# Patient Record
Sex: MALE | Race: WHITE | NOT HISPANIC OR LATINO
[De-identification: names, ages, dates, MRNs, and addresses within clinical notes are randomized per-mention and may not be internally consistent; named-entity substitution may affect disease eponyms.]

---

## 2017-01-26 ENCOUNTER — FORM ENCOUNTER (OUTPATIENT)
Age: 82
End: 2017-01-26

## 2017-01-27 ENCOUNTER — APPOINTMENT (OUTPATIENT)
Dept: RADIOLOGY | Facility: CLINIC | Age: 82
End: 2017-01-27

## 2017-01-27 ENCOUNTER — OUTPATIENT (OUTPATIENT)
Dept: OUTPATIENT SERVICES | Facility: HOSPITAL | Age: 82
LOS: 1 days | End: 2017-01-27
Payer: MEDICARE

## 2017-01-27 ENCOUNTER — APPOINTMENT (OUTPATIENT)
Dept: CARDIOLOGY | Facility: CLINIC | Age: 82
End: 2017-01-27

## 2017-01-27 ENCOUNTER — NON-APPOINTMENT (OUTPATIENT)
Age: 82
End: 2017-01-27

## 2017-01-27 VITALS
SYSTOLIC BLOOD PRESSURE: 145 MMHG | OXYGEN SATURATION: 94 % | TEMPERATURE: 100.5 F | HEART RATE: 114 BPM | DIASTOLIC BLOOD PRESSURE: 70 MMHG

## 2017-01-27 VITALS — TEMPERATURE: 100.5 F | HEIGHT: 71 IN

## 2017-01-27 DIAGNOSIS — M48.00 SPINAL STENOSIS, SITE UNSPECIFIED: ICD-10-CM

## 2017-01-27 PROCEDURE — 71046 X-RAY EXAM CHEST 2 VIEWS: CPT

## 2017-01-30 LAB
25(OH)D3 SERPL-MCNC: 24.8 NG/ML
ALBUMIN SERPL ELPH-MCNC: 4.6 G/DL
ALP BLD-CCNC: 59 U/L
ALT SERPL-CCNC: 14 U/L
ANION GAP SERPL CALC-SCNC: 14 MMOL/L
AST SERPL-CCNC: 17 U/L
BASOPHILS # BLD AUTO: 0.02 K/UL
BASOPHILS NFR BLD AUTO: 0.2 %
BILIRUB SERPL-MCNC: 1.5 MG/DL
BUN SERPL-MCNC: 19 MG/DL
CALCIUM SERPL-MCNC: 8.9 MG/DL
CHLORIDE SERPL-SCNC: 98 MMOL/L
CHOLEST SERPL-MCNC: 119 MG/DL
CHOLEST/HDLC SERPL: 3.6 RATIO
CO2 SERPL-SCNC: 24 MMOL/L
CREAT SERPL-MCNC: 1.08 MG/DL
EOSINOPHIL # BLD AUTO: 0.02 K/UL
EOSINOPHIL NFR BLD AUTO: 0.2 %
GLUCOSE SERPL-MCNC: 238 MG/DL
HBA1C MFR BLD HPLC: 6.1 %
HCT VFR BLD CALC: 41.9 %
HDLC SERPL-MCNC: 33 MG/DL
HGB BLD-MCNC: 14.1 G/DL
IMM GRANULOCYTES NFR BLD AUTO: 0.2 %
LDLC SERPL CALC-MCNC: 71 MG/DL
LYMPHOCYTES # BLD AUTO: 0.61 K/UL
LYMPHOCYTES NFR BLD AUTO: 7.1 %
MAN DIFF?: NORMAL
MCHC RBC-ENTMCNC: 31.4 PG
MCHC RBC-ENTMCNC: 33.7 GM/DL
MCV RBC AUTO: 93.3 FL
MONOCYTES # BLD AUTO: 1 K/UL
MONOCYTES NFR BLD AUTO: 11.7 %
NEUTROPHILS # BLD AUTO: 6.88 K/UL
NEUTROPHILS NFR BLD AUTO: 80.6 %
PLATELET # BLD AUTO: 128 K/UL
POTASSIUM SERPL-SCNC: 4.4 MMOL/L
PROT SERPL-MCNC: 6.8 G/DL
PSA SERPL-MCNC: 3.71 NG/ML
RBC # BLD: 4.49 M/UL
RBC # FLD: 13.3 %
SODIUM SERPL-SCNC: 136 MMOL/L
TRIGL SERPL-MCNC: 73 MG/DL
TSH SERPL-ACNC: 1.42 UIU/ML
WBC # FLD AUTO: 8.55 K/UL

## 2017-02-08 ENCOUNTER — APPOINTMENT (OUTPATIENT)
Dept: CARDIOLOGY | Facility: CLINIC | Age: 82
End: 2017-02-08

## 2017-02-27 ENCOUNTER — APPOINTMENT (OUTPATIENT)
Dept: CARDIOLOGY | Facility: CLINIC | Age: 82
End: 2017-02-27

## 2017-02-27 VITALS
DIASTOLIC BLOOD PRESSURE: 62 MMHG | SYSTOLIC BLOOD PRESSURE: 120 MMHG | HEART RATE: 77 BPM | HEIGHT: 71 IN | OXYGEN SATURATION: 97 % | WEIGHT: 205 LBS | BODY MASS INDEX: 28.7 KG/M2

## 2017-03-01 ENCOUNTER — NON-APPOINTMENT (OUTPATIENT)
Age: 82
End: 2017-03-01

## 2017-03-08 ENCOUNTER — RX RENEWAL (OUTPATIENT)
Age: 82
End: 2017-03-08

## 2017-03-17 ENCOUNTER — OUTPATIENT (OUTPATIENT)
Dept: OUTPATIENT SERVICES | Facility: HOSPITAL | Age: 82
LOS: 1 days | End: 2017-03-17
Payer: MEDICARE

## 2017-03-17 DIAGNOSIS — M54.16 RADICULOPATHY, LUMBAR REGION: ICD-10-CM

## 2017-03-17 PROCEDURE — 62323 NJX INTERLAMINAR LMBR/SAC: CPT

## 2017-03-17 PROCEDURE — 77003 FLUOROGUIDE FOR SPINE INJECT: CPT

## 2017-03-30 ENCOUNTER — APPOINTMENT (OUTPATIENT)
Dept: MRI IMAGING | Facility: CLINIC | Age: 82
End: 2017-03-30

## 2017-03-30 ENCOUNTER — OUTPATIENT (OUTPATIENT)
Dept: OUTPATIENT SERVICES | Facility: HOSPITAL | Age: 82
LOS: 1 days | End: 2017-03-30
Payer: MEDICARE

## 2017-03-30 DIAGNOSIS — Z00.8 ENCOUNTER FOR OTHER GENERAL EXAMINATION: ICD-10-CM

## 2017-03-30 PROCEDURE — 72148 MRI LUMBAR SPINE W/O DYE: CPT

## 2017-03-31 ENCOUNTER — OUTPATIENT (OUTPATIENT)
Dept: OUTPATIENT SERVICES | Facility: HOSPITAL | Age: 82
LOS: 1 days | End: 2017-03-31
Payer: MEDICARE

## 2017-03-31 DIAGNOSIS — M54.16 RADICULOPATHY, LUMBAR REGION: ICD-10-CM

## 2017-03-31 PROCEDURE — 77003 FLUOROGUIDE FOR SPINE INJECT: CPT

## 2017-03-31 PROCEDURE — 62323 NJX INTERLAMINAR LMBR/SAC: CPT

## 2017-05-04 ENCOUNTER — OUTPATIENT (OUTPATIENT)
Dept: OUTPATIENT SERVICES | Facility: HOSPITAL | Age: 82
LOS: 1 days | End: 2017-05-04
Payer: MEDICARE

## 2017-05-04 DIAGNOSIS — M54.16 RADICULOPATHY, LUMBAR REGION: ICD-10-CM

## 2017-05-04 PROCEDURE — 77003 FLUOROGUIDE FOR SPINE INJECT: CPT

## 2017-05-04 PROCEDURE — 64483 NJX AA&/STRD TFRM EPI L/S 1: CPT | Mod: RT

## 2017-06-02 ENCOUNTER — APPOINTMENT (OUTPATIENT)
Dept: CARDIOLOGY | Facility: CLINIC | Age: 82
End: 2017-06-02

## 2017-06-02 ENCOUNTER — NON-APPOINTMENT (OUTPATIENT)
Age: 82
End: 2017-06-02

## 2017-06-02 VITALS
OXYGEN SATURATION: 98 % | HEART RATE: 78 BPM | WEIGHT: 206 LBS | BODY MASS INDEX: 28.84 KG/M2 | SYSTOLIC BLOOD PRESSURE: 111 MMHG | DIASTOLIC BLOOD PRESSURE: 66 MMHG | HEIGHT: 71 IN

## 2017-06-14 ENCOUNTER — MEDICATION RENEWAL (OUTPATIENT)
Age: 82
End: 2017-06-14

## 2017-06-14 ENCOUNTER — RX RENEWAL (OUTPATIENT)
Age: 82
End: 2017-06-14

## 2017-07-18 ENCOUNTER — MEDICATION RENEWAL (OUTPATIENT)
Age: 82
End: 2017-07-18

## 2017-08-23 ENCOUNTER — APPOINTMENT (OUTPATIENT)
Dept: CARDIOLOGY | Facility: CLINIC | Age: 82
End: 2017-08-23
Payer: MEDICARE

## 2017-08-23 ENCOUNTER — NON-APPOINTMENT (OUTPATIENT)
Age: 82
End: 2017-08-23

## 2017-08-23 VITALS
WEIGHT: 206 LBS | TEMPERATURE: 98.2 F | HEART RATE: 79 BPM | RESPIRATION RATE: 14 BRPM | DIASTOLIC BLOOD PRESSURE: 73 MMHG | HEIGHT: 71 IN | OXYGEN SATURATION: 97 % | BODY MASS INDEX: 28.84 KG/M2 | SYSTOLIC BLOOD PRESSURE: 123 MMHG

## 2017-08-23 DIAGNOSIS — Z85.9 PERSONAL HISTORY OF MALIGNANT NEOPLASM, UNSPECIFIED: ICD-10-CM

## 2017-08-23 DIAGNOSIS — M79.606 PAIN IN LEG, UNSPECIFIED: ICD-10-CM

## 2017-08-23 DIAGNOSIS — Z01.810 ENCOUNTER FOR PREPROCEDURAL CARDIOVASCULAR EXAMINATION: ICD-10-CM

## 2017-08-23 DIAGNOSIS — Z87.891 PERSONAL HISTORY OF NICOTINE DEPENDENCE: ICD-10-CM

## 2017-08-23 PROCEDURE — 99214 OFFICE O/P EST MOD 30 MIN: CPT | Mod: 25

## 2017-08-23 PROCEDURE — 93000 ELECTROCARDIOGRAM COMPLETE: CPT

## 2017-08-28 ENCOUNTER — APPOINTMENT (OUTPATIENT)
Dept: CARDIOLOGY | Facility: CLINIC | Age: 82
End: 2017-08-28
Payer: MEDICARE

## 2017-08-28 PROCEDURE — 36415 COLL VENOUS BLD VENIPUNCTURE: CPT

## 2017-08-29 LAB
ALBUMIN SERPL ELPH-MCNC: 4.3 G/DL
ALP BLD-CCNC: 70 U/L
ALT SERPL-CCNC: 13 U/L
ANION GAP SERPL CALC-SCNC: 15 MMOL/L
AST SERPL-CCNC: 16 U/L
BASOPHILS # BLD AUTO: 0.04 K/UL
BASOPHILS NFR BLD AUTO: 0.6 %
BILIRUB SERPL-MCNC: 0.9 MG/DL
BUN SERPL-MCNC: 27 MG/DL
CALCIUM SERPL-MCNC: 9.4 MG/DL
CHLORIDE SERPL-SCNC: 104 MMOL/L
CO2 SERPL-SCNC: 23 MMOL/L
CREAT SERPL-MCNC: 1.01 MG/DL
EOSINOPHIL # BLD AUTO: 0.31 K/UL
EOSINOPHIL NFR BLD AUTO: 4.3 %
GLUCOSE SERPL-MCNC: 115 MG/DL
HCT VFR BLD CALC: 41.2 %
HGB BLD-MCNC: 13.7 G/DL
IMM GRANULOCYTES NFR BLD AUTO: 0.1 %
LYMPHOCYTES # BLD AUTO: 3.17 K/UL
LYMPHOCYTES NFR BLD AUTO: 44.3 %
MAN DIFF?: NORMAL
MCHC RBC-ENTMCNC: 31.2 PG
MCHC RBC-ENTMCNC: 33.3 GM/DL
MCV RBC AUTO: 93.8 FL
MONOCYTES # BLD AUTO: 0.5 K/UL
MONOCYTES NFR BLD AUTO: 7 %
NEUTROPHILS # BLD AUTO: 3.13 K/UL
NEUTROPHILS NFR BLD AUTO: 43.7 %
PLATELET # BLD AUTO: 152 K/UL
POTASSIUM SERPL-SCNC: 4.4 MMOL/L
PROT SERPL-MCNC: 6.6 G/DL
RBC # BLD: 4.39 M/UL
RBC # FLD: 13.5 %
SODIUM SERPL-SCNC: 142 MMOL/L
WBC # FLD AUTO: 7.16 K/UL

## 2017-09-08 ENCOUNTER — APPOINTMENT (OUTPATIENT)
Dept: CARDIOLOGY | Facility: CLINIC | Age: 82
End: 2017-09-08
Payer: MEDICARE

## 2017-09-08 VITALS
HEIGHT: 71 IN | OXYGEN SATURATION: 94 % | HEART RATE: 69 BPM | WEIGHT: 208 LBS | SYSTOLIC BLOOD PRESSURE: 116 MMHG | DIASTOLIC BLOOD PRESSURE: 72 MMHG | BODY MASS INDEX: 29.12 KG/M2

## 2017-09-08 PROCEDURE — 99214 OFFICE O/P EST MOD 30 MIN: CPT

## 2017-09-13 ENCOUNTER — APPOINTMENT (OUTPATIENT)
Dept: NUCLEAR MEDICINE | Facility: CLINIC | Age: 82
End: 2017-09-13
Payer: MEDICARE

## 2017-09-13 ENCOUNTER — OUTPATIENT (OUTPATIENT)
Dept: OUTPATIENT SERVICES | Facility: HOSPITAL | Age: 82
LOS: 1 days | End: 2017-09-13
Payer: MEDICARE

## 2017-09-13 DIAGNOSIS — C83.89 OTHER NON-FOLLICULAR LYMPHOMA, EXTRANODAL AND SOLID ORGAN SITES: ICD-10-CM

## 2017-09-13 PROCEDURE — 78815 PET IMAGE W/CT SKULL-THIGH: CPT

## 2017-09-13 PROCEDURE — A9552: CPT

## 2017-09-13 PROCEDURE — 78815 PET IMAGE W/CT SKULL-THIGH: CPT | Mod: 26,KX,PS

## 2017-12-09 ENCOUNTER — RX RENEWAL (OUTPATIENT)
Age: 82
End: 2017-12-09

## 2018-01-12 ENCOUNTER — APPOINTMENT (OUTPATIENT)
Dept: CARDIOLOGY | Facility: CLINIC | Age: 83
End: 2018-01-12
Payer: MEDICARE

## 2018-01-12 VITALS
OXYGEN SATURATION: 90 % | WEIGHT: 214 LBS | BODY MASS INDEX: 29.85 KG/M2 | SYSTOLIC BLOOD PRESSURE: 111 MMHG | HEART RATE: 65 BPM | DIASTOLIC BLOOD PRESSURE: 67 MMHG

## 2018-01-12 PROCEDURE — 93000 ELECTROCARDIOGRAM COMPLETE: CPT

## 2018-01-12 PROCEDURE — 99214 OFFICE O/P EST MOD 30 MIN: CPT | Mod: 25

## 2018-01-12 PROCEDURE — 36415 COLL VENOUS BLD VENIPUNCTURE: CPT

## 2018-01-15 LAB
25(OH)D3 SERPL-MCNC: 22 NG/ML
ALBUMIN SERPL ELPH-MCNC: 4.4 G/DL
ALP BLD-CCNC: 68 U/L
ALT SERPL-CCNC: 13 U/L
ANION GAP SERPL CALC-SCNC: 16 MMOL/L
AST SERPL-CCNC: 19 U/L
BASOPHILS # BLD AUTO: 0.03 K/UL
BASOPHILS NFR BLD AUTO: 0.4 %
BILIRUB SERPL-MCNC: 0.5 MG/DL
BUN SERPL-MCNC: 27 MG/DL
CALCIUM SERPL-MCNC: 8.9 MG/DL
CHLORIDE SERPL-SCNC: 104 MMOL/L
CHOLEST SERPL-MCNC: 143 MG/DL
CHOLEST/HDLC SERPL: 4.3 RATIO
CO2 SERPL-SCNC: 24 MMOL/L
CREAT SERPL-MCNC: 0.95 MG/DL
EOSINOPHIL # BLD AUTO: 0.2 K/UL
EOSINOPHIL NFR BLD AUTO: 2.8 %
ERYTHROCYTE [SEDIMENTATION RATE] IN BLOOD BY WESTERGREN METHOD: 12 MM/HR
GLUCOSE SERPL-MCNC: 98 MG/DL
HBA1C MFR BLD HPLC: 6 %
HCT VFR BLD CALC: 40.5 %
HDLC SERPL-MCNC: 33 MG/DL
HGB BLD-MCNC: 13.5 G/DL
IMM GRANULOCYTES NFR BLD AUTO: 0 %
LDLC SERPL CALC-MCNC: 89 MG/DL
LYMPHOCYTES # BLD AUTO: 3.21 K/UL
LYMPHOCYTES NFR BLD AUTO: 44.3 %
MAN DIFF?: NORMAL
MCHC RBC-ENTMCNC: 31.9 PG
MCHC RBC-ENTMCNC: 33.3 GM/DL
MCV RBC AUTO: 95.7 FL
MONOCYTES # BLD AUTO: 0.53 K/UL
MONOCYTES NFR BLD AUTO: 7.3 %
NEUTROPHILS # BLD AUTO: 3.27 K/UL
NEUTROPHILS NFR BLD AUTO: 45.2 %
PLATELET # BLD AUTO: 153 K/UL
POTASSIUM SERPL-SCNC: 4.8 MMOL/L
PROT SERPL-MCNC: 6.9 G/DL
RBC # BLD: 4.23 M/UL
RBC # FLD: 13.1 %
SODIUM SERPL-SCNC: 144 MMOL/L
T3FREE SERPL-MCNC: 2.33 PG/ML
T4 FREE SERPL-MCNC: 1 NG/DL
TRIGL SERPL-MCNC: 106 MG/DL
TSH SERPL-ACNC: 4.8 UIU/ML
WBC # FLD AUTO: 7.24 K/UL

## 2018-01-18 ENCOUNTER — NON-APPOINTMENT (OUTPATIENT)
Age: 83
End: 2018-01-18

## 2018-02-02 ENCOUNTER — APPOINTMENT (OUTPATIENT)
Dept: CARDIOLOGY | Facility: CLINIC | Age: 83
End: 2018-02-02
Payer: MEDICARE

## 2018-02-02 PROCEDURE — 93880 EXTRACRANIAL BILAT STUDY: CPT

## 2018-09-06 ENCOUNTER — RX RENEWAL (OUTPATIENT)
Age: 83
End: 2018-09-06

## 2018-12-03 ENCOUNTER — RX RENEWAL (OUTPATIENT)
Age: 83
End: 2018-12-03

## 2018-12-07 ENCOUNTER — APPOINTMENT (OUTPATIENT)
Dept: NUCLEAR MEDICINE | Facility: CLINIC | Age: 83
End: 2018-12-07
Payer: MEDICARE

## 2018-12-07 ENCOUNTER — OUTPATIENT (OUTPATIENT)
Dept: OUTPATIENT SERVICES | Facility: HOSPITAL | Age: 83
LOS: 1 days | End: 2018-12-07
Payer: MEDICARE

## 2018-12-07 DIAGNOSIS — Z00.8 ENCOUNTER FOR OTHER GENERAL EXAMINATION: ICD-10-CM

## 2018-12-07 PROCEDURE — 78815 PET IMAGE W/CT SKULL-THIGH: CPT

## 2018-12-07 PROCEDURE — 78815 PET IMAGE W/CT SKULL-THIGH: CPT | Mod: 26,PS,KX

## 2018-12-07 PROCEDURE — A9552: CPT

## 2019-01-21 ENCOUNTER — LABORATORY RESULT (OUTPATIENT)
Age: 84
End: 2019-01-21

## 2019-01-21 ENCOUNTER — APPOINTMENT (OUTPATIENT)
Dept: CARDIOLOGY | Facility: CLINIC | Age: 84
End: 2019-01-21
Payer: MEDICARE

## 2019-01-21 ENCOUNTER — NON-APPOINTMENT (OUTPATIENT)
Age: 84
End: 2019-01-21

## 2019-01-21 VITALS
HEART RATE: 82 BPM | BODY MASS INDEX: 29.82 KG/M2 | SYSTOLIC BLOOD PRESSURE: 148 MMHG | OXYGEN SATURATION: 95 % | HEIGHT: 71 IN | DIASTOLIC BLOOD PRESSURE: 76 MMHG | WEIGHT: 213 LBS

## 2019-01-21 PROCEDURE — 36415 COLL VENOUS BLD VENIPUNCTURE: CPT

## 2019-01-21 PROCEDURE — 93000 ELECTROCARDIOGRAM COMPLETE: CPT

## 2019-01-21 PROCEDURE — 99214 OFFICE O/P EST MOD 30 MIN: CPT | Mod: 25

## 2019-01-21 NOTE — DISCUSSION/SUMMARY
[Hyperlipidemia] : hyperlipidemia [Stable] : stable [None] : none [Diet Modification] : diet modification [Patient] : the patient [FreeTextEntry1] : Pt will taper off neurontin.

## 2019-01-21 NOTE — REASON FOR VISIT
[Follow-Up - Clinic] : a clinic follow-up of [Hypertension] : hypertension [FreeTextEntry1] : spinal stenosis

## 2019-01-21 NOTE — PHYSICAL EXAM
[General Appearance - Well Developed] : well developed [Normal Appearance] : normal appearance [Well Groomed] : well groomed [General Appearance - Well Nourished] : well nourished [No Deformities] : no deformities [General Appearance - In No Acute Distress] : no acute distress [Normal Conjunctiva] : the conjunctiva exhibited no abnormalities [] : no respiratory distress [Respiration, Rhythm And Depth] : normal respiratory rhythm and effort [Exaggerated Use Of Accessory Muscles For Inspiration] : no accessory muscle use [Auscultation Breath Sounds / Voice Sounds] : lungs were clear to auscultation bilaterally [Heart Rate And Rhythm] : heart rate and rhythm were normal [Heart Sounds] : normal S1 and S2 [Abdomen Soft] : soft [FreeTextEntry1] : spinal stenosis  [Skin Color & Pigmentation] : normal skin color and pigmentation [Oriented To Time, Place, And Person] : oriented to person, place, and time

## 2019-01-21 NOTE — HISTORY OF PRESENT ILLNESS
[FreeTextEntry1] : Pt is here in follow up for diffuse severe joint pain and dizziness. Pt denies chest pain or shortness of breath. \par

## 2019-01-25 LAB
25(OH)D3 SERPL-MCNC: 15.6 NG/ML
ALBUMIN SERPL ELPH-MCNC: 4.4 G/DL
ALP BLD-CCNC: 69 U/L
ALT SERPL-CCNC: 11 U/L
ANION GAP SERPL CALC-SCNC: 11 MMOL/L
AST SERPL-CCNC: 15 U/L
BASOPHILS # BLD AUTO: 0.04 K/UL
BASOPHILS NFR BLD AUTO: 0.8 %
BILIRUB SERPL-MCNC: 0.6 MG/DL
BUN SERPL-MCNC: 21 MG/DL
CALCIUM SERPL-MCNC: 8.9 MG/DL
CHLORIDE SERPL-SCNC: 107 MMOL/L
CHOLEST SERPL-MCNC: 148 MG/DL
CHOLEST/HDLC SERPL: 4.1 RATIO
CO2 SERPL-SCNC: 26 MMOL/L
CREAT SERPL-MCNC: 1.07 MG/DL
EOSINOPHIL # BLD AUTO: 0.19 K/UL
EOSINOPHIL NFR BLD AUTO: 4 %
GLUCOSE SERPL-MCNC: 155 MG/DL
HBA1C MFR BLD HPLC: 6.2 %
HCT VFR BLD CALC: 40.3 %
HDLC SERPL-MCNC: 36 MG/DL
HGB BLD-MCNC: 13.3 G/DL
IMM GRANULOCYTES NFR BLD AUTO: 1.7 %
LDLC SERPL CALC-MCNC: 88 MG/DL
LYMPHOCYTES # BLD AUTO: 2 K/UL
LYMPHOCYTES NFR BLD AUTO: 41.6 %
MAN DIFF?: NORMAL
MCHC RBC-ENTMCNC: 30.9 PG
MCHC RBC-ENTMCNC: 33 GM/DL
MCV RBC AUTO: 93.7 FL
MONOCYTES # BLD AUTO: 0.38 K/UL
MONOCYTES NFR BLD AUTO: 7.9 %
NEUTROPHILS # BLD AUTO: 2.12 K/UL
NEUTROPHILS NFR BLD AUTO: 44 %
PLATELET # BLD AUTO: 146 K/UL
POTASSIUM SERPL-SCNC: 4.3 MMOL/L
PROT SERPL-MCNC: 6.4 G/DL
RBC # BLD: 4.3 M/UL
RBC # FLD: 13.3 %
SODIUM SERPL-SCNC: 144 MMOL/L
TRIGL SERPL-MCNC: 122 MG/DL
TSH SERPL-ACNC: 4.53 UIU/ML
WBC # FLD AUTO: 4.81 K/UL

## 2019-04-22 ENCOUNTER — RX RENEWAL (OUTPATIENT)
Age: 84
End: 2019-04-22

## 2019-06-19 ENCOUNTER — APPOINTMENT (OUTPATIENT)
Dept: NUCLEAR MEDICINE | Facility: CLINIC | Age: 84
End: 2019-06-19
Payer: MEDICARE

## 2019-06-19 ENCOUNTER — OUTPATIENT (OUTPATIENT)
Dept: OUTPATIENT SERVICES | Facility: HOSPITAL | Age: 84
LOS: 1 days | End: 2019-06-19
Payer: MEDICARE

## 2019-06-19 DIAGNOSIS — C83.89 OTHER NON-FOLLICULAR LYMPHOMA, EXTRANODAL AND SOLID ORGAN SITES: ICD-10-CM

## 2019-06-19 PROCEDURE — 78815 PET IMAGE W/CT SKULL-THIGH: CPT | Mod: 26,PS,KX

## 2019-06-19 PROCEDURE — 78815 PET IMAGE W/CT SKULL-THIGH: CPT

## 2019-06-19 PROCEDURE — A9552: CPT

## 2019-06-21 ENCOUNTER — OUTPATIENT (OUTPATIENT)
Dept: OUTPATIENT SERVICES | Facility: HOSPITAL | Age: 84
LOS: 1 days | End: 2019-06-21
Payer: MEDICARE

## 2019-06-21 PROCEDURE — 93010 ELECTROCARDIOGRAM REPORT: CPT

## 2019-06-30 ENCOUNTER — RESULT CHARGE (OUTPATIENT)
Age: 84
End: 2019-06-30

## 2019-07-01 ENCOUNTER — APPOINTMENT (OUTPATIENT)
Dept: CARDIOLOGY | Facility: CLINIC | Age: 84
End: 2019-07-01

## 2019-07-01 ENCOUNTER — APPOINTMENT (OUTPATIENT)
Dept: INTERNAL MEDICINE | Facility: CLINIC | Age: 84
End: 2019-07-01

## 2019-10-10 ENCOUNTER — MEDICATION RENEWAL (OUTPATIENT)
Age: 84
End: 2019-10-10

## 2019-10-10 ENCOUNTER — RX RENEWAL (OUTPATIENT)
Age: 84
End: 2019-10-10

## 2019-10-14 ENCOUNTER — NON-APPOINTMENT (OUTPATIENT)
Age: 84
End: 2019-10-14

## 2019-10-14 ENCOUNTER — APPOINTMENT (OUTPATIENT)
Dept: CARDIOLOGY | Facility: CLINIC | Age: 84
End: 2019-10-14
Payer: MEDICARE

## 2019-10-14 VITALS — HEIGHT: 71 IN | WEIGHT: 195 LBS | BODY MASS INDEX: 27.3 KG/M2

## 2019-10-14 VITALS — SYSTOLIC BLOOD PRESSURE: 118 MMHG | DIASTOLIC BLOOD PRESSURE: 62 MMHG | OXYGEN SATURATION: 96 % | HEART RATE: 74 BPM

## 2019-10-14 PROCEDURE — 93000 ELECTROCARDIOGRAM COMPLETE: CPT

## 2019-10-14 PROCEDURE — 99213 OFFICE O/P EST LOW 20 MIN: CPT

## 2019-10-14 NOTE — DISCUSSION/SUMMARY
[Hyperlipidemia] : hyperlipidemia [Stable] : stable [None] : none [Diet Modification] : diet modification [Patient] : the patient [FreeTextEntry1] : Pt will follow up with current medications. Cardiac status is compensated at this time. Pt was encouraged to use current pain management to increase mobiltiy.

## 2019-10-14 NOTE — PHYSICAL EXAM
[General Appearance - Well Developed] : well developed [Normal Appearance] : normal appearance [No Deformities] : no deformities [Well Groomed] : well groomed [General Appearance - Well Nourished] : well nourished [General Appearance - In No Acute Distress] : no acute distress [Normal Conjunctiva] : the conjunctiva exhibited no abnormalities [] : no respiratory distress [Heart Rate And Rhythm] : heart rate and rhythm were normal [Respiration, Rhythm And Depth] : normal respiratory rhythm and effort [Exaggerated Use Of Accessory Muscles For Inspiration] : no accessory muscle use [Auscultation Breath Sounds / Voice Sounds] : lungs were clear to auscultation bilaterally [FreeTextEntry1] : spinal stenosis  [Abdomen Soft] : soft [Heart Sounds] : normal S1 and S2 [Skin Color & Pigmentation] : normal skin color and pigmentation [Oriented To Time, Place, And Person] : oriented to person, place, and time

## 2019-10-14 NOTE — REASON FOR VISIT
[Follow-Up - Clinic] : a clinic follow-up of [FreeTextEntry1] : spinal stenosis [Hypertension] : hypertension

## 2019-10-14 NOTE — HISTORY OF PRESENT ILLNESS
[FreeTextEntry1] : 89 yo male presents with dtr for evaluation of HTN. Pt denies chest pain or shortness of breath. Pt was planning knee surgery but declined. He is using medical marijuana for chronic pain with improved mobility.

## 2020-01-10 ENCOUNTER — MEDICATION RENEWAL (OUTPATIENT)
Age: 85
End: 2020-01-10

## 2020-01-10 ENCOUNTER — RX RENEWAL (OUTPATIENT)
Age: 85
End: 2020-01-10

## 2020-02-03 ENCOUNTER — APPOINTMENT (OUTPATIENT)
Dept: CARDIOLOGY | Facility: CLINIC | Age: 85
End: 2020-02-03

## 2020-02-25 ENCOUNTER — EMERGENCY (EMERGENCY)
Facility: HOSPITAL | Age: 85
LOS: 1 days | Discharge: ANOTHER HOSPITAL | End: 2020-02-25
Attending: EMERGENCY MEDICINE | Admitting: EMERGENCY MEDICINE
Payer: MEDICARE

## 2020-02-25 ENCOUNTER — INPATIENT (INPATIENT)
Facility: HOSPITAL | Age: 85
LOS: 20 days | Discharge: NURSING, REHAB OR LONG-TERM CARE | DRG: 871 | End: 2020-03-17
Attending: FAMILY MEDICINE | Admitting: INTERNAL MEDICINE
Payer: MEDICARE

## 2020-02-25 VITALS
SYSTOLIC BLOOD PRESSURE: 132 MMHG | TEMPERATURE: 99 F | WEIGHT: 190.04 LBS | OXYGEN SATURATION: 94 % | HEIGHT: 70 IN | DIASTOLIC BLOOD PRESSURE: 66 MMHG | RESPIRATION RATE: 18 BRPM | HEART RATE: 96 BPM

## 2020-02-25 VITALS
OXYGEN SATURATION: 95 % | DIASTOLIC BLOOD PRESSURE: 56 MMHG | SYSTOLIC BLOOD PRESSURE: 131 MMHG | HEART RATE: 98 BPM | RESPIRATION RATE: 23 BRPM

## 2020-02-25 VITALS
HEIGHT: 70 IN | SYSTOLIC BLOOD PRESSURE: 120 MMHG | TEMPERATURE: 98 F | WEIGHT: 199.96 LBS | RESPIRATION RATE: 22 BRPM | HEART RATE: 100 BPM | DIASTOLIC BLOOD PRESSURE: 66 MMHG | OXYGEN SATURATION: 95 %

## 2020-02-25 LAB
ALBUMIN SERPL ELPH-MCNC: 3.1 G/DL — LOW (ref 3.3–5)
ALP SERPL-CCNC: 58 U/L — SIGNIFICANT CHANGE UP (ref 30–120)
ALT FLD-CCNC: 24 U/L DA — SIGNIFICANT CHANGE UP (ref 10–60)
ANION GAP SERPL CALC-SCNC: 6 MMOL/L — SIGNIFICANT CHANGE UP (ref 5–17)
APPEARANCE UR: CLEAR — SIGNIFICANT CHANGE UP
APTT BLD: 30.9 SEC — SIGNIFICANT CHANGE UP (ref 28.5–37)
AST SERPL-CCNC: 16 U/L — SIGNIFICANT CHANGE UP (ref 10–40)
BACTERIA # UR AUTO: ABNORMAL
BASOPHILS # BLD AUTO: 0.04 K/UL — SIGNIFICANT CHANGE UP (ref 0–0.2)
BASOPHILS NFR BLD AUTO: 0.3 % — SIGNIFICANT CHANGE UP (ref 0–2)
BILIRUB SERPL-MCNC: 2 MG/DL — HIGH (ref 0.2–1.2)
BILIRUB UR-MCNC: NEGATIVE — SIGNIFICANT CHANGE UP
BUN SERPL-MCNC: 19 MG/DL — SIGNIFICANT CHANGE UP (ref 7–23)
CALCIUM SERPL-MCNC: 8.6 MG/DL — SIGNIFICANT CHANGE UP (ref 8.4–10.5)
CHLORIDE SERPL-SCNC: 102 MMOL/L — SIGNIFICANT CHANGE UP (ref 96–108)
CO2 SERPL-SCNC: 29 MMOL/L — SIGNIFICANT CHANGE UP (ref 22–31)
COLOR SPEC: YELLOW — SIGNIFICANT CHANGE UP
CREAT SERPL-MCNC: 1.25 MG/DL — SIGNIFICANT CHANGE UP (ref 0.5–1.3)
DIFF PNL FLD: ABNORMAL
EOSINOPHIL # BLD AUTO: 0.01 K/UL — SIGNIFICANT CHANGE UP (ref 0–0.5)
EOSINOPHIL NFR BLD AUTO: 0.1 % — SIGNIFICANT CHANGE UP (ref 0–6)
EPI CELLS # UR: SIGNIFICANT CHANGE UP
FLU A RESULT: SIGNIFICANT CHANGE UP
FLU A RESULT: SIGNIFICANT CHANGE UP
FLUAV AG NPH QL: SIGNIFICANT CHANGE UP
FLUBV AG NPH QL: SIGNIFICANT CHANGE UP
GLUCOSE SERPL-MCNC: 117 MG/DL — HIGH (ref 70–99)
GLUCOSE UR QL: NEGATIVE MG/DL — SIGNIFICANT CHANGE UP
HCT VFR BLD CALC: 40.4 % — SIGNIFICANT CHANGE UP (ref 39–50)
HGB BLD-MCNC: 13.2 G/DL — SIGNIFICANT CHANGE UP (ref 13–17)
IMM GRANULOCYTES NFR BLD AUTO: 0.8 % — SIGNIFICANT CHANGE UP (ref 0–1.5)
INR BLD: 1.99 RATIO — HIGH (ref 0.88–1.16)
KETONES UR-MCNC: ABNORMAL
LACTATE SERPL-SCNC: 1.5 MMOL/L — SIGNIFICANT CHANGE UP (ref 0.7–2)
LEUKOCYTE ESTERASE UR-ACNC: ABNORMAL
LIDOCAIN IGE QN: 46 U/L — LOW (ref 73–393)
LYMPHOCYTES # BLD AUTO: 1.95 K/UL — SIGNIFICANT CHANGE UP (ref 1–3.3)
LYMPHOCYTES # BLD AUTO: 14.2 % — SIGNIFICANT CHANGE UP (ref 13–44)
MCHC RBC-ENTMCNC: 31.1 PG — SIGNIFICANT CHANGE UP (ref 27–34)
MCHC RBC-ENTMCNC: 32.7 GM/DL — SIGNIFICANT CHANGE UP (ref 32–36)
MCV RBC AUTO: 95.1 FL — SIGNIFICANT CHANGE UP (ref 80–100)
MONOCYTES # BLD AUTO: 1.17 K/UL — HIGH (ref 0–0.9)
MONOCYTES NFR BLD AUTO: 8.5 % — SIGNIFICANT CHANGE UP (ref 2–14)
NEUTROPHILS # BLD AUTO: 10.49 K/UL — HIGH (ref 1.8–7.4)
NEUTROPHILS NFR BLD AUTO: 76.1 % — SIGNIFICANT CHANGE UP (ref 43–77)
NITRITE UR-MCNC: POSITIVE
NRBC # BLD: 0 /100 WBCS — SIGNIFICANT CHANGE UP (ref 0–0)
PH UR: 6 — SIGNIFICANT CHANGE UP (ref 5–8)
PLATELET # BLD AUTO: 169 K/UL — SIGNIFICANT CHANGE UP (ref 150–400)
POTASSIUM SERPL-MCNC: 3.8 MMOL/L — SIGNIFICANT CHANGE UP (ref 3.5–5.3)
POTASSIUM SERPL-SCNC: 3.8 MMOL/L — SIGNIFICANT CHANGE UP (ref 3.5–5.3)
PROT SERPL-MCNC: 6.6 G/DL — SIGNIFICANT CHANGE UP (ref 6–8.3)
PROT UR-MCNC: 30 MG/DL
PROTHROM AB SERPL-ACNC: 22.6 SEC — HIGH (ref 10–12.9)
RBC # BLD: 4.25 M/UL — SIGNIFICANT CHANGE UP (ref 4.2–5.8)
RBC # FLD: 12.9 % — SIGNIFICANT CHANGE UP (ref 10.3–14.5)
RBC CASTS # UR COMP ASSIST: ABNORMAL /HPF (ref 0–4)
RSV RESULT: SIGNIFICANT CHANGE UP
RSV RNA RESP QL NAA+PROBE: SIGNIFICANT CHANGE UP
SODIUM SERPL-SCNC: 137 MMOL/L — SIGNIFICANT CHANGE UP (ref 135–145)
SP GR SPEC: 1.02 — SIGNIFICANT CHANGE UP (ref 1.01–1.02)
TROPONIN I SERPL-MCNC: 0.04 NG/ML — SIGNIFICANT CHANGE UP (ref 0.02–0.06)
UROBILINOGEN FLD QL: 1 MG/DL
WBC # BLD: 13.77 K/UL — HIGH (ref 3.8–10.5)
WBC # FLD AUTO: 13.77 K/UL — HIGH (ref 3.8–10.5)
WBC UR QL: SIGNIFICANT CHANGE UP

## 2020-02-25 PROCEDURE — 71250 CT THORAX DX C-: CPT | Mod: 26

## 2020-02-25 PROCEDURE — 85730 THROMBOPLASTIN TIME PARTIAL: CPT

## 2020-02-25 PROCEDURE — 85027 COMPLETE CBC AUTOMATED: CPT

## 2020-02-25 PROCEDURE — 87631 RESP VIRUS 3-5 TARGETS: CPT

## 2020-02-25 PROCEDURE — 96365 THER/PROPH/DIAG IV INF INIT: CPT

## 2020-02-25 PROCEDURE — 83690 ASSAY OF LIPASE: CPT

## 2020-02-25 PROCEDURE — 87040 BLOOD CULTURE FOR BACTERIA: CPT

## 2020-02-25 PROCEDURE — 93010 ELECTROCARDIOGRAM REPORT: CPT

## 2020-02-25 PROCEDURE — 96366 THER/PROPH/DIAG IV INF ADDON: CPT

## 2020-02-25 PROCEDURE — 71046 X-RAY EXAM CHEST 2 VIEWS: CPT

## 2020-02-25 PROCEDURE — 36415 COLL VENOUS BLD VENIPUNCTURE: CPT

## 2020-02-25 PROCEDURE — 80053 COMPREHEN METABOLIC PANEL: CPT

## 2020-02-25 PROCEDURE — 99285 EMERGENCY DEPT VISIT HI MDM: CPT | Mod: 25

## 2020-02-25 PROCEDURE — 74176 CT ABD & PELVIS W/O CONTRAST: CPT | Mod: 26

## 2020-02-25 PROCEDURE — 71250 CT THORAX DX C-: CPT

## 2020-02-25 PROCEDURE — 71046 X-RAY EXAM CHEST 2 VIEWS: CPT | Mod: 26

## 2020-02-25 PROCEDURE — 83605 ASSAY OF LACTIC ACID: CPT

## 2020-02-25 PROCEDURE — 81001 URINALYSIS AUTO W/SCOPE: CPT

## 2020-02-25 PROCEDURE — 99285 EMERGENCY DEPT VISIT HI MDM: CPT

## 2020-02-25 PROCEDURE — 84484 ASSAY OF TROPONIN QUANT: CPT

## 2020-02-25 PROCEDURE — 85610 PROTHROMBIN TIME: CPT

## 2020-02-25 PROCEDURE — 87086 URINE CULTURE/COLONY COUNT: CPT

## 2020-02-25 PROCEDURE — 93005 ELECTROCARDIOGRAM TRACING: CPT

## 2020-02-25 PROCEDURE — 74176 CT ABD & PELVIS W/O CONTRAST: CPT

## 2020-02-25 RX ORDER — CEFTRIAXONE 500 MG/1
1000 INJECTION, POWDER, FOR SOLUTION INTRAMUSCULAR; INTRAVENOUS ONCE
Refills: 0 | Status: COMPLETED | OUTPATIENT
Start: 2020-02-25 | End: 2020-02-25

## 2020-02-25 RX ORDER — SODIUM CHLORIDE 9 MG/ML
1000 INJECTION INTRAMUSCULAR; INTRAVENOUS; SUBCUTANEOUS ONCE
Refills: 0 | Status: COMPLETED | OUTPATIENT
Start: 2020-02-25 | End: 2020-02-25

## 2020-02-25 RX ORDER — AZITHROMYCIN 500 MG/1
500 TABLET, FILM COATED ORAL ONCE
Refills: 0 | Status: COMPLETED | OUTPATIENT
Start: 2020-02-25 | End: 2020-02-25

## 2020-02-25 RX ORDER — ACETAMINOPHEN 500 MG
650 TABLET ORAL ONCE
Refills: 0 | Status: COMPLETED | OUTPATIENT
Start: 2020-02-25 | End: 2020-02-25

## 2020-02-25 RX ORDER — PIPERACILLIN AND TAZOBACTAM 4; .5 G/20ML; G/20ML
3.38 INJECTION, POWDER, LYOPHILIZED, FOR SOLUTION INTRAVENOUS ONCE
Refills: 0 | Status: COMPLETED | OUTPATIENT
Start: 2020-02-25 | End: 2020-02-25

## 2020-02-25 RX ADMIN — PIPERACILLIN AND TAZOBACTAM 3.38 GRAM(S): 4; .5 INJECTION, POWDER, LYOPHILIZED, FOR SOLUTION INTRAVENOUS at 22:07

## 2020-02-25 RX ADMIN — Medication 650 MILLIGRAM(S): at 18:32

## 2020-02-25 RX ADMIN — SODIUM CHLORIDE 1000 MILLILITER(S): 9 INJECTION INTRAMUSCULAR; INTRAVENOUS; SUBCUTANEOUS at 18:32

## 2020-02-25 RX ADMIN — PIPERACILLIN AND TAZOBACTAM 200 GRAM(S): 4; .5 INJECTION, POWDER, LYOPHILIZED, FOR SOLUTION INTRAVENOUS at 21:08

## 2020-02-25 RX ADMIN — CEFTRIAXONE 1000 MILLIGRAM(S): 500 INJECTION, POWDER, FOR SOLUTION INTRAMUSCULAR; INTRAVENOUS at 19:02

## 2020-02-25 RX ADMIN — SODIUM CHLORIDE 1000 MILLILITER(S): 9 INJECTION INTRAMUSCULAR; INTRAVENOUS; SUBCUTANEOUS at 19:35

## 2020-02-25 RX ADMIN — AZITHROMYCIN 500 MILLIGRAM(S): 500 TABLET, FILM COATED ORAL at 21:09

## 2020-02-25 RX ADMIN — AZITHROMYCIN 255 MILLIGRAM(S): 500 TABLET, FILM COATED ORAL at 20:06

## 2020-02-25 RX ADMIN — CEFTRIAXONE 100 MILLIGRAM(S): 500 INJECTION, POWDER, FOR SOLUTION INTRAMUSCULAR; INTRAVENOUS at 18:32

## 2020-02-25 RX ADMIN — Medication 650 MILLIGRAM(S): at 19:35

## 2020-02-25 NOTE — ED PROVIDER NOTE - CLINICAL SUMMARY MEDICAL DECISION MAKING FREE TEXT BOX
Pt with left flank and back pain with fever. Will do EKG, CXR, CT A/P, IV fluids, abx. Pt with left flank and back pain with fever. Will do EKG, CXR, CT A/P, Labs, IV fluids, abx, tylenol.

## 2020-02-25 NOTE — ED ADULT NURSE NOTE - NSIMPLEMENTINTERV_GEN_ALL_ED
Implemented All Universal Safety Interventions:  [place] to call system. Call bell, personal items and telephone within reach. Instruct patient to call for assistance. Room bathroom lighting operational. Non-slip footwear when patient is off stretcher. Physically safe environment: no spills, clutter or unnecessary equipment. Stretcher in lowest position, wheels locked, appropriate side rails in place.

## 2020-02-25 NOTE — ED PROVIDER NOTE - LAB INTERPRETATION
FLU A B RSV Detection by PCR (02.25.20 @ 18:13)    Flu A Result: NotDetec: The Flu A B RSV assay is a Real-Time PCR test for the qualitative  detection and differentiation of Influenza A, Influenza B, and  Respiratory Syncytial Virus on nasopharyngeal swabs. The results should  be interpreted in the context of all clinical and laboratory findings.    Flu B Result: NotDetec    RSV Result: NotDetec

## 2020-02-25 NOTE — ED ADULT NURSE NOTE - PMH
Chronic Lymphocytic Leukemia  followed by oncologist in [place]  Diverticulitis    GERD (Gastroesophageal Reflux Disease)    GI Bleed  2007  Herniated Disc    Hyperlipidemia    Osteoarthritis    Spinal Stenosis    Stomach Ulcer  H pylori 2007, treated with antibiotics

## 2020-02-25 NOTE — ED PROVIDER NOTE - ENMT, MLM
Airway patent. Mouth with normal mucosa. Throat has no vesicles, no oropharyngeal exudates and uvula is midline. Airway patent. Mouth with normal mucosa

## 2020-02-25 NOTE — ED CLERICAL - CLERICAL COMMENTS
I called and setup transfer @ 21:20 with Dell from the transfer center. I called and setup transfer @ 21:20 with Dell from the transfer center. Per transfer center they should be here within the hour

## 2020-02-25 NOTE — ED PROVIDER NOTE - PROGRESS NOTE DETAILS
rafa (uro) reviewed CT, relates no uro surg intervention needed at this time. d/w taylor (pul) recommends xfer to [facility]. d/w mehdi ([facility]) will accept xfer er->er

## 2020-02-25 NOTE — ED PROVIDER NOTE - SKIN, MLM
Skin normal color for race, warm, dry and intact. No evidence of rash. Skin normal color for race, dry and intact. No evidence of rash. +skin hot to the touch Skin normal color for race, hot, dry and intact. No evidence of rash.

## 2020-02-25 NOTE — ED PROVIDER NOTE - NONTENDER LOCATION
left upper quadrant/right upper quadrant/right costovertebral angle/left lower quadrant/right lower quadrant

## 2020-02-25 NOTE — ED ADULT NURSE NOTE - OBJECTIVE STATEMENT
89 y/o M patient presents to ED via EMS from [facility] as transfer for kidney stone. As per patient's daughter, patient was c/o SOB and upper left chest soreness and  earlier today. Patient's daughter reports patient was dx with pleural effusion at [facility] and was told he had 2 kidney stones. As per patient's daughter patient was sent to [facility] for admission. Patient A&Ox4. skin warm and intact. Patient denies HA, dizziness, SOB, N/V/D. Safety and comfort measures provided and maintained. Daughter bedside.

## 2020-02-25 NOTE — ED ADULT NURSE REASSESSMENT NOTE - NS ED NURSE REASSESS COMMENT FT1
pt picked up by [facility] for transport to [facility] at this time
received report and assumed care of pt at change of shift. pt voided QS. urine specimens sent to lab. NAD presently. will continue to monitor

## 2020-02-25 NOTE — ED PROVIDER NOTE - OBJECTIVE STATEMENT
89 year old male with a PMHx of GERD, HLD, OA, Spinal stenosis presents to the ED c/o left flank pain x yesterday. Pt describes the pain as a dull ache that wraps around the back. Pain is worse with changes in position. Denies any recent falls or trauma. Denies fevers, N/V, urinary symptoms. PCP: Jaziel. 89 year old male with a PMHx of GERD, HLD, OA, Spinal stenosis presents to the ED c/o left flank pain x yesterday. Pt describes the pain as a dull ache that wraps around the back. Pain is worse with changes in position. Denies any recent falls or trauma. Denies fevers, N/V, sob, cough, urinary symptoms. PCP: Jaziel.

## 2020-02-25 NOTE — ED ADULT NURSE NOTE - PSH
History of Arthroscopy of Knee  bilateral  S/P Tonsillectomy  childhood  Status Post Arthroscopy  right shoulder

## 2020-02-25 NOTE — ED ADULT NURSE NOTE - OBJECTIVE STATEMENT
Presents to ED via amb from home. Pt c/o pain in left back just under scapula. Denies trauma. Pain is "spasmlike" Pain is worse with movement. Color fair. Skin warm & dry to touch. Lungs clear.

## 2020-02-26 DIAGNOSIS — Z29.9 ENCOUNTER FOR PROPHYLACTIC MEASURES, UNSPECIFIED: ICD-10-CM

## 2020-02-26 DIAGNOSIS — M48.00 SPINAL STENOSIS, SITE UNSPECIFIED: ICD-10-CM

## 2020-02-26 DIAGNOSIS — K31.89 OTHER DISEASES OF STOMACH AND DUODENUM: ICD-10-CM

## 2020-02-26 DIAGNOSIS — C91.10 CHRONIC LYMPHOCYTIC LEUKEMIA OF B-CELL TYPE NOT HAVING ACHIEVED REMISSION: ICD-10-CM

## 2020-02-26 DIAGNOSIS — J86.9 PYOTHORAX WITHOUT FISTULA: ICD-10-CM

## 2020-02-26 DIAGNOSIS — M19.90 UNSPECIFIED OSTEOARTHRITIS, UNSPECIFIED SITE: ICD-10-CM

## 2020-02-26 DIAGNOSIS — E78.5 HYPERLIPIDEMIA, UNSPECIFIED: ICD-10-CM

## 2020-02-26 DIAGNOSIS — K21.9 GASTRO-ESOPHAGEAL REFLUX DISEASE WITHOUT ESOPHAGITIS: ICD-10-CM

## 2020-02-26 LAB
CRP SERPL-MCNC: 22.25 MG/DL — HIGH (ref 0–0.4)
ERYTHROCYTE [SEDIMENTATION RATE] IN BLOOD: 48 MM/HR — HIGH (ref 0–20)
FOLATE SERPL-MCNC: 14.8 NG/ML — SIGNIFICANT CHANGE UP
HBA1C BLD-MCNC: 5.6 % — SIGNIFICANT CHANGE UP (ref 4–5.6)
HCT VFR BLD CALC: 35.2 % — LOW (ref 39–50)
HGB BLD-MCNC: 12 G/DL — LOW (ref 13–17)
IGA FLD-MCNC: 72 MG/DL — LOW (ref 84–499)
IGG FLD-MCNC: 569 MG/DL — LOW (ref 610–1660)
IGM SERPL-MCNC: 23 MG/DL — LOW (ref 35–242)
INR BLD: 2.11 RATIO — HIGH (ref 0.88–1.16)
KAPPA LC SER QL IFE: 2.06 MG/DL — HIGH (ref 0.33–1.94)
KAPPA/LAMBDA FREE LIGHT CHAIN RATIO, SERUM: 1.23 RATIO — SIGNIFICANT CHANGE UP (ref 0.26–1.65)
LAMBDA LC SER QL IFE: 1.68 MG/DL — SIGNIFICANT CHANGE UP (ref 0.57–2.63)
MCHC RBC-ENTMCNC: 31.3 PG — SIGNIFICANT CHANGE UP (ref 27–34)
MCHC RBC-ENTMCNC: 34.1 GM/DL — SIGNIFICANT CHANGE UP (ref 32–36)
MCV RBC AUTO: 91.9 FL — SIGNIFICANT CHANGE UP (ref 80–100)
NRBC # BLD: 0 /100 WBCS — SIGNIFICANT CHANGE UP (ref 0–0)
NT-PROBNP SERPL-SCNC: 2438 PG/ML — HIGH (ref 0–450)
PLATELET # BLD AUTO: 159 K/UL — SIGNIFICANT CHANGE UP (ref 150–400)
PROTHROM AB SERPL-ACNC: 24.2 SEC — HIGH (ref 10–12.9)
RBC # BLD: 3.83 M/UL — LOW (ref 4.2–5.8)
RBC # FLD: 13 % — SIGNIFICANT CHANGE UP (ref 10.3–14.5)
TSH SERPL-MCNC: 1.8 UIU/ML — SIGNIFICANT CHANGE UP (ref 0.36–3.74)
VIT B12 SERPL-MCNC: 437 PG/ML — SIGNIFICANT CHANGE UP (ref 232–1245)
WBC # BLD: 13.18 K/UL — HIGH (ref 3.8–10.5)
WBC # FLD AUTO: 13.18 K/UL — HIGH (ref 3.8–10.5)

## 2020-02-26 PROCEDURE — 99285 EMERGENCY DEPT VISIT HI MDM: CPT

## 2020-02-26 PROCEDURE — 99231 SBSQ HOSP IP/OBS SF/LOW 25: CPT

## 2020-02-26 RX ORDER — IPRATROPIUM/ALBUTEROL SULFATE 18-103MCG
3 AEROSOL WITH ADAPTER (GRAM) INHALATION EVERY 6 HOURS
Refills: 0 | Status: DISCONTINUED | OUTPATIENT
Start: 2020-02-26 | End: 2020-03-17

## 2020-02-26 RX ORDER — ACETAMINOPHEN 500 MG
650 TABLET ORAL EVERY 6 HOURS
Refills: 0 | Status: DISCONTINUED | OUTPATIENT
Start: 2020-02-26 | End: 2020-03-17

## 2020-02-26 RX ORDER — SODIUM CHLORIDE 9 MG/ML
1000 INJECTION, SOLUTION INTRAVENOUS
Refills: 0 | Status: DISCONTINUED | OUTPATIENT
Start: 2020-02-26 | End: 2020-02-26

## 2020-02-26 RX ORDER — ATORVASTATIN CALCIUM 80 MG/1
10 TABLET, FILM COATED ORAL AT BEDTIME
Refills: 0 | Status: DISCONTINUED | OUTPATIENT
Start: 2020-02-26 | End: 2020-03-17

## 2020-02-26 RX ORDER — HEPARIN SODIUM 5000 [USP'U]/ML
5000 INJECTION INTRAVENOUS; SUBCUTANEOUS EVERY 12 HOURS
Refills: 0 | Status: DISCONTINUED | OUTPATIENT
Start: 2020-02-26 | End: 2020-02-28

## 2020-02-26 RX ORDER — OXYCODONE HYDROCHLORIDE 5 MG/1
2.5 TABLET ORAL EVERY 4 HOURS
Refills: 0 | Status: DISCONTINUED | OUTPATIENT
Start: 2020-02-26 | End: 2020-02-27

## 2020-02-26 RX ORDER — LACTOBACILLUS ACIDOPHILUS 100MM CELL
1 CAPSULE ORAL THREE TIMES A DAY
Refills: 0 | Status: DISCONTINUED | OUTPATIENT
Start: 2020-02-26 | End: 2020-03-17

## 2020-02-26 RX ORDER — OXYCODONE HYDROCHLORIDE 5 MG/1
5 TABLET ORAL EVERY 4 HOURS
Refills: 0 | Status: DISCONTINUED | OUTPATIENT
Start: 2020-02-26 | End: 2020-02-29

## 2020-02-26 RX ORDER — PHYTONADIONE (VIT K1) 5 MG
5 TABLET ORAL ONCE
Refills: 0 | Status: COMPLETED | OUTPATIENT
Start: 2020-02-26 | End: 2020-02-26

## 2020-02-26 RX ORDER — VANCOMYCIN HCL 1 G
1000 VIAL (EA) INTRAVENOUS EVERY 12 HOURS
Refills: 0 | Status: DISCONTINUED | OUTPATIENT
Start: 2020-02-26 | End: 2020-02-28

## 2020-02-26 RX ORDER — PIPERACILLIN AND TAZOBACTAM 4; .5 G/20ML; G/20ML
3.38 INJECTION, POWDER, LYOPHILIZED, FOR SOLUTION INTRAVENOUS EVERY 8 HOURS
Refills: 0 | Status: DISCONTINUED | OUTPATIENT
Start: 2020-02-26 | End: 2020-02-29

## 2020-02-26 RX ORDER — FAMOTIDINE 10 MG/ML
20 INJECTION INTRAVENOUS
Refills: 0 | Status: DISCONTINUED | OUTPATIENT
Start: 2020-02-26 | End: 2020-02-29

## 2020-02-26 RX ADMIN — Medication 650 MILLIGRAM(S): at 05:55

## 2020-02-26 RX ADMIN — FAMOTIDINE 20 MILLIGRAM(S): 10 INJECTION INTRAVENOUS at 05:27

## 2020-02-26 RX ADMIN — OXYCODONE HYDROCHLORIDE 2.5 MILLIGRAM(S): 5 TABLET ORAL at 13:57

## 2020-02-26 RX ADMIN — FAMOTIDINE 20 MILLIGRAM(S): 10 INJECTION INTRAVENOUS at 17:40

## 2020-02-26 RX ADMIN — SODIUM CHLORIDE 75 MILLILITER(S): 9 INJECTION, SOLUTION INTRAVENOUS at 01:35

## 2020-02-26 RX ADMIN — PIPERACILLIN AND TAZOBACTAM 25 GRAM(S): 4; .5 INJECTION, POWDER, LYOPHILIZED, FOR SOLUTION INTRAVENOUS at 05:27

## 2020-02-26 RX ADMIN — HEPARIN SODIUM 5000 UNIT(S): 5000 INJECTION INTRAVENOUS; SUBCUTANEOUS at 17:41

## 2020-02-26 RX ADMIN — PIPERACILLIN AND TAZOBACTAM 25 GRAM(S): 4; .5 INJECTION, POWDER, LYOPHILIZED, FOR SOLUTION INTRAVENOUS at 13:17

## 2020-02-26 RX ADMIN — Medication 250 MILLIGRAM(S): at 17:40

## 2020-02-26 RX ADMIN — Medication 1 TABLET(S): at 21:51

## 2020-02-26 RX ADMIN — OXYCODONE HYDROCHLORIDE 5 MILLIGRAM(S): 5 TABLET ORAL at 22:52

## 2020-02-26 RX ADMIN — Medication 1 TABLET(S): at 05:29

## 2020-02-26 RX ADMIN — OXYCODONE HYDROCHLORIDE 5 MILLIGRAM(S): 5 TABLET ORAL at 21:52

## 2020-02-26 RX ADMIN — SODIUM CHLORIDE 75 MILLILITER(S): 9 INJECTION, SOLUTION INTRAVENOUS at 05:28

## 2020-02-26 RX ADMIN — ATORVASTATIN CALCIUM 10 MILLIGRAM(S): 80 TABLET, FILM COATED ORAL at 21:53

## 2020-02-26 RX ADMIN — SODIUM CHLORIDE 75 MILLILITER(S): 9 INJECTION, SOLUTION INTRAVENOUS at 17:44

## 2020-02-26 RX ADMIN — Medication 1 TABLET(S): at 13:17

## 2020-02-26 RX ADMIN — Medication 650 MILLIGRAM(S): at 05:27

## 2020-02-26 RX ADMIN — OXYCODONE HYDROCHLORIDE 2.5 MILLIGRAM(S): 5 TABLET ORAL at 13:27

## 2020-02-26 RX ADMIN — PIPERACILLIN AND TAZOBACTAM 25 GRAM(S): 4; .5 INJECTION, POWDER, LYOPHILIZED, FOR SOLUTION INTRAVENOUS at 21:53

## 2020-02-26 RX ADMIN — Medication 5 MILLIGRAM(S): at 13:17

## 2020-02-26 RX ADMIN — Medication 250 MILLIGRAM(S): at 05:28

## 2020-02-26 NOTE — CONSULT NOTE ADULT - SUBJECTIVE AND OBJECTIVE BOX
Patient is a 89y old  Male who presents with a chief complaint of Left Empyema (2020 07:57)    HPI:  89y Male brought to [place] ED complaining of flank pain, fever and cough for 4 days.  Patient transferred from [facility] ER for admission to [facility]. Daughter at the bedside states she took him to the ER for evaluation of back pain, chest discomfort. At [facility] patient found to have fever, ct scan revealed left sided empyema and right ureteral stone, patient received ceftriaxone and zosyn. Dr. Maxwell pulmonary and Dr. Barnard urology were aware of transfer. Patient states he is feeling well now. (2020 05:01)    Heme asked to see pt for hx of NHL and CLL.   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 PET and CT has been negative. Has been followed with yearly EGD showing low level involvement of disease. Has remained asymptomatic without any constitutional symptoms, no pain, no bleeding. Has now moved to [place] with family. Had previously elected to be observed, declined tx    Dx 2016 with neuropathy with borderline low B12 done and neurology recommending B12 replacment.     2019 - had EGD with Dr. Lorenz 10/24/2017 with biopsy showing CLL/SLL  2019 PET/Ct with out hypermetabolic activity.   no new adenopathy    2020 - no fever or abdominal pain. has occasional chill without documented fever.       PAST MEDICAL & SURGICAL HISTORY:  GI Bleed:   Stomach Ulcer: H pylori , treated with antibiotics  Osteoarthritis  Hyperlipidemia  Herniated Disc  Spinal Stenosis  Diverticulitis  GERD (Gastroesophageal Reflux Disease)  Chronic Lymphocytic Leukemia: followed by oncologist in [place]  S/P Tonsillectomy: childhood  Status Post Arthroscopy: right shoulder  History of Arthroscopy of Knee: bilateral      HEALTH ISSUES - PROBLEM Dx:  Empyema lung: Empyema lung      Empyema of pleural space without fistula (J86.9)  GI Bleed (578.9)  Stomach Ulcer (531.90)  Osteoarthritis (715.90)  Hyperlipidemia (272.4)  Herniated Disc (722.2)  Spinal Stenosis (724.00)  Diverticulitis (562.11)  GERD (Gastroesophageal Reflux Disease) (530.81)  Chronic Lymphocytic Leukemia (204.10)  S/P Tonsillectomy (V45.89)  Status Post Arthroscopy (V45.89)  History of Arthroscopy of Knee (V45.89)  Osteoarthritis (715.90)  Stomach Ulcer (531.90)      FAMILY HISTORY:  mother  93 dementia  father  Age 94 unknown        [SOCIAL HISTORY: ]     smokinPPD somker 35yrs, quit ~ 56yo     EtOH:  denies     illicit drugs:  denies     occupation:  retired construction     marital status:  , lives with wife     Other:       [ALLERGIES/INTOLERANCES:]  Allergies     No Known Allergies  Intolerances        [MEDICATIONS]  MEDICATIONS  (STANDING):  atorvastatin 10 milliGRAM(s) Oral at bedtime  famotidine    Tablet 20 milliGRAM(s) Oral two times a day  lactated ringers. 1000 milliLiter(s) (75 mL/Hr) IV Continuous <Continuous>  lactobacillus acidophilus 1 Tablet(s) Oral three times a day  piperacillin/tazobactam IVPB.. 3.375 Gram(s) IV Intermittent every 8 hours  vancomycin  IVPB 1000 milliGRAM(s) IV Intermittent every 12 hours    MEDICATIONS  (PRN):  acetaminophen   Tablet .. 650 milliGRAM(s) Oral every 6 hours PRN Temp greater or equal to 38C (100.4F), Mild Pain (1 - 3)  albuterol/ipratropium for Nebulization 3 milliLiter(s) Nebulizer every 6 hours PRN Shortness of Breath and/or Wheezing  guaiFENesin   Syrup  (Sugar-Free) 200 milliGRAM(s) Oral every 6 hours PRN Cough  oxyCODONE    IR 2.5 milliGRAM(s) Oral every 4 hours PRN Moderate Pain (4 - 6)  oxyCODONE    IR 5 milliGRAM(s) Oral every 4 hours PRN Severe Pain (7 - 10)        [REVIEW OF SYSTEMS: ]  CONSTITUTIONAL: normal, no fever, no shakes, no chills   EYES: No eye pain, no visual disturbances, no discharge  ENMT:  no discharge  NECK: No pain, no stiffness  BREASTS: No pain, no masses, no nipple discharge  RESPIRATORY: No cough, no wheezing, no chills, no hemoptysis; +shortness of breath  CARDIOVASCULAR: +back/chest pain, no palpitations, no dizziness, no leg swelling  GASTROINTESTINAL: No abdominal, no epigastric pain. No nausea, no vomiting, no hematemesis; No diarrhea , no constipation. No melena, no hematochezia.  GENITOURINARY: No dysuria, no frequency, no hematuria, no incontinence  NEUROLOGICAL: No headaches, no memory loss, no loss of strength, no numbness, no tremors  SKIN: No itching, no burning, no rashes, no lesions   LYMPH NODES: No enlarged glands  ENDOCRINE: No heat or cold intolerance; No hair loss  MUSCULOSKELETAL: No joint pain or swelling; No muscle, no back, no extremity pain  PSYCHIATRIC: No depression, no anxiety, no mood swings, no difficulty sleeping  HEME/LYMPH: No easy bruising, no bleeding gums      [VITALS SIGNS 24hrs]  Vital Signs Last 24 Hrs  T(C): 36.4 (2020 07:27), Max: 39.3 (2020 17:50)  T(F): 97.6 (2020 07:27), Max: 102.7 (2020 17:50)  HR: 101 (2020 07:27) (88 - 105)  BP: 124/74 (2020 07:27) (100/63 - 138/74)  BP(mean): --  RR: 18 (2020 07:27) (18 - 23)  SpO2: 92% (2020 07:27) (91% - 95%)    [PHYSICAL EXAM]  General: adult in NAD,  WN,  WD.  HEENT: clear oropharynx, anicteric sclera, pink conjunctivae.  Neck: supple, no masses.  CV: normal S1S2, no murmur, no rubs, no gallops.  Lungs: L base dec BS, no wheezes, no rales, no rhonchi. BL coarse crackles  Abdomen: soft, non-tender, non-distended, no hepatosplenomegaly, normal BS, no guarding.  Ext: no clubbing, no cyanosis, no edema.  Skin: no rashes,  no petechiae, no venous stasis changes.  Neuro: alert and oriented X3, no focal motor deficits.  LN: no SC BETHEL.      [LABS:]                        12.0   13.18 )-----------( 159      ( 2020 08:57 )             35.2     CBC Full  -  ( 2020 08:57 )  WBC Count : 13.18 K/uL  RBC Count : 3.83 M/uL  Hemoglobin : 12.0 g/dL  Hematocrit : 35.2 %  Platelet Count - Automated : 159 K/uL  Mean Cell Volume : 91.9 fl  Mean Cell Hemoglobin : 31.3 pg  Mean Cell Hemoglobin Concentration : 34.1 gm/dL  Auto Neutrophil # : x  Auto Lymphocyte # : x  Auto Monocyte # : x  Auto Eosinophil # : x  Auto Basophil # : x  Auto Neutrophil % : x  Auto Lymphocyte % : x  Auto Monocyte % : x  Auto Eosinophil % : x  Auto Basophil % : x        137  |  102  |  19  ----------------------------<  117<H>  3.8   |  29  |  1.25    Ca    8.6      2020 18:13    TPro  6.6  /  Alb  3.1<L>  /  TBili  2.0<H>  /  DBili  x   /  AST  16  /  ALT  24  /  AlkPhos  58      PT/INR - ( 2020 08:57 )   PT: 24.2 sec;   INR: 2.11 ratio         PTT - ( 2020 18:13 )  PTT:30.9 sec  LIVER FUNCTIONS - ( 2020 18:13 )  Alb: 3.1 g/dL / Pro: 6.6 g/dL / ALK PHOS: 58 U/L / ALT: 24 U/L DA / AST: 16 U/L / GGT: x           CARDIAC MARKERS ( 2020 18:13 )  .040 ng/mL / x     / x     / x     / x          Urinalysis Basic - ( 2020 19:48 )    Color: Yellow / Appearance: Clear / S.020 / pH: x  Gluc: x / Ketone: Trace  / Bili: Negative / Urobili: 1 mg/dL   Blood: x / Protein: 30 mg/dL / Nitrite: Positive   Leuk Esterase: Trace / RBC: 3-5 /HPF / WBC 3-5   Sq Epi: x / Non Sq Epi: Few / Bacteria: Moderate        WBC  TREND (5 Days)  WBC Count: 13.18 K/uL ( @ 08:57)  WBC Count: 13.77 K/uL ( @ 18:13)    HGB  TREND (5 Days)  Hemoglobin: 12.0 g/dL ( @ 08:57)  Hemoglobin: 13.2 g/dL ( @ 18:13)    HCT  TREND (5 Days)  Hematocrit: 35.2 % ( @ 08:57)  Hematocrit: 40.4 % ( @ 18:13)    PLT  TREND (5 Days)  Platelet Count - Automated: 159 K/uL ( @ 08:57)  Platelet Count - Automated: 169 K/uL ( @ 18:13)      Anemia Studies             [RADIOLOGY & ADDITIONAL STUDIES:]    < from: CT Chest No Cont (20 @ 19:18) >  EXAM:  CT RENAL STONE HUNT                        EXAM:  CT CHEST                        PROCEDURE DATE:  2020    INTERPRETATION:  CLINICAL HISTORY:  Fever, left back and flank pain. History of gastric lymphoma.    Multiple axial images of the chest, abdomen and pelvis were obtained from the lung apices through symphysis pubis without the administration of oral or intravascular contrast limiting the sensitivity of evaluation. Reformatted coronal and sagittal images are submitted.    COMPARISON: PET/CT evaluation 2019.    FINDINGS:   ·	New moderately sized left pleural effusion is identified with portions that are loculated; air attenuation within the effusion as well as within the left pleural space suggests the presence of left-sided empyema. Consolidation is identified within the lingular segment as well as left lower lobe lung parenchyma; underlying neoplasm cannot be excluded. Hazy opacity within the left upper lobe is likely related to atelectasis. Subpleural opacity within the posterior right lower lobe and right middle lobe likely related to subpleural fibrosis. There is no evidence for right-sided pleural effusion or air.  ·	No abnormally enlarged mediastinal or hilar lymph nodes are noted. There is no evidence for axillary lymphadenopathy. The heart size appears within normal limits. No pericardial effusion is identified. Thoracic aortic caliber demonstrates unremarkable caliber. Coronary arterial calcifications identified.  ·	The central bronchial anatomy appears patent.  ·	No mediastinal shift is noted.  ·	The liver is normal in size. No focal hepatic masses are identified. There is no evidence for intrahepatic or extrahepatic biliary dilatation. A large gallstone is noted. No gallbladder wall thickening or pericholecystic fluid identified.  ·	The spleen, pancreas and adrenal glands are unremarkable.  ·	There is no evidence for hydronephrosis. No right renal calculi are identified. An 8 mm calculus is identified within the left-sided extrarenal pelvis, unchanged. There is no change in a 6 mm calculus identified within the distal right ureter at the level of the UVJ, without evidence for obstructive uropathy. A 1.3 cm right renal cyst is noted. The abdominal aorta is normal in course and caliber. No abnormally enlarged retroperitoneal or pelvic lymphadenopathy is noted.  ·	Gastric wall thickening is identified allowing for nondistention with oral contrast; clinical correlation is suggested in light of the provided clinical history of gastric lymphoma.  ·	Fecal material is scattered throughout the colon. There is no evidence for mechanical bowel obstruction. Colonic diverticulosis is noted. No colonic wall thickening is identified. There is no evidence for acute appendicitis. There is no evidence for free intraperitoneal air or fluid.  ·	The prostate is enlarged and the urinary bladder appear unremarkable.   ·	Postsurgical changes of the lumbar spine noted. Postprocedural changes of the right shoulder noted. Degenerative changes of the spine evident.    IMPRESSION:    1. New moderately sized left pleural effusion is identified with portions that are loculated; air attenuation within the effusion as well as within the left pleural space suggests the presence of left-sided empyema. Consolidation is identified within the lingular segment as well as left lower lobe lung parenchyma; underlying neoplasm cannot be excluded.   2. Gastric wall thickening is identified allowing for non-distention with oral contrast; clinical correlation is suggested in light of the provided clinical history of gastric lymphoma.  3. An 8 mm calculus is identified within the left-sided extrarenal pelvis, unchanged. There is no change in a 6 mm calculus identified within the distal right ureter at the level of the UVJ, without evidence for obstructive uropathy. No evidence for bilateral hydronephrosis.    Preliminary results discussed with Dr. Galvan by telephone.  < end of copied text >        < from: NM PET/CT ONC FDG Skull Thi Subs KX (19 @ 12:36) >  EXAM:  PETCT  SKUL THI ONC FDGSUBS KX    PROCEDURE DATE:  2019    INTERPRETATION:  PROCEDURE:  PET/CT SKULL BASE-MID THIGH IMAGING   RADIOPHARMACEUTICAL:  11.99 mCi F-18, FDG, I.V.  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 PET/CT is done as part of the subsequent  treatment strategy evaluation.  COMPARISON:  PET/CT 2018  OTHER STUDIES USED FOR CORRELATION: No interval studies  FINDINGS:    ·	HEAD/NECK: No abnormal avidity. Physiologic FDG activity seen in the visualized portions of the brain, major salivary glands and neck muscles.  ·	THORAX:  No abnormal avidity.Physiologic FDG activity in the myocardium andblood pool.    ·	LUNGS: No FDG avid foci.  No pulmonary nodules.  ·	PLEURA/PERICARDIUM: No abnormal avidity. No pericardial or pleural effusions.  ·	HEPATOBILIARY/PANCREAS:  Liver background SUV mean as a reference for comparing studies is 3.2, previously 3.0. Unchanged cholelithiasis.  ·	SPLEEN: No abnormal avidity.  ·	ADRENAL GLANDS: No abnormal avidity.  ·	KIDNEYS/URINARY BLADDER: Excreted activity is seen.   No abnormal avidity.  ·	ABDOMINOPELVIC NODES:   No abnormal avidity.  ·	BOWEL/PERITONEUM/MESENTERY: No gastric FDG avidity. Sigmoid diverticulosis. No abnormal avidity.  ·	PELVIC ORGANS: Unchanged prostatic enlargement, 5.7 cm (image 270), without FDG avidity.  ·	BONES:  Degenerative changes throughout the spine. Postsurgical changes right humeral head.  ·	SOFT TISSUES:  No abnormal avidity.  IMPRESSION:    ·	Since PET/CT 2018:  ·	No significant interval change and no evidence of FDG avid recurrent or metastatic disease.    < end of copied text >

## 2020-02-26 NOTE — CONSULT NOTE ADULT - SUBJECTIVE AND OBJECTIVE BOX
Patient is a 89y old  Male who presents with a chief complaint of Left Empyema (2020 16:34)      HISTORY OF PRESENT ILLNESS:  89 y/o male initially presented to [facility] for left flank pain, found on CT (see below) to have left empyema, and was transferred to [facility] for further care.  CT also shows the 8mm left extrarenal pelvis stone and 6mm right distal ureteral stone that was previously seen on PET-CT 2019.  UA was "dirty".  Pt denies right sided pain or dysuria.  No indicators of obstructive uropathy.  Pt able to provide his own history, although his son (a physician) was at bedside.    PAST MEDICAL & SURGICAL HISTORY:  GI Bleed:   Stomach Ulcer: H pylori , treated with antibiotics  Osteoarthritis  Hyperlipidemia  Herniated Disc  Spinal Stenosis  Diverticulitis  GERD (Gastroesophageal Reflux Disease)  Chronic Lymphocytic Leukemia: followed by oncologist in [place]  S/P Tonsillectomy: childhood  Status Post Arthroscopy: right shoulder  History of Arthroscopy of Knee: bilateral      REVIEW OF SYSTEMS:    SKIN: No itching, burning, rashes, or lesions   EYES/ENT: No visual changes;  No vertigo or throat pain   NECK: No pain or stiffness  CARDIOVASCULAR: No chest pain or palpitations  GASTROINTESTINAL: No abdominal or epigastric pain. No nausea, vomiting, diarrhea  NEUROLOGICAL: No numbness or weakness  GENITOURINARY:     No hematuria, dysuria, incontinence    All other review of systems is negative unless indicated above.    MEDICATIONS  (STANDING):  atorvastatin 10 milliGRAM(s) Oral at bedtime  famotidine    Tablet 20 milliGRAM(s) Oral two times a day  heparin  Injectable 5000 Unit(s) SubCutaneous every 12 hours  lactobacillus acidophilus 1 Tablet(s) Oral three times a day  piperacillin/tazobactam IVPB.. 3.375 Gram(s) IV Intermittent every 8 hours  vancomycin  IVPB 1000 milliGRAM(s) IV Intermittent every 12 hours    MEDICATIONS  (PRN):  acetaminophen   Tablet .. 650 milliGRAM(s) Oral every 6 hours PRN Temp greater or equal to 38C (100.4F), Mild Pain (1 - 3)  albuterol/ipratropium for Nebulization 3 milliLiter(s) Nebulizer every 6 hours PRN Shortness of Breath and/or Wheezing  guaiFENesin   Syrup  (Sugar-Free) 200 milliGRAM(s) Oral every 6 hours PRN Cough  oxyCODONE    IR 2.5 milliGRAM(s) Oral every 4 hours PRN Moderate Pain (4 - 6)  oxyCODONE    IR 5 milliGRAM(s) Oral every 4 hours PRN Severe Pain (7 - 10)      Allergies    No Known Allergies    Intolerances        SOCIAL HISTORY:   Smoking:  quit >40 yrs ago   Work: former union       FAMILY HISTORY:  No pertinent family history in first degree relatives      Vital Signs Last 24 Hrs  T(C): 36.3 (2020 20:39), Max: 37.3 (2020 02:16)  T(F): 97.4 (2020 20:39), Max: 99.2 (2020 02:16)  HR: 100 (2020 20:39) (96 - 105)  BP: 110/68 (2020 20:39) (106/67 - 138/74)  BP(mean): --  RR: 18 (2020 20:39) (18 - 20)  SpO2: 94% (2020 20:39) (91% - 95%)    PHYSICAL EXAM:    Constitutional: NAD, well-developed  HEENT: PERRLA, EOMI  Neck: Supple, full ROM  Back: (+)Left flank tenderness  Respiratory: Normal repiratory effort  Cardiovascular: RRR  Abd: Soft, NT/ND  : Normal phallus,,bilateral descended testes, non-tender  Extremities: No peripheral edema  Neurological: A&O x 3, no focal deficits  Psychiatric: Normal mood, normal affect  Skin: No rashes    LABS:                        12.0   13.18 )-----------( 159      ( 2020 08:57 )             35.2     02-26    137  |  104  |  20  ----------------------------<  165<H>  3.6   |  25  |  1.00    Ca    8.3<L>      2020 08:57  Phos  2.6     -26  Mg     2.3     -26    TPro  6.6  /  Alb  3.1<L>  /  TBili  2.0<H>  /  DBili  x   /  AST  16  /  ALT  24  /  AlkPhos  58  02-25    PT/INR - ( 2020 08:57 )   PT: 24.2 sec;   INR: 2.11 ratio         PTT - ( 2020 18:13 )  PTT:30.9 sec  Urinalysis Basic - ( 2020 19:48 )    Color: Yellow / Appearance: Clear / S.020 / pH: x  Gluc: x / Ketone: Trace  / Bili: Negative / Urobili: 1 mg/dL   Blood: x / Protein: 30 mg/dL / Nitrite: Positive   Leuk Esterase: Trace / RBC: 3-5 /HPF / WBC 3-5   Sq Epi: x / Non Sq Epi: Few / Bacteria: Moderate      Urine Culture:       RADIOLOGY & ADDITIONAL STUDIES:      EXAM:  CT RENAL STONE HUNT                          EXAM:  CT CHEST                                  PROCEDURE DATE:  2020          INTERPRETATION:  CLINICAL HISTORY:  Fever, left back and flank pain. History of gastric lymphoma.    Multiple axial images of the chest, abdomen and pelvis were obtained from the lung apices through symphysis pubis without the administration of oral or intravascular contrast limiting the sensitivity of evaluation. Reformatted coronal and sagittal images are submitted.    COMPARISON: PET/CT evaluation 2019.    FINDINGS: New moderately sized left pleural effusion is identified with portions that are loculated; air attenuation within the effusion as well as within the left pleural space suggests the presence of left-sided empyema. Consolidation is identified within the lingular segment as well as left lower lobe lung parenchyma; underlying neoplasm cannot be excluded. Hazy opacity within the left upper lobe is likely related to atelectasis. Subpleural opacity within the posterior right lower lobe and right middle lobe likely related to subpleural fibrosis. There is no evidence for right-sided pleural effusion or air.    No abnormally enlarged mediastinal or hilar lymph nodes are noted. There is no evidence for axillary lymphadenopathy. The heart size appears within normal limits. No pericardial effusion is identified. Thoracic aortic caliber demonstrates unremarkable caliber. Coronary arterial calcifications identified.    The central bronchial anatomy appears patent.    No mediastinal shift is noted.    The liver is normal in size. No focal hepatic masses are identified. There is no evidence for intrahepatic or extrahepatic biliary dilatation. A large gallstone is noted. No gallbladder wall thickening or pericholecystic fluid identified.    The spleen, pancreas and adrenal glands are unremarkable.    There is no evidence for hydronephrosis. No right renal calculi are identified. An 8 mm calculus is identified within the left-sided extrarenal pelvis, unchanged. There is no change in a 6 mm calculus identified within the distal right ureter at the level of the UVJ, without evidence for obstructive uropathy. A 1.3 cm right renal cyst is noted. The abdominal aorta is normal in course and caliber. No abnormally enlarged retroperitoneal or pelvic lymphadenopathy is noted.    Gastric wall thickening is identified allowing for nondistention with oral contrast; clinical correlation is suggested in light of the provided clinical history of gastric lymphoma.    Fecal material is scattered throughout the colon. There is no evidence for mechanical bowel obstruction. Colonic diverticulosis is noted. No colonic wall thickening is identified. There is no evidence for acute appendicitis.There is no evidence for free intraperitoneal air or fluid.    The prostate is enlarged and the urinary bladder appear unremarkable.     Postsurgical changes of the lumbar spine noted. Postprocedural changes of the right shoulder noted. Degenerative changes of the spine evident.    IMPRESSION:    1. New moderately sized left pleural effusion is identified with portions that are loculated; air attenuation within the effusion as well as within the left pleural space suggests the presence of left-sided empyema. Consolidation is identified within the lingular segment as well as left lower lobe lung parenchyma; underlying neoplasm cannot be excluded.   2. Gastric wall thickening is identified allowing for nondistention with oral contrast; clinicalcorrelation is suggested in light of the provided clinical history of gastric lymphoma.  3. An 8 mm calculus is identified within the left-sided extrarenal pelvis, unchanged. There is no change in a 6 mm calculus identified within the distal right ureter at the level of the UVJ, without evidence for obstructive uropathy. No evidence for bilateral hydronephrosis.

## 2020-02-26 NOTE — ED PROVIDER NOTE - OBJECTIVE STATEMENT
89 male transferred from [facility] ER for admission to [place]. Daughter at the bedside states she took him to the ER for evaluation of back pain, chest discomfort. At [facility] patient found to have fever, ct scan revealed left sided empyema and right ureteral stone, patient received ceftriaxone and zosyn. Dr. Maxwell pulmonary and Dr. Barnard urology were aware of transfer. Patient states he is feeling well now.

## 2020-02-26 NOTE — SWALLOW BEDSIDE ASSESSMENT ADULT - COMMENTS
Consult received and chart reviewed. The patient was seen at chairside this AM for an initial assessment of swallow function, at which time he was alert and cooperative. The patient is reporting 0/10 pain pre/post today's evaluation and was willing to participate in today's evaluation. Patient's daughter present throughout today's evaluation and stated that the patient consumes a regular diet at home.    Per charting, the patient is a "89y Male brought to [facility] ED complaining of flank pain, fever and cough for 4 days. Patient transferred from [facility] ER for admission to [place]. Daughter at the bedside states she took him to the ER for evaluation of back pain, chest discomfort. At [facility] patient found to have fever, ct scan revealed left sided empyema and right ureteral stone, patient received ceftriaxone and zosyn. Dr. Maxwell pulmonary and Dr. Barnard urology were aware of transfer. Patient states he is feeling well now."     CT of the chest revealed, "New moderately sized left pleural effusion is identified with portions that are loculated; air attenuation within the effusion as well as within the left pleural space suggests the presence of left-sided empyema. Consolidation is identified within the lingular segment as well as left lower lobe lung parenchyma; underlying neoplasm cannot be excluded. 2. Gastric wall thickening is identified allowing for nondistention with oral contrast; clinical correlation is suggested in light of the provided clinical history of gastric lymphoma. 3. An 8 mm calculus is identified within the left-sided extrarenal pelvis, unchanged. There is no change in a 6 mm calculus identified within the distal right ureter at the level of the UVJ, without evidence for obstructive uropathy. No evidence for bilateral hydronephrosis."    Discussed results and recommendations from this evaluation with the patient, patient's daughter, RN, and call out to MD.

## 2020-02-26 NOTE — H&P ADULT - NSHPLABSRESULTS_GEN_ALL_CORE
13.2   13.77 )-----------( 169      ( 2020 18:13 )             40.4     2020 18:13    137    |  102    |  19     ----------------------------<  117    3.8     |  29     |  1.25     Ca    8.6        2020 18:13    TPro  6.6    /  Alb  3.1    /  TBili  2.0    /  DBili  x      /  AST  16     /  ALT  24     /  AlkPhos  58     2020 18:13    LIVER FUNCTIONS - ( 2020 18:13 )  Alb: 3.1 g/dL / Pro: 6.6 g/dL / ALK PHOS: 58 U/L / ALT: 24 U/L DA / AST: 16 U/L / GGT: x           PT/INR - ( 2020 18:13 )   PT: 22.6 sec;   INR: 1.99 ratio         PTT - ( 2020 18: )  PTT:30.9 sec  CAPILLARY BLOOD GLUCOSE        CARDIAC MARKERS ( 2020 18:13 )  .040 ng/mL / x     / x     / x     / x          Urinalysis Basic - ( 2020 19:48 )    Color: Yellow / Appearance: Clear / S.020 / pH: x  Gluc: x / Ketone: Trace  / Bili: Negative / Urobili: 1 mg/dL   Blood: x / Protein: 30 mg/dL / Nitrite: Positive   Leuk Esterase: Trace / RBC: 3-5 /HPF / WBC 3-5   Sq Epi: x / Non Sq Epi: Few / Bacteria: Moderate    < from: CT Chest No Cont (20 @ 19:18) >      IMPRESSION:    1. New moderately sized left pleural effusion is identified with portions that are loculated; air attenuation within the effusion as well as within the left pleural space suggests the presence of left-sided empyema. Consolidation is identified within the lingular segment as well as left lower lobe lung parenchyma; underlying neoplasm cannot be excluded.   2. Gastric wall thickening is identified allowing for nondistention with oral contrast; clinicalcorrelation is suggested in light of the provided clinical history of gastric lymphoma.  3. An 8 mm calculus is identified within the left-sided extrarenal pelvis, unchanged. There is no change in a 6 mm calculus identified within the distal right ureter at the level of the UVJ, without evidence for obstructive uropathy. No evidence for bilateral hydronephrosis.    Preliminary results discussed with Dr. Galvan by telephone.    < end of copied text >

## 2020-02-26 NOTE — CONSULT NOTE ADULT - SUBJECTIVE AND OBJECTIVE BOX
HPI:  CHARLY LU is a 89y Male PMH CLL, gastric lymphoma ex smoker, hx of asbestos exp presenting to the hospital with CC of cough, fever and left sided pleuritic chest pain. Febrile to 102 in ED. CT scan revealed left sided empyema and right ureteral stone. No recent  travel or sick contacts. Denies n/v/d Abd pain urinary symptoms.    Infectious Disease consult was called to evaluate pt and for antibiotic management.    Past Medical & Surgical Hx:  PAST MEDICAL & SURGICAL HISTORY:  GI Bleed: 2007  Stomach Ulcer: H pylori 2007, treated with antibiotics  Osteoarthritis  Hyperlipidemia  Herniated Disc  Spinal Stenosis  Diverticulitis  GERD (Gastroesophageal Reflux Disease)  Chronic Lymphocytic Leukemia: followed by oncologist in [place]  S/P Tonsillectomy: childhood  Status Post Arthroscopy: right shoulder  History of Arthroscopy of Knee: bilateral      Social History--  EtOH: denies  Tobacco: denies   Drug Use: denies    FAMILY HISTORY:  NONE    Allergies  No Known Allergies    Intolerances  NONE    Home Medications:  atorvastatin 10 mg oral tablet: 1 tab(s) orally once a day (25 Feb 2020 17:27)  medical marijuana:  (25 Feb 2020 17:27)  pantoprazole 40 mg oral delayed release tablet: 1 tab(s) orally once a day (25 Feb 2020 17:27)    Current Inpatient Medications :    ANTIBIOTICS:   piperacillin/tazobactam IVPB.. 3.375 Gram(s) IV Intermittent every 8 hours  vancomycin  IVPB 1000 milliGRAM(s) IV Intermittent every 12 hours      OTHER RELEVANT MEDICATIONS :  acetaminophen   Tablet .. 650 milliGRAM(s) Oral every 6 hours PRN  albuterol/ipratropium for Nebulization 3 milliLiter(s) Nebulizer every 6 hours PRN  atorvastatin 10 milliGRAM(s) Oral at bedtime  famotidine    Tablet 20 milliGRAM(s) Oral two times a day  guaiFENesin   Syrup  (Sugar-Free) 200 milliGRAM(s) Oral every 6 hours PRN  lactated ringers. 1000 milliLiter(s) IV Continuous <Continuous>  oxyCODONE    IR 2.5 milliGRAM(s) Oral every 4 hours PRN  oxyCODONE    IR 5 milliGRAM(s) Oral every 4 hours PRN    ROS:  CONSTITUTIONAL:  +fever or chills,   EYES:  Negative  blurry vision or double vision  CARDIOVASCULAR:  Negative for chest pain or palpitations  RESPIRATORY:  Negative for  wheezing, or SOB +cough,  GASTROINTESTINAL:  Negative for nausea, vomiting, diarrhea, constipation, or abdominal pain  GENITOURINARY:  Negative frequency, urgency , dysuria or hematuria   NEUROLOGIC:  No headache, confusion, dizziness, lightheadedness  All other systems were reviewed and are negative      I&O's Detail    26 Feb 2020 07:01  -  26 Feb 2020 14:33  --------------------------------------------------------  IN:    lactated ringers.: 525 mL    Solution: 100 mL  Total IN: 625 mL    OUT:    Voided: 300 mL  Total OUT: 300 mL    Total NET: 325 mL    Physical Exam:  Vital Signs Last 24 Hrs  T(C): 36.8 (26 Feb 2020 11:36), Max: 39.3 (25 Feb 2020 17:50)  T(F): 98.2 (26 Feb 2020 11:36), Max: 102.7 (25 Feb 2020 17:50)  HR: 104 (26 Feb 2020 11:36) (88 - 105)  BP: 116/73 (26 Feb 2020 11:36) (100/63 - 138/74)  RR: 18 (26 Feb 2020 11:36) (18 - 23)  SpO2: 95% (26 Feb 2020 11:36) (91% - 95%)  Height (cm): 177.8 (02-25 @ 22:44), 177.8 (02-25 @ 17:24)  Weight (kg): 86.2 (02-25 @ 22:44), 90.7 (02-25 @ 17:24)  BMI (kg/m2): 27.3 (02-25 @ 22:44), 28.7 (02-25 @ 17:24)  BSA (m2): 2.04 (02-25 @ 22:44), 2.09 (02-25 @ 17:24)    General: no acute distress  Eyes: sclera anicteric, pupils equal and reactive to light  ENMT: buccal mucosa moist, pharynx not injected  Neck: supple, trachea midline  Lungs: Decreased no wheeze/rhonchi  Cardiovascular: regular rate and rhythm, S1 S2  Abdomen: soft, nontender, no organomegaly present, bowel sounds normal  Neurological:  alert and oriented x3, Cranial Nerves II-XII grossly intact  Skin:no increased ecchymosis/petechiae/purpura  Lymph Nodes: no palpable cervical/supraclavicular lymph nodes enlargements  Extremities: no cyanosis/clubbing/edema    Labs:                         12.0   13.18 )-----------( 159      ( 26 Feb 2020 08:57 )             35.2   02-26    137  |  104  |  20  ----------------------------<  165<H>  3.6   |  25  |  1.00    Ca    8.3<L>      26 Feb 2020 08:57  Phos  2.6     02-26  Mg     2.3     02-26    TPro  6.6  /  Alb  3.1<L>  /  TBili  2.0<H>  /  DBili  x   /  AST  16  /  ALT  24  /  AlkPhos  58  02-25      RECENT CULTURES:  pending    RADIOLOGY & ADDITIONAL STUDIES:  EXAM:  CT RENAL STONE HUNT                          EXAM:  CT CHEST                                  PROCEDURE DATE:  02/25/2020          INTERPRETATION:  CLINICAL HISTORY:  Fever, left back and flank pain. History of gastric lymphoma.    Multiple axial images of the chest, abdomen and pelvis were obtained from the lung apices through symphysis pubis without the administration of oral or intravascular contrast limiting the sensitivity of evaluation. Reformatted coronal and sagittal images are submitted.    COMPARISON: PET/CT evaluation 6/19/2019.    FINDINGS: New moderately sized left pleural effusion is identified with portions that are loculated; air attenuation within the effusion as well as within the left pleural space suggests the presence of left-sided empyema. Consolidation is identified within the lingular segment as well as left lower lobe lung parenchyma; underlying neoplasm cannot be excluded. Hazy opacity within the left upper lobe is likely related to atelectasis. Subpleural opacity within the posterior right lower lobe and right middle lobe likely related to subpleural fibrosis. There is no evidence for right-sided pleural effusion or air.    No abnormally enlarged mediastinal or hilar lymph nodes are noted. There is no evidence for axillary lymphadenopathy. The heart size appears within normal limits. No pericardial effusion is identified. Thoracic aortic caliber demonstrates unremarkable caliber. Coronary arterial calcifications identified.    The central bronchial anatomy appears patent.    No mediastinal shift is noted.    The liver is normal in size. No focal hepatic masses are identified. There is no evidence for intrahepatic or extrahepatic biliary dilatation. A large gallstone is noted. No gallbladder wall thickening or pericholecystic fluid identified.    The spleen, pancreas and adrenal glands are unremarkable.    There is no evidence for hydronephrosis. No right renal calculi are identified. An 8 mm calculus is identified within the left-sided extrarenal pelvis, unchanged. There is no change in a 6 mm calculus identified within the distal right ureter at the level of the UVJ, without evidence for obstructive uropathy. A 1.3 cm right renal cyst is noted. The abdominal aorta is normal in course and caliber. No abnormally enlarged retroperitoneal or pelvic lymphadenopathy is noted.    Gastric wall thickening is identified allowing for nondistention with oral contrast; clinical correlation is suggested in light of the provided clinical history of gastric lymphoma.    Fecal material is scattered throughout the colon. There is no evidence for mechanical bowel obstruction. Colonic diverticulosis is noted. No colonic wall thickening is identified. There is no evidence for acute appendicitis.There is no evidence for free intraperitoneal air or fluid.    The prostate is enlarged and the urinary bladder appear unremarkable.     Postsurgical changes of the lumbar spine noted. Postprocedural changes of the right shoulder noted. Degenerative changes of the spine evident.    IMPRESSION:    1. New moderately sized left pleural effusion is identified with portions that are loculated; air attenuation within the effusion as well as within the left pleural space suggests the presence of left-sided empyema. Consolidation is identified within the lingular segment as well as left lower lobe lung parenchyma; underlying neoplasm cannot be excluded.   2. Gastric wall thickening is identified allowing for nondistention with oral contrast; clinicalcorrelation is suggested in light of the provided clinical history of gastric lymphoma.  3. An 8 mm calculus is identified within the left-sided extrarenal pelvis, unchanged. There is no change in a 6 mm calculus identified within the distal right ureter at the level of the UVJ, without evidence for obstructive uropathy. No evidence for bilateral hydronephrosis.    Assessment :   CHARLY LU is a 89y Male PMH CLL, gastric lymphoma ex smoker, hx of asbestos exp presenting to the hospital with cough, fever and left sided pleuritic chest pain. Febrile to 102 found to have  left sided empyema. Seen by pulm and CT surgery.    Plan :  Cont Vancomycin and Zosyn  Fu cultures  Trend temps and cbc  For IR CT guided thoracentesis tmrw  Legionella and pneumococcal antigen    D/w Dr Ku    Continue with present regime .  Approptiate use of antibiotics and adverse effects reviewed.      I have discussed the above plan of care with patient/family in detail. They expressed understanding of the treatment plan . Risks, benefits and alternatives discussed in detail. I have asked if they have any questions or concerns and appropriately addressed them to the best of my ability .      > 45 minutes spent in direct patient care reviewing  the notes, lab data/ imaging , discussion with multidisciplinary team. All questions were addressed and answered to the best of my capacity .    Thank you for allowing me to participate in the care of your patient .      Jose Kiran MD  Infectious Disease  [phone]

## 2020-02-26 NOTE — CONSULT NOTE ADULT - SUBJECTIVE AND OBJECTIVE BOX
Patient is a 89y old  Male who presents with a chief complaint of Left Empyema.  Pt found to be febrile in [facility] ER.      Subjective/HPI    in bed  seen and examined  vs and meds reviewed  labs reviewed  H and P reviewed  ER provider note reviewed    awake  verbal  transferred from [facility] for poss empyema eval     ex smoker  lives at home    poss exp to asbestos    uses med marijuana       PAST MEDICAL & SURGICAL HISTORY:  GI Bleed: 2007  Stomach Ulcer: H pylori 2007, treated with antibiotics  Osteoarthritis  Hyperlipidemia  Herniated Disc  Spinal Stenosis  Diverticulitis  GERD (Gastroesophageal Reflux Disease)  Chronic Lymphocytic Leukemia: followed by oncologist in [place]  S/P Tonsillectomy: childhood  Status Post Arthroscopy: right shoulder  History of Arthroscopy of Knee: bilateral  Osteoarthritis  Stomach Ulcer: H pylori, treated with antibiotics        Medication list         MEDICATIONS  (STANDING):  atorvastatin 10 milliGRAM(s) Oral at bedtime  famotidine    Tablet 20 milliGRAM(s) Oral two times a day  lactated ringers. 1000 milliLiter(s) (75 mL/Hr) IV Continuous <Continuous>  lactobacillus acidophilus 1 Tablet(s) Oral three times a day  piperacillin/tazobactam IVPB.. 3.375 Gram(s) IV Intermittent every 8 hours  vancomycin  IVPB 1000 milliGRAM(s) IV Intermittent every 12 hours    MEDICATIONS  (PRN):  acetaminophen   Tablet .. 650 milliGRAM(s) Oral every 6 hours PRN Temp greater or equal to 38C (100.4F), Mild Pain (1 - 3)  albuterol/ipratropium for Nebulization 3 milliLiter(s) Nebulizer every 6 hours PRN Shortness of Breath and/or Wheezing  oxyCODONE    IR 2.5 milliGRAM(s) Oral every 4 hours PRN Moderate Pain (4 - 6)  oxyCODONE    IR 5 milliGRAM(s) Oral every 4 hours PRN Severe Pain (7 - 10)          Review of Systems	  cough      Objective     Physical Examination    heart s1s2  lung dec BS  abd soft  head nc  head at  on room air

## 2020-02-26 NOTE — SWALLOW BEDSIDE ASSESSMENT ADULT - ASR SWALLOW ASPIRATION MONITOR
gurgly voice/cough/fever/pneumonia/throat clearing/upper respiratory infection/oral hygiene/position upright (90Y)/change of breathing pattern

## 2020-02-26 NOTE — SWALLOW BEDSIDE ASSESSMENT ADULT - ADDITIONAL RECOMMENDATIONS
Skilled ST services are not warranted at this time. Re-consult this department should there be any change in the patient's swallow function.

## 2020-02-26 NOTE — CONSULT NOTE ADULT - ASSESSMENT
nhl- gastric maltoma  abnormal ct  gastric wall thickening  left empyema     follows w d  cont abx  cont bacid  cont pepcid  diet as per slp  aspiration precautions  tx/dx thoracentesis per pulm/cts h/o nhl- gastric maltoma  abnormal ct  left empyema   gastric wall thickening      nc ct showing gastric wall thickening  had op egd 7/2019--  gerd precautions  cont pepcid  cont abx  cont bacid  fractionate lfts for am  diet as per slp; asp prec  tx/dx thoracentesis per pulm/cts   will follow h/o nhl- gastric maltoma  abnormal ct  left empyema   gastric wall thickening      nc ct showing gastric wall thickening  per dtr has had op egd w/in past yr  gerd precautions  cont pepcid  cont abx  monitor cbc/coags  fractionate lfts for am  diet as per slp; asp prec  tx/dx thoracentesis per pulm/cts   will follow

## 2020-02-26 NOTE — H&P ADULT - HISTORY OF PRESENT ILLNESS
CHARLY LU is a 89y Male brought to [place] ED complaining of flank pain, fever and cough for 4 days.  Patient transferred from [place] ER for admission to [place]. Daughter at the bedside states she took him to the ER for evaluation of back pain, chest discomfort. At [place] patient found to have fever, ct scan revealed left sided empyema and right ureteral stone, patient received ceftriaxone and zosyn. Dr. Maxwell pulmonary and Dr. Barnard urology were aware of transfer. Patient states he is feeling well now. CHARLY LU is a 89 YO Male brought to [place] ED complaining of flank pain, fever and cough for 4 days. Patient transferred from [place] ER for admission to [place].  Daughter at the bedside states she took him to the ER for evaluation of back pain, chest discomfort. At [facility] patient found to have fever, CT scan revealed left sided empyema and right ureteral stone, patient received ceftriaxone and zosyn. Dr. Maxwell pulmonary and Dr. Barnard urology were aware of transfer. Patient states he is feeling well now.

## 2020-02-26 NOTE — CONSULT NOTE ADULT - SUBJECTIVE AND OBJECTIVE BOX
Date/Time Patient Seen:  		  Referring MD:   Data Reviewed	       Patient is a 89y old  Male who presents with a chief complaint of Left Empyema (26 Feb 2020 05:01)      Subjective/HPI    in bed  seen and examined  vs and meds reviewed  labs reviewed  H and P reviewed  ER provider note reviewed    awake  verbal  transferred from [place] for poss empyema eval     ex smoker  lives at home    poss exp to asbestos    uses med marijuana       PAST MEDICAL & SURGICAL HISTORY:  GI Bleed: 2007  Stomach Ulcer: H pylori 2007, treated with antibiotics  Osteoarthritis  Hyperlipidemia  Herniated Disc  Spinal Stenosis  Diverticulitis  GERD (Gastroesophageal Reflux Disease)  Chronic Lymphocytic Leukemia: followed by oncologist in [place]  S/P Tonsillectomy: childhood  Status Post Arthroscopy: right shoulder  History of Arthroscopy of Knee: bilateral  Osteoarthritis  Stomach Ulcer: H pylori, treated with antibiotics        Medication list         MEDICATIONS  (STANDING):  atorvastatin 10 milliGRAM(s) Oral at bedtime  famotidine    Tablet 20 milliGRAM(s) Oral two times a day  lactated ringers. 1000 milliLiter(s) (75 mL/Hr) IV Continuous <Continuous>  lactobacillus acidophilus 1 Tablet(s) Oral three times a day  piperacillin/tazobactam IVPB.. 3.375 Gram(s) IV Intermittent every 8 hours  vancomycin  IVPB 1000 milliGRAM(s) IV Intermittent every 12 hours    MEDICATIONS  (PRN):  acetaminophen   Tablet .. 650 milliGRAM(s) Oral every 6 hours PRN Temp greater or equal to 38C (100.4F), Mild Pain (1 - 3)  albuterol/ipratropium for Nebulization 3 milliLiter(s) Nebulizer every 6 hours PRN Shortness of Breath and/or Wheezing  oxyCODONE    IR 2.5 milliGRAM(s) Oral every 4 hours PRN Moderate Pain (4 - 6)  oxyCODONE    IR 5 milliGRAM(s) Oral every 4 hours PRN Severe Pain (7 - 10)         Vitals log        ICU Vital Signs Last 24 Hrs  T(C): 36.4 (26 Feb 2020 07:27), Max: 39.3 (25 Feb 2020 17:50)  T(F): 97.6 (26 Feb 2020 07:27), Max: 102.7 (25 Feb 2020 17:50)  HR: 101 (26 Feb 2020 07:27) (88 - 105)  BP: 124/74 (26 Feb 2020 07:27) (100/63 - 138/74)  BP(mean): --  ABP: --  ABP(mean): --  RR: 18 (26 Feb 2020 07:27) (18 - 23)  SpO2: 92% (26 Feb 2020 07:27) (91% - 95%)           Input and Output:  I&O's Detail      Lab Data                        13.2   13.77 )-----------( 169      ( 25 Feb 2020 18:13 )             40.4     02-25    137  |  102  |  19  ----------------------------<  117<H>  3.8   |  29  |  1.25    Ca    8.6      25 Feb 2020 18:13    TPro  6.6  /  Alb  3.1<L>  /  TBili  2.0<H>  /  DBili  x   /  AST  16  /  ALT  24  /  AlkPhos  58  02-25      CARDIAC MARKERS ( 25 Feb 2020 18:13 )  .040 ng/mL / x     / x     / x     / x            Review of Systems	  cough      Objective     Physical Examination    heart s1s2  lung dec BS  abd soft  head nc  head at  on room air  alert  verbal      Pertinent Lab findings & Imaging      Mcnulty:  NO   Adequate UO     I&O's Detail           Discussed with:     Cultures:	        Radiology              EXAM:  CT RENAL STONE HUNT                          EXAM:  CT CHEST                                  PROCEDURE DATE:  02/25/2020          INTERPRETATION:  CLINICAL HISTORY:  Fever, left back and flank pain. History of gastric lymphoma.    Multiple axial images of the chest, abdomen and pelvis were obtained from the lung apices through symphysis pubis without the administration of oral or intravascular contrast limiting the sensitivity of evaluation. Reformatted coronal and sagittal images are submitted.    COMPARISON: PET/CT evaluation 6/19/2019.    FINDINGS: New moderately sized left pleural effusion is identified with portions that are loculated; air attenuation within the effusion as well as within the left pleural space suggests the presence of left-sided empyema. Consolidation is identified within the lingular segment as well as left lower lobe lung parenchyma; underlying neoplasm cannot be excluded. Hazy opacity within the left upper lobe is likely related to atelectasis. Subpleural opacity within the posterior right lower lobe and right middle lobe likely related to subpleural fibrosis. There is no evidence for right-sided pleural effusion or air.    No abnormally enlarged mediastinal or hilar lymph nodes are noted. There is no evidence for axillary lymphadenopathy. The heart size appears within normal limits. No pericardial effusion is identified. Thoracic aortic caliber demonstrates unremarkable caliber. Coronary arterial calcifications identified.    The central bronchial anatomy appears patent.    No mediastinal shift is noted.    The liver is normal in size. No focal hepatic masses are identified. There is no evidence for intrahepatic or extrahepatic biliary dilatation. A large gallstone is noted. No gallbladder wall thickening or pericholecystic fluid identified.    The spleen, pancreas and adrenal glands are unremarkable.    There is no evidence for hydronephrosis. No right renal calculi are identified. An 8 mm calculus is identified within the left-sided extrarenal pelvis, unchanged. There is no change in a 6 mm calculus identified within the distal right ureter at the level of the UVJ, without evidence for obstructive uropathy. A 1.3 cm right renal cyst is noted. The abdominal aorta is normal in course and caliber. No abnormally enlarged retroperitoneal or pelvic lymphadenopathy is noted.    Gastric wall thickening is identified allowing for nondistention with oral contrast; clinical correlation is suggested in light of the provided clinical history of gastric lymphoma.    Fecal material is scattered throughout the colon. There is no evidence for mechanical bowel obstruction. Colonic diverticulosis is noted. No colonic wall thickening is identified. There is no evidence for acute appendicitis. There is no evidence for free intraperitoneal air or fluid.    The prostate is enlarged and the urinary bladder appear unremarkable.     Postsurgical changes of the lumbar spine noted. Postprocedural changes of the right shoulder noted. Degenerative changes of the spine evident.    IMPRESSION:    1. New moderately sized left pleural effusion is identified with portions that are loculated; air attenuation within the effusion as well as within the left pleural space suggests the presence of left-sided empyema. Consolidation is identified within the lingular segment as well as left lower lobe lung parenchyma; underlying neoplasm cannot be excluded.   2. Gastric wall thickening is identified allowing for nondistention with oral contrast; clinical correlation is suggested in light of the provided clinical history of gastric lymphoma.  3. An 8 mm calculus is identified within the left-sided extrarenal pelvis, unchanged. There is no change in a 6 mm calculus identified within the distal right ureter at the level of the UVJ, without evidence for obstructive uropathy. No evidence for bilateral hydronephrosis.    Preliminary results discussed with Dr. Galvan by telephone.                    CASA BAUTISTA   This document has been electronically signed. Feb 25 2020  8:17PM

## 2020-02-26 NOTE — H&P ADULT - MUSCULOSKELETAL
details… detailed exam no joint erythema/no joint swelling/ROM intact/no joint warmth/no calf tenderness

## 2020-02-26 NOTE — H&P ADULT - RS GEN PE MLT RESP DETAILS PC
airway patent/clear to auscultation bilaterally/respirations non-labored/no chest wall tenderness/breath sounds equal/good air movement

## 2020-02-26 NOTE — CONSULT NOTE ADULT - PROBLEM SELECTOR RECOMMENDATION 9
eval empyema, CLL, ex smoker, hx of asbestos exp  will call for thoracic surgery cx  on nebs and abx  needs ID eval  will repeat coags  will discuss with IR - thoracentesis vs pigtail insertion  supportive medical regimen and work up under way  discussed with daughter  will follow
-Agree with left thoracentesis to confirm dx of empyema  -Agree with abx  -Will follow

## 2020-02-26 NOTE — H&P ADULT - NEGATIVE CARDIOVASCULAR SYMPTOMS
no claudication/no orthopnea/no paroxysmal nocturnal dyspnea/no chest pain/no palpitations/no dyspnea on exertion/no peripheral edema

## 2020-02-26 NOTE — CONSULT NOTE ADULT - SUBJECTIVE AND OBJECTIVE BOX
Chief Complaint:  Patient is a 89y old  Male who presents with a chief complaint of Left Empyema (2020 11:05)    GI Bleed  Stomach Ulcer  Osteoarthritis  Hyperlipidemia  Herniated Disc  Spinal Stenosis  Diverticulitis  GERD (Gastroesophageal Reflux Disease)  Chronic Lymphocytic Leukemia  S/P Tonsillectomy  Status Post Arthroscopy  History of Arthroscopy of Knee  Osteoarthritis  Stomach Ulcer     HPI:  CHARLY LU is a 89y Male brought to [facility] complaining of flank pain, fever and cough for 4 days.  Patient transferred from [place] ER for admission to [place]. Daughter at the bedside states she took him to the ER for evaluation of back pain, chest discomfort. At [place] patient found to have fever, ct scan revealed left sided empyema and right ureteral stone, patient received ceftriaxone and zosyn. Dr. Maxwell pulmonary and Dr. Barnard urology were aware of transfer. Patient states he is feeling well now. (2020 05:01)      gi consulted for abnormal imaging. pt w known hx of gastric lymphoma. chart reviewed. pt seen and examined      recent vs/labs/imaging reviewed- avss  wbc 13  inr 2.11  tbili 2 not fractionated  +ua  ct renal stone hunt and chest as below  pet 2019   No significant interval change and no evidence of FDG avid recurrent or   metastatic disease.      No Known Allergies      acetaminophen   Tablet .. 650 milliGRAM(s) Oral every 6 hours PRN  albuterol/ipratropium for Nebulization 3 milliLiter(s) Nebulizer every 6 hours PRN  atorvastatin 10 milliGRAM(s) Oral at bedtime  famotidine    Tablet 20 milliGRAM(s) Oral two times a day  guaiFENesin   Syrup  (Sugar-Free) 200 milliGRAM(s) Oral every 6 hours PRN  lactated ringers. 1000 milliLiter(s) IV Continuous <Continuous>  lactobacillus acidophilus 1 Tablet(s) Oral three times a day  oxyCODONE    IR 2.5 milliGRAM(s) Oral every 4 hours PRN  oxyCODONE    IR 5 milliGRAM(s) Oral every 4 hours PRN  phytonadione   Solution 5 milliGRAM(s) Oral once  piperacillin/tazobactam IVPB.. 3.375 Gram(s) IV Intermittent every 8 hours  vancomycin  IVPB 1000 milliGRAM(s) IV Intermittent every 12 hours        FAMILY HISTORY:        Review of Systems:    General:   fever  Eyes:  Good vision, no reported pain  ENT:  No sore throat, pain, runny nose, dysphagia  CV:  No pain, palpitations, no lightheadedness  Resp:  chest discomfort  GI: see above  :  No pain, bleeding, incontinence, nocturia  Muscle:  No pain, weakness  Neuro:  No weakness, tingling, memory problems  Psych:  No fatigue, insomnia, mood problems, depression  Endocrine:  No polyuria, polydypsia, cold/heat intolerance  Heme:  No petechiae, ecchymosis, easy bruisability  Skin:  No rash, tattoos, scars, edema    Relevant Family History:   n/c    Relevant Social History: n/c      Physical Exam:    Vital Signs:  Vital Signs Last 24 Hrs  T(C): 36.8 (2020 11:36), Max: 39.3 (2020 17:50)  T(F): 98.2 (2020 11:36), Max: 102.7 (2020 17:50)  HR: 104 (2020 11:36) (88 - 105)  BP: 116/73 (2020 11:36) (100/63 - 138/74)  BP(mean): --  RR: 18 (2020 11:36) (18 - 23)  SpO2: 95% (2020 11:36) (91% - 95%)  Daily Height in cm: 177.8 (2020 22:44)    Daily Weight in k (2020 05:20)    General:  Appears stated age, well-groomed, nad  HEENT:  NC/AT,  conjunctivae clear and pink, no thyromegaly, nodules, adenopathy, no JVD  Chest:  Full & symmetric excursion, no increased effort, breath sounds clear  Cardiovascular:  Regular rhythm, S1, S2, no murmur/rub/S3/S4, no abdominal bruit, no edema  Abdomen:  Soft, non-tender, non-distended, normoactive bowel sounds,  no masses ,no hepatosplenomeagaly, no signs of chronic liver disease  Extremities:  no cyanosis,clubbing or edema  Skin:  No rash/erythema/ecchymoses/petechiae/wounds/abscess/warm/dry  Neuro/Psych:  A&O  , no asterixis, no tremor, no encephalopathy    Laboratory:                            12.0   13.18 )-----------( 159      ( 2020 08:57 )             35.2         137  |  102  |  19  ----------------------------<  117<H>  3.8   |  29  |  1.25    Ca    8.6      2020 18:13    TPro  6.6  /  Alb  3.1<L>  /  TBili  2.0<H>  /  DBili  x   /  AST  16  /  ALT  24  /  AlkPhos  58      LIVER FUNCTIONS - ( 2020 18:13 )  Alb: 3.1 g/dL / Pro: 6.6 g/dL / ALK PHOS: 58 U/L / ALT: 24 U/L DA / AST: 16 U/L / GGT: x           PT/INR - ( 2020 08:57 )   PT: 24.2 sec;   INR: 2.11 ratio         PTT - ( 2020 18:13 )  PTT:30.9 sec  Urinalysis Basic - ( 2020 19:48 )    Color: Yellow / Appearance: Clear / S.020 / pH: x  Gluc: x / Ketone: Trace  / Bili: Negative / Urobili: 1 mg/dL   Blood: x / Protein: 30 mg/dL / Nitrite: Positive   Leuk Esterase: Trace / RBC: 3-5 /HPF / WBC 3-5   Sq Epi: x / Non Sq Epi: Few / Bacteria: Moderate      Amylase Serum--      Lipase serum46       Ammonia--    Imaging:  < from: CT Renal Stone [facility] (20 @ 19:18) >    [facility]                          EXAM:  CT CHEST                                  PROCEDURE DATE:  2020          INTERPRETATION:  CLINICAL HISTORY:  Fever, left back and flank pain. History of gastric lymphoma.    Multiple axial images of the chest, abdomen and pelvis were obtained from the lung apices through symphysis pubis without the administration of oral or intravascular contrast limiting the sensitivity of evaluation. Reformatted coronal and sagittal images are submitted.    COMPARISON: PET/CT evaluation 2019.    FINDINGS: New moderately sized left pleural effusion is identified with portions that are loculated; air attenuation within the effusion as well as within the left pleural space suggests the presence of left-sided empyema. Consolidation is identified within the lingular segment as well as left lower lobe lung parenchyma; underlying neoplasm cannot be excluded. Hazy opacity within the left upper lobe is likely related to atelectasis. Subpleural opacity within the posterior right lower lobe and right middle lobe likely related to subpleural fibrosis. There is no evidence for right-sided pleural effusion or air.    No abnormally enlarged mediastinal or hilar lymph nodes are noted. There is no evidence for axillary lymphadenopathy. The heart size appears within normal limits. No pericardial effusion is identified. Thoracic aortic caliber demonstrates unremarkable caliber. Coronary arterial calcifications identified.    The central bronchial anatomy appears patent.    No mediastinal shift is noted.    The liver is normal in size. No focal hepatic masses are identified. There is no evidence for intrahepatic or extrahepatic biliary dilatation. A large gallstone is noted. No gallbladder wall thickening or pericholecystic fluid identified.    The spleen, pancreas and adrenal glands are unremarkable.    There is no evidence for hydronephrosis. No right renal calculi are identified. An 8 mm calculus is identified within the left-sided extrarenal pelvis, unchanged. There is no change in a 6 mm calculus identified within the distal right ureter at the level of the UVJ, without evidence for obstructive uropathy. A 1.3 cm right renal cyst is noted. The abdominal aorta is normal in course and caliber. No abnormally enlarged retroperitoneal or pelvic lymphadenopathy is noted.    Gastric wall thickening is identified allowing for nondistention with oral contrast; clinical correlation is suggested in light of the provided clinical history of gastric lymphoma.    Fecal material is scattered throughout the colon. There is no evidence for mechanical bowel obstruction. Colonic diverticulosis is noted. No colonic wall thickening is identified. There is no evidence for acute appendicitis.There is no evidence for free intraperitoneal air or fluid.    The prostate is enlarged and the urinary bladder appear unremarkable.     Postsurgical changes of the lumbar spine noted. Postprocedural changes of the right shoulder noted. Degenerative changes of the spine evident.    IMPRESSION:    1. New moderately sized left pleural effusion is identified with portions that are loculated; air attenuation within the effusion as well as within the left pleural space suggests the presence of left-sided empyema. Consolidation is identified within the lingular segment as well as left lower lobe lung parenchyma; underlying neoplasm cannot be excluded.   2. Gastric wall thickening is identified allowing for nondistention with oral contrast; clinicalcorrelation is suggested in light of the provided clinical history of gastric lymphoma.  3. An 8 mm calculus is identified within the left-sided extrarenal pelvis, unchanged. There is no change in a 6 mm calculus identified within the distal right ureter at the level of the UVJ, without evidence for obstructive uropathy. No evidence for bilateral hydronephrosis.    Preliminary results discussed with Dr. Galvan by telephone.                    CASA BAUTISTA   This document has been electronically signed. 2020  8:17PM                < end of copied text >  < from: CT Chest No Cont (20 @ 19:18) >    EXAM:  CT RENAL STONE [facility]T                          EXAM:  CT CHEST                                  PROCEDURE DATE:  2020          INTERPRETATION:  CLINICAL HISTORY:  Fever, left back and flank pain. History of gastric lymphoma.    Multiple axial images of the chest, abdomen and pelvis were obtained from the lung apices through symphysis pubis without the administration of oral or intravascular contrast limiting the sensitivity of evaluation. Reformatted coronal and sagittal images are submitted.    COMPARISON: PET/CT evaluation 2019.    FINDINGS: New moderately sized left pleural effusion is identified with portions that are loculated; air attenuation within the effusion as well as within the left pleural space suggests the presence of left-sided empyema. Consolidation is identified within the lingular segment as well as left lower lobe lung parenchyma; underlying neoplasm cannot be excluded. Hazy opacity within the left upper lobe is likely related to atelectasis. Subpleural opacity within the posterior right lower lobe and right middle lobe likely related to subpleural fibrosis. There is no evidence for right-sided pleural effusion or air.    No abnormally enlarged mediastinal or hilar lymph nodes are noted. There is no evidence for axillary lymphadenopathy. The heart size appears within normal limits. No pericardial effusion is identified. Thoracic aortic caliber demonstrates unremarkable caliber. Coronary arterial calcifications identified.    The central bronchial anatomy appears patent.    No mediastinal shift is noted.    The liver is normal in size. No focal hepatic masses are identified. There is no evidence for intrahepatic or extrahepatic biliary dilatation. A large gallstone is noted. No gallbladder wall thickening or pericholecystic fluid identified.    The spleen, pancreas and adrenal glands are unremarkable.    There is no evidence for hydronephrosis. No right renal calculi are identified. An 8 mm calculus is identified within the left-sided extrarenal pelvis, unchanged. There is no change in a 6 mm calculus identified within the distal right ureter at the level of the UVJ, without evidence for obstructive uropathy. A 1.3 cm right renal cyst is noted. The abdominal aorta is normal in course and caliber. No abnormally enlarged retroperitoneal or pelvic lymphadenopathy is noted.    Gastric wall thickening is identified allowing for nondistention with oral contrast; clinical correlation is suggested in light of the provided clinical history of gastric lymphoma.    Fecal material is scattered throughout the colon. There is no evidence for mechanical bowel obstruction. Colonic diverticulosis is noted. No colonic wall thickening is identified. There is no evidence for acute appendicitis.There is no evidence for free intraperitoneal air or fluid.    The prostate is enlarged and the urinary bladder appear unremarkable.     Postsurgical changes of the lumbar spine noted. Postprocedural changes of the right shoulder noted. Degenerative changes of the spine evident.    IMPRESSION:    1. New moderately sized left pleural effusion is identified with portions that are loculated; air attenuation within the effusion as well as within the left pleural space suggests the presence of left-sided empyema. Consolidation is identified within the lingular segment as well as left lower lobe lung parenchyma; underlying neoplasm cannot be excluded.   2. Gastric wall thickening is identified allowing for nondistention with oral contrast; clinicalcorrelation is suggested in light of the provided clinical history of gastric lymphoma.  3. An 8 mm calculus is identified within the left-sided extrarenal pelvis, unchanged. There is no change in a 6 mm calculus identified within the distal right ureter at the level of the UVJ, without evidence for obstructive uropathy. No evidence for bilateral hydronephrosis.    Preliminary results discussed with Dr. Galvan by telephone.                    CASA BAUTISTA   This document has been electronically signed. 2020  8:17PM                < end of copied text > Chief Complaint:  Patient is a 89y old  Male who presents with a chief complaint of Left Empyema (2020 11:05)    GI Bleed  Stomach Ulcer  Osteoarthritis  Hyperlipidemia  Herniated Disc  Spinal Stenosis  Diverticulitis  GERD (Gastroesophageal Reflux Disease)  Chronic Lymphocytic Leukemia  S/P Tonsillectomy  Status Post Arthroscopy  History of Arthroscopy of Knee  Osteoarthritis  Stomach Ulcer     HPI:  CHARLY LU is a 89y Male brought to [facility] ED complaining of flank pain, fever and cough for 4 days.  Patient transferred from [place] ER for admission to [place]. Daughter at the bedside states she took him to the ER for evaluation of back pain, chest discomfort. At [place] patient found to have fever, ct scan revealed left sided empyema and right ureteral stone, patient received ceftriaxone and zosyn. Dr. Maxwell pulmonary and Dr. Barnard urology were aware of transfer. Patient states he is feeling well now. (2020 05:01)      gi consulted for abnormal imaging. pt w known hx of gastric lymphoma. chart reviewed.  per heme onc note- 2019 - had EGD with Dr. Lorenz; 2019 PET/Ct with out hypermetabolic activity. pt seen and examined, no family at bedside. pt states he is tired, hx limited. upon eval denies n/v/d/c/abd pain. per rn no bms, no s/s active gib.     recent vs/labs/imaging reviewed- avss  wbc 13  inr 2.11  tbili 2 not fractionated (last month lfts normal)  +ua  ct renal stone hunt and chest as below  pet 2019   No significant interval change and no evidence of FDG avid recurrent or   metastatic disease.      No Known Allergies      acetaminophen   Tablet .. 650 milliGRAM(s) Oral every 6 hours PRN  albuterol/ipratropium for Nebulization 3 milliLiter(s) Nebulizer every 6 hours PRN  atorvastatin 10 milliGRAM(s) Oral at bedtime  famotidine    Tablet 20 milliGRAM(s) Oral two times a day  guaiFENesin   Syrup  (Sugar-Free) 200 milliGRAM(s) Oral every 6 hours PRN  lactated ringers. 1000 milliLiter(s) IV Continuous <Continuous>  lactobacillus acidophilus 1 Tablet(s) Oral three times a day  oxyCODONE    IR 2.5 milliGRAM(s) Oral every 4 hours PRN  oxyCODONE    IR 5 milliGRAM(s) Oral every 4 hours PRN  phytonadione   Solution 5 milliGRAM(s) Oral once  piperacillin/tazobactam IVPB.. 3.375 Gram(s) IV Intermittent every 8 hours  vancomycin  IVPB 1000 milliGRAM(s) IV Intermittent every 12 hours        FAMILY HISTORY:        Review of Systems:    General:   fever  Eyes:  Good vision, no reported pain  ENT:  No sore throat, pain, runny nose, dysphagia  CV:  No pain, palpitations, no lightheadedness  Resp:  chest discomfort  GI: see above  :  No pain, bleeding, incontinence, nocturia  Muscle:  No pain, weakness  Neuro:  No weakness, tingling, memory problems  Psych:  No fatigue, insomnia, mood problems, depression  Endocrine:  No polyuria, polydypsia, cold/heat intolerance  Heme:  No petechiae, ecchymosis, easy bruisability  Skin:  No rash, tattoos, scars, edema    Relevant Family History:   n/c    Relevant Social History: n/c      Physical Exam:    Vital Signs:  Vital Signs Last 24 Hrs  T(C): 36.8 (2020 11:36), Max: 39.3 (2020 17:50)  T(F): 98.2 (2020 11:36), Max: 102.7 (2020 17:50)  HR: 104 (2020 11:36) (88 - 105)  BP: 116/73 (2020 11:36) (100/63 - 138/74)  BP(mean): --  RR: 18 (2020 11:36) (18 - 23)  SpO2: 95% (2020 11:36) (91% - 95%)  Daily Height in cm: 177.8 (2020 22:44)    Daily Weight in k (2020 05:20)    General:  lying in  bed  HEENT:  NC/AT  Abdomen:  Soft, non-tender, non-distended  Extremities:  no edema  Neuro/Psych:  Awake alert    Laboratory:                            12.0   13.18 )-----------( 159      ( 2020 08:57 )             35.2     02-    137  |  102  |  19  ----------------------------<  117<H>  3.8   |  29  |  1.25    Ca    8.6      2020 18:13    TPro  6.6  /  Alb  3.1<L>  /  TBili  2.0<H>  /  DBili  x   /  AST  16  /  ALT  24  /  AlkPhos  58  02-25    LIVER FUNCTIONS - ( 2020 18:13 )  Alb: 3.1 g/dL / Pro: 6.6 g/dL / ALK PHOS: 58 U/L / ALT: 24 U/L DA / AST: 16 U/L / GGT: x           PT/INR - ( 2020 08:57 )   PT: 24.2 sec;   INR: 2.11 ratio         PTT - ( 2020 18:13 )  PTT:30.9 sec  Urinalysis Basic - ( 2020 19:48 )    Color: Yellow / Appearance: Clear / S.020 / pH: x  Gluc: x / Ketone: Trace  / Bili: Negative / Urobili: 1 mg/dL   Blood: x / Protein: 30 mg/dL / Nitrite: Positive   Leuk Esterase: Trace / RBC: 3-5 /HPF / WBC 3-5   Sq Epi: x / Non Sq Epi: Few / Bacteria: Moderate      Amylase Serum--      Lipase serum46       Ammonia--    Imaging:  < from: CT Renal Stone [facility] (20 @ 19:18) >    EXAM:  CT RENAL STONE [facility]                          EXAM:  CT CHEST                                  PROCEDURE DATE:  2020          INTERPRETATION:  CLINICAL HISTORY:  Fever, left back and flank pain. History of gastric lymphoma.    Multiple axial images of the chest, abdomen and pelvis were obtained from the lung apices through symphysis pubis without the administration of oral or intravascular contrast limiting the sensitivity of evaluation. Reformatted coronal and sagittal images are submitted.    COMPARISON: PET/CT evaluation 2019.    FINDINGS: New moderately sized left pleural effusion is identified with portions that are loculated; air attenuation within the effusion as well as within the left pleural space suggests the presence of left-sided empyema. Consolidation is identified within the lingular segment as well as left lower lobe lung parenchyma; underlying neoplasm cannot be excluded. Hazy opacity within the left upper lobe is likely related to atelectasis. Subpleural opacity within the posterior right lower lobe and right middle lobe likely related to subpleural fibrosis. There is no evidence for right-sided pleural effusion or air.    No abnormally enlarged mediastinal or hilar lymph nodes are noted. There is no evidence for axillary lymphadenopathy. The heart size appears within normal limits. No pericardial effusion is identified. Thoracic aortic caliber demonstrates unremarkable caliber. Coronary arterial calcifications identified.    The central bronchial anatomy appears patent.    No mediastinal shift is noted.    The liver is normal in size. No focal hepatic masses are identified. There is no evidence for intrahepatic or extrahepatic biliary dilatation. A large gallstone is noted. No gallbladder wall thickening or pericholecystic fluid identified.    The spleen, pancreas and adrenal glands are unremarkable.    There is no evidence for hydronephrosis. No right renal calculi are identified. An 8 mm calculus is identified within the left-sided extrarenal pelvis, unchanged. There is no change in a 6 mm calculus identified within the distal right ureter at the level of the UVJ, without evidence for obstructive uropathy. A 1.3 cm right renal cyst is noted. The abdominal aorta is normal in course and caliber. No abnormally enlarged retroperitoneal or pelvic lymphadenopathy is noted.    Gastric wall thickening is identified allowing for nondistention with oral contrast; clinical correlation is suggested in light of the provided clinical history of gastric lymphoma.    Fecal material is scattered throughout the colon. There is no evidence for mechanical bowel obstruction. Colonic diverticulosis is noted. No colonic wall thickening is identified. There is no evidence for acute appendicitis.There is no evidence for free intraperitoneal air or fluid.    The prostate is enlarged and the urinary bladder appear unremarkable.     Postsurgical changes of the lumbar spine noted. Postprocedural changes of the right shoulder noted. Degenerative changes of the spine evident.    IMPRESSION:    1. New moderately sized left pleural effusion is identified with portions that are loculated; air attenuation within the effusion as well as within the left pleural space suggests the presence of left-sided empyema. Consolidation is identified within the lingular segment as well as left lower lobe lung parenchyma; underlying neoplasm cannot be excluded.   2. Gastric wall thickening is identified allowing for nondistention with oral contrast; clinicalcorrelation is suggested in light of the provided clinical history of gastric lymphoma.  3. An 8 mm calculus is identified within the left-sided extrarenal pelvis, unchanged. There is no change in a 6 mm calculus identified within the distal right ureter at the level of the UVJ, without evidence for obstructive uropathy. No evidence for bilateral hydronephrosis.    Preliminary results discussed with Dr. Galvan by telephone.                    CASA BAUTISTA   This document has been electronically signed. 2020  8:17PM                < end of copied text >  < from: CT Chest No Cont (20 @ 19:18) >    EXAM:  CT RENAL STONE [facility]                          EXAM:  CT CHEST                                  PROCEDURE DATE:  2020          INTERPRETATION:  CLINICAL HISTORY:  Fever, left back and flank pain. History of gastric lymphoma.    Multiple axial images of the chest, abdomen and pelvis were obtained from the lung apices through symphysis pubis without the administration of oral or intravascular contrast limiting the sensitivity of evaluation. Reformatted coronal and sagittal images are submitted.    COMPARISON: PET/CT evaluation 2019.    FINDINGS: New moderately sized left pleural effusion is identified with portions that are loculated; air attenuation within the effusion as well as within the left pleural space suggests the presence of left-sided empyema. Consolidation is identified within the lingular segment as well as left lower lobe lung parenchyma; underlying neoplasm cannot be excluded. Hazy opacity within the left upper lobe is likely related to atelectasis. Subpleural opacity within the posterior right lower lobe and right middle lobe likely related to subpleural fibrosis. There is no evidence for right-sided pleural effusion or air.    No abnormally enlarged mediastinal or hilar lymph nodes are noted. There is no evidence for axillary lymphadenopathy. The heart size appears within normal limits. No pericardial effusion is identified. Thoracic aortic caliber demonstrates unremarkable caliber. Coronary arterial calcifications identified.    The central bronchial anatomy appears patent.    No mediastinal shift is noted.    The liver is normal in size. No focal hepatic masses are identified. There is no evidence for intrahepatic or extrahepatic biliary dilatation. A large gallstone is noted. No gallbladder wall thickening or pericholecystic fluid identified.    The spleen, pancreas and adrenal glands are unremarkable.    There is no evidence for hydronephrosis. No right renal calculi are identified. An 8 mm calculus is identified within the left-sided extrarenal pelvis, unchanged. There is no change in a 6 mm calculus identified within the distal right ureter at the level of the UVJ, without evidence for obstructive uropathy. A 1.3 cm right renal cyst is noted. The abdominal aorta is normal in course and caliber. No abnormally enlarged retroperitoneal or pelvic lymphadenopathy is noted.    Gastric wall thickening is identified allowing for nondistention with oral contrast; clinical correlation is suggested in light of the provided clinical history of gastric lymphoma.    Fecal material is scattered throughout the colon. There is no evidence for mechanical bowel obstruction. Colonic diverticulosis is noted. No colonic wall thickening is identified. There is no evidence for acute appendicitis.There is no evidence for free intraperitoneal air or fluid.    The prostate is enlarged and the urinary bladder appear unremarkable.     Postsurgical changes of the lumbar spine noted. Postprocedural changes of the right shoulder noted. Degenerative changes of the spine evident.    IMPRESSION:    1. New moderately sized left pleural effusion is identified with portions that are loculated; air attenuation within the effusion as well as within the left pleural space suggests the presence of left-sided empyema. Consolidation is identified within the lingular segment as well as left lower lobe lung parenchyma; underlying neoplasm cannot be excluded.   2. Gastric wall thickening is identified allowing for nondistention with oral contrast; clinicalcorrelation is suggested in light of the provided clinical history of gastric lymphoma.  3. An 8 mm calculus is identified within the left-sided extrarenal pelvis, unchanged. There is no change in a 6 mm calculus identified within the distal right ureter at the level of the UVJ, without evidence for obstructive uropathy. No evidence for bilateral hydronephrosis.    Preliminary results discussed with Dr. Galvan by telephone.                    CASA BAUTISTA   This document has been electronically signed. 2020  8:17PM                < end of copied text > Chief Complaint:  Patient is a 89y old  Male who presents with a chief complaint of Left Empyema (2020 11:05)    GI Bleed  Stomach Ulcer  Osteoarthritis  Hyperlipidemia  Herniated Disc  Spinal Stenosis  Diverticulitis  GERD (Gastroesophageal Reflux Disease)  Chronic Lymphocytic Leukemia  S/P Tonsillectomy  Status Post Arthroscopy  History of Arthroscopy of Knee  Osteoarthritis  Stomach Ulcer     HPI:  CHARLY LU is a 89y Male brought to [place] ED complaining of flank pain, fever and cough for 4 days.  Patient transferred from [place] ER for admission to [place]. Daughter at the bedside states she took him to the ER for evaluation of back pain, chest discomfort. At [place] patient found to have fever, ct scan revealed left sided empyema and right ureteral stone, patient received ceftriaxone and zosyn. Dr. Maxwell pulmonary and Dr. Barnard urology were aware of transfer. Patient states he is feeling well now. (2020 05:01)      gi consulted for abnormal imaging. pt w known hx of gastric lymphoma. chart reviewed.  per heme onc note- 2019 - had EGD with Dr. Lorenz; 2019 PET/Ct with out hypermetabolic activity. pt seen and examined, pt states he is tired, hx limited. upon eval denies n/v/d/c/abd pain. dtr at bedside, states brought to hospital for cp/back pain. has been eating well. no recne tn/v/d/c/abd pain/blood in stool. thinks last egd by dr lorenz in july was normal.     recent vs/labs/imaging reviewed- avss  wbc 13  inr 2.11  tbili 2 not fractionated (last month lfts normal)  +ua  ct renal stone hunt and chest as below  pet 2019   No significant interval change and no evidence of FDG avid recurrent or   metastatic disease.      No Known Allergies      acetaminophen   Tablet .. 650 milliGRAM(s) Oral every 6 hours PRN  albuterol/ipratropium for Nebulization 3 milliLiter(s) Nebulizer every 6 hours PRN  atorvastatin 10 milliGRAM(s) Oral at bedtime  famotidine    Tablet 20 milliGRAM(s) Oral two times a day  guaiFENesin   Syrup  (Sugar-Free) 200 milliGRAM(s) Oral every 6 hours PRN  lactated ringers. 1000 milliLiter(s) IV Continuous <Continuous>  lactobacillus acidophilus 1 Tablet(s) Oral three times a day  oxyCODONE    IR 2.5 milliGRAM(s) Oral every 4 hours PRN  oxyCODONE    IR 5 milliGRAM(s) Oral every 4 hours PRN  phytonadione   Solution 5 milliGRAM(s) Oral once  piperacillin/tazobactam IVPB.. 3.375 Gram(s) IV Intermittent every 8 hours  vancomycin  IVPB 1000 milliGRAM(s) IV Intermittent every 12 hours        FAMILY HISTORY:        Review of Systems:    General:   fever  Eyes:  Good vision, no reported pain  ENT:  No sore throat, pain, runny nose, dysphagia  CV:  No pain, palpitations, no lightheadedness  Resp:  chest discomfort  GI: see above  :  No pain, bleeding, incontinence, nocturia  Muscle:  No pain, weakness  Neuro:  No weakness, tingling, memory problems  Psych:  No fatigue, insomnia, mood problems, depression  Endocrine:  No polyuria, polydypsia, cold/heat intolerance  Heme:  No petechiae, ecchymosis, easy bruisability  Skin:  No rash, tattoos, scars, edema    Relevant Family History:   n/c    Relevant Social History: n/c      Physical Exam:    Vital Signs:  Vital Signs Last 24 Hrs  T(C): 36.8 (2020 11:36), Max: 39.3 (2020 17:50)  T(F): 98.2 (2020 11:36), Max: 102.7 (2020 17:50)  HR: 104 (2020 11:36) (88 - 105)  BP: 116/73 (2020 11:36) (100/63 - 138/74)  BP(mean): --  RR: 18 (2020 11:36) (18 - 23)  SpO2: 95% (2020 11:36) (91% - 95%)  Daily Height in cm: 177.8 (2020 22:44)    Daily Weight in k (2020 05:20)    General:  lying in  bed  HEENT:  NC/AT  Abdomen:  Soft, non-tender, non-distended  Extremities:  no edema  Neuro/Psych:  Awake alert    Laboratory:                            12.0   13.18 )-----------( 159      ( 2020 08:57 )             35.2         137  |  102  |  19  ----------------------------<  117<H>  3.8   |  29  |  1.25    Ca    8.6      2020 18:13    TPro  6.6  /  Alb  3.1<L>  /  TBili  2.0<H>  /  DBili  x   /  AST  16  /  ALT  24  /  AlkPhos  58  25    LIVER FUNCTIONS - ( 2020 18:13 )  Alb: 3.1 g/dL / Pro: 6.6 g/dL / ALK PHOS: 58 U/L / ALT: 24 U/L DA / AST: 16 U/L / GGT: x           PT/INR - ( 2020 08:57 )   PT: 24.2 sec;   INR: 2.11 ratio         PTT - ( 2020 18:13 )  PTT:30.9 sec  Urinalysis Basic - ( 2020 19:48 )    Color: Yellow / Appearance: Clear / S.020 / pH: x  Gluc: x / Ketone: Trace  / Bili: Negative / Urobili: 1 mg/dL   Blood: x / Protein: 30 mg/dL / Nitrite: Positive   Leuk Esterase: Trace / RBC: 3-5 /HPF / WBC 3-5   Sq Epi: x / Non Sq Epi: Few / Bacteria: Moderate      Amylase Serum--      Lipase serum46       Ammonia--    Imaging:  < from: CT Renal Stone Hunt (20 @ 19:18) >    EXAM:  CT RENAL STONE HUNT                          EXAM:  CT CHEST                                  PROCEDURE DATE:  2020          INTERPRETATION:  CLINICAL HISTORY:  Fever, left back and flank pain. History of gastric lymphoma.    Multiple axial images of the chest, abdomen and pelvis were obtained from the lung apices through symphysis pubis without the administration of oral or intravascular contrast limiting the sensitivity of evaluation. Reformatted coronal and sagittal images are submitted.    COMPARISON: PET/CT evaluation 2019.    FINDINGS: New moderately sized left pleural effusion is identified with portions that are loculated; air attenuation within the effusion as well as within the left pleural space suggests the presence of left-sided empyema. Consolidation is identified within the lingular segment as well as left lower lobe lung parenchyma; underlying neoplasm cannot be excluded. Hazy opacity within the left upper lobe is likely related to atelectasis. Subpleural opacity within the posterior right lower lobe and right middle lobe likely related to subpleural fibrosis. There is no evidence for right-sided pleural effusion or air.    No abnormally enlarged mediastinal or hilar lymph nodes are noted. There is no evidence for axillary lymphadenopathy. The heart size appears within normal limits. No pericardial effusion is identified. Thoracic aortic caliber demonstrates unremarkable caliber. Coronary arterial calcifications identified.    The central bronchial anatomy appears patent.    No mediastinal shift is noted.    The liver is normal in size. No focal hepatic masses are identified. There is no evidence for intrahepatic or extrahepatic biliary dilatation. A large gallstone is noted. No gallbladder wall thickening or pericholecystic fluid identified.    The spleen, pancreas and adrenal glands are unremarkable.    There is no evidence for hydronephrosis. No right renal calculi are identified. An 8 mm calculus is identified within the left-sided extrarenal pelvis, unchanged. There is no change in a 6 mm calculus identified within the distal right ureter at the level of the UVJ, without evidence for obstructive uropathy. A 1.3 cm right renal cyst is noted. The abdominal aorta is normal in course and caliber. No abnormally enlarged retroperitoneal or pelvic lymphadenopathy is noted.    Gastric wall thickening is identified allowing for nondistention with oral contrast; clinical correlation is suggested in light of the provided clinical history of gastric lymphoma.    Fecal material is scattered throughout the colon. There is no evidence for mechanical bowel obstruction. Colonic diverticulosis is noted. No colonic wall thickening is identified. There is no evidence for acute appendicitis.There is no evidence for free intraperitoneal air or fluid.    The prostate is enlarged and the urinary bladder appear unremarkable.     Postsurgical changes of the lumbar spine noted. Postprocedural changes of the right shoulder noted. Degenerative changes of the spine evident.    IMPRESSION:    1. New moderately sized left pleural effusion is identified with portions that are loculated; air attenuation within the effusion as well as within the left pleural space suggests the presence of left-sided empyema. Consolidation is identified within the lingular segment as well as left lower lobe lung parenchyma; underlying neoplasm cannot be excluded.   2. Gastric wall thickening is identified allowing for nondistention with oral contrast; clinicalcorrelation is suggested in light of the provided clinical history of gastric lymphoma.  3. An 8 mm calculus is identified within the left-sided extrarenal pelvis, unchanged. There is no change in a 6 mm calculus identified within the distal right ureter at the level of the UVJ, without evidence for obstructive uropathy. No evidence for bilateral hydronephrosis.    Preliminary results discussed with Dr. Galvan by telephone.                    CASA BAUTISTA   This document has been electronically signed. 2020  8:17PM                < end of copied text >  < from: CT Chest No Cont (20 @ 19:18) >    EXAM:  CT RENAL STONE HUNT                          EXAM:  CT CHEST                                  PROCEDURE DATE:  2020          INTERPRETATION:  CLINICAL HISTORY:  Fever, left back and flank pain. History of gastric lymphoma.    Multiple axial images of the chest, abdomen and pelvis were obtained from the lung apices through symphysis pubis without the administration of oral or intravascular contrast limiting the sensitivity of evaluation. Reformatted coronal and sagittal images are submitted.    COMPARISON: PET/CT evaluation 2019.    FINDINGS: New moderately sized left pleural effusion is identified with portions that are loculated; air attenuation within the effusion as well as within the left pleural space suggests the presence of left-sided empyema. Consolidation is identified within the lingular segment as well as left lower lobe lung parenchyma; underlying neoplasm cannot be excluded. Hazy opacity within the left upper lobe is likely related to atelectasis. Subpleural opacity within the posterior right lower lobe and right middle lobe likely related to subpleural fibrosis. There is no evidence for right-sided pleural effusion or air.    No abnormally enlarged mediastinal or hilar lymph nodes are noted. There is no evidence for axillary lymphadenopathy. The heart size appears within normal limits. No pericardial effusion is identified. Thoracic aortic caliber demonstrates unremarkable caliber. Coronary arterial calcifications identified.    The central bronchial anatomy appears patent.    No mediastinal shift is noted.    The liver is normal in size. No focal hepatic masses are identified. There is no evidence for intrahepatic or extrahepatic biliary dilatation. A large gallstone is noted. No gallbladder wall thickening or pericholecystic fluid identified.    The spleen, pancreas and adrenal glands are unremarkable.    There is no evidence for hydronephrosis. No right renal calculi are identified. An 8 mm calculus is identified within the left-sided extrarenal pelvis, unchanged. There is no change in a 6 mm calculus identified within the distal right ureter at the level of the UVJ, without evidence for obstructive uropathy. A 1.3 cm right renal cyst is noted. The abdominal aorta is normal in course and caliber. No abnormally enlarged retroperitoneal or pelvic lymphadenopathy is noted.    Gastric wall thickening is identified allowing for nondistention with oral contrast; clinical correlation is suggested in light of the provided clinical history of gastric lymphoma.    Fecal material is scattered throughout the colon. There is no evidence for mechanical bowel obstruction. Colonic diverticulosis is noted. No colonic wall thickening is identified. There is no evidence for acute appendicitis.There is no evidence for free intraperitoneal air or fluid.    The prostate is enlarged and the urinary bladder appear unremarkable.     Postsurgical changes of the lumbar spine noted. Postprocedural changes of the right shoulder noted. Degenerative changes of the spine evident.    IMPRESSION:    1. New moderately sized left pleural effusion is identified with portions that are loculated; air attenuation within the effusion as well as within the left pleural space suggests the presence of left-sided empyema. Consolidation is identified within the lingular segment as well as left lower lobe lung parenchyma; underlying neoplasm cannot be excluded.   2. Gastric wall thickening is identified allowing for nondistention with oral contrast; clinicalcorrelation is suggested in light of the provided clinical history of gastric lymphoma.  3. An 8 mm calculus is identified within the left-sided extrarenal pelvis, unchanged. There is no change in a 6 mm calculus identified within the distal right ureter at the level of the UVJ, without evidence for obstructive uropathy. No evidence for bilateral hydronephrosis.    Preliminary results discussed with Dr. Galvan by telephone.                    CASA BAUTISTA   This document has been electronically signed. 2020  8:17PM                < end of copied text >

## 2020-02-26 NOTE — CONSULT NOTE ADULT - ASSESSMENT
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 On Observation with serial EGD and PET  BM bx prior with low level mono-clonal B-cells c/w MBCL/CLL.     Hx B12 def. On B12 injections outpt. No sn/sx.     Outpt normal CBC, normal Hgb. Baseline WBC ~ 7    In Jan 2020 with cough/URI, in retrospect may have later developed pneumonia.   Now admitted with empyema.     Leukocytosis due to infection.   Hx of asymptomatic low IgA and IgM      RECOMMENDATIONS  IV abx per pulm, medicine.   On Zosyn and Vancomycin.     Check IgG levels.   Recheck B12, Folate levels.     Will need therapeutic and diagnostic tap per pulm.   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     ID consultation  IR eval for tap.     DVT Prophylaxis on SCD  Can place on SQ heparin or LMWH post procedures.       Thank you for consulting us.   I have discussed the above plan of care with patient/dtr in detail. They expressed understanding of the treatment plan . Risks, benefits and alternatives discussed in detail. I have asked if they have any questions or concerns and appropriately addressed them; all questions answered to their satisfactions and in lay terms.

## 2020-02-26 NOTE — CONSULT NOTE ADULT - ASSESSMENT
Left empyema appears to be the main problem  Non obstructive right ureteral stone and left extrarenal pelvis  "Dirty" urine on UA likely represents chronic colonization / asymptomatic bacteriuria rather than infection    Will start Flomax for medical expulsive therapy    Discussed with physician son at bedside

## 2020-02-26 NOTE — ED PROVIDER NOTE - PROGRESS NOTE DETAILS
paged Dr. Bray and Dr. Barnard, awaiting call back spoke with dr. Barnard, ct scan results discussed regarding kidney stones, believes fever is from empyema and not stones, no need for surgical intervention

## 2020-02-26 NOTE — CHART NOTE - NSCHARTNOTEFT_GEN_A_CORE
Notified by Rn that patient's family wants to speak to a physician regarding patient's breathing. Patient seen and examined at bedside. Patient's son at bedside. Son reports that patient appears to have difficulty breathing and states he is concerned due to a vital sign check that showed an O2 saturation of 90%. Upon my examination, patient appears to be in no acute distress and states he has pain in his left lower lung region, which has been present since admission. States pain is on and off, however when it does occur, he rates it a 10/10, sharp in nature. During my examination, patient had pain approx 3 times. Patient denies difficulty breathing, chest pain, cough, headache, dizziness, lightheadedness. Patient also states he did not like the dinner that was provided and did not feel hungry.     Vital Signs Last 24 Hrs  T(C): 36.3 (26 Feb 2020 20:39), Max: 37.3 (26 Feb 2020 02:16)  T(F): 97.4 (26 Feb 2020 20:39), Max: 99.2 (26 Feb 2020 02:16)  HR: 100 (26 Feb 2020 20:39) (88 - 105)  BP: 110/68 (26 Feb 2020 20:39) (106/67 - 138/74)  BP(mean): --  RR: 18 (26 Feb 2020 20:39) (18 - 23)  SpO2: 94% (26 Feb 2020 20:39) (91% - 95%)    Physical Exam:  General: Well developed, NAD  HEENT: NCAT, PERRLA, EOMI bl  Neurology: alert, sensation intact intact  Respiratory: expiratory wheezes heard in right lower lung field, decreased breath sounds on left lung field, no crackles, rales or rhonchi  CV: RRR, +S1/S2  Skin: warm, dry, normal color, no rash    A/P: 89 yo M pmh of CLL, ex smoker, hx of asbestos exp admitted with empyema. Notified by RN that patient's family concerned with patient's breathing.     -Currently patient is sating on room air at 95%, in NAD, however wheezes were heard in right lung field. Will give duoneb treatment now.   -Patient was also started on IVF last night, however is on regular diet and normal creatinine. Will dc IVF at this time to prevent fluid overload  and worsen breathing   -Patient to receive oxycodone as ordered now.   -Family to give patient egg mcmuffin and patient agrees to eat dinner  -Will continue to monitor. RN to call if any changes Notified by Rn that patient's family wants to speak to a physician regarding patient's breathing. Patient seen and examined at bedside. Patient's son at bedside. Son reports that patient appears to have difficulty breathing and states he is concerned due to a vital sign check that showed an O2 saturation of 90%. Upon my examination, patient appears to be in no acute distress and states he has pain in his left lower lung region, which has been present since admission. States pain is on and off, however when it does occur, he rates it a 10/10, sharp in nature. During my examination, patient had pain approx 3 times. Patient denies difficulty breathing, chest pain, cough, headache, dizziness, lightheadedness. Patient also states he did not like the dinner that was provided and did not feel hungry.     Vital Signs Last 24 Hrs  T(C): 36.3 (26 Feb 2020 20:39), Max: 37.3 (26 Feb 2020 02:16)  T(F): 97.4 (26 Feb 2020 20:39), Max: 99.2 (26 Feb 2020 02:16)  HR: 100 (26 Feb 2020 20:39) (88 - 105)  BP: 110/68 (26 Feb 2020 20:39) (106/67 - 138/74)  BP(mean): --  RR: 18 (26 Feb 2020 20:39) (18 - 23)  SpO2: 94% (26 Feb 2020 20:39) (91% - 95%)    Physical Exam:  General: Well developed, NAD  HEENT: NCAT, PERRLA, EOMI bl  Neurology: alert, sensation intact intact  Respiratory: expiratory wheezes heard in right lower lung field, decreased breath sounds on left lung field, no crackles, rales or rhonchi  CV: RRR, +S1/S2  Skin: warm, dry, normal color, no rash    A/P: 89 yo M pmh of CLL, ex smoker, hx of asbestos exp admitted with empyema. Notified by RN that patient's family concerned with patient's breathing.     -Currently patient is sating on room air at 95%, in NAD, however wheezes were heard in right lung field. Will give duoneb treatment now.   -Patient was also started on IVF last night, however is on regular diet and normal creatinine. Will dc IVF at this time to prevent fluid overload  and worsen breathing   -Patient to receive oxycodone as ordered now.   -Family to give patient egg mcmuffin and patient agrees to eat dinner  -Will continue to monitor. RN to call if any changes    Addendum 11:30pm:  Notified by RN that patient's family member would like to see physician. Patient seen and examined at bedside. Son at bedside. Son request Notified by Rn that patient's family wants to speak to a physician regarding patient's breathing. Patient seen and examined at bedside. Patient's son at bedside. Son reports that patient appears to have difficulty breathing and states he is concerned due to a vital sign check that showed an O2 saturation of 90%. Upon my examination, patient appears to be in no acute distress and states he has pain in his left lower lung region, which has been present since admission. States pain is on and off, however when it does occur, he rates it a 10/10, sharp in nature. During my examination, patient had pain approx 3 times. Patient denies difficulty breathing, chest pain, cough, headache, dizziness, lightheadedness. Patient also states he did not like the dinner that was provided and did not feel hungry.     Vital Signs Last 24 Hrs  T(C): 36.3 (26 Feb 2020 20:39), Max: 37.3 (26 Feb 2020 02:16)  T(F): 97.4 (26 Feb 2020 20:39), Max: 99.2 (26 Feb 2020 02:16)  HR: 100 (26 Feb 2020 20:39) (88 - 105)  BP: 110/68 (26 Feb 2020 20:39) (106/67 - 138/74)  BP(mean): --  RR: 18 (26 Feb 2020 20:39) (18 - 23)  SpO2: 94% (26 Feb 2020 20:39) (91% - 95%)    Physical Exam:  General: Well developed, NAD  HEENT: NCAT, PERRLA, EOMI bl  Neurology: alert, sensation intact intact  Respiratory: expiratory wheezes heard in right lower lung field, decreased breath sounds on left lung field, no crackles, rales or rhonchi  CV: RRR, +S1/S2  Skin: warm, dry, normal color, no rash    A/P: 89 yo M pmh of CLL, ex smoker, hx of asbestos exp admitted with empyema. Notified by RN that patient's family concerned with patient's breathing.     -Currently patient is sating on room air at 95%, in NAD, however wheezes were heard in right lung field. Will give duoneb treatment now.   -Patient was also started on IVF last night, however is on regular diet and normal creatinine. Will dc IVF at this time to prevent fluid overload  and worsen breathing   -Patient to receive oxycodone as ordered now.   -Family to give patient egg mcmuffin and patient agrees to eat dinner  -Will continue to monitor. RN to call if any changes        Addendum 11:30pm:  Notified by RN that patient's family member would like to see physician. Patient seen and examined at bedside. Son at bedside. Patient is sleeping in bed. When woken up, states he needs to urinate. Denies all complaints. Expiratory wheezes heard on previous exam have decreased. Patient is in no acute distress and sating at 92% on room air. Son persistent on patient receiving nasal cannula due to saturations between 90-92% this evening. Will place patient on 2L nasal cannula.

## 2020-02-26 NOTE — PROGRESS NOTE ADULT - SUBJECTIVE AND OBJECTIVE BOX
Patient is a 89y old  Male who presents with a chief complaint of Left Empyema (2020 13:32)      INTERVAL /OVERNIGHT EVENTS: feels weak    MEDICATIONS  (STANDING):  atorvastatin 10 milliGRAM(s) Oral at bedtime  famotidine    Tablet 20 milliGRAM(s) Oral two times a day  lactated ringers. 1000 milliLiter(s) (75 mL/Hr) IV Continuous <Continuous>  lactobacillus acidophilus 1 Tablet(s) Oral three times a day  piperacillin/tazobactam IVPB.. 3.375 Gram(s) IV Intermittent every 8 hours  vancomycin  IVPB 1000 milliGRAM(s) IV Intermittent every 12 hours    MEDICATIONS  (PRN):  acetaminophen   Tablet .. 650 milliGRAM(s) Oral every 6 hours PRN Temp greater or equal to 38C (100.4F), Mild Pain (1 - 3)  albuterol/ipratropium for Nebulization 3 milliLiter(s) Nebulizer every 6 hours PRN Shortness of Breath and/or Wheezing  guaiFENesin   Syrup  (Sugar-Free) 200 milliGRAM(s) Oral every 6 hours PRN Cough  oxyCODONE    IR 2.5 milliGRAM(s) Oral every 4 hours PRN Moderate Pain (4 - 6)  oxyCODONE    IR 5 milliGRAM(s) Oral every 4 hours PRN Severe Pain (7 - 10)      Allergies    No Known Allergies    Intolerances        REVIEW OF SYSTEMS:  CONSTITUTIONAL: No fever, weight loss, or fatigue  EYES: No eye pain, visual disturbances, or discharge  ENMT:  No difficulty hearing, tinnitus, vertigo; No sinus or throat pain  NECK: No pain or stiffness  RESPIRATORY: No cough, wheezing, chills or hemoptysis; No shortness of breath  CARDIOVASCULAR: No chest pain, palpitations, dizziness, or leg swelling  GASTROINTESTINAL: No abdominal or epigastric pain. No nausea, vomiting, or hematemesis; No diarrhea or constipation. No melena or hematochezia.  GENITOURINARY: No dysuria, frequency, hematuria, or incontinence  NEUROLOGICAL: No headaches, memory loss, loss of strength, numbness, or tremors  SKIN: No itching, burning, rashes, or lesions   LYMPH NODES: No enlarged glands  ENDOCRINE: No heat or cold intolerance; No hair loss; No polydipsia or polyuria  MUSCULOSKELETAL: No joint pain or swelling; No muscle, back, or extremity pain  PSYCHIATRIC: No depression, anxiety, mood swings, or difficulty sleeping  HEME/LYMPH: No easy bruising, or bleeding gums  ALLERGY AND IMMUNOLOGIC: No hives or eczema    Vital Signs Last 24 Hrs  T(C): 37.2 (2020 15:23), Max: 39.3 (2020 17:50)  T(F): 98.9 (2020 15:23), Max: 102.7 (2020 17:50)  HR: 100 (2020 15:23) (88 - 105)  BP: 106/67 (2020 15:23) (100/63 - 138/74)  BP(mean): --  RR: 18 (2020 15:23) (18 - 23)  SpO2: 94% (2020 15:23) (91% - 95%)    PHYSICAL EXAM:  GENERAL: NAD, well-groomed, well-developed  HEAD:  Atraumatic, Normocephalic  EYES: EOMI, PERRLA, conjunctiva and sclera clear  ENMT: No tonsillar erythema, exudates, or enlargement; Moist mucous membranes, Good dentition, No lesions  NECK: Supple, No JVD, Normal thyroid  NERVOUS SYSTEM:  Alert & Oriented X3, Good concentration; Motor Strength 5/5 B/L upper and lower extremities; DTRs 2+ intact and symmetric  CHEST/LUNG: Clear to auscultation bilaterally; No rales, rhonchi, wheezing, or rubs  HEART: Regular rate and rhythm; No murmurs, rubs, or gallops  ABDOMEN: Soft, Nontender, Nondistended; Bowel sounds present  EXTREMITIES:  2+ Peripheral Pulses, No clubbing, cyanosis, or edema  LYMPH: No lymphadenopathy noted  SKIN: No rashes or lesions    LABS:                        12.0   13.18 )-----------( 159      ( 2020 08:57 )             35.2     2020 08:57    137    |  104    |  20     ----------------------------<  165    3.6     |  25     |  1.00     Ca    8.3        2020 08:57  Phos  2.6       2020 08:57  Mg     2.3       2020 08:57    TPro  6.6    /  Alb  3.1    /  TBili  2.0    /  DBili  x      /  AST  16     /  ALT  24     /  AlkPhos  58     2020 18:13    PT/INR - ( 2020 08:57 )   PT: 24.2 sec;   INR: 2.11 ratio         PTT - ( 2020 18:13 )  PTT:30.9 sec  Urinalysis Basic - ( 2020 19:48 )    Color: Yellow / Appearance: Clear / S.020 / pH: x  Gluc: x / Ketone: Trace  / Bili: Negative / Urobili: 1 mg/dL   Blood: x / Protein: 30 mg/dL / Nitrite: Positive   Leuk Esterase: Trace / RBC: 3-5 /HPF / WBC 3-5   Sq Epi: x / Non Sq Epi: Few / Bacteria: Moderate      CAPILLARY BLOOD GLUCOSE          RADIOLOGY & ADDITIONAL TESTS:    Notes Reviewed:  [x ] YES  [ ] NO    Care Discussed with Consultants/Other Providers [x ] YES  [ ] NO

## 2020-02-26 NOTE — H&P ADULT - NSICDXPASTMEDICALHX_GEN_ALL_CORE_FT
PAST MEDICAL HISTORY:  Chronic Lymphocytic Leukemia followed by oncologist in [place]    Diverticulitis     GERD (Gastroesophageal Reflux Disease)     GI Bleed 2007    Herniated Disc     Hyperlipidemia     Osteoarthritis     Spinal Stenosis     Stomach Ulcer H pylori 2007, treated with antibiotics

## 2020-02-27 ENCOUNTER — RESULT REVIEW (OUTPATIENT)
Age: 85
End: 2020-02-27

## 2020-02-27 LAB
ALBUMIN FLD-MCNC: 2.7 G/DL — SIGNIFICANT CHANGE UP
ALBUMIN SERPL ELPH-MCNC: 2.3 G/DL — LOW (ref 3.3–5)
ALP SERPL-CCNC: 60 U/L — SIGNIFICANT CHANGE UP (ref 40–120)
ALT FLD-CCNC: 31 U/L — SIGNIFICANT CHANGE UP (ref 12–78)
APTT BLD: 29.4 SEC — SIGNIFICANT CHANGE UP (ref 28.5–37)
AST SERPL-CCNC: 30 U/L — SIGNIFICANT CHANGE UP (ref 15–37)
BILIRUB DIRECT SERPL-MCNC: 0.4 MG/DL — HIGH (ref 0.05–0.2)
BILIRUB INDIRECT FLD-MCNC: 0.6 MG/DL — SIGNIFICANT CHANGE UP (ref 0.2–1)
BILIRUB SERPL-MCNC: 1 MG/DL — SIGNIFICANT CHANGE UP (ref 0.2–1.2)
CRP SERPL-MCNC: 21.64 MG/DL — HIGH (ref 0–0.4)
CULTURE RESULTS: SIGNIFICANT CHANGE UP
GLUCOSE FLD-MCNC: 76 MG/DL — SIGNIFICANT CHANGE UP
GRAM STN FLD: SIGNIFICANT CHANGE UP
HCT VFR BLD CALC: 33.5 % — LOW (ref 39–50)
HGB BLD-MCNC: 11.2 G/DL — LOW (ref 13–17)
INR BLD: 1.7 RATIO — HIGH (ref 0.88–1.16)
LDH SERPL L TO P-CCNC: 148 U/L — SIGNIFICANT CHANGE UP (ref 50–242)
LDH SERPL L TO P-CCNC: 692 U/L — SIGNIFICANT CHANGE UP
LEGIONELLA AG UR QL: NEGATIVE — SIGNIFICANT CHANGE UP
MCHC RBC-ENTMCNC: 31.1 PG — SIGNIFICANT CHANGE UP (ref 27–34)
MCHC RBC-ENTMCNC: 33.4 GM/DL — SIGNIFICANT CHANGE UP (ref 32–36)
MCV RBC AUTO: 93.1 FL — SIGNIFICANT CHANGE UP (ref 80–100)
NRBC # BLD: 0 /100 WBCS — SIGNIFICANT CHANGE UP (ref 0–0)
PH FLD: 7.02 — SIGNIFICANT CHANGE UP
PLATELET # BLD AUTO: 174 K/UL — SIGNIFICANT CHANGE UP (ref 150–400)
PROT FLD-MCNC: 4 G/DL — SIGNIFICANT CHANGE UP
PROT SERPL-MCNC: 5.8 G/DL — LOW (ref 6–8.3)
PROTHROM AB SERPL-ACNC: 19.3 SEC — HIGH (ref 10–12.9)
RBC # BLD: 3.6 M/UL — LOW (ref 4.2–5.8)
RBC # FLD: 13 % — SIGNIFICANT CHANGE UP (ref 10.3–14.5)
SPECIMEN SOURCE: SIGNIFICANT CHANGE UP
SPECIMEN SOURCE: SIGNIFICANT CHANGE UP
VANCOMYCIN TROUGH SERPL-MCNC: 16.1 UG/ML — SIGNIFICANT CHANGE UP (ref 10–20)
WBC # BLD: 10.7 K/UL — HIGH (ref 3.8–10.5)
WBC # FLD AUTO: 10.7 K/UL — HIGH (ref 3.8–10.5)

## 2020-02-27 PROCEDURE — 93306 TTE W/DOPPLER COMPLETE: CPT | Mod: 26

## 2020-02-27 PROCEDURE — 32557 INSERT CATH PLEURA W/ IMAGE: CPT | Mod: LT

## 2020-02-27 PROCEDURE — 88305 TISSUE EXAM BY PATHOLOGIST: CPT | Mod: 26

## 2020-02-27 PROCEDURE — 88108 CYTOPATH CONCENTRATE TECH: CPT | Mod: 26

## 2020-02-27 PROCEDURE — 71045 X-RAY EXAM CHEST 1 VIEW: CPT | Mod: 26

## 2020-02-27 RX ORDER — TRAMADOL HYDROCHLORIDE 50 MG/1
25 TABLET ORAL EVERY 6 HOURS
Refills: 0 | Status: DISCONTINUED | OUTPATIENT
Start: 2020-02-27 | End: 2020-02-29

## 2020-02-27 RX ORDER — DEXTROSE MONOHYDRATE, SODIUM CHLORIDE, AND POTASSIUM CHLORIDE 50; .745; 4.5 G/1000ML; G/1000ML; G/1000ML
1000 INJECTION, SOLUTION INTRAVENOUS
Refills: 0 | Status: DISCONTINUED | OUTPATIENT
Start: 2020-02-27 | End: 2020-02-29

## 2020-02-27 RX ORDER — TAMSULOSIN HYDROCHLORIDE 0.4 MG/1
0.4 CAPSULE ORAL AT BEDTIME
Refills: 0 | Status: DISCONTINUED | OUTPATIENT
Start: 2020-02-27 | End: 2020-03-05

## 2020-02-27 RX ORDER — PHYTONADIONE (VIT K1) 5 MG
5 TABLET ORAL ONCE
Refills: 0 | Status: COMPLETED | OUTPATIENT
Start: 2020-02-28 | End: 2020-02-28

## 2020-02-27 RX ADMIN — Medication 1 TABLET(S): at 21:32

## 2020-02-27 RX ADMIN — Medication 250 MILLIGRAM(S): at 18:12

## 2020-02-27 RX ADMIN — PIPERACILLIN AND TAZOBACTAM 25 GRAM(S): 4; .5 INJECTION, POWDER, LYOPHILIZED, FOR SOLUTION INTRAVENOUS at 05:04

## 2020-02-27 RX ADMIN — FAMOTIDINE 20 MILLIGRAM(S): 10 INJECTION INTRAVENOUS at 18:11

## 2020-02-27 RX ADMIN — Medication 3 MILLILITER(S): at 09:46

## 2020-02-27 RX ADMIN — FAMOTIDINE 20 MILLIGRAM(S): 10 INJECTION INTRAVENOUS at 05:03

## 2020-02-27 RX ADMIN — ATORVASTATIN CALCIUM 10 MILLIGRAM(S): 80 TABLET, FILM COATED ORAL at 21:31

## 2020-02-27 RX ADMIN — OXYCODONE HYDROCHLORIDE 5 MILLIGRAM(S): 5 TABLET ORAL at 06:58

## 2020-02-27 RX ADMIN — PIPERACILLIN AND TAZOBACTAM 25 GRAM(S): 4; .5 INJECTION, POWDER, LYOPHILIZED, FOR SOLUTION INTRAVENOUS at 13:43

## 2020-02-27 RX ADMIN — Medication 1 TABLET(S): at 05:03

## 2020-02-27 RX ADMIN — OXYCODONE HYDROCHLORIDE 5 MILLIGRAM(S): 5 TABLET ORAL at 05:58

## 2020-02-27 RX ADMIN — TAMSULOSIN HYDROCHLORIDE 0.4 MILLIGRAM(S): 0.4 CAPSULE ORAL at 21:32

## 2020-02-27 RX ADMIN — OXYCODONE HYDROCHLORIDE 5 MILLIGRAM(S): 5 TABLET ORAL at 22:31

## 2020-02-27 RX ADMIN — Medication 250 MILLIGRAM(S): at 05:04

## 2020-02-27 RX ADMIN — Medication 3 MILLILITER(S): at 19:19

## 2020-02-27 RX ADMIN — OXYCODONE HYDROCHLORIDE 5 MILLIGRAM(S): 5 TABLET ORAL at 21:31

## 2020-02-27 RX ADMIN — PIPERACILLIN AND TAZOBACTAM 25 GRAM(S): 4; .5 INJECTION, POWDER, LYOPHILIZED, FOR SOLUTION INTRAVENOUS at 21:34

## 2020-02-27 NOTE — PROGRESS NOTE ADULT - SUBJECTIVE AND OBJECTIVE BOX
SURGERY  Spectralink [phone]    Patient seen at bedside. Continues to complain of wheezing, cough and SOB. Pt denies any CP, nausea, vomiting, fever, chills, weakness, dysuria  Patient A+Ox3 in NAD at time of visit with son at bedside.    T(C): 36.7 (20 @ 08:31), Max: 37.2 (20 @ 15:23)  HR: 94 (20 @ 09:48) (94 - 105)  BP: 107/68 (20 @ 08:31) (106/67 - 122/68)  RR: 18 (20 @ 08:31) (18 - 18)  SpO2: 94% (20 @ 09:48) (91% - 95%)  Wt(kg): --  ICU Vital Signs Last 24 Hrs  T(C): 36.7 (2020 08:31), Max: 37.2 (2020 15:23)  T(F): 98.1 (2020 08:31), Max: 98.9 (2020 15:23)  HR: 94 (2020 09:48) (94 - 105)  BP: 107/68 (2020 08:31) (106/67 - 122/68)  BP(mean): --  ABP: --  ABP(mean): --  RR: 18 (2020 08:31) (18 - 18)  SpO2: 94% (2020 09:48) (91% - 95%)    I&O's Detail    2020 07:01  -  2020 07:00  --------------------------------------------------------  IN:    lactated ringers.: 900 mL    Solution: 500 mL    Solution: 200 mL  Total IN: 1600 mL    OUT:    Voided: 500 mL  Total OUT: 500 mL    Total NET: 1100 mL        I&O's Summary    2020 07:01  -  2020 07:00  --------------------------------------------------------  IN: 1600 mL / OUT: 500 mL / NET: 1100 mL    Physical exam: Pt sitting comfortably in bed in NAD  Chest- (+) Wheezing throughout bilateral lung fields   CV- S1 & S2, RRR  Abdomen- Soft, non-tender, non-distended. (+) BS                          11.2   10.70 )-----------( 174      ( 2020 07:10 )             33.5     02-26    137  |  104  |  20  ----------------------------<  165<H>  3.6   |  25  |  1.00    Ca    8.3<L>      2020 08:57  Phos  2.6     02-26  Mg     2.3     02-26    TPro  5.8<L>  /  Alb  2.3<L>  /  TBili  1.0  /  DBili  .40<H>  /  AST  30  /  ALT  31  /  AlkPhos  60  02-27    Transcutaneous Bilirubin    PT/INR - ( 2020 07:10 )   PT: 19.3 sec;   INR: 1.70 ratio    PTT - ( 2020 07:10 )  PTT:29.4 sec  Urinalysis Basic - ( 2020 19:48 )    Color: Yellow / Appearance: Clear / S.020 / pH: x  Gluc: x / Ketone: Trace  / Bili: Negative / Urobili: 1 mg/dL   Blood: x / Protein: 30 mg/dL / Nitrite: Positive   Leuk Esterase: Trace / RBC: 3-5 /HPF / WBC 3-5   Sq Epi: x / Non Sq Epi: Few / Bacteria: Moderate    Culture - Blood (collected 2020 22:38)  Source: .Blood Blood-Peripheral  Preliminary Report (2020 23:01):    No growth to date.    Culture - Blood (collected 2020 22:38)  Source: .Blood Blood-Peripheral  Preliminary Report (2020 23:01):    No growth to date.    Assessment/Plan:    -Discussed with RN/Respiratory, pt needs duoneb now  -Discussed with IR- pt to undergo Left thoracentesis with pig-tail placement to Pleur-evac today, INR 1.7 acceptable, patient may resume oral intake as does not need sedation for procedure  -Continue DVT Prophylaxis with SCD's/SQH  -Continue IV Antibiotics- Vancomycin and Zosyn  -Continue analgesia  -Encourage OOB/ambulation with assistance  -Above discussed with Maurizio

## 2020-02-27 NOTE — PROGRESS NOTE ADULT - SUBJECTIVE AND OBJECTIVE BOX
Interventional Radiology Brief- Operative Note    Procedure: Left pleural drain    Operators: Pearl    Anesthesia (type): lidocaine local    Contrast:  none    EBL: none    Findings/Follow up Plan of Care: US/Fluoro guided left pleural drainage. 12 Fr drain inserted. Cira cloudy fluid aspirated. Drain placed to pleur Evac. CXR to follow. Pt tolerated procedure. Full report to follow.    Specimens Removed: sample collected for micro, cyto, chem    Implants: as above    Complications: none    Condition/Disposition: stable / room    Please call Interventional Radiology x [phone] with any questions, concerns, or issues.

## 2020-02-27 NOTE — PROGRESS NOTE ADULT - ASSESSMENT
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 On Observation with serial EGD and PET  BM bx prior with low level mono-clonal B-cells c/w MBCL/CLL.     Hx B12 def. On B12 injections outpt. No sn/sx.     Outpt normal CBC, normal Hgb. Baseline WBC ~ 7    In Jan 2020 with cough/URI, in retrospect may have later developed pneumonia.   Now admitted with empyema.     Leukocytosis due to infection.   Hx of asymptomatic low IgA and IgM    INR improved after dose of Vit K.. increased INR secondary to nutritional concerns and decreased protein production. note albumin      RECOMMENDATIONS  IV abx per pulm, medicine.   On Zosyn and Vancomycin.     Check IgG levels.   Recheck B12, Folate levels.     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     ID consultation      DVT Prophylaxis on SCD  Can place on SQ heparin or LMWH post procedures.

## 2020-02-27 NOTE — PROGRESS NOTE ADULT - SUBJECTIVE AND OBJECTIVE BOX
Interventional Radiology  Pre-Procedure Note    This is a 89y  Male  presenting for left pleural drainage    HPI:  CHARLY LU is a 89 YO Male brought to [facility] ED complaining of flank pain, fever and cough for 4 days. Patient transferred from [facility] ER for admission to [facility].  Daughter at the bedside states she took him to the ER for evaluation of back pain, chest discomfort. At [facility] patient found to have fever, CT scan revealed left sided empyema and right ureteral stone, patient received ceftriaxone and zosyn. Dr. Maxwell pulmonary and Dr. Barnard urology were aware of transfer. Patient states he is feeling well now. (26 Feb 2020 05:01)      PAST MEDICAL & SURGICAL HISTORY:  GI Bleed: 2007  Stomach Ulcer: H pylori 2007, treated with antibiotics  Osteoarthritis  Hyperlipidemia  Herniated Disc  Spinal Stenosis  Diverticulitis  GERD (Gastroesophageal Reflux Disease)  Chronic Lymphocytic Leukemia: followed by oncologist in [place]  S/P Tonsillectomy: childhood  Status Post Arthroscopy: right shoulder  History of Arthroscopy of Knee: bilateral      Social History:     FAMILY HISTORY:  No pertinent family history in first degree relatives      Allergies: No Known Allergies      Current Medications: acetaminophen   Tablet .. 650 milliGRAM(s) Oral every 6 hours PRN  albuterol/ipratropium for Nebulization 3 milliLiter(s) Nebulizer every 6 hours PRN  atorvastatin 10 milliGRAM(s) Oral at bedtime  famotidine    Tablet 20 milliGRAM(s) Oral two times a day  guaiFENesin   Syrup  (Sugar-Free) 200 milliGRAM(s) Oral every 6 hours PRN  heparin  Injectable 5000 Unit(s) SubCutaneous every 12 hours  lactobacillus acidophilus 1 Tablet(s) Oral three times a day  oxyCODONE    IR 2.5 milliGRAM(s) Oral every 4 hours PRN  oxyCODONE    IR 5 milliGRAM(s) Oral every 4 hours PRN  piperacillin/tazobactam IVPB.. 3.375 Gram(s) IV Intermittent every 8 hours  tamsulosin 0.4 milliGRAM(s) Oral at bedtime  vancomycin  IVPB 1000 milliGRAM(s) IV Intermittent every 12 hours      Labs:                         11.2   10.70 )-----------( 174      ( 27 Feb 2020 07:10 )             33.5       02-26    137  |  104  |  20  ----------------------------<  165<H>  3.6   |  25  |  1.00    Ca    8.3<L>      26 Feb 2020 08:57  Phos  2.6     02-26  Mg     2.3     02-26    TPro  5.8<L>  /  Alb  2.3<L>  /  TBili  1.0  /  DBili  .40<H>  /  AST  30  /  ALT  31  /  AlkPhos  60  02-27    < from: CT Chest No Cont (02.25.20 @ 19:18) >    EXAM:  CT RENAL STONE HUNT                          EXAM:  CT CHEST                                  PROCEDURE DATE:  02/25/2020          INTERPRETATION:  CLINICAL HISTORY:  Fever, left back and flank pain. History of gastric lymphoma.    Multiple axial images of the chest, abdomen and pelvis were obtained from the lung apices through symphysis pubis without the administration of oral or intravascular contrast limiting the sensitivity of evaluation. Reformatted coronal and sagittal images are submitted.    COMPARISON: PET/CT evaluation 6/19/2019.    FINDINGS: New moderately sized left pleural effusion is identified with portions that are loculated; air attenuation within the effusion as well as within the left pleural space suggests the presence of left-sided empyema. Consolidation is identified within the lingular segment as well as left lower lobe lung parenchyma; underlying neoplasm cannot be excluded. Hazy opacity within the left upper lobe is likely related to atelectasis. Subpleural opacity within the posterior right lower lobe and right middle lobe likely related to subpleural fibrosis. There is no evidence for right-sided pleural effusion or air.    No abnormally enlarged mediastinal or hilar lymph nodes are noted. There is no evidence for axillary lymphadenopathy. The heart size appears within normal limits. No pericardial effusion is identified. Thoracic aortic caliber demonstrates unremarkable caliber. Coronary arterial calcifications identified.    The central bronchial anatomy appears patent.    No mediastinal shift is noted.    The liver is normal in size. No focal hepatic masses are identified. There is no evidence for intrahepatic or extrahepatic biliary dilatation. A large gallstone is noted. No gallbladder wall thickening or pericholecystic fluid identified.    The spleen, pancreas and adrenal glands are unremarkable.    There is no evidence for hydronephrosis. No right renal calculi are identified. An 8 mm calculus is identified within the left-sided extrarenal pelvis, unchanged. There is no change in a 6 mm calculus identified within the distal right ureter at the level of the UVJ, without evidence for obstructive uropathy. A 1.3 cm right renal cyst is noted. The abdominal aorta is normal in course and caliber. No abnormally enlarged retroperitoneal or pelvic lymphadenopathy is noted.    Gastric wall thickening is identified allowing for nondistention with oral contrast; clinical correlation is suggested in light of the provided clinical history of gastric lymphoma.    Fecal material is scattered throughout the colon. There is no evidence for mechanical bowel obstruction. Colonic diverticulosis is noted. No colonic wall thickening is identified. There is no evidence for acute appendicitis.There is no evidence for free intraperitoneal air or fluid.    The prostate is enlarged and the urinary bladder appear unremarkable.     Postsurgical changes of the lumbar spine noted. Postprocedural changes of the right shoulder noted. Degenerative changes of the spine evident.    IMPRESSION:    1. New moderately sized left pleural effusion is identified with portions that are loculated; air attenuation within the effusion as well as within the left pleural space suggests the presence of left-sided empyema. Consolidation is identified within the lingular segment as well as left lower lobe lung parenchyma; underlying neoplasm cannot be excluded.   2. Gastric wall thickening is identified allowing for nondistention with oral contrast; clinicalcorrelation is suggested in light of the provided clinical history of gastric lymphoma.  3. An 8 mm calculus is identified within the left-sided extrarenal pelvis, unchanged. There is no change in a 6 mm calculus identified within the distal right ureter at the level of the UVJ, without evidence for obstructive uropathy. No evidence for bilateral hydronephrosis.    Preliminary results discussed with Dr. Galvan by telephone.                    CASA BAUTISTA   This document has been electronically signed. Feb 25 2020  8:17PM        < end of copied text >    Assessment/Plan:   This is a 89yo Male  presents with complex left pleural effusion and consolidation  Patient presents to IR for left pleural drainage.  Procedure/ risks/ benefits/ goals/ alternatives were explained. All questions answered. Informed content obtained from patient. Consent placed in chart.

## 2020-02-27 NOTE — PROGRESS NOTE ADULT - SUBJECTIVE AND OBJECTIVE BOX
Date/Time Patient Seen:  		  Referring MD:   Data Reviewed	       Patient is a 89y old  Male who presents with a chief complaint of Left Empyema (26 Feb 2020 23:42)      Subjective/HPI     PAST MEDICAL & SURGICAL HISTORY:  GI Bleed: 2007  Stomach Ulcer: H pylori 2007, treated with antibiotics  Osteoarthritis  Hyperlipidemia  Herniated Disc  Spinal Stenosis  Diverticulitis  GERD (Gastroesophageal Reflux Disease)  Chronic Lymphocytic Leukemia: followed by oncologist in [place]  S/P Tonsillectomy: childhood  Status Post Arthroscopy: right shoulder  History of Arthroscopy of Knee: bilateral  Osteoarthritis  Stomach Ulcer: H pylori, treated with antibiotics        Medication list         MEDICATIONS  (STANDING):  atorvastatin 10 milliGRAM(s) Oral at bedtime  famotidine    Tablet 20 milliGRAM(s) Oral two times a day  heparin  Injectable 5000 Unit(s) SubCutaneous every 12 hours  lactobacillus acidophilus 1 Tablet(s) Oral three times a day  piperacillin/tazobactam IVPB.. 3.375 Gram(s) IV Intermittent every 8 hours  tamsulosin 0.4 milliGRAM(s) Oral at bedtime  vancomycin  IVPB 1000 milliGRAM(s) IV Intermittent every 12 hours    MEDICATIONS  (PRN):  acetaminophen   Tablet .. 650 milliGRAM(s) Oral every 6 hours PRN Temp greater or equal to 38C (100.4F), Mild Pain (1 - 3)  albuterol/ipratropium for Nebulization 3 milliLiter(s) Nebulizer every 6 hours PRN Shortness of Breath and/or Wheezing  guaiFENesin   Syrup  (Sugar-Free) 200 milliGRAM(s) Oral every 6 hours PRN Cough  oxyCODONE    IR 2.5 milliGRAM(s) Oral every 4 hours PRN Moderate Pain (4 - 6)  oxyCODONE    IR 5 milliGRAM(s) Oral every 4 hours PRN Severe Pain (7 - 10)         Vitals log        ICU Vital Signs Last 24 Hrs  T(C): 36.7 (27 Feb 2020 04:23), Max: 37.2 (26 Feb 2020 15:23)  T(F): 98.1 (27 Feb 2020 04:23), Max: 98.9 (26 Feb 2020 15:23)  HR: 101 (27 Feb 2020 04:23) (100 - 105)  BP: 113/67 (27 Feb 2020 04:23) (106/67 - 122/68)  BP(mean): --  ABP: --  ABP(mean): --  RR: 18 (27 Feb 2020 04:23) (18 - 18)  SpO2: 95% (27 Feb 2020 04:23) (94% - 95%)           Input and Output:  I&O's Detail    26 Feb 2020 07:01  -  27 Feb 2020 07:00  --------------------------------------------------------  IN:    lactated ringers.: 900 mL    Solution: 500 mL    Solution: 200 mL  Total IN: 1600 mL    OUT:    Voided: 500 mL  Total OUT: 500 mL    Total NET: 1100 mL          Lab Data                        11.2   10.70 )-----------( 174      ( 27 Feb 2020 07:10 )             33.5     02-26    137  |  104  |  20  ----------------------------<  165<H>  3.6   |  25  |  1.00    Ca    8.3<L>      26 Feb 2020 08:57  Phos  2.6     02-26  Mg     2.3     02-26    TPro  6.6  /  Alb  3.1<L>  /  TBili  2.0<H>  /  DBili  x   /  AST  16  /  ALT  24  /  AlkPhos  58  02-25      CARDIAC MARKERS ( 25 Feb 2020 18:13 )  .040 ng/mL / x     / x     / x     / x            Review of Systems	      Objective     Physical Examination    heart s1s2  lung dc BS  abd soft  head nc  on o2 support  son at BS      Pertinent Lab findings & Imaging      [place]let:  NO   Adequate UO     I&O's Detail    26 Feb 2020 07:01  -  27 Feb 2020 07:00  --------------------------------------------------------  IN:    lactated ringers.: 900 mL    Solution: 500 mL    Solution: 200 mL  Total IN: 1600 mL    OUT:    Voided: 500 mL  Total OUT: 500 mL    Total NET: 1100 mL               Discussed with:     Cultures:	        Radiology

## 2020-02-27 NOTE — PROGRESS NOTE ADULT - SUBJECTIVE AND OBJECTIVE BOX
Interval History:  resting comfortably. going for R procedure of drainage of empyema.  family at bedside    Chart reviewed and events noted;   Overnight events:    MEDICATIONS  (STANDING):  atorvastatin 10 milliGRAM(s) Oral at bedtime  famotidine    Tablet 20 milliGRAM(s) Oral two times a day  heparin  Injectable 5000 Unit(s) SubCutaneous every 12 hours  lactobacillus acidophilus 1 Tablet(s) Oral three times a day  piperacillin/tazobactam IVPB.. 3.375 Gram(s) IV Intermittent every 8 hours  tamsulosin 0.4 milliGRAM(s) Oral at bedtime  vancomycin  IVPB 1000 milliGRAM(s) IV Intermittent every 12 hours    MEDICATIONS  (PRN):  acetaminophen   Tablet .. 650 milliGRAM(s) Oral every 6 hours PRN Temp greater or equal to 38C (100.4F), Mild Pain (1 - 3)  albuterol/ipratropium for Nebulization 3 milliLiter(s) Nebulizer every 6 hours PRN Shortness of Breath and/or Wheezing  guaiFENesin   Syrup  (Sugar-Free) 200 milliGRAM(s) Oral every 6 hours PRN Cough  oxyCODONE    IR 2.5 milliGRAM(s) Oral every 4 hours PRN Moderate Pain (4 - 6)  oxyCODONE    IR 5 milliGRAM(s) Oral every 4 hours PRN Severe Pain (7 - 10)      Vital Signs Last 24 Hrs  T(C): 36.7 (27 Feb 2020 08:31), Max: 37.2 (26 Feb 2020 15:23)  T(F): 98.1 (27 Feb 2020 08:31), Max: 98.9 (26 Feb 2020 15:23)  HR: 94 (27 Feb 2020 09:48) (94 - 105)  BP: 107/68 (27 Feb 2020 08:31) (106/67 - 122/68)  BP(mean): --  RR: 18 (27 Feb 2020 08:31) (18 - 18)  SpO2: 94% (27 Feb 2020 09:48) (91% - 95%)    PHYSICAL EXAM  General: adult in NAD, chronically ill appearing  HEENT: clear oropharynx, anicteric sclera, pink conjunctivae  Neck: supple  CV: normal S1S2 with no murmur rubs or gallops  Lungs: clear to auscultation, no wheezes, no rhales  Abdomen: soft non-tender non-distended, no hepato/splenomegaly  Ext: no clubbing cyanosis or edema  Skin: no rashes and no petichiae  Neuro: alert and oriented X3 no focal deficits      LABS:  CBC Full  -  ( 27 Feb 2020 07:10 )  WBC Count : 10.70 K/uL  RBC Count : 3.60 M/uL  Hemoglobin : 11.2 g/dL  Hematocrit : 33.5 %  Platelet Count - Automated : 174 K/uL  Mean Cell Volume : 93.1 fl  Mean Cell Hemoglobin : 31.1 pg  Mean Cell Hemoglobin Concentration : 33.4 gm/dL  Auto Neutrophil # : x  Auto Lymphocyte # : x  Auto Monocyte # : x  Auto Eosinophil # : x  Auto Basophil # : x  Auto Neutrophil % : x  Auto Lymphocyte % : x  Auto Monocyte % : x  Auto Eosinophil % : x  Auto Basophil % : x    02-26    137  |  104  |  20  ----------------------------<  165<H>  3.6   |  25  |  1.00    Ca    8.3<L>      26 Feb 2020 08:57  Phos  2.6     02-26  Mg     2.3     02-26    TPro  5.8<L>  /  Alb  2.3<L>  /  TBili  1.0  /  DBili  .40<H>  /  AST  30  /  ALT  31  /  AlkPhos  60  02-27    PT/INR - ( 27 Feb 2020 07:10 )   PT: 19.3 sec;   INR: 1.70 ratio         PTT - ( 27 Feb 2020 07:10 )  PTT:29.4 sec    fe studies      WBC trend  10.70 K/uL (02-27-20 @ 07:10)  13.18 K/uL (02-26-20 @ 08:57)  13.77 K/uL (02-25-20 @ 18:13)      Hgb trend  11.2 g/dL (02-27-20 @ 07:10)  12.0 g/dL (02-26-20 @ 08:57)  13.2 g/dL (02-25-20 @ 18:13)      plt trend  174 K/uL (02-27-20 @ 07:10)  159 K/uL (02-26-20 @ 08:57)  169 K/uL (02-25-20 @ 18:13)        RADIOLOGY & ADDITIONAL STUDIES:

## 2020-02-27 NOTE — PROGRESS NOTE ADULT - ASSESSMENT
h/o nhl- gastric maltoma  abnormal ct  left empyema   gastric wall thickening      nc ct showing gastric wall thickening  per dtr has had op egd w/in past yr  gerd precautions  cont pepcid

## 2020-02-27 NOTE — PROGRESS NOTE ADULT - SUBJECTIVE AND OBJECTIVE BOX
[facility] [place]       HPI:  CHARLY LU is a 89 YO Male brought to [facility] ED complaining of flank pain, fever and cough for 4 days. Patient transferred from [facility] ER for admission to [facility].  Daughter at the bedside states she took him to the ER for evaluation of back pain, chest discomfort. At [facility] patient found to have fever, CT scan revealed left sided empyema and right ureteral stone, patient received ceftriaxone and zosyn. Dr. Maxwell pulmonary and Dr. Barnard urology were aware of transfer. Patient states he is feeling well now. (26 Feb 2020 05:01)        SUBJECTIVE:      ALLERGIES:  Allergies    No Known Allergies    Intolerances          MEDICATIONS  (STANDING):  atorvastatin 10 milliGRAM(s) Oral at bedtime  famotidine    Tablet 20 milliGRAM(s) Oral two times a day  heparin  Injectable 5000 Unit(s) SubCutaneous every 12 hours  lactobacillus acidophilus 1 Tablet(s) Oral three times a day  piperacillin/tazobactam IVPB.. 3.375 Gram(s) IV Intermittent every 8 hours  tamsulosin 0.4 milliGRAM(s) Oral at bedtime  vancomycin  IVPB 1000 milliGRAM(s) IV Intermittent every 12 hours    MEDICATIONS  (PRN):  acetaminophen   Tablet .. 650 milliGRAM(s) Oral every 6 hours PRN Temp greater or equal to 38C (100.4F), Mild Pain (1 - 3)  albuterol/ipratropium for Nebulization 3 milliLiter(s) Nebulizer every 6 hours PRN Shortness of Breath and/or Wheezing  guaiFENesin   Syrup  (Sugar-Free) 200 milliGRAM(s) Oral every 6 hours PRN Cough  oxyCODONE    IR 5 milliGRAM(s) Oral every 4 hours PRN Severe Pain (7 - 10)  traMADol 25 milliGRAM(s) Oral every 6 hours PRN Moderate Pain (4 - 6)      REVIEW OF SYSTEMS:  CONSTITUTIONAL: No fever,  RESPIRATORY: No cough, wheezing, shortness of breath  CARDIOVASCULAR: No chest pain, dyspnea, palpitations, dizziness, syncope, paroxysmal nocturnal dyspnea, orthopnea, or arm or leg swelling  GASTROINTESTINAL: No abdominal  or epigastric pain, nausea, vomiting,  diarrhea  NEUROLOGICAL: No headaches,  loss of strength, numbness, or tremors    Vital Signs Last 24 Hrs  T(C): 36.8 (27 Feb 2020 19:58), Max: 37.1 (27 Feb 2020 12:13)  T(F): 98.3 (27 Feb 2020 19:58), Max: 98.7 (27 Feb 2020 12:13)  HR: 96 (27 Feb 2020 19:58) (94 - 105)  BP: 107/66 (27 Feb 2020 19:58) (99/65 - 122/68)  BP(mean): --  RR: 18 (27 Feb 2020 19:58) (18 - 18)  SpO2: 94% (27 Feb 2020 19:58) (91% - 95%)    PHYSICAL EXAM:  HEAD:  Atraumatic, Normocephalic  NECK: Supple and normal thyroid.  No JVD or carotid bruit.   HEART: S1, S2 regular , 1/6 soft ejection systolic murmur in mitral area , no thrill and no gallops .  PULMONARY: Bilateral vesicular breathing , few scattered ronchi and few scattered rales are present .  ABDOMEN: Soft nontender and positive bowl sounds   EXTREMITIES:  No clubbing, cyanosis, or pedal  edema  NEUROLOGICAL: AAOX3 , no focal deficit .    LABS:                        11.2   10.70 )-----------( 174      ( 27 Feb 2020 07:10 )             33.5     02-26    137  |  104  |  20  ----------------------------<  165<H>  3.6   |  25  |  1.00    Ca    8.3<L>      26 Feb 2020 08:57  Phos  2.6     02-26  Mg     2.3     02-26    TPro  5.8<L>  /  Alb  2.3<L>  /  TBili  1.0  /  DBili  .40<H>  /  AST  30  /  ALT  31  /  AlkPhos  60  02-27        PT/INR - ( 27 Feb 2020 07:10 )   PT: 19.3 sec;   INR: 1.70 ratio         PTT - ( 27 Feb 2020 07:10 )  PTT:29.4 sec    BNP      EKG:  ECHO:  IMAGING:    Assessment/Plan    Will continue to follow during hospital course and give further recommendations as needed . Thanks for your referral . if any questions please contact me at office ([phone])cell [phone]) [facility] [place]       HPI:  CHARLY LU is a 89 YO Male brought to [facility] ED complaining of flank pain, fever and cough for 4 days. Patient transferred from [facility] ER for admission to [facility].  Daughter at the bedside states she took him to the ER for evaluation of back pain, chest discomfort. At [facility] patient found to have fever, CT scan revealed left sided empyema and right ureteral stone, patient received ceftriaxone and zosyn. Dr. Maxwell pulmonary and Dr. Barnard urology were aware of transfer. Patient states he is feeling well now. (26 Feb 2020 05:01)        SUBJECTIVE: Patient is feeling better . No chest pain . Patient SOB improved .       ALLERGIES:  Allergies    No Known Allergies    Intolerances          MEDICATIONS  (STANDING):  atorvastatin 10 milliGRAM(s) Oral at bedtime  famotidine    Tablet 20 milliGRAM(s) Oral two times a day  heparin  Injectable 5000 Unit(s) SubCutaneous every 12 hours  lactobacillus acidophilus 1 Tablet(s) Oral three times a day  piperacillin/tazobactam IVPB.. 3.375 Gram(s) IV Intermittent every 8 hours  tamsulosin 0.4 milliGRAM(s) Oral at bedtime  vancomycin  IVPB 1000 milliGRAM(s) IV Intermittent every 12 hours    MEDICATIONS  (PRN):  acetaminophen   Tablet .. 650 milliGRAM(s) Oral every 6 hours PRN Temp greater or equal to 38C (100.4F), Mild Pain (1 - 3)  albuterol/ipratropium for Nebulization 3 milliLiter(s) Nebulizer every 6 hours PRN Shortness of Breath and/or Wheezing  guaiFENesin   Syrup  (Sugar-Free) 200 milliGRAM(s) Oral every 6 hours PRN Cough  oxyCODONE    IR 5 milliGRAM(s) Oral every 4 hours PRN Severe Pain (7 - 10)  traMADol 25 milliGRAM(s) Oral every 6 hours PRN Moderate Pain (4 - 6)      REVIEW OF SYSTEMS:  CONSTITUTIONAL: No fever,  RESPIRATORY:  Mild cough, wheezing, shortness of breath  CARDIOVASCULAR: No chest pain, palpitations, dizziness, syncope, paroxysmal nocturnal dyspnea, or arm or leg swelling and mild SOB .  GASTROINTESTINAL: No abdominal  or epigastric pain, nausea, vomiting,  diarrhea  NEUROLOGICAL: No headaches,  loss of strength, numbness, or tremors  Skin : No lesions .  Musculoskeletal : No joint swellings .    Vital Signs Last 24 Hrs  T(C): 36.8 (27 Feb 2020 19:58), Max: 37.1 (27 Feb 2020 12:13)  T(F): 98.3 (27 Feb 2020 19:58), Max: 98.7 (27 Feb 2020 12:13)  HR: 96 (27 Feb 2020 19:58) (94 - 105)  BP: 107/66 (27 Feb 2020 19:58) (99/65 - 122/68)  BP(mean): --  RR: 18 (27 Feb 2020 19:58) (18 - 18)  SpO2: 94% (27 Feb 2020 19:58) (91% - 95%)    PHYSICAL EXAM:  HEAD:  Atraumatic, Normocephalic  NECK: Supple and normal thyroid.  No JVD or carotid bruit.   HEART: S1, S2 regular , 1/6 soft ejection systolic murmur in mitral area , no thrill and no gallops .  PULMONARY: Bilateral vesicular breathing , few scattered ronchi and few scattered rales are present .  ABDOMEN: Soft nontender and positive bowl sounds   EXTREMITIES:  No clubbing, cyanosis, or pedal  edema  NEUROLOGICAL: AA and mild confused .    LABS:                        11.2   10.70 )-----------( 174      ( 27 Feb 2020 07:10 )             33.5     02-26    137  |  104  |  20  ----------------------------<  165<H>  3.6   |  25  |  1.00    Ca    8.3<L>      26 Feb 2020 08:57  Phos  2.6     02-26  Mg     2.3     02-26    TPro  5.8<L>  /  Alb  2.3<L>  /  TBili  1.0  /  DBili  .40<H>  /  AST  30  /  ALT  31  /  AlkPhos  60  02-27        PT/INR - ( 27 Feb 2020 07:10 )   PT: 19.3 sec;   INR: 1.70 ratio         PTT - ( 27 Feb 2020 07:10 )  PTT:29.4 sec    Assessment/Plan  Patient has :  1) Left sided empyema . S/P Drainage   2) Pneumonia   3) Mild CHF secondary to chronic LV diastolic dysfunction and stable   Plan : 1) Medically stable so fra . 2) Encourage po intake . 3) I/V antibiotics . 4) Discussed with son on bed side .  Will continue to follow during hospital course and give further recommendations as needed . Thanks for your referral . if any questions please contact me at office ([phone])cell [phone])

## 2020-02-27 NOTE — PROGRESS NOTE ADULT - SUBJECTIVE AND OBJECTIVE BOX
[facility]  Smooth Finch PA-C  [address] [place] [address]  [phone]      INTERVAL HPI/OVERNIGHT EVENTS:    patient s/e  npo for thoracentesis today     MEDICATIONS  (STANDING):  atorvastatin 10 milliGRAM(s) Oral at bedtime  famotidine    Tablet 20 milliGRAM(s) Oral two times a day  heparin  Injectable 5000 Unit(s) SubCutaneous every 12 hours  lactobacillus acidophilus 1 Tablet(s) Oral three times a day  piperacillin/tazobactam IVPB.. 3.375 Gram(s) IV Intermittent every 8 hours  tamsulosin 0.4 milliGRAM(s) Oral at bedtime  vancomycin  IVPB 1000 milliGRAM(s) IV Intermittent every 12 hours    MEDICATIONS  (PRN):  acetaminophen   Tablet .. 650 milliGRAM(s) Oral every 6 hours PRN Temp greater or equal to 38C (100.4F), Mild Pain (1 - 3)  albuterol/ipratropium for Nebulization 3 milliLiter(s) Nebulizer every 6 hours PRN Shortness of Breath and/or Wheezing  guaiFENesin   Syrup  (Sugar-Free) 200 milliGRAM(s) Oral every 6 hours PRN Cough  oxyCODONE    IR 5 milliGRAM(s) Oral every 4 hours PRN Severe Pain (7 - 10)  traMADol 25 milliGRAM(s) Oral every 6 hours PRN Moderate Pain (4 - 6)      Allergies    No Known Allergies    Intolerances        ROS:   General:  No wt loss, fevers, chills, night sweats, fatigue,   Eyes:  Good vision, no reported pain  ENT:  No sore throat, pain, runny nose, dysphagia  CV:  No pain, palpitations, hypo/hypertension  Resp:  No dyspnea, cough, tachypnea, wheezing  GI:  No pain, No nausea, No vomiting, No diarrhea, No constipation, No weight loss, No fever, No pruritis, No rectal bleeding, No tarry stools, No dysphagia,  :  No pain, bleeding, incontinence, nocturia  Muscle:  No pain, weakness  Neuro:  No weakness, tingling, memory problems  Psych:  No fatigue, insomnia, mood problems, depression  Endocrine:  No polyuria, polydipsia, cold/heat intolerance  Heme:  No petechiae, ecchymosis, easy bruisability  Skin:  No rash, tattoos, scars, edema      PHYSICAL EXAM:   Vital Signs:  Vital Signs Last 24 Hrs  T(C): 37.1 (2020 12:13), Max: 37.1 (2020 12:13)  T(F): 98.7 (2020 12:13), Max: 98.7 (2020 12:13)  HR: 98 (2020 12:13) (94 - 105)  BP: 99/65 (2020 12:13) (99/65 - 122/68)  BP(mean): --  RR: 18 (2020 12:13) (18 - 18)  SpO2: 94% (2020 12:13) (91% - 95%)  Daily     Daily     GENERAL:  Appears stated age, well-groomed, well-nourished, no distress  HEENT:  NC/AT,  conjunctivae clear and pink, no thyromegaly, nodules, adenopathy, no JVD, sclera -anicteric  CHEST:  Full & symmetric excursion, no increased effort, breath sounds clear  HEART:  Regular rhythm, S1, S2, no murmur/rub/S3/S4, no abdominal bruit, no edema  ABDOMEN:  Soft, non-tender, non-distended, normoactive bowel sounds,  no masses ,no hepato-splenomegaly, no signs of chronic liver disease  EXTEREMITIES:  no cyanosis,clubbing or edema  SKIN:  No rash/erythema/ecchymoses/petechiae/wounds/abscess/warm/dry  NEURO:  Alert, oriented, no asterixis, no tremor, no encephalopathy      LABS:                        11.2   10.70 )-----------( 174      ( 2020 07:10 )             33.5         137  |  104  |  20  ----------------------------<  165<H>  3.6   |  25  |  1.00    Ca    8.3<L>      2020 08:57  Phos  2.6       Mg     2.3     -    TPro  5.8<L>  /  Alb  2.3<L>  /  TBili  1.0  /  DBili  .40<H>  /  AST  30  /  ALT  31  /  AlkPhos  60      PT/INR - ( 2020 07:10 )   PT: 19.3 sec;   INR: 1.70 ratio         PTT - ( 2020 07:10 )  PTT:29.4 sec  Urinalysis Basic - ( 2020 19:48 )    Color: Yellow / Appearance: Clear / S.020 / pH: x  Gluc: x / Ketone: Trace  / Bili: Negative / Urobili: 1 mg/dL   Blood: x / Protein: 30 mg/dL / Nitrite: Positive   Leuk Esterase: Trace / RBC: 3-5 /HPF / WBC 3-5   Sq Epi: x / Non Sq Epi: Few / Bacteria: Moderate        RADIOLOGY & ADDITIONAL TESTS:

## 2020-02-28 DIAGNOSIS — N20.1 CALCULUS OF URETER: ICD-10-CM

## 2020-02-28 DIAGNOSIS — J18.9 PNEUMONIA, UNSPECIFIED ORGANISM: ICD-10-CM

## 2020-02-28 LAB
B PERT IGG+IGM PNL SER: CLEAR — SIGNIFICANT CHANGE UP
COLOR FLD: YELLOW — SIGNIFICANT CHANGE UP
FLUID INTAKE SUBSTANCE CLASS: SIGNIFICANT CHANGE UP
FLUID SEGMENTED GRANULOCYTES: 79 % — SIGNIFICANT CHANGE UP
HCT VFR BLD CALC: 32.7 % — LOW (ref 39–50)
HGB BLD-MCNC: 11 G/DL — LOW (ref 13–17)
IGG4 SER-MCNC: 18.8 MG/DL — SIGNIFICANT CHANGE UP (ref 2.4–121)
LYMPHOCYTES # FLD: 6 % — SIGNIFICANT CHANGE UP
MAGNESIUM SERPL-MCNC: 2.3 MG/DL — SIGNIFICANT CHANGE UP (ref 1.6–2.6)
MCHC RBC-ENTMCNC: 31.4 PG — SIGNIFICANT CHANGE UP (ref 27–34)
MCHC RBC-ENTMCNC: 33.6 GM/DL — SIGNIFICANT CHANGE UP (ref 32–36)
MCV RBC AUTO: 93.4 FL — SIGNIFICANT CHANGE UP (ref 80–100)
MONOS+MACROS # FLD: 15 % — SIGNIFICANT CHANGE UP
NIGHT BLUE STAIN TISS: SIGNIFICANT CHANGE UP
NRBC # BLD: 0 /100 WBCS — SIGNIFICANT CHANGE UP (ref 0–0)
NT-PROBNP SERPL-SCNC: 4285 PG/ML — HIGH (ref 0–450)
PHOSPHATE SERPL-MCNC: 4.5 MG/DL — SIGNIFICANT CHANGE UP (ref 2.5–4.5)
PLATELET # BLD AUTO: 212 K/UL — SIGNIFICANT CHANGE UP (ref 150–400)
RBC # BLD: 3.5 M/UL — LOW (ref 4.2–5.8)
RBC # FLD: 13.2 % — SIGNIFICANT CHANGE UP (ref 10.3–14.5)
RCV VOL RI: 26 /UL — HIGH (ref 0–0)
S PNEUM AG UR QL: NEGATIVE — SIGNIFICANT CHANGE UP
SPECIMEN SOURCE: SIGNIFICANT CHANGE UP
TOTAL NUCLEATED CELL COUNT, BODY FLUID: 118 /UL — SIGNIFICANT CHANGE UP
TUBE TYPE: SIGNIFICANT CHANGE UP
WBC # BLD: 8.55 K/UL — SIGNIFICANT CHANGE UP (ref 3.8–10.5)
WBC # FLD AUTO: 8.55 K/UL — SIGNIFICANT CHANGE UP (ref 3.8–10.5)

## 2020-02-28 PROCEDURE — 99223 1ST HOSP IP/OBS HIGH 75: CPT

## 2020-02-28 PROCEDURE — 99231 SBSQ HOSP IP/OBS SF/LOW 25: CPT

## 2020-02-28 RX ORDER — PREGABALIN 225 MG/1
1000 CAPSULE ORAL EVERY 24 HOURS
Refills: 0 | Status: COMPLETED | OUTPATIENT
Start: 2020-02-28 | End: 2020-03-03

## 2020-02-28 RX ORDER — HEPARIN SODIUM 5000 [USP'U]/ML
5000 INJECTION INTRAVENOUS; SUBCUTANEOUS EVERY 8 HOURS
Refills: 0 | Status: DISCONTINUED | OUTPATIENT
Start: 2020-02-28 | End: 2020-03-17

## 2020-02-28 RX ORDER — DRONABINOL 2.5 MG
2.5 CAPSULE ORAL
Refills: 0 | Status: DISCONTINUED | OUTPATIENT
Start: 2020-02-28 | End: 2020-03-06

## 2020-02-28 RX ADMIN — PIPERACILLIN AND TAZOBACTAM 25 GRAM(S): 4; .5 INJECTION, POWDER, LYOPHILIZED, FOR SOLUTION INTRAVENOUS at 22:01

## 2020-02-28 RX ADMIN — FAMOTIDINE 20 MILLIGRAM(S): 10 INJECTION INTRAVENOUS at 05:19

## 2020-02-28 RX ADMIN — Medication 1 TABLET(S): at 22:01

## 2020-02-28 RX ADMIN — Medication 250 MILLIGRAM(S): at 05:19

## 2020-02-28 RX ADMIN — TAMSULOSIN HYDROCHLORIDE 0.4 MILLIGRAM(S): 0.4 CAPSULE ORAL at 22:01

## 2020-02-28 RX ADMIN — HEPARIN SODIUM 5000 UNIT(S): 5000 INJECTION INTRAVENOUS; SUBCUTANEOUS at 22:01

## 2020-02-28 RX ADMIN — Medication 3 MILLILITER(S): at 09:55

## 2020-02-28 RX ADMIN — PIPERACILLIN AND TAZOBACTAM 25 GRAM(S): 4; .5 INJECTION, POWDER, LYOPHILIZED, FOR SOLUTION INTRAVENOUS at 05:19

## 2020-02-28 RX ADMIN — HEPARIN SODIUM 5000 UNIT(S): 5000 INJECTION INTRAVENOUS; SUBCUTANEOUS at 08:55

## 2020-02-28 RX ADMIN — PIPERACILLIN AND TAZOBACTAM 25 GRAM(S): 4; .5 INJECTION, POWDER, LYOPHILIZED, FOR SOLUTION INTRAVENOUS at 13:55

## 2020-02-28 RX ADMIN — Medication 3 MILLILITER(S): at 19:26

## 2020-02-28 RX ADMIN — FAMOTIDINE 20 MILLIGRAM(S): 10 INJECTION INTRAVENOUS at 17:42

## 2020-02-28 RX ADMIN — DEXTROSE MONOHYDRATE, SODIUM CHLORIDE, AND POTASSIUM CHLORIDE 30 MILLILITER(S): 50; .745; 4.5 INJECTION, SOLUTION INTRAVENOUS at 00:44

## 2020-02-28 RX ADMIN — Medication 1 TABLET(S): at 13:55

## 2020-02-28 RX ADMIN — OXYCODONE HYDROCHLORIDE 5 MILLIGRAM(S): 5 TABLET ORAL at 15:52

## 2020-02-28 RX ADMIN — OXYCODONE HYDROCHLORIDE 5 MILLIGRAM(S): 5 TABLET ORAL at 16:22

## 2020-02-28 RX ADMIN — Medication 5 MILLIGRAM(S): at 05:19

## 2020-02-28 RX ADMIN — ATORVASTATIN CALCIUM 10 MILLIGRAM(S): 80 TABLET, FILM COATED ORAL at 22:00

## 2020-02-28 RX ADMIN — Medication 1 TABLET(S): at 05:19

## 2020-02-28 RX ADMIN — PREGABALIN 1000 MICROGRAM(S): 225 CAPSULE ORAL at 17:43

## 2020-02-28 RX ADMIN — Medication 2.5 MILLIGRAM(S): at 17:42

## 2020-02-28 NOTE — DIETITIAN INITIAL EVALUATION ADULT. - PHYSICAL APPEARANCE
well nourished/BMI 27.3. Pt sitting upright with loose-fitting gown to reveal no overt signs of muscle wasting/fat loss in upper body. Pt appears well-nourished.

## 2020-02-28 NOTE — PROGRESS NOTE ADULT - SUBJECTIVE AND OBJECTIVE BOX
INTERVAL HPI/OVERNIGHT EVENTS:  pt seen and examined, son present  sp left pleural drainage by ir  denies n/v/d/c/abd pain  had dec po at dinner  no new labs    MEDICATIONS  (STANDING):  atorvastatin 10 milliGRAM(s) Oral at bedtime  dextrose 5% + sodium chloride 0.45% with potassium chloride 20 mEq/L 1000 milliLiter(s) (30 mL/Hr) IV Continuous <Continuous>  famotidine    Tablet 20 milliGRAM(s) Oral two times a day  heparin  Injectable 5000 Unit(s) SubCutaneous every 12 hours  lactobacillus acidophilus 1 Tablet(s) Oral three times a day  piperacillin/tazobactam IVPB.. 3.375 Gram(s) IV Intermittent every 8 hours  tamsulosin 0.4 milliGRAM(s) Oral at bedtime  vancomycin  IVPB 1000 milliGRAM(s) IV Intermittent every 12 hours    MEDICATIONS  (PRN):  acetaminophen   Tablet .. 650 milliGRAM(s) Oral every 6 hours PRN Temp greater or equal to 38C (100.4F), Mild Pain (1 - 3)  albuterol/ipratropium for Nebulization 3 milliLiter(s) Nebulizer every 6 hours PRN Shortness of Breath and/or Wheezing  guaiFENesin   Syrup  (Sugar-Free) 200 milliGRAM(s) Oral every 6 hours PRN Cough  oxyCODONE    IR 5 milliGRAM(s) Oral every 4 hours PRN Severe Pain (7 - 10)  traMADol 25 milliGRAM(s) Oral every 6 hours PRN Moderate Pain (4 - 6)      Allergies    No Known Allergies    Intolerances        Review of Systems:    General:  No wt loss, fevers, chills, night sweats, fatigue   Eyes:  Good vision, no reported pain  ENT:  No sore throat, pain, runny nose, dysphagia  CV:  No pain, palpitations, hypo/hypertension  Resp:  No dyspnea, cough, tachypnea, wheezing  GI:  No pain, No nausea, No vomiting, No diarrhea, No constipation, No weight loss, No fever, No pruritis, No rectal bleeding, No melena, No dysphagia  :  No pain, bleeding, incontinence, nocturia  Muscle:  No pain, weakness  Neuro:  No weakness, tingling, memory problems  Psych:  No fatigue, insomnia, mood problems, depression  Endocrine:  No polyuria, polydypsia, cold/heat intolerance  Heme:  No petechiae, ecchymosis, easy bruisability  Skin:  No rash, tattoos, scars, edema      Vital Signs Last 24 Hrs  T(C): 36.9 (28 Feb 2020 08:27), Max: 37.1 (27 Feb 2020 12:13)  T(F): 98.5 (28 Feb 2020 08:27), Max: 98.7 (27 Feb 2020 12:13)  HR: 101 (28 Feb 2020 09:56) (93 - 103)  BP: 97/60 (28 Feb 2020 08:27) (97/60 - 110/67)  BP(mean): --  RR: 17 (28 Feb 2020 08:27) (17 - 18)  SpO2: 93% (28 Feb 2020 09:56) (91% - 95%)    PHYSICAL EXAM:  General:  lying in  bed  HEENT:  NC/AT  Abdomen:  Soft, non-tender, non-distended  Extremities:  no edema  Neuro/Psych:  Awake alert      LABS:                        11.2   10.70 )-----------( 174      ( 27 Feb 2020 07:10 )             33.5       Phos  4.5     02-28  Mg     2.3     02-28    TPro  5.8<L>  /  Alb  2.3<L>  /  TBili  1.0  /  DBili  .40<H>  /  AST  30  /  ALT  31  /  AlkPhos  60  02-27    PT/INR - ( 27 Feb 2020 07:10 )   PT: 19.3 sec;   INR: 1.70 ratio         PTT - ( 27 Feb 2020 07:10 )  PTT:29.4 sec      RADIOLOGY & ADDITIONAL TESTS:

## 2020-02-28 NOTE — PROGRESS NOTE ADULT - ASSESSMENT
h/o nhl- gastric maltoma  abnormal ct  left empyema, sp drainage  gastric wall thickening      nc ct showing gastric wall thickening  per dtr has had op egd w/in past yr  gerd precautions  cont pepcid  diet as tolerated  further care per primary    Advanced care planning was discussed with patient and family.  Advanced care planning forms were reviewed and discussed.  Risks, benefits and alternatives of gastroenterologic procedures were discussed in detail and all questions were answered.    30 minutes spent.

## 2020-02-28 NOTE — DIETITIAN INITIAL EVALUATION ADULT. - OTHER INFO
Pt is a 89 y/o M with PMH diverticulitis, CLL, GERD, HLD. Presents with L empyema. Upon visit, pt's son present. Denies nausea/vomiting or GI distress. +BM 2/27. No issues chewing/swallowing. NKFA. Pt reports UBW ~210 lb, likely inaccurate as pt does not weigh himself at home. However, pt notes he has noticed weight loss due to "not exercising" and looser-fitting clothes. Pt reports variable appetite/intake at baseline depending on if he is interested in the food being served. Pt lives at home with wife, aid prepares meals. Appetite/intake currently remains low per son due to lack of preference for institutionalized food, stated "almost none" in regards to in-house consumption. Family bringing food from outside source, reported pt consumed 1/2 of eggplant parm hero. Pt amenable to Might[employer]hakes two times per day to optimize nutritional needs.

## 2020-02-28 NOTE — PROGRESS NOTE ADULT - SUBJECTIVE AND OBJECTIVE BOX
SURGERY  [facility] [phone]    Patient seen at bedside, underwent Right thoracentesis with pig tail drain left in place. Reports some improvement in symptoms but denies any CP, dyspnea, mild SOB. Pt denies any nausea, vomiting, fever, chills, weakness, dysuria.  Patient A+Ox3 in NAD at time of visit.  T(C): 36.9 (02-28-20 @ 08:27), Max: 37.1 (02-27-20 @ 12:13)  HR: 101 (02-28-20 @ 09:56) (93 - 103)  BP: 97/60 (02-28-20 @ 08:27) (97/60 - 110/67)  RR: 17 (02-28-20 @ 08:27) (17 - 18)  SpO2: 93% (02-28-20 @ 09:56) (91% - 95%)  Wt(kg): --  ICU Vital Signs Last 24 Hrs  T(C): 36.9 (28 Feb 2020 08:27), Max: 37.1 (27 Feb 2020 12:13)  T(F): 98.5 (28 Feb 2020 08:27), Max: 98.7 (27 Feb 2020 12:13)  HR: 101 (28 Feb 2020 09:56) (93 - 103)  BP: 97/60 (28 Feb 2020 08:27) (97/60 - 110/67)  BP(mean): --  ABP: --  ABP(mean): --  RR: 17 (28 Feb 2020 08:27) (17 - 18)  SpO2: 93% (28 Feb 2020 09:56) (91% - 95%)    CAPILLARY BLOOD GLUCOSE        I&O's Detail    27 Feb 2020 07:01  -  28 Feb 2020 07:00  --------------------------------------------------------  IN:    dextrose 5% + sodium chloride 0.45% with potassium chloride 20 mEq/L: 210 mL    Oral Fluid: 360 mL    Solution: 500 mL    Solution: 300 mL  Total IN: 1370 mL    OUT:    Chest Tube: 140 mL    Voided: 1500 mL  Total OUT: 1640 mL    Total NET: -270 mL        I&O's Summary    27 Feb 2020 07:01  -  28 Feb 2020 07:00  --------------------------------------------------------  IN: 1370 mL / OUT: 1640 mL / NET: -270 mL    Exam: Sitting comfortably in chair on NC s/p nebulizer treatment  Chest: Clear through auscultation bilaterally, (+) mild wheezing to Left lung field, Right lung CTA, decreased breath sounds at base  Heart: S1,S1 RRR, no murmurs noted  Abdomen: soft non distended, (+) BS  Incision: intact, no erythema noted  Extremities: no edema noted, warm,  no calf tenderness     LABS:                        11.2   10.70 )-----------( 174      ( 27 Feb 2020 07:10 )             33.5       Phos  4.5     02-28  Mg     2.3     02-28    TPro  5.8<L>  /  Alb  2.3<L>  /  TBili  1.0  /  DBili  .40<H>  /  AST  30  /  ALT  31  /  AlkPhos  60  02-27    PT/INR - ( 27 Feb 2020 07:10 )   PT: 19.3 sec;   INR: 1.70 ratio         PTT - ( 27 Feb 2020 07:10 )  PTT:29.4 sec      Culture - Fungal, Body Fluid (collected 27 Feb 2020 21:38)  Source: .Body Fluid Pleural Fluid  Preliminary Report (28 Feb 2020 06:36):    Testing in progress    Culture - Body Fluid with Gram Stain (collected 27 Feb 2020 21:38)  Source: .Body Fluid Pleural Fluid  Gram Stain (27 Feb 2020 23:27):    polymorphonuclear leukocytes    No organisms seen    by cytocentrifuge    Culture - Blood (collected 26 Feb 2020 22:29)  Source: .Blood Blood-Peripheral  Preliminary Report (27 Feb 2020 23:01):    No growth to date.    Culture - Blood (collected 26 Feb 2020 22:29)  Source: .Blood Blood-Peripheral  Preliminary Report (27 Feb 2020 23:01):    No growth to date.    Culture - Urine (collected 26 Feb 2020 11:46)  Source: .Urine Catheterized  Final Report (27 Feb 2020 15:05):    50,000 - 99,000 CFU/mL Coag Negative Staphylococcus    Susceptibilites not performed.    Culture - Blood (collected 25 Feb 2020 22:38)  Source: .Blood Blood-Peripheral  Preliminary Report (26 Feb 2020 23:01):    No growth to date.    Culture - Blood (collected 25 Feb 2020 22:38)  Source: .Blood Blood-Peripheral  Preliminary Report (26 Feb 2020 23:01):    No growth to date.    BNP elevated to 4285      Assessment/Plan:  89y Male PMH CLL, gastric lymphoma ex smoker, hx of asbestos exp presenting to the hospital with cough, fever and left sided pleuritic chest pain s/p Left sided IR drainage of empyema and placement of pig tail catheter- in place, fluid/cytology consistent with infection/parapneumonic effusion    -Case discussed with Dr. Steven, pt would likely benefit from VATs decortication of empyema, Dr. Steven to discuss surgical options with patient and son this afternoon  -Medical clearance for surgery  -Will need cardiac clearance/optimization prior to OR  -Continue DVT Prophylaxis with SCD's  -Continue IV Antibiotics- vancomycin and zosyn  -Continue Incentive Spirometry  -Continue analgesia  -Encourage OOB/ambulation with assistance  -Above discussed with Dr. Steven

## 2020-02-28 NOTE — PROGRESS NOTE ADULT - SUBJECTIVE AND OBJECTIVE BOX
Patient is a 89y old  Male who presents with a chief complaint of Left Empyema (28 Feb 2020 14:46)      INTERVAL /OVERNIGHT EVENTS: c/o anorexia    MEDICATIONS  (STANDING):  atorvastatin 10 milliGRAM(s) Oral at bedtime  cyanocobalamin Injectable 1000 MICROGram(s) IntraMuscular every 24 hours  dextrose 5% + sodium chloride 0.45% with potassium chloride 20 mEq/L 1000 milliLiter(s) (30 mL/Hr) IV Continuous <Continuous>  dronabinol 2.5 milliGRAM(s) Oral two times a day  famotidine    Tablet 20 milliGRAM(s) Oral two times a day  heparin  Injectable 5000 Unit(s) SubCutaneous every 12 hours  lactobacillus acidophilus 1 Tablet(s) Oral three times a day  piperacillin/tazobactam IVPB.. 3.375 Gram(s) IV Intermittent every 8 hours  tamsulosin 0.4 milliGRAM(s) Oral at bedtime    MEDICATIONS  (PRN):  acetaminophen   Tablet .. 650 milliGRAM(s) Oral every 6 hours PRN Temp greater or equal to 38C (100.4F), Mild Pain (1 - 3)  albuterol/ipratropium for Nebulization 3 milliLiter(s) Nebulizer every 6 hours PRN Shortness of Breath and/or Wheezing  guaiFENesin   Syrup  (Sugar-Free) 200 milliGRAM(s) Oral every 6 hours PRN Cough  oxyCODONE    IR 5 milliGRAM(s) Oral every 4 hours PRN Severe Pain (7 - 10)  traMADol 25 milliGRAM(s) Oral every 6 hours PRN Moderate Pain (4 - 6)      Allergies    No Known Allergies    Intolerances        REVIEW OF SYSTEMS:  CONSTITUTIONAL: No fever, weight loss, or fatigue  EYES: No eye pain, visual disturbances, or discharge  ENMT:  No difficulty hearing, tinnitus, vertigo; No sinus or throat pain  NECK: No pain or stiffness  RESPIRATORY: No cough, wheezing, chills or hemoptysis; No shortness of breath  CARDIOVASCULAR: No chest pain, palpitations, dizziness, or leg swelling  GASTROINTESTINAL: No abdominal or epigastric pain. No nausea, vomiting, or hematemesis; No diarrhea or constipation. No melena or hematochezia.  GENITOURINARY: No dysuria, frequency, hematuria, or incontinence  NEUROLOGICAL: No headaches, memory loss, loss of strength, numbness, or tremors  SKIN: No itching, burning, rashes, or lesions   LYMPH NODES: No enlarged glands  ENDOCRINE: No heat or cold intolerance; No hair loss; No polydipsia or polyuria  MUSCULOSKELETAL: No joint pain or swelling; No muscle, back, or extremity pain  PSYCHIATRIC: No depression, anxiety, mood swings, or difficulty sleeping  HEME/LYMPH: No easy bruising, or bleeding gums  ALLERGY AND IMMUNOLOGIC: No hives or eczema    Vital Signs Last 24 Hrs  T(C): 36.8 (28 Feb 2020 17:00), Max: 37.4 (28 Feb 2020 15:39)  T(F): 98.2 (28 Feb 2020 17:00), Max: 99.3 (28 Feb 2020 15:39)  HR: 106 (28 Feb 2020 17:00) (93 - 109)  BP: 118/68 (28 Feb 2020 17:00) (90/51 - 118/68)  BP(mean): --  RR: 18 (28 Feb 2020 17:00) (17 - 18)  SpO2: 91% (28 Feb 2020 17:00) (90% - 94%)    PHYSICAL EXAM:  GENERAL: NAD, well-groomed, well-developed  HEAD:  Atraumatic, Normocephalic  EYES: EOMI, PERRLA, conjunctiva and sclera clear  ENMT: No tonsillar erythema, exudates, or enlargement; Moist mucous membranes, Good dentition, No lesions  NECK: Supple, No JVD, Normal thyroid  NERVOUS SYSTEM:  Alert & Oriented X3, Good concentration; Motor Strength 5/5 B/L upper and lower extremities; DTRs 2+ intact and symmetric  CHEST/LUNGS : chest tube in place  HEART: Regular rate and rhythm; No murmurs, rubs, or gallops  ABDOMEN: Soft, Nontender, Nondistended; Bowel sounds present  EXTREMITIES:  2+ Peripheral Pulses, No clubbing, cyanosis, or edema  LYMPH: No lymphadenopathy noted  SKIN: No rashes or lesions    LABS:                        11.0   8.55  )-----------( 212      ( 28 Feb 2020 14:53 )             32.7       Phos  4.5       28 Feb 2020 07:38  Mg     2.3       28 Feb 2020 07:38      PT/INR - ( 27 Feb 2020 07:10 )   PT: 19.3 sec;   INR: 1.70 ratio         PTT - ( 27 Feb 2020 07:10 )  PTT:29.4 sec    CAPILLARY BLOOD GLUCOSE          RADIOLOGY & ADDITIONAL TESTS:    Notes Reviewed:  [x ] YES  [ ] NO    Care Discussed with Consultants/Other Providers [x ] YES  [ ] NO

## 2020-02-28 NOTE — CONSULT NOTE ADULT - ASSESSMENT
89 yo M with PMH of HLD, HTN, MALToma of the stomach (under surveillance), spinal stenosis with extensive lumbar surgery in 2011 and bilateral LE neuropathy, admitted for L side empyema and R ureteral stone, planned for possible urologic intervention. Patient may be having anginal CP at home per his description, there is no clear evidence of acute ischemia with no ST/T wave changes on EKG. No evidence of meaningful volume overload.    INCOMPLETE 89 yo M with PMH of HLD, HTN, MALToma of the stomach (under surveillance), spinal stenosis with extensive lumbar surgery in 2011 and bilateral LE neuropathy, admitted for L side empyema and R ureteral stone, planned for possible urologic intervention. There is no clear evidence of acute ischemia with no ST/T wave changes on EKG. No evidence of meaningful volume overload.    INCOMPLETE 89 yo M with PMH of HLD and MALToma of the stomach (under surveillance), spinal stenosis with extensive lumbar surgery in 2011 and bilateral LE neuropathy, admitted for L side empyema and R ureteral stone. Patient initially reported what appeared to be stable occasional angina however later denied this - he is a poor historian. Review of EKG revealed possible inferior infarct however no wall motion abnormalities on 2/27 echo. Elevated proBNP however normal LV systolic function with EF 55% on echo and no evidence of meaningful volume overload on exam. Patient has no active ischemia, decompensated HF, unstable arrythmia, or severe stenotic valvular disease.     - Continue Atorvastatin.  - Continue heparin SQ for DVT prophylaxis.  - Monitor and replete lytes, keep K>4, Mg>2.  - Patient is average cardiac risk and optimized from cardiovascular standpoint to proceed with needed cardiothoracic or urologic procedure.   - Rest of management per urology/CT surgery/primary team. Other cardiovascular workup will depend on clinical course. Will follow. 89 yo M with PMH of HLD and MALToma of the stomach (under surveillance), spinal stenosis with extensive lumbar surgery in 2011 and bilateral LE neuropathy, admitted for L side empyema and R ureteral stone. Patient initially reported what appeared to be stable occasional angina however later denied this - he is a poor historian. Review of EKG revealed possible inferior infarct however no wall motion abnormalities on 2/27 echo. Elevated proBNP however normal LV systolic function with EF 55% on echo and no evidence of meaningful volume overload on exam. Patient has no active ischemia, decompensated HF, unstable arrythmia, or severe stenotic valvular disease.     - Continue Atorvastatin.  - Continue heparin SQ for DVT prophylaxis.  - Monitor and replete lytes, keep K>4, Mg>2.  - Patient is average cardiac risk and optimized from cardiovascular standpoint to proceed with needed thoracic or urologic procedure.   - Rest of management per urology/CT surgery/primary team. Other cardiovascular workup will depend on clinical course. Will follow.

## 2020-02-28 NOTE — DIETITIAN INITIAL EVALUATION ADULT. - ADD RECOMMEND
1) Continue regular diet. 2) Provide Mightyshakes two times per day. 3) Monitor weights, labs, intake, GI tolerance, skin integrity.

## 2020-02-28 NOTE — PROGRESS NOTE ADULT - ASSESSMENT
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 On Observation with serial EGD and PET.   BM bx prior with low level mono-clonal B-cells c/w MBCL/CLL.     Hx B12 def. On B12 injections outpt. No sn/sx.     Outpt normal CBC, normal Hgb. Baseline WBC ~ 7    In Jan 2020 with cough/URI, in retrospect may have later developed pneumonia.   Now admitted with empyema.     Leukocytosis due to infection.   Hx of asymptomatic low IgA and IgM    INR improved after dose of Vit K.. increased INR secondary to nutritional concerns and decreased protein production. note albumin      RECOMMENDATIONS  IV abx per pulm, medicine.   On Zosyn and Vancomycin.     IgG levels adequate, but may be low relative to having empyema.   B12 low normal.   Give B12 injections.     s/p IR drain for empyema. 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     ID consultation appreciated.     DVT Prophylaxis on SCD  Can place on SQ heparin or LMWH post procedures.   VitK for elevated PT.   Follow INR.

## 2020-02-28 NOTE — DIETITIAN INITIAL EVALUATION ADULT. - WEIGHT IN LBS
After Visit Summary   5/17/2018    Herminia Garcia    MRN: [id number]           Patient Information     Date Of Birth          1954        Visit Information        Provider Department      5/17/2018 3:00 PM Diane Menendez [facility]        Today's Diagnoses     Adjustment disorder with anxiety    -  1       Follow-ups after your visit        Your next 10 appointments already scheduled     May 31, 2018  2:00 PM CDT   Individual Therapy 53+ minutes with Diane Menendez [facility] ([facility])    [address]  [address]  [facility] [address]   [phone]            Jun 13, 2018  2:00 PM CDT   Individual Therapy 53+ minutes with Diane Menendez [facility] ([facility])    [address]  [address]  [facility] [address]   [phone]            Jul 11, 2018  3:00 PM CDT   Individual Therapy 53+ minutes with Diane Menendez [facility] ([facility])    [address]  [address]  [facility] [address]   [phone]            Jul 26, 2018  3:00 PM CDT   Individual Therapy 53+ minutes with Diane Menendez [facility] ([facility])    [address]  [address]  [facility] [address]   [phone]              Who to contact     Please call your clinic at [phone] to:    Ask questions about your health    Make or cancel appointments    Discuss your medicines    Learn about your test results    Speak to your doctor            Additional Information About Your Visit        MyChart Information     Appear is an electronic gateway that provides easy, online access to your medical records. With Appear, you can request a clinic appointment, read your test results, renew a prescription or communicate with your care team.     To sign up for [employer] visit the website at [email or web address]    You will be asked to enter the access code listed below, as well as some personal information. Please follow the directions to create your username and password.     Your access code is: [email or web address]  Expires: 2018  9:06 PM     Your access code will  in 90 days. If you need help or a new code, please contact your [facility] or call [phone] for assistance.        Care EveryWhere ID     This is your Care EveryWhere ID. This could be used by other organizations to access your [facility] medical records  [id number]         Blood Pressure from Last 3 Encounters:   No data found for BP    Weight from Last 3 Encounters:   No data found for Wt              We Performed the Following     Individual Psychotherapy (53+ min) [33739]        Primary Care Provider    None Specified       No primary provider on file.        Equal Access to Services     [facility] : Hadmariusz Tamez, khalif saldana, freya galdamez, manasa shannon . So [facility] [phone].    ATENCIÓN: Si habla español, tiene a garcia disposición servicios gratuitos de asistencia lingüística. Llame al [phone].    We comply with applicable federal civil rights laws and [place] laws. We do not discriminate on the basis of race, color, national origin, age, disability, sex, sexual orientation, or gender identity.            Thank you!     Thank you for choosing [facility]  for your care. Our goal is always to provide you with excellent care. Hearing back from our patients is one way we can continue to improve our services. Please take a few minutes to complete the written survey that you may receive in the mail after your visit with us. Thank you!             Your Updated Medication List - Protect others around you: Learn how to safely use, store and throw away your medicines at www.disposemymeds.org.      Notice  As of 2018 11:59 PM    You have not  194 been prescribed any medications.

## 2020-02-28 NOTE — PROGRESS NOTE ADULT - SUBJECTIVE AND OBJECTIVE BOX
[INTERVAL HX: ]  Patient seen and examined;  Chart reviewed and events noted;   no CP, no SOB, no BOYKIN.   s/p L chest tube/drain.   Mild incisional back pain.       Patient is a 89y Male with a known history of :  Empyema of pleural space without fistula (J86.9) [Active]  GI Bleed (578.9) [Active]  Stomach Ulcer (531.90) [Active]  Osteoarthritis (715.90) [Active]  Hyperlipidemia (272.4) [Active]  Herniated Disc (722.2) [Active]  Spinal Stenosis (724.00) [Active]  Diverticulitis (562.11) [Active]  GERD (Gastroesophageal Reflux Disease) (530.81) [Active]  Chronic Lymphocytic Leukemia (204.10) [Active]  S/P Tonsillectomy (V45.89) [Active]  Status Post Arthroscopy (V45.89) [Active]  History of Arthroscopy of Knee (V45.89) [Active]  Osteoarthritis (715.90) [Inactive]  Stomach Ulcer (531.90) [Inactive]    HPI:  CHARLY LU is a 89 YO Male brought to [place] ED complaining of flank pain, fever and cough for 4 days. Patient transferred from [place] ER for admission to [place].  Daughter at the bedside states she took him to the ER for evaluation of back pain, chest discomfort. At [place] patient found to have fever, CT scan revealed left sided empyema and right ureteral stone, patient received ceftriaxone and zosyn. Dr. Maxwell pulmonary and Dr. Barnard urology were aware of transfer. Patient states he is feeling well now. (2020 05:01)            MEDICATIONS  (STANDING):  atorvastatin 10 milliGRAM(s) Oral at bedtime  dextrose 5% + sodium chloride 0.45% with potassium chloride 20 mEq/L 1000 milliLiter(s) (30 mL/Hr) IV Continuous <Continuous>  famotidine    Tablet 20 milliGRAM(s) Oral two times a day  heparin  Injectable 5000 Unit(s) SubCutaneous every 12 hours  lactobacillus acidophilus 1 Tablet(s) Oral three times a day  piperacillin/tazobactam IVPB.. 3.375 Gram(s) IV Intermittent every 8 hours  tamsulosin 0.4 milliGRAM(s) Oral at bedtime  vancomycin  IVPB 1000 milliGRAM(s) IV Intermittent every 12 hours    MEDICATIONS  (PRN):  acetaminophen   Tablet .. 650 milliGRAM(s) Oral every 6 hours PRN Temp greater or equal to 38C (100.4F), Mild Pain (1 - 3)  albuterol/ipratropium for Nebulization 3 milliLiter(s) Nebulizer every 6 hours PRN Shortness of Breath and/or Wheezing  guaiFENesin   Syrup  (Sugar-Free) 200 milliGRAM(s) Oral every 6 hours PRN Cough  oxyCODONE    IR 5 milliGRAM(s) Oral every 4 hours PRN Severe Pain (7 - 10)  traMADol 25 milliGRAM(s) Oral every 6 hours PRN Moderate Pain (4 - 6)      Vital Signs Last 24 Hrs  T(C): 37.1 (2020 12:20), Max: 37.1 (2020 12:20)  T(F): 98.7 (2020 12:20), Max: 98.7 (2020 12:20)  HR: 105 (2020 12:20) (93 - 109)  BP: 98/58 (2020 12:20) (97/60 - 110/67)  BP(mean): --  RR: 18 (2020 12:20) (17 - 18)  SpO2: 90% (2020 12:20) (90% - 95%)      [PHYSICAL EXAM]  General: adult in NAD,  WN,  WD.  HEENT: clear oropharynx, anicteric sclera, pink conjunctivae.  Neck: supple, no masses.  CV: normal S1S2, no murmur, no rubs, no gallops.  Lungs: dec to auscultation L base, no wheezes, no rales, no rhonchi.  Abdomen: soft, non-tender, non-distended, no hepatosplenomegaly, normal BS, no guarding.  Ext: no clubbing, no cyanosis, no edema.  Skin: no rashes,  no petechiae, no venous stasis changes.  Neuro: alert and oriented X  , no focal motor deficits.  LN: no SC BETHEL.      [LABS:]                        11.2   10.70 )-----------( 174      ( 2020 07:10 )             33.5       Phos  4.5       Mg     2.3         TPro  5.8<L>  /  Alb  2.3<L>  /  TBili  1.0  /  DBili  .40<H>  /  AST  30  /  ALT  31  /  AlkPhos  60      PT/INR - ( 2020 07:10 )   PT: 19.3 sec;   INR: 1.70 ratio         PTT - ( 2020 07:10 )  PTT:29.4 sec      Culture - Fungal, Body Fluid (collected 20 @ 21:38)  Source: .Body Fluid Pleural Fluid  Preliminary Report (20 @ 06:36):    Testing in progress    Culture - Body Fluid with Gram Stain (collected 20 @ 21:38)  Source: .Body Fluid Pleural Fluid  Gram Stain (20 @ 23:27):    polymorphonuclear leukocytes    No organisms seen    by cytocentrifuge      Immunoglobulin Subclass IgG4, Serum (20 @ 19:48)    Immunoglobulin Subclass IgG4, Serum: 18.8: Test Performed by:  [facility]  [address] [place] [address]  : Gilles Rogers M.D. Ph.D.; CLIA# [id number] mg/dL    Immunoglobulins Panel (20 @ 16:54)    Quantitative Ig mg/dL    Quantitative IgA: 72 mg/dL    Quantitative IgM: 23 mg/dL    LISSET Kappa: 2.06 mg/dL    LISSET Lambda: 1.68 mg/dL    Kappa/Lambda Free Light Chain Ratio, Serum: 1.23 Ratio        Vitamin B12, Serum (20 @ 16:23)    Vitamin B12, Serum: 437 pg/mL    Folate, Serum (20 @ 16:23)    Folate, Serum: 14.8 ng/mL            [RADIOLOGY STUDIES:]

## 2020-02-28 NOTE — PROGRESS NOTE ADULT - SUBJECTIVE AND OBJECTIVE BOX
Date/Time Patient Seen:  		  Referring MD:   Data Reviewed	       Patient is a 89y old  Male who presents with a chief complaint of Left Empyema (27 Feb 2020 23:16)      Subjective/HPI     PAST MEDICAL & SURGICAL HISTORY:  GI Bleed: 2007  Stomach Ulcer: H pylori 2007, treated with antibiotics  Osteoarthritis  Hyperlipidemia  Herniated Disc  Spinal Stenosis  Diverticulitis  GERD (Gastroesophageal Reflux Disease)  Chronic Lymphocytic Leukemia: followed by oncologist in [place]  S/P Tonsillectomy: childhood  Status Post Arthroscopy: right shoulder  History of Arthroscopy of Knee: bilateral  Osteoarthritis  Stomach Ulcer: H pylori, treated with antibiotics        Medication list         MEDICATIONS  (STANDING):  atorvastatin 10 milliGRAM(s) Oral at bedtime  dextrose 5% + sodium chloride 0.45% with potassium chloride 20 mEq/L 1000 milliLiter(s) (30 mL/Hr) IV Continuous <Continuous>  famotidine    Tablet 20 milliGRAM(s) Oral two times a day  heparin  Injectable 5000 Unit(s) SubCutaneous every 12 hours  lactobacillus acidophilus 1 Tablet(s) Oral three times a day  piperacillin/tazobactam IVPB.. 3.375 Gram(s) IV Intermittent every 8 hours  tamsulosin 0.4 milliGRAM(s) Oral at bedtime  vancomycin  IVPB 1000 milliGRAM(s) IV Intermittent every 12 hours    MEDICATIONS  (PRN):  acetaminophen   Tablet .. 650 milliGRAM(s) Oral every 6 hours PRN Temp greater or equal to 38C (100.4F), Mild Pain (1 - 3)  albuterol/ipratropium for Nebulization 3 milliLiter(s) Nebulizer every 6 hours PRN Shortness of Breath and/or Wheezing  guaiFENesin   Syrup  (Sugar-Free) 200 milliGRAM(s) Oral every 6 hours PRN Cough  oxyCODONE    IR 5 milliGRAM(s) Oral every 4 hours PRN Severe Pain (7 - 10)  traMADol 25 milliGRAM(s) Oral every 6 hours PRN Moderate Pain (4 - 6)         Vitals log        ICU Vital Signs Last 24 Hrs  T(C): 36.9 (28 Feb 2020 08:27), Max: 37.1 (27 Feb 2020 12:13)  T(F): 98.5 (28 Feb 2020 08:27), Max: 98.7 (27 Feb 2020 12:13)  HR: 103 (28 Feb 2020 08:27) (93 - 103)  BP: 97/60 (28 Feb 2020 08:27) (97/60 - 110/67)  BP(mean): --  ABP: --  ABP(mean): --  RR: 17 (28 Feb 2020 08:27) (17 - 18)  SpO2: 91% (28 Feb 2020 08:27) (91% - 95%)           Input and Output:  I&O's Detail    27 Feb 2020 07:01  -  28 Feb 2020 07:00  --------------------------------------------------------  IN:    dextrose 5% + sodium chloride 0.45% with potassium chloride 20 mEq/L: 210 mL    Oral Fluid: 360 mL    Solution: 500 mL    Solution: 300 mL  Total IN: 1370 mL    OUT:    Chest Tube: 140 mL    Voided: 1500 mL  Total OUT: 1640 mL    Total NET: -270 mL          Lab Data                        11.2   10.70 )-----------( 174      ( 27 Feb 2020 07:10 )             33.5       Phos  4.5     02-28  Mg     2.3     02-28    TPro  5.8<L>  /  Alb  2.3<L>  /  TBili  1.0  /  DBili  .40<H>  /  AST  30  /  ALT  31  /  AlkPhos  60  02-27            Review of Systems	      Objective     Physical Examination    heart s1s2  lung dc BS  abd soft      Pertinent Lab findings & Imaging      [place]:  NO   Adequate UO     I&O's Detail    27 Feb 2020 07:01  -  28 Feb 2020 07:00  --------------------------------------------------------  IN:    dextrose 5% + sodium chloride 0.45% with potassium chloride 20 mEq/L: 210 mL    Oral Fluid: 360 mL    Solution: 500 mL    Solution: 300 mL  Total IN: 1370 mL    OUT:    Chest Tube: 140 mL    Voided: 1500 mL  Total OUT: 1640 mL    Total NET: -270 mL               Discussed with:     Cultures:	  Culture Results:   Testing in progress (02-27 @ 21:38)        Radiology

## 2020-02-28 NOTE — PHYSICAL THERAPY INITIAL EVALUATION ADULT - PERTINENT HX OF CURRENT PROBLEM, REHAB EVAL
89 YO M brought to [place] ED complaining of flank pain, back pain, chest discomfort, fever and cough. Patient transferred for admission to [place]. At [place] patient found to have fever, CT scan revealed left sided empyema and right ureteral stone.

## 2020-02-28 NOTE — CONSULT NOTE ADULT - SUBJECTIVE AND OBJECTIVE BOX
[facility] - Warren Posada, Dandre, Abelardo, Lucina, Kelvin Travis  Office Number: [phone]    Initial Consult Note    CHIEF COMPLAINT: Patient is a 89y old  Male who presents with a chief complaint of Left Empyema (28 Feb 2020 13:59)    HPI: 89 yo M with PMH of HLD, HTN, MALToma of the stomach (under surveillance), spinal stenosis and extensive lumbar surgery in 2011 was brought to [place] ED complaining of flank pain, fever and cough for 4 days. Patient transferred from [place] ER for admission to [place].  Daughter at the bedside states she took him to the ER for evaluation of back pain, chest discomfort. At [place] patient found to have fever, CT scan revealed left sided empyema and right ureteral stone, patient received ceftriaxone and zosyn. Dr. Maxwell pulmonary and Dr. Barnard urology were aware of transfer.     Patient was seen and examined at bedside for preop optimization.     He was last seen by his cardiologist 10/2019, at which point his cardiologist Dr Kirkland stated in his note that cardiac status was compensated at the time. Listed meds were: Atorvastatin 10, Gabapentin, Pantoprazole and medical marijuana.   Carotid dopplers in 2/2018 showed mild plaque in bilateral carotid bulb to ICA and ECA, no hemodynamically significant stenosis. Echo in 2014 showed mild MR/AR, atrial septal aneurysm, normal LV internal dimensions/wall thickness and systolic function, mild state I diastolic dysfunction.    PAST MEDICAL & SURGICAL HISTORY:  GI Bleed: 2007  Stomach Ulcer: H pylori 2007, treated with antibiotics  Osteoarthritis  Hyperlipidemia  Herniated Disc  Spinal Stenosis  Diverticulitis  GERD (Gastroesophageal Reflux Disease)  Chronic Lymphocytic Leukemia: followed by oncologist in [place]  S/P Tonsillectomy: childhood  Status Post Arthroscopy: right shoulder  History of Arthroscopy of Knee: bilateral      SOCIAL HISTORY:  No tobacco, ethanol, or drug abuse.    FAMILY HISTORY:  No pertinent family history in first degree relatives    No family history of acute MI or sudden cardiac death.    MEDICATIONS  (STANDING):  atorvastatin 10 milliGRAM(s) Oral at bedtime  cyanocobalamin Injectable 1000 MICROGram(s) IntraMuscular every 24 hours  dextrose 5% + sodium chloride 0.45% with potassium chloride 20 mEq/L 1000 milliLiter(s) (30 mL/Hr) IV Continuous <Continuous>  famotidine    Tablet 20 milliGRAM(s) Oral two times a day  heparin  Injectable 5000 Unit(s) SubCutaneous every 12 hours  lactobacillus acidophilus 1 Tablet(s) Oral three times a day  piperacillin/tazobactam IVPB.. 3.375 Gram(s) IV Intermittent every 8 hours  tamsulosin 0.4 milliGRAM(s) Oral at bedtime  vancomycin  IVPB 1000 milliGRAM(s) IV Intermittent every 12 hours    MEDICATIONS  (PRN):  acetaminophen   Tablet .. 650 milliGRAM(s) Oral every 6 hours PRN Temp greater or equal to 38C (100.4F), Mild Pain (1 - 3)  albuterol/ipratropium for Nebulization 3 milliLiter(s) Nebulizer every 6 hours PRN Shortness of Breath and/or Wheezing  guaiFENesin   Syrup  (Sugar-Free) 200 milliGRAM(s) Oral every 6 hours PRN Cough  oxyCODONE    IR 5 milliGRAM(s) Oral every 4 hours PRN Severe Pain (7 - 10)  traMADol 25 milliGRAM(s) Oral every 6 hours PRN Moderate Pain (4 - 6)      Allergies    No Known Allergies    Intolerances        REVIEW OF SYSTEMS:    CONSTITUTIONAL: No weakness, fevers or chills  EYES/ENT: No visual changes;  No vertigo or throat pain   NECK: No pain or stiffness  RESPIRATORY: No cough, wheezing, hemoptysis; No shortness of breath  CARDIOVASCULAR: No chest pain or palpitations  GASTROINTESTINAL: No abdominal pain. No nausea, vomiting, or hematemesis; No diarrhea or constipation. No melena or hematochezia.  GENITOURINARY: No dysuria, frequency or hematuria  NEUROLOGICAL: No numbness or weakness  SKIN: No itching or rash  All other review of systems is negative unless indicated above    VITAL SIGNS:   Vital Signs Last 24 Hrs  T(C): 36.7 (28 Feb 2020 14:33), Max: 37.1 (28 Feb 2020 12:20)  T(F): 98 (28 Feb 2020 14:33), Max: 98.7 (28 Feb 2020 12:20)  HR: 105 (28 Feb 2020 14:33) (93 - 109)  BP: 90/51 (28 Feb 2020 14:33) (90/51 - 110/67)  BP(mean): --  RR: 18 (28 Feb 2020 14:33) (17 - 18)  SpO2: 90% (28 Feb 2020 14:33) (90% - 95%)    I&O's Summary    27 Feb 2020 07:01  -  28 Feb 2020 07:00  --------------------------------------------------------  IN: 1370 mL / OUT: 1640 mL / NET: -270 mL    28 Feb 2020 07:01  -  28 Feb 2020 14:43  --------------------------------------------------------  IN: 180 mL / OUT: 0 mL / NET: 180 mL        On Exam:    Constitutional: NAD, alert and oriented x 3  Lungs:  Non-labored, breath sounds are clear bilaterally, No wheezing, rales or rhonchi  Cardiovascular: RRR.  S1 and S2 positive.  No murmurs, rubs, gallops or clicks  Gastrointestinal: Bowel Sounds present, soft, nontender.   Lymph: No peripheral edema. No cervical lymphadenopathy.  Neurological: Alert, no focal deficits  Skin: No rashes or ulcers   Psych:  Mood & affect appropriate.    LABS: All Labs Reviewed:                        11.2   10.70 )-----------( 174      ( 27 Feb 2020 07:10 )             33.5                         12.0   13.18 )-----------( 159      ( 26 Feb 2020 08:57 )             35.2                         13.2   13.77 )-----------( 169      ( 25 Feb 2020 18:13 )             40.4     26 Feb 2020 08:57    137    |  104    |  20     ----------------------------<  165    3.6     |  25     |  1.00   25 Feb 2020 18:13    137    |  102    |  19     ----------------------------<  117    3.8     |  29     |  1.25     Ca    8.3        26 Feb 2020 08:57  Ca    8.6        25 Feb 2020 18:13  Phos  4.5       28 Feb 2020 07:38  Phos  2.6       26 Feb 2020 08:57  Mg     2.3       28 Feb 2020 07:38  Mg     2.3       26 Feb 2020 08:57    TPro  5.8    /  Alb  2.3    /  TBili  1.0    /  DBili  .40    /  AST  30     /  ALT  31     /  AlkPhos  60     27 Feb 2020 07:10  TPro  6.6    /  Alb  3.1    /  TBili  2.0    /  DBili  x      /  AST  16     /  ALT  24     /  AlkPhos  58     25 Feb 2020 18:13    PT/INR - ( 27 Feb 2020 07:10 )   PT: 19.3 sec;   INR: 1.70 ratio    PTT - ( 27 Feb 2020 07:10 )  PTT:29.4 sec    Blood Culture: Organism --  Gram Stain Blood -- Gram Stain   polymorphonuclear leukocytes  No organisms seen  by cytocentrifuge  Specimen Source .Body Fluid Pleural Fluid  Culture-Blood --    Organism --  Gram Stain Blood -- Gram Stain --  Specimen Source .Blood Blood-Peripheral  Culture-Blood --    Organism --  Gram Stain Blood -- Gram Stain --  Specimen Source .Urine Catheterized  Culture-Blood --    Organism --  Gram Stain Blood -- Gram Stain --  Specimen Source .Blood Blood-Peripheral  Culture-Blood --      02-28 @ 07:38  Pro Bnp [id number]  02-26 @ 08:57  Pro Bnp [id number]    02-26 @ 08:57  TSH: 1.80      RADIOLOGY:    EKG: [facility] - Warren Posada, Dandre, Abelardo, Lucina, Kelvin Travis  Office Number: [phone]    Initial Consult Note    CHIEF COMPLAINT: Patient is a 89y old  Male who presents with a chief complaint of Left Empyema (28 Feb 2020 13:59)    HPI: 89 yo M with PMH of HLD, HTN, MALToma of the stomach (under surveillance), spinal stenosis with extensive lumbar surgery in 2011 and bilateral LE neuropathy was brought to [place] ED complaining of flank pain, fever and cough for 4 days. Patient transferred from [place] ER for admission to [place].  Daughter at the bedside states she took him to the ER for evaluation of back pain, chest discomfort. At [place], patient found to have fever, CT scan revealed left sided empyema and right ureteral stone, patient received ceftriaxone and zosyn. Dr. Maxwell pulmonary and Dr. Barnard urology were aware of transfer.     Patient was seen and examined at bedside for preop optimization with family present. Son (physician) confirmed past medical history - deny hx of heart disease, CAD, MI, CVA. Patient reports having brief sharp L sided CP at least a couple times a week with exertion, which started sometime last fall. He is unclear on whether he discussed this with Dr Kirkland. He ambulates with walker. He can walk about 10 feet/residential across a room before stopping to rest. He denies [facility], states he takes things slow.    He was last seen by his cardiologist 10/2019, at which point his cardiologist Dr Kirkland stated in his note that cardiac status was compensated at the time. Listed meds were: Atorvastatin 10, Gabapentin, Pantoprazole and medical marijuana.   Carotid dopplers in 2/2018 showed mild plaque in bilateral carotid bulb to ICA and ECA, no hemodynamically significant stenosis. Echo in 2014 showed mild MR/AR, atrial septal aneurysm, normal LV internal dimensions/wall thickness and systolic function, mild state I diastolic dysfunction.      PAST MEDICAL & SURGICAL HISTORY:  GI Bleed: 2007  Stomach Ulcer: H pylori 2007, treated with antibiotics  Osteoarthritis  Hyperlipidemia  Herniated Disc  Spinal Stenosis  Diverticulitis  GERD (Gastroesophageal Reflux Disease)  Chronic Lymphocytic Leukemia: followed by oncologist in [place]  S/P Tonsillectomy: childhood  Status Post Arthroscopy: right shoulder  History of Arthroscopy of Knee: bilateral      SOCIAL HISTORY:  Former smoker (smoked about 45 years, 1PPD). No ethanol or drug abuse.    FAMILY HISTORY: No family history of acute MI or sudden cardiac death.    MEDICATIONS  (STANDING):  atorvastatin 10 milliGRAM(s) Oral at bedtime  cyanocobalamin Injectable 1000 MICROGram(s) IntraMuscular every 24 hours  dextrose 5% + sodium chloride 0.45% with potassium chloride 20 mEq/L 1000 milliLiter(s) (30 mL/Hr) IV Continuous <Continuous>  famotidine    Tablet 20 milliGRAM(s) Oral two times a day  heparin  Injectable 5000 Unit(s) SubCutaneous every 12 hours  lactobacillus acidophilus 1 Tablet(s) Oral three times a day  piperacillin/tazobactam IVPB.. 3.375 Gram(s) IV Intermittent every 8 hours  tamsulosin 0.4 milliGRAM(s) Oral at bedtime  vancomycin  IVPB 1000 milliGRAM(s) IV Intermittent every 12 hours    MEDICATIONS  (PRN):  acetaminophen   Tablet .. 650 milliGRAM(s) Oral every 6 hours PRN Temp greater or equal to 38C (100.4F), Mild Pain (1 - 3)  albuterol/ipratropium for Nebulization 3 milliLiter(s) Nebulizer every 6 hours PRN Shortness of Breath and/or Wheezing  guaiFENesin   Syrup  (Sugar-Free) 200 milliGRAM(s) Oral every 6 hours PRN Cough  oxyCODONE    IR 5 milliGRAM(s) Oral every 4 hours PRN Severe Pain (7 - 10)  traMADol 25 milliGRAM(s) Oral every 6 hours PRN Moderate Pain (4 - 6)      Allergies    No Known Allergies    Intolerances        REVIEW OF SYSTEMS:    CONSTITUTIONAL: +weakness, fevers or chills  EYES/ENT: No visual changes;  No vertigo or throat pain   NECK: No pain or stiffness  RESPIRATORY: No cough, wheezing, hemoptysis; No shortness of breath  CARDIOVASCULAR: +occasional chest pain; No palpitations  GASTROINTESTINAL: No abdominal pain. No nausea, vomiting, or hematemesis; No diarrhea or constipation. No melena or hematochezia.  GENITOURINARY: No dysuria, frequency or hematuria  NEUROLOGICAL: No numbness; chronic LE weakness   SKIN: No itching or rash  All other review of systems is negative unless indicated above    VITAL SIGNS:   Vital Signs Last 24 Hrs  T(C): 36.7 (28 Feb 2020 14:33), Max: 37.1 (28 Feb 2020 12:20)  T(F): 98 (28 Feb 2020 14:33), Max: 98.7 (28 Feb 2020 12:20)  HR: 105 (28 Feb 2020 14:33) (93 - 109)  BP: 90/51 (28 Feb 2020 14:33) (90/51 - 110/67)  BP(mean): --  RR: 18 (28 Feb 2020 14:33) (17 - 18)  SpO2: 90% (28 Feb 2020 14:33) (90% - 95%)    I&O's Summary    27 Feb 2020 07:01  -  28 Feb 2020 07:00  --------------------------------------------------------  IN: 1370 mL / OUT: 1640 mL / NET: -270 mL    28 Feb 2020 07:01  -  28 Feb 2020 14:43  --------------------------------------------------------  IN: 180 mL / OUT: 0 mL / NET: 180 mL        On Exam:    Constitutional: NAD, alert and oriented x 3  Lungs:  Non-labored, breath sounds are clear bilaterally, No wheezing, rales or rhonchi  Cardiovascular: RRR.  S1 and S2 positive.  No murmurs, rubs, gallops or clicks  Gastrointestinal: Bowel Sounds present, soft, nontender.   Lymph: Trace peripheral edema mostly on feet. No cervical lymphadenopathy.  Neurological: Alert, no focal deficits  Skin: No obvious rash  Psych:  Mood & affect appropriate.    LABS: All Labs Reviewed:                        11.2   10.70 )-----------( 174      ( 27 Feb 2020 07:10 )             33.5                         12.0   13.18 )-----------( 159      ( 26 Feb 2020 08:57 )             35.2                         13.2   13.77 )-----------( 169      ( 25 Feb 2020 18:13 )             40.4     26 Feb 2020 08:57    137    |  104    |  20     ----------------------------<  165    3.6     |  25     |  1.00   25 Feb 2020 18:13    137    |  102    |  19     ----------------------------<  117    3.8     |  29     |  1.25     Ca    8.3        26 Feb 2020 08:57  Ca    8.6        25 Feb 2020 18:13  Phos  4.5       28 Feb 2020 07:38  Phos  2.6       26 Feb 2020 08:57  Mg     2.3       28 Feb 2020 07:38  Mg     2.3       26 Feb 2020 08:57    TPro  5.8    /  Alb  2.3    /  TBili  1.0    /  DBili  .40    /  AST  30     /  ALT  31     /  AlkPhos  60     27 Feb 2020 07:10  TPro  6.6    /  Alb  3.1    /  TBili  2.0    /  DBili  x      /  AST  16     /  ALT  24     /  AlkPhos  58     25 Feb 2020 18:13    PT/INR - ( 27 Feb 2020 07:10 )   PT: 19.3 sec;   INR: 1.70 ratio    PTT - ( 27 Feb 2020 07:10 )  PTT:29.4 sec    Blood Culture: Organism --  Gram Stain Blood -- Gram Stain   polymorphonuclear leukocytes  No organisms seen  by cytocentrifuge  Specimen Source .Body Fluid Pleural Fluid  Culture-Blood --    Organism --  Gram Stain Blood -- Gram Stain --  Specimen Source .Blood Blood-Peripheral  Culture-Blood --    Organism --  Gram Stain Blood -- Gram Stain --  Specimen Source .Urine Catheterized  Culture-Blood --    Organism --  Gram Stain Blood -- Gram Stain --  Specimen Source .Blood Blood-Peripheral  Culture-Blood --      02-28 @ 07:38  Pro Bnp [id number]  02-26 @ 08:57  Pro Bnp [id number]    02-26 @ 08:57  TSH: 1.80      RADIOLOGY:  CXR (2/27): IMPRESSION: Catheter left chest tube insertion. Persistent large left effusion possibly with loculation.    EKG (2/25): NSR, LAD, inferior infarct age undetermined. [facility] - Warren Posada, Dandre, Abelardo, Lucina, Kelvin Travis  Office Number: [phone]    Initial Consult Note    CHIEF COMPLAINT: Patient is a 89y old  Male who presents with a chief complaint of Left Empyema (28 Feb 2020 13:59)    HPI: 89 yo M with PMH of HLD, HTN, MALToma of the stomach (under surveillance), spinal stenosis with extensive lumbar surgery in 2011 and bilateral LE neuropathy was brought to [place] ED complaining of flank pain, fever and cough for 4 days. Patient transferred from [place] ER for admission to [place].  Daughter at the bedside states she took him to the ER for evaluation of back pain, chest discomfort. At [place], patient found to have fever, CT scan revealed left sided empyema and right ureteral stone, patient received ceftriaxone and zosyn. Dr. Maxwell pulmonary and Dr. Barnard urology were aware of transfer.     Patient was seen and examined at bedside for preop optimization with family present. Son (physician) reports patient is a poor historian and confirmed past medical history - deny hx of heart disease, CAD, MI, CVA. Patient initially reported having brief sharp L sided CP at least a couple times a week with exertion, which started sometime last fall. He is unclear on whether he discussed this with Dr Kirkland. However, when patient was questioned again in the presence of his son, he denied having any CP whatsoever. He ambulates with walker. He can walk about 10 feet/FCI across a room before stopping to rest. He denies [facility], states he takes things slow.    He was last seen by his cardiologist 10/2019, at which point his cardiologist Dr Kirkland stated in his note that cardiac status was compensated at the time. Listed meds were: Atorvastatin 10, Gabapentin, Pantoprazole and medical marijuana.   Carotid dopplers in 2/2018 showed mild plaque in bilateral carotid bulb to ICA and ECA, no hemodynamically significant stenosis. Echo in 2014 showed mild MR/AR, atrial septal aneurysm, normal LV internal dimensions/wall thickness and systolic function, mild state I diastolic dysfunction.      PAST MEDICAL & SURGICAL HISTORY:  GI Bleed: 2007  Stomach Ulcer: H pylori 2007, treated with antibiotics  Osteoarthritis  Hyperlipidemia  Herniated Disc  Spinal Stenosis  Diverticulitis  GERD (Gastroesophageal Reflux Disease)  Chronic Lymphocytic Leukemia: followed by oncologist in [place]  S/P Tonsillectomy: childhood  Status Post Arthroscopy: right shoulder  History of Arthroscopy of Knee: bilateral      SOCIAL HISTORY:  Former smoker (smoked about 45 years, 1PPD). No ethanol or drug abuse.    FAMILY HISTORY: No family history of acute MI or sudden cardiac death.    MEDICATIONS  (STANDING):  atorvastatin 10 milliGRAM(s) Oral at bedtime  cyanocobalamin Injectable 1000 MICROGram(s) IntraMuscular every 24 hours  dextrose 5% + sodium chloride 0.45% with potassium chloride 20 mEq/L 1000 milliLiter(s) (30 mL/Hr) IV Continuous <Continuous>  famotidine    Tablet 20 milliGRAM(s) Oral two times a day  heparin  Injectable 5000 Unit(s) SubCutaneous every 12 hours  lactobacillus acidophilus 1 Tablet(s) Oral three times a day  piperacillin/tazobactam IVPB.. 3.375 Gram(s) IV Intermittent every 8 hours  tamsulosin 0.4 milliGRAM(s) Oral at bedtime  vancomycin  IVPB 1000 milliGRAM(s) IV Intermittent every 12 hours    MEDICATIONS  (PRN):  acetaminophen   Tablet .. 650 milliGRAM(s) Oral every 6 hours PRN Temp greater or equal to 38C (100.4F), Mild Pain (1 - 3)  albuterol/ipratropium for Nebulization 3 milliLiter(s) Nebulizer every 6 hours PRN Shortness of Breath and/or Wheezing  guaiFENesin   Syrup  (Sugar-Free) 200 milliGRAM(s) Oral every 6 hours PRN Cough  oxyCODONE    IR 5 milliGRAM(s) Oral every 4 hours PRN Severe Pain (7 - 10)  traMADol 25 milliGRAM(s) Oral every 6 hours PRN Moderate Pain (4 - 6)      Allergies    No Known Allergies    Intolerances        REVIEW OF SYSTEMS:    CONSTITUTIONAL: +weakness, fevers or chills  EYES/ENT: No visual changes;  No vertigo or throat pain   NECK: No pain or stiffness  RESPIRATORY: No cough, wheezing, hemoptysis; No shortness of breath  CARDIOVASCULAR: +occasional chest pain; No palpitations  GASTROINTESTINAL: No abdominal pain. No nausea, vomiting, or hematemesis; No diarrhea or constipation. No melena or hematochezia.  GENITOURINARY: No dysuria, frequency or hematuria  NEUROLOGICAL: No numbness; chronic LE weakness   SKIN: No itching or rash  All other review of systems is negative unless indicated above    VITAL SIGNS:   Vital Signs Last 24 Hrs  T(C): 36.7 (28 Feb 2020 14:33), Max: 37.1 (28 Feb 2020 12:20)  T(F): 98 (28 Feb 2020 14:33), Max: 98.7 (28 Feb 2020 12:20)  HR: 105 (28 Feb 2020 14:33) (93 - 109)  BP: 90/51 (28 Feb 2020 14:33) (90/51 - 110/67)  BP(mean): --  RR: 18 (28 Feb 2020 14:33) (17 - 18)  SpO2: 90% (28 Feb 2020 14:33) (90% - 95%)    I&O's Summary    27 Feb 2020 07:01  -  28 Feb 2020 07:00  --------------------------------------------------------  IN: 1370 mL / OUT: 1640 mL / NET: -270 mL    28 Feb 2020 07:01  -  28 Feb 2020 14:43  --------------------------------------------------------  IN: 180 mL / OUT: 0 mL / NET: 180 mL        On Exam:    Constitutional: NAD, alert and oriented x 3  Lungs:  Non-labored, breath sounds are clear bilaterally, No wheezing, rales or rhonchi  Cardiovascular: RRR.  S1 and S2 positive.  No murmurs, rubs, gallops or clicks  Gastrointestinal: Bowel Sounds present, soft, nontender.   Lymph: Trace peripheral edema mostly on feet. No cervical lymphadenopathy.  Neurological: Alert, no focal deficits  Skin: No obvious rash  Psych:  Mood & affect appropriate.    LABS: All Labs Reviewed:                        11.2   10.70 )-----------( 174      ( 27 Feb 2020 07:10 )             33.5                         12.0   13.18 )-----------( 159      ( 26 Feb 2020 08:57 )             35.2                         13.2   13.77 )-----------( 169      ( 25 Feb 2020 18:13 )             40.4     26 Feb 2020 08:57    137    |  104    |  20     ----------------------------<  165    3.6     |  25     |  1.00   25 Feb 2020 18:13    137    |  102    |  19     ----------------------------<  117    3.8     |  29     |  1.25     Ca    8.3        26 Feb 2020 08:57  Ca    8.6        25 Feb 2020 18:13  Phos  4.5       28 Feb 2020 07:38  Phos  2.6       26 Feb 2020 08:57  Mg     2.3       28 Feb 2020 07:38  Mg     2.3       26 Feb 2020 08:57    TPro  5.8    /  Alb  2.3    /  TBili  1.0    /  DBili  .40    /  AST  30     /  ALT  31     /  AlkPhos  60     27 Feb 2020 07:10  TPro  6.6    /  Alb  3.1    /  TBili  2.0    /  DBili  x      /  AST  16     /  ALT  24     /  AlkPhos  58     25 Feb 2020 18:13    PT/INR - ( 27 Feb 2020 07:10 )   PT: 19.3 sec;   INR: 1.70 ratio    PTT - ( 27 Feb 2020 07:10 )  PTT:29.4 sec    Blood Culture: Organism --  Gram Stain Blood -- Gram Stain   polymorphonuclear leukocytes  No organisms seen  by cytocentrifuge  Specimen Source .Body Fluid Pleural Fluid  Culture-Blood --    Organism --  Gram Stain Blood -- Gram Stain --  Specimen Source .Blood Blood-Peripheral  Culture-Blood --    Organism --  Gram Stain Blood -- Gram Stain --  Specimen Source .Urine Catheterized  Culture-Blood --    Organism --  Gram Stain Blood -- Gram Stain --  Specimen Source .Blood Blood-Peripheral  Culture-Blood --      02-28 @ 07:38  Pro Bnp [id number]  02-26 @ 08:57  Pro Bnp [id number]    02-26 @ 08:57  TSH: 1.80      RADIOLOGY:  CXR (2/27): IMPRESSION: Catheter left chest tube insertion. Persistent large left effusion possibly with loculation.    EKG (2/25): NSR, LAD, inferior infarct age undetermined. [facility] - Warren Posada, Dandre, Abelardo, Lucina, Kelvin Travis  Office Number: [phone]    Initial Consult Note    CHIEF COMPLAINT: Patient is a 89y old  Male who presents with a chief complaint of Left Empyema (28 Feb 2020 13:59)    HPI: 89 yo M with PMH of HLD, HTN, MALToma of the stomach (under surveillance), spinal stenosis with extensive lumbar surgery in 2011 and bilateral LE neuropathy was brought to [place] ED complaining of flank pain, fever and cough for 4 days. Patient transferred from [place] ER for admission to [place].  Daughter at the bedside states she took him to the ER for evaluation of back pain, chest discomfort. At [place], patient found to have fever, CT scan revealed left sided empyema and right ureteral stone, patient received ceftriaxone and zosyn. Dr. Maxwell pulmonary and Dr. Barnard urology were aware of transfer.     Patient was seen and examined at bedside for preop optimization with family present. Son (physician) reports patient is a poor historian and confirmed past medical history - deny hx of heart disease, CAD, MI, CVA. Patient initially reported having brief sharp L sided CP at least a couple times a week with exertion, which started sometime last fall. He is unclear on whether he discussed this with Dr Kirkland. However, when patient was questioned again in the presence of his son, he denied having any CP whatsoever. He ambulates with walker. He can walk about 10 feet/detention across a room before stopping to rest. He denies [facility], states he takes things slow.    He was last seen by his cardiologist 10/2019, at which point his cardiologist Dr Kirkland stated in his note that cardiac status was compensated at the time. Listed meds were: Atorvastatin 10, Gabapentin, Pantoprazole and medical marijuana.   Carotid dopplers in 2/2018 showed mild plaque in bilateral carotid bulb to ICA and ECA, no hemodynamically significant stenosis. Echo in 2014 showed mild MR/AR, atrial septal aneurysm, normal LV internal dimensions/wall thickness and systolic function, mild state I diastolic dysfunction.      PAST MEDICAL & SURGICAL HISTORY:  GI Bleed: 2007  Stomach Ulcer: H pylori 2007, treated with antibiotics  Osteoarthritis  Hyperlipidemia  Herniated Disc  Spinal Stenosis  Diverticulitis  GERD (Gastroesophageal Reflux Disease)  Chronic Lymphocytic Leukemia: followed by oncologist in [place]  S/P Tonsillectomy: childhood  Status Post Arthroscopy: right shoulder  History of Arthroscopy of Knee: bilateral      SOCIAL HISTORY:  Former smoker (smoked about 45 years, 1PPD). No ethanol or drug abuse.    FAMILY HISTORY: No family history of acute MI or sudden cardiac death.    MEDICATIONS  (STANDING):  atorvastatin 10 milliGRAM(s) Oral at bedtime  cyanocobalamin Injectable 1000 MICROGram(s) IntraMuscular every 24 hours  dextrose 5% + sodium chloride 0.45% with potassium chloride 20 mEq/L 1000 milliLiter(s) (30 mL/Hr) IV Continuous <Continuous>  famotidine    Tablet 20 milliGRAM(s) Oral two times a day  heparin  Injectable 5000 Unit(s) SubCutaneous every 12 hours  lactobacillus acidophilus 1 Tablet(s) Oral three times a day  piperacillin/tazobactam IVPB.. 3.375 Gram(s) IV Intermittent every 8 hours  tamsulosin 0.4 milliGRAM(s) Oral at bedtime  vancomycin  IVPB 1000 milliGRAM(s) IV Intermittent every 12 hours    MEDICATIONS  (PRN):  acetaminophen   Tablet .. 650 milliGRAM(s) Oral every 6 hours PRN Temp greater or equal to 38C (100.4F), Mild Pain (1 - 3)  albuterol/ipratropium for Nebulization 3 milliLiter(s) Nebulizer every 6 hours PRN Shortness of Breath and/or Wheezing  guaiFENesin   Syrup  (Sugar-Free) 200 milliGRAM(s) Oral every 6 hours PRN Cough  oxyCODONE    IR 5 milliGRAM(s) Oral every 4 hours PRN Severe Pain (7 - 10)  traMADol 25 milliGRAM(s) Oral every 6 hours PRN Moderate Pain (4 - 6)      Allergies    No Known Allergies    Intolerances        REVIEW OF SYSTEMS:    CONSTITUTIONAL: +weakness, fevers or chills  EYES/ENT: No visual changes;  No vertigo or throat pain   NECK: No pain or stiffness  RESPIRATORY: No cough, wheezing, hemoptysis; No shortness of breath  CARDIOVASCULAR: +occasional chest pain; No palpitations  GASTROINTESTINAL: No abdominal pain. No nausea, vomiting, or hematemesis; No diarrhea or constipation. No melena or hematochezia.  GENITOURINARY: No dysuria, frequency or hematuria  NEUROLOGICAL: No numbness; chronic LE weakness   SKIN: No itching or rash  All other review of systems is negative unless indicated above    VITAL SIGNS:   Vital Signs Last 24 Hrs  T(C): 36.7 (28 Feb 2020 14:33), Max: 37.1 (28 Feb 2020 12:20)  T(F): 98 (28 Feb 2020 14:33), Max: 98.7 (28 Feb 2020 12:20)  HR: 105 (28 Feb 2020 14:33) (93 - 109)  BP: 90/51 (28 Feb 2020 14:33) (90/51 - 110/67)  BP(mean): --  RR: 18 (28 Feb 2020 14:33) (17 - 18)  SpO2: 90% (28 Feb 2020 14:33) (90% - 95%)    I&O's Summary    27 Feb 2020 07:01  -  28 Feb 2020 07:00  --------------------------------------------------------  IN: 1370 mL / OUT: 1640 mL / NET: -270 mL    28 Feb 2020 07:01  -  28 Feb 2020 14:43  --------------------------------------------------------  IN: 180 mL / OUT: 0 mL / NET: 180 mL        On Exam:  Constitutional: NAD, alert and oriented x 3  Lungs: Non-labored, breath sounds are clear bilaterally, No wheezing, rales or rhonchi  Cardiovascular: RRR.  S1 and S2 positive.  No murmurs, rubs, gallops or clicks  Gastrointestinal: Bowel Sounds present, soft, nontender.   Lymph: Trace peripheral edema mostly on feet. No cervical lymphadenopathy.  Neurological: Alert, no focal deficits  Skin: No obvious rash  Psych: Mood & affect appropriate.    LABS: All Labs Reviewed:                        11.2   10.70 )-----------( 174      ( 27 Feb 2020 07:10 )             33.5                         12.0   13.18 )-----------( 159      ( 26 Feb 2020 08:57 )             35.2                         13.2   13.77 )-----------( 169      ( 25 Feb 2020 18:13 )             40.4     26 Feb 2020 08:57    137    |  104    |  20     ----------------------------<  165    3.6     |  25     |  1.00   25 Feb 2020 18:13    137    |  102    |  19     ----------------------------<  117    3.8     |  29     |  1.25     Ca    8.3        26 Feb 2020 08:57  Ca    8.6        25 Feb 2020 18:13  Phos  4.5       28 Feb 2020 07:38  Phos  2.6       26 Feb 2020 08:57  Mg     2.3       28 Feb 2020 07:38  Mg     2.3       26 Feb 2020 08:57    TPro  5.8    /  Alb  2.3    /  TBili  1.0    /  DBili  .40    /  AST  30     /  ALT  31     /  AlkPhos  60     27 Feb 2020 07:10  TPro  6.6    /  Alb  3.1    /  TBili  2.0    /  DBili  x      /  AST  16     /  ALT  24     /  AlkPhos  58     25 Feb 2020 18:13    PT/INR - ( 27 Feb 2020 07:10 )   PT: 19.3 sec;   INR: 1.70 ratio    PTT - ( 27 Feb 2020 07:10 )  PTT:29.4 sec    Blood Culture: Organism --  Gram Stain Blood -- Gram Stain   polymorphonuclear leukocytes  No organisms seen  by cytocentrifuge  Specimen Source .Body Fluid Pleural Fluid  Culture-Blood --    Organism --  Gram Stain Blood -- Gram Stain --  Specimen Source .Blood Blood-Peripheral  Culture-Blood --    Organism --  Gram Stain Blood -- Gram Stain --  Specimen Source .Urine Catheterized  Culture-Blood --    Organism --  Gram Stain Blood -- Gram Stain --  Specimen Source .Blood Blood-Peripheral  Culture-Blood --      02-28 @ 07:38  Pro Bnp [id number]  02-26 @ 08:57  Pro Bnp [id number]    02-26 @ 08:57  TSH: 1.80      RADIOLOGY:  CXR (2/27): IMPRESSION: Catheter left chest tube insertion. Persistent large left effusion possibly with loculation.    EKG (2/25): NSR, LAD, inferior infarct age undetermined.

## 2020-02-28 NOTE — PROGRESS NOTE ADULT - SUBJECTIVE AND OBJECTIVE BOX
CHARLY LU is a 89yMale , patient examined and chart reviewed.     INTERVAL HPI/ OVERNIGHT EVENTS:   Awake. Sp IR drainage of left empyema with pigtail placement yesterday.  Son at bedside.    PAST MEDICAL & SURGICAL HISTORY:  GI Bleed: 2007  Stomach Ulcer: H pylori 2007, treated with antibiotics  Osteoarthritis  Hyperlipidemia  Herniated Disc  Spinal Stenosis  Diverticulitis  GERD (Gastroesophageal Reflux Disease)  Chronic Lymphocytic Leukemia: followed by oncologist in [place]  S/P Tonsillectomy: childhood  Status Post Arthroscopy: right shoulder  History of Arthroscopy of Knee: bilateral    For details regarding the patient's social history, family history, and other miscellaneous elements, please refer the initial infectious diseases consultation and/or the admitting history and physical examination for this admission.    ROS:  CONSTITUTIONAL:  Negative fever or chills  EYES:  Negative  blurry vision or double vision  CARDIOVASCULAR:  Negative for chest pain or palpitations  RESPIRATORY:  Negative for cough, wheezing, or SOB   GASTROINTESTINAL:  Negative for nausea, vomiting, diarrhea, constipation, or abdominal pain  GENITOURINARY:  Negative frequency, urgency or dysuria  NEUROLOGIC:  No headache, confusion, dizziness, lightheadedness  All other systems were reviewed and are negative         Current inpatient medications :    ANTIBIOTICS/RELEVANT:  lactobacillus acidophilus 1 Tablet(s) Oral three times a day  piperacillin/tazobactam IVPB.. 3.375 Gram(s) IV Intermittent every 8 hours  vancomycin  IVPB 1000 milliGRAM(s) IV Intermittent every 12 hours      acetaminophen   Tablet .. 650 milliGRAM(s) Oral every 6 hours PRN  albuterol/ipratropium for Nebulization 3 milliLiter(s) Nebulizer every 6 hours PRN  atorvastatin 10 milliGRAM(s) Oral at bedtime  cyanocobalamin Injectable 1000 MICROGram(s) IntraMuscular every 24 hours  dextrose 5% + sodium chloride 0.45% with potassium chloride 20 mEq/L 1000 milliLiter(s) IV Continuous <Continuous>  famotidine    Tablet 20 milliGRAM(s) Oral two times a day  guaiFENesin   Syrup  (Sugar-Free) 200 milliGRAM(s) Oral every 6 hours PRN  heparin  Injectable 5000 Unit(s) SubCutaneous every 12 hours  oxyCODONE    IR 5 milliGRAM(s) Oral every 4 hours PRN  tamsulosin 0.4 milliGRAM(s) Oral at bedtime  traMADol 25 milliGRAM(s) Oral every 6 hours PRN      Objective:    02-27 @ 07:01  -  02-28 @ 07:00  --------------------------------------------------------  IN: 1370 mL / OUT: 1640 mL / NET: -270 mL    02-28 @ 07:01  -  02-28 @ 14:46  --------------------------------------------------------  IN: 180 mL / OUT: 0 mL / NET: 180 mL      T(C): 36.7 (02-28-20 @ 14:33), Max: 37.1 (02-28-20 @ 12:20)  HR: 105 (02-28-20 @ 14:33) (93 - 109)  BP: 90/51 (02-28-20 @ 14:33) (90/51 - 110/67)  RR: 18 (02-28-20 @ 14:33) (17 - 18)  SpO2: 90% (02-28-20 @ 14:33) (90% - 95%)      Physical Exam:  General:  no acute distress  Eyes: sclera anicteric, pupils equal and reactive to light  ENMT: buccal mucosa moist, pharynx not injected  Neck: supple, trachea midline  Lungs: decreased no wheeze/rhonchi Left lung pigtail  Cardiovascular: regular rate and rhythm, S1 S2  Abdomen: soft, nontender, no organomegaly present, bowel sounds normal  Neurological: alert and oriented x3, Cranial Nerves II-XII grossly intact  Skin: no increased ecchymosis/petechiae/purpura  Lymph Nodes: no palpable cervical/supraclavicular lymph nodes enlargements  Extremities: no cyanosis/clubbing/edema    LABS:             11.2   10.70 )-----------( 174      ( 27 Feb 2020 07:10 )             33.5         Phos  4.5     02-28  Mg     2.3     02-28    TPro  5.8<L>  /  Alb  2.3<L>  /  TBili  1.0  /  DBili  .40<H>  /  AST  30  /  ALT  31  /  AlkPhos  60  02-27      PT/INR - ( 27 Feb 2020 07:10 )   PT: 19.3 sec;   INR: 1.70 ratio         PTT - ( 27 Feb 2020 07:10 )  PTT:29.4 sec    Vancomycin Level, Trough: 16.1 ug/mL (02-27 @ 17:35)    MICROBIOLOGY:    Culture - Fungal, Body Fluid (collected 27 Feb 2020 21:38)  Source: .Body Fluid Pleural Fluid  Preliminary Report (28 Feb 2020 06:36):    Testing in progress    Culture - Body Fluid with Gram Stain (collected 27 Feb 2020 21:38)  Source: .Body Fluid Pleural Fluid  Gram Stain (27 Feb 2020 23:27):    polymorphonuclear leukocytes    No organisms seen    by cytocentrifuge    Culture - Blood (collected 26 Feb 2020 22:29)  Source: .Blood Blood-Peripheral  Preliminary Report (27 Feb 2020 23:01):    No growth to date.    Culture - Blood (collected 26 Feb 2020 22:29)  Source: .Blood Blood-Peripheral  Preliminary Report (27 Feb 2020 23:01):    No growth to date.    Culture - Urine (collected 26 Feb 2020 11:46)  Source: .Urine Catheterized  Final Report (27 Feb 2020 15:05):    50,000 - 99,000 CFU/mL Coag Negative Staphylococcus    Susceptibilites not performed.    Culture - Blood (collected 25 Feb 2020 22:38)  Source: .Blood Blood-Peripheral  Preliminary Report (26 Feb 2020 23:01):    No growth to date.    Culture - Blood (collected 25 Feb 2020 22:38)  Source: .Blood Blood-Peripheral  Preliminary Report (26 Feb 2020 23:01):    No growth to date.    Legionella pneumophila Antigen, Urine (02.27.20 @ 02:53)    Legionella Antigen, Urine: Negative    RADIOLOGY & ADDITIONAL STUDIES:  EXAM:  CT RENAL STONE HUNT                          EXAM:  CT CHEST                                  PROCEDURE DATE:  02/25/2020          INTERPRETATION:  CLINICAL HISTORY:  Fever, left back and flank pain. History of gastric lymphoma.    Multiple axial images of the chest, abdomen and pelvis were obtained from the lung apices through symphysis pubis without the administration of oral or intravascular contrast limiting the sensitivity of evaluation. Reformatted coronal and sagittal images are submitted.    COMPARISON: PET/CT evaluation 6/19/2019.    FINDINGS: New moderately sized left pleural effusion is identified with portions that are loculated; air attenuation within the effusion as well as within the left pleural space suggests the presence of left-sided empyema. Consolidation is identified within the lingular segment as well as left lower lobe lung parenchyma; underlying neoplasm cannot be excluded. Hazy opacity within the left upper lobe is likely related to atelectasis. Subpleural opacity within the posterior right lower lobe and right middle lobe likely related to subpleural fibrosis. There is no evidence for right-sided pleural effusion or air.    No abnormally enlarged mediastinal or hilar lymph nodes are noted. There is no evidence for axillary lymphadenopathy. The heart size appears within normal limits. No pericardial effusion is identified. Thoracic aortic caliber demonstrates unremarkable caliber. Coronary arterial calcifications identified.    The central bronchial anatomy appears patent.    No mediastinal shift is noted.    The liver is normal in size. No focal hepatic masses are identified. There is no evidence for intrahepatic or extrahepatic biliary dilatation. A large gallstone is noted. No gallbladder wall thickening or pericholecystic fluid identified.    The spleen, pancreas and adrenal glands are unremarkable.    There is no evidence for hydronephrosis. No right renal calculi are identified. An 8 mm calculus is identified within the left-sided extrarenal pelvis, unchanged. There is no change in a 6 mm calculus identified within the distal right ureter at the level of the UVJ, without evidence for obstructive uropathy. A 1.3 cm right renal cyst is noted. The abdominal aorta is normal in course and caliber. No abnormally enlarged retroperitoneal or pelvic lymphadenopathy is noted.    Gastric wall thickening is identified allowing for nondistention with oral contrast; clinical correlation is suggested in light of the provided clinical history of gastric lymphoma.    Fecal material is scattered throughout the colon. There is no evidence for mechanical bowel obstruction. Colonic diverticulosis is noted. No colonic wall thickening is identified. There is no evidence for acute appendicitis.There is no evidence for free intraperitoneal air or fluid.    The prostate is enlarged and the urinary bladder appear unremarkable.     Postsurgical changes of the lumbar spine noted. Postprocedural changes of the right shoulder noted. Degenerative changes of the spine evident.    IMPRESSION:    1. New moderately sized left pleural effusion is identified with portions that are loculated; air attenuation within the effusion as well as within the left pleural space suggests the presence of left-sided empyema. Consolidation is identified within the lingular segment as well as left lower lobe lung parenchyma; underlying neoplasm cannot be excluded.   2. Gastric wall thickening is identified allowing for nondistention with oral contrast; clinicalcorrelation is suggested in light of the provided clinical history of gastric lymphoma.  3. An 8 mm calculus is identified within the left-sided extrarenal pelvis, unchanged. There is no change in a 6 mm calculus identified within the distal right ureter at the level of the UVJ, without evidence for obstructive uropathy. No evidence for bilateral hydronephrosis.    Assessment :   CHARLY LU is a 89y Male PMH CLL, gastric lymphoma ex smoker, hx of asbestos exp presenting to the hospital with cough, fever and left sided pleuritic chest pain. Febrile to 102 found to have left sided empyema. Seen by pulm and CT surgery. Sp IR drainage with pigtail placement yesterday.    Plan :  Cont Zosyn  Dc Vancomycin  Fu cultures  Trend temps and cbc  For possible VATs and decortication per CT surgery  Fu  pneumococcal antigen    D/w Dr Elmira Wagner to cover over weekend.    Continue with present regiment.  Appropriate use of antibiotics and adverse effects reviewed.      I have discussed the above plan of care with patient/ family in detail. They expressed understanding of the  treatment plan . Risks, benefits and alternatives discussed in detail. I have asked if they have any questions or concerns and appropriately addressed them to the best of my ability .    > 35 minutes were spent in direct patient care reviewing notes, medications ,labs data/ imaging , discussion with multidisciplinary team.    Thank you for allowing me to participate in care of your patient .    Jose Kiran MD  Infectious Disease  [phone]

## 2020-02-28 NOTE — PHYSICAL THERAPY INITIAL EVALUATION ADULT - ADDITIONAL COMMENTS
pt lives in a house w/ no stairs to negotiate.  Pt lives w/ wife who has a 24/7 HHA, however does assist pt as needed as well.  Pt is a community ambulator.

## 2020-02-29 LAB
ALBUMIN SERPL ELPH-MCNC: 2.3 G/DL — LOW (ref 3.3–5)
ALP SERPL-CCNC: 111 U/L — SIGNIFICANT CHANGE UP (ref 40–120)
ALT FLD-CCNC: 92 U/L — HIGH (ref 12–78)
ANION GAP SERPL CALC-SCNC: 10 MMOL/L — SIGNIFICANT CHANGE UP (ref 5–17)
APPEARANCE UR: ABNORMAL
APTT BLD: 29.2 SEC — SIGNIFICANT CHANGE UP (ref 28.5–37)
AST SERPL-CCNC: 79 U/L — HIGH (ref 15–37)
BASOPHILS # BLD AUTO: 0.02 K/UL — SIGNIFICANT CHANGE UP (ref 0–0.2)
BASOPHILS NFR BLD AUTO: 0.3 % — SIGNIFICANT CHANGE UP (ref 0–2)
BILIRUB DIRECT SERPL-MCNC: 1.2 MG/DL — HIGH (ref 0.05–0.2)
BILIRUB INDIRECT FLD-MCNC: 0.6 MG/DL — SIGNIFICANT CHANGE UP (ref 0.2–1)
BILIRUB SERPL-MCNC: 1.8 MG/DL — HIGH (ref 0.2–1.2)
BILIRUB UR-MCNC: NEGATIVE — SIGNIFICANT CHANGE UP
BUN SERPL-MCNC: 47 MG/DL — HIGH (ref 7–23)
CALCIUM SERPL-MCNC: 8 MG/DL — LOW (ref 8.5–10.1)
CHLORIDE SERPL-SCNC: 100 MMOL/L — SIGNIFICANT CHANGE UP (ref 96–108)
CO2 SERPL-SCNC: 23 MMOL/L — SIGNIFICANT CHANGE UP (ref 22–31)
COLOR SPEC: YELLOW — SIGNIFICANT CHANGE UP
CREAT ?TM UR-MCNC: 98 MG/DL — SIGNIFICANT CHANGE UP
CREAT SERPL-MCNC: 4.5 MG/DL — HIGH (ref 0.5–1.3)
DIFF PNL FLD: ABNORMAL
EOSINOPHIL # BLD AUTO: 0.07 K/UL — SIGNIFICANT CHANGE UP (ref 0–0.5)
EOSINOPHIL NFR BLD AUTO: 0.9 % — SIGNIFICANT CHANGE UP (ref 0–6)
GLUCOSE SERPL-MCNC: 161 MG/DL — HIGH (ref 70–99)
GLUCOSE UR QL: NEGATIVE — SIGNIFICANT CHANGE UP
HCT VFR BLD CALC: 29.4 % — LOW (ref 39–50)
HGB BLD-MCNC: 9.9 G/DL — LOW (ref 13–17)
IMM GRANULOCYTES NFR BLD AUTO: 0.5 % — SIGNIFICANT CHANGE UP (ref 0–1.5)
INR BLD: 1.57 RATIO — HIGH (ref 0.88–1.16)
KETONES UR-MCNC: NEGATIVE — SIGNIFICANT CHANGE UP
LEUKOCYTE ESTERASE UR-ACNC: ABNORMAL
LYMPHOCYTES # BLD AUTO: 1.07 K/UL — SIGNIFICANT CHANGE UP (ref 1–3.3)
LYMPHOCYTES # BLD AUTO: 13.5 % — SIGNIFICANT CHANGE UP (ref 13–44)
MAGNESIUM SERPL-MCNC: 2.5 MG/DL — SIGNIFICANT CHANGE UP (ref 1.6–2.6)
MCHC RBC-ENTMCNC: 31.1 PG — SIGNIFICANT CHANGE UP (ref 27–34)
MCHC RBC-ENTMCNC: 33.7 GM/DL — SIGNIFICANT CHANGE UP (ref 32–36)
MCV RBC AUTO: 92.5 FL — SIGNIFICANT CHANGE UP (ref 80–100)
MONOCYTES # BLD AUTO: 0.86 K/UL — SIGNIFICANT CHANGE UP (ref 0–0.9)
MONOCYTES NFR BLD AUTO: 10.9 % — SIGNIFICANT CHANGE UP (ref 2–14)
NEUTROPHILS # BLD AUTO: 5.85 K/UL — SIGNIFICANT CHANGE UP (ref 1.8–7.4)
NEUTROPHILS NFR BLD AUTO: 73.9 % — SIGNIFICANT CHANGE UP (ref 43–77)
NITRITE UR-MCNC: NEGATIVE — SIGNIFICANT CHANGE UP
NRBC # BLD: 0 /100 WBCS — SIGNIFICANT CHANGE UP (ref 0–0)
PH UR: 5 — SIGNIFICANT CHANGE UP (ref 5–8)
PHOSPHATE SERPL-MCNC: 5.5 MG/DL — HIGH (ref 2.5–4.5)
PLATELET # BLD AUTO: 191 K/UL — SIGNIFICANT CHANGE UP (ref 150–400)
POTASSIUM SERPL-MCNC: 4.3 MMOL/L — SIGNIFICANT CHANGE UP (ref 3.5–5.3)
POTASSIUM SERPL-SCNC: 4.3 MMOL/L — SIGNIFICANT CHANGE UP (ref 3.5–5.3)
POTASSIUM UR-SCNC: 31.1 MMOL/L — SIGNIFICANT CHANGE UP
PROT ?TM UR-MCNC: 60 MG/DL — HIGH (ref 0–12)
PROT SERPL-MCNC: 5.9 G/DL — LOW (ref 6–8.3)
PROT UR-MCNC: 30 MG/DL
PROTHROM AB SERPL-ACNC: 17.8 SEC — HIGH (ref 10–12.9)
RBC # BLD: 3.18 M/UL — LOW (ref 4.2–5.8)
RBC # FLD: 13 % — SIGNIFICANT CHANGE UP (ref 10.3–14.5)
SODIUM SERPL-SCNC: 133 MMOL/L — LOW (ref 135–145)
SODIUM UR-SCNC: 36 MMOL/L — SIGNIFICANT CHANGE UP
SP GR SPEC: 1.01 — SIGNIFICANT CHANGE UP (ref 1.01–1.02)
UROBILINOGEN FLD QL: NEGATIVE — SIGNIFICANT CHANGE UP
VANCOMYCIN TROUGH SERPL-MCNC: 23.1 UG/ML — HIGH (ref 10–20)
WBC # BLD: 7.91 K/UL — SIGNIFICANT CHANGE UP (ref 3.8–10.5)
WBC # FLD AUTO: 7.91 K/UL — SIGNIFICANT CHANGE UP (ref 3.8–10.5)

## 2020-02-29 PROCEDURE — 99232 SBSQ HOSP IP/OBS MODERATE 35: CPT

## 2020-02-29 PROCEDURE — 99223 1ST HOSP IP/OBS HIGH 75: CPT

## 2020-02-29 RX ORDER — CHLORHEXIDINE GLUCONATE 213 G/1000ML
1 SOLUTION TOPICAL
Refills: 0 | Status: DISCONTINUED | OUTPATIENT
Start: 2020-02-29 | End: 2020-03-13

## 2020-02-29 RX ORDER — HYDROMORPHONE HYDROCHLORIDE 2 MG/ML
0.5 INJECTION INTRAMUSCULAR; INTRAVENOUS; SUBCUTANEOUS EVERY 4 HOURS
Refills: 0 | Status: DISCONTINUED | OUTPATIENT
Start: 2020-02-29 | End: 2020-03-04

## 2020-02-29 RX ORDER — FAMOTIDINE 10 MG/ML
20 INJECTION INTRAVENOUS DAILY
Refills: 0 | Status: DISCONTINUED | OUTPATIENT
Start: 2020-02-29 | End: 2020-03-17

## 2020-02-29 RX ORDER — HYDROMORPHONE HYDROCHLORIDE 2 MG/ML
0.5 INJECTION INTRAMUSCULAR; INTRAVENOUS; SUBCUTANEOUS ONCE
Refills: 0 | Status: DISCONTINUED | OUTPATIENT
Start: 2020-02-29 | End: 2020-02-29

## 2020-02-29 RX ORDER — SODIUM CHLORIDE 9 MG/ML
500 INJECTION, SOLUTION INTRAVENOUS
Refills: 0 | Status: DISCONTINUED | OUTPATIENT
Start: 2020-02-29 | End: 2020-03-01

## 2020-02-29 RX ORDER — HYDROMORPHONE HYDROCHLORIDE 2 MG/ML
0.5 INJECTION INTRAMUSCULAR; INTRAVENOUS; SUBCUTANEOUS EVERY 4 HOURS
Refills: 0 | Status: DISCONTINUED | OUTPATIENT
Start: 2020-02-29 | End: 2020-02-29

## 2020-02-29 RX ORDER — PIPERACILLIN AND TAZOBACTAM 4; .5 G/20ML; G/20ML
3.38 INJECTION, POWDER, LYOPHILIZED, FOR SOLUTION INTRAVENOUS EVERY 12 HOURS
Refills: 0 | Status: COMPLETED | OUTPATIENT
Start: 2020-02-29 | End: 2020-03-06

## 2020-02-29 RX ORDER — HYDROMORPHONE HYDROCHLORIDE 2 MG/ML
1 INJECTION INTRAMUSCULAR; INTRAVENOUS; SUBCUTANEOUS EVERY 4 HOURS
Refills: 0 | Status: DISCONTINUED | OUTPATIENT
Start: 2020-02-29 | End: 2020-03-04

## 2020-02-29 RX ORDER — DEXMEDETOMIDINE HYDROCHLORIDE IN 0.9% SODIUM CHLORIDE 4 UG/ML
0.3 INJECTION INTRAVENOUS
Qty: 200 | Refills: 0 | Status: DISCONTINUED | OUTPATIENT
Start: 2020-02-29 | End: 2020-03-01

## 2020-02-29 RX ORDER — ALTEPLASE 100 MG
10 KIT INTRAVENOUS EVERY 12 HOURS
Refills: 0 | Status: COMPLETED | OUTPATIENT
Start: 2020-02-29 | End: 2020-03-03

## 2020-02-29 RX ORDER — IPRATROPIUM/ALBUTEROL SULFATE 18-103MCG
3 AEROSOL WITH ADAPTER (GRAM) INHALATION ONCE
Refills: 0 | Status: COMPLETED | OUTPATIENT
Start: 2020-02-29 | End: 2020-02-29

## 2020-02-29 RX ORDER — OXYCODONE AND ACETAMINOPHEN 5; 325 MG/1; MG/1
1 TABLET ORAL EVERY 4 HOURS
Refills: 0 | Status: DISCONTINUED | OUTPATIENT
Start: 2020-02-29 | End: 2020-02-29

## 2020-02-29 RX ORDER — DORNASE ALFA 1 MG/ML
5 SOLUTION RESPIRATORY (INHALATION) EVERY 12 HOURS
Refills: 0 | Status: COMPLETED | OUTPATIENT
Start: 2020-02-29 | End: 2020-03-03

## 2020-02-29 RX ADMIN — HYDROMORPHONE HYDROCHLORIDE 0.5 MILLIGRAM(S): 2 INJECTION INTRAMUSCULAR; INTRAVENOUS; SUBCUTANEOUS at 16:50

## 2020-02-29 RX ADMIN — FAMOTIDINE 20 MILLIGRAM(S): 10 INJECTION INTRAVENOUS at 04:45

## 2020-02-29 RX ADMIN — HEPARIN SODIUM 5000 UNIT(S): 5000 INJECTION INTRAVENOUS; SUBCUTANEOUS at 14:16

## 2020-02-29 RX ADMIN — HYDROMORPHONE HYDROCHLORIDE 0.5 MILLIGRAM(S): 2 INJECTION INTRAMUSCULAR; INTRAVENOUS; SUBCUTANEOUS at 17:05

## 2020-02-29 RX ADMIN — SODIUM CHLORIDE 500 MILLILITER(S): 9 INJECTION, SOLUTION INTRAVENOUS at 18:22

## 2020-02-29 RX ADMIN — DORNASE ALFA 5 MILLIGRAM(S): 1 SOLUTION RESPIRATORY (INHALATION) at 14:03

## 2020-02-29 RX ADMIN — OXYCODONE HYDROCHLORIDE 5 MILLIGRAM(S): 5 TABLET ORAL at 14:49

## 2020-02-29 RX ADMIN — OXYCODONE HYDROCHLORIDE 5 MILLIGRAM(S): 5 TABLET ORAL at 04:45

## 2020-02-29 RX ADMIN — HYDROMORPHONE HYDROCHLORIDE 0.5 MILLIGRAM(S): 2 INJECTION INTRAMUSCULAR; INTRAVENOUS; SUBCUTANEOUS at 15:11

## 2020-02-29 RX ADMIN — PIPERACILLIN AND TAZOBACTAM 25 GRAM(S): 4; .5 INJECTION, POWDER, LYOPHILIZED, FOR SOLUTION INTRAVENOUS at 14:15

## 2020-02-29 RX ADMIN — HYDROMORPHONE HYDROCHLORIDE 1 MILLIGRAM(S): 2 INJECTION INTRAMUSCULAR; INTRAVENOUS; SUBCUTANEOUS at 17:47

## 2020-02-29 RX ADMIN — HYDROMORPHONE HYDROCHLORIDE 0.5 MILLIGRAM(S): 2 INJECTION INTRAMUSCULAR; INTRAVENOUS; SUBCUTANEOUS at 15:30

## 2020-02-29 RX ADMIN — CHLORHEXIDINE GLUCONATE 1 APPLICATION(S): 213 SOLUTION TOPICAL at 12:26

## 2020-02-29 RX ADMIN — Medication 1 TABLET(S): at 14:16

## 2020-02-29 RX ADMIN — ALTEPLASE 10 MILLIGRAM(S): KIT at 13:45

## 2020-02-29 RX ADMIN — TAMSULOSIN HYDROCHLORIDE 0.4 MILLIGRAM(S): 0.4 CAPSULE ORAL at 22:13

## 2020-02-29 RX ADMIN — PIPERACILLIN AND TAZOBACTAM 25 GRAM(S): 4; .5 INJECTION, POWDER, LYOPHILIZED, FOR SOLUTION INTRAVENOUS at 04:45

## 2020-02-29 RX ADMIN — PREGABALIN 1000 MICROGRAM(S): 225 CAPSULE ORAL at 17:59

## 2020-02-29 RX ADMIN — HYDROMORPHONE HYDROCHLORIDE 1 MILLIGRAM(S): 2 INJECTION INTRAMUSCULAR; INTRAVENOUS; SUBCUTANEOUS at 17:27

## 2020-02-29 RX ADMIN — ATORVASTATIN CALCIUM 10 MILLIGRAM(S): 80 TABLET, FILM COATED ORAL at 22:13

## 2020-02-29 RX ADMIN — DEXMEDETOMIDINE HYDROCHLORIDE IN 0.9% SODIUM CHLORIDE 6.46 MICROGRAM(S)/KG/HR: 4 INJECTION INTRAVENOUS at 17:55

## 2020-02-29 RX ADMIN — Medication 1 TABLET(S): at 04:44

## 2020-02-29 RX ADMIN — HEPARIN SODIUM 5000 UNIT(S): 5000 INJECTION INTRAVENOUS; SUBCUTANEOUS at 04:45

## 2020-02-29 RX ADMIN — Medication 3 MILLILITER(S): at 20:20

## 2020-02-29 RX ADMIN — OXYCODONE HYDROCHLORIDE 5 MILLIGRAM(S): 5 TABLET ORAL at 15:40

## 2020-02-29 RX ADMIN — HEPARIN SODIUM 5000 UNIT(S): 5000 INJECTION INTRAVENOUS; SUBCUTANEOUS at 22:13

## 2020-02-29 RX ADMIN — Medication 1 TABLET(S): at 22:13

## 2020-02-29 RX ADMIN — OXYCODONE HYDROCHLORIDE 5 MILLIGRAM(S): 5 TABLET ORAL at 05:45

## 2020-02-29 RX ADMIN — Medication 2.5 MILLIGRAM(S): at 04:45

## 2020-02-29 NOTE — PROGRESS NOTE ADULT - ASSESSMENT
89 y/o man known to us from office, dx'd w gastric MALToma 2013, bone marrow and repeat gastric bx 2017 c/w Chronic Lymphocytic Leukemia/Small Lymphocytic Lymphoma, under serial monitoring w EGD and PETCT, has not required treatment. Also hx B12 deficiency.  Adm w left empyema, hx URI sx Jan 2020  Pt refused VATS, now transferred to ICU for tPA and DNAsa treatment    on admission also w prolonged INR, improved w Vitamin K  CBC w lower H/H likely due to acute illness. hemodynamically stable, can continue w serial monitoring  continue treatment of acute illness

## 2020-02-29 NOTE — CONSULT NOTE ADULT - SUBJECTIVE AND OBJECTIVE BOX
Patient is a 89 y/o male who presents with a chief complaint of Left Empyema (29 Feb 2020 13:45)    BRIEF HOSPITAL COURSE: 89 y/o M w/ a PMHx of HLD, GERD, osteoarthritis (on medical marijuana), spinal stenosis, gastric maltoma,    Events last 24 hours: ***    PAST MEDICAL & SURGICAL HISTORY:  GI Bleed: 2007  Stomach Ulcer: H pylori 2007, treated with antibiotics  Osteoarthritis  Hyperlipidemia  Herniated Disc  Spinal Stenosis  Diverticulitis  GERD (Gastroesophageal Reflux Disease)  Chronic Lymphocytic Leukemia: followed by oncologist in [place]  S/P Tonsillectomy: childhood  Status Post Arthroscopy: right shoulder  History of Arthroscopy of Knee: bilateral    Allergies    No Known Allergies    Intolerances      FAMILY HISTORY:  No pertinent family history in first degree relatives    SOCIAL HISTORY:     Review of Systems:  CONSTITUTIONAL: No fever, chills, or fatigue.  EYES: No eye pain, visual disturbances, or discharge.  ENMT:  No difficulty hearing, tinnitus, or vertigo. No sinus or throat pain.  NECK: No pain or stiffness.  RESPIRATORY: No shortness of breath, cough, or wheezing.  CARDIOVASCULAR: No chest pain, palpitations, dizziness, or leg swelling.  GASTROINTESTINAL: No abdominal or epigastric pain. No nausea, vomiting,  diarrhea, or constipation. No hematemesis, melena, or hematochezia.  GENITOURINARY: No dysuria, frequency, hematuria, or incontinence.  NEUROLOGICAL: No headaches, memory loss, loss of strength, numbness, or tremors.  SKIN: No itching, burning, rashes, or lesions.  MUSCULOSKELETAL: No joint pain or swelling; No muscle, back, or extremity pain.  PSYCHIATRIC: No depression, anxiety, mood swings, or difficulty sleeping.    Medications:  piperacillin/tazobactam IVPB.. 3.375 Gram(s) IV Intermittent every 8 hours    tamsulosin 0.4 milliGRAM(s) Oral at bedtime    albuterol/ipratropium for Nebulization 3 milliLiter(s) Nebulizer every 6 hours PRN  dornase brenda Solution for Pleural Effusion 5 milliGRAM(s) IntraPleural. every 12 hours  guaiFENesin   Syrup  (Sugar-Free) 200 milliGRAM(s) Oral every 6 hours PRN    acetaminophen   Tablet .. 650 milliGRAM(s) Oral every 6 hours PRN  dronabinol 2.5 milliGRAM(s) Oral two times a day  oxyCODONE    IR 5 milliGRAM(s) Oral every 4 hours PRN  traMADol 25 milliGRAM(s) Oral every 6 hours PRN      alteplase  Injectable for Pleural Effusion 10 milliGRAM(s) IntraPleural. every 12 hours  heparin  Injectable 5000 Unit(s) SubCutaneous every 8 hours    famotidine    Tablet 20 milliGRAM(s) Oral two times a day      atorvastatin 10 milliGRAM(s) Oral at bedtime    cyanocobalamin Injectable 1000 MICROGram(s) IntraMuscular every 24 hours  dextrose 5% + sodium chloride 0.45% with potassium chloride 20 mEq/L 1000 milliLiter(s) IV Continuous <Continuous>      chlorhexidine 2% Cloths 1 Application(s) Topical <User Schedule>    lactobacillus acidophilus 1 Tablet(s) Oral three times a day          ICU Vital Signs Last 24 Hrs  T(C): 37.1 (29 Feb 2020 11:21), Max: 37.4 (28 Feb 2020 15:39)  T(F): 98.7 (29 Feb 2020 11:21), Max: 99.3 (28 Feb 2020 15:39)  HR: 100 (29 Feb 2020 12:00) (94 - 106)  BP: 100/71 (29 Feb 2020 12:00) (90/51 - 120/74)  BP(mean): 82 (29 Feb 2020 12:00) (79 - 91)  ABP: --  ABP(mean): --  RR: 19 (29 Feb 2020 12:00) (18 - 27)  SpO2: 92% (29 Feb 2020 12:00) (90% - 97%)    Vital Signs Last 24 Hrs  T(C): 37.1 (29 Feb 2020 11:21), Max: 37.4 (28 Feb 2020 15:39)  T(F): 98.7 (29 Feb 2020 11:21), Max: 99.3 (28 Feb 2020 15:39)  HR: 100 (29 Feb 2020 12:00) (94 - 106)  BP: 100/71 (29 Feb 2020 12:00) (90/51 - 120/74)  BP(mean): 82 (29 Feb 2020 12:00) (79 - 91)  RR: 19 (29 Feb 2020 12:00) (18 - 27)  SpO2: 92% (29 Feb 2020 12:00) (90% - 97%)        I&O's Detail    28 Feb 2020 07:01  -  29 Feb 2020 07:00  --------------------------------------------------------  IN:    dextrose 5% + sodium chloride 0.45% with potassium chloride 20 mEq/L: 720 mL    Solution: 300 mL  Total IN: 1020 mL    OUT:    Chest Tube: 30 mL  Total OUT: 30 mL    Total NET: 990 mL      29 Feb 2020 07:01  -  29 Feb 2020 14:06  --------------------------------------------------------  IN:    dextrose 5% + sodium chloride 0.45% with potassium chloride 20 mEq/L: 120 mL    Oral Fluid: 120 mL  Total IN: 240 mL    OUT:  Total OUT: 0 mL    Total NET: 240 mL          LABS:                        9.9    7.91  )-----------( 191      ( 29 Feb 2020 10:47 )             29.4     02-29    133<L>  |  100  |  47<H>  ----------------------------<  161<H>  4.3   |  23  |  4.50<H>    Ca    8.0<L>      29 Feb 2020 10:47  Phos  5.5     02-29  Mg     2.5     02-29    TPro  5.9<L>  /  Alb  2.3<L>  /  TBili  1.8<H>  /  DBili  1.20<H>  /  AST  79<H>  /  ALT  92<H>  /  AlkPhos  111  02-29          CAPILLARY BLOOD GLUCOSE      POCT Blood Glucose.: 95 mg/dL (29 Feb 2020 12:33)    PT/INR - ( 29 Feb 2020 10:47 )   PT: 17.8 sec;   INR: 1.57 ratio         PTT - ( 29 Feb 2020 10:47 )  PTT:29.2 sec      CULTURES:  Culture Results:   No growth (02-27 @ 21:38)  Culture Results:   Testing in progress (02-27 @ 21:38)  Culture Results:   No growth to date. (02-26 @ 22:29)  Culture Results:   No growth to date. (02-26 @ 22:29)  Culture Results:   50,000 - 99,000 CFU/mL Coag Negative Staphylococcus  Susceptibilites not performed. (02-26 @ 11:46)  Culture Results:   No growth to date. (02-25 @ 22:38)  Culture Results:   No growth to date. (02-25 @ 22:38)      Physical Examination:    VITALS AT TIME OF EXAM:  HR:  BP:  RR:  SPO2:    General: Well appearing, lying in bed in NAD.    HEENT: Pupils equal, reactive to light. Symmetric. No scleral icterus or injection.    PULM: Clear to auscultation B/L. No wheezes, rales, or rhonchi appreciated. No significant sputum production or increased respiratory effort.    NECK: Supple, no lymphadenopathy, trachea midline.    CVS: Regular rate and rhythm, no murmurs appreciated, +s1/s2.    ABD: Soft, nondistended, nontender, normoactive bowel sounds.    EXT: No edema, nontender.    SKIN: Warm and well perfused, no rashes noted.    NEURO: Alert, oriented, interactive, nonfocal.    DEVICES:   LINES:  SOLIS:    POCUS:    RADIOLOGY: ***    CRITICAL CARE TIME SPENT: *** Patient is a 89 y/o male who presents with a chief complaint of Left Empyema (29 Feb 2020 13:45)    BRIEF HOSPITAL COURSE: 89 y/o M w/ a PMHx of HLD, GERD, osteoarthritis (on medical marijuana), spinal stenosis, gastric maltoma    Events last 24 hours: ***    PAST MEDICAL & SURGICAL HISTORY:  GI Bleed: 2007  Stomach Ulcer: H pylori 2007, treated with antibiotics  Osteoarthritis  Hyperlipidemia  Herniated Disc  Spinal Stenosis  Diverticulitis  GERD (Gastroesophageal Reflux Disease)  Chronic Lymphocytic Leukemia: followed by oncologist in [place]  S/P Tonsillectomy: childhood  Status Post Arthroscopy: right shoulder  History of Arthroscopy of Knee: bilateral    Allergies    No Known Allergies    Intolerances      FAMILY HISTORY:  No pertinent family history in first degree relatives    SOCIAL HISTORY:     Review of Systems:  CONSTITUTIONAL: No fever, chills, or fatigue.  EYES: No eye pain, visual disturbances, or discharge.  ENMT:  No difficulty hearing, tinnitus, or vertigo. No sinus or throat pain.  NECK: No pain or stiffness.  RESPIRATORY: No shortness of breath, cough, or wheezing.  CARDIOVASCULAR: No chest pain, palpitations, dizziness, or leg swelling.  GASTROINTESTINAL: No abdominal or epigastric pain. No nausea, vomiting,  diarrhea, or constipation. No hematemesis, melena, or hematochezia.  GENITOURINARY: No dysuria, frequency, hematuria, or incontinence.  NEUROLOGICAL: No headaches, memory loss, loss of strength, numbness, or tremors.  SKIN: No itching, burning, rashes, or lesions.  MUSCULOSKELETAL: No joint pain or swelling; No muscle, back, or extremity pain.  PSYCHIATRIC: No depression, anxiety, mood swings, or difficulty sleeping.    Medications:  piperacillin/tazobactam IVPB.. 3.375 Gram(s) IV Intermittent every 8 hours    tamsulosin 0.4 milliGRAM(s) Oral at bedtime    albuterol/ipratropium for Nebulization 3 milliLiter(s) Nebulizer every 6 hours PRN  dornase brenda Solution for Pleural Effusion 5 milliGRAM(s) IntraPleural. every 12 hours  guaiFENesin   Syrup  (Sugar-Free) 200 milliGRAM(s) Oral every 6 hours PRN    acetaminophen   Tablet .. 650 milliGRAM(s) Oral every 6 hours PRN  dronabinol 2.5 milliGRAM(s) Oral two times a day  oxyCODONE    IR 5 milliGRAM(s) Oral every 4 hours PRN  traMADol 25 milliGRAM(s) Oral every 6 hours PRN      alteplase  Injectable for Pleural Effusion 10 milliGRAM(s) IntraPleural. every 12 hours  heparin  Injectable 5000 Unit(s) SubCutaneous every 8 hours    famotidine    Tablet 20 milliGRAM(s) Oral two times a day      atorvastatin 10 milliGRAM(s) Oral at bedtime    cyanocobalamin Injectable 1000 MICROGram(s) IntraMuscular every 24 hours  dextrose 5% + sodium chloride 0.45% with potassium chloride 20 mEq/L 1000 milliLiter(s) IV Continuous <Continuous>      chlorhexidine 2% Cloths 1 Application(s) Topical <User Schedule>    lactobacillus acidophilus 1 Tablet(s) Oral three times a day          ICU Vital Signs Last 24 Hrs  T(C): 37.1 (29 Feb 2020 11:21), Max: 37.4 (28 Feb 2020 15:39)  T(F): 98.7 (29 Feb 2020 11:21), Max: 99.3 (28 Feb 2020 15:39)  HR: 100 (29 Feb 2020 12:00) (94 - 106)  BP: 100/71 (29 Feb 2020 12:00) (90/51 - 120/74)  BP(mean): 82 (29 Feb 2020 12:00) (79 - 91)  ABP: --  ABP(mean): --  RR: 19 (29 Feb 2020 12:00) (18 - 27)  SpO2: 92% (29 Feb 2020 12:00) (90% - 97%)    Vital Signs Last 24 Hrs  T(C): 37.1 (29 Feb 2020 11:21), Max: 37.4 (28 Feb 2020 15:39)  T(F): 98.7 (29 Feb 2020 11:21), Max: 99.3 (28 Feb 2020 15:39)  HR: 100 (29 Feb 2020 12:00) (94 - 106)  BP: 100/71 (29 Feb 2020 12:00) (90/51 - 120/74)  BP(mean): 82 (29 Feb 2020 12:00) (79 - 91)  RR: 19 (29 Feb 2020 12:00) (18 - 27)  SpO2: 92% (29 Feb 2020 12:00) (90% - 97%)        I&O's Detail    28 Feb 2020 07:01  -  29 Feb 2020 07:00  --------------------------------------------------------  IN:    dextrose 5% + sodium chloride 0.45% with potassium chloride 20 mEq/L: 720 mL    Solution: 300 mL  Total IN: 1020 mL    OUT:    Chest Tube: 30 mL  Total OUT: 30 mL    Total NET: 990 mL      29 Feb 2020 07:01  -  29 Feb 2020 14:06  --------------------------------------------------------  IN:    dextrose 5% + sodium chloride 0.45% with potassium chloride 20 mEq/L: 120 mL    Oral Fluid: 120 mL  Total IN: 240 mL    OUT:  Total OUT: 0 mL    Total NET: 240 mL          LABS:                        9.9    7.91  )-----------( 191      ( 29 Feb 2020 10:47 )             29.4     02-29    133<L>  |  100  |  47<H>  ----------------------------<  161<H>  4.3   |  23  |  4.50<H>    Ca    8.0<L>      29 Feb 2020 10:47  Phos  5.5     02-29  Mg     2.5     02-29    TPro  5.9<L>  /  Alb  2.3<L>  /  TBili  1.8<H>  /  DBili  1.20<H>  /  AST  79<H>  /  ALT  92<H>  /  AlkPhos  111  02-29          CAPILLARY BLOOD GLUCOSE      POCT Blood Glucose.: 95 mg/dL (29 Feb 2020 12:33)    PT/INR - ( 29 Feb 2020 10:47 )   PT: 17.8 sec;   INR: 1.57 ratio         PTT - ( 29 Feb 2020 10:47 )  PTT:29.2 sec      CULTURES:  Culture Results:   No growth (02-27 @ 21:38)  Culture Results:   Testing in progress (02-27 @ 21:38)  Culture Results:   No growth to date. (02-26 @ 22:29)  Culture Results:   No growth to date. (02-26 @ 22:29)  Culture Results:   50,000 - 99,000 CFU/mL Coag Negative Staphylococcus  Susceptibilites not performed. (02-26 @ 11:46)  Culture Results:   No growth to date. (02-25 @ 22:38)  Culture Results:   No growth to date. (02-25 @ 22:38)      Physical Examination:    VITALS AT TIME OF EXAM:  HR:  BP:  RR:  SPO2:    General: Well appearing, lying in bed in NAD.    HEENT: Pupils equal, reactive to light. Symmetric. No scleral icterus or injection.    PULM: Clear to auscultation B/L. No wheezes, rales, or rhonchi appreciated. No significant sputum production or increased respiratory effort.    NECK: Supple, no lymphadenopathy, trachea midline.    CVS: Regular rate and rhythm, no murmurs appreciated, +s1/s2.    ABD: Soft, nondistended, nontender, normoactive bowel sounds.    EXT: No edema, nontender.    SKIN: Warm and well perfused, no rashes noted.    NEURO: Alert, oriented, interactive, nonfocal.    DEVICES:   LINES:  SOLIS:    POCUS:    RADIOLOGY: ***    CRITICAL CARE TIME SPENT: *** Patient is a 89 y/o male who presents with a chief complaint of Left Empyema (29 Feb 2020 13:45)    BRIEF HOSPITAL COURSE: 89 y/o M w/ a PMHx of HLD, GERD, osteoarthritis (on medical marijuana), spinal stenosis, gastric maltoma who presented to [facility] ED on 2/25 c/o back pain and chest discomfort. CT chest performed at [facility] revealing of left-sided empyema; and an 8 mm left extrarenal pelvis stone and 6 mm right distal ureteral stone. Pt subsequently transferred to [facility] for further evaluation w/ both Dr. Maxwell (pulmonary) and Dr. Khan (urology) aware. Pt's daughter at the bedside states that when she arrived to pt's house on Monday, pt was in distress due to lower back pain. She gave pt NSAID and applied a heating pad to his back w/ successful resolution of pain. On Tuesday, pt w/ recurrent back pain and chest discomfort which prompted ED presentation. Pt evaluated by urology who endorse that renal stones were previously seen on PET-CT in 6/2019 and there is no signs of obstructive uropathy. Also state that "dirty" urine on UA is likely representative of chronic colonization/asymptomatic bacteriuria rather than infection. On 2/27, pt underwent left-sided thoracentesis w/ pig-tail placement by IR. Pt was evaluated by thoracic surgery who felt that pt would benefit from VATS, however, upon discussion w/ pt's family they had decided to be more conservative w/ management. Pt transferred to ICU today for tPA and dornase via pigtail.    PAST MEDICAL & SURGICAL HISTORY:  GI Bleed: 2007  Stomach Ulcer: H pylori 2007, treated with antibiotics  Osteoarthritis  Hyperlipidemia  Herniated Disc  Spinal Stenosis  Diverticulitis  GERD (Gastroesophageal Reflux Disease)  Chronic Lymphocytic Leukemia: followed by oncologist in [place]  S/P Tonsillectomy: childhood  Status Post Arthroscopy: right shoulder  History of Arthroscopy of Knee: bilateral    Allergies  No Known Allergies    Intolerances    FAMILY HISTORY:  No pertinent family history in first degree relatives    SOCIAL HISTORY:   Worked as an . Lives at home w/ wife, has home health aid 24/7. Hx of tobacco abuse, quit ~40 yrs ago. Denies hx of EtOH and illicit drug abuse.    Review of Systems:  CONSTITUTIONAL: No fever, chills, or fatigue.  EYES: No eye pain, visual disturbances, or discharge.  ENMT:  No difficulty hearing, tinnitus, or vertigo. No sinus or throat pain.  NECK: No pain or stiffness.  RESPIRATORY: No shortness of breath, cough, or wheezing.  CARDIOVASCULAR: No chest pain, palpitations, dizziness, or leg swelling.  GASTROINTESTINAL: No abdominal or epigastric pain. No nausea, vomiting,  diarrhea, or constipation. No hematemesis, melena, or hematochezia.  GENITOURINARY: No dysuria, frequency, hematuria, or incontinence.  NEUROLOGICAL: No headaches, memory loss, loss of strength, numbness, or tremors.  SKIN: No itching, burning, rashes, or lesions.  MUSCULOSKELETAL: No joint pain or swelling; No muscle, back, or extremity pain.  PSYCHIATRIC: No depression, anxiety, mood swings, or difficulty sleeping.    Medications:  piperacillin/tazobactam IVPB.. 3.375 Gram(s) IV Intermittent every 8 hours  tamsulosin 0.4 milliGRAM(s) Oral at bedtime  albuterol/ipratropium for Nebulization 3 milliLiter(s) Nebulizer every 6 hours PRN  dornase brenda Solution for Pleural Effusion 5 milliGRAM(s) IntraPleural. every 12 hours  guaiFENesin   Syrup  (Sugar-Free) 200 milliGRAM(s) Oral every 6 hours PRN  acetaminophen   Tablet .. 650 milliGRAM(s) Oral every 6 hours PRN  dronabinol 2.5 milliGRAM(s) Oral two times a day  oxyCODONE    IR 5 milliGRAM(s) Oral every 4 hours PRN  traMADol 25 milliGRAM(s) Oral every 6 hours PRN  alteplase  Injectable for Pleural Effusion 10 milliGRAM(s) IntraPleural. every 12 hours  heparin  Injectable 5000 Unit(s) SubCutaneous every 8 hours  famotidine    Tablet 20 milliGRAM(s) Oral two times a day  atorvastatin 10 milliGRAM(s) Oral at bedtime  cyanocobalamin Injectable 1000 MICROGram(s) IntraMuscular every 24 hours  dextrose 5% + sodium chloride 0.45% with potassium chloride 20 mEq/L 1000 milliLiter(s) IV Continuous <Continuous>  chlorhexidine 2% Cloths 1 Application(s) Topical <User Schedule>  lactobacillus acidophilus 1 Tablet(s) Oral three times a day    ICU Vital Signs Last 24 Hrs  T(C): 37.1 (29 Feb 2020 11:21), Max: 37.4 (28 Feb 2020 15:39)  T(F): 98.7 (29 Feb 2020 11:21), Max: 99.3 (28 Feb 2020 15:39)  HR: 100 (29 Feb 2020 12:00) (94 - 106)  BP: 100/71 (29 Feb 2020 12:00) (90/51 - 120/74)  BP(mean): 82 (29 Feb 2020 12:00) (79 - 91)  ABP: --  ABP(mean): --  RR: 19 (29 Feb 2020 12:00) (18 - 27)  SpO2: 92% (29 Feb 2020 12:00) (90% - 97%)    Vital Signs Last 24 Hrs  T(C): 37.1 (29 Feb 2020 11:21), Max: 37.4 (28 Feb 2020 15:39)  T(F): 98.7 (29 Feb 2020 11:21), Max: 99.3 (28 Feb 2020 15:39)  HR: 100 (29 Feb 2020 12:00) (94 - 106)  BP: 100/71 (29 Feb 2020 12:00) (90/51 - 120/74)  BP(mean): 82 (29 Feb 2020 12:00) (79 - 91)  RR: 19 (29 Feb 2020 12:00) (18 - 27)  SpO2: 92% (29 Feb 2020 12:00) (90% - 97%)    I&O's Detail    28 Feb 2020 07:01  -  29 Feb 2020 07:00  --------------------------------------------------------  IN:    dextrose 5% + sodium chloride 0.45% with potassium chloride 20 mEq/L: 720 mL    Solution: 300 mL  Total IN: 1020 mL    OUT:    Chest Tube: 30 mL  Total OUT: 30 mL    Total NET: 990 mL    29 Feb 2020 07:01  -  29 Feb 2020 14:06  --------------------------------------------------------  IN:    dextrose 5% + sodium chloride 0.45% with potassium chloride 20 mEq/L: 120 mL    Oral Fluid: 120 mL  Total IN: 240 mL    OUT:  Total OUT: 0 mL    Total NET: 240 mL    LABS:                        9.9    7.91  )-----------( 191      ( 29 Feb 2020 10:47 )             29.4     02-29    133<L>  |  100  |  47<H>  ----------------------------<  161<H>  4.3   |  23  |  4.50<H>    Ca    8.0<L>      29 Feb 2020 10:47  Phos  5.5     02-29  Mg     2.5     02-29    TPro  5.9<L>  /  Alb  2.3<L>  /  TBili  1.8<H>  /  DBili  1.20<H>  /  AST  79<H>  /  ALT  92<H>  /  AlkPhos  111  02-29    CAPILLARY BLOOD GLUCOSE    POCT Blood Glucose.: 95 mg/dL (29 Feb 2020 12:33)    PT/INR - ( 29 Feb 2020 10:47 )   PT: 17.8 sec;   INR: 1.57 ratio    PTT - ( 29 Feb 2020 10:47 )  PTT:29.2 sec    CULTURES:  Culture Results:   No growth (02-27 @ 21:38)  Culture Results:   Testing in progress (02-27 @ 21:38)  Culture Results:   No growth to date. (02-26 @ 22:29)  Culture Results:   No growth to date. (02-26 @ 22:29)  Culture Results:   50,000 - 99,000 CFU/mL Coag Negative Staphylococcus  Susceptibilites not performed. (02-26 @ 11:46)  Culture Results:   No growth to date. (02-25 @ 22:38)  Culture Results:   No growth to date. (02-25 @ 22:38)    Physical Examination:    General: Well appearing, lying in bed in NAD.    HEENT: Pupils equal, reactive to light. Symmetric. No scleral icterus or injection.    PULM: Clear to auscultation B/L. No wheezes, rales, or rhonchi appreciated. No significant sputum production or increased respiratory effort.    NECK: Supple, no lymphadenopathy, trachea midline.    CVS: Regular rate and rhythm, no murmurs appreciated, +s1/s2.    ABD: Soft, nondistended, nontender, normoactive bowel sounds.    EXT: No edema, nontender.    SKIN: Warm and well perfused, no rashes noted.    NEURO: Alert, oriented, interactive, nonfocal.    DEVICES: Left-sided pigtail  LINES: Peripheral  SOLIS: [place] cath    POCUS:    RADIOLOGY:  < from: Xray Chest 1 View- PORTABLE-Routine (02.27.20 @ 16:53) >  EXAM:  XR CHEST PORTABLE ROUTINE 1V                          PROCEDURE DATE:  02/27/2020      INTERPRETATION:  AP semierect chest on February 27, 2020 at 4:37 PM. Patient had left chest tube insertion.    Prior study of February 25 showed an extensive left effusion.    On present examination heart is difficult to assess.    A catheter left chest tube is been inserted at the base.    Large left effusion possibly loculated laterally is essentially unchanged. No pneumothorax is evident.    IMPRESSION: Catheter left chest tube insertion. Persistent large left effusion possibly with loculation.    EDWARD CARROLL M.D., ATTENDING RADIOLOGIST  This document has been electronically signed. Feb 27 2020  4:56PM    < end of copied text >    < from: CT Chest No Cont (02.25.20 @ 19:18) >  EXAM:  CT RENAL STONE [place]UNT                          EXAM:  CT CHEST                          PROCEDURE DATE:  02/25/2020      INTERPRETATION:  CLINICAL HISTORY:  Fever, left back and flank pain. History of gastric lymphoma.    Multiple axial images of the chest, abdomen and pelvis were obtained from the lung apices through symphysis pubis without the administration of oral or intravascular contrast limiting the sensitivity of evaluation. Reformatted coronal and sagittal images are submitted.    COMPARISON: PET/CT evaluation 6/19/2019.    FINDINGS: New moderately sized left pleural effusion is identified with portions that are loculated; air attenuation within the effusion as well as within the left pleural space suggests the presence of left-sided empyema. Consolidation is identified within the lingular segment as well as left lower lobe lung parenchyma; underlying neoplasm cannot be excluded. Hazy opacity within the left upper lobe is likely related to atelectasis. Subpleural opacity within the posterior right lower lobe and right middle lobe likely related to subpleural fibrosis. There is no evidence for right-sided pleural effusion or air.    No abnormally enlarged mediastinal or hilar lymph nodes are noted. There is no evidence for axillary lymphadenopathy. The heart size appears within normal limits. No pericardial effusion is identified. Thoracic aortic caliber demonstrates unremarkable caliber. Coronary arterial calcifications identified.    The central bronchial anatomy appears patent.    No mediastinal shift is noted.    The liver is normal in size. No focal hepatic masses are identified. There is no evidence for intrahepatic or extrahepatic biliary dilatation. A large gallstone is noted. No gallbladder wall thickening or pericholecystic fluid identified.    The spleen, pancreas and adrenal glands are unremarkable.    There is no evidence for hydronephrosis. No right renal calculi are identified. An 8 mm calculus is identified within the left-sided extrarenal pelvis, unchanged. There is no change in a 6 mm calculus identified within the distal right ureter at the level of the UVJ, without evidence for obstructive uropathy. A 1.3 cm right renal cyst is noted. The abdominal aorta is normal in course and caliber. No abnormally enlarged retroperitoneal or pelvic lymphadenopathy is noted.    Gastric wall thickening is identified allowing for nondistention with oral contrast; clinical correlation is suggested in light of the provided clinical history of gastric lymphoma.    Fecal material is scattered throughout the colon. There is no evidence for mechanical bowel obstruction. Colonic diverticulosis is noted. No colonic wall thickening is identified. There is no evidence for acute appendicitis.There is no evidence for free intraperitoneal air or fluid.    The prostate is enlarged and the urinary bladder appear unremarkable.     Postsurgical changes of the lumbar spine noted. Postprocedural changes of the right shoulder noted. Degenerative changes of the spine evident.    IMPRESSION:    1. New moderately sized left pleural effusion is identified with portions that are loculated; air attenuation within the effusion as well as within the left pleural space suggests the presence of left-sided empyema. Consolidation is identified within the lingular segment as well as left lower lobe lung parenchyma; underlying neoplasm cannot be excluded.   2. Gastric wall thickening is identified allowing for nondistention with oral contrast; clinicalcorrelation is suggested in light of the provided clinical history of gastric lymphoma.  3. An 8 mm calculus is identified within the left-sided extrarenal pelvis, unchanged. There is no change in a 6 mm calculus identified within the distal right ureter at the level of the UVJ, without evidence for obstructive uropathy. No evidence for bilateral hydronephrosis.    Preliminary results discussed with Dr. Galvan by telephone.    CASA BAUTISTA   This document has been electronically signed. 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 CT chest performed at [facility] [place] revealing of left-sided loculated pleural effusion concerning for empyema, and an 8 mm left extrarenal pelvis stone and 6 mm right distal ureteral stone. Pt subsequently transferred to [facility] for further evaluation. Pt's daughter at the bedside states that when she arrived to pt's house on Monday, pt was in distress due to lower back pain. She gave pt NSAID and applied a heating pad to his back w/ successful resolution of pain. On Tuesday, pt w/ recurrent back pain and chest discomfort which prompted ED presentation. Pt evaluated by urology who endorse that renal stones were previously seen on PET-CT in 6/2019 and there are no signs of obstructive uropathy. Also state that "dirty" urine on UA is likely representative of chronic colonization/asymptomatic bacteriuria rather than infection. On 2/27, pt underwent left-sided thoracentesis w/ pig-tail placement by IR. Pt was evaluated by thoracic surgery who felt that pt would benefit from VATS, however, upon discussion w/ pt's family they had decided to be more conservative w/ management. Pt transferred to ICU today for tPA and dornase via pigtail.    PAST MEDICAL & SURGICAL HISTORY:  GI Bleed: 2007  Stomach Ulcer: H pylori 2007, treated with antibiotics  Osteoarthritis  Hyperlipidemia  Herniated Disc  Spinal Stenosis  Diverticulitis  GERD (Gastroesophageal Reflux Disease)  Chronic Lymphocytic Leukemia: followed by oncologist in [place]  S/P Tonsillectomy: childhood  Status Post Arthroscopy: right shoulder  History of Arthroscopy of Knee: bilateral    Allergies  No Known Allergies    Intolerances    FAMILY HISTORY:  No pertinent family history in first degree relatives    SOCIAL HISTORY:   Worked as an . Lives at home w/ wife, has home health aid 24/7. Hx of tobacco abuse, quit ~40 yrs ago. Denies hx of EtOH and illicit drug abuse.    Review of Systems:  CONSTITUTIONAL: No fever, chills, or fatigue.  EYES: No eye pain, visual disturbances, or discharge.  ENMT:  No difficulty hearing, tinnitus, or vertigo. No sinus or throat pain.  NECK: No pain or stiffness.  RESPIRATORY: No shortness of breath, cough, or wheezing.  CARDIOVASCULAR: No chest pain, palpitations, dizziness, or leg swelling.  GASTROINTESTINAL: No abdominal or epigastric pain. No nausea, vomiting,  diarrhea, or constipation. No hematemesis, melena, or hematochezia.  GENITOURINARY: No dysuria, frequency, hematuria, or incontinence.  NEUROLOGICAL: No headaches, memory loss, loss of strength, numbness, or tremors.  SKIN: No itching, burning, rashes, or lesions.  MUSCULOSKELETAL: No joint pain or swelling; No muscle, back, or extremity pain.  PSYCHIATRIC: No depression, anxiety, mood swings, or difficulty sleeping.    Medications:  piperacillin/tazobactam IVPB.. 3.375 Gram(s) IV Intermittent every 8 hours  tamsulosin 0.4 milliGRAM(s) Oral at bedtime  albuterol/ipratropium for Nebulization 3 milliLiter(s) Nebulizer every 6 hours PRN  dornase brenda Solution for Pleural Effusion 5 milliGRAM(s) IntraPleural. every 12 hours  guaiFENesin   Syrup  (Sugar-Free) 200 milliGRAM(s) Oral every 6 hours PRN  acetaminophen   Tablet .. 650 milliGRAM(s) Oral every 6 hours PRN  dronabinol 2.5 milliGRAM(s) Oral two times a day  oxyCODONE    IR 5 milliGRAM(s) Oral every 4 hours PRN  traMADol 25 milliGRAM(s) Oral every 6 hours PRN  alteplase  Injectable for Pleural Effusion 10 milliGRAM(s) IntraPleural. every 12 hours  heparin  Injectable 5000 Unit(s) SubCutaneous every 8 hours  famotidine    Tablet 20 milliGRAM(s) Oral two times a day  atorvastatin 10 milliGRAM(s) Oral at bedtime  cyanocobalamin Injectable 1000 MICROGram(s) IntraMuscular every 24 hours  dextrose 5% + sodium chloride 0.45% with potassium chloride 20 mEq/L 1000 milliLiter(s) IV Continuous <Continuous>  chlorhexidine 2% Cloths 1 Application(s) Topical <User Schedule>  lactobacillus acidophilus 1 Tablet(s) Oral three times a day    ICU Vital Signs Last 24 Hrs  T(C): 37.1 (29 Feb 2020 11:21), Max: 37.4 (28 Feb 2020 15:39)  T(F): 98.7 (29 Feb 2020 11:21), Max: 99.3 (28 Feb 2020 15:39)  HR: 100 (29 Feb 2020 12:00) (94 - 106)  BP: 100/71 (29 Feb 2020 12:00) (90/51 - 120/74)  BP(mean): 82 (29 Feb 2020 12:00) (79 - 91)  ABP: --  ABP(mean): --  RR: 19 (29 Feb 2020 12:00) (18 - 27)  SpO2: 92% (29 Feb 2020 12:00) (90% - 97%)    Vital Signs Last 24 Hrs  T(C): 37.1 (29 Feb 2020 11:21), Max: 37.4 (28 Feb 2020 15:39)  T(F): 98.7 (29 Feb 2020 11:21), Max: 99.3 (28 Feb 2020 15:39)  HR: 100 (29 Feb 2020 12:00) (94 - 106)  BP: 100/71 (29 Feb 2020 12:00) (90/51 - 120/74)  BP(mean): 82 (29 Feb 2020 12:00) (79 - 91)  RR: 19 (29 Feb 2020 12:00) (18 - 27)  SpO2: 92% (29 Feb 2020 12:00) (90% - 97%)    I&O's Detail    28 Feb 2020 07:01  -  29 Feb 2020 07:00  --------------------------------------------------------  IN:    dextrose 5% + sodium chloride 0.45% with potassium chloride 20 mEq/L: 720 mL    Solution: 300 mL  Total IN: 1020 mL    OUT:    Chest Tube: 30 mL  Total OUT: 30 mL    Total NET: 990 mL    29 Feb 2020 07:01  -  29 Feb 2020 14:06  --------------------------------------------------------  IN:    dextrose 5% + sodium chloride 0.45% with potassium chloride 20 mEq/L: 120 mL    Oral Fluid: 120 mL  Total IN: 240 mL    OUT:  Total OUT: 0 mL    Total NET: 240 mL    LABS:                        9.9    7.91  )-----------( 191      ( 29 Feb 2020 10:47 )             29.4     02-29    133<L>  |  100  |  47<H>  ----------------------------<  161<H>  4.3   |  23  |  4.50<H>    Ca    8.0<L>      29 Feb 2020 10:47  Phos  5.5     02-29  Mg     2.5     02-29    TPro  5.9<L>  /  Alb  2.3<L>  /  TBili  1.8<H>  /  DBili  1.20<H>  /  AST  79<H>  /  ALT  92<H>  /  AlkPhos  111  02-29    CAPILLARY BLOOD GLUCOSE    POCT Blood Glucose.: 95 mg/dL (29 Feb 2020 12:33)    PT/INR - ( 29 Feb 2020 10:47 )   PT: 17.8 sec;   INR: 1.57 ratio    PTT - ( 29 Feb 2020 10:47 )  PTT:29.2 sec    CULTURES:  Culture Results:   No growth (02-27 @ 21:38)  Culture Results:   Testing in progress (02-27 @ 21:38)  Culture Results:   No growth to date. (02-26 @ 22:29)  Culture Results:   No growth to date. (02-26 @ 22:29)  Culture Results:   50,000 - 99,000 CFU/mL Coag Negative Staphylococcus  Susceptibilites not performed. (02-26 @ 11:46)  Culture Results:   No growth to date. (02-25 @ 22:38)  Culture Results:   No growth to date. (02-25 @ 22:38)    Physical Examination:    General: Elderly male lying in bed, in no acute distress.    HEENT: Pupils equal, reactive to light. Symmetric. No scleral icterus or injection.    PULM: Decreased breath sounds B/L.    NECK: Supple, no lymphadenopathy, trachea midline.    CVS: Regular rate and rhythm, no murmurs appreciated, +s1/s2.    ABD: Soft, nondistended, nontender, normoactive bowel sounds.    EXT: No edema, nontender.    SKIN: Warm and well perfused, no rashes noted. Left-sided chest tube in place.    NEURO: Alert, oriented, interactive, nonfocal.    DEVICES: Left-sided pigtail  LINES: Peripheral  SOLIS: [place] cath    RADIOLOGY:  < from: Xray Chest 1 View- PORTABLE-Routine (02.27.20 @ 16:53) >  EXAM:  XR CHEST PORTABLE ROUTINE 1V                          PROCEDURE DATE:  02/27/2020      INTERPRETATION:  AP semierect chest on February 27, 2020 at 4:37 PM. Patient had left chest tube insertion.    Prior study of February 25 showed an extensive left effusion.    On present examination heart is difficult to assess.    A catheter left chest tube is been inserted at the base.    Large left effusion possibly loculated laterally is essentially unchanged. No pneumothorax is evident.    IMPRESSION: Catheter left chest tube insertion. Persistent large left effusion possibly with loculation.    EDWARD CARROLL M.D., ATTENDING RADIOLOGIST  This document has been electronically signed. Feb 27 2020  4:56PM    < end of copied text >    < from: CT Chest No Cont (02.25.20 @ 19:18) >  EXAM:  CT RENAL STONE [place]UNT                          EXAM:  CT CHEST                          PROCEDURE DATE:  02/25/2020      INTERPRETATION:  CLINICAL HISTORY:  Fever, left back and flank pain. History of gastric lymphoma.    Multiple axial images of the chest, abdomen and pelvis were obtained from the lung apices through symphysis pubis without the administration of oral or intravascular contrast limiting the sensitivity of evaluation. Reformatted coronal and sagittal images are submitted.    COMPARISON: PET/CT evaluation 6/19/2019.    FINDINGS: New moderately sized left pleural effusion is identified with portions that are loculated; air attenuation within the effusion as well as within the left pleural space suggests the presence of left-sided empyema. Consolidation is identified within the lingular segment as well as left lower lobe lung parenchyma; underlying neoplasm cannot be excluded. Hazy opacity within the left upper lobe is likely related to atelectasis. Subpleural opacity within the posterior right lower lobe and right middle lobe likely related to subpleural fibrosis. There is no evidence for right-sided pleural effusion or air.    No abnormally enlarged mediastinal or hilar lymph nodes are noted. There is no evidence for axillary lymphadenopathy. The heart size appears within normal limits. No pericardial effusion is identified. Thoracic aortic caliber demonstrates unremarkable caliber. Coronary arterial calcifications identified.    The central bronchial anatomy appears patent.    No mediastinal shift is noted.    The liver is normal in size. No focal hepatic masses are identified. There is no evidence for intrahepatic or extrahepatic biliary dilatation. A large gallstone is noted. No gallbladder wall thickening or pericholecystic fluid identified.    The spleen, pancreas and adrenal glands are unremarkable.    There is no evidence for hydronephrosis. No right renal calculi are identified. An 8 mm calculus is identified within the left-sided extrarenal pelvis, unchanged. There is no change in a 6 mm calculus identified within the distal right ureter at the level of the UVJ, without evidence for obstructive uropathy. A 1.3 cm right renal cyst is noted. The abdominal aorta is normal in course and caliber. No abnormally enlarged retroperitoneal or pelvic lymphadenopathy is noted.    Gastric wall thickening is identified allowing for nondistention with oral contrast; clinical correlation is suggested in light of the provided clinical history of gastric lymphoma.    Fecal material is scattered throughout the colon. There is no evidence for mechanical bowel obstruction. Colonic diverticulosis is noted. No colonic wall thickening is identified. There is no evidence for acute appendicitis.There is no evidence for free intraperitoneal air or fluid.    The prostate is enlarged and the urinary bladder appear unremarkable.     Postsurgical changes of the lumbar spine noted. Postprocedural changes of the right shoulder noted. Degenerative changes of the spine evident.    IMPRESSION:    1. New moderately sized left pleural effusion is identified with portions that are loculated; air attenuation within the effusion as well as within the left pleural space suggests the presence of left-sided empyema. Consolidation is identified within the lingular segment as well as left lower lobe lung parenchyma; underlying neoplasm cannot be excluded.   2. Gastric wall thickening is identified allowing for nondistention with oral contrast; clinicalcorrelation is suggested in light of the provided clinical history of gastric lymphoma.  3. An 8 mm calculus is identified within the left-sided extrarenal pelvis, unchanged. There is no change in a 6 mm calculus identified within the distal right ureter at the level of the UVJ, without evidence for obstructive uropathy. No evidence for bilateral hydronephrosis.    Preliminary results discussed with Dr. Galvan by telephone.    CASA BAUTISTA   This document has been electronically signed. Feb 25 2020  8:17PM        < end of copied text >

## 2020-02-29 NOTE — CONSULT NOTE ADULT - ASSESSMENT
89 y/o M w/ a PMHx of HLD, GERD, osteoarthritis (on medical marijuana), spinal stenosis, gastric maltoma     PLAN:  Neuro:  Cardiac:  Pulm:  GI:  Renal:  ID:  Endo:  Heme/Onc:  Dispo:    Case discussed w/ ICU attending Dr. Gusman. 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 Transferred to ICU today for tPA/dornase via chest tube. Hospital course now complicated by acute renal failure.    PLAN:  Neuro: Mental status intact. Pain control w/ IV Dilaudid and PO Tylenol.  Cardiac: HD stable. Maintain MAP > 65. Continue w/ lipitor.  Pulm: SpO2 stable on 2 L nasal cannula. Maintain SpO2 > 92%. CT chest revealing of loculated left-sided pleural effusion concerning for empyema. S/p pigtail placement w/ minimal output. Offered VATS by thoracic surgery, however, family would prefer conservative management at this point. Will administer intrapleural tPA/dornase q12h for 6 doses as tolerated. Monitor chest tube output.  GI: Regular diet as tolerated. Continue w/ famotidine.  Renal: Now w/ GUS likely 2/2 vancomycin. Continue to trend BMP. Monitor urine output. Renally dose medications and avoid nephrotoxic agents. Replace electrolytes as needed.   ID: Afebrile, leukocytosis on admission (now resolved). Pleural fluid consistent w/ empyema. Continue w/ Zosyn. Cultures w/o growth.  Endo: No active issues.  Heme/Onc: Heparin SQ for DVT prophylaxis.  Dispo: Transfer to ICU. Daughter at bedside. Full code.    Case discussed w/ ICU attending Dr. Gusman.

## 2020-02-29 NOTE — CONSULT NOTE ADULT - ATTENDING COMMENTS
Patient seen and evaluated independently from above.     89M PMH gastric MALToma (not on treatment), CLL (not on treatment), GERD, HLD, herniated discs, & spinal stenosis s/p back surgery on medical marijuana who presents with worsening back and chest pain, found on chest CT to have loculated left pleural effusion worrisome for empyema. Also with 8mm L extrarenal pelvis stone and 6mm R distal urethral stone. S/p IR-guided left chest tube on 2/27, but with minimal drainage (200mL total). Transferred to ICU for tPA/dornase brenda via chest tube.    1. Neuro: pain control with acetaminophen, oxycodone, and hydromorphone prn  2. CV: HD stable, continue statin  3. Pulm: continue NC@2-4LPM, goal SpO2>90%. Continue chest tube drainage, decrease to -10 cm H2O. Administer intrapleural tPA/dornase brenda q12h for 6 doses. Monitor chest tube output. Incentive spirometry  4. GI: regular diet as tolerates, continue dronabinol. On lactobacillus  5. Renal: GUS of unclear etiology, likely drug toxicity due to vancomycin. Renally dose Zosyn and famotidine. No other nephrotoxins. Strict I/O's, trend kidney function and lytes. Check urinalysis, urine Na, Cr, Cl, Pr  6. ID: likely empyema, pH 7.0, normal glucose, 118 nuc cells with neutrophil predominance. Continue Zosyn. Cultures negative  7. Heme: HSQ for DVT ppx. Maltoma and CLL being monitored, not on active treatment  8. Endo: no active issues  9. Skin: no lines or celestin  10. Dispo: full code, discussed with patient and family at bedside
Chart reviewed    Patient seen and examined    Agree with plan as outlined above

## 2020-02-29 NOTE — PROGRESS NOTE ADULT - SUBJECTIVE AND OBJECTIVE BOX
[facility] -- Warren Posada, Abelardo Amos Pannella, Patel, Savella  Office # [phone]      Follow Up:    Preop eval  Subjective/Observations:   No events overnight resting comfortably in bed.  No complaints of chest pain, dyspnea, or palpitations reported. No signs of orthopnea or PND.     REVIEW OF SYSTEMS: All other review of systems is negative unless indicated above    PAST MEDICAL & SURGICAL HISTORY:  GI Bleed: 2007  Stomach Ulcer: H pylori 2007, treated with antibiotics  Osteoarthritis  Hyperlipidemia  Herniated Disc  Spinal Stenosis  Diverticulitis  GERD (Gastroesophageal Reflux Disease)  Chronic Lymphocytic Leukemia: followed by oncologist in [place]  S/P Tonsillectomy: childhood  Status Post Arthroscopy: right shoulder  History of Arthroscopy of Knee: bilateral      MEDICATIONS  (STANDING):  atorvastatin 10 milliGRAM(s) Oral at bedtime  chlorhexidine 2% Cloths 1 Application(s) Topical <User Schedule>  cyanocobalamin Injectable 1000 MICROGram(s) IntraMuscular every 24 hours  dextrose 5% + sodium chloride 0.45% with potassium chloride 20 mEq/L 1000 milliLiter(s) (30 mL/Hr) IV Continuous <Continuous>  dronabinol 2.5 milliGRAM(s) Oral two times a day  famotidine    Tablet 20 milliGRAM(s) Oral two times a day  heparin  Injectable 5000 Unit(s) SubCutaneous every 8 hours  lactobacillus acidophilus 1 Tablet(s) Oral three times a day  piperacillin/tazobactam IVPB.. 3.375 Gram(s) IV Intermittent every 8 hours  tamsulosin 0.4 milliGRAM(s) Oral at bedtime    MEDICATIONS  (PRN):  acetaminophen   Tablet .. 650 milliGRAM(s) Oral every 6 hours PRN Temp greater or equal to 38C (100.4F), Mild Pain (1 - 3)  albuterol/ipratropium for Nebulization 3 milliLiter(s) Nebulizer every 6 hours PRN Shortness of Breath and/or Wheezing  guaiFENesin   Syrup  (Sugar-Free) 200 milliGRAM(s) Oral every 6 hours PRN Cough  oxyCODONE    IR 5 milliGRAM(s) Oral every 4 hours PRN Severe Pain (7 - 10)  traMADol 25 milliGRAM(s) Oral every 6 hours PRN Moderate Pain (4 - 6)      Allergies    No Known Allergies    Intolerances        Vital Signs Last 24 Hrs  T(C): 36.8 (29 Feb 2020 07:51), Max: 37.4 (28 Feb 2020 15:39)  T(F): 98.2 (29 Feb 2020 07:51), Max: 99.3 (28 Feb 2020 15:39)  HR: 96 (29 Feb 2020 07:51) (95 - 109)  BP: 102/59 (29 Feb 2020 07:51) (90/51 - 120/74)  BP(mean): --  RR: 18 (29 Feb 2020 07:51) (18 - 19)  SpO2: 94% (29 Feb 2020 07:51) (90% - 97%)    I&O's Summary    28 Feb 2020 07:01  -  29 Feb 2020 07:00  --------------------------------------------------------  IN: 1020 mL / OUT: 30 mL / NET: 990 mL          PHYSICAL EXAM:  TELE:   Constitutional: NAD, awake and alert, well-developed  HEENT: Moist Mucous Membranes, Anicteric  Pulmonary: Non-labored, breath sounds are clear bilaterally, No wheezing, crackles or rhonchi  Cardiovascular: Regular, S1 and S2 nl, No murmurs, rubs, gallops or clicks  Gastrointestinal: Bowel Sounds present, soft, nontender.   Lymph: No lymphadenopathy. No peripheral edema.  Skin: No visible rashes or ulcers.  Psych:  Mood & affect appropriate    LABS: All Labs Reviewed:                        11.0   8.55  )-----------( 212      ( 28 Feb 2020 14:53 )             32.7                         11.2   10.70 )-----------( 174      ( 27 Feb 2020 07:10 )             33.5                         12.0   13.18 )-----------( 159      ( 26 Feb 2020 08:57 )             35.2     26 Feb 2020 08:57    137    |  104    |  20     ----------------------------<  165    3.6     |  25     |  1.00     Ca    8.3        26 Feb 2020 08:57  Phos  4.5       28 Feb 2020 07:38  Phos  2.6       26 Feb 2020 08:57  Mg     2.3       28 Feb 2020 07:38  Mg     2.3       26 Feb 2020 08:57    TPro  5.8    /  Alb  2.3    /  TBili  1.0    /  DBili  .40    /  AST  30     /  ALT  31     /  AlkPhos  60     27 Feb 2020 07:10             ECG:  < from: 12 Lead ECG (02.25.20 @ 18:14) >  Ventricular Rate 100 BPM    Atrial Rate 100 BPM    P-R Interval 194 ms    QRS Duration 90 ms    Q-T Interval 332 ms    QTC Calculation(Bezet) 428 ms    P Axis 57 degrees    R Axis -46 degrees    T Axis 60 degrees    Diagnosis Line Normal sinus rhythm  Left axis deviation  Inferior infarct , age undetermined  Abnormal ECG  No previous ECGs available  Confirmed by Vince Hopkins MD ([id number]) on 2/26/2020 11:18:08 PM    < end of copied text >    Echo:  < from: TTE Echo Doppler w/o Cont (02.27.20 @ 11:47) >  EXAM:  ECHO TTE WO CON COMP W DOPPLR         PROCEDURE DATE:  02/27/2020        INTERPRETATION:  INDICATION: Heart failure  Sonographer PH    Blood Pressure 107/68    Height 177.8 cm     Weight 86.2 kg       BSA 2.0 sq m    Dimensions:    LA 3.7    Normal Values: 2.0 - 4.0 cm    Ao 3.3        Normal Values: 2.0 - 3.8 cm  SEPTUM Normal Values: 0.6 - 1.2 cm  PWT Normal Values: 0.6 - 1.1 cm  LVIDd Normal Values: 3.0 - 5.6 cm  LVIDs Normal Values: 1.8 - 4.0 cm      OBSERVATIONS:  Technically difficult and limited study  Mitral Valve: normal, trace physiologic MR.  Aortic Valve/Aorta: Sclerotic trileaflet aortic valve with normal opening.  Tricuspid Valve: normal with trace TR.  Pulmonic Valve: Not well-visualized  Left Atrium: normal  RightAtrium: Not well-visualized  Left Ventricle: normal LV size and systolic function, estimated LVEF of 55%.  Right Ventricle: Not well-visualized  Pericardium/Pleura: normal, small anterior pericardial effusion without signs of hemodynamic compromise.  Pulmonary/RV Pressure: estimated PA systolic pressure of 34mmHg. IVC is dilated    Conclusion:   Technically difficult and limited study  Normal left ventricular internal dimensions and systolic function, estimated LVEF of 55%.   Right ventricle is not well-visualized  Sclerotic trileaflet aortic valve, without AI.   Trace physiologic MR and TR.                       TYLER AYALA   This document has been electronically signed. Feb 28 2020  8:07AM    < end of copied text >    Radiology:  < from: Xray Chest 1 View- PORTABLE-Routine (02.27.20 @ 16:53) >  EXAM:  XR CHEST PORTABLE ROUTINE 1V                            PROCEDURE DATE:  02/27/2020          INTERPRETATION:  AP semierect chest on February 27, 2020 at 4:37 PM. Patient had left chest tube insertion.    Prior study of February 25 showed an extensive left effusion.    On present examination heart is difficult to assess.    A catheter left chest tube is been inserted at the base.    Large left effusion possibly loculated laterally is essentially unchanged. No pneumothorax is evident.    IMPRESSION: Catheter left chest tube insertion. Persistent large left effusion possibly with loculation.                EDWARD CARROLL M.D., ATTENDING RADIOLOGIST  This document has been electronically signed. Feb 27 2020  4:    < end of copied text >

## 2020-02-29 NOTE — PROGRESS NOTE ADULT - SUBJECTIVE AND OBJECTIVE BOX
Patient is a 89y old  Male who presents with a chief complaint of Left Empyema (29 Feb 2020 10:30)      INTERVAL HPI/OVERNIGHT EVENTS:  Patient seen and examined. NAD. No complaints.    Vital Signs Last 24 Hrs  T(C): 37.1 (29 Feb 2020 11:21), Max: 37.4 (28 Feb 2020 15:39)  T(F): 98.7 (29 Feb 2020 11:21), Max: 99.3 (28 Feb 2020 15:39)  HR: 100 (29 Feb 2020 12:00) (94 - 106)  BP: 100/71 (29 Feb 2020 12:00) (90/51 - 120/74)  BP(mean): 82 (29 Feb 2020 12:00) (79 - 91)  RR: 19 (29 Feb 2020 12:00) (18 - 27)  SpO2: 92% (29 Feb 2020 12:00) (90% - 97%)    02-29    133<L>  |  100  |  47<H>  ----------------------------<  161<H>  4.3   |  23  |  4.50<H>    Ca    8.0<L>      29 Feb 2020 10:47  Phos  5.5     02-29  Mg     2.5     02-29    TPro  5.9<L>  /  Alb  2.3<L>  /  TBili  1.8<H>  /  DBili  1.20<H>  /  AST  79<H>  /  ALT  92<H>  /  AlkPhos  111  02-29                          9.9    7.91  )-----------( 191      ( 29 Feb 2020 10:47 )             29.4     PT/INR - ( 29 Feb 2020 10:47 )   PT: 17.8 sec;   INR: 1.57 ratio         PTT - ( 29 Feb 2020 10:47 )  PTT:29.2 sec  CAPILLARY BLOOD GLUCOSE                  acetaminophen   Tablet .. 650 milliGRAM(s) Oral every 6 hours PRN  albuterol/ipratropium for Nebulization 3 milliLiter(s) Nebulizer every 6 hours PRN  alteplase  Injectable for Pleural Effusion 10 milliGRAM(s) IntraPleural. every 12 hours  atorvastatin 10 milliGRAM(s) Oral at bedtime  chlorhexidine 2% Cloths 1 Application(s) Topical <User Schedule>  cyanocobalamin Injectable 1000 MICROGram(s) IntraMuscular every 24 hours  dextrose 5% + sodium chloride 0.45% with potassium chloride 20 mEq/L 1000 milliLiter(s) IV Continuous <Continuous>  dornase brenda Solution for Pleural Effusion 5 milliGRAM(s) IntraPleural. every 12 hours  dronabinol 2.5 milliGRAM(s) Oral two times a day  famotidine    Tablet 20 milliGRAM(s) Oral two times a day  guaiFENesin   Syrup  (Sugar-Free) 200 milliGRAM(s) Oral every 6 hours PRN  heparin  Injectable 5000 Unit(s) SubCutaneous every 8 hours  lactobacillus acidophilus 1 Tablet(s) Oral three times a day  oxyCODONE    IR 5 milliGRAM(s) Oral every 4 hours PRN  piperacillin/tazobactam IVPB.. 3.375 Gram(s) IV Intermittent every 8 hours  tamsulosin 0.4 milliGRAM(s) Oral at bedtime  traMADol 25 milliGRAM(s) Oral every 6 hours PRN              REVIEW OF SYSTEMS:  CONSTITUTIONAL: No fever, no weight loss, or no fatigue  NECK: No pain, no stiffness  RESPIRATORY: No cough, no wheezing, no chills, no hemoptysis, No shortness of breath  CARDIOVASCULAR: No chest pain, no palpitations, no dizziness, no leg swelling  GASTROINTESTINAL: No abdominal pain. No nausea, no vomiting, no hematemesis; No diarrhea, no constipation. No melena, no hematochezia.  GENITOURINARY: No dysuria, no frequency, no hematuria, no incontinence  NEUROLOGICAL: No headaches, no loss of strength, no numbness, no tremors  SKIN: No itching, no burning  MUSCULOSKELETAL: No joint pain, no swelling; No muscle, no back, no extremity pain  PSYCHIATRIC: No depression, no mood swings,   HEME/LYMPH: No easy bruising, no bleeding gums  ALLERY AND IMMUNOLOGIC: No hives       Consultant(s) Notes Reviewed:  [X] YES  [ ] NO    PHYSICAL EXAM:  GENERAL: NAD  HEAD:  Atraumatic, Normocephalic  EYES: EOMI, PERRLA, conjunctiva and sclera clear  ENMT: No tonsillar erythema, exudates, or enlargement; Moist mucous membranes  NECK: Supple, No JVD  NERVOUS SYSTEM:  Awake & alert  CHEST/LUNG: Clear to auscultation bilaterally; No rales, rhonchi, wheezing,  HEART: Regular rate and rhythm  ABDOMEN: Soft, Nontender, Nondistended; Bowel sounds present  EXTREMITIES:  No clubbing, cyanosis, or edema  LYMPH: No lymphadenopathy noted  SKIN: No rashes      Advanced care planning discussed with patient/family [X] YES   [ ] NO    Advanced care planning discussed with patient/family. Patient's health status was discussed. All appropriate changes have been made regarding patient's end-of-life care. Advanced care planning forms reviewed/discussed/completed.  20 minutes spent.

## 2020-02-29 NOTE — PROGRESS NOTE ADULT - SUBJECTIVE AND OBJECTIVE BOX
Date/Time Patient Seen:  		  Referring MD:   Data Reviewed	       Patient is a 89y old  Male who presents with a chief complaint of Left Empyema (29 Feb 2020 08:52)      Subjective/HPI     PAST MEDICAL & SURGICAL HISTORY:  GI Bleed: 2007  Stomach Ulcer: H pylori 2007, treated with antibiotics  Osteoarthritis  Hyperlipidemia  Herniated Disc  Spinal Stenosis  Diverticulitis  GERD (Gastroesophageal Reflux Disease)  Chronic Lymphocytic Leukemia: followed by oncologist in [place]  S/P Tonsillectomy: childhood  Status Post Arthroscopy: right shoulder  History of Arthroscopy of Knee: bilateral  Osteoarthritis  Stomach Ulcer: H pylori, treated with antibiotics        Medication list         MEDICATIONS  (STANDING):  alteplase  Injectable for Pleural Effusion 10 milliGRAM(s) IntraPleural. every 12 hours  atorvastatin 10 milliGRAM(s) Oral at bedtime  chlorhexidine 2% Cloths 1 Application(s) Topical <User Schedule>  cyanocobalamin Injectable 1000 MICROGram(s) IntraMuscular every 24 hours  dextrose 5% + sodium chloride 0.45% with potassium chloride 20 mEq/L 1000 milliLiter(s) (30 mL/Hr) IV Continuous <Continuous>  dornase brenda Solution for Pleural Effusion 5 milliGRAM(s) IntraPleural. every 12 hours  dronabinol 2.5 milliGRAM(s) Oral two times a day  famotidine    Tablet 20 milliGRAM(s) Oral two times a day  heparin  Injectable 5000 Unit(s) SubCutaneous every 8 hours  lactobacillus acidophilus 1 Tablet(s) Oral three times a day  piperacillin/tazobactam IVPB.. 3.375 Gram(s) IV Intermittent every 8 hours  tamsulosin 0.4 milliGRAM(s) Oral at bedtime    MEDICATIONS  (PRN):  acetaminophen   Tablet .. 650 milliGRAM(s) Oral every 6 hours PRN Temp greater or equal to 38C (100.4F), Mild Pain (1 - 3)  albuterol/ipratropium for Nebulization 3 milliLiter(s) Nebulizer every 6 hours PRN Shortness of Breath and/or Wheezing  guaiFENesin   Syrup  (Sugar-Free) 200 milliGRAM(s) Oral every 6 hours PRN Cough  oxyCODONE    IR 5 milliGRAM(s) Oral every 4 hours PRN Severe Pain (7 - 10)  traMADol 25 milliGRAM(s) Oral every 6 hours PRN Moderate Pain (4 - 6)         Vitals log        ICU Vital Signs Last 24 Hrs  T(C): 36.9 (29 Feb 2020 10:19), Max: 37.4 (28 Feb 2020 15:39)  T(F): 98.4 (29 Feb 2020 10:19), Max: 99.3 (28 Feb 2020 15:39)  HR: 96 (29 Feb 2020 07:51) (95 - 109)  BP: 102/59 (29 Feb 2020 07:51) (90/51 - 120/74)  BP(mean): --  ABP: --  ABP(mean): --  RR: 18 (29 Feb 2020 07:51) (18 - 19)  SpO2: 94% (29 Feb 2020 07:51) (90% - 97%)           Input and Output:  I&O's Detail    28 Feb 2020 07:01  -  29 Feb 2020 07:00  --------------------------------------------------------  IN:    dextrose 5% + sodium chloride 0.45% with potassium chloride 20 mEq/L: 720 mL    Solution: 300 mL  Total IN: 1020 mL    OUT:    Chest Tube: 30 mL  Total OUT: 30 mL    Total NET: 990 mL          Lab Data                        11.0   8.55  )-----------( 212      ( 28 Feb 2020 14:53 )             32.7       Phos  4.5     02-28  Mg     2.3     02-28              Review of Systems	      Objective     Physical Examination    heart s1s2  lung dec BS  abd soft  head nc  head at      Pertinent Lab findings & Imaging      [place]:  NO   Adequate UO     I&O's Detail    28 Feb 2020 07:01  -  29 Feb 2020 07:00  --------------------------------------------------------  IN:    dextrose 5% + sodium chloride 0.45% with potassium chloride 20 mEq/L: 720 mL    Solution: 300 mL  Total IN: 1020 mL    OUT:    Chest Tube: 30 mL  Total OUT: 30 mL    Total NET: 990 mL               Discussed with:     Cultures:	        Radiology

## 2020-02-29 NOTE — PROGRESS NOTE ADULT - SUBJECTIVE AND OBJECTIVE BOX
All interim records and events noted.    some cough  no SOB      MEDICATIONS  (STANDING):  alteplase  Injectable for Pleural Effusion 10 milliGRAM(s) IntraPleural. every 12 hours  atorvastatin 10 milliGRAM(s) Oral at bedtime  chlorhexidine 2% Cloths 1 Application(s) Topical <User Schedule>  cyanocobalamin Injectable 1000 MICROGram(s) IntraMuscular every 24 hours  dextrose 5% + sodium chloride 0.45% with potassium chloride 20 mEq/L 1000 milliLiter(s) (30 mL/Hr) IV Continuous <Continuous>  dornase brenda Solution for Pleural Effusion 5 milliGRAM(s) IntraPleural. every 12 hours  dronabinol 2.5 milliGRAM(s) Oral two times a day  famotidine    Tablet 20 milliGRAM(s) Oral two times a day  heparin  Injectable 5000 Unit(s) SubCutaneous every 8 hours  lactobacillus acidophilus 1 Tablet(s) Oral three times a day  piperacillin/tazobactam IVPB.. 3.375 Gram(s) IV Intermittent every 8 hours  tamsulosin 0.4 milliGRAM(s) Oral at bedtime    MEDICATIONS  (PRN):  acetaminophen   Tablet .. 650 milliGRAM(s) Oral every 6 hours PRN Temp greater or equal to 38C (100.4F), Mild Pain (1 - 3)  albuterol/ipratropium for Nebulization 3 milliLiter(s) Nebulizer every 6 hours PRN Shortness of Breath and/or Wheezing  guaiFENesin   Syrup  (Sugar-Free) 200 milliGRAM(s) Oral every 6 hours PRN Cough  oxyCODONE    IR 5 milliGRAM(s) Oral every 4 hours PRN Severe Pain (7 - 10)  traMADol 25 milliGRAM(s) Oral every 6 hours PRN Moderate Pain (4 - 6)      Vital Signs Last 24 Hrs  T(C): 37.1 (29 Feb 2020 11:21), Max: 37.4 (28 Feb 2020 15:39)  T(F): 98.7 (29 Feb 2020 11:21), Max: 99.3 (28 Feb 2020 15:39)  HR: 100 (29 Feb 2020 12:00) (94 - 106)  BP: 100/71 (29 Feb 2020 12:00) (90/51 - 120/74)  BP(mean): 82 (29 Feb 2020 12:00) (79 - 91)  RR: 19 (29 Feb 2020 12:00) (18 - 27)  SpO2: 92% (29 Feb 2020 12:00) (90% - 97%)    PHYSICAL EXAM  General: well developed  well nourished, in no acute distress  Head: atraumatic, normocephalic  ENT: sclera anicteric, buccal mucosa moist  Neck: supple, trachea midline  CV: S1 S2, regular rate and rhythm  Lungs: clear to auscultation, no wheezes/rhonchi  Abdomen: soft, nontender, bowel sounds present, pouchy  Extrem: no clubbing/cyanosis/edema  Skin: no significant increased ecchymosis/petechiae  Neuro: alert and oriented X3,  no focal deficits      LABS:             9.9    7.91  )-----------( 191      ( 02-29 @ 10:47 )             29.4                11.0   8.55  )-----------( 212      ( 02-28 @ 14:53 )             32.7                11.2   10.70 )-----------( 174      ( 02-27 @ 07:10 )             33.5       02-29    133<L>  |  100  |  47<H>  ----------------------------<  161<H>  4.3   |  23  |  4.50<H>    Ca    8.0<L>      29 Feb 2020 10:47  Phos  5.5     02-29  Mg     2.5     02-29    TPro  5.9<L>  /  Alb  2.3<L>  /  TBili  1.8<H>  /  DBili  1.20<H>  /  AST  79<H>  /  ALT  92<H>  /  AlkPhos  111  02-29 02-29 @ 10:47  PT17.8 INR1.57  PTT29.2  02-27 @ 07:10  PT19.3 INR1.70  PTT29.4  02-26 @ 08:57  PT24.2 INR2.11  PTT--  02-25 @ 18:13  PT22.6 INR1.99  PTT30.9      RADIOLOGY & ADDITIONAL STUDIES:    IMPRESSION/RECOMMENDATIONS:

## 2020-03-01 DIAGNOSIS — N17.9 ACUTE KIDNEY FAILURE, UNSPECIFIED: ICD-10-CM

## 2020-03-01 LAB
ALBUMIN SERPL ELPH-MCNC: 2.2 G/DL — LOW (ref 3.3–5)
ALP SERPL-CCNC: 98 U/L — SIGNIFICANT CHANGE UP (ref 40–120)
ALT FLD-CCNC: 76 U/L — SIGNIFICANT CHANGE UP (ref 12–78)
ANION GAP SERPL CALC-SCNC: 12 MMOL/L — SIGNIFICANT CHANGE UP (ref 5–17)
AST SERPL-CCNC: 56 U/L — HIGH (ref 15–37)
BASOPHILS # BLD AUTO: 0.03 K/UL — SIGNIFICANT CHANGE UP (ref 0–0.2)
BASOPHILS NFR BLD AUTO: 0.3 % — SIGNIFICANT CHANGE UP (ref 0–2)
BILIRUB SERPL-MCNC: 1.6 MG/DL — HIGH (ref 0.2–1.2)
BUN SERPL-MCNC: 54 MG/DL — HIGH (ref 7–23)
CALCIUM SERPL-MCNC: 7.9 MG/DL — LOW (ref 8.5–10.1)
CHLORIDE SERPL-SCNC: 98 MMOL/L — SIGNIFICANT CHANGE UP (ref 96–108)
CO2 SERPL-SCNC: 23 MMOL/L — SIGNIFICANT CHANGE UP (ref 22–31)
CREAT SERPL-MCNC: 5.1 MG/DL — HIGH (ref 0.5–1.3)
CULTURE RESULTS: SIGNIFICANT CHANGE UP
CULTURE RESULTS: SIGNIFICANT CHANGE UP
EOSINOPHIL # BLD AUTO: 0.04 K/UL — SIGNIFICANT CHANGE UP (ref 0–0.5)
EOSINOPHIL NFR BLD AUTO: 0.4 % — SIGNIFICANT CHANGE UP (ref 0–6)
EOSINOPHIL NFR URNS MANUAL: NEGATIVE — SIGNIFICANT CHANGE UP
EOSINOPHIL NFR URNS MANUAL: NEGATIVE — SIGNIFICANT CHANGE UP
GLUCOSE SERPL-MCNC: 130 MG/DL — HIGH (ref 70–99)
HCT VFR BLD CALC: 30.4 % — LOW (ref 39–50)
HCT VFR BLD CALC: 31.4 % — LOW (ref 39–50)
HGB BLD-MCNC: 10.1 G/DL — LOW (ref 13–17)
HGB BLD-MCNC: 10.5 G/DL — LOW (ref 13–17)
IMM GRANULOCYTES NFR BLD AUTO: 0.5 % — SIGNIFICANT CHANGE UP (ref 0–1.5)
LYMPHOCYTES # BLD AUTO: 1.52 K/UL — SIGNIFICANT CHANGE UP (ref 1–3.3)
LYMPHOCYTES # BLD AUTO: 16.3 % — SIGNIFICANT CHANGE UP (ref 13–44)
MAGNESIUM SERPL-MCNC: 2.6 MG/DL — SIGNIFICANT CHANGE UP (ref 1.6–2.6)
MCHC RBC-ENTMCNC: 30.5 PG — SIGNIFICANT CHANGE UP (ref 27–34)
MCHC RBC-ENTMCNC: 31 PG — SIGNIFICANT CHANGE UP (ref 27–34)
MCHC RBC-ENTMCNC: 33.2 GM/DL — SIGNIFICANT CHANGE UP (ref 32–36)
MCHC RBC-ENTMCNC: 33.4 GM/DL — SIGNIFICANT CHANGE UP (ref 32–36)
MCV RBC AUTO: 91.3 FL — SIGNIFICANT CHANGE UP (ref 80–100)
MCV RBC AUTO: 93.3 FL — SIGNIFICANT CHANGE UP (ref 80–100)
MONOCYTES # BLD AUTO: 0.78 K/UL — SIGNIFICANT CHANGE UP (ref 0–0.9)
MONOCYTES NFR BLD AUTO: 8.4 % — SIGNIFICANT CHANGE UP (ref 2–14)
NEUTROPHILS # BLD AUTO: 6.92 K/UL — SIGNIFICANT CHANGE UP (ref 1.8–7.4)
NEUTROPHILS NFR BLD AUTO: 74.1 % — SIGNIFICANT CHANGE UP (ref 43–77)
NRBC # BLD: 0 /100 WBCS — SIGNIFICANT CHANGE UP (ref 0–0)
NRBC # BLD: 0 /100 WBCS — SIGNIFICANT CHANGE UP (ref 0–0)
PHOSPHATE SERPL-MCNC: 6.6 MG/DL — HIGH (ref 2.5–4.5)
PLATELET # BLD AUTO: 223 K/UL — SIGNIFICANT CHANGE UP (ref 150–400)
PLATELET # BLD AUTO: 243 K/UL — SIGNIFICANT CHANGE UP (ref 150–400)
POTASSIUM SERPL-MCNC: 4.4 MMOL/L — SIGNIFICANT CHANGE UP (ref 3.5–5.3)
POTASSIUM SERPL-SCNC: 4.4 MMOL/L — SIGNIFICANT CHANGE UP (ref 3.5–5.3)
PROT SERPL-MCNC: 5.9 G/DL — LOW (ref 6–8.3)
RBC # BLD: 3.26 M/UL — LOW (ref 4.2–5.8)
RBC # BLD: 3.44 M/UL — LOW (ref 4.2–5.8)
RBC # FLD: 13 % — SIGNIFICANT CHANGE UP (ref 10.3–14.5)
RBC # FLD: 13.1 % — SIGNIFICANT CHANGE UP (ref 10.3–14.5)
SODIUM SERPL-SCNC: 133 MMOL/L — LOW (ref 135–145)
SPECIMEN SOURCE: SIGNIFICANT CHANGE UP
SPECIMEN SOURCE: SIGNIFICANT CHANGE UP
WBC # BLD: 9.34 K/UL — SIGNIFICANT CHANGE UP (ref 3.8–10.5)
WBC # BLD: 9.59 K/UL — SIGNIFICANT CHANGE UP (ref 3.8–10.5)
WBC # FLD AUTO: 9.34 K/UL — SIGNIFICANT CHANGE UP (ref 3.8–10.5)
WBC # FLD AUTO: 9.59 K/UL — SIGNIFICANT CHANGE UP (ref 3.8–10.5)

## 2020-03-01 PROCEDURE — 99233 SBSQ HOSP IP/OBS HIGH 50: CPT

## 2020-03-01 PROCEDURE — 76770 US EXAM ABDO BACK WALL COMP: CPT | Mod: 26

## 2020-03-01 PROCEDURE — 71045 X-RAY EXAM CHEST 1 VIEW: CPT | Mod: 26

## 2020-03-01 PROCEDURE — 71250 CT THORAX DX C-: CPT | Mod: 26

## 2020-03-01 RX ORDER — SODIUM CHLORIDE 9 MG/ML
250 INJECTION, SOLUTION INTRAVENOUS ONCE
Refills: 0 | Status: COMPLETED | OUTPATIENT
Start: 2020-03-01 | End: 2020-03-01

## 2020-03-01 RX ORDER — SODIUM CHLORIDE 9 MG/ML
500 INJECTION, SOLUTION INTRAVENOUS ONCE
Refills: 0 | Status: COMPLETED | OUTPATIENT
Start: 2020-03-01 | End: 2020-03-01

## 2020-03-01 RX ORDER — LIDOCAINE HCL 20 MG/ML
6 VIAL (ML) INJECTION ONCE
Refills: 0 | Status: COMPLETED | OUTPATIENT
Start: 2020-03-01 | End: 2020-03-01

## 2020-03-01 RX ORDER — HYDROMORPHONE HYDROCHLORIDE 2 MG/ML
1 INJECTION INTRAMUSCULAR; INTRAVENOUS; SUBCUTANEOUS ONCE
Refills: 0 | Status: DISCONTINUED | OUTPATIENT
Start: 2020-03-01 | End: 2020-03-01

## 2020-03-01 RX ORDER — SODIUM CHLORIDE 9 MG/ML
1000 INJECTION, SOLUTION INTRAVENOUS
Refills: 0 | Status: DISCONTINUED | OUTPATIENT
Start: 2020-03-01 | End: 2020-03-01

## 2020-03-01 RX ORDER — DEXMEDETOMIDINE HYDROCHLORIDE IN 0.9% SODIUM CHLORIDE 4 UG/ML
0.2 INJECTION INTRAVENOUS
Qty: 200 | Refills: 0 | Status: DISCONTINUED | OUTPATIENT
Start: 2020-03-01 | End: 2020-03-01

## 2020-03-01 RX ADMIN — Medication 1 TABLET(S): at 14:14

## 2020-03-01 RX ADMIN — PIPERACILLIN AND TAZOBACTAM 25 GRAM(S): 4; .5 INJECTION, POWDER, LYOPHILIZED, FOR SOLUTION INTRAVENOUS at 14:14

## 2020-03-01 RX ADMIN — PIPERACILLIN AND TAZOBACTAM 25 GRAM(S): 4; .5 INJECTION, POWDER, LYOPHILIZED, FOR SOLUTION INTRAVENOUS at 02:25

## 2020-03-01 RX ADMIN — SODIUM CHLORIDE 250 MILLILITER(S): 9 INJECTION, SOLUTION INTRAVENOUS at 03:41

## 2020-03-01 RX ADMIN — HEPARIN SODIUM 5000 UNIT(S): 5000 INJECTION INTRAVENOUS; SUBCUTANEOUS at 22:07

## 2020-03-01 RX ADMIN — HEPARIN SODIUM 5000 UNIT(S): 5000 INJECTION INTRAVENOUS; SUBCUTANEOUS at 06:02

## 2020-03-01 RX ADMIN — ATORVASTATIN CALCIUM 10 MILLIGRAM(S): 80 TABLET, FILM COATED ORAL at 22:07

## 2020-03-01 RX ADMIN — Medication 3 MILLILITER(S): at 12:48

## 2020-03-01 RX ADMIN — SODIUM CHLORIDE 250 MILLILITER(S): 9 INJECTION, SOLUTION INTRAVENOUS at 06:23

## 2020-03-01 RX ADMIN — ALTEPLASE 10 MILLIGRAM(S): KIT at 13:42

## 2020-03-01 RX ADMIN — Medication 2.5 MILLIGRAM(S): at 17:44

## 2020-03-01 RX ADMIN — Medication 2.5 MILLIGRAM(S): at 06:03

## 2020-03-01 RX ADMIN — Medication 6 MILLILITER(S): at 20:01

## 2020-03-01 RX ADMIN — DEXMEDETOMIDINE HYDROCHLORIDE IN 0.9% SODIUM CHLORIDE 4.31 MICROGRAM(S)/KG/HR: 4 INJECTION INTRAVENOUS at 13:42

## 2020-03-01 RX ADMIN — Medication 3 MILLILITER(S): at 07:38

## 2020-03-01 RX ADMIN — HYDROMORPHONE HYDROCHLORIDE 1 MILLIGRAM(S): 2 INJECTION INTRAMUSCULAR; INTRAVENOUS; SUBCUTANEOUS at 20:40

## 2020-03-01 RX ADMIN — HYDROMORPHONE HYDROCHLORIDE 1 MILLIGRAM(S): 2 INJECTION INTRAMUSCULAR; INTRAVENOUS; SUBCUTANEOUS at 13:55

## 2020-03-01 RX ADMIN — TAMSULOSIN HYDROCHLORIDE 0.4 MILLIGRAM(S): 0.4 CAPSULE ORAL at 22:07

## 2020-03-01 RX ADMIN — HYDROMORPHONE HYDROCHLORIDE 0.5 MILLIGRAM(S): 2 INJECTION INTRAMUSCULAR; INTRAVENOUS; SUBCUTANEOUS at 23:27

## 2020-03-01 RX ADMIN — SODIUM CHLORIDE 1000 MILLILITER(S): 9 INJECTION, SOLUTION INTRAVENOUS at 10:00

## 2020-03-01 RX ADMIN — CHLORHEXIDINE GLUCONATE 1 APPLICATION(S): 213 SOLUTION TOPICAL at 06:03

## 2020-03-01 RX ADMIN — DORNASE ALFA 5 MILLIGRAM(S): 1 SOLUTION RESPIRATORY (INHALATION) at 13:15

## 2020-03-01 RX ADMIN — PREGABALIN 1000 MICROGRAM(S): 225 CAPSULE ORAL at 17:44

## 2020-03-01 RX ADMIN — HYDROMORPHONE HYDROCHLORIDE 0.5 MILLIGRAM(S): 2 INJECTION INTRAMUSCULAR; INTRAVENOUS; SUBCUTANEOUS at 23:12

## 2020-03-01 RX ADMIN — HYDROMORPHONE HYDROCHLORIDE 1 MILLIGRAM(S): 2 INJECTION INTRAMUSCULAR; INTRAVENOUS; SUBCUTANEOUS at 20:25

## 2020-03-01 RX ADMIN — FAMOTIDINE 20 MILLIGRAM(S): 10 INJECTION INTRAVENOUS at 11:54

## 2020-03-01 RX ADMIN — Medication 1 TABLET(S): at 22:07

## 2020-03-01 RX ADMIN — SODIUM CHLORIDE 500 MILLILITER(S): 9 INJECTION, SOLUTION INTRAVENOUS at 08:26

## 2020-03-01 RX ADMIN — HYDROMORPHONE HYDROCHLORIDE 1 MILLIGRAM(S): 2 INJECTION INTRAMUSCULAR; INTRAVENOUS; SUBCUTANEOUS at 13:42

## 2020-03-01 RX ADMIN — Medication 1 TABLET(S): at 06:03

## 2020-03-01 RX ADMIN — HEPARIN SODIUM 5000 UNIT(S): 5000 INJECTION INTRAVENOUS; SUBCUTANEOUS at 14:14

## 2020-03-01 NOTE — PROGRESS NOTE ADULT - SUBJECTIVE AND OBJECTIVE BOX
Patient is a 89y old  Male who presents with a chief complaint of Left Empyema (01 Mar 2020 11:30)      INTERVAL HPI/OVERNIGHT EVENTS:  Patient seen and examined. NAD. No complaints.    Vital Signs Last 24 Hrs  T(C): 36.6 (01 Mar 2020 12:00), Max: 37 (01 Mar 2020 04:00)  T(F): 97.8 (01 Mar 2020 12:00), Max: 98.6 (01 Mar 2020 04:00)  HR: 98 (01 Mar 2020 11:00) (84 - 114)  BP: 141/69 (01 Mar 2020 11:00) (84/53 - 141/69)  BP(mean): 99 (01 Mar 2020 11:00) (61 - 99)  RR: 30 (01 Mar 2020 11:00) (12 - 31)  SpO2: 93% (01 Mar 2020 11:00) (84% - 99%)    03-    133<L>  |  98  |  54<H>  ----------------------------<  130<H>  4.4   |  23  |  5.10<H>    Ca    7.9<L>      01 Mar 2020 05:08  Phos  6.6     03-01  Mg     2.6     03-01    TPro  5.9<L>  /  Alb  2.2<L>  /  TBili  1.6<H>  /  DBili  x   /  AST  56<H>  /  ALT  76  /  AlkPhos  98  03-01                          10.1   9.34  )-----------( 223      ( 01 Mar 2020 05:08 )             30.4     PT/INR - ( 2020 10:47 )   PT: 17.8 sec;   INR: 1.57 ratio         PTT - ( 2020 10:47 )  PTT:29.2 sec  CAPILLARY BLOOD GLUCOSE      POCT Blood Glucose.: 95 mg/dL (2020 12:33)    Urinalysis Basic - ( 2020 17:47 )    Color: Yellow / Appearance: Slightly Turbid / S.015 / pH: x  Gluc: x / Ketone: Negative  / Bili: Negative / Urobili: Negative   Blood: x / Protein: 30 mg/dL / Nitrite: Negative   Leuk Esterase: Trace / RBC: 11-25 /HPF / WBC 0-2   Sq Epi: x / Non Sq Epi: Few / Bacteria: Many              acetaminophen   Tablet .. 650 milliGRAM(s) Oral every 6 hours PRN  albuterol/ipratropium for Nebulization 3 milliLiter(s) Nebulizer every 6 hours PRN  alteplase  Injectable for Pleural Effusion 10 milliGRAM(s) IntraPleural. every 12 hours  atorvastatin 10 milliGRAM(s) Oral at bedtime  chlorhexidine 2% Cloths 1 Application(s) Topical <User Schedule>  cyanocobalamin Injectable 1000 MICROGram(s) IntraMuscular every 24 hours  dornase brenda Solution for Pleural Effusion 5 milliGRAM(s) IntraPleural. every 12 hours  dronabinol 2.5 milliGRAM(s) Oral two times a day  famotidine    Tablet 20 milliGRAM(s) Oral daily  guaiFENesin   Syrup  (Sugar-Free) 200 milliGRAM(s) Oral every 6 hours PRN  heparin  Injectable 5000 Unit(s) SubCutaneous every 8 hours  HYDROmorphone  Injectable 0.5 milliGRAM(s) IV Push every 4 hours PRN  HYDROmorphone  Injectable 1 milliGRAM(s) IV Push every 4 hours PRN  lactated ringers. 1000 milliLiter(s) IV Continuous <Continuous>  lactated ringers. 500 milliLiter(s) IV Continuous <Continuous>  lactobacillus acidophilus 1 Tablet(s) Oral three times a day  piperacillin/tazobactam IVPB.. 3.375 Gram(s) IV Intermittent every 12 hours  tamsulosin 0.4 milliGRAM(s) Oral at bedtime              REVIEW OF SYSTEMS:  CONSTITUTIONAL: No fever, no weight loss, or no fatigue  NECK: No pain, no stiffness  RESPIRATORY: No cough, no wheezing, no chills, no hemoptysis, No shortness of breath  CARDIOVASCULAR: No chest pain, no palpitations, no dizziness, no leg swelling  GASTROINTESTINAL: No abdominal pain. No nausea, no vomiting, no hematemesis; No diarrhea, no constipation. No melena, no hematochezia.  GENITOURINARY: No dysuria, no frequency, no hematuria, no incontinence  NEUROLOGICAL: No headaches, no loss of strength, no numbness, no tremors  SKIN: No itching, no burning  MUSCULOSKELETAL: No joint pain, no swelling; No muscle, no back, no extremity pain  PSYCHIATRIC: No depression, no mood swings,   HEME/LYMPH: No easy bruising, no bleeding gums  ALLERY AND IMMUNOLOGIC: No hives       Consultant(s) Notes Reviewed:  [X] YES  [ ] NO    PHYSICAL EXAM:  GENERAL: NAD  HEAD:  Atraumatic, Normocephalic  EYES: EOMI, PERRLA, conjunctiva and sclera clear  ENMT: No tonsillar erythema, exudates, or enlargement; Moist mucous membranes  NECK: Supple, No JVD  NERVOUS SYSTEM:  Awake & alert  CHEST/LUNG: Clear to auscultation bilaterally; No rales, rhonchi, wheezing,  HEART: Regular rate and rhythm  ABDOMEN: Soft, Nontender, Nondistended; Bowel sounds present  EXTREMITIES:  No clubbing, cyanosis, or edema  LYMPH: No lymphadenopathy noted  SKIN: No rashes      Advanced care planning discussed with patient/family [X] YES   [ ] NO    Advanced care planning discussed with patient/family. Patient's health status was discussed. All appropriate changes have been made regarding patient's end-of-life care. Advanced care planning forms reviewed/discussed/completed.  20 minutes spent.

## 2020-03-01 NOTE — CHART NOTE - NSCHARTNOTEFT_GEN_A_CORE
Pt being seen and examined and noted to have hematuria with no output for celestin. ICU team at bedside attempting irrigation, irrigation attempted with 30 cc of sterile water with no output. Discussed with Dr. Larson. Advised to attempt 2 way 20 Fr coude.16Fr silicone celestin removed followed by large clot and passage of nick blood/mixed with urine.  Urostat jelly instilled into penis and under sterile technique 2 way coude placed without resistance. Celestin irrigated with eventual clear output. Bladder US performed by US confirming balloon within bladder. Celestin attached to celestin bag. Please perform Q2h irrigation with 60cc-120cc of sterile water. Above discussed with Dr. Larson. Pt being seen and examined and noted to have hematuria with no output for celestin. ICU team at bedside attempting irrigation, irrigation attempted with 30 cc of sterile water with no output. Discussed with Dr. Larson. Advised to attempt 2 way 20 Fr coude.16Fr silicone celestin removed followed by large clot and passage of nick blood/mixed with urine.  2% lidocaine Uro-jet  jelly instilled into penis and under sterile technique 2 way coude catheter placed without resistance. Celestin irrigated with eventual minimal clear output. Bladder US performed by ICU PA confirming balloon within bladder. Celestin attached to celestin bag. Please perform Q2h irrigation with 60cc-120cc of sterile water. Above discussed with Dr. Larson. Pt being seen and examined and noted to have hematuria with no output from celestin. ICU team at bedside attempting irrigation, irrigation attempted with 30 cc of sterile water with no output. Discussed with Dr. Larson. Advised to attempt 2 way 20 Fr coude. Previoulsy in-dwelling 16Fr silicone celestin removed followed by large clot and passage of nick blood/mixed with urine.  2% lidocaine Uro-jet  jelly instilled into penis and under sterile technique 2 way coude catheter placed without resistance. Celestin irrigated with eventual minimal clear output. Bladder US performed by ICU PA confirming balloon within bladder. Of note, patient with GUS creat 5.10, Renal US neg for hydronephrosis, normal bilateral renal parenchyma. Vancomycin discontinued 2/29. Pt with oliguria throughout the day s/p multiple boluses (1L total) and minimal urine noted in bladder with celestin noted within bladder. CBC stable 10.5/31.4. Celestin attached to celestin bag. Please perform celestin irrigation Q2h irrigation with 60cc-120cc of sterile water. Above discussed with Dr. Larson.

## 2020-03-01 NOTE — CONSULT NOTE ADULT - SUBJECTIVE AND OBJECTIVE BOX
[facility] NEPHROLOGY SERVICES, [facility]  NEPHROLOGY AND HYPERTENSION  [address]  [address]  [place] [address]  [phone]    MD JOESPH TAYLOR MD ANDREY GONCHARUK, MD MADHU KORRAPATI, MD YELENA ROSENBERG, MD BINNY KOSHY, MD CHRISTOPHER CAPUTO, MD EDWARD BOVER, MD      Information from chart:  "Patient is a 89y old  Male who presents with a chief complaint of Left Empyema (01 Mar 2020 12:25)    HPI:  CHARLY LU is a 89 YO Male brought to [place] ED complaining of flank pain, fever and cough for 4 days. Patient transferred from [place] ER for admission to [place].  Daughter at the bedside states she took him to the ER for evaluation of back pain, chest discomfort. At [place] patient found to have fever, CT scan revealed left sided empyema and right ureteral stone, patient received ceftriaxone and zosyn. Dr. Maxwell pulmonary and Dr. Barnard urology were aware of transfer. Patient states he is feeling well now. (2020 05:01)   "    Baseline cr 1.0   with noted increase to 4.5  and 5.1 today;  Patient post left chest tube parapneumonic effusion;   Rx with Vanco and Zosyn;   Noted lower MAP and poor po intake;     PAST MEDICAL & SURGICAL HISTORY:  GI Bleed:   Stomach Ulcer: H pylori , treated with antibiotics  Osteoarthritis  Hyperlipidemia  Herniated Disc  Spinal Stenosis  Diverticulitis  GERD (Gastroesophageal Reflux Disease)  Chronic Lymphocytic Leukemia: followed by oncologist in [place]  S/P Tonsillectomy: childhood  Status Post Arthroscopy: right shoulder  History of Arthroscopy of Knee: bilateral    FAMILY HISTORY:  No pertinent family history in first degree relatives    Allergies    No Known Allergies    Intolerances      Home Medications:  atorvastatin 10 mg oral tablet: 1 tab(s) orally once a day (2020 17:27)  medical marijuana:  (2020 17:27)  pantoprazole 40 mg oral delayed release tablet: 1 tab(s) orally once a day (2020 17:27)    MEDICATIONS  (STANDING):  alteplase  Injectable for Pleural Effusion 10 milliGRAM(s) IntraPleural. every 12 hours  atorvastatin 10 milliGRAM(s) Oral at bedtime  chlorhexidine 2% Cloths 1 Application(s) Topical <User Schedule>  cyanocobalamin Injectable 1000 MICROGram(s) IntraMuscular every 24 hours  dornase brenda Solution for Pleural Effusion 5 milliGRAM(s) IntraPleural. every 12 hours  dronabinol 2.5 milliGRAM(s) Oral two times a day  famotidine    Tablet 20 milliGRAM(s) Oral daily  heparin  Injectable 5000 Unit(s) SubCutaneous every 8 hours  lactated ringers. 1000 milliLiter(s) (50 mL/Hr) IV Continuous <Continuous>  lactated ringers. 500 milliLiter(s) (500 mL/Hr) IV Continuous <Continuous>  lactobacillus acidophilus 1 Tablet(s) Oral three times a day  piperacillin/tazobactam IVPB.. 3.375 Gram(s) IV Intermittent every 12 hours  tamsulosin 0.4 milliGRAM(s) Oral at bedtime    MEDICATIONS  (PRN):  acetaminophen   Tablet .. 650 milliGRAM(s) Oral every 6 hours PRN Temp greater or equal to 38C (100.4F), Mild Pain (1 - 3)  albuterol/ipratropium for Nebulization 3 milliLiter(s) Nebulizer every 6 hours PRN Shortness of Breath and/or Wheezing  guaiFENesin   Syrup  (Sugar-Free) 200 milliGRAM(s) Oral every 6 hours PRN Cough  HYDROmorphone  Injectable 0.5 milliGRAM(s) IV Push every 4 hours PRN Moderate Pain (4 - 6)  HYDROmorphone  Injectable 1 milliGRAM(s) IV Push every 4 hours PRN Severe Pain (7 - 10)    Vital Signs Last 24 Hrs  T(C): 36.6 (01 Mar 2020 12:00), Max: 37 (01 Mar 2020 04:00)  T(F): 97.8 (01 Mar 2020 12:00), Max: 98.6 (01 Mar 2020 04:00)  HR: 92 (01 Mar 2020 12:49) (84 - 114)  BP: 141/69 (01 Mar 2020 11:00) (84/53 - 141/69)  BP(mean): 99 (01 Mar 2020 11:00) (61 - 99)  RR: 30 (01 Mar 2020 11:00) (12 - 31)  SpO2: 93% (01 Mar 2020 12:49) (84% - 99%)    Daily     Daily Weight in k.2 (01 Mar 2020 08:00)    20 @ 07:  -  20 @ 07:00  --------------------------------------------------------  IN: 1796.5 mL / OUT: 1320 mL / NET: 476.5 mL    20 @ 07:01  -  20 @ 13:26  --------------------------------------------------------  IN: 1360 mL / OUT: 190 mL / NET: 1170 mL      CAPILLARY BLOOD GLUCOSE        PHYSICAL EXAM:      T(C): 36.6 (20 @ 12:00), Max: 37 (20 @ 04:00)  HR: 92 (20 @ 12:49) (84 - 114)  BP: 141/69 (20 @ 11:00) (84/53 - 141/69)  RR: 30 (20 @ 11:00) (12 - 31)  SpO2: 93% (20 @ 12:49) (84% - 99%)  Wt(kg): --  Respiratory: clear anteriorly, decreased BS at bases L > R  Cardiovascular: S1 S2  Gastrointestinal: soft NT ND +BS  Extremities:   1 edema                  133<L>  |  98  |  54<H>  ----------------------------<  130<H>  4.4   |  23  |  5.10<H>    Ca    7.9<L>      01 Mar 2020 05:08  Phos  6.6       Mg     2.6         TPro  5.9<L>  /  Alb  2.2<L>  /  TBili  1.6<H>  /  DBili  x   /  AST  56<H>  /  ALT  76  /  AlkPhos  98                            10.1   9.34  )-----------( 223      ( 01 Mar 2020 05:08 )             30.4     Creatinine Trend: 5.10<--, 4.50<--, 1.00<--, 1.25<--  Urinalysis Basic - ( 2020 17:47 )    Color: Yellow / Appearance: Slightly Turbid / S.015 / pH: x  Gluc: x / Ketone: Negative  / Bili: Negative / Urobili: Negative   Blood: x / Protein: 30 mg/dL / Nitrite: Negative   Leuk Esterase: Trace / RBC: 11-25 /HPF / WBC 0-2   Sq Epi: x / Non Sq Epi: Few / Bacteria: Many        < from: US Kidney and Bladder (20 @ 06:55) >  FINDINGS:   Right kidney: The right kidney measures 11.8 cm in length. Normal parenchymal   echogenicity. No hydronephrosis.   Left kidney: The left kidney measures 11.7 cm in length. Normal parenchymal   echogenicity. Slightly irregular margin. No hydronephrosis.   Prostate: The prostate measures up to 3.7 cm in size.   Bladder: Bladder volume calculated to be 104 mL. No bladder wall thickening   allowing for lack of full distention. No bladder calculus.     IMPRESSION:   No hydronephrosis.    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  R/O underlying nephrotoxic ATN; abx associated AIN; infectious GN      Plan  Agree with judicious IVF bolus;  Vanco level today ( last dose )  Follow UO, Cr trend;   C3/ C4 ASO titers;   Will follow closely for HD indications;   Discussed with patients son at bedside.    Nemesio Wang MD

## 2020-03-01 NOTE — PROGRESS NOTE ADULT - SUBJECTIVE AND OBJECTIVE BOX
Follow up: SOB, left empyema    HPI:  CHARLY LU is a 89 YO Male brought to [place] ED complaining of flank pain, fever and cough for 4 days. Patient transferred from [place] ER for admission to [place].  Daughter at the bedside states she took him to the ER for evaluation of back pain, chest discomfort. At [place] patient found to have fever, CT scan revealed left sided empyema and right ureteral stone, patient received ceftriaxone and zosyn. Dr. Maxwell pulmonary and Dr. Barnard urology were aware of transfer. Patient states he is feeling well now. (26 Feb 2020    The patient is awake and alert in the intensive care unit. He underwent chest tube placement and was given lytic agents. He had a lot of pain and this will be held today. He is feeling somewhat improved this morning and reports no worsening dyspnea, chest pain, or palpitations      PAST MEDICAL & SURGICAL HISTORY:  GI Bleed: 2007  Stomach Ulcer: H pylori 2007, treated with antibiotics  Osteoarthritis  Hyperlipidemia  Herniated Disc  Spinal Stenosis  Diverticulitis  GERD (Gastroesophageal Reflux Disease)  Chronic Lymphocytic Leukemia: followed by oncologist in [place]  S/P Tonsillectomy: childhood  Status Post Arthroscopy: right shoulder  History of Arthroscopy of Knee: bilateral      MEDICATIONS  (STANDING):  alteplase  Injectable for Pleural Effusion 10 milliGRAM(s) IntraPleural. every 12 hours  atorvastatin 10 milliGRAM(s) Oral at bedtime  chlorhexidine 2% Cloths 1 Application(s) Topical <User Schedule>  cyanocobalamin Injectable 1000 MICROGram(s) IntraMuscular every 24 hours  dornase brenda Solution for Pleural Effusion 5 milliGRAM(s) IntraPleural. every 12 hours  dronabinol 2.5 milliGRAM(s) Oral two times a day  famotidine    Tablet 20 milliGRAM(s) Oral daily  heparin  Injectable 5000 Unit(s) SubCutaneous every 8 hours  lactated ringers Bolus 500 milliLiter(s) IV Bolus once  lactated ringers. 500 milliLiter(s) (500 mL/Hr) IV Continuous <Continuous>  lactobacillus acidophilus 1 Tablet(s) Oral three times a day  piperacillin/tazobactam IVPB.. 3.375 Gram(s) IV Intermittent every 12 hours  tamsulosin 0.4 milliGRAM(s) Oral at bedtime    MEDICATIONS  (PRN):  acetaminophen   Tablet .. 650 milliGRAM(s) Oral every 6 hours PRN Temp greater or equal to 38C (100.4F), Mild Pain (1 - 3)  albuterol/ipratropium for Nebulization 3 milliLiter(s) Nebulizer every 6 hours PRN Shortness of Breath and/or Wheezing  guaiFENesin   Syrup  (Sugar-Free) 200 milliGRAM(s) Oral every 6 hours PRN Cough  HYDROmorphone  Injectable 0.5 milliGRAM(s) IV Push every 4 hours PRN Moderate Pain (4 - 6)  HYDROmorphone  Injectable 1 milliGRAM(s) IV Push every 4 hours PRN Severe Pain (7 - 10)      Vital Signs Last 24 Hrs  T(C): 36.7 (01 Mar 2020 07:25), Max: 37.1 (29 Feb 2020 11:21)  T(F): 98 (01 Mar 2020 07:25), Max: 98.7 (29 Feb 2020 11:21)  HR: 102 (01 Mar 2020 08:00) (84 - 114)  BP: 93/53 (01 Mar 2020 08:00) (84/53 - 135/66)  BP(mean): 68 (01 Mar 2020 08:00) (63 - 95)  RR: 21 (01 Mar 2020 08:00) (12 - 31)  SpO2: 92% (01 Mar 2020 08:00) (84% - 99%)    I&O's Summary    29 Feb 2020 07:01  -  01 Mar 2020 07:00  --------------------------------------------------------  IN: 1796.5 mL / OUT: 1320 mL / NET: 476.5 mL    01 Mar 2020 07:01  -  01 Mar 2020 09:42  --------------------------------------------------------  IN: 560 mL / OUT: 147 mL / NET: 413 mL        PHYSICAL EXAM:    Constitutional: NAD, awake and alert, well-developed  Eyes:   Pupils round, no lesions  ENMT: no exudate or erythema  Pulmonary: breath sounds are clear bilaterally, No wheezing, rales or rhonchi  Cardiovascular: PMI not palpable RRR normal S1 and S2, no murmurs, rubs, gallops or clicks  Gastrointestinal: Bowel Sounds present, soft, nontender.   Lymph: No cervical lymphadenopathy.  Neurological: Alert, no focal deficits  Skin: No rashes.  No cyanosis.  Psych:  Mood & affect appropriate   Ext: No lower ext edema      CARDIAC MARKERS ( 29 Feb 2020 10:47 )  x     / x     / 65 U/L / x     / x                                10.1   9.34  )-----------( 223      ( 01 Mar 2020 05:08 )             30.4     03-01    133<L>  |  98  |  54<H>  ----------------------------<  130<H>  4.4   |  23  |  5.10<H>    Ca    7.9<L>      01 Mar 2020 05:08  Phos  6.6     03-01  Mg     2.6     03-01    TPro  5.9<L>  /  Alb  2.2<L>  /  TBili  1.6<H>  /  DBili  x   /  AST  56<H>  /  ALT  76  /  AlkPhos  98  03-01    < from: 12 Lead ECG (02.25.20 @ 18:14) >    Ventricular Rate 100 BPM    Atrial Rate 100 BPM    P-R Interval 194 ms    QRS Duration 90 ms    Q-T Interval 332 ms    QTC Calculation(Bezet) 428 ms    P Axis 57 degrees    R Axis -46 degrees    T Axis 60 degrees    Diagnosis Line Normal sinus rhythm  Left axis deviation  Inferior infarct , age undetermined  Abnormal ECG  No previous ECGs available  Confirmed by Vince Hopkins MD ([id number]) on 2/26/2020 11:18:08 PM    < end of copied text >    < from: Xray Chest 1 View-PORTABLE IMMEDIATE (03.01.20 @ 07:34) >    EXAM:  XR CHEST PORTABLE IMMED 1V                            PROCEDURE DATE:  03/01/2020          INTERPRETATION:  HISTORY: ADMDIAG1: J86.9 PYOTHORAX WITHOUT FISTULA/; pleureal effusions; Shortness of Breath;  TECHNIQUE: Portable frontal view of the chest, 1 view.  COMPARISON: 2/27/2020.  FINDINGS:   Pigtail catheter left base.  HEART:  Enlarged  LUNGS: Improved aeration of the left lung. Hazy opacity may represent layering effusion versus lobar atelectasis. Right lung clear. No pneumothorax    OSSEOUS STRUCTURES:: degenerative changes    IMPRESSION:     Cardiomegaly.    Improved aeration of the left lung. Hazy opacity may represent layering effusion versus lobar atelectasis. Consider nonemergent CT chest..                  GABI HAGEN M.D., ATTENDING RADIOLOGIST  This document has been electronically signed. Mar  1 2020  8:00AM                < end of copied text >  < from: TTE Echo Doppler w/o Cont (02.27.20 @ 11:47) >     EXAM:  ECHO TTE WO CON COMP W DOPPLR         PROCEDURE DATE:  02/27/2020        INTERPRETATION:  INDICATION: Heart failure  Sonographer PH    Blood Pressure 107/68    Height 177.8 cm     Weight 86.2 kg       BSA 2.0 sq m    Dimensions:    LA 3.7    Normal Values: 2.0 - 4.0 cm    Ao 3.3        Normal Values: 2.0 - 3.8 cm  SEPTUM Normal Values: 0.6 - 1.2 cm  PWT Normal Values: 0.6 - 1.1 cm  LVIDd Normal Values: 3.0 - 5.6 cm  LVIDs Normal Values: 1.8 - 4.0 cm      OBSERVATIONS:  Technically difficult and limited study  Mitral Valve: normal, trace physiologic MR.  Aortic Valve/Aorta: Sclerotic trileaflet aortic valve with normal opening.  Tricuspid Valve: normal with trace TR.  Pulmonic Valve: Not well-visualized  Left Atrium: normal  RightAtrium: Not well-visualized  Left Ventricle: normal LV size and systolic function, estimated LVEF of 55%.  Right Ventricle: Not well-visualized  Pericardium/Pleura: normal, small anterior pericardial effusion without signs of hemodynamic compromise.  Pulmonary/RV Pressure: estimated PA systolic pressure of 34mmHg. IVC is dilated    Conclusion:   Technically difficult and limited study  Normal left ventricular internal dimensions and systolic function, estimated LVEF of 55%.   Right ventricle is not well-visualized  Sclerotic trileaflet aortic valve, without AI.   Trace physiologic MR and TR.                       TYLER AYALA   This document has been electronically signed. Feb 28 2020  8:07AM                < end of copied text >

## 2020-03-01 NOTE — PROGRESS NOTE ADULT - SUBJECTIVE AND OBJECTIVE BOX
Date/Time Patient Seen:  		  Referring MD:   Data Reviewed	       Patient is a 89y old  Male who presents with a chief complaint of Left Empyema (01 Mar 2020 00:49)      Subjective/HPI     PAST MEDICAL & SURGICAL HISTORY:  GI Bleed: 2007  Stomach Ulcer: H pylori 2007, treated with antibiotics  Osteoarthritis  Hyperlipidemia  Herniated Disc  Spinal Stenosis  Diverticulitis  GERD (Gastroesophageal Reflux Disease)  Chronic Lymphocytic Leukemia: followed by oncologist in [place]  S/P Tonsillectomy: childhood  Status Post Arthroscopy: right shoulder  History of Arthroscopy of Knee: bilateral  Osteoarthritis  Stomach Ulcer: H pylori, treated with antibiotics        Medication list         MEDICATIONS  (STANDING):  alteplase  Injectable for Pleural Effusion 10 milliGRAM(s) IntraPleural. every 12 hours  atorvastatin 10 milliGRAM(s) Oral at bedtime  chlorhexidine 2% Cloths 1 Application(s) Topical <User Schedule>  cyanocobalamin Injectable 1000 MICROGram(s) IntraMuscular every 24 hours  dornase brenda Solution for Pleural Effusion 5 milliGRAM(s) IntraPleural. every 12 hours  dronabinol 2.5 milliGRAM(s) Oral two times a day  famotidine    Tablet 20 milliGRAM(s) Oral daily  heparin  Injectable 5000 Unit(s) SubCutaneous every 8 hours  lactated ringers. 500 milliLiter(s) (500 mL/Hr) IV Continuous <Continuous>  lactobacillus acidophilus 1 Tablet(s) Oral three times a day  piperacillin/tazobactam IVPB.. 3.375 Gram(s) IV Intermittent every 12 hours  tamsulosin 0.4 milliGRAM(s) Oral at bedtime    MEDICATIONS  (PRN):  acetaminophen   Tablet .. 650 milliGRAM(s) Oral every 6 hours PRN Temp greater or equal to 38C (100.4F), Mild Pain (1 - 3)  albuterol/ipratropium for Nebulization 3 milliLiter(s) Nebulizer every 6 hours PRN Shortness of Breath and/or Wheezing  guaiFENesin   Syrup  (Sugar-Free) 200 milliGRAM(s) Oral every 6 hours PRN Cough  HYDROmorphone  Injectable 0.5 milliGRAM(s) IV Push every 4 hours PRN Moderate Pain (4 - 6)  HYDROmorphone  Injectable 1 milliGRAM(s) IV Push every 4 hours PRN Severe Pain (7 - 10)         Vitals log        ICU Vital Signs Last 24 Hrs  T(C): 37 (01 Mar 2020 04:00), Max: 37.1 (29 Feb 2020 11:21)  T(F): 98.6 (01 Mar 2020 04:00), Max: 98.7 (29 Feb 2020 11:21)  HR: 99 (01 Mar 2020 06:00) (84 - 114)  BP: 95/57 (01 Mar 2020 06:00) (84/53 - 135/66)  BP(mean): 71 (01 Mar 2020 06:00) (63 - 95)  ABP: --  ABP(mean): --  RR: 18 (01 Mar 2020 06:00) (12 - 31)  SpO2: 92% (01 Mar 2020 06:00) (84% - 99%)           Input and Output:  I&O's Detail    29 Feb 2020 07:01  -  01 Mar 2020 07:00  --------------------------------------------------------  IN:    dexmedetomidine Infusion: 16.5 mL    dextrose 5% + sodium chloride 0.45% with potassium chloride 20 mEq/L: 210 mL    lactated ringers.: 1000 mL    Oral Fluid: 370 mL    Solution: 200 mL  Total IN: 1796.5 mL    OUT:    Chest Tube: 1200 mL    Voided: 120 mL  Total OUT: 1320 mL    Total NET: 476.5 mL          Lab Data                        10.1   9.34  )-----------( 223      ( 01 Mar 2020 05:08 )             30.4     03-01    133<L>  |  98  |  54<H>  ----------------------------<  130<H>  4.4   |  23  |  5.10<H>    Ca    7.9<L>      01 Mar 2020 05:08  Phos  6.6     03-01  Mg     2.6     03-01    TPro  5.9<L>  /  Alb  2.2<L>  /  TBili  1.6<H>  /  DBili  x   /  AST  56<H>  /  ALT  76  /  AlkPhos  98  03-01      CARDIAC MARKERS ( 29 Feb 2020 10:47 )  x     / x     / 65 U/L / x     / x            Review of Systems	      Objective     Physical Examination    heart s1s2  lung dec BS  abd soft  left ct  on o2 support  head nc      Pertinent Lab findings & Imaging      [place]:  NO   Adequate UO     I&O's Detail    29 Feb 2020 07:01  -  01 Mar 2020 07:00  --------------------------------------------------------  IN:    dexmedetomidine Infusion: 16.5 mL    dextrose 5% + sodium chloride 0.45% with potassium chloride 20 mEq/L: 210 mL    lactated ringers.: 1000 mL    Oral Fluid: 370 mL    Solution: 200 mL  Total IN: 1796.5 mL    OUT:    Chest Tube: 1200 mL    Voided: 120 mL  Total OUT: 1320 mL    Total NET: 476.5 mL               Discussed with:     Cultures:	        Radiology

## 2020-03-01 NOTE — PROGRESS NOTE ADULT - ASSESSMENT
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 sided loculated effusions concerning for empyema and found to have 8mm L. extrarenal pelvis stone and 6mm R. distal ureteral stone, pt was transferred to [facility] for further tx/mangement. On 2/27/2020 pt underwent thoracentesis w/ pig-tail placement by IR. Pt was transferred to MICU given minimal outpt of appx 200cc to be given dornase/alteplase via peg-tail.     -Neuro: Mental status stable, precedex d/c, cont Dilaudid, tylenol for Chest tube pain control, chronic low back pain cont marinol  -Cardic: No acute cardic process and HDS, maintain MAP>65  -Resp: L. sided pleural effusions concerning for empyema w/ L. sided pig-tail now draining to suction -10, will hold on overnight dose, monitor outpt closely, thoracic surgery following input noted, maintain o2 >90%  -GI: cont diet as ordered/tolerated, GI ppx w/ famotidine, hld cont atorvastatin  -Renal: GUS , lytes reviewed and replete w/ renal precautions, monitor renal function closely avoid nephrotic toxic agents, monitor UOP closely  -ID: pt afebril, wbc stable, IV vanco d/c given trough of 21, cx w/ no growth, cont IV zosyn for empyema  -Endo: No acute issues   -Heme: DVT ppx w/ heparin   -Dispo: Pt remains in MICU for now, GOC ongoing, prognosis guarded  -Renal:

## 2020-03-01 NOTE — PROGRESS NOTE ADULT - ASSESSMENT
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     Assessment:  1. 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     Assessment:  1. Left sided loculated pleural effusion- likely empyema pH 7.0, glu normal, 118 nucleated cells with neutrophil predominance  2. GUS- vanco toxicity? Likely ATN, follows appropriate time-line post vanco dosing w/ trough @23  3. Hematuria- doubt systemtic absorption from tPA, perhaps from pulling? Pt needing frequent redirection  4. Delirium- likely secondary to pain medications, age-related and ICU induced.     Plan:  - Urology consulted, advised to place 2-way 20 Fr Coude. Surgery PA at bedside. Removed 16Fr w/ passage of large clot and nick blood/mixed with urine. Celestin irrigation performed with clear fluid on return and no further blood/clots noted. New celestin noted in place on bladder POCUS. Hyperechoic fluid no longer noted inside bladder. No urine noted though.   - Continue q2h celestin irrigation w/ 60-120cc's sterile water. Keep celestin in place. Dr Larson (urology) aware, also notified of multiple non-obstructive extra-renal stones.   - Minimal UOP noted throughout last 2 days. Cr following impressive injury w/ initial Cr 1.00 on 2/26 now >5. Nephrology recommended judicious IVF but on my POCUS pt will likely not tolerate further large volumes of fluid. In addition, pt increasingly delirious secondary to pain medication secondary to chest tube and now repeated celestin insertion with likely trauma, pt at risk for respiratory compromise and I fear that further fluid may be harmful. Will need to reach out to Nephro re: HD tomorrow. Will also need to discuss w/ son (Md) re: HD for father given initially wanting conservative measures.   - For now, monitor UOP w/ Strict I/Os.  Trend Cr. Avoid further nephrotoxic medications (vanco) and continue dose adjustments for meds.   - Continue supplemental O2 via NC. Titrate to maintain SPO2 >90%. Keep HOB elevated. Aspiration precautions in setting of sedating pain medications. Will hold off on overnight dose of tPA/Dornase in setting of hematuria.   - Continue Zosyn for empyema. Cultures negative, initial body fluid cx negative pending repeat (testing now). Afebrile, no leukocytosis. Flu neg.   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     Assessment:  1. Left sided loculated pleural effusion- likely empyema pH 7.0, glu normal, 118 nucleated cells with neutrophil predominance  2. GUS- vanco toxicity? Likely ATN, follows appropriate time-line post vanco dosing w/ trough @23  3. Hematuria- doubt systemtic absorption from tPA, perhaps from pulling? Pt needing frequent redirection  4. Delirium- likely secondary to pain medications, age-related and ICU induced.     Plan:  - Urology consulted, advised to place 2-way 20 Fr Coude. Surgery PA at bedside. Removed 16Fr w/ passage of large clot and nick blood/mixed with urine. Celestin irrigation performed with clear fluid on return and no further blood/clots noted. New celestin noted in place on bladder POCUS. Hyperechoic fluid no longer noted inside bladder.   - Continue q2h celestin irrigation w/ 60-120cc's sterile water. Keep celestin in place. Dr Larson (urology) aware, also notified of multiple non-obstructive extra-renal stones.   - Minimal UOP noted throughout last 2 days. Cr following impressive injury w/ initial Cr 1.00 on 2/26 now >5. Nephrology recommended judicious IVF but on my POCUS pt will likely not tolerate further large volumes of fluid. In addition, pt increasingly delirious likely from pain medication secondary to chest tube and now repeated celestin insertion with likely trauma. Given this, pt at risk for respiratory compromise and I fear that further fluid may be harmful. Will need to reach out to Nephro re: HD tomorrow. Will also need to discuss w/ son (Md) re: HD for father given initially wanting conservative measures.   - For now, monitor UOP w/ Strict I/Os.  Trend Cr. Avoid further nephrotoxic medications (vanco) and continue dose adjustments for meds.   - Continue supplemental O2 via NC. Titrate to maintain SPO2 >90%. Keep HOB elevated. Aspiration precautions in setting of sedating pain medications. Will hold off on overnight dose of tPA/Dornase in setting of hematuria. Repeat CBC stat. PRN pRBC for HgB <7.0. Monitor HD's, stable currently.   - As above, hold tpa/dornase, despite dose again today pt with decreased output from chest tube. Will rediscuss w/ CTsx re: plan. CT scan showing consistent complicated effusion, unsure if multiple pockets and likely culprit for minimal drainage despite tpa/dornase. Son contemplating more aggressive measures. Will d/w with day team re: next step.   - Continue Zosyn for empyema. Cultures negative, initial body fluid cx negative pending repeat (testing now). Afebrile, no leukocytosis. Flu neg.   - As per conversation with son via phone, pain control is priority, will be cautious going forward but will need further pain control.   - Subq Heparin for dvt ppx  - Palliative consult     DISPO: Full code. To remain in ICU.

## 2020-03-01 NOTE — PROGRESS NOTE ADULT - SUBJECTIVE AND OBJECTIVE BOX
All interim records and events noted.    awake, alert, up in bed, son present  no pain or SOB      MEDICATIONS  (STANDING):  alteplase  Injectable for Pleural Effusion 10 milliGRAM(s) IntraPleural. every 12 hours  atorvastatin 10 milliGRAM(s) Oral at bedtime  chlorhexidine 2% Cloths 1 Application(s) Topical <User Schedule>  cyanocobalamin Injectable 1000 MICROGram(s) IntraMuscular every 24 hours  dornase brenda Solution for Pleural Effusion 5 milliGRAM(s) IntraPleural. every 12 hours  dronabinol 2.5 milliGRAM(s) Oral two times a day  famotidine    Tablet 20 milliGRAM(s) Oral daily  heparin  Injectable 5000 Unit(s) SubCutaneous every 8 hours  lactated ringers Bolus 500 milliLiter(s) IV Bolus once  lactated ringers. 1000 milliLiter(s) (50 mL/Hr) IV Continuous <Continuous>  lactated ringers. 500 milliLiter(s) (500 mL/Hr) IV Continuous <Continuous>  lactobacillus acidophilus 1 Tablet(s) Oral three times a day  piperacillin/tazobactam IVPB.. 3.375 Gram(s) IV Intermittent every 12 hours  tamsulosin 0.4 milliGRAM(s) Oral at bedtime    MEDICATIONS  (PRN):  acetaminophen   Tablet .. 650 milliGRAM(s) Oral every 6 hours PRN Temp greater or equal to 38C (100.4F), Mild Pain (1 - 3)  albuterol/ipratropium for Nebulization 3 milliLiter(s) Nebulizer every 6 hours PRN Shortness of Breath and/or Wheezing  guaiFENesin   Syrup  (Sugar-Free) 200 milliGRAM(s) Oral every 6 hours PRN Cough  HYDROmorphone  Injectable 0.5 milliGRAM(s) IV Push every 4 hours PRN Moderate Pain (4 - 6)  HYDROmorphone  Injectable 1 milliGRAM(s) IV Push every 4 hours PRN Severe Pain (7 - 10)      Vital Signs Last 24 Hrs  T(C): 36.7 (01 Mar 2020 07:25), Max: 37 (01 Mar 2020 04:00)  T(F): 98 (01 Mar 2020 07:25), Max: 98.6 (01 Mar 2020 04:00)  HR: 98 (01 Mar 2020 11:00) (84 - 114)  BP: 141/69 (01 Mar 2020 11:00) (84/53 - 141/69)  BP(mean): 99 (01 Mar 2020 11:00) (61 - 99)  RR: 30 (01 Mar 2020 11:00) (12 - 31)  SpO2: 93% (01 Mar 2020 11:00) (84% - 99%)    PHYSICAL EXAM  General: well developed  well nourished, in no acute distress  Head: atraumatic, normocephalic  ENT: sclera anicteric, buccal mucosa moist  Neck: supple, trachea midline  CV: S1 S2, regular rate and rhythm  Lungs: clear to auscultation, no wheezes/rhonchi  Abdomen: soft, nontender, bowel sounds present, no palpable hepatosplenomegaly  Extrem: no clubbing/cyanosis/edema  Skin: no significant increased ecchymosis/petechiae  Neuro: alert and oriented X3,  no focal deficits      LABS:             10.1   9.34  )-----------( 223      ( 03-01 @ 05:08 )             30.4                9.9    7.91  )-----------( 191      ( 02-29 @ 10:47 )             29.4                11.0   8.55  )-----------( 212      ( 02-28 @ 14:53 )             32.7       03-01    133<L>  |  98  |  54<H>  ----------------------------<  130<H>  4.4   |  23  |  5.10<H>    Ca    7.9<L>      01 Mar 2020 05:08  Phos  6.6     03-01  Mg     2.6     03-01    TPro  5.9<L>  /  Alb  2.2<L>  /  TBili  1.6<H>  /  DBili  x   /  AST  56<H>  /  ALT  76  /  AlkPhos  98  03-01 02-29 @ 10:47  PT17.8 INR1.57  PTT29.2  02-27 @ 07:10  PT19.3 INR1.70  PTT29.4  02-26 @ 08:57  PT24.2 INR2.11  PTT--  02-25 @ 18:13  PT22.6 INR1.99  PTT30.9      RADIOLOGY & ADDITIONAL STUDIES:    IMPRESSION/RECOMMENDATIONS:

## 2020-03-01 NOTE — PROGRESS NOTE ADULT - ASSESSMENT
89 yo M with PMH of HLD and MALToma of the stomach (under surveillance), spinal stenosis with extensive lumbar surgery in 2011 and bilateral LE neuropathy, admitted for L side empyema and R ureteral stone. Patient initially reported what appeared to be stable occasional angina however later denied this - he is a poor historian.   Elevated proBNP however normal LV systolic function with EF 55% on echo and no evidence of meaningful volume overload on exam.   Review of EKG revealed possible inferior infarct however no wall motion abnormalities on 2/27 echo.   He is now in the intensive care unit status post lytic infusion therapy through a chest tube for his left-sided empyema.  He appears to have acute kidney injury possibly on the basis of vancomycin although a prerenal component should be considered given his lack of oral intake while on the floor.    Recommend  - chest tube per ICU team  - hold lytics today per pulm  - eval for GUS  - IV hydration  - Continue Atorvastatin.  - Continue heparin SQ   - Monitor and replete lytes, keep K>4, Mg>2.  - No urology procedure planned at present  - Rest of management per urology/CT surgery/primary team. Other cardiovascular workup will depend on clinical course. Will follow.

## 2020-03-01 NOTE — PROGRESS NOTE ADULT - ASSESSMENT
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 Transferred to ICU for tPA/dornase brenda via chest tube. Found to have GUS 2/2 ATN, likely due to vancomycin.    1. Neuro: pain control with acetaminophen and hydromorphone prn  2. CV: HD stable, continue statin  3. Pulm: continue NC@2-4LPM, goal SpO2>90%. Continue chest tube drainage, increase suction back to -20 cm H2O. Administer intrapleural tPA/dornase brenda again today and monitor. Will need pre-medication with hydromorphone. Consider low-dose dexmedetomidine as well given agitation due to pain yesterday after administration. Monitor chest tube output. Incentive spirometry  4. GI: regular diet as tolerates with Ensure, continue dronabinol. On lactobacillus  5. Renal: GUS of unclear etiology, likely drug toxicity due to vancomycin. Repeat trough today is 20, (was 23 yesterday, 36 hours after last vanco administered). Renally dose meds. Renal eval. Check C3, C4, ASO. Urine eos negative and no peripheral eosinophilia or rash, doubt AIN. Strict I/O's, trend kidney function and lytes. FeNa>1%, unlikely prerenal, c/w ATN. Also has 8mm L extrarenal pelvis stone and 6mm R distal urethral stone, non-obstructing, f/up urology. Keep celestin  6. ID: likely empyema, pH 7.0, normal glucose, 118 nuc cells with neutrophil predominance. Continue Zosyn. Cultures negative  7. Heme: HSQ for DVT ppx. Maltoma and CLL being monitored, not on active treatment  8. Endo: no active issues  9. Skin: no lines or celestin  10. Dispo: full code, discussed with patient and family at bedside

## 2020-03-01 NOTE — CHART NOTE - NSCHARTNOTEFT_GEN_A_CORE
Asked by bedside RN to eval for no UOP since 7pm, pt was given 500cc of LR bolus w/ no UOP,     bedside POCUS was attempted to check IVC but unable to obtain good window, additional 250cc of LR     bolus was ordered but still w/ no UOP, bedside bladder scan shows appx 14cc of urine in bladder,     will order Renal/bladder US given hx of stones on recent CT and order additional 250cc of LR, pt     will need Mcnulty for stick I&O but given recent alteplase there is risk of bleeding will d/w eICU attending if safe to place

## 2020-03-01 NOTE — PROGRESS NOTE ADULT - SUBJECTIVE AND OBJECTIVE BOX
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 sided loculated effusions concerning for empyema and found to have 8mm L. extrarenal pelvis stone and     24 hour events:     Review of Systems:  Constitutional: No fever, chills, fatigue  Neuro: No headache, numbness, weakness  Resp: No cough, wheezing, shortness of breath  CVS: No chest pain, palpitations, leg swelling  GI: No abdominal pain, nausea, vomiting, diarrhea   : No dysuria, frequency, incontinence  Skin: No itching, burning, rashes, or lesions   Msk: No joint pain or swelling  Psych: No depression, anxiety, mood swings    T(F): 98.4 (20 @ 20:03), Max: 98.7 (20 @ 11:21)  HR: 100 (20 @ 00:00) (87 - 114)  BP: 98/64 (20 @ 00:00) (84/53 - 135/66)  RR: 16 (20 @ 00:00) (12 - 31)  SpO2: 95% (20 @ 00:00) (90% - 99%)  Wt(kg): --      20 @ 07:  -  20 @ 07:00  --------------------------------------------------------  IN: 1020 mL / OUT: 30 mL / NET: 990 mL    20 @ 07:01  -  20 @ 00:49  --------------------------------------------------------  IN: 1146.5 mL / OUT: 945 mL / NET: 201.5 mL        CAPILLARY BLOOD GLUCOSE      POCT Blood Glucose.: 95 mg/dL (2020 12:33)      I&O's Summary    2020 07:01  -  2020 07:00  --------------------------------------------------------  IN: 1020 mL / OUT: 30 mL / NET: 990 mL    2020 07:  -  01 Mar 2020 00:49  --------------------------------------------------------  IN: 1146.5 mL / OUT: 945 mL / NET: 201.5 mL        Physical Exam:   Gen:  Neuro:  HEENT:  Resp:  CVS:  Abd:  Ext:  Skin:    Meds:  piperacillin/tazobactam IVPB.. IV Intermittent    tamsulosin Oral    atorvastatin Oral    albuterol/ipratropium for Nebulization Nebulizer PRN  dornase brenda Solution for Pleural Effusion IntraPleural.  guaiFENesin   Syrup  (Sugar-Free) Oral PRN    acetaminophen   Tablet .. Oral PRN  dexMEDEtomidine Infusion IV Continuous  dronabinol Oral  HYDROmorphone  Injectable IV Push PRN  HYDROmorphone  Injectable IV Push PRN      alteplase  Injectable for Pleural Effusion IntraPleural.  heparin  Injectable SubCutaneous    famotidine    Tablet Oral      cyanocobalamin Injectable IntraMuscular  lactated ringers. IV Continuous      chlorhexidine 2% Cloths Topical    lactobacillus acidophilus Oral                            9.9    7.91  )-----------( 191      ( 2020 10:47 )             29.4       02-29    133<L>  |  100  |  47<H>  ----------------------------<  161<H>  4.3   |  23  |  4.50<H>    Ca    8.0<L>      2020 10:47  Phos  5.5     02-  Mg     2.5     -    TPro  5.9<L>  /  Alb  2.3<L>  /  TBili  1.8<H>  /  DBili  1.20<H>  /  AST  79<H>  /  ALT  92<H>  /  AlkPhos  111  29      CARDIAC MARKERS ( 2020 10:47 )  x     / x     / 65 U/L / x     / x          PT/INR - ( 2020 10:47 )   PT: 17.8 sec;   INR: 1.57 ratio         PTT - ( 2020 10:47 )  PTT:29.2 sec  Urinalysis Basic - ( 2020 17:47 )    Color: Yellow / Appearance: Slightly Turbid / S.015 / pH: x  Gluc: x / Ketone: Negative  / Bili: Negative / Urobili: Negative   Blood: x / Protein: 30 mg/dL / Nitrite: Negative   Leuk Esterase: Trace / RBC: 11-25 /HPF / WBC 0-2   Sq Epi: x / Non Sq Epi: Few / Bacteria: Many      .Body Fluid Pleural Fluid   Culture is being performed.   polymorphonuclear leukocytes  No organisms seen  by cytocentrifuge  @ 21:38  .Blood Blood-Peripheral   No growth to date. --  @ 22:29  .Urine Catheterized   50,000 - 99,000 CFU/mL Coag Negative Staphylococcus  Susceptibilites not performed. --  @ 11:46  .Blood Blood-Peripheral   No growth to date. --  @ 22:38              Radiology: ***  Bedside ultrasound: ***    CENTRAL LINE: N/Y          DATE INSERTED:              REMOVE: Y/N  SOLIS: N/Y                       DATE INSERTED:              REMOVE: Y/N  A-LINE: N/Y                       DATE INSERTED:              REMOVE: Y/N    GLOBAL ISSUE/BEST PRACTICE:  Analgesia:  Sedation:  CAM-ICU:   HOB elevation: yes  Stress ulcer prophylaxis:  VTE prophylaxis:  Glycemic control:  Nutrition:    CODE STATUS: ***    CRITICAL CARE TIME SPENT:  (Assessing presenting problems of acute illness, which pose high probability of life threatening deterioration or end organ damage/dysfunction, as well as medical decision making including initiating plan of care, reviewing data, reviewing radiologic exams, discussing with multidisciplinary team,  discussing goals of care with patient/family, and writing this note.  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 sided loculated effusions concerning for empyema and found to have 8mm L. extrarenal pelvis stone and 6mm R. distal ureteral stone, pt was transferred to [facility] for further tx/mangement. On 2020 pt underwent thoracentesis w/ pig-tail placement by IR. Pt was transferred to MICU given minimal outpt of appx 200cc to be given dornase/alteplase via peg-tail.     24 hour events: Pt was given dornase/alteplast via pig-tail today with good outpt form pig-tail set to suction at -10. Post administration pt developed increased pain for which he was given dilaudid for pain control and was started on precedex drip pt subsequently developed hypotension to SBP 90s for which 500cc of LR and precedex gtt stopped. No other major day time events. During exam pt denies any medical complains at this time.    Review of Systems:  Constitutional: No fever, chills, fatigue  Neuro: No headache, numbness, weakness  Resp: No cough, wheezing, shortness of breath  CVS: No chest pain, palpitations, leg swelling  GI: No abdominal pain, nausea, vomiting, diarrhea   : No dysuria, frequency, incontinence  Skin: No itching, burning, rashes, or lesions   Msk: No joint pain or swelling  Psych: No depression, anxiety, mood swings    T(F): 98.4 (20 @ 20:03), Max: 98.7 (20 @ 11:21)  HR: 100 (20 @ 00:00) (87 - 114)  BP: 98/64 (20 @ 00:00) (84/53 - 135/66)  RR: 16 (20 @ 00:00) (12 - 31)  SpO2: 95% (20 @ 00:00) (90% - 99%)  Wt(kg): --      20 @ 07:01  -  20 @ 07:00  --------------------------------------------------------  IN: 1020 mL / OUT: 30 mL / NET: 990 mL    20 @ 07:01  -  20 @ 00:49  --------------------------------------------------------  IN: 1146.5 mL / OUT: 945 mL / NET: 201.5 mL        CAPILLARY BLOOD GLUCOSE      POCT Blood Glucose.: 95 mg/dL (2020 12:33)      I&O's Summary    2020 07:  -  2020 07:00  --------------------------------------------------------  IN: 1020 mL / OUT: 30 mL / NET: 990 mL    2020 07:  -  01 Mar 2020 00:49  --------------------------------------------------------  IN: 1146.5 mL / OUT: 945 mL / NET: 201.5 mL        Physical Exam:   Gen: Comfortable in bed in NAD  Neuro: AAOx3  Resp: good air entery b/l   CVS/Chest: +RRR, +L. sided chest tube with drainage noted  Abd: BSx4, soft, nt/nd  Ext: no edema  Skin: warm/dry    Meds:  piperacillin/tazobactam IVPB.. IV Intermittent    tamsulosin Oral    atorvastatin Oral    albuterol/ipratropium for Nebulization Nebulizer PRN  dornase brenda Solution for Pleural Effusion IntraPleural.  guaiFENesin   Syrup  (Sugar-Free) Oral PRN    acetaminophen   Tablet .. Oral PRN  dexMEDEtomidine Infusion IV Continuous  dronabinol Oral  HYDROmorphone  Injectable IV Push PRN  HYDROmorphone  Injectable IV Push PRN      alteplase  Injectable for Pleural Effusion IntraPleural.  heparin  Injectable SubCutaneous    famotidine    Tablet Oral      cyanocobalamin Injectable IntraMuscular  lactated ringers. IV Continuous      chlorhexidine 2% Cloths Topical    lactobacillus acidophilus Oral                            9.9    7.91  )-----------( 191      ( 2020 10:47 )             29.4           133<L>  |  100  |  47<H>  ----------------------------<  161<H>  4.3   |  23  |  4.50<H>    Ca    8.0<L>      2020 10:47  Phos  5.5       Mg     2.5         TPro  5.9<L>  /  Alb  2.3<L>  /  TBili  1.8<H>  /  DBili  1.20<H>  /  AST  79<H>  /  ALT  92<H>  /  AlkPhos  111        CARDIAC MARKERS ( 2020 10:47 )  x     / x     / 65 U/L / x     / x          PT/INR - ( 2020 10:47 )   PT: 17.8 sec;   INR: 1.57 ratio         PTT - ( 2020 10:47 )  PTT:29.2 sec  Urinalysis Basic - ( 2020 17:47 )    Color: Yellow / Appearance: Slightly Turbid / S.015 / pH: x  Gluc: x / Ketone: Negative  / Bili: Negative / Urobili: Negative   Blood: x / Protein: 30 mg/dL / Nitrite: Negative   Leuk Esterase: Trace / RBC: 11-25 /HPF / WBC 0-2   Sq Epi: x / Non Sq Epi: Few / Bacteria: Many      .Body Fluid Pleural Fluid   Culture is being performed.   polymorphonuclear leukocytes  No organisms seen  by cytocentrifuge  @ 21:38  .Blood Blood-Peripheral   No growth to date. --  @ 22:29  .Urine Catheterized   50,000 - 99,000 CFU/mL Coag Negative Staphylococcus  Susceptibilites not performed. --  @ 11:46  .Blood Blood-Peripheral   No growth to date. --  @ 22:38    Radiology:     EXAM:  XR CHEST PORTABLE ROUTINE 1V                            PROCEDURE DATE:  2020        INTERPRETATION:  AP semierect chest on 2020 at 4:37 PM. Patient had left chest tube insertion.    Prior study of  showed an extensive left effusion.    On present examination heart is difficult to assess.    A catheter left chest tube is been inserted at the base.    Large left effusion possibly loculated laterally is essentially unchanged. No pneumothorax is evident.    IMPRESSION: Catheter left chest tube insertion. Persistent large left effusion possibly with loculation.    EDWARD CARROLL M.D., ATTENDING RADIOLOGIST  This document has been electronically signed. 2020  4:56PM      CENTRAL LINE: N  SOLIS: N  A-LINE: N    GLOBAL ISSUE/BEST PRACTICE:  Analgesia: Y  Sedation: N  CAM-ICU: n/a  HOB elevation: Y  Stress ulcer prophylaxis: Y  VTE prophylaxis: Y  Glycemic control: Y  Nutrition: Y    CODE STATUS: FULL CODE

## 2020-03-01 NOTE — PROGRESS NOTE ADULT - SUBJECTIVE AND OBJECTIVE BOX
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 Pt found to have loculated left pleural effusions on CT Chest, concerning for empyema. Pt underwent IR guided left chest tube on , initially w/ minimal drainage, subsequently transferred to [facility] ED for further management. Pt initially offered VATs, but family wanted more conservative measures. Pt subsequently treated with tPA/Dornase brenda via chest tube. Pt initially with sufficient output s/p first dose tPA now with decreased drainage on 2nd administration (~100-200 cc's, total 1.2L). ICU course complicated by worsening renal failure, Cr 1.00  to now 5.1.     Events last 24 hours: Hematuria noted in urethral catheter with multiple clots noted blocking UOP. Urology consulted. HD stable. Off Precedex.     PAST MEDICAL & SURGICAL HISTORY:  GI Bleed:   Stomach Ulcer: H pylori , treated with antibiotics  Osteoarthritis  Hyperlipidemia  Herniated Disc  Spinal Stenosis  Diverticulitis  GERD (Gastroesophageal Reflux Disease)  Chronic Lymphocytic Leukemia: followed by oncologist in [place]  S/P Tonsillectomy: childhood  Status Post Arthroscopy: right shoulder  History of Arthroscopy of Knee: bilateral    Review of Systems:  CONSTITUTIONAL: No fever, chills, or fatigue  EYES: No eye pain, visual disturbances, or discharge  ENMT:  No difficulty hearing, tinnitus, vertigo; No sinus or throat pain  NECK: No pain or stiffness  RESPIRATORY: No cough, wheezing, chills or hemoptysis; No shortness of breath  CARDIOVASCULAR: No chest pain, palpitations, dizziness, or leg swelling  GASTROINTESTINAL: No abdominal or epigastric pain. No nausea, vomiting, or hematemesis; No diarrhea or constipation. No melena or hematochezia.  GENITOURINARY: No dysuria, frequency, hematuria, or incontinence  NEUROLOGICAL: No headaches, memory loss, loss of strength, numbness, or tremors  SKIN: No itching, burning, rashes, or lesions   MUSCULOSKELETAL: No joint pain or swelling; No muscle, back, or extremity pain  PSYCHIATRIC: No depression, anxiety, mood swings, or difficulty sleeping    Medications:  piperacillin/tazobactam IVPB.. 3.375 Gram(s) IV Intermittent every 12 hours  tamsulosin 0.4 milliGRAM(s) Oral at bedtime  albuterol/ipratropium for Nebulization 3 milliLiter(s) Nebulizer every 6 hours PRN  dornase brenda Solution for Pleural Effusion 5 milliGRAM(s) IntraPleural. every 12 hours  guaiFENesin   Syrup  (Sugar-Free) 200 milliGRAM(s) Oral every 6 hours PRN  acetaminophen   Tablet .. 650 milliGRAM(s) Oral every 6 hours PRN  dronabinol 2.5 milliGRAM(s) Oral two times a day  HYDROmorphone  Injectable 0.5 milliGRAM(s) IV Push every 4 hours PRN  HYDROmorphone  Injectable 1 milliGRAM(s) IV Push every 4 hours PRN  alteplase  Injectable for Pleural Effusion 10 milliGRAM(s) IntraPleural. every 12 hours  heparin  Injectable 5000 Unit(s) SubCutaneous every 8 hours  famotidine    Tablet 20 milliGRAM(s) Oral daily  atorvastatin 10 milliGRAM(s) Oral at bedtime  cyanocobalamin Injectable 1000 MICROGram(s) IntraMuscular every 24 hours  chlorhexidine 2% Cloths 1 Application(s) Topical <User Schedule>  lactobacillus acidophilus 1 Tablet(s) Oral three times a day  lidocaine 2% Jelly 6 milliLiter(s) IntraUrethral once    ICU Vital Signs Last 24 Hrs  T(C): 36.9 (01 Mar 2020 15:05), Max: 37 (01 Mar 2020 04:00)  T(F): 98.5 (01 Mar 2020 15:05), Max: 98.6 (01 Mar 2020 04:00)  HR: 108 (01 Mar 2020 19:00) (84 - 108)  BP: 102/55 (01 Mar 2020 19:00) (84/53 - 141/69)  BP(mean): 75 (01 Mar 2020 19:00) (61 - 99)  ABP: --  ABP(mean): --  RR: 22 (01 Mar 2020 19:00) (12 - 31)  SpO2: 100% (01 Mar 2020 19:00) (83% - 100%)    I&O's Detail    2020 07:01  -  01 Mar 2020 07:00  --------------------------------------------------------  IN:    dexmedetomidine Infusion: 16.5 mL    dextrose 5% + sodium chloride 0.45% with potassium chloride 20 mEq/L: 210 mL    lactated ringers.: 1000 mL    Oral Fluid: 370 mL    Solution: 200 mL  Total IN: 1796.5 mL    OUT:    Chest Tube: 1200 mL    Voided: 120 mL  Total OUT: 1320 mL    Total NET: 476.5 mL      01 Mar 2020 07:01  -  01 Mar 2020 20:13  --------------------------------------------------------  IN:    dexmedetomidine Infusion: 9.4 mL    Lactated Ringers IV Bolus: 1000 mL    lactated ringers.: 350 mL    Oral Fluid: 510 mL    Solution: 100 mL  Total IN: 1969.4 mL    OUT:    Indwelling Catheter - Urethral: 218 mL  Total OUT: 218 mL    Total NET: 1751.4 mL    LABS:                        10.1   9.34  )-----------( 223      ( 01 Mar 2020 05:08 )             30.4     03-01    133<L>  |  98  |  54<H>  ----------------------------<  130<H>  4.4   |  23  |  5.10<H>    Ca    7.9<L>      01 Mar 2020 05:08  Phos  6.6     03-01  Mg     2.6     03-01    TPro  5.9<L>  /  Alb  2.2<L>  /  TBili  1.6<H>  /  DBili  x   /  AST  56<H>  /  ALT  76  /  AlkPhos  98  03-01    CARDIAC MARKERS ( 2020 10:47 )  x     / x     / 65 U/L / x     / x          CAPILLARY BLOOD GLUCOSE  POCT Blood Glucose.: 95 mg/dL (2020 12:33)    PT/INR - ( 2020 10:47 )   PT: 17.8 sec;   INR: 1.57 ratio    PTT - ( 2020 10:47 )  PTT:29.2 sec    Urinalysis Basic - ( 2020 17:47 )  Color: Yellow / Appearance: Slightly Turbid / S.015 / pH: x  Gluc: x / Ketone: Negative  / Bili: Negative / Urobili: Negative   Blood: x / Protein: 30 mg/dL / Nitrite: Negative   Leuk Esterase: Trace / RBC: 11-25 /HPF / WBC 0-2   Sq Epi: x / Non Sq Epi: Few / Bacteria: Many    CULTURES:  Culture Results:   Culture is being performed. ( @ 21:38)  Culture Results:   No growth ( @ 21:38)  Culture Results:   Testing in progress ( @ 21:38)  Culture Results:   No growth to date. ( @ 22:29)  Culture Results:   No growth to date. ( @ 22:29)  Culture Results:   50,000 - 99,000 CFU/mL Coag Negative Staphylococcus  Susceptibilites not performed. ( @ 11:46)  Culture Results:   No growth to date. ( @ 22:38)  Culture Results:   No growth to date. ( @ 22:38)      Physical Examination:    VITALS AT TIME OF EXAM:  HR:  BP:  RR:  SPO2:    General: Well appearing, lying in bed in NAD      HEENT: Pupils equal, reactive to light.  Symmetric. No scleral icterus or injection    PULM: Clear to auscultation bilaterally, no wheezes, rales or rhonchi, no significant sputum production or increased respiratory effort    NECK: Supple, no lymphadenopathy, trachea midline    CVS: Regular rate and rhythm, no murmurs, +s1/s2    ABD: Soft, nondistended, nontender, normoactive bowel sounds    EXT: No edema, nontender    SKIN: Warm and well perfused, no rashes noted.    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 Pt found to have loculated left pleural effusions on CT Chest, concerning for empyema. Pt underwent IR guided left chest tube on , initially w/ minimal drainage, subsequently transferred to [facility] ED for further management. Pt initially offered VATs, but family wanted more conservative measures. Pt subsequently treated with tPA/Dornase brenda via chest tube. Pt initially with sufficient output s/p first dose tPA now with decreased drainage on 2nd administration (~100-200 cc's, total 1.2L). ICU course complicated by worsening renal failure, Cr 1.00  to now 5.1.     Events last 24 hours: Hematuria noted in urethral catheter with multiple clots noted blocking UOP. Urology consulted. HD stable. Off Precedex.     PAST MEDICAL & SURGICAL HISTORY:  GI Bleed:   Stomach Ulcer: H pylori , treated with antibiotics  Osteoarthritis  Hyperlipidemia  Herniated Disc  Spinal Stenosis  Diverticulitis  GERD (Gastroesophageal Reflux Disease)  Chronic Lymphocytic Leukemia: followed by oncologist in [place]  S/P Tonsillectomy: childhood  Status Post Arthroscopy: right shoulder  History of Arthroscopy of Knee: bilateral    Review of Systems:  CONSTITUTIONAL: No fever, chills, or fatigue  EYES: No eye pain, visual disturbances, or discharge  ENMT:  No difficulty hearing, tinnitus, vertigo; No sinus or throat pain  NECK: No pain or stiffness  RESPIRATORY: No cough, wheezing, chills or hemoptysis; No shortness of breath  CARDIOVASCULAR: No chest pain, palpitations, dizziness, or leg swelling  GASTROINTESTINAL: No abdominal or epigastric pain. No nausea, vomiting, or hematemesis; No diarrhea or constipation. No melena or hematochezia.  GENITOURINARY: No dysuria, frequency, hematuria, or incontinence  NEUROLOGICAL: No headaches, memory loss, loss of strength, numbness, or tremors  SKIN: No itching, burning, rashes, or lesions   MUSCULOSKELETAL: No joint pain or swelling; No muscle, back, or extremity pain  PSYCHIATRIC: No depression, anxiety, mood swings, or difficulty sleeping    Medications:  piperacillin/tazobactam IVPB.. 3.375 Gram(s) IV Intermittent every 12 hours  tamsulosin 0.4 milliGRAM(s) Oral at bedtime  albuterol/ipratropium for Nebulization 3 milliLiter(s) Nebulizer every 6 hours PRN  dornase brenda Solution for Pleural Effusion 5 milliGRAM(s) IntraPleural. every 12 hours  guaiFENesin   Syrup  (Sugar-Free) 200 milliGRAM(s) Oral every 6 hours PRN  acetaminophen   Tablet .. 650 milliGRAM(s) Oral every 6 hours PRN  dronabinol 2.5 milliGRAM(s) Oral two times a day  HYDROmorphone  Injectable 0.5 milliGRAM(s) IV Push every 4 hours PRN  HYDROmorphone  Injectable 1 milliGRAM(s) IV Push every 4 hours PRN  alteplase  Injectable for Pleural Effusion 10 milliGRAM(s) IntraPleural. every 12 hours  heparin  Injectable 5000 Unit(s) SubCutaneous every 8 hours  famotidine    Tablet 20 milliGRAM(s) Oral daily  atorvastatin 10 milliGRAM(s) Oral at bedtime  cyanocobalamin Injectable 1000 MICROGram(s) IntraMuscular every 24 hours  chlorhexidine 2% Cloths 1 Application(s) Topical <User Schedule>  lactobacillus acidophilus 1 Tablet(s) Oral three times a day  lidocaine 2% Jelly 6 milliLiter(s) IntraUrethral once    ICU Vital Signs Last 24 Hrs  T(C): 36.9 (01 Mar 2020 15:05), Max: 37 (01 Mar 2020 04:00)  T(F): 98.5 (01 Mar 2020 15:05), Max: 98.6 (01 Mar 2020 04:00)  HR: 108 (01 Mar 2020 19:00) (84 - 108)  BP: 102/55 (01 Mar 2020 19:00) (84/53 - 141/69)  BP(mean): 75 (01 Mar 2020 19:00) (61 - 99)  ABP: --  ABP(mean): --  RR: 22 (01 Mar 2020 19:00) (12 - 31)  SpO2: 100% (01 Mar 2020 19:00) (83% - 100%)    I&O's Detail    2020 07:01  -  01 Mar 2020 07:00  --------------------------------------------------------  IN:    dexmedetomidine Infusion: 16.5 mL    dextrose 5% + sodium chloride 0.45% with potassium chloride 20 mEq/L: 210 mL    lactated ringers.: 1000 mL    Oral Fluid: 370 mL    Solution: 200 mL  Total IN: 1796.5 mL    OUT:    Chest Tube: 1200 mL    Voided: 120 mL  Total OUT: 1320 mL    Total NET: 476.5 mL      01 Mar 2020 07:01  -  01 Mar 2020 20:13  --------------------------------------------------------  IN:    dexmedetomidine Infusion: 9.4 mL    Lactated Ringers IV Bolus: 1000 mL    lactated ringers.: 350 mL    Oral Fluid: 510 mL    Solution: 100 mL  Total IN: 1969.4 mL    OUT:    Indwelling Catheter - Urethral: 218 mL  Total OUT: 218 mL    Total NET: 1751.4 mL    LABS:                        10.1   9.34  )-----------( 223      ( 01 Mar 2020 05:08 )             30.4     03-01    133<L>  |  98  |  54<H>  ----------------------------<  130<H>  4.4   |  23  |  5.10<H>    Ca    7.9<L>      01 Mar 2020 05:08  Phos  6.6     03-01  Mg     2.6     03-01    TPro  5.9<L>  /  Alb  2.2<L>  /  TBili  1.6<H>  /  DBili  x   /  AST  56<H>  /  ALT  76  /  AlkPhos  98  03-01    CARDIAC MARKERS ( 2020 10:47 )  x     / x     / 65 U/L / x     / x          CAPILLARY BLOOD GLUCOSE  POCT Blood Glucose.: 95 mg/dL (2020 12:33)    PT/INR - ( 2020 10:47 )   PT: 17.8 sec;   INR: 1.57 ratio    PTT - ( 2020 10:47 )  PTT:29.2 sec    Urinalysis Basic - ( 2020 17:47 )  Color: Yellow / Appearance: Slightly Turbid / S.015 / pH: x  Gluc: x / Ketone: Negative  / Bili: Negative / Urobili: Negative   Blood: x / Protein: 30 mg/dL / Nitrite: Negative   Leuk Esterase: Trace / RBC: 11-25 /HPF / WBC 0-2   Sq Epi: x / Non Sq Epi: Few / Bacteria: Many    CULTURES:  Culture Results:   Culture is being performed. ( @ 21:38)  Culture Results:   No growth ( @ 21:38)  Culture Results:   Testing in progress ( @ 21:38)  Culture Results:   No growth to date. ( @ 22:29)  Culture Results:   No growth to date. ( @ 22:29)  Culture Results:   50,000 - 99,000 CFU/mL Coag Negative Staphylococcus  Susceptibilites not performed. ( @ 11:46)  Culture Results:   No growth to date. ( @ 22:38)  Culture Results:   No growth to date. ( @ 22:38)      Physical Examination:    VITALS AT TIME OF EXAM:  HR: 105 sinus tach	  BP: 121/71  RR: 18  SPO2: 100% RA    General: Elderly, lethargic, but otherwise in NAD, arousable, oriented, reactive      HEENT: Pupils equal, reactive to light.  Symmetric. No scleral icterus or injection    PULM: scattered rhonchi, largely clear with diminished breath sounds LLL, no wheezes, rales, no significant sputum production or increased respiratory effort    NECK: Supple, no lymphadenopathy, trachea midline    CVS: Sinus tach, no murmurs, +s1/s2    ABD: Soft, nondistended, nontender, normoactive bowel sounds    EXT: No edema, nontender    SKIN: Warm and well perfused, no rashes noted.    NEURO: Alert, oriented, interactive, nonfocal, confused at times    POCUS: Scattered B lines b/l, RUL a-line predominant, noted mod-large left left sided, complex pleural effusion. Cardiac POCUS poor. IVC difficult to visualize, estimated >2cm. Bladder scan w/ celestin balloon in place, bladder w/ hyperechoic fluid noted.     DEVICES: N  LINES: N  CELESTIN: Y    RADIOLOGY: < from: CT Chest No Cont (20 @ 16:56) >  A left-sided pigtail catheter is present in the left pleural space. Loculated effusion with associated dense consolidation is present, the appearance is slightly more prominentsince the prior exam.    No thoracic aortic aneurysm or pericardial effusion. Cardiomegaly present. Coronary artery calcifications identified. The central airway appears intact. The thyroid gland is not enlarged.    Minimal atelectatic change right lung base. Calcification visible in the right pleura. Peribronchial thickening right infrahilar region. No pneumothorax.    The stomach is filled with food and air. A large gallstone is noted. The adrenal glands are not enlarged. The spleen is not enlarged. No acute appearing osseous abnormalities. Soft tissue anchors right humeral head. Air bubbles visible in the subcutaneous soft tissues left lateral lower thorax.    IMPRESSION: A left-sided pigtail catheter is present in the pleural space. Loculated effusion with associated dense consolidation is present, appearance slightly more prominent when compared to prior exam.    < from: US Kidney and Bladder (20 @ 06:55) >  FINDINGS:   Right kidney: The right kidney measures 11.8 cm in length. Normal parenchymal   echogenicity. No hydronephrosis.   Left kidney: The left kidney measures 11.7 cm in length. Normal parenchymal   echogenicity. Slightly irregular margin. No hydronephrosis.   Prostate: The prostate measures up to 3.7 cm in size.   Bladder: Bladder volume calculated to be 104 mL. No bladder wall thickening   allowing for lack of full distention. No bladder calculus.     IMPRESSION:   No hydronephrosis. 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 Pt found to have loculated left pleural effusions on CT Chest, concerning for empyema. Pt underwent IR guided left chest tube on , initially w/ minimal drainage, subsequently transferred to [facility] ED for further management. Pt initially offered VATs, but family wanted more conservative measures. Pt subsequently treated with tPA/Dornase brenda via chest tube. Pt initially with sufficient output s/p first dose tPA now with decreased drainage on 2nd administration (~100-200 cc's, total 1.2L). ICU course complicated by worsening renal failure, Cr 1.00  to now 5.1.     Events last 24 hours: Hematuria noted in urethral catheter with multiple clots noted blocking UOP. Urology consulted. HD stable. Off Precedex.     PAST MEDICAL & SURGICAL HISTORY:  GI Bleed:   Stomach Ulcer: H pylori , treated with antibiotics  Osteoarthritis  Hyperlipidemia  Herniated Disc  Spinal Stenosis  Diverticulitis  GERD (Gastroesophageal Reflux Disease)  Chronic Lymphocytic Leukemia: followed by oncologist in [place]  S/P Tonsillectomy: childhood  Status Post Arthroscopy: right shoulder  History of Arthroscopy of Knee: bilateral    Review of Systems:  CONSTITUTIONAL: No fever, chills, or fatigue  EYES: No eye pain, visual disturbances, or discharge  ENMT:  No difficulty hearing, tinnitus, vertigo; No sinus or throat pain  NECK: No pain or stiffness  RESPIRATORY: No cough, wheezing, chills or hemoptysis; No shortness of breath  CARDIOVASCULAR: No chest pain, palpitations, dizziness, or leg swelling  GASTROINTESTINAL: No abdominal or epigastric pain. No nausea, vomiting, or hematemesis; No diarrhea or constipation. No melena or hematochezia.  GENITOURINARY: No dysuria, frequency, hematuria, or incontinence  NEUROLOGICAL: No headaches, memory loss, loss of strength, numbness, or tremors  SKIN: No itching, burning, rashes, or lesions   MUSCULOSKELETAL: No joint pain or swelling; No muscle, back, or extremity pain  PSYCHIATRIC: No depression, anxiety, mood swings, or difficulty sleeping    Medications:  piperacillin/tazobactam IVPB.. 3.375 Gram(s) IV Intermittent every 12 hours  tamsulosin 0.4 milliGRAM(s) Oral at bedtime  albuterol/ipratropium for Nebulization 3 milliLiter(s) Nebulizer every 6 hours PRN  dornase brenda Solution for Pleural Effusion 5 milliGRAM(s) IntraPleural. every 12 hours  guaiFENesin   Syrup  (Sugar-Free) 200 milliGRAM(s) Oral every 6 hours PRN  acetaminophen   Tablet .. 650 milliGRAM(s) Oral every 6 hours PRN  dronabinol 2.5 milliGRAM(s) Oral two times a day  HYDROmorphone  Injectable 0.5 milliGRAM(s) IV Push every 4 hours PRN  HYDROmorphone  Injectable 1 milliGRAM(s) IV Push every 4 hours PRN  alteplase  Injectable for Pleural Effusion 10 milliGRAM(s) IntraPleural. every 12 hours  heparin  Injectable 5000 Unit(s) SubCutaneous every 8 hours  famotidine    Tablet 20 milliGRAM(s) Oral daily  atorvastatin 10 milliGRAM(s) Oral at bedtime  cyanocobalamin Injectable 1000 MICROGram(s) IntraMuscular every 24 hours  chlorhexidine 2% Cloths 1 Application(s) Topical <User Schedule>  lactobacillus acidophilus 1 Tablet(s) Oral three times a day  lidocaine 2% Jelly 6 milliLiter(s) IntraUrethral once    ICU Vital Signs Last 24 Hrs  T(C): 36.9 (01 Mar 2020 15:05), Max: 37 (01 Mar 2020 04:00)  T(F): 98.5 (01 Mar 2020 15:05), Max: 98.6 (01 Mar 2020 04:00)  HR: 108 (01 Mar 2020 19:00) (84 - 108)  BP: 102/55 (01 Mar 2020 19:00) (84/53 - 141/69)  BP(mean): 75 (01 Mar 2020 19:00) (61 - 99)  ABP: --  ABP(mean): --  RR: 22 (01 Mar 2020 19:00) (12 - 31)  SpO2: 100% (01 Mar 2020 19:00) (83% - 100%)    I&O's Detail    2020 07:01  -  01 Mar 2020 07:00  --------------------------------------------------------  IN:    dexmedetomidine Infusion: 16.5 mL    dextrose 5% + sodium chloride 0.45% with potassium chloride 20 mEq/L: 210 mL    lactated ringers.: 1000 mL    Oral Fluid: 370 mL    Solution: 200 mL  Total IN: 1796.5 mL    OUT:    Chest Tube: 1200 mL    Voided: 120 mL  Total OUT: 1320 mL    Total NET: 476.5 mL      01 Mar 2020 07:01  -  01 Mar 2020 20:13  --------------------------------------------------------  IN:    dexmedetomidine Infusion: 9.4 mL    Lactated Ringers IV Bolus: 1000 mL    lactated ringers.: 350 mL    Oral Fluid: 510 mL    Solution: 100 mL  Total IN: 1969.4 mL    OUT:    Indwelling Catheter - Urethral: 218 mL  Total OUT: 218 mL    Total NET: 1751.4 mL    LABS:                        10.1   9.34  )-----------( 223      ( 01 Mar 2020 05:08 )             30.4     03-01    133<L>  |  98  |  54<H>  ----------------------------<  130<H>  4.4   |  23  |  5.10<H>    Ca    7.9<L>      01 Mar 2020 05:08  Phos  6.6     03-01  Mg     2.6     03-01    TPro  5.9<L>  /  Alb  2.2<L>  /  TBili  1.6<H>  /  DBili  x   /  AST  56<H>  /  ALT  76  /  AlkPhos  98  03-01    CARDIAC MARKERS ( 2020 10:47 )  x     / x     / 65 U/L / x     / x          CAPILLARY BLOOD GLUCOSE  POCT Blood Glucose.: 95 mg/dL (2020 12:33)    PT/INR - ( 2020 10:47 )   PT: 17.8 sec;   INR: 1.57 ratio    PTT - ( 2020 10:47 )  PTT:29.2 sec    Urinalysis Basic - ( 2020 17:47 )  Color: Yellow / Appearance: Slightly Turbid / S.015 / pH: x  Gluc: x / Ketone: Negative  / Bili: Negative / Urobili: Negative   Blood: x / Protein: 30 mg/dL / Nitrite: Negative   Leuk Esterase: Trace / RBC: 11-25 /HPF / WBC 0-2   Sq Epi: x / Non Sq Epi: Few / Bacteria: Many    CULTURES:  Culture Results:   Culture is being performed. ( @ 21:38)  Culture Results:   No growth ( @ 21:38)  Culture Results:   Testing in progress ( @ 21:38)  Culture Results:   No growth to date. ( @ 22:29)  Culture Results:   No growth to date. ( @ 22:29)  Culture Results:   50,000 - 99,000 CFU/mL Coag Negative Staphylococcus  Susceptibilites not performed. ( @ 11:46)  Culture Results:   No growth to date. ( @ 22:38)  Culture Results:   No growth to date. ( @ 22:38)      Physical Examination:    VITALS AT TIME OF EXAM:  HR: 105 sinus tach	  BP: 121/71  RR: 18  SPO2: 100% 2L NC    General: Elderly, lethargic, but otherwise in NAD, arousable, oriented, reactive      HEENT: Pupils equal, reactive to light.  Symmetric. No scleral icterus or injection    PULM: scattered rhonchi, largely clear with diminished breath sounds LLL, no wheezes, rales, no significant sputum production or increased respiratory effort    NECK: Supple, no lymphadenopathy, trachea midline    CVS: Sinus tach, no murmurs, +s1/s2    ABD: Soft, nondistended, nontender, normoactive bowel sounds    EXT: No edema, nontender    SKIN: Warm and well perfused, no rashes noted.    NEURO: Alert, oriented, interactive, nonfocal, confused at times    POCUS: Scattered B lines b/l, RUL a-line predominant, noted mod-large left left sided, complex pleural effusion. Cardiac POCUS poor. IVC difficult to visualize, estimated >2cm. Bladder scan w/ celestin balloon in place, bladder w/ hyperechoic fluid noted.     DEVICES: Left sided chest tube w/ pleurovac, ~1.3L blood tinged/serous fluid drainage  LINES: N  CELESTIN: Y    RADIOLOGY: < from: CT Chest No Cont (20 @ 16:56) >  A left-sided pigtail catheter is present in the left pleural space. Loculated effusion with associated dense consolidation is present, the appearance is slightly more prominentsince the prior exam.    No thoracic aortic aneurysm or pericardial effusion. Cardiomegaly present. Coronary artery calcifications identified. The central airway appears intact. The thyroid gland is not enlarged.    Minimal atelectatic change right lung base. Calcification visible in the right pleura. Peribronchial thickening right infrahilar region. No pneumothorax.    The stomach is filled with food and air. A large gallstone is noted. The adrenal glands are not enlarged. The spleen is not enlarged. No acute appearing osseous abnormalities. Soft tissue anchors right humeral head. Air bubbles visible in the subcutaneous soft tissues left lateral lower thorax.    IMPRESSION: A left-sided pigtail catheter is present in the pleural space. Loculated effusion with associated dense consolidation is present, appearance slightly more prominent when compared to prior exam.    < from: US Kidney and Bladder (20 @ 06:55) >  FINDINGS:   Right kidney: The right kidney measures 11.8 cm in length. Normal parenchymal   echogenicity. No hydronephrosis.   Left kidney: The left kidney measures 11.7 cm in length. Normal parenchymal   echogenicity. Slightly irregular margin. No hydronephrosis.   Prostate: The prostate measures up to 3.7 cm in size.   Bladder: Bladder volume calculated to be 104 mL. No bladder wall thickening   allowing for lack of full distention. No bladder calculus.     IMPRESSION:   No hydronephrosis.

## 2020-03-01 NOTE — PROGRESS NOTE ADULT - SUBJECTIVE AND OBJECTIVE BOX
Interval events: no acute events overnight, no fevers or chills, improved left chest/back pain. Minimal urine output    Review of Systems:  Constitutional: no fever, chills, fatigue  Neuro: no headache, numbness, weakness  Resp: no cough, wheezing, shortness of breath  CVS: no chest pain, palpitations, leg swelling  GI: no abdominal pain, nausea, vomiting, diarrhea   : no dysuria, frequency, incontinence  Skin: no itching, burning, rashes, or lesions   Msk: no joint pain or swelling  Psych: no depression, anxiety    T(F): 97.8 (20 @ 12:00), Max: 98.6 (20 @ 04:00)  HR: 92 (20 @ 12:49) (84 - 114)  BP: 141/69 (20 @ 11:00) (84/53 - 141/69)  RR: 30 (20 @ 11:00) (12 - 31)  SpO2: 93% (20 @ 12:49) (84% - 99%)    POCT Blood Glucose.: 95 mg/dL (2020 12:33)    I&O's Summary    2020 07:01  -  01 Mar 2020 07:00  --------------------------------------------------------  IN: 1796.5 mL / OUT: 1320 mL / NET: 476.5 mL    Physical Exam:     Gen: NAD; AAOx3  Neuro: CN II-XII grossly intact; moving all extremities   HEENT: NC/AT; EOMI; MMM; OP clear  CV: normal S1 & S2; regular rate and rhythm   Pulm: decreased breath sounds on left with inspiratory rales  GI: soft; NT/ND  Ext: no edema; pulses intact  Skin: warm, well perfused    Meds:  alteplase  Injectable for Pleural Effusion 10 milliGRAM(s) IntraPleural. every 12 hours  heparin  Injectable 5000 Unit(s) SubCutaneous every 8 hours  piperacillin/tazobactam IVPB.. 3.375 Gram(s) IV Intermittent every 12 hours  tamsulosin 0.4 milliGRAM(s) Oral at bedtime  atorvastatin 10 milliGRAM(s) Oral at bedtime  albuterol/ipratropium for Nebulization 3 milliLiter(s) Nebulizer every 6 hours PRN  dornase brenda Solution for Pleural Effusion 5 milliGRAM(s) IntraPleural. every 12 hours  guaiFENesin   Syrup  (Sugar-Free) 200 milliGRAM(s) Oral every 6 hours PRN  acetaminophen   Tablet .. 650 milliGRAM(s) Oral every 6 hours PRN  dexMEDEtomidine Infusion 0.2 MICROgram(s)/kG/Hr IV Continuous <Continuous>  dronabinol 2.5 milliGRAM(s) Oral two times a day  HYDROmorphone  Injectable 0.5 milliGRAM(s) IV Push every 4 hours PRN  HYDROmorphone  Injectable 1 milliGRAM(s) IV Push every 4 hours PRN  famotidine    Tablet 20 milliGRAM(s) Oral daily  cyanocobalamin Injectable 1000 MICROGram(s) IntraMuscular every 24 hours  lactated ringers. 1000 milliLiter(s) IV Continuous <Continuous>  lactated ringers. 500 milliLiter(s) IV Continuous <Continuous>  chlorhexidine 2% Cloths 1 Application(s) Topical <User Schedule>  lactobacillus acidophilus 1 Tablet(s) Oral three times a day                        10.1   9.34  )-----------( 223      ( 01 Mar 2020 05:08 )             30.4       03-01    133<L>  |  98  |  54<H>  ----------------------------<  130<H>  4.4   |  23  |  5.10<H>    Ca    7.9<L>      01 Mar 2020 05:08  Phos  6.6     03-01  Mg     2.6     03-01    TPro  5.9<L>  /  Alb  2.2<L>  /  TBili  1.6<H>  /  DBili  x   /  AST  56<H>  /  ALT  76  /  AlkPhos  98  03-01      CARDIAC MARKERS ( 2020 10:47 )  x     / x     / 65 U/L / x     / x          PT/INR - ( 2020 10:47 )   PT: 17.8 sec;   INR: 1.57 ratio         PTT - ( 2020 10:47 )  PTT:29.2 sec  Urinalysis Basic - ( 2020 17:47 )    Color: Yellow / Appearance: Slightly Turbid / S.015 / pH: x  Gluc: x / Ketone: Negative  / Bili: Negative / Urobili: Negative   Blood: x / Protein: 30 mg/dL / Nitrite: Negative   Leuk Esterase: Trace / RBC: 11-25 /HPF / WBC 0-2   Sq Epi: x / Non Sq Epi: Few / Bacteria: Many      .Body Fluid Pleural Fluid   Culture is being performed.   polymorphonuclear leukocytes  No organisms seen  by cytocentrifuge  @ 21:38  .Blood Blood-Peripheral   No growth to date. --  @ 22:29  .Urine Catheterized   50,000 - 99,000 CFU/mL Coag Negative Staphylococcus  Susceptibilites not performed. --  @ 11:46  .Blood Blood-Peripheral   No growth to date. --  @ 22:38    Bedside Lung U/S: loculated left pleural effusion with scattered stranding and loculations, no thick sediments. +Underlying compressive atelectasis    CENTRAL LINE: N    SOLIS: Y      DATE INSERTED: 3/1        REMOVE: N    A-LINE: N    GLOBAL ISSUE/BEST PRACTICE:  Analgesia: acetaminophen and hydromorphone  Sedation: n/a  CAM-ICU: N  HOB elevation: yes  Stress ulcer prophylaxis: n/a  VTE prophylaxis: HSQ  Glycemic control: yes  Nutrition: regular    CODE STATUS: full

## 2020-03-01 NOTE — CONSULT NOTE ADULT - PROVIDER SPECIALTY LIST ADULT
Cardiology
Critical Care
Infectious Disease
Nephrology
Gastroenterology
Heme/Onc
Urology
Pulmonology
CT Surgery

## 2020-03-01 NOTE — CONSULT NOTE ADULT - REASON FOR ADMISSION
Left Empyema

## 2020-03-02 LAB
ALBUMIN SERPL ELPH-MCNC: 2.1 G/DL — LOW (ref 3.3–5)
ALP SERPL-CCNC: 112 U/L — SIGNIFICANT CHANGE UP (ref 40–120)
ALT FLD-CCNC: 64 U/L — SIGNIFICANT CHANGE UP (ref 12–78)
ANION GAP SERPL CALC-SCNC: 13 MMOL/L — SIGNIFICANT CHANGE UP (ref 5–17)
ANION GAP SERPL CALC-SCNC: 16 MMOL/L — SIGNIFICANT CHANGE UP (ref 5–17)
ASO AB SER QL: <20 IU/ML — SIGNIFICANT CHANGE UP (ref 0–199)
AST SERPL-CCNC: 42 U/L — HIGH (ref 15–37)
BASOPHILS # BLD AUTO: 0.04 K/UL — SIGNIFICANT CHANGE UP (ref 0–0.2)
BASOPHILS NFR BLD AUTO: 0.4 % — SIGNIFICANT CHANGE UP (ref 0–2)
BILIRUB SERPL-MCNC: 1.1 MG/DL — SIGNIFICANT CHANGE UP (ref 0.2–1.2)
BUN SERPL-MCNC: 61 MG/DL — HIGH (ref 7–23)
BUN SERPL-MCNC: 67 MG/DL — HIGH (ref 7–23)
C3 SERPL-MCNC: 123 MG/DL — SIGNIFICANT CHANGE UP (ref 81–157)
CALCIUM SERPL-MCNC: 7.9 MG/DL — LOW (ref 8.5–10.1)
CALCIUM SERPL-MCNC: 8.3 MG/DL — LOW (ref 8.5–10.1)
CHLORIDE SERPL-SCNC: 95 MMOL/L — LOW (ref 96–108)
CHLORIDE SERPL-SCNC: 97 MMOL/L — SIGNIFICANT CHANGE UP (ref 96–108)
CO2 SERPL-SCNC: 20 MMOL/L — LOW (ref 22–31)
CO2 SERPL-SCNC: 23 MMOL/L — SIGNIFICANT CHANGE UP (ref 22–31)
CREAT SERPL-MCNC: 6 MG/DL — HIGH (ref 0.5–1.3)
CREAT SERPL-MCNC: 6.3 MG/DL — HIGH (ref 0.5–1.3)
CULTURE RESULTS: NO GROWTH — SIGNIFICANT CHANGE UP
CULTURE RESULTS: SIGNIFICANT CHANGE UP
CULTURE RESULTS: SIGNIFICANT CHANGE UP
EOSINOPHIL # BLD AUTO: 0.05 K/UL — SIGNIFICANT CHANGE UP (ref 0–0.5)
EOSINOPHIL NFR BLD AUTO: 0.5 % — SIGNIFICANT CHANGE UP (ref 0–6)
GLUCOSE SERPL-MCNC: 117 MG/DL — HIGH (ref 70–99)
GLUCOSE SERPL-MCNC: 155 MG/DL — HIGH (ref 70–99)
HCT VFR BLD CALC: 28.6 % — LOW (ref 39–50)
HGB BLD-MCNC: 9.7 G/DL — LOW (ref 13–17)
IMM GRANULOCYTES NFR BLD AUTO: 0.6 % — SIGNIFICANT CHANGE UP (ref 0–1.5)
LYMPHOCYTES # BLD AUTO: 1.06 K/UL — SIGNIFICANT CHANGE UP (ref 1–3.3)
LYMPHOCYTES # BLD AUTO: 10.6 % — LOW (ref 13–44)
MAGNESIUM SERPL-MCNC: 2.6 MG/DL — SIGNIFICANT CHANGE UP (ref 1.6–2.6)
MCHC RBC-ENTMCNC: 30.9 PG — SIGNIFICANT CHANGE UP (ref 27–34)
MCHC RBC-ENTMCNC: 33.9 GM/DL — SIGNIFICANT CHANGE UP (ref 32–36)
MCV RBC AUTO: 91.1 FL — SIGNIFICANT CHANGE UP (ref 80–100)
MONOCYTES # BLD AUTO: 0.83 K/UL — SIGNIFICANT CHANGE UP (ref 0–0.9)
MONOCYTES NFR BLD AUTO: 8.3 % — SIGNIFICANT CHANGE UP (ref 2–14)
NEUTROPHILS # BLD AUTO: 7.94 K/UL — HIGH (ref 1.8–7.4)
NEUTROPHILS NFR BLD AUTO: 79.6 % — HIGH (ref 43–77)
NRBC # BLD: 0 /100 WBCS — SIGNIFICANT CHANGE UP (ref 0–0)
PHOSPHATE SERPL-MCNC: 6.9 MG/DL — HIGH (ref 2.5–4.5)
PLATELET # BLD AUTO: 268 K/UL — SIGNIFICANT CHANGE UP (ref 150–400)
POTASSIUM SERPL-MCNC: 4.7 MMOL/L — SIGNIFICANT CHANGE UP (ref 3.5–5.3)
POTASSIUM SERPL-MCNC: 4.9 MMOL/L — SIGNIFICANT CHANGE UP (ref 3.5–5.3)
POTASSIUM SERPL-SCNC: 4.7 MMOL/L — SIGNIFICANT CHANGE UP (ref 3.5–5.3)
POTASSIUM SERPL-SCNC: 4.9 MMOL/L — SIGNIFICANT CHANGE UP (ref 3.5–5.3)
PROT SERPL-MCNC: 5.8 G/DL — LOW (ref 6–8.3)
RBC # BLD: 3.14 M/UL — LOW (ref 4.2–5.8)
RBC # FLD: 13.1 % — SIGNIFICANT CHANGE UP (ref 10.3–14.5)
SODIUM SERPL-SCNC: 131 MMOL/L — LOW (ref 135–145)
SODIUM SERPL-SCNC: 133 MMOL/L — LOW (ref 135–145)
SPECIMEN SOURCE: SIGNIFICANT CHANGE UP
WBC # BLD: 9.98 K/UL — SIGNIFICANT CHANGE UP (ref 3.8–10.5)
WBC # FLD AUTO: 9.98 K/UL — SIGNIFICANT CHANGE UP (ref 3.8–10.5)

## 2020-03-02 PROCEDURE — 99233 SBSQ HOSP IP/OBS HIGH 50: CPT | Mod: GC

## 2020-03-02 PROCEDURE — 99233 SBSQ HOSP IP/OBS HIGH 50: CPT

## 2020-03-02 RX ORDER — POLYETHYLENE GLYCOL 3350 17 G/17G
17 POWDER, FOR SOLUTION ORAL
Refills: 0 | Status: DISCONTINUED | OUTPATIENT
Start: 2020-03-02 | End: 2020-03-17

## 2020-03-02 RX ORDER — HYDROMORPHONE HYDROCHLORIDE 2 MG/ML
1 INJECTION INTRAMUSCULAR; INTRAVENOUS; SUBCUTANEOUS ONCE
Refills: 0 | Status: DISCONTINUED | OUTPATIENT
Start: 2020-03-02 | End: 2020-03-02

## 2020-03-02 RX ORDER — SEVELAMER CARBONATE 2400 MG/1
800 POWDER, FOR SUSPENSION ORAL
Refills: 0 | Status: DISCONTINUED | OUTPATIENT
Start: 2020-03-02 | End: 2020-03-17

## 2020-03-02 RX ADMIN — Medication 2.5 MILLIGRAM(S): at 06:23

## 2020-03-02 RX ADMIN — Medication 1 TABLET(S): at 06:23

## 2020-03-02 RX ADMIN — HYDROMORPHONE HYDROCHLORIDE 1 MILLIGRAM(S): 2 INJECTION INTRAMUSCULAR; INTRAVENOUS; SUBCUTANEOUS at 14:29

## 2020-03-02 RX ADMIN — PIPERACILLIN AND TAZOBACTAM 25 GRAM(S): 4; .5 INJECTION, POWDER, LYOPHILIZED, FOR SOLUTION INTRAVENOUS at 01:48

## 2020-03-02 RX ADMIN — HYDROMORPHONE HYDROCHLORIDE 1 MILLIGRAM(S): 2 INJECTION INTRAMUSCULAR; INTRAVENOUS; SUBCUTANEOUS at 09:35

## 2020-03-02 RX ADMIN — SEVELAMER CARBONATE 800 MILLIGRAM(S): 2400 POWDER, FOR SUSPENSION ORAL at 12:18

## 2020-03-02 RX ADMIN — Medication 30 MILLILITER(S): at 14:07

## 2020-03-02 RX ADMIN — POLYETHYLENE GLYCOL 3350 17 GRAM(S): 17 POWDER, FOR SOLUTION ORAL at 18:16

## 2020-03-02 RX ADMIN — ATORVASTATIN CALCIUM 10 MILLIGRAM(S): 80 TABLET, FILM COATED ORAL at 22:04

## 2020-03-02 RX ADMIN — HEPARIN SODIUM 5000 UNIT(S): 5000 INJECTION INTRAVENOUS; SUBCUTANEOUS at 14:07

## 2020-03-02 RX ADMIN — HYDROMORPHONE HYDROCHLORIDE 0.5 MILLIGRAM(S): 2 INJECTION INTRAMUSCULAR; INTRAVENOUS; SUBCUTANEOUS at 22:04

## 2020-03-02 RX ADMIN — Medication 1 TABLET(S): at 22:04

## 2020-03-02 RX ADMIN — HEPARIN SODIUM 5000 UNIT(S): 5000 INJECTION INTRAVENOUS; SUBCUTANEOUS at 22:03

## 2020-03-02 RX ADMIN — Medication 1 TABLET(S): at 14:07

## 2020-03-02 RX ADMIN — HYDROMORPHONE HYDROCHLORIDE 0.5 MILLIGRAM(S): 2 INJECTION INTRAMUSCULAR; INTRAVENOUS; SUBCUTANEOUS at 22:20

## 2020-03-02 RX ADMIN — DORNASE ALFA 5 MILLIGRAM(S): 1 SOLUTION RESPIRATORY (INHALATION) at 17:15

## 2020-03-02 RX ADMIN — ALTEPLASE 10 MILLIGRAM(S): KIT at 15:15

## 2020-03-02 RX ADMIN — HYDROMORPHONE HYDROCHLORIDE 1 MILLIGRAM(S): 2 INJECTION INTRAMUSCULAR; INTRAVENOUS; SUBCUTANEOUS at 09:45

## 2020-03-02 RX ADMIN — SEVELAMER CARBONATE 800 MILLIGRAM(S): 2400 POWDER, FOR SUSPENSION ORAL at 18:16

## 2020-03-02 RX ADMIN — PREGABALIN 1000 MICROGRAM(S): 225 CAPSULE ORAL at 18:17

## 2020-03-02 RX ADMIN — CHLORHEXIDINE GLUCONATE 1 APPLICATION(S): 213 SOLUTION TOPICAL at 06:23

## 2020-03-02 RX ADMIN — FAMOTIDINE 20 MILLIGRAM(S): 10 INJECTION INTRAVENOUS at 12:18

## 2020-03-02 RX ADMIN — HYDROMORPHONE HYDROCHLORIDE 1 MILLIGRAM(S): 2 INJECTION INTRAMUSCULAR; INTRAVENOUS; SUBCUTANEOUS at 14:45

## 2020-03-02 RX ADMIN — HEPARIN SODIUM 5000 UNIT(S): 5000 INJECTION INTRAVENOUS; SUBCUTANEOUS at 06:23

## 2020-03-02 RX ADMIN — TAMSULOSIN HYDROCHLORIDE 0.4 MILLIGRAM(S): 0.4 CAPSULE ORAL at 22:04

## 2020-03-02 RX ADMIN — Medication 2.5 MILLIGRAM(S): at 18:17

## 2020-03-02 RX ADMIN — PIPERACILLIN AND TAZOBACTAM 25 GRAM(S): 4; .5 INJECTION, POWDER, LYOPHILIZED, FOR SOLUTION INTRAVENOUS at 14:06

## 2020-03-02 NOTE — PROGRESS NOTE ADULT - SUBJECTIVE AND OBJECTIVE BOX
INTERVAL Hx:  Notes reviewed,  related events noted.  Mcnulty placed for u/o monitoring; traumatic placement with tPA resulting in gross hematuria.  Hematuria now resolved; manual irrigation has ceased.  Pt with worsening renal failure, HD being considered.  Renal u/s showing no hydronephrosis.    MEDICATIONS  (STANDING):  alteplase  Injectable for Pleural Effusion 10 milliGRAM(s) IntraPleural. every 12 hours  atorvastatin 10 milliGRAM(s) Oral at bedtime  chlorhexidine 2% Cloths 1 Application(s) Topical <User Schedule>  cyanocobalamin Injectable 1000 MICROGram(s) IntraMuscular every 24 hours  dornase brenda Solution for Pleural Effusion 5 milliGRAM(s) IntraPleural. every 12 hours  dronabinol 2.5 milliGRAM(s) Oral two times a day  famotidine    Tablet 20 milliGRAM(s) Oral daily  heparin  Injectable 5000 Unit(s) SubCutaneous every 8 hours  lactobacillus acidophilus 1 Tablet(s) Oral three times a day  piperacillin/tazobactam IVPB.. 3.375 Gram(s) IV Intermittent every 12 hours  polyethylene glycol 3350 17 Gram(s) Oral two times a day  sevelamer carbonate 800 milliGRAM(s) Oral three times a day with meals  tamsulosin 0.4 milliGRAM(s) Oral at bedtime    MEDICATIONS  (PRN):  acetaminophen   Tablet .. 650 milliGRAM(s) Oral every 6 hours PRN Temp greater or equal to 38C (100.4F), Mild Pain (1 - 3)  albuterol/ipratropium for Nebulization 3 milliLiter(s) Nebulizer every 6 hours PRN Shortness of Breath and/or Wheezing  guaiFENesin   Syrup  (Sugar-Free) 200 milliGRAM(s) Oral every 6 hours PRN Cough  HYDROmorphone  Injectable 0.5 milliGRAM(s) IV Push every 4 hours PRN Moderate Pain (4 - 6)  HYDROmorphone  Injectable 1 milliGRAM(s) IV Push every 4 hours PRN Severe Pain (7 - 10)        Vital Signs Last 24 Hrs  T(C): 36.8 (02 Mar 2020 07:22), Max: 36.9 (01 Mar 2020 15:05)  T(F): 98.3 (02 Mar 2020 07:22), Max: 98.5 (01 Mar 2020 15:05)  HR: 97 (02 Mar 2020 11:00) (92 - 108)  BP: 114/57 (02 Mar 2020 11:00) (85/55 - 132/101)  BP(mean): 81 (02 Mar 2020 11:00) (63 - 109)  RR: 21 (02 Mar 2020 11:00) (13 - 38)  SpO2: 100% (02 Mar 2020 11:00) (83% - 100%)    PHYSICAL EXAM:    Mcnulty: draining slightly cloudy, yellow urine    LABS:                        9.7    9.98  )-----------( 268      ( 02 Mar 2020 05:16 )             28.6     03-02    133<L>  |  97  |  61<H>  ----------------------------<  117<H>  4.7   |  23  |  6.00<H>    Ca    7.9<L>      02 Mar 2020 05:16  Phos  6.9     03-02  Mg     2.6     03-02    TPro  5.8<L>  /  Alb  2.1<L>  /  TBili  1.1  /  DBili  x   /  AST  42<H>  /  ALT  64  /  AlkPhos  112  03-02    Urine culture:  03-01 @ 13:24 --   No growth  Urine culture:  02-27 @ 21:38 --   Culture is being performed.      RADIOLOGY & ADDITIONAL TESTS:    EXAM:  US KIDNEYS AND BLADDER                            PROCEDURE DATE:  03/01/2020          INTERPRETATION:    PROCEDURE INFORMATION:   Exam: US Retroperitoneal Limited, Kidneys   Exam date and time: 3/1/2020 6:31 AM   Age: 89 years old   Clinical indication: Other: Zack     TECHNIQUE:   Imaging protocol: Real-time ultrasound of the retroperitoneum with image   documentation. Examination was focused on the kidneys.     COMPARISON:   No relevant prior studies available.     FINDINGS:   Right kidney: The right kidney measures 11.8 cm in length. Normal parenchymal   echogenicity. No hydronephrosis.   Left kidney: The left kidney measures 11.7 cm in length. Normal parenchymal   echogenicity. Slightly irregular margin. No hydronephrosis.   Prostate: The prostate measures up to 3.7 cm in size.   Bladder: Bladder volume calculated to be 104 mL. No bladder wall thickening   allowing for lack of full distention. No bladder calculus.     IMPRESSION:   No hydronephrosis.

## 2020-03-02 NOTE — PROGRESS NOTE ADULT - ASSESSMENT
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 Transferred to ICU for tPA/dornase brenda via chest tube. Found to have GUS 2/2 ATN, likely due to vancomycin.    1. Neuro: pain control with acetaminophen and hydromorphone prn  2. CV: HD stable, continue statin  3. Pulm: continue NC@2-4LPM, goal SpO2>90%. Continue chest tube drainage, increase suction back to -20 cm H2O. Administer intrapleural tPA/dornase brenda again today and monitor. Will need pre-medication with hydromorphone. Consider low-dose dexmedetomidine as well given agitation due to pain yesterday after administration. Monitor chest tube output. Incentive spirometry  4. GI: regular diet as tolerates with Ensure, continue dronabinol. On lactobacillus  5. Renal: GUS of unclear etiology, likely drug toxicity due to vancomycin. Repeat trough today is 20, (was 23 yesterday, 36 hours after last vanco administered). Renally dose meds. Renal eval. Check C3, C4, ASO. Urine eos negative and no peripheral eosinophilia or rash, doubt AIN. Strict I/O's, trend kidney function and lytes. FeNa>1%, unlikely prerenal, c/w ATN. Also has 8mm L extrarenal pelvis stone and 6mm R distal urethral stone, non-obstructing, f/up urology. Keep celestin  6. ID: likely empyema, pH 7.0, normal glucose, 118 nuc cells with neutrophil predominance. Continue Zosyn. Cultures negative  7. Heme: HSQ for DVT ppx. Maltoma and CLL being monitored, not on active treatment  8. Endo: no active issues  9. Skin: no lines or celestin  10. 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 Transferred to ICU for tPA/dornase brenda via chest tube. Found to have GUS 2/2 ATN, likely due to vancomycin.    1. Neuro: pain control with acetaminophen and hydromorphone prn    2. CV: HD stable, continue statin    3. Pulm: Sat well on RA. Continue chest tube drainage, monitor output. Administer intrapleural tPA/dornase brenda again today and monitor. Encourage incentive spirometry. Pulm following. No plan for Sx per discussion w/ CT Sx.    4. GI: regular diet as tolerated with Ensure, continue dronabinol. On lactobacillus    5. Renal: GUS of unclear etiology, likely drug toxicity due to vancomycin. Renally dose meds. Worsening renal function, Nephro following. Possible need for HD tomorrow. Strict I/O's, trend kidney function and lytes. Uro c/s. Keep celestin. Start phos binder.    6. ID: Continue Zosyn. UCx NGF. FU Pleural Cx/Fungi/AFB. ID following.    7. Heme: HSQ for DVT ppx. Maltoma and CLL being monitored, not on active treatment. H/O following.    8. Endo: no active issues    9. Skin: no lines or celestin    10. Dispo: full code, Critically ill, continue w/ ICU care

## 2020-03-02 NOTE — PROGRESS NOTE ADULT - ASSESSMENT
1.	GUS: ATN (Nephrotoxic, Ischemic)  2.	Loculated pleural effusions  3.	Anemia    Worsening renal indices. Will follow trend and fluid status closely. Urine eos negative. Avoid nephrotoxic meds as possible.   Avoid ACEI, ARB, NSAIDs and IV contrast. Monitor h/h trend. D/w pt regarding possibility of hemodialysis if renal function doesn't improve.   Risks and benefits explained to pt. Pt in agreement to start hemodialysis if needed. D/w pt's son over the phone ([phone]). D/w ICU team.

## 2020-03-02 NOTE — PROGRESS NOTE ADULT - SUBJECTIVE AND OBJECTIVE BOX
Patient is a 89y old  Male who presents with a chief complaint of Left Empyema (02 Mar 2020 09:12)  Patient seen in follow up for GUS, ATN.     Low urine out put.     PAST MEDICAL HISTORY:  GI Bleed  Stomach Ulcer  Osteoarthritis  Hyperlipidemia  Herniated Disc  Spinal Stenosis  Diverticulitis  GERD (Gastroesophageal Reflux Disease)  Chronic Lymphocytic Leukemia    MEDICATIONS  (STANDING):  alteplase  Injectable for Pleural Effusion 10 milliGRAM(s) IntraPleural. every 12 hours  atorvastatin 10 milliGRAM(s) Oral at bedtime  chlorhexidine 2% Cloths 1 Application(s) Topical <User Schedule>  cyanocobalamin Injectable 1000 MICROGram(s) IntraMuscular every 24 hours  dornase brenda Solution for Pleural Effusion 5 milliGRAM(s) IntraPleural. every 12 hours  dronabinol 2.5 milliGRAM(s) Oral two times a day  famotidine    Tablet 20 milliGRAM(s) Oral daily  heparin  Injectable 5000 Unit(s) SubCutaneous every 8 hours  lactobacillus acidophilus 1 Tablet(s) Oral three times a day  piperacillin/tazobactam IVPB.. 3.375 Gram(s) IV Intermittent every 12 hours  polyethylene glycol 3350 17 Gram(s) Oral two times a day  sevelamer carbonate 800 milliGRAM(s) Oral three times a day with meals  tamsulosin 0.4 milliGRAM(s) Oral at bedtime    MEDICATIONS  (PRN):  acetaminophen   Tablet .. 650 milliGRAM(s) Oral every 6 hours PRN Temp greater or equal to 38C (100.4F), Mild Pain (1 - 3)  albuterol/ipratropium for Nebulization 3 milliLiter(s) Nebulizer every 6 hours PRN Shortness of Breath and/or Wheezing  guaiFENesin   Syrup  (Sugar-Free) 200 milliGRAM(s) Oral every 6 hours PRN Cough  HYDROmorphone  Injectable 0.5 milliGRAM(s) IV Push every 4 hours PRN Moderate Pain (4 - 6)  HYDROmorphone  Injectable 1 milliGRAM(s) IV Push every 4 hours PRN Severe Pain (7 - 10)    T(C): 36.8 (20 @ 07:22), Max: 37 (20 @ 04:00)  HR: 101 (20 @ 10:00) (84 - 108)  BP: 127/56 (20 @ 09:00) (85/55 - 141/69)  RR: 24 (20 @ 10:00) (12 - 38)  SpO2: 100% (20 @ 10:00) (83% - 100%)  Wt(kg): --  I&O's Detail    01 Mar 2020 07:01  -  02 Mar 2020 07:00  --------------------------------------------------------  IN:    dexmedetomidine Infusion: 9.4 mL    Instillation: 250 mL    Lactated Ringers IV Bolus: 1000 mL    lactated ringers.: 350 mL    Oral Fluid: 710 mL    Solution: 200 mL  Total IN: 2519.4 mL    OUT:    Chest Tube: 30 mL    Indwelling Catheter - Urethral: 428 mL    Instillation: 160 mL  Total OUT: 618 mL    Total NET: 1901.4 mL      02 Mar 2020 07:01  -  02 Mar 2020 10:37  --------------------------------------------------------  IN:    Oral Fluid: 100 mL  Total IN: 100 mL    OUT:    Indwelling Catheter - Urethral: 41 mL  Total OUT: 41 mL    Total NET: 59 mL      PHYSICAL EXAM:  General: NAD  Respiratory: b/l air entry, left chest tube  Cardiovascular: S1 S2  Gastrointestinal: soft  Extremities:  no edema                          9.7    9.98  )-----------( 268      ( 02 Mar 2020 05:16 )             28.6     03-02    133<L>  |  97  |  61<H>  ----------------------------<  117<H>  4.7   |  23  |  6.00<H>    Ca    7.9<L>      02 Mar 2020 05:16  Phos  6.9     03-02  Mg     2.6     03-02    T Pro  5.8<L>  /  Alb  2.1<L>  /  T Bili  1.1  /  D Bili  x   /  AST  42<H>  /  ALT  64  /  Alk Phos  112  03-02    CARDIAC MARKERS ( 2020 10:47 )  x     / x     / 65 U/L / x     / x          LIVER FUNCTIONS - ( 02 Mar 2020 05:16 )  Alb: 2.1 g/dL / Pro: 5.8 g/dL / ALK PHOS: 112 U/L / ALT: 64 U/L / AST: 42 U/L / GGT: x           Urinalysis Basic - ( 2020 17:47 )    Color: Yellow / Appearance: Slightly Turbid / S.015 / pH: x  Gluc: x / Ketone: Negative  / Bili: Negative / Urobili: Negative   Blood: x / Protein: 30 mg/dL / Nitrite: Negative   Leuk Esterase: Trace / RBC: 11-25 /HPF / WBC 0-2   Sq Epi: x / Non Sq Epi: Few / Bacteria: Many        Sodium, Serum: 133 ( @ 05:16)  Sodium, Serum: 133 ( @ 05:08)  Sodium, Serum: 133 ( @ 10:47)    Creatinine, Serum: 6.00 ( @ 05:16)  Creatinine, Serum: 5.10 ( @ 05:08)  Creatinine, Serum: 4.50 ( @ 10:47)    Potassium, Serum: 4.7 ( @ 05:16)  Potassium, Serum: 4.4 ( @ 05:08)  Potassium, Serum: 4.3 ( @ 10:47)    Hemoglobin: 9.7 ( @ 05:16)  Hemoglobin: 10.5 ( @ 21:18)  Hemoglobin: 10.1 ( @ 05:08)  Hemoglobin: 9.9 ( @ 10:47)      < from: CT Chest No Cont (20 @ 16:56) >    EXAM:  CT CHEST                            PROCEDURE DATE:  2020          INTERPRETATION:  Clinical information: Left-sided empyema    Comparison exam dated 2020    Axial images obtained, coronal and sagittal images computer reformatted    Noncontrast exam limits evaluation of hilar and mediastinal regions.    A left-sided pigtail catheter is present in the left pleural space. Loculated effusion with associated dense consolidation is present, the appearance is slightly more prominentsince the prior exam.    No thoracic aortic aneurysm or pericardial effusion. Cardiomegaly present. Coronary artery calcifications identified. The central airway appears intact. The thyroid gland is not enlarged.    Minimal atelectatic change right lung base. Calcification visible in the right pleura. Peribronchial thickening right infrahilar region. No pneumothorax.    The stomach is filled with food and air. A large gallstone is noted. The adrenal glands are not enlarged. The spleen is not enlarged. No acute appearing osseous abnormalities. Soft tissue anchors right humeral head. Air bubbles visible in the subcutaneous soft tissues left lateral lower thorax.    IMPRESSION: A left-sided pigtail catheter is present in the pleural space. Loculated effusion with associated dense consolidation is present, appearance slightly more prominent when compared to prior exam.          STEPHIE MOLINA M.D.,ATTENDING RADIOLOGIST  This document has been electronically signed. Mar  1 2020  5:35PM                < end of copied text >

## 2020-03-02 NOTE — PROGRESS NOTE ADULT - SUBJECTIVE AND OBJECTIVE BOX
Patient is a 89y old  Male who presents with a chief complaint of Left Empyema (02 Mar 2020 11:14)    24 hour events:   Off precedex  Hematuria + large blood clots requiring celestin exchange & q2 irrigation    REVIEW OF SYSTEMS  Constitutional: No fever, chills, fatigue  Neuro: No headache, numbness, weakness  Resp: No cough, wheezing, shortness of breath  CVS: No chest pain, palpitations, leg swelling  GI: No abdominal pain, nausea, vomiting, diarrhea   : No dysuria, frequency, incontinence  Skin: No itching, burning, rashes, or lesions   Msk: No joint pain or swelling  Psych: No depression, anxiety, mood swings  Heme: No bleeding    T(F): 98.3 (20 @ 07:22), Max: 98.5 (20 @ 15:05)  HR: 97 (20 @ 11:00) (92 - 108)  BP: 114/57 (20 @ 11:00) (85/55 - 132/101)  RR: 21 (20 @ 11:00) (13 - 38)  SpO2: 100% (20 @ 11:00) (83% - 100%)  Wt(kg): --    I&O's Summary     @ 07:  -   @ 07:00  --------------------------------------------------------  IN: 2519.4 mL / OUT: 618 mL / NET: 1901.4 mL     @ 07:01  -   @ 11:33  --------------------------------------------------------  IN: 220 mL / OUT: 56 mL / NET: 164 mL      PHYSICAL EXAM  General: Elderly male, NAD, following commands  CNS: A/Ox2, grossly moving all extremities, sensation grossly intact  HEENT: EOMI, PERLL  Resp: Expiratory wheezing BL, Chest tube on L w/ serosanguinos drainage  CVS: RRR  Abd: NTND, +BSx4  Skin: brisk cap refill, warm/well perfused, no edema        MEDICATIONS  piperacillin/tazobactam IVPB.. IV Intermittent  tamsulosin Oral  atorvastatin Oral  albuterol/ipratropium for Nebulization Nebulizer PRN  dornase brenda Solution for Pleural Effusion IntraPleural.  guaiFENesin   Syrup  (Sugar-Free) Oral PRN  acetaminophen   Tablet .. Oral PRN  dronabinol Oral  HYDROmorphone  Injectable IV Push PRN  HYDROmorphone  Injectable IV Push PRN  alteplase  Injectable for Pleural Effusion IntraPleural.  heparin  Injectable SubCutaneous  famotidine    Tablet Oral  polyethylene glycol 3350 Oral  cyanocobalamin Injectable IntraMuscular  chlorhexidine 2% Cloths Topical  lactobacillus acidophilus Oral  sevelamer carbonate Oral                        9.7    9.98  )-----------( 268      ( 02 Mar 2020 05:16 )             28.6       03-02    133<L>  |  97  |  61<H>  ----------------------------<  117<H>  4.7   |  23  |  6.00<H>    Ca    7.9<L>      02 Mar 2020 05:16  Phos  6.9     03-02  Mg     2.6     03-02    TPro  5.8<L>  /  Alb  2.1<L>  /  TBili  1.1  /  DBili  x   /  AST  42<H>  /  ALT  64  /  AlkPhos  112  03-02    Urinalysis Basic - ( 2020 17:47 )    Color: Yellow / Appearance: Slightly Turbid / S.015 / pH: x  Gluc: x / Ketone: Negative  / Bili: Negative / Urobili: Negative   Blood: x / Protein: 30 mg/dL / Nitrite: Negative   Leuk Esterase: Trace / RBC: 11-25 /HPF / WBC 0-2   Sq Epi: x / Non Sq Epi: Few / Bacteria: Many    .Urine Catheterized   No growth --  @ 13:24  .Body Fluid Pleural Fluid   Culture is being performed.   polymorphonuclear leukocytes  No organisms seen  by cytocentrifuge  @ 21:38  .Blood Blood-Peripheral   No growth to date. --  @ 22:29  .Urine Catheterized   50,000 - 99,000 CFU/mL Coag Negative Staphylococcus  Susceptibilites not performed. --  @ 11:46        Bedside lung ultrasound: BL B lines, large L pleural effusion    CENTRAL LINE: N  CELESTIN: Y  A-LINE: N    GLOBAL ISSUE/BEST PRACTICE  Analgesia: Y  Sedation: N  CAM-ICU: Y  HOB elevation: yes  Stress ulcer prophylaxis: N  VTE prophylaxis: Y  Glycemic control: N  Nutrition: Y    CODE STATUS: Full code Patient is a 89y old  Male who presents with a chief complaint of Left Empyema (02 Mar 2020 11:14)    24 hour events:   Off precedex  Hematuria + large blood clots requiring celestin exchange & q2 irrigation    REVIEW OF SYSTEMS  Constitutional: No fever, chills, fatigue  Neuro: No headache, numbness, weakness  Resp: No cough, wheezing, shortness of breath  CVS: No chest pain, palpitations, leg swelling  GI: No abdominal pain, nausea, vomiting, diarrhea   : No dysuria, frequency, incontinence  Skin: No itching, burning, rashes, or lesions   Msk: No joint pain or swelling  Psych: No depression, anxiety, mood swings  Heme: No bleeding    T(F): 98.3 (20 @ 07:22), Max: 98.5 (20 @ 15:05)  HR: 97 (20 @ 11:00) (92 - 108)  BP: 114/57 (20 @ 11:00) (85/55 - 132/101)  RR: 21 (20 @ 11:00) (13 - 38)  SpO2: 100% (20 @ 11:00) (83% - 100%)  Wt(kg): --    I&O's Summary     @ 07:  -   @ 07:00  --------------------------------------------------------  IN: 2519.4 mL / OUT: 618 mL / NET: 1901.4 mL     @ 07:01  -   @ 11:33  --------------------------------------------------------  IN: 220 mL / OUT: 56 mL / NET: 164 mL      PHYSICAL EXAM  General: Elderly male, NAD, following commands  CNS: A/Ox2, grossly moving all extremities, sensation grossly intact  HEENT: EOMI, PERLL  Resp: Expiratory wheezing BL, Chest tube on suction on L w/ serosanguinos drainage  CVS: RRR  Abd: NTND, +BSx4  Skin: brisk cap refill, warm/well perfused, no edema          MEDICATIONS  piperacillin/tazobactam IVPB.. IV Intermittent  tamsulosin Oral  atorvastatin Oral  albuterol/ipratropium for Nebulization Nebulizer PRN  dornase brenda Solution for Pleural Effusion IntraPleural.  guaiFENesin   Syrup  (Sugar-Free) Oral PRN  acetaminophen   Tablet .. Oral PRN  dronabinol Oral  HYDROmorphone  Injectable IV Push PRN  HYDROmorphone  Injectable IV Push PRN  alteplase  Injectable for Pleural Effusion IntraPleural.  heparin  Injectable SubCutaneous  famotidine    Tablet Oral  polyethylene glycol 3350 Oral  cyanocobalamin Injectable IntraMuscular  chlorhexidine 2% Cloths Topical  lactobacillus acidophilus Oral  sevelamer carbonate Oral                        9.7    9.98  )-----------( 268      ( 02 Mar 2020 05:16 )             28.6       03-02    133<L>  |  97  |  61<H>  ----------------------------<  117<H>  4.7   |  23  |  6.00<H>    Ca    7.9<L>      02 Mar 2020 05:16  Phos  6.9     03-02  Mg     2.6     03-02    TPro  5.8<L>  /  Alb  2.1<L>  /  TBili  1.1  /  DBili  x   /  AST  42<H>  /  ALT  64  /  AlkPhos  112  03-02    Urinalysis Basic - ( 2020 17:47 )    Color: Yellow / Appearance: Slightly Turbid / S.015 / pH: x  Gluc: x / Ketone: Negative  / Bili: Negative / Urobili: Negative   Blood: x / Protein: 30 mg/dL / Nitrite: Negative   Leuk Esterase: Trace / RBC: 11-25 /HPF / WBC 0-2   Sq Epi: x / Non Sq Epi: Few / Bacteria: Many    .Urine Catheterized   No growth --  @ 13:24  .Body Fluid Pleural Fluid   Culture is being performed.   polymorphonuclear leukocytes  No organisms seen  by cytocentrifuge  @ 21:38  .Blood Blood-Peripheral   No growth to date. --  @ 22:29  .Urine Catheterized   50,000 - 99,000 CFU/mL Coag Negative Staphylococcus  Susceptibilites not performed. --  @ 11:46        Bedside lung ultrasound: BL B lines, large L pleural effusion    CENTRAL LINE: N  CELESTIN: Y  A-LINE: N    GLOBAL ISSUE/BEST PRACTICE  Analgesia: Y  Sedation: N  CAM-ICU: Y  HOB elevation: yes  Stress ulcer prophylaxis: N  VTE prophylaxis: Y  Glycemic control: N  Nutrition: Y    CODE STATUS: Full code Patient is a 89y old  Male who presents with a chief complaint of Left Empyema (02 Mar 2020 11:14)    24 hour events:   Off precedex  Hematuria + large blood clots requiring celestin exchange & q2 irrigation      T(F): 98.3 (20 @ 07:22), Max: 98.5 (20 @ 15:05)  HR: 97 (20 @ 11:00) (92 - 108)  BP: 114/57 (20 @ 11:00) (85/55 - 132/101)  RR: 21 (20 @ 11:00) (13 - 38)  SpO2: 100% (20 @ 11:00) (83% - 100%)  Wt(kg): --    I&O's Summary     @ 07:01  -   @ 07:00  --------------------------------------------------------  IN: 2519.4 mL / OUT: 618 mL / NET: 1901.4 mL     @ 07:01  -   @ 11:33  --------------------------------------------------------  IN: 220 mL / OUT: 56 mL / NET: 164 mL      PHYSICAL EXAM  General: Elderly male, NAD, following commands  CNS: A/Ox2, grossly moving all extremities, sensation grossly intact  HEENT: EOMI, PERLL  Resp: Expiratory wheezing BL, L Chest tube on suction on L w/ serosanguinos drainage, no air leak  CVS: RRR  Abd: NTND, +BSx4  Skin: brisk cap refill, warm/well perfused, no edema          MEDICATIONS  piperacillin/tazobactam IVPB.. IV Intermittent  tamsulosin Oral  atorvastatin Oral  albuterol/ipratropium for Nebulization Nebulizer PRN  dornase brenda Solution for Pleural Effusion IntraPleural.  guaiFENesin   Syrup  (Sugar-Free) Oral PRN  acetaminophen   Tablet .. Oral PRN  dronabinol Oral  HYDROmorphone  Injectable IV Push PRN  HYDROmorphone  Injectable IV Push PRN  alteplase  Injectable for Pleural Effusion IntraPleural.  heparin  Injectable SubCutaneous  famotidine    Tablet Oral  polyethylene glycol 3350 Oral  cyanocobalamin Injectable IntraMuscular  chlorhexidine 2% Cloths Topical  lactobacillus acidophilus Oral  sevelamer carbonate Oral                        9.7    9.98  )-----------( 268      ( 02 Mar 2020 05:16 )             28.6       03-02    133<L>  |  97  |  61<H>  ----------------------------<  117<H>  4.7   |  23  |  6.00<H>    Ca    7.9<L>      02 Mar 2020 05:16  Phos  6.9     03-02  Mg     2.6     -02    TPro  5.8<L>  /  Alb  2.1<L>  /  TBili  1.1  /  DBili  x   /  AST  42<H>  /  ALT  64  /  AlkPhos  112  -    Urinalysis Basic - ( 2020 17:47 )    Color: Yellow / Appearance: Slightly Turbid / S.015 / pH: x  Gluc: x / Ketone: Negative  / Bili: Negative / Urobili: Negative   Blood: x / Protein: 30 mg/dL / Nitrite: Negative   Leuk Esterase: Trace / RBC: 11-25 /HPF / WBC 0-2   Sq Epi: x / Non Sq Epi: Few / Bacteria: Many    .Urine Catheterized   No growth --  @ 13:24  .Body Fluid Pleural Fluid   Culture is being performed.   polymorphonuclear leukocytes  No organisms seen  by cytocentrifuge  @ 21:38  .Blood Blood-Peripheral   No growth to date. --  @ 22:29  .Urine Catheterized   50,000 - 99,000 CFU/mL Coag Negative Staphylococcus  Susceptibilites not performed. --  @ 11:46        Bedside lung ultrasound: BL B lines, large L pleural effusion    CENTRAL LINE: N  CELESTIN: Y  A-LINE: N    GLOBAL ISSUE/BEST PRACTICE  Analgesia: Y  Sedation: N  CAM-ICU: Y  HOB elevation: yes  Stress ulcer prophylaxis: N  VTE prophylaxis: Y  Glycemic control: N  Nutrition: Y    CODE STATUS: Full code

## 2020-03-02 NOTE — PROGRESS NOTE ADULT - SUBJECTIVE AND OBJECTIVE BOX
Patient seen and examined;  Chart reviewed and events noted;   urine output overall decreased (10cc per hour); has been consented for HD    MEDICATIONS  (STANDING):  alteplase  Injectable for Pleural Effusion 10 milliGRAM(s) IntraPleural. every 12 hours  atorvastatin 10 milliGRAM(s) Oral at bedtime  chlorhexidine 2% Cloths 1 Application(s) Topical <User Schedule>  cyanocobalamin Injectable 1000 MICROGram(s) IntraMuscular every 24 hours  dornase brenda Solution for Pleural Effusion 5 milliGRAM(s) IntraPleural. every 12 hours  dronabinol 2.5 milliGRAM(s) Oral two times a day  famotidine    Tablet 20 milliGRAM(s) Oral daily  heparin  Injectable 5000 Unit(s) SubCutaneous every 8 hours  lactobacillus acidophilus 1 Tablet(s) Oral three times a day  piperacillin/tazobactam IVPB.. 3.375 Gram(s) IV Intermittent every 12 hours  polyethylene glycol 3350 17 Gram(s) Oral two times a day  sevelamer carbonate 800 milliGRAM(s) Oral three times a day with meals  tamsulosin 0.4 milliGRAM(s) Oral at bedtime    MEDICATIONS  (PRN):  acetaminophen   Tablet .. 650 milliGRAM(s) Oral every 6 hours PRN Temp greater or equal to 38C (100.4F), Mild Pain (1 - 3)  albuterol/ipratropium for Nebulization 3 milliLiter(s) Nebulizer every 6 hours PRN Shortness of Breath and/or Wheezing  guaiFENesin   Syrup  (Sugar-Free) 200 milliGRAM(s) Oral every 6 hours PRN Cough  HYDROmorphone  Injectable 0.5 milliGRAM(s) IV Push every 4 hours PRN Moderate Pain (4 - 6)  HYDROmorphone  Injectable 1 milliGRAM(s) IV Push every 4 hours PRN Severe Pain (7 - 10)      Vital Signs Last 24 Hrs  T(C): 36.8 (02 Mar 2020 07:22), Max: 36.9 (01 Mar 2020 15:05)  T(F): 98.3 (02 Mar 2020 07:22), Max: 98.5 (01 Mar 2020 15:05)  HR: 97 (02 Mar 2020 11:00) (92 - 108)  BP: 114/57 (02 Mar 2020 11:00) (85/55 - 132/101)  BP(mean): 81 (02 Mar 2020 11:00) (63 - 109)  RR: 21 (02 Mar 2020 11:00) (13 - 38)  SpO2: 100% (02 Mar 2020 11:00) (83% - 100%)    PHYSICAL EXAM  General: adult elderly  in NAD  HEENT: clear oropharynx, anicteric sclera, pink conjunctivae  Neck: supple  CV: normal S1S2 with no murmur rubs or gallops  Lungs: reduced breath sounds at bases - left chest tube draining serosanguinous fluid  Abdomen: soft non-tender non-distended, no hepato/splenomegaly  Ext: no clubbing cyanosis or edema  Skin: no rashes and no petichiae  Neuro: alert and cooperative      LABS:                        9.7    9.98  )-----------( 268      ( 02 Mar 2020 05:16 )             28.6     Hemoglobin: 9.7 g/dL (03-02 @ 05:16)  Hemoglobin: 10.5 g/dL (03-01 @ 21:18)  Hemoglobin: 10.1 g/dL (03-01 @ 05:08)  Hemoglobin: 9.9 g/dL (02-29 @ 10:47)  Hemoglobin: 11.0 g/dL (02-28 @ 14:53)    03-02    133<L>  |  97  |  61<H>  ----------------------------<  117<H>  4.7   |  23  |  6.00<H>    Ca    7.9<L>      02 Mar 2020 05:16  Phos  6.9     03-02  Mg     2.6     03-02    TPro  5.8<L>  /  Alb  2.1<L>  /  TBili  1.1  /  DBili  x   /  AST  42<H>  /  ALT  64  /  AlkPhos  112  03-02

## 2020-03-02 NOTE — PROGRESS NOTE ADULT - SUBJECTIVE AND OBJECTIVE BOX
Hospital day: 5    89y Male admitted with Empyema of pleural space without fistula  S/P Pigatil catheter insertion  .    Patient seen and examined bedside resting comfortably.  Pt states when he moves he has discomfort in his left lung area.  No complaints of SOB, chest pain, back pain, abdominal pain, fevers, chills.     T(F): 98.3 (03-02-20 @ 07:22), Max: 98.5 (03-01-20 @ 15:05)  HR: 101 (03-02-20 @ 10:00) (92 - 108)  BP: 127/56 (03-02-20 @ 09:00) (85/55 - 141/69)  RR: 24 (03-02-20 @ 10:00) (13 - 38)  SpO2: 100% (03-02-20 @ 10:00) (83% - 100%)  Wt(kg): --  CAPILLARY BLOOD GLUCOSE          PHYSICAL EXAM:  General: NAD, WDWN.   Neuro:  Alert & oriented x 3  HEENT: NCAT, EOMI, conjunctiva clear  CV: +S1+S2 regular rate and rhythm  Lung: + Expiratory wheezing at left lung base. Pig tail in place.   Extremities: no pedal edema or calf tenderness noted       LABS:                        9.7    9.98  )-----------( 268      ( 02 Mar 2020 05:16 )             28.6     03-02    133<L>  |  97  |  61<H>  ----------------------------<  117<H>  4.7   |  23  |  6.00<H>    Ca    7.9<L>      02 Mar 2020 05:16  Phos  6.9     03-02  Mg     2.6     03-02    TPro  5.8<L>  /  Alb  2.1<L>  /  TBili  1.1  /  DBili  x   /  AST  42<H>  /  ALT  64  /  AlkPhos  112  03-02      I&O's Detail    01 Mar 2020 07:01  -  02 Mar 2020 07:00  --------------------------------------------------------  IN:    dexmedetomidine Infusion: 9.4 mL    Instillation: 250 mL    Lactated Ringers IV Bolus: 1000 mL    lactated ringers.: 350 mL    Oral Fluid: 710 mL    Solution: 200 mL  Total IN: 2519.4 mL    OUT:    Chest Tube: 30 mL    Indwelling Catheter - Urethral: 428 mL    Instillation: 160 mL  Total OUT: 618 mL    Total NET: 1901.4 mL      02 Mar 2020 07:01  -  02 Mar 2020 10:56  --------------------------------------------------------  IN:    Oral Fluid: 100 mL  Total IN: 100 mL    OUT:    Indwelling Catheter - Urethral: 41 mL  Total OUT: 41 mL    Total NET: 59 mL          Impression: 89y Male admitted with Empyema of pleural space without fistula    PMH GI Bleed  Stomach Ulcer  Osteoarthritis  Hyperlipidemia  Herniated Disc  Spinal Stenosis  Diverticulitis  GERD (Gastroesophageal Reflux Disease)  Chronic Lymphocytic Leukemia      Plan:  -continue VTE prophylaxis-[facility]  -Increase activity with PT, OOB, Ambulate  -Patient instructed on and encouraged incentive spirometry use  -continue local wound care  -Continue zosyn  -Continue ICU management.   -Possible VATS in future?  -will discuss with Dr Steven

## 2020-03-02 NOTE — PROGRESS NOTE ADULT - SUBJECTIVE AND OBJECTIVE BOX
CHARLY LU is a 89yMale , patient examined and chart reviewed.     INTERVAL HPI/ OVERNIGHT EVENTS:   Awake. events noted. Transferred to ICU sec tpa/dornase therapy through pigtail now on hold sec complications of hematuria. Had large clot and nick blood/mixed with urine. Mcnulty irrigation performed Also with GUS. + urine output.    PAST MEDICAL & SURGICAL HISTORY:  GI Bleed: 2007  Stomach Ulcer: H pylori 2007, treated with antibiotics  Osteoarthritis  Hyperlipidemia  Herniated Disc  Spinal Stenosis  Diverticulitis  GERD (Gastroesophageal Reflux Disease)  Chronic Lymphocytic Leukemia: followed by oncologist in [place]  S/P Tonsillectomy: childhood  Status Post Arthroscopy: right shoulder  History of Arthroscopy of Knee: bilateral    For details regarding the patient's social history, family history, and other miscellaneous elements, please refer the initial infectious diseases consultation and/or the admitting history and physical examination for this admission.    ROS:  CONSTITUTIONAL:  Negative fever or chills  EYES:  Negative  blurry vision or double vision  CARDIOVASCULAR:  Negative for chest pain or palpitations  RESPIRATORY:  Negative for cough, wheezing, or SOB   GASTROINTESTINAL:  Negative for nausea, vomiting, diarrhea, constipation, or abdominal pain  GENITOURINARY:  Negative frequency, urgency or dysuria  NEUROLOGIC:  No headache, confusion, dizziness, lightheadedness  All other systems were reviewed and are negative     Current inpatient medications :    ANTIBIOTICS/RELEVANT:  piperacillin/tazobactam IVPB.. 3.375 Gram(s) IV Intermittent every 12 hours    MEDICATIONS  (STANDING):  alteplase  Injectable for Pleural Effusion 10 milliGRAM(s) IntraPleural. every 12 hours  atorvastatin 10 milliGRAM(s) Oral at bedtime  chlorhexidine 2% Cloths 1 Application(s) Topical <User Schedule>  cyanocobalamin Injectable 1000 MICROGram(s) IntraMuscular every 24 hours  dornase brenda Solution for Pleural Effusion 5 milliGRAM(s) IntraPleural. every 12 hours  dronabinol 2.5 milliGRAM(s) Oral two times a day  famotidine    Tablet 20 milliGRAM(s) Oral daily  heparin  Injectable 5000 Unit(s) SubCutaneous every 8 hours  lactobacillus acidophilus 1 Tablet(s) Oral three times a day  polyethylene glycol 3350 17 Gram(s) Oral two times a day  sevelamer carbonate 800 milliGRAM(s) Oral three times a day with meals  tamsulosin 0.4 milliGRAM(s) Oral at bedtime    MEDICATIONS  (PRN):  acetaminophen   Tablet .. 650 milliGRAM(s) Oral every 6 hours PRN Temp greater or equal to 38C (100.4F), Mild Pain (1 - 3)  albuterol/ipratropium for Nebulization 3 milliLiter(s) Nebulizer every 6 hours PRN Shortness of Breath and/or Wheezing  guaiFENesin   Syrup  (Sugar-Free) 200 milliGRAM(s) Oral every 6 hours PRN Cough  HYDROmorphone  Injectable 0.5 milliGRAM(s) IV Push every 4 hours PRN Moderate Pain (4 - 6)  HYDROmorphone  Injectable 1 milliGRAM(s) IV Push every 4 hours PRN Severe Pain (7 - 10)        Objective:  Vital Signs Last 24 Hrs  T(C): 36.8 (02 Mar 2020 07:22), Max: 36.9 (01 Mar 2020 15:05)  T(F): 98.3 (02 Mar 2020 07:22), Max: 98.5 (01 Mar 2020 15:05)  HR: 97 (02 Mar 2020 11:00) (92 - 108)  BP: 114/57 (02 Mar 2020 11:00) (85/55 - 132/101)  BP(mean): 81 (02 Mar 2020 11:00) (63 - 109)  RR: 21 (02 Mar 2020 11:00) (13 - 38)  SpO2: 100% (02 Mar 2020 11:00) (83% - 100%)    Physical Exam:  General:  no acute distress  Eyes: sclera anicteric, pupils equal and reactive to light  ENMT: buccal mucosa moist, pharynx not injected  Neck: supple, trachea midline  Lungs: decreased no wheeze/rhonchi Left lung pigtail  Cardiovascular: regular rate and rhythm, S1 S2  Abdomen: soft, nontender, no organomegaly present, bowel sounds normal  Neurological: alert and oriented x3, Cranial Nerves II-XII grossly intact  Skin: no increased ecchymosis/petechiae/purpura  Lymph Nodes: no palpable cervical/supraclavicular lymph nodes enlargements  Extremities: no cyanosis/clubbing/edema    LABS:                        9.7    9.98  )-----------( 268      ( 02 Mar 2020 05:16 )             28.6   03-02    133<L>  |  97  |  61<H>  ----------------------------<  117<H>  4.7   |  23  |  6.00<H>    Ca    7.9<L>      02 Mar 2020 05:16  Phos  6.9     03-02  Mg     2.6     03-02    TPro  5.8<L>  /  Alb  2.1<L>  /  TBili  1.1  /  DBili  x   /  AST  42<H>  /  ALT  64  /  AlkPhos  112  03-02    Vancomycin Level, Trough (03.01.20 @ 11:53)    Vancomycin Level, Trough: 20.4    MICROBIOLOGY:  Culture - Body Fluid with Gram Stain (02.27.20 @ 21:38)    Gram Stain:   polymorphonuclear leukocytes  No organisms seen  by cytocentrifuge    Specimen Source: .Body Fluid Pleural Fluid    Culture Results:   No growth    Culture - Blood (collected 26 Feb 2020 22:29)  Source: .Blood Blood-Peripheral  Preliminary Report (27 Feb 2020 23:01):    No growth to date.    Culture - Blood (collected 26 Feb 2020 22:29)  Source: .Blood Blood-Peripheral  Preliminary Report (27 Feb 2020 23:01):    No growth to date.    Culture - Urine (collected 26 Feb 2020 11:46)  Source: .Urine Catheterized  Final Report (27 Feb 2020 15:05):    50,000 - 99,000 CFU/mL Coag Negative Staphylococcus    Susceptibilites not performed.    Culture - Blood (collected 25 Feb 2020 22:38)  Source: .Blood Blood-Peripheral  Preliminary Report (26 Feb 2020 23:01):    No growth to date.    Culture - Blood (collected 25 Feb 2020 22:38)  Source: .Blood Blood-Peripheral  Preliminary Report (26 Feb 2020 23:01):    No growth to date.    Legionella pneumophila Antigen, Urine (02.27.20 @ 02:53)    Legionella Antigen, Urine: Negative    Streptococcus Pneumoniae Ag Urine (02.27.20 @ 04:25)    Streptococcus Pneumoniae Ag Urine: Negative    RADIOLOGY & ADDITIONAL STUDIES:  EXAM:  CT RENAL STONE HUNT                          EXAM:  CT CHEST                                  PROCEDURE DATE:  02/25/2020          INTERPRETATION:  CLINICAL HISTORY:  Fever, left back and flank pain. History of gastric lymphoma.    Multiple axial images of the chest, abdomen and pelvis were obtained from the lung apices through symphysis pubis without the administration of oral or intravascular contrast limiting the sensitivity of evaluation. Reformatted coronal and sagittal images are submitted.    COMPARISON: PET/CT evaluation 6/19/2019.    FINDINGS: New moderately sized left pleural effusion is identified with portions that are loculated; air attenuation within the effusion as well as within the left pleural space suggests the presence of left-sided empyema. Consolidation is identified within the lingular segment as well as left lower lobe lung parenchyma; underlying neoplasm cannot be excluded. Hazy opacity within the left upper lobe is likely related to atelectasis. Subpleural opacity within the posterior right lower lobe and right middle lobe likely related to subpleural fibrosis. There is no evidence for right-sided pleural effusion or air.    No abnormally enlarged mediastinal or hilar lymph nodes are noted. There is no evidence for axillary lymphadenopathy. The heart size appears within normal limits. No pericardial effusion is identified. Thoracic aortic caliber demonstrates unremarkable caliber. Coronary arterial calcifications identified.    The central bronchial anatomy appears patent.    No mediastinal shift is noted.    The liver is normal in size. No focal hepatic masses are identified. There is no evidence for intrahepatic or extrahepatic biliary dilatation. A large gallstone is noted. No gallbladder wall thickening or pericholecystic fluid identified.    The spleen, pancreas and adrenal glands are unremarkable.    There is no evidence for hydronephrosis. No right renal calculi are identified. An 8 mm calculus is identified within the left-sided extrarenal pelvis, unchanged. There is no change in a 6 mm calculus identified within the distal right ureter at the level of the UVJ, without evidence for obstructive uropathy. A 1.3 cm right renal cyst is noted. The abdominal aorta is normal in course and caliber. No abnormally enlarged retroperitoneal or pelvic lymphadenopathy is noted.    Gastric wall thickening is identified allowing for nondistention with oral contrast; clinical correlation is suggested in light of the provided clinical history of gastric lymphoma.    Fecal material is scattered throughout the colon. There is no evidence for mechanical bowel obstruction. Colonic diverticulosis is noted. No colonic wall thickening is identified. There is no evidence for acute appendicitis.There is no evidence for free intraperitoneal air or fluid.    The prostate is enlarged and the urinary bladder appear unremarkable.     Postsurgical changes of the lumbar spine noted. Postprocedural changes of the right shoulder noted. Degenerative changes of the spine evident.    IMPRESSION:    1. New moderately sized left pleural effusion is identified with portions that are loculated; air attenuation within the effusion as well as within the left pleural space suggests the presence of left-sided empyema. Consolidation is identified within the lingular segment as well as left lower lobe lung parenchyma; underlying neoplasm cannot be excluded.   2. Gastric wall thickening is identified allowing for nondistention with oral contrast; clinicalcorrelation is suggested in light of the provided clinical history of gastric lymphoma.  3. An 8 mm calculus is identified within the left-sided extrarenal pelvis, unchanged. There is no change in a 6 mm calculus identified within the distal right ureter at the level of the UVJ, without evidence for obstructive uropathy. No evidence for bilateral hydronephrosis.      EXAM:  CT CHEST                            PROCEDURE DATE:  03/01/2020          INTERPRETATION:  Clinical information: Left-sided empyema    Comparison exam dated 2/25/2020    Axial images obtained, coronal and sagittal images computer reformatted    Noncontrast exam limits evaluation of hilar and mediastinal regions.    A left-sided pigtail catheter is present in the left pleural space. Loculated effusion with associated dense consolidation is present, the appearance is slightly more prominentsince the prior exam.    No thoracic aortic aneurysm or pericardial effusion. Cardiomegaly present. Coronary artery calcifications identified. The central airway appears intact. The thyroid gland is not enlarged.    Minimal atelectatic change right lung base. Calcification visible in the right pleura. Peribronchial thickening right infrahilar region. No pneumothorax.    The stomach is filled with food and air. A large gallstone is noted. The adrenal glands are not enlarged. The spleen is not enlarged. No acute appearing osseous abnormalities. Soft tissue anchors right humeral head. Air bubbles visible in the subcutaneous soft tissues left lateral lower thorax.    IMPRESSION: A left-sided pigtail catheter is present in the pleural space. Loculated effusion with associated dense consolidation is present, appearance slightly more prominent when compared to prior exam.        EXAM:  US KIDNEYS AND BLADDER                            PROCEDURE DATE:  03/01/2020          INTERPRETATION:    PROCEDURE INFORMATION:   Exam: US Retroperitoneal Limited, Kidneys   Exam date and time: 3/1/2020 6:31 AM   Age: 89 years old   Clinical indication: Other: Gus     TECHNIQUE:   Imaging protocol: Real-time ultrasound of the retroperitoneum with image   documentation. Examination was focused on the kidneys.     COMPARISON:   No relevant prior studies available.     FINDINGS:   Right kidney: The right kidney measures 11.8 cm in length. Normal parenchymal   echogenicity. No hydronephrosis.   Left kidney: The left kidney measures 11.7 cm in length. Normal parenchymal   echogenicity. Slightly irregular margin. No hydronephrosis.   Prostate: The prostate measures up to 3.7 cm in size.   Bladder: Bladder volume calculated to be 104 mL. No bladder wall thickening   allowing for lack of full distention. No bladder calculus.     IMPRESSION:   No hydronephrosis.        Assessment :   CHARLY LU is a 89y Male PMH CLL, gastric lymphoma ex smoker, hx of asbestos exp presenting to the hospital with cough, fever and left sided pleuritic chest pain. Febrile to 102 found to have left sided empyema. Seen by pulm and CT surgery. Sp IR drainage with pigtail placement 2/2/7/2020. Transferred to ICU sec tpa/dornase therapy through pigtail now on hold sec complications of hematuria. Had large clot and nick blood/mixed with urine. Mcnulty irrigation performed complicated with GUS. However making urine. Repeat CT chest noted with persistent loculated effusion with associated dense consolidation.     Plan :  Cont Zosyn day 5  Trend temps and cbc  Tpa/dornase on hold  For possible VATs and decortication per CT surgery if family agrees  Trend renal fx    D/w Dr Ku    Continue with present regiment.  Appropriate use of antibiotics and adverse effects reviewed.      I have discussed the above plan of care with patient/ family in detail. They expressed understanding of the  treatment plan . Risks, benefits and alternatives discussed in detail. I have asked if they have any questions or concerns and appropriately addressed them to the best of my ability .    Critical care > 35 minutes were spent in direct patient care reviewing notes, medications ,labs data/ imaging , discussion with multidisciplinary team.    Thank you for allowing me to participate in care of your patient .    Jose Kiran MD  Infectious Disease  [phone]

## 2020-03-02 NOTE — PROGRESS NOTE ADULT - ASSESSMENT
Intrinsic renal failure  Non-obstructive right ureteral stone, non-obstructive left renal pelvic stone  Continue celestin to monitor u/o

## 2020-03-02 NOTE — PROGRESS NOTE ADULT - SUBJECTIVE AND OBJECTIVE BOX
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 Pt found to have loculated left pleural effusions on CT Chest, concerning for empyema. Pt underwent IR guided left chest tube on , initially w/ minimal drainage, subsequently transferred to \[facility]\"" ED for further management. Pt initially offered VATs, but family wanted more conservative measures. Pt subsequently treated with tPA/Dornase brenda via chest tube. Pt initially with sufficient output s/p first dose tPA now with decreased drainage on 2nd administration (~100-200 cc's, total 1.2L). ICU course complicated by worsening renal failure, Cr 1.00  to now 5.1.     Events last 24 hours: Hematuria noted in urethral catheter with multiple clots noted blocking UOP. Urology consulted. HD stable. Off Precedex.     PAST MEDICAL & SURGICAL HISTORY:  GI Bleed:   Stomach Ulcer: H pylori , treated with antibiotics  Osteoarthritis  Hyperlipidemia  Herniated Disc  Spinal Stenosis  Diverticulitis  GERD (Gastroesophageal Reflux Disease)  Chronic Lymphocytic Leukemia: followed by oncologist in [place]  S/P Tonsillectomy: childhood  Status Post Arthroscopy: right shoulder  History of Arthroscopy of Knee: bilateral      Review of Systems:  CONSTITUTIONAL: No fever, chills, or fatigue  EYES: No eye pain, visual disturbances, or discharge  ENMT:  No difficulty hearing, tinnitus, vertigo; No sinus or throat pain  NECK: No pain or stiffness  RESPIRATORY: No cough, wheezing, chills or hemoptysis; No shortness of breath  CARDIOVASCULAR: No chest pain, palpitations, dizziness, or leg swelling  GASTROINTESTINAL: No abdominal or epigastric pain. No nausea, vomiting, or hematemesis; No diarrhea or constipation. No melena or hematochezia.  GENITOURINARY: No dysuria, frequency, hematuria, or incontinence  NEUROLOGICAL: No headaches, memory loss, loss of strength, numbness, or tremors  SKIN: No itching, burning, rashes, or lesions   MUSCULOSKELETAL: No joint pain or swelling; No muscle, back, or extremity pain  PSYCHIATRIC: No depression, anxiety, mood swings, or difficulty sleeping    Medications:  piperacillin/tazobactam IVPB.. 3.375 Gram(s) IV Intermittent every 12 hours    tamsulosin 0.4 milliGRAM(s) Oral at bedtime    albuterol/ipratropium for Nebulization 3 milliLiter(s) Nebulizer every 6 hours PRN  dornase brenda Solution for Pleural Effusion 5 milliGRAM(s) IntraPleural. every 12 hours  guaiFENesin   Syrup  (Sugar-Free) 200 milliGRAM(s) Oral every 6 hours PRN    acetaminophen   Tablet .. 650 milliGRAM(s) Oral every 6 hours PRN  dronabinol 2.5 milliGRAM(s) Oral two times a day  HYDROmorphone  Injectable 0.5 milliGRAM(s) IV Push every 4 hours PRN  HYDROmorphone  Injectable 1 milliGRAM(s) IV Push every 4 hours PRN      alteplase  Injectable for Pleural Effusion 10 milliGRAM(s) IntraPleural. every 12 hours  heparin  Injectable 5000 Unit(s) SubCutaneous every 8 hours    famotidine    Tablet 20 milliGRAM(s) Oral daily      atorvastatin 10 milliGRAM(s) Oral at bedtime    cyanocobalamin Injectable 1000 MICROGram(s) IntraMuscular every 24 hours      chlorhexidine 2% Cloths 1 Application(s) Topical <User Schedule>    lactobacillus acidophilus 1 Tablet(s) Oral three times a day          ICU Vital Signs Last 24 Hrs  T(C): 36.8 (02 Mar 2020 00:48), Max: 37 (01 Mar 2020 04:00)  T(F): 98.3 (02 Mar 2020 00:48), Max: 98.6 (01 Mar 2020 04:00)  HR: 103 (02 Mar 2020 01:00) (84 - 108)  BP: 118/62 (02 Mar 2020 01:00) (86/61 - 141/69)  BP(mean): 81 (02 Mar 2020 01:00) (61 - 109)  ABP: --  ABP(mean): --  RR: 35 (02 Mar 2020 01:00) (13 - 38)  SpO2: 95% (02 Mar 2020 01:00) (83% - 100%)          I&O's Detail    2020 07:01  -  01 Mar 2020 07:00  --------------------------------------------------------  IN:    dexmedetomidine Infusion: 16.5 mL    dextrose 5% + sodium chloride 0.45% with potassium chloride 20 mEq/L: 210 mL    lactated ringers.: 1000 mL    Oral Fluid: 370 mL    Solution: 200 mL  Total IN: 1796.5 mL    OUT:    Chest Tube: 1200 mL    Voided: 120 mL  Total OUT: 1320 mL    Total NET: 476.5 mL      01 Mar 2020 07:01  -  02 Mar 2020 01:51  --------------------------------------------------------  IN:    dexmedetomidine Infusion: 9.4 mL    Instillation: 150 mL    Lactated Ringers IV Bolus: 1000 mL    lactated ringers.: 350 mL    Oral Fluid: 510 mL    Solution: 100 mL  Total IN: 2119.4 mL    OUT:    Indwelling Catheter - Urethral: 283 mL    Instillation: 120 mL  Total OUT: 403 mL    Total NET: 1716.4 mL          LABS:                        10.5   9.59  )-----------( 243      ( 01 Mar 2020 21:18 )             31.4         133<L>  |  98  |  54<H>  ----------------------------<  130<H>  4.4   |  23  |  5.10<H>    Ca    7.9<L>      01 Mar 2020 05:08  Phos  6.6     03  Mg     2.6         TPro  5.9<L>  /  Alb  2.2<L>  /  TBili  1.6<H>  /  DBili  x   /  AST  56<H>  /  ALT  76  /  AlkPhos  98  03      CARDIAC MARKERS ( 2020 10:47 )  x     / x     / 65 U/L / x     / x          CAPILLARY BLOOD GLUCOSE      POCT Blood Glucose.: 95 mg/dL (2020 12:33)    PT/INR - ( 2020 10:47 )   PT: 17.8 sec;   INR: 1.57 ratio         PTT - ( 2020 10:47 )  PTT:29.2 sec  Urinalysis Basic - ( 2020 17:47 )    Color: Yellow / Appearance: Slightly Turbid / S.015 / pH: x  Gluc: x / Ketone: Negative  / Bili: Negative / Urobili: Negative   Blood: x / Protein: 30 mg/dL / Nitrite: Negative   Leuk Esterase: Trace / RBC: 11-25 /HPF / WBC 0-2   Sq Epi: x / Non Sq Epi: Few / Bacteria: Many      CULTURES:  Culture Results:   Culture is being performed. ( @ 21:38)  Culture Results:   No growth ( @ 21:38)  Culture Results:   Testing in progress ( @ :38)  Culture Results:   No growth to date. ( @ 22:29)  Culture Results:   No growth to date. ( @ 22:29)  Culture Results:   50,000 - 99,000 CFU/mL Coag Negative Staphylococcus  Susceptibilites not performed. ( @ 11:46)  Culture Results:   No growth at 5 days. ( @ 22:38)  Culture Results:   No growth at 5 days. ( @ 22:38)      Physical Examination:    VITALS AT TIME OF EXAM:  HR: 105 sinus tach	  BP: 121/71  RR: 18  SPO2: 100% 2L NC    General: Elderly, lethargic, but otherwise in NAD, arousable, oriented, reactive      HEENT: Pupils equal, reactive to light.  Symmetric. No scleral icterus or injection    PULM: scattered rhonchi, largely clear with diminished breath sounds LLL, no wheezes, rales, no significant sputum production or increased respiratory effort    NECK: Supple, no lymphadenopathy, trachea midline    CVS: Sinus tach, no murmurs, +s1/s2    ABD: Soft, nondistended, nontender, normoactive bowel sounds    EXT: No edema, nontender    SKIN: Warm and well perfused, no rashes noted.    NEURO: Alert, oriented, interactive, nonfocal, confused at times    POCUS: Scattered B lines b/l, RUL a-line predominant, noted mod-large left left sided, complex pleural effusion. Cardiac POCUS poor. IVC difficult to visualize, estimated >2cm. Bladder scan w/ celestin balloon in place, bladder w/ hyperechoic fluid noted.     DEVICES: Left sided chest tube w/ pleurovac, ~1.3L blood tinged/serous fluid drainage  LINES: N  CELESTIN: Y    RADIOLOGY: < from: CT Chest No Cont (20 @ 16:56) >  A left-sided pigtail catheter is present in the left pleural space. Loculated effusion with associated dense consolidation is present, the appearance is slightly more prominentsince the prior exam.    No thoracic aortic aneurysm or pericardial effusion. Cardiomegaly present. Coronary artery calcifications identified. The central airway appears intact. The thyroid gland is not enlarged.    Minimal atelectatic change right lung base. Calcification visible in the right pleura. Peribronchial thickening right infrahilar region. No pneumothorax.    The stomach is filled with food and air. A large gallstone is noted. The adrenal glands are not enlarged. The spleen is not enlarged. No acute appearing osseous abnormalities. Soft tissue anchors right humeral head. Air bubbles visible in the subcutaneous soft tissues left lateral lower thorax.    IMPRESSION: A left-sided pigtail catheter is present in the pleural space. Loculated effusion with associated dense consolidation is present, appearance slightly more prominent when compared to prior exam.    < from: US Kidney and Bladder (20 @ 06:55) >  FINDINGS:   Right kidney: The right kidney measures 11.8 cm in length. Normal parenchymal   echogenicity. No hydronephrosis.   Left kidney: The left kidney measures 11.7 cm in length. Normal parenchymal   echogenicity. Slightly irregular margin. No hydronephrosis.   Prostate: The prostate measures up to 3.7 cm in size.   Bladder: Bladder volume calculated to be 104 mL. No bladder wall thickening   allowing for lack of full distention. No bladder calculus.     IMPRESSION:   No hydronephrosis.

## 2020-03-02 NOTE — PROGRESS NOTE ADULT - SUBJECTIVE AND OBJECTIVE BOX
Date/Time Patient Seen:  		  Referring MD:   Data Reviewed	       Patient is a 89y old  Male who presents with a chief complaint of Left Empyema (02 Mar 2020 01:51)      Subjective/HPI     PAST MEDICAL & SURGICAL HISTORY:  GI Bleed: 2007  Stomach Ulcer: H pylori 2007, treated with antibiotics  Osteoarthritis  Hyperlipidemia  Herniated Disc  Spinal Stenosis  Diverticulitis  GERD (Gastroesophageal Reflux Disease)  Chronic Lymphocytic Leukemia: followed by oncologist in [place]  S/P Tonsillectomy: childhood  Status Post Arthroscopy: right shoulder  History of Arthroscopy of Knee: bilateral  Osteoarthritis  Stomach Ulcer: H pylori, treated with antibiotics        Medication list         MEDICATIONS  (STANDING):  alteplase  Injectable for Pleural Effusion 10 milliGRAM(s) IntraPleural. every 12 hours  atorvastatin 10 milliGRAM(s) Oral at bedtime  chlorhexidine 2% Cloths 1 Application(s) Topical <User Schedule>  cyanocobalamin Injectable 1000 MICROGram(s) IntraMuscular every 24 hours  dornase brenda Solution for Pleural Effusion 5 milliGRAM(s) IntraPleural. every 12 hours  dronabinol 2.5 milliGRAM(s) Oral two times a day  famotidine    Tablet 20 milliGRAM(s) Oral daily  heparin  Injectable 5000 Unit(s) SubCutaneous every 8 hours  lactobacillus acidophilus 1 Tablet(s) Oral three times a day  piperacillin/tazobactam IVPB.. 3.375 Gram(s) IV Intermittent every 12 hours  tamsulosin 0.4 milliGRAM(s) Oral at bedtime    MEDICATIONS  (PRN):  acetaminophen   Tablet .. 650 milliGRAM(s) Oral every 6 hours PRN Temp greater or equal to 38C (100.4F), Mild Pain (1 - 3)  albuterol/ipratropium for Nebulization 3 milliLiter(s) Nebulizer every 6 hours PRN Shortness of Breath and/or Wheezing  guaiFENesin   Syrup  (Sugar-Free) 200 milliGRAM(s) Oral every 6 hours PRN Cough  HYDROmorphone  Injectable 0.5 milliGRAM(s) IV Push every 4 hours PRN Moderate Pain (4 - 6)  HYDROmorphone  Injectable 1 milliGRAM(s) IV Push every 4 hours PRN Severe Pain (7 - 10)         Vitals log        ICU Vital Signs Last 24 Hrs  T(C): 36.8 (02 Mar 2020 04:47), Max: 36.9 (01 Mar 2020 15:05)  T(F): 98.3 (02 Mar 2020 04:47), Max: 98.5 (01 Mar 2020 15:05)  HR: 98 (02 Mar 2020 06:00) (92 - 108)  BP: 115/73 (02 Mar 2020 06:00) (86/61 - 141/69)  BP(mean): 87 (02 Mar 2020 06:00) (61 - 109)  ABP: --  ABP(mean): --  RR: 25 (02 Mar 2020 06:00) (13 - 38)  SpO2: 100% (02 Mar 2020 06:00) (83% - 100%)           Input and Output:  I&O's Detail    29 Feb 2020 07:01  -  01 Mar 2020 07:00  --------------------------------------------------------  IN:    dexmedetomidine Infusion: 16.5 mL    dextrose 5% + sodium chloride 0.45% with potassium chloride 20 mEq/L: 210 mL    lactated ringers.: 1000 mL    Oral Fluid: 370 mL    Solution: 200 mL  Total IN: 1796.5 mL    OUT:    Chest Tube: 1200 mL    Voided: 120 mL  Total OUT: 1320 mL    Total NET: 476.5 mL      01 Mar 2020 07:01  -  02 Mar 2020 06:31  --------------------------------------------------------  IN:    dexmedetomidine Infusion: 9.4 mL    Instillation: 200 mL    Lactated Ringers IV Bolus: 1000 mL    lactated ringers.: 350 mL    Oral Fluid: 610 mL    Solution: 200 mL  Total IN: 2369.4 mL    OUT:    Indwelling Catheter - Urethral: 368 mL    Instillation: 140 mL  Total OUT: 508 mL    Total NET: 1861.4 mL          Lab Data                        9.7    9.98  )-----------( 268      ( 02 Mar 2020 05:16 )             28.6     03-02    133<L>  |  97  |  61<H>  ----------------------------<  117<H>  4.7   |  23  |  6.00<H>    Ca    7.9<L>      02 Mar 2020 05:16  Phos  6.9     03-02  Mg     2.6     03-02    TPro  5.8<L>  /  Alb  2.1<L>  /  TBili  1.1  /  DBili  x   /  AST  42<H>  /  ALT  64  /  AlkPhos  112  03-02      CARDIAC MARKERS ( 29 Feb 2020 10:47 )  x     / x     / 65 U/L / x     / x            Review of Systems	      Objective     Physical Examination    heart s1s2  lung dec BS  abd soft  head nc  left chest tube      Pertinent Lab findings & Imaging      [place]:  NO   Adequate UO     I&O's Detail    29 Feb 2020 07:01  -  01 Mar 2020 07:00  --------------------------------------------------------  IN:    dexmedetomidine Infusion: 16.5 mL    dextrose 5% + sodium chloride 0.45% with potassium chloride 20 mEq/L: 210 mL    lactated ringers.: 1000 mL    Oral Fluid: 370 mL    Solution: 200 mL  Total IN: 1796.5 mL    OUT:    Chest Tube: 1200 mL    Voided: 120 mL  Total OUT: 1320 mL    Total NET: 476.5 mL      01 Mar 2020 07:01  -  02 Mar 2020 06:31  --------------------------------------------------------  IN:    dexmedetomidine Infusion: 9.4 mL    Instillation: 200 mL    Lactated Ringers IV Bolus: 1000 mL    lactated ringers.: 350 mL    Oral Fluid: 610 mL    Solution: 200 mL  Total IN: 2369.4 mL    OUT:    Indwelling Catheter - Urethral: 368 mL    Instillation: 140 mL  Total OUT: 508 mL    Total NET: 1861.4 mL               Discussed with:     Cultures:	        Radiology

## 2020-03-02 NOTE — CHART NOTE - NSCHARTNOTEFT_GEN_A_CORE
TPA injectable given via pigtail catheter for pleural effusion and was then clamped for 1 hour  Then left CT open to drainage for following hour  After this give dornase and clamp for 1 hour and then open to straight drainage   Repeat this process q12 TPA injectable given via pigtail catheter for pleural effusion-->clamped for 1 hour-->then CT left open to drain for following hour   After this give dornase and repeat as above  Repeat this process q12 TPA injectable given via pigtail catheter for pleural effusion-->clamped for 1 hour-->then CT left open to drain for following hour   After this give dornase and repeat as above  Repeat this process q12  Pre medicate with 1mg of Dilaudid

## 2020-03-02 NOTE — PROGRESS NOTE ADULT - ASSESSMENT
89 yo M with PMH of HLD and MALToma of the stomach (under surveillance), spinal stenosis with extensive lumbar surgery in 2011 and bilateral LE neuropathy, admitted for L side empyema and R ureteral stone. Patient initially reported what appeared to be stable occasional angina however later denied this.  Elevated proBNP however normal LV systolic function with EF 55% on echo and no evidence of meaningful volume overload on exam.   Review of EKG revealed possible inferior infarct however no wall motion abnormalities on 2/27 echo.   He is now in the intensive care unit status post lytic infusion therapy through a chest tube for his left-sided empyema.  He appears to have acute kidney injury possibly on the basis of vancomycin although a prerenal component should be considered given his lack of oral intake while on the floor.    Recommend  - chest tube management per ICU team  - remains with worsening renal function. Some urine output noted, though with clots and hematuria. Renal follow up regarding need for HD.  - IV hydration  - Continue Atorvastatin.  - Continue heparin SQ   - Monitor and replete lytes, keep K>4, Mg>2.  - Rest of management per urology/CT surgery/primary team.  - Other cardiovascular workup will depend on clinical course. Will follow.

## 2020-03-02 NOTE — PROGRESS NOTE ADULT - ASSESSMENT
h/o nhl- gastric maltoma  abnormal ct  left empyema, sp drainage  gastric wall thickening      prior nc ct ap showing gastric wall thickening  per dtr has had op egd w/in past yr  gerd precautions  cont pepcid  cont marinol  cont bowel regimen  diet as tolerated; gerd/aspiration prec  monitor abdominal exam  further management per primary; cont icu care    Advanced care planning was discussed with patient and family.  Advanced care planning forms were reviewed and discussed.  Risks, benefits and alternatives of gastroenterologic procedures were discussed in detail and all questions were answered.    30 minutes spent.

## 2020-03-02 NOTE — PROGRESS NOTE ADULT - SUBJECTIVE AND OBJECTIVE BOX
INTERVAL HPI/OVERNIGHT EVENTS:  pt seen and examined, dtr present  denies abd pain  per rn no vomiting no bm overnight, prior hematuria  per dtr, pt has been c/o reflux like symptoms; ct noted  labs noted    MEDICATIONS  (STANDING):  alteplase  Injectable for Pleural Effusion 10 milliGRAM(s) IntraPleural. every 12 hours  atorvastatin 10 milliGRAM(s) Oral at bedtime  chlorhexidine 2% Cloths 1 Application(s) Topical <User Schedule>  cyanocobalamin Injectable 1000 MICROGram(s) IntraMuscular every 24 hours  dornase brenda Solution for Pleural Effusion 5 milliGRAM(s) IntraPleural. every 12 hours  dronabinol 2.5 milliGRAM(s) Oral two times a day  famotidine    Tablet 20 milliGRAM(s) Oral daily  heparin  Injectable 5000 Unit(s) SubCutaneous every 8 hours  lactobacillus acidophilus 1 Tablet(s) Oral three times a day  piperacillin/tazobactam IVPB.. 3.375 Gram(s) IV Intermittent every 12 hours  polyethylene glycol 3350 17 Gram(s) Oral two times a day  tamsulosin 0.4 milliGRAM(s) Oral at bedtime    MEDICATIONS  (PRN):  acetaminophen   Tablet .. 650 milliGRAM(s) Oral every 6 hours PRN Temp greater or equal to 38C (100.4F), Mild Pain (1 - 3)  albuterol/ipratropium for Nebulization 3 milliLiter(s) Nebulizer every 6 hours PRN Shortness of Breath and/or Wheezing  guaiFENesin   Syrup  (Sugar-Free) 200 milliGRAM(s) Oral every 6 hours PRN Cough  HYDROmorphone  Injectable 0.5 milliGRAM(s) IV Push every 4 hours PRN Moderate Pain (4 - 6)  HYDROmorphone  Injectable 1 milliGRAM(s) IV Push every 4 hours PRN Severe Pain (7 - 10)      Allergies    No Known Allergies    Intolerances        Review of Systems:    see above unable to obtain in entirety     Vital Signs Last 24 Hrs  T(C): 36.8 (02 Mar 2020 07:22), Max: 36.9 (01 Mar 2020 15:05)  T(F): 98.3 (02 Mar 2020 07:22), Max: 98.5 (01 Mar 2020 15:05)  HR: 99 (02 Mar 2020 08:00) (92 - 108)  BP: 85/55 (02 Mar 2020 08:00) (85/55 - 141/69)  BP(mean): 63 (02 Mar 2020 08:00) (61 - 109)  RR: 20 (02 Mar 2020 08:00) (13 - 38)  SpO2: 100% (02 Mar 2020 08:00) (83% - 100%)    PHYSICAL EXAM:    General:  lying in  bed  HEENT:  NC/AT  Abdomen:  Soft nt +dt  Extremities:  no edema  Neuro/Psych:  Awake alert        LABS:                        9.7    9.98  )-----------( 268      ( 02 Mar 2020 05:16 )             28.6     03-02    133<L>  |  97  |  61<H>  ----------------------------<  117<H>  4.7   |  23  |  6.00<H>    Ca    7.9<L>      02 Mar 2020 05:16  Phos  6.9     03-02  Mg     2.6     03-02    TPro  5.8<L>  /  Alb  2.1<L>  /  TBili  1.1  /  DBili  x   /  AST  42<H>  /  ALT  64  /  AlkPhos  112  03-02    PT/INR - ( 2020 10:47 )   PT: 17.8 sec;   INR: 1.57 ratio         PTT - ( 2020 10:47 )  PTT:29.2 sec  Urinalysis Basic - ( 2020 17:47 )    Color: Yellow / Appearance: Slightly Turbid / S.015 / pH: x  Gluc: x / Ketone: Negative  / Bili: Negative / Urobili: Negative   Blood: x / Protein: 30 mg/dL / Nitrite: Negative   Leuk Esterase: Trace / RBC: 11-25 /HPF / WBC 0-2   Sq Epi: x / Non Sq Epi: Few / Bacteria: Many        RADIOLOGY & ADDITIONAL TESTS:

## 2020-03-02 NOTE — PROGRESS NOTE ADULT - PROBLEM SELECTOR PLAN 3
Possibly secondary to vanco toxicity  Trend Cr  Avoid nephrotoxic medications  Renal consult with Dr. STORY

## 2020-03-02 NOTE — PROGRESS NOTE ADULT - ASSESSMENT
Pt is a 89 y/o male with PMHx of CLL/MALToma not on active treatment, GERD, HLD, herniated discs/spinal stenosis s/p back surgery on medicinal Marijuana here with left sided empyema s/p chest tube insertion w/ TPA/Dornase administration and worsening GUS/delirium.   Assessment:  1. Left sided loculated pleural effusion- likely empyema pH 7.0, glu normal, 118 nucleated cells with neutrophil predominance  2. GUS- vanco toxicity? Likely ATN, follows appropriate time-line post vanco dosing w/ trough @23  3. Hematuria- doubt systemtic absorption from tPA, perhaps from pulling? Pt needing frequent redirection  4. Delirium- likely secondary to pain medications, age-related and ICU induced.     Plan:  - Urology consulted, advised to place 2-way 20 Fr Coude. Surgery PA at bedside. Removed 16Fr w/ passage of large clot and nick blood/mixed with urine. Celestin irrigation performed with clear fluid on return and no further blood/clots noted. New celestin noted in place on bladder POCUS. Hyperechoic fluid no longer noted inside bladder.   - Continue q2h celestin irrigation w/ 60-120cc's sterile water. Keep celestin in place. Dr Larson (urology) aware, also notified of multiple non-obstructive extra-renal stones.   - Minimal UOP noted throughout last 2 days. Cr following impressive injury w/ initial Cr 1.00 on 2/26 now >5. Nephrology recommended judicious IVF but on my POCUS pt will likely not tolerate further large volumes of fluid. In addition, pt increasingly delirious likely from pain medication secondary to chest tube and now repeated celestin insertion with likely trauma. Given this, pt at risk for respiratory compromise and I fear that further fluid may be harmful. Will need to reach out to Nephro re: HD tomorrow. Will also need to discuss w/ son (Md) re: HD for father given initially wanting conservative measures.   - For now, monitor UOP w/ Strict I/Os.  Trend Cr. Avoid further nephrotoxic medications (vanco) and continue dose adjustments for meds.   - Continue supplemental O2 via NC. Titrate to maintain SPO2 >90%. Keep HOB elevated. Aspiration precautions in setting of sedating pain medications. Will hold off on overnight dose of tPA/Dornase in setting of hematuria. Repeat CBC stat. PRN pRBC for HgB <7.0. Monitor HD's, stable currently.   - As above, hold tpa/dornase, despite dose again today pt with decreased output from chest tube. Will rediscuss w/ CTsx re: plan. CT scan showing consistent complicated effusion, unsure if multiple pockets and likely culprit for minimal drainage despite tpa/dornase. Son contemplating more aggressive measures. Will d/w with day team re: next step.   - Continue Zosyn for empyema. Cultures negative, initial body fluid cx negative pending repeat (testing now). Afebrile, no leukocytosis. Flu neg.   - As per conversation with son via phone, pain control is priority, will be cautious going forward but will need further pain control.   - Subq Heparin for dvt ppx  - Palliative consult     DISPO: Full code. To remain in ICU.

## 2020-03-02 NOTE — PROGRESS NOTE ADULT - SUBJECTIVE AND OBJECTIVE BOX
Patient is a 89y old  Male who presents with a chief complaint of Left Empyema (02 Mar 2020 11:42)      INTERVAL /OVERNIGHT EVENTS: c/o chest wall pain    MEDICATIONS  (STANDING):  alteplase  Injectable for Pleural Effusion 10 milliGRAM(s) IntraPleural. every 12 hours  atorvastatin 10 milliGRAM(s) Oral at bedtime  chlorhexidine 2% Cloths 1 Application(s) Topical <User Schedule>  cyanocobalamin Injectable 1000 MICROGram(s) IntraMuscular every 24 hours  dornase brenda Solution for Pleural Effusion 5 milliGRAM(s) IntraPleural. every 12 hours  dronabinol 2.5 milliGRAM(s) Oral two times a day  famotidine    Tablet 20 milliGRAM(s) Oral daily  heparin  Injectable 5000 Unit(s) SubCutaneous every 8 hours  lactobacillus acidophilus 1 Tablet(s) Oral three times a day  piperacillin/tazobactam IVPB.. 3.375 Gram(s) IV Intermittent every 12 hours  polyethylene glycol 3350 17 Gram(s) Oral two times a day  sevelamer carbonate 800 milliGRAM(s) Oral three times a day with meals  tamsulosin 0.4 milliGRAM(s) Oral at bedtime    MEDICATIONS  (PRN):  acetaminophen   Tablet .. 650 milliGRAM(s) Oral every 6 hours PRN Temp greater or equal to 38C (100.4F), Mild Pain (1 - 3)  albuterol/ipratropium for Nebulization 3 milliLiter(s) Nebulizer every 6 hours PRN Shortness of Breath and/or Wheezing  aluminum hydroxide/magnesium hydroxide/simethicone Suspension 30 milliLiter(s) Oral every 6 hours PRN Dyspepsia  guaiFENesin   Syrup  (Sugar-Free) 200 milliGRAM(s) Oral every 6 hours PRN Cough  HYDROmorphone  Injectable 0.5 milliGRAM(s) IV Push every 4 hours PRN Moderate Pain (4 - 6)  HYDROmorphone  Injectable 1 milliGRAM(s) IV Push every 4 hours PRN Severe Pain (7 - 10)      Allergies    No Known Allergies    Intolerances        REVIEW OF SYSTEMS:  CONSTITUTIONAL: No fever, weight loss, or fatigue  EYES: No eye pain, visual disturbances, or discharge  ENMT:  No difficulty hearing, tinnitus, vertigo; No sinus or throat pain  NECK: No pain or stiffness  RESPIRATORY: No cough, wheezing, chills or hemoptysis; No shortness of breath  CARDIOVASCULAR: No chest pain, palpitations, dizziness, or leg swelling  GASTROINTESTINAL: No abdominal or epigastric pain. No nausea, vomiting, or hematemesis; No diarrhea or constipation. No melena or hematochezia.  GENITOURINARY: No dysuria, frequency, hematuria, or incontinence  NEUROLOGICAL: No headaches, memory loss, loss of strength, numbness, or tremors  SKIN: No itching, burning, rashes, or lesions   LYMPH NODES: No enlarged glands  ENDOCRINE: No heat or cold intolerance; No hair loss; No polydipsia or polyuria  MUSCULOSKELETAL: No joint pain or swelling; No muscle, back, or extremity pain  PSYCHIATRIC: No depression, anxiety, mood swings, or difficulty sleeping  HEME/LYMPH: No easy bruising, or bleeding gums  ALLERGY AND IMMUNOLOGIC: No hives or eczema    Vital Signs Last 24 Hrs  T(C): 36.5 (02 Mar 2020 16:07), Max: 36.8 (02 Mar 2020 00:48)  T(F): 97.7 (02 Mar 2020 16:07), Max: 98.3 (02 Mar 2020 00:48)  HR: 99 (02 Mar 2020 19:00) (96 - 109)  BP: 108/62 (02 Mar 2020 19:00) (85/55 - 145/63)  BP(mean): 81 (02 Mar 2020 19:00) (63 - 109)  RR: 29 (02 Mar 2020 19:00) (15 - 43)  SpO2: 100% (02 Mar 2020 19:00) (91% - 100%)    PHYSICAL EXAM:  GENERAL: NAD, well-groomed, well-developed  HEAD:  Atraumatic, Normocephalic  EYES: EOMI, PERRLA, conjunctiva and sclera clear  ENMT: No tonsillar erythema, exudates, or enlargement; Moist mucous membranes, Good dentition, No lesions  NECK: Supple, No JVD, Normal thyroid  NERVOUS SYSTEM:  Alert & Oriented X3, Good concentration; Motor Strength 5/5 B/L upper and lower extremities; DTRs 2+ intact and symmetric  CHEST/LUNG: Clear to auscultation bilaterally; No rales, rhonchi, wheezing, or rubs  HEART: Regular rate and rhythm; No murmurs, rubs, or gallops  ABDOMEN: Soft, Nontender, Nondistended; Bowel sounds present  EXTREMITIES:  2+ Peripheral Pulses, No clubbing, cyanosis, or edema  LYMPH: No lymphadenopathy noted  SKIN: No rashes or lesions    LABS:                        9.7    9.98  )-----------( 268      ( 02 Mar 2020 05:16 )             28.6     02 Mar 2020 13:59    131    |  95     |  67     ----------------------------<  155    4.9     |  20     |  6.30     Ca    8.3        02 Mar 2020 13:59  Phos  6.9       02 Mar 2020 05:16  Mg     2.6       02 Mar 2020 05:16    TPro  5.8    /  Alb  2.1    /  TBili  1.1    /  DBili  x      /  AST  42     /  ALT  64     /  AlkPhos  112    02 Mar 2020 05:16        CAPILLARY BLOOD GLUCOSE          RADIOLOGY & ADDITIONAL TESTS:    Notes Reviewed:  [x ] YES  [ ] NO    Care Discussed with Consultants/Other Providers [x ] YES  [ ] NO

## 2020-03-02 NOTE — PROGRESS NOTE ADULT - ASSESSMENT
89 y/o man known to us from office, dx'd w gastric MALToma 2013, bone marrow and repeat gastric bx 2017 c/w Chronic Lymphocytic Leukemia/Small Lymphocytic Lymphoma, under serial monitoring w EGD and PETCT, has not required treatment. Also hx B12 deficiency.  Adm 2/26/20 with  left empyema, hx URI sx Jan 2020  Post IR pigtail insertion w minimal output and tx to ICU for dornase/alteplase adm w resultant good output    - on admission treated with vanco and Zosyn; also had episode of hypotension likely contributing to acute renal failure  - being evaluated by nephrology for possible dialysis  - on admission also w prolonged INR, improved w Vitamin K  - noted mild anemia which is multifactorial with hx B12 deficiency, acute illness and not with worsening renal function  - will continue to follow with you  - case discussed with Dr. Martinez (ICU attending)

## 2020-03-02 NOTE — PROGRESS NOTE ADULT - SUBJECTIVE AND OBJECTIVE BOX
[facility] - Warren Posada, Dandre, Abelardo, Lucina, Kelvin Travis  Office Number:  [phone]    Patient resting comfortably in bed in NAD.  Laying flat with no respiratory distress.  No complaints of chest pain, dyspnea, palpitations, PND, or orthopnea.  hematuria, and some pain.    ROS: negative unless otherwise mentioned.    Telemetry:  sr/pat    MEDICATIONS  (STANDING):  alteplase  Injectable for Pleural Effusion 10 milliGRAM(s) IntraPleural. every 12 hours  atorvastatin 10 milliGRAM(s) Oral at bedtime  chlorhexidine 2% Cloths 1 Application(s) Topical <User Schedule>  cyanocobalamin Injectable 1000 MICROGram(s) IntraMuscular every 24 hours  dornase brenda Solution for Pleural Effusion 5 milliGRAM(s) IntraPleural. every 12 hours  dronabinol 2.5 milliGRAM(s) Oral two times a day  famotidine    Tablet 20 milliGRAM(s) Oral daily  heparin  Injectable 5000 Unit(s) SubCutaneous every 8 hours  lactobacillus acidophilus 1 Tablet(s) Oral three times a day  piperacillin/tazobactam IVPB.. 3.375 Gram(s) IV Intermittent every 12 hours  tamsulosin 0.4 milliGRAM(s) Oral at bedtime    MEDICATIONS  (PRN):  acetaminophen   Tablet .. 650 milliGRAM(s) Oral every 6 hours PRN Temp greater or equal to 38C (100.4F), Mild Pain (1 - 3)  albuterol/ipratropium for Nebulization 3 milliLiter(s) Nebulizer every 6 hours PRN Shortness of Breath and/or Wheezing  guaiFENesin   Syrup  (Sugar-Free) 200 milliGRAM(s) Oral every 6 hours PRN Cough  HYDROmorphone  Injectable 0.5 milliGRAM(s) IV Push every 4 hours PRN Moderate Pain (4 - 6)  HYDROmorphone  Injectable 1 milliGRAM(s) IV Push every 4 hours PRN Severe Pain (7 - 10)      Allergies    No Known Allergies    Intolerances        Vital Signs Last 24 Hrs  T(C): 36.8 (02 Mar 2020 04:47), Max: 36.9 (01 Mar 2020 15:05)  T(F): 98.3 (02 Mar 2020 04:47), Max: 98.5 (01 Mar 2020 15:05)  HR: 98 (02 Mar 2020 06:00) (92 - 108)  BP: 115/73 (02 Mar 2020 06:00) (86/61 - 141/69)  BP(mean): 87 (02 Mar 2020 06:00) (61 - 109)  RR: 25 (02 Mar 2020 06:00) (13 - 38)  SpO2: 100% (02 Mar 2020 06:00) (83% - 100%)    I&O's Summary    01 Mar 2020 07:01  -  02 Mar 2020 07:00  --------------------------------------------------------  IN: 2519.4 mL / OUT: 618 mL / NET: 1901.4 mL        ON EXAM:    Constitutional: NAD, awake and alert, well-developed  Eyes:   Pupils round, no lesions  ENMT: no exudate or erythema  Pulmonary: breath sounds are clear bilaterally, No wheezing, rales or rhonchi  Cardiovascular: PMI not palpable RRR normal S1 and S2, no murmurs, rubs, gallops or clicks  Gastrointestinal: Bowel Sounds present, soft, nontender.   Lymph: No cervical lymphadenopathy.  Neurological: Alert, no focal deficits  Skin: No rashes.  No cyanosis.  Psych:  Mood & affect appropriate   Ext: No lower ext edema    LABS: All Labs Reviewed:                        9.7    9.98  )-----------( 268      ( 02 Mar 2020 05:16 )             28.6                         10.5   9.59  )-----------( 243      ( 01 Mar 2020 21:18 )             31.4                         10.1   9.34  )-----------( 223      ( 01 Mar 2020 05:08 )             30.4     02 Mar 2020 05:16    133    |  97     |  61     ----------------------------<  117    4.7     |  23     |  6.00   01 Mar 2020 05:08    133    |  98     |  54     ----------------------------<  130    4.4     |  23     |  5.10   29 Feb 2020 10:47    133    |  100    |  47     ----------------------------<  161    4.3     |  23     |  4.50     Ca    7.9        02 Mar 2020 05:16  Ca    7.9        01 Mar 2020 05:08  Ca    8.0        29 Feb 2020 10:47  Phos  6.9       02 Mar 2020 05:16  Phos  6.6       01 Mar 2020 05:08  Phos  5.5       29 Feb 2020 10:47  Mg     2.6       02 Mar 2020 05:16  Mg     2.6       01 Mar 2020 05:08  Mg     2.5       29 Feb 2020 10:47    TPro  5.8    /  Alb  2.1    /  TBili  1.1    /  DBili  x      /  AST  42     /  ALT  64     /  AlkPhos  112    02 Mar 2020 05:16  TPro  5.9    /  Alb  2.2    /  TBili  1.6    /  DBili  x      /  AST  56     /  ALT  76     /  AlkPhos  98     01 Mar 2020 05:08  TPro  5.9    /  Alb  2.3    /  TBili  1.8    /  DBili  1.20   /  AST  79     /  ALT  92     /  AlkPhos  111    29 Feb 2020 10:47    PT/INR - ( 29 Feb 2020 10:47 )   PT: 17.8 sec;   INR: 1.57 ratio         PTT - ( 29 Feb 2020 10:47 )  PTT:29.2 sec  CARDIAC MARKERS ( 29 Feb 2020 10:47 )  x     / x     / 65 U/L / x     / x          Blood Culture: Organism --  Gram Stain Blood -- Gram Stain   polymorphonuclear leukocytes  No organisms seen  by cytocentrifuge  Specimen Source .Body Fluid Pleural Fluid  Culture-Blood --    Organism --  Gram Stain Blood -- Gram Stain --  Specimen Source .Blood Blood-Peripheral  Culture-Blood --    Organism --  Gram Stain Blood -- Gram Stain --  Specimen Source .Urine Catheterized  Culture-Blood --      02-28 @ 07:38  Pro Bnp [id number]

## 2020-03-03 LAB
ALBUMIN SERPL ELPH-MCNC: 2.1 G/DL — LOW (ref 3.3–5)
ALP SERPL-CCNC: 115 U/L — SIGNIFICANT CHANGE UP (ref 40–120)
ALT FLD-CCNC: 54 U/L — SIGNIFICANT CHANGE UP (ref 12–78)
ANION GAP SERPL CALC-SCNC: 14 MMOL/L — SIGNIFICANT CHANGE UP (ref 5–17)
APTT BLD: 28.8 SEC — SIGNIFICANT CHANGE UP (ref 28.5–37)
AST SERPL-CCNC: 37 U/L — SIGNIFICANT CHANGE UP (ref 15–37)
BASOPHILS # BLD AUTO: 0.05 K/UL — SIGNIFICANT CHANGE UP (ref 0–0.2)
BASOPHILS NFR BLD AUTO: 0.5 % — SIGNIFICANT CHANGE UP (ref 0–2)
BILIRUB SERPL-MCNC: 1 MG/DL — SIGNIFICANT CHANGE UP (ref 0.2–1.2)
BUN SERPL-MCNC: 78 MG/DL — HIGH (ref 7–23)
CALCIUM SERPL-MCNC: 8.1 MG/DL — LOW (ref 8.5–10.1)
CHLORIDE SERPL-SCNC: 95 MMOL/L — LOW (ref 96–108)
CO2 SERPL-SCNC: 22 MMOL/L — SIGNIFICANT CHANGE UP (ref 22–31)
CREAT SERPL-MCNC: 6.7 MG/DL — HIGH (ref 0.5–1.3)
CULTURE RESULTS: SIGNIFICANT CHANGE UP
EOSINOPHIL # BLD AUTO: 0.09 K/UL — SIGNIFICANT CHANGE UP (ref 0–0.5)
EOSINOPHIL NFR BLD AUTO: 0.9 % — SIGNIFICANT CHANGE UP (ref 0–6)
GLUCOSE SERPL-MCNC: 105 MG/DL — HIGH (ref 70–99)
HCT VFR BLD CALC: 28.7 % — LOW (ref 39–50)
HGB BLD-MCNC: 9.8 G/DL — LOW (ref 13–17)
IMM GRANULOCYTES NFR BLD AUTO: 0.8 % — SIGNIFICANT CHANGE UP (ref 0–1.5)
INR BLD: 1.5 RATIO — HIGH (ref 0.88–1.16)
LYMPHOCYTES # BLD AUTO: 1.14 K/UL — SIGNIFICANT CHANGE UP (ref 1–3.3)
LYMPHOCYTES # BLD AUTO: 11.8 % — LOW (ref 13–44)
MAGNESIUM SERPL-MCNC: 2.9 MG/DL — HIGH (ref 1.6–2.6)
MCHC RBC-ENTMCNC: 31.2 PG — SIGNIFICANT CHANGE UP (ref 27–34)
MCHC RBC-ENTMCNC: 34.1 GM/DL — SIGNIFICANT CHANGE UP (ref 32–36)
MCV RBC AUTO: 91.4 FL — SIGNIFICANT CHANGE UP (ref 80–100)
MONOCYTES # BLD AUTO: 0.94 K/UL — HIGH (ref 0–0.9)
MONOCYTES NFR BLD AUTO: 9.7 % — SIGNIFICANT CHANGE UP (ref 2–14)
NEUTROPHILS # BLD AUTO: 7.37 K/UL — SIGNIFICANT CHANGE UP (ref 1.8–7.4)
NEUTROPHILS NFR BLD AUTO: 76.3 % — SIGNIFICANT CHANGE UP (ref 43–77)
NRBC # BLD: 0 /100 WBCS — SIGNIFICANT CHANGE UP (ref 0–0)
PHOSPHATE SERPL-MCNC: 7.8 MG/DL — HIGH (ref 2.5–4.5)
PLATELET # BLD AUTO: 299 K/UL — SIGNIFICANT CHANGE UP (ref 150–400)
POTASSIUM SERPL-MCNC: 4.6 MMOL/L — SIGNIFICANT CHANGE UP (ref 3.5–5.3)
POTASSIUM SERPL-SCNC: 4.6 MMOL/L — SIGNIFICANT CHANGE UP (ref 3.5–5.3)
PROT SERPL-MCNC: 6 G/DL — SIGNIFICANT CHANGE UP (ref 6–8.3)
PROTHROM AB SERPL-ACNC: 17 SEC — HIGH (ref 10–12.9)
RBC # BLD: 3.14 M/UL — LOW (ref 4.2–5.8)
RBC # FLD: 13.2 % — SIGNIFICANT CHANGE UP (ref 10.3–14.5)
SODIUM SERPL-SCNC: 131 MMOL/L — LOW (ref 135–145)
SPECIMEN SOURCE: SIGNIFICANT CHANGE UP
WBC # BLD: 9.67 K/UL — SIGNIFICANT CHANGE UP (ref 3.8–10.5)
WBC # FLD AUTO: 9.67 K/UL — SIGNIFICANT CHANGE UP (ref 3.8–10.5)

## 2020-03-03 PROCEDURE — 71045 X-RAY EXAM CHEST 1 VIEW: CPT | Mod: 26

## 2020-03-03 PROCEDURE — 99233 SBSQ HOSP IP/OBS HIGH 50: CPT | Mod: GC

## 2020-03-03 PROCEDURE — 99232 SBSQ HOSP IP/OBS MODERATE 35: CPT

## 2020-03-03 RX ORDER — DORNASE ALFA 1 MG/ML
5 SOLUTION RESPIRATORY (INHALATION) EVERY 12 HOURS
Refills: 0 | Status: COMPLETED | OUTPATIENT
Start: 2020-03-03 | End: 2020-03-04

## 2020-03-03 RX ORDER — FUROSEMIDE 40 MG
20 TABLET ORAL ONCE
Refills: 0 | Status: COMPLETED | OUTPATIENT
Start: 2020-03-03 | End: 2020-03-03

## 2020-03-03 RX ORDER — ALTEPLASE 100 MG
10 KIT INTRAVENOUS EVERY 12 HOURS
Refills: 0 | Status: COMPLETED | OUTPATIENT
Start: 2020-03-03 | End: 2020-03-04

## 2020-03-03 RX ADMIN — PREGABALIN 1000 MICROGRAM(S): 225 CAPSULE ORAL at 17:04

## 2020-03-03 RX ADMIN — HYDROMORPHONE HYDROCHLORIDE 1 MILLIGRAM(S): 2 INJECTION INTRAMUSCULAR; INTRAVENOUS; SUBCUTANEOUS at 00:45

## 2020-03-03 RX ADMIN — HYDROMORPHONE HYDROCHLORIDE 1 MILLIGRAM(S): 2 INJECTION INTRAMUSCULAR; INTRAVENOUS; SUBCUTANEOUS at 23:50

## 2020-03-03 RX ADMIN — DORNASE ALFA 5 MILLIGRAM(S): 1 SOLUTION RESPIRATORY (INHALATION) at 04:45

## 2020-03-03 RX ADMIN — DORNASE ALFA 5 MILLIGRAM(S): 1 SOLUTION RESPIRATORY (INHALATION) at 17:14

## 2020-03-03 RX ADMIN — Medication 1 TABLET(S): at 22:09

## 2020-03-03 RX ADMIN — POLYETHYLENE GLYCOL 3350 17 GRAM(S): 17 POWDER, FOR SOLUTION ORAL at 05:26

## 2020-03-03 RX ADMIN — Medication 1 TABLET(S): at 13:01

## 2020-03-03 RX ADMIN — Medication 2.5 MILLIGRAM(S): at 17:04

## 2020-03-03 RX ADMIN — HEPARIN SODIUM 5000 UNIT(S): 5000 INJECTION INTRAVENOUS; SUBCUTANEOUS at 13:01

## 2020-03-03 RX ADMIN — HYDROMORPHONE HYDROCHLORIDE 1 MILLIGRAM(S): 2 INJECTION INTRAMUSCULAR; INTRAVENOUS; SUBCUTANEOUS at 01:00

## 2020-03-03 RX ADMIN — TAMSULOSIN HYDROCHLORIDE 0.4 MILLIGRAM(S): 0.4 CAPSULE ORAL at 22:09

## 2020-03-03 RX ADMIN — HYDROMORPHONE HYDROCHLORIDE 1 MILLIGRAM(S): 2 INJECTION INTRAMUSCULAR; INTRAVENOUS; SUBCUTANEOUS at 14:50

## 2020-03-03 RX ADMIN — HEPARIN SODIUM 5000 UNIT(S): 5000 INJECTION INTRAVENOUS; SUBCUTANEOUS at 22:09

## 2020-03-03 RX ADMIN — SEVELAMER CARBONATE 800 MILLIGRAM(S): 2400 POWDER, FOR SUSPENSION ORAL at 17:04

## 2020-03-03 RX ADMIN — ALTEPLASE 10 MILLIGRAM(S): KIT at 15:10

## 2020-03-03 RX ADMIN — SEVELAMER CARBONATE 800 MILLIGRAM(S): 2400 POWDER, FOR SUSPENSION ORAL at 12:42

## 2020-03-03 RX ADMIN — CHLORHEXIDINE GLUCONATE 1 APPLICATION(S): 213 SOLUTION TOPICAL at 05:26

## 2020-03-03 RX ADMIN — HEPARIN SODIUM 5000 UNIT(S): 5000 INJECTION INTRAVENOUS; SUBCUTANEOUS at 05:26

## 2020-03-03 RX ADMIN — HYDROMORPHONE HYDROCHLORIDE 1 MILLIGRAM(S): 2 INJECTION INTRAMUSCULAR; INTRAVENOUS; SUBCUTANEOUS at 15:05

## 2020-03-03 RX ADMIN — Medication 1 TABLET(S): at 05:26

## 2020-03-03 RX ADMIN — Medication 20 MILLIGRAM(S): at 09:11

## 2020-03-03 RX ADMIN — PIPERACILLIN AND TAZOBACTAM 25 GRAM(S): 4; .5 INJECTION, POWDER, LYOPHILIZED, FOR SOLUTION INTRAVENOUS at 02:48

## 2020-03-03 RX ADMIN — ALTEPLASE 10 MILLIGRAM(S): KIT at 02:18

## 2020-03-03 RX ADMIN — FAMOTIDINE 20 MILLIGRAM(S): 10 INJECTION INTRAVENOUS at 12:42

## 2020-03-03 RX ADMIN — Medication 2.5 MILLIGRAM(S): at 05:26

## 2020-03-03 RX ADMIN — ATORVASTATIN CALCIUM 10 MILLIGRAM(S): 80 TABLET, FILM COATED ORAL at 22:09

## 2020-03-03 RX ADMIN — SEVELAMER CARBONATE 800 MILLIGRAM(S): 2400 POWDER, FOR SUSPENSION ORAL at 08:43

## 2020-03-03 RX ADMIN — PIPERACILLIN AND TAZOBACTAM 25 GRAM(S): 4; .5 INJECTION, POWDER, LYOPHILIZED, FOR SOLUTION INTRAVENOUS at 13:01

## 2020-03-03 NOTE — PROGRESS NOTE ADULT - SUBJECTIVE AND OBJECTIVE BOX
[facility] -- Warren Posada, Abelardo Amos Pannella, Patel, Savella  Office # [phone]      Follow Up:  pleural effusion    Subjective/Observations: Patient seen and examined. Events noted. Resting comfortably in bed. No complaints of chest pain, dyspnea, or palpitations reported. No signs of orthopnea or PND.       REVIEW OF SYSTEMS: All other review of systems is negative unless indicated above    PAST MEDICAL & SURGICAL HISTORY:  GI Bleed: 2007  Stomach Ulcer: H pylori 2007, treated with antibiotics  Osteoarthritis  Hyperlipidemia  Herniated Disc  Spinal Stenosis  Diverticulitis  GERD (Gastroesophageal Reflux Disease)  Chronic Lymphocytic Leukemia: followed by oncologist in [place]  S/P Tonsillectomy: childhood  Status Post Arthroscopy: right shoulder  History of Arthroscopy of Knee: bilateral      MEDICATIONS  (STANDING):  atorvastatin 10 milliGRAM(s) Oral at bedtime  chlorhexidine 2% Cloths 1 Application(s) Topical <User Schedule>  cyanocobalamin Injectable 1000 MICROGram(s) IntraMuscular every 24 hours  dronabinol 2.5 milliGRAM(s) Oral two times a day  famotidine    Tablet 20 milliGRAM(s) Oral daily  heparin  Injectable 5000 Unit(s) SubCutaneous every 8 hours  lactobacillus acidophilus 1 Tablet(s) Oral three times a day  piperacillin/tazobactam IVPB.. 3.375 Gram(s) IV Intermittent every 12 hours  polyethylene glycol 3350 17 Gram(s) Oral two times a day  sevelamer carbonate 800 milliGRAM(s) Oral three times a day with meals  tamsulosin 0.4 milliGRAM(s) Oral at bedtime    MEDICATIONS  (PRN):  acetaminophen   Tablet .. 650 milliGRAM(s) Oral every 6 hours PRN Temp greater or equal to 38C (100.4F), Mild Pain (1 - 3)  albuterol/ipratropium for Nebulization 3 milliLiter(s) Nebulizer every 6 hours PRN Shortness of Breath and/or Wheezing  aluminum hydroxide/magnesium hydroxide/simethicone Suspension 30 milliLiter(s) Oral every 6 hours PRN Dyspepsia  guaiFENesin   Syrup  (Sugar-Free) 200 milliGRAM(s) Oral every 6 hours PRN Cough  HYDROmorphone  Injectable 0.5 milliGRAM(s) IV Push every 4 hours PRN Moderate Pain (4 - 6)  HYDROmorphone  Injectable 1 milliGRAM(s) IV Push every 4 hours PRN Severe Pain (7 - 10)      Allergies    No Known Allergies    Intolerances            Vital Signs Last 24 Hrs  T(C): 37.1 (03 Mar 2020 03:40), Max: 37.1 (03 Mar 2020 03:40)  T(F): 98.8 (03 Mar 2020 03:40), Max: 98.8 (03 Mar 2020 03:40)  HR: 94 (03 Mar 2020 07:00) (91 - 109)  BP: 99/58 (03 Mar 2020 07:00) (85/55 - 145/63)  BP(mean): 73 (03 Mar 2020 07:00) (60 - 91)  RR: 13 (03 Mar 2020 07:00) (13 - 43)  SpO2: 100% (03 Mar 2020 07:00) (91% - 100%)    I&O's Summary    02 Mar 2020 07:01  -  03 Mar 2020 07:00  --------------------------------------------------------  IN: 820 mL / OUT: 333 mL / NET: 487 mL          PHYSICAL EXAM:  TELE: SR with freq APCs, PAT  Constitutional: NAD, awake  HEENT: Moist Mucous Membranes, Anicteric  Pulmonary: Decreased breath sounds b/l. No rales, crackles or wheeze appreciated.   Cardiovascular: IRRR, S1 and S2, No murmurs, rubs, gallops or clicks  Gastrointestinal: Bowel Sounds present, soft, nontender.   Lymph: No peripheral edema. No lymphadenopathy.  Skin: No visible rashes or ulcers.  Psych:  Mood & affect appropriate for situation    LABS: All Labs Reviewed:                        9.8    9.67  )-----------( 299      ( 03 Mar 2020 05:08 )             28.7                         9.7    9.98  )-----------( 268      ( 02 Mar 2020 05:16 )             28.6                         10.5   9.59  )-----------( 243      ( 01 Mar 2020 21:18 )             31.4     03 Mar 2020 05:08    131    |  95     |  78     ----------------------------<  105    4.6     |  22     |  6.70   02 Mar 2020 13:59    131    |  95     |  67     ----------------------------<  155    4.9     |  20     |  6.30   02 Mar 2020 05:16    133    |  97     |  61     ----------------------------<  117    4.7     |  23     |  6.00     Ca    8.1        03 Mar 2020 05:08  Ca    8.3        02 Mar 2020 13:59  Ca    7.9        02 Mar 2020 05:16  Phos  7.8       03 Mar 2020 05:08  Phos  6.9       02 Mar 2020 05:16  Phos  6.6       01 Mar 2020 05:08  Mg     2.9       03 Mar 2020 05:08  Mg     2.6       02 Mar 2020 05:16  Mg     2.6       01 Mar 2020 05:08    TPro  6.0    /  Alb  2.1    /  TBili  1.0    /  DBili  x      /  AST  37     /  ALT  54     /  AlkPhos  115    03 Mar 2020 05:08  TPro  5.8    /  Alb  2.1    /  TBili  1.1    /  DBili  x      /  AST  42     /  ALT  64     /  AlkPhos  112    02 Mar 2020 05:16  TPro  5.9    /  Alb  2.2    /  TBili  1.6    /  DBili  x      /  AST  56     /  ALT  76     /  AlkPhos  98     01 Mar 2020 05:08    PT/INR - ( 03 Mar 2020 05:08 )   PT: 17.0 sec;   INR: 1.50 ratio         PTT - ( 03 Mar 2020 05:08 )  PTT:28.8 sec

## 2020-03-03 NOTE — PROGRESS NOTE ADULT - SUBJECTIVE AND OBJECTIVE BOX
Date/Time Patient Seen:  		  Referring MD:   Data Reviewed	       Patient is a 89y old  Male who presents with a chief complaint of Left Empyema (03 Mar 2020 00:08)      Subjective/HPI     PAST MEDICAL & SURGICAL HISTORY:  GI Bleed: 2007  Stomach Ulcer: H pylori 2007, treated with antibiotics  Osteoarthritis  Hyperlipidemia  Herniated Disc  Spinal Stenosis  Diverticulitis  GERD (Gastroesophageal Reflux Disease)  Chronic Lymphocytic Leukemia: followed by oncologist in [place]  S/P Tonsillectomy: childhood  Status Post Arthroscopy: right shoulder  History of Arthroscopy of Knee: bilateral  Osteoarthritis  Stomach Ulcer: H pylori, treated with antibiotics        Medication list         MEDICATIONS  (STANDING):  atorvastatin 10 milliGRAM(s) Oral at bedtime  chlorhexidine 2% Cloths 1 Application(s) Topical <User Schedule>  cyanocobalamin Injectable 1000 MICROGram(s) IntraMuscular every 24 hours  dronabinol 2.5 milliGRAM(s) Oral two times a day  famotidine    Tablet 20 milliGRAM(s) Oral daily  heparin  Injectable 5000 Unit(s) SubCutaneous every 8 hours  lactobacillus acidophilus 1 Tablet(s) Oral three times a day  piperacillin/tazobactam IVPB.. 3.375 Gram(s) IV Intermittent every 12 hours  polyethylene glycol 3350 17 Gram(s) Oral two times a day  sevelamer carbonate 800 milliGRAM(s) Oral three times a day with meals  tamsulosin 0.4 milliGRAM(s) Oral at bedtime    MEDICATIONS  (PRN):  acetaminophen   Tablet .. 650 milliGRAM(s) Oral every 6 hours PRN Temp greater or equal to 38C (100.4F), Mild Pain (1 - 3)  albuterol/ipratropium for Nebulization 3 milliLiter(s) Nebulizer every 6 hours PRN Shortness of Breath and/or Wheezing  aluminum hydroxide/magnesium hydroxide/simethicone Suspension 30 milliLiter(s) Oral every 6 hours PRN Dyspepsia  guaiFENesin   Syrup  (Sugar-Free) 200 milliGRAM(s) Oral every 6 hours PRN Cough  HYDROmorphone  Injectable 0.5 milliGRAM(s) IV Push every 4 hours PRN Moderate Pain (4 - 6)  HYDROmorphone  Injectable 1 milliGRAM(s) IV Push every 4 hours PRN Severe Pain (7 - 10)         Vitals log        ICU Vital Signs Last 24 Hrs  T(C): 37.1 (03 Mar 2020 03:40), Max: 37.1 (03 Mar 2020 03:40)  T(F): 98.8 (03 Mar 2020 03:40), Max: 98.8 (03 Mar 2020 03:40)  HR: 96 (03 Mar 2020 06:00) (91 - 109)  BP: 94/52 (03 Mar 2020 06:00) (85/55 - 145/63)  BP(mean): 60 (03 Mar 2020 06:00) (60 - 91)  ABP: --  ABP(mean): --  RR: 17 (03 Mar 2020 06:00) (15 - 43)  SpO2: 100% (03 Mar 2020 06:00) (91% - 100%)           Input and Output:  I&O's Detail    01 Mar 2020 07:01  -  02 Mar 2020 07:00  --------------------------------------------------------  IN:    dexmedetomidine Infusion: 9.4 mL    Instillation: 250 mL    Lactated Ringers IV Bolus: 1000 mL    lactated ringers.: 350 mL    Oral Fluid: 710 mL    Solution: 200 mL  Total IN: 2519.4 mL    OUT:    Chest Tube: 30 mL    Indwelling Catheter - Urethral: 428 mL    Instillation: 160 mL  Total OUT: 618 mL    Total NET: 1901.4 mL      02 Mar 2020 07:01  -  03 Mar 2020 06:28  --------------------------------------------------------  IN:    Oral Fluid: 620 mL    Solution: 100 mL  Total IN: 720 mL    OUT:    Indwelling Catheter - Urethral: 183 mL  Total OUT: 183 mL    Total NET: 537 mL          Lab Data                        9.8    9.67  )-----------( 299      ( 03 Mar 2020 05:08 )             28.7     03-03    131<L>  |  95<L>  |  78<H>  ----------------------------<  105<H>  4.6   |  22  |  6.70<H>    Ca    8.1<L>      03 Mar 2020 05:08  Phos  7.8     03-03  Mg     2.9     03-03    TPro  6.0  /  Alb  2.1<L>  /  TBili  1.0  /  DBili  x   /  AST  37  /  ALT  54  /  AlkPhos  115  03-03            Review of Systems	      Objective     Physical Examination    heart s1s2  abd soft  head nc  on and off o2 support  head at  lung dec BS    Pertinent Lab findings & Imaging      [place]:  NO   Adequate UO     I&O's Detail    01 Mar 2020 07:01  -  02 Mar 2020 07:00  --------------------------------------------------------  IN:    dexmedetomidine Infusion: 9.4 mL    Instillation: 250 mL    Lactated Ringers IV Bolus: 1000 mL    lactated ringers.: 350 mL    Oral Fluid: 710 mL    Solution: 200 mL  Total IN: 2519.4 mL    OUT:    Chest Tube: 30 mL    Indwelling Catheter - Urethral: 428 mL    Instillation: 160 mL  Total OUT: 618 mL    Total NET: 1901.4 mL      02 Mar 2020 07:01  -  03 Mar 2020 06:28  --------------------------------------------------------  IN:    Oral Fluid: 620 mL    Solution: 100 mL  Total IN: 720 mL    OUT:    Indwelling Catheter - Urethral: 183 mL  Total OUT: 183 mL    Total NET: 537 mL               Discussed with:     Cultures:	        Radiology

## 2020-03-03 NOTE — PROGRESS NOTE ADULT - ASSESSMENT
h/o nhl- gastric maltoma  abnormal ct  left empyema, sp drainage  gastric wall thickening  roberto       prior nc ct ap showing gastric wall thickening  per dtr has had op egd w/in past yr  gerd precautions  cont pepcid  cont marinol  cont bowel regimen  diet as tolerated  monitor abd exam  renal follow up  further management per primary; cont icu care    Advanced care planning was discussed with patient and family.  Advanced care planning forms were reviewed and discussed.  Risks, benefits and alternatives of gastroenterologic procedures were discussed in detail and all questions were answered.    30 minutes spent.

## 2020-03-03 NOTE — PROGRESS NOTE ADULT - SUBJECTIVE AND OBJECTIVE BOX
Patient is a 89y old  Male who presents with a chief complaint of Left Empyema (03 Mar 2020 07:44)    24 hour events:   TPA overnight 0300  BCx NGF        REVIEW OF SYSTEMS  Constitutional: No fever, chills, fatigue  Neuro: No headache, numbness, weakness  Resp: No cough, wheezing, shortness of breath  CVS: No chest pain, palpitations, leg swelling  GI: No abdominal pain, nausea, vomiting, diarrhea   : No dysuria, frequency, incontinence  Skin: No itching, burning, rashes, or lesions   Msk: No joint pain or swelling  Psych: No depression, anxiety, mood swings  Heme: No bleeding    T(F): 98.5 (03-03-20 @ 08:05), Max: 98.8 (03-03-20 @ 03:40)  HR: 94 (03-03-20 @ 07:00) (91 - 109)  BP: 99/58 (03-03-20 @ 07:00) (85/60 - 145/63)  RR: 13 (03-03-20 @ 07:00) (13 - 43)  SpO2: 100% (03-03-20 @ 07:00) (91% - 100%)  Wt(kg): --      I&O's Summary    03-02 @ 07:01  -  03-03 @ 07:00  --------------------------------------------------------  IN: 820 mL / OUT: 333 mL / NET: 487 mL    MEDICATIONS  piperacillin/tazobactam IVPB.. IV Intermittent  tamsulosin Oral  atorvastatin Oral  albuterol/ipratropium for Nebulization Nebulizer PRN  guaiFENesin   Syrup  (Sugar-Free) Oral PRN  acetaminophen   Tablet .. Oral PRN  dronabinol Oral  HYDROmorphone  Injectable IV Push PRN  HYDROmorphone  Injectable IV Push PRN  heparin  Injectable SubCutaneous  aluminum hydroxide/magnesium hydroxide/simethicone Suspension Oral PRN  famotidine    Tablet Oral  polyethylene glycol 3350 Oral  cyanocobalamin Injectable IntraMuscular  chlorhexidine 2% Cloths Topical  lactobacillus acidophilus Oral  sevelamer carbonate Oral                          9.8    9.67  )-----------( 299      ( 03 Mar 2020 05:08 )             28.7       03-03    131<L>  |  95<L>  |  78<H>  ----------------------------<  105<H>  4.6   |  22  |  6.70<H>    Ca    8.1<L>      03 Mar 2020 05:08  Phos  7.8     03-03  Mg     2.9     03-03  TPro  6.0  /  Alb  2.1<L>  /  TBili  1.0  /  DBili  x   /  AST  37  /  ALT  54  /  AlkPhos  115  03-03  PT/INR - ( 03 Mar 2020 05:08 )   PT: 17.0 sec;   INR: 1.50 ratio    PTT - ( 03 Mar 2020 05:08 )  PTT:28.8 sec    .Urine Catheterized   No growth -- 03-01 @ 13:24  .Body Fluid Pleural Fluid   Culture is being performed.   polymorphonuclear leukocytes  No organisms seen  by cytocentrifuge 02-27 @ 21:38      PHYSICAL EXAM  General: Elderly male, NAD, following commands  CNS: A/Ox2, grossly moving all extremities, sensation grossly intact  HEENT: EOMI, PERLL  Resp: Expiratory wheezing BL, L Chest tube on suction on L w/ serosanguinos drainage, no air leak  CVS: RRR  Abd: NTND, +BSx4  Skin: brisk cap refill, warm/well perfused, no edema        CENTRAL LINE: N  SOLIS: Y  A-LINE: N    GLOBAL ISSUE/BEST PRACTICE  Analgesia: Y  Sedation: N  CAM-ICU: Y  HOB elevation: yes  Stress ulcer prophylaxis: N  VTE prophylaxis: Y  Glycemic control: N  Nutrition: Y    CODE STATUS: Full code Patient is a 89y old  Male who presents with a chief complaint of Left Empyema (03 Mar 2020 07:44)    24 hour events:   TPA overnight 0300  BCx NGF    REVIEW OF SYSTEMS  Constitutional: No fever, chills, fatigue  Neuro: No headache, numbness, weakness  Resp: No cough, wheezing, shortness of breath  CVS: No chest pain, palpitations, leg swelling  GI: No abdominal pain, nausea, vomiting, diarrhea   : No dysuria, frequency, incontinence  Skin: No itching, burning, rashes, or lesions   Msk: No joint pain or swelling  Psych: No depression, anxiety, mood swings  Heme: No bleeding    T(F): 98.5 (03-03-20 @ 08:05), Max: 98.8 (03-03-20 @ 03:40)  HR: 94 (03-03-20 @ 07:00) (91 - 109)  BP: 99/58 (03-03-20 @ 07:00) (85/60 - 145/63)  RR: 13 (03-03-20 @ 07:00) (13 - 43)  SpO2: 100% (03-03-20 @ 07:00) (91% - 100%)  Wt(kg): --      I&O's Summary    03-02 @ 07:01  -  03-03 @ 07:00  --------------------------------------------------------  IN: 820 mL / OUT: 333 mL / NET: 487 mL    MEDICATIONS  piperacillin/tazobactam IVPB.. IV Intermittent  tamsulosin Oral  atorvastatin Oral  albuterol/ipratropium for Nebulization Nebulizer PRN  guaiFENesin   Syrup  (Sugar-Free) Oral PRN  acetaminophen   Tablet .. Oral PRN  dronabinol Oral  HYDROmorphone  Injectable IV Push PRN  HYDROmorphone  Injectable IV Push PRN  heparin  Injectable SubCutaneous  aluminum hydroxide/magnesium hydroxide/simethicone Suspension Oral PRN  famotidine    Tablet Oral  polyethylene glycol 3350 Oral  cyanocobalamin Injectable IntraMuscular  chlorhexidine 2% Cloths Topical  lactobacillus acidophilus Oral  sevelamer carbonate Oral                          9.8    9.67  )-----------( 299      ( 03 Mar 2020 05:08 )             28.7       03-03    131<L>  |  95<L>  |  78<H>  ----------------------------<  105<H>  4.6   |  22  |  6.70<H>    Ca    8.1<L>      03 Mar 2020 05:08  Phos  7.8     03-03  Mg     2.9     03-03  TPro  6.0  /  Alb  2.1<L>  /  TBili  1.0  /  DBili  x   /  AST  37  /  ALT  54  /  AlkPhos  115  03-03  PT/INR - ( 03 Mar 2020 05:08 )   PT: 17.0 sec;   INR: 1.50 ratio    PTT - ( 03 Mar 2020 05:08 )  PTT:28.8 sec    .Urine Catheterized   No growth -- 03-01 @ 13:24  .Body Fluid Pleural Fluid   Culture is being performed.   polymorphonuclear leukocytes  No organisms seen  by cytocentrifuge 02-27 @ 21:38      PHYSICAL EXAM  General: Elderly male, NAD, following commands  CNS: A/Ox2, grossly moving all extremities, sensation grossly intact  HEENT: EOMI, PERLL  Resp: CTA BL, L Chest tube on suction on L w/ serosanguinous drainage, no air leak  CVS: RRR  Abd: NTND, +BSx4  Skin: brisk cap refill, warm/well perfused, no edema        CENTRAL LINE: N  SOLIS: Y  A-LINE: N    GLOBAL ISSUE/BEST PRACTICE  Analgesia: Y  Sedation: N  CAM-ICU: Y  HOB elevation: yes  Stress ulcer prophylaxis: N  VTE prophylaxis: Y  Glycemic control: N  Nutrition: Y    CODE STATUS: Full code Patient is a 89y old  Male who presents with a chief complaint of Left Empyema (03 Mar 2020 07:44)    24 hour events:   TPA overnight 0300  BCx NGF    REVIEW OF SYSTEMS  Constitutional: No fever, chills, fatigue  Neuro: No headache, numbness, weakness  Resp: No cough, wheezing, shortness of breath  CVS: No chest pain, palpitations, leg swelling  GI: No abdominal pain, nausea, vomiting, diarrhea   : No dysuria, frequency, incontinence  Skin: No itching, burning, rashes, or lesions   Msk: No joint pain or swelling  Psych: No depression, anxiety, mood swings  Heme: No bleeding    T(F): 98.5 (03-03-20 @ 08:05), Max: 98.8 (03-03-20 @ 03:40)  HR: 94 (03-03-20 @ 07:00) (91 - 109)  BP: 99/58 (03-03-20 @ 07:00) (85/60 - 145/63)  RR: 13 (03-03-20 @ 07:00) (13 - 43)  SpO2: 100% (03-03-20 @ 07:00) (91% - 100%)  Wt(kg): --      I&O's Summary    03-02 @ 07:01  -  03-03 @ 07:00  --------------------------------------------------------  IN: 820 mL / OUT: 333 mL / NET: 487 mL    MEDICATIONS  piperacillin/tazobactam IVPB.. IV Intermittent  tamsulosin Oral  atorvastatin Oral  albuterol/ipratropium for Nebulization Nebulizer PRN  guaiFENesin   Syrup  (Sugar-Free) Oral PRN  acetaminophen   Tablet .. Oral PRN  dronabinol Oral  HYDROmorphone  Injectable IV Push PRN  HYDROmorphone  Injectable IV Push PRN  heparin  Injectable SubCutaneous  aluminum hydroxide/magnesium hydroxide/simethicone Suspension Oral PRN  famotidine    Tablet Oral  polyethylene glycol 3350 Oral  cyanocobalamin Injectable IntraMuscular  chlorhexidine 2% Cloths Topical  lactobacillus acidophilus Oral  sevelamer carbonate Oral                          9.8    9.67  )-----------( 299      ( 03 Mar 2020 05:08 )             28.7       03-03    131<L>  |  95<L>  |  78<H>  ----------------------------<  105<H>  4.6   |  22  |  6.70<H>    Ca    8.1<L>      03 Mar 2020 05:08  Phos  7.8     03-03  Mg     2.9     03-03  TPro  6.0  /  Alb  2.1<L>  /  TBili  1.0  /  DBili  x   /  AST  37  /  ALT  54  /  AlkPhos  115  03-03  PT/INR - ( 03 Mar 2020 05:08 )   PT: 17.0 sec;   INR: 1.50 ratio    PTT - ( 03 Mar 2020 05:08 )  PTT:28.8 sec    .Urine Catheterized   No growth -- 03-01 @ 13:24  .Body Fluid Pleural Fluid   Culture is being performed.   polymorphonuclear leukocytes  No organisms seen  by cytocentrifuge 02-27 @ 21:38      PHYSICAL EXAM  General: Elderly male, NAD, following commands  CNS: A/Ox2, grossly moving all extremities, sensation grossly intact  HEENT: EOMI, PERLL  Resp: Wheezing BL, L Chest tube on suction on L w/ serosanguinous drainage, no air leak  CVS: RRR  Abd: NTND, +BSx4  Skin: brisk cap refill, warm/well perfused, no edema        CENTRAL LINE: N  SOLIS: Y  A-LINE: N    GLOBAL ISSUE/BEST PRACTICE  Analgesia: Y  Sedation: N  CAM-ICU: Y  HOB elevation: yes  Stress ulcer prophylaxis: N  VTE prophylaxis: Y  Glycemic control: N  Nutrition: Y    CODE STATUS: Full code Patient is a 89y old  Male who presents with a chief complaint of Left Empyema (03 Mar 2020 07:44)    24 hour events:   intrapleural TPA and dornase given overnight at 0300  BCx NGF      T(F): 98.5 (03-03-20 @ 08:05), Max: 98.8 (03-03-20 @ 03:40)  HR: 94 (03-03-20 @ 07:00) (91 - 109)  BP: 99/58 (03-03-20 @ 07:00) (85/60 - 145/63)  RR: 13 (03-03-20 @ 07:00) (13 - 43)  SpO2: 100% (03-03-20 @ 07:00) (91% - 100%)  Wt(kg): --      I&O's Summary    03-02 @ 07:01  -  03-03 @ 07:00  --------------------------------------------------------  IN: 820 mL / OUT: 333 mL / NET: 487 mL    MEDICATIONS  piperacillin/tazobactam IVPB.. IV Intermittent  tamsulosin Oral  atorvastatin Oral  albuterol/ipratropium for Nebulization Nebulizer PRN  guaiFENesin   Syrup  (Sugar-Free) Oral PRN  acetaminophen   Tablet .. Oral PRN  dronabinol Oral  HYDROmorphone  Injectable IV Push PRN  HYDROmorphone  Injectable IV Push PRN  heparin  Injectable SubCutaneous  aluminum hydroxide/magnesium hydroxide/simethicone Suspension Oral PRN  famotidine    Tablet Oral  polyethylene glycol 3350 Oral  cyanocobalamin Injectable IntraMuscular  chlorhexidine 2% Cloths Topical  lactobacillus acidophilus Oral  sevelamer carbonate Oral                          9.8    9.67  )-----------( 299      ( 03 Mar 2020 05:08 )             28.7       03-03    131<L>  |  95<L>  |  78<H>  ----------------------------<  105<H>  4.6   |  22  |  6.70<H>    Ca    8.1<L>      03 Mar 2020 05:08  Phos  7.8     03-03  Mg     2.9     03-03  TPro  6.0  /  Alb  2.1<L>  /  TBili  1.0  /  DBili  x   /  AST  37  /  ALT  54  /  AlkPhos  115  03-03  PT/INR - ( 03 Mar 2020 05:08 )   PT: 17.0 sec;   INR: 1.50 ratio    PTT - ( 03 Mar 2020 05:08 )  PTT:28.8 sec    .Urine Catheterized   No growth -- 03-01 @ 13:24  .Body Fluid Pleural Fluid   Culture is being performed.   polymorphonuclear leukocytes  No organisms seen  by cytocentrifuge 02-27 @ 21:38      PHYSICAL EXAM  General: Elderly male, NAD, following commands  CNS: A/Ox2, grossly moving all extremities, sensation grossly intact  HEENT: EOMI, PERLL  Resp: Wheezing BL, L Chest tube on suction on L w/ serosanguinous drainage, no air leak  CVS: RRR  Abd: NTND, +BSx4  Skin: brisk cap refill, warm/well perfused, no edema        CENTRAL LINE: N  SOLIS: Y  A-LINE: N    GLOBAL ISSUE/BEST PRACTICE  Analgesia: Y  Sedation: N  CAM-ICU: Y  HOB elevation: yes  Stress ulcer prophylaxis: N  VTE prophylaxis: Y  Glycemic control: N  Nutrition: Y    CODE STATUS: Full code

## 2020-03-03 NOTE — PROGRESS NOTE ADULT - SUBJECTIVE AND OBJECTIVE BOX
INTERVAL HPI/OVERNIGHT EVENTS:  pt seen and examined  denies n/v/abd pain  per rn +bm no vomiting, dec uop    MEDICATIONS  (STANDING):  atorvastatin 10 milliGRAM(s) Oral at bedtime  chlorhexidine 2% Cloths 1 Application(s) Topical <User Schedule>  cyanocobalamin Injectable 1000 MICROGram(s) IntraMuscular every 24 hours  dronabinol 2.5 milliGRAM(s) Oral two times a day  famotidine    Tablet 20 milliGRAM(s) Oral daily  furosemide   Injectable 20 milliGRAM(s) IV Push once  heparin  Injectable 5000 Unit(s) SubCutaneous every 8 hours  lactobacillus acidophilus 1 Tablet(s) Oral three times a day  piperacillin/tazobactam IVPB.. 3.375 Gram(s) IV Intermittent every 12 hours  polyethylene glycol 3350 17 Gram(s) Oral two times a day  sevelamer carbonate 800 milliGRAM(s) Oral three times a day with meals  tamsulosin 0.4 milliGRAM(s) Oral at bedtime    MEDICATIONS  (PRN):  acetaminophen   Tablet .. 650 milliGRAM(s) Oral every 6 hours PRN Temp greater or equal to 38C (100.4F), Mild Pain (1 - 3)  albuterol/ipratropium for Nebulization 3 milliLiter(s) Nebulizer every 6 hours PRN Shortness of Breath and/or Wheezing  aluminum hydroxide/magnesium hydroxide/simethicone Suspension 30 milliLiter(s) Oral every 6 hours PRN Dyspepsia  guaiFENesin   Syrup  (Sugar-Free) 200 milliGRAM(s) Oral every 6 hours PRN Cough  HYDROmorphone  Injectable 0.5 milliGRAM(s) IV Push every 4 hours PRN Moderate Pain (4 - 6)  HYDROmorphone  Injectable 1 milliGRAM(s) IV Push every 4 hours PRN Severe Pain (7 - 10)      Allergies    No Known Allergies    Intolerances        Review of Systems:    General:  No wt loss, fevers, chills, night sweats, fatigue   Eyes:  Good vision, no reported pain  ENT:  No sore throat, pain, runny nose, dysphagia  CV:  No pain, palpitations, hypo/hypertension  Resp:  No dyspnea, cough, tachypnea, wheezing  GI:  No pain, No nausea, No vomiting, No diarrhea, No constipation, No weight loss, No fever, No pruritis, No rectal bleeding, No melena, No dysphagia  :  No pain, bleeding, incontinence, nocturia  Muscle:  No pain, weakness  Neuro:  No weakness, tingling, memory problems  Psych:  No fatigue, insomnia, mood problems, depression  Endocrine:  No polyuria, polydypsia, cold/heat intolerance  Heme:  No petechiae, ecchymosis, easy bruisability  Skin:  No rash, tattoos, scars, edema      Vital Signs Last 24 Hrs  T(C): 36.9 (03 Mar 2020 08:05), Max: 37.1 (03 Mar 2020 03:40)  T(F): 98.5 (03 Mar 2020 08:05), Max: 98.8 (03 Mar 2020 03:40)  HR: 93 (03 Mar 2020 08:00) (91 - 109)  BP: 102/63 (03 Mar 2020 08:00) (85/60 - 145/63)  BP(mean): 76 (03 Mar 2020 08:00) (60 - 91)  RR: 17 (03 Mar 2020 08:00) (13 - 43)  SpO2: 100% (03 Mar 2020 08:00) (91% - 100%)    PHYSICAL EXAM:  General:  lying in  bed  HEENT:  NC/AT  Abdomen:  Soft nt +dt  Extremities:  no edema  Neuro/Psych:  Awake alert appropriate      LABS:                        9.8    9.67  )-----------( 299      ( 03 Mar 2020 05:08 )             28.7     03-03    131<L>  |  95<L>  |  78<H>  ----------------------------<  105<H>  4.6   |  22  |  6.70<H>    Ca    8.1<L>      03 Mar 2020 05:08  Phos  7.8     03-03  Mg     2.9     03-03    TPro  6.0  /  Alb  2.1<L>  /  TBili  1.0  /  DBili  x   /  AST  37  /  ALT  54  /  AlkPhos  115  03-03    PT/INR - ( 03 Mar 2020 05:08 )   PT: 17.0 sec;   INR: 1.50 ratio         PTT - ( 03 Mar 2020 05:08 )  PTT:28.8 sec      RADIOLOGY & ADDITIONAL TESTS:

## 2020-03-03 NOTE — PROGRESS NOTE ADULT - SUBJECTIVE AND OBJECTIVE BOX
Patient is a 89y old  Male who presents with a chief complaint of Left Empyema (03 Mar 2020 14:31)      INTERVAL /OVERNIGHT EVENTS: pain better    MEDICATIONS  (STANDING):  alteplase  Injectable for Pleural Effusion 10 milliGRAM(s) IntraPleural. every 12 hours  atorvastatin 10 milliGRAM(s) Oral at bedtime  chlorhexidine 2% Cloths 1 Application(s) Topical <User Schedule>  dornase brenda Solution for Pleural Effusion 5 milliGRAM(s) IntraPleural. every 12 hours  dronabinol 2.5 milliGRAM(s) Oral two times a day  famotidine    Tablet 20 milliGRAM(s) Oral daily  heparin  Injectable 5000 Unit(s) SubCutaneous every 8 hours  lactobacillus acidophilus 1 Tablet(s) Oral three times a day  piperacillin/tazobactam IVPB.. 3.375 Gram(s) IV Intermittent every 12 hours  polyethylene glycol 3350 17 Gram(s) Oral two times a day  sevelamer carbonate 800 milliGRAM(s) Oral three times a day with meals  tamsulosin 0.4 milliGRAM(s) Oral at bedtime    MEDICATIONS  (PRN):  acetaminophen   Tablet .. 650 milliGRAM(s) Oral every 6 hours PRN Temp greater or equal to 38C (100.4F), Mild Pain (1 - 3)  albuterol/ipratropium for Nebulization 3 milliLiter(s) Nebulizer every 6 hours PRN Shortness of Breath and/or Wheezing  aluminum hydroxide/magnesium hydroxide/simethicone Suspension 30 milliLiter(s) Oral every 6 hours PRN Dyspepsia  guaiFENesin   Syrup  (Sugar-Free) 200 milliGRAM(s) Oral every 6 hours PRN Cough  HYDROmorphone  Injectable 0.5 milliGRAM(s) IV Push every 4 hours PRN Moderate Pain (4 - 6)  HYDROmorphone  Injectable 1 milliGRAM(s) IV Push every 4 hours PRN Severe Pain (7 - 10)      Allergies    No Known Allergies    Intolerances        REVIEW OF SYSTEMS:  CONSTITUTIONAL: No fever, weight loss, or fatigue  EYES: No eye pain, visual disturbances, or discharge  ENMT:  No difficulty hearing, tinnitus, vertigo; No sinus or throat pain  NECK: No pain or stiffness  RESPIRATORY: No cough, wheezing, chills or hemoptysis; + shortness of breath  CARDIOVASCULAR: No chest pain, palpitations, dizziness, or leg swelling  GASTROINTESTINAL: No abdominal or epigastric pain. No nausea, vomiting, or hematemesis; No diarrhea or constipation. No melena or hematochezia.  GENITOURINARY: No dysuria, frequency, hematuria, or incontinence  NEUROLOGICAL: No headaches, memory loss, loss of strength, numbness, or tremors  SKIN: No itching, burning, rashes, or lesions   LYMPH NODES: No enlarged glands  ENDOCRINE: No heat or cold intolerance; No hair loss; No polydipsia or polyuria  MUSCULOSKELETAL: No joint pain or swelling; No muscle, back, or extremity pain  PSYCHIATRIC: No depression, anxiety, mood swings, or difficulty sleeping  HEME/LYMPH: No easy bruising, or bleeding gums  ALLERGY AND IMMUNOLOGIC: No hives or eczema    Vital Signs Last 24 Hrs  T(C): 36.6 (03 Mar 2020 20:03), Max: 37.1 (03 Mar 2020 03:40)  T(F): 97.8 (03 Mar 2020 20:03), Max: 98.8 (03 Mar 2020 03:40)  HR: 97 (03 Mar 2020 19:00) (89 - 100)  BP: 126/77 (03 Mar 2020 19:00) (85/60 - 127/72)  BP(mean): 94 (03 Mar 2020 19:00) (60 - 95)  RR: 20 (03 Mar 2020 19:00) (13 - 37)  SpO2: 91% (03 Mar 2020 19:00) (90% - 100%)    PHYSICAL EXAM:  GENERAL: NAD, well-groomed, well-developed  HEAD:  Atraumatic, Normocephalic  EYES: EOMI, PERRLA, conjunctiva and sclera clear  ENMT: No tonsillar erythema, exudates, or enlargement; Moist mucous membranes, Good dentition, No lesions  NECK: Supple, No JVD, Normal thyroid  NERVOUS SYSTEM:  Alert & Oriented X3, occasional confusion, Motor Strength 5/5 B/L upper and lower extremities; DTRs 2+ intact and symmetric  CHEST/LUNG: Clear to auscultation bilaterally; No rales, rhonchi, wheezing, or rubs  HEART: Regular rate and rhythm; No murmurs, rubs, or gallops  ABDOMEN: Soft, Nontender, Nondistended; Bowel sounds present  EXTREMITIES:  2+ Peripheral Pulses, No clubbing, cyanosis, or edema  LYMPH: No lymphadenopathy noted  SKIN: No rashes or lesions    LABS:                        9.8    9.67  )-----------( 299      ( 03 Mar 2020 05:08 )             28.7     03 Mar 2020 05:08    131    |  95     |  78     ----------------------------<  105    4.6     |  22     |  6.70     Ca    8.1        03 Mar 2020 05:08  Phos  7.8       03 Mar 2020 05:08  Mg     2.9       03 Mar 2020 05:08    TPro  6.0    /  Alb  2.1    /  TBili  1.0    /  DBili  x      /  AST  37     /  ALT  54     /  AlkPhos  115    03 Mar 2020 05:08    PT/INR - ( 03 Mar 2020 05:08 )   PT: 17.0 sec;   INR: 1.50 ratio         PTT - ( 03 Mar 2020 05:08 )  PTT:28.8 sec    CAPILLARY BLOOD GLUCOSE          RADIOLOGY & ADDITIONAL TESTS:    Notes Reviewed:  [x ] YES  [ ] NO    Care Discussed with Consultants/Other Providers [x ] YES  [ ] NO

## 2020-03-03 NOTE — PROGRESS NOTE ADULT - SUBJECTIVE AND OBJECTIVE BOX
All interim records and events noted.    awake responsive  family present      MEDICATIONS  (STANDING):  alteplase  Injectable for Pleural Effusion 10 milliGRAM(s) IntraPleural. every 12 hours  atorvastatin 10 milliGRAM(s) Oral at bedtime  chlorhexidine 2% Cloths 1 Application(s) Topical <User Schedule>  cyanocobalamin Injectable 1000 MICROGram(s) IntraMuscular every 24 hours  dornase brenda Solution for Pleural Effusion 5 milliGRAM(s) IntraPleural. every 12 hours  dronabinol 2.5 milliGRAM(s) Oral two times a day  famotidine    Tablet 20 milliGRAM(s) Oral daily  heparin  Injectable 5000 Unit(s) SubCutaneous every 8 hours  lactobacillus acidophilus 1 Tablet(s) Oral three times a day  piperacillin/tazobactam IVPB.. 3.375 Gram(s) IV Intermittent every 12 hours  polyethylene glycol 3350 17 Gram(s) Oral two times a day  sevelamer carbonate 800 milliGRAM(s) Oral three times a day with meals  tamsulosin 0.4 milliGRAM(s) Oral at bedtime    MEDICATIONS  (PRN):  acetaminophen   Tablet .. 650 milliGRAM(s) Oral every 6 hours PRN Temp greater or equal to 38C (100.4F), Mild Pain (1 - 3)  albuterol/ipratropium for Nebulization 3 milliLiter(s) Nebulizer every 6 hours PRN Shortness of Breath and/or Wheezing  aluminum hydroxide/magnesium hydroxide/simethicone Suspension 30 milliLiter(s) Oral every 6 hours PRN Dyspepsia  guaiFENesin   Syrup  (Sugar-Free) 200 milliGRAM(s) Oral every 6 hours PRN Cough  HYDROmorphone  Injectable 0.5 milliGRAM(s) IV Push every 4 hours PRN Moderate Pain (4 - 6)  HYDROmorphone  Injectable 1 milliGRAM(s) IV Push every 4 hours PRN Severe Pain (7 - 10)      Vital Signs Last 24 Hrs  T(C): 36.6 (03 Mar 2020 12:36), Max: 37.1 (03 Mar 2020 03:40)  T(F): 97.9 (03 Mar 2020 12:36), Max: 98.8 (03 Mar 2020 03:40)  HR: 94 (03 Mar 2020 14:00) (89 - 103)  BP: 124/59 (03 Mar 2020 14:00) (85/60 - 124/59)  BP(mean): 80 (03 Mar 2020 14:00) (60 - 86)  RR: 21 (03 Mar 2020 14:00) (13 - 43)  SpO2: 92% (03 Mar 2020 14:00) (91% - 100%)    PHYSICAL EXAM  General: well developed  well nourished, in no acute distress. Mcnulty clear yellow urine  Head: atraumatic, normocephalic  ENT: sclera anicteric, buccal mucosa moist  Neck: supple, trachea midline  CV: S1 S2, regular rate and rhythm  Lungs: clear to auscultation, no wheezes/rhonchi  Abdomen: soft, nontender, bowel sounds present, no palpable masses, pouchy  Extrem: no clubbing/cyanosis/edema  Skin: no significant increased ecchymosis/petechiae  Neuro: alert and oriented X3,  no focal deficits      LABS:             9.8    9.67  )-----------( 299      ( 03-03 @ 05:08 )             28.7                9.7    9.98  )-----------( 268      ( 03-02 @ 05:16 )             28.6                10.5   9.59  )-----------( 243      ( 03-01 @ 21:18 )             31.4                10.1   9.34  )-----------( 223      ( 03-01 @ 05:08 )             30.4       03-03    131<L>  |  95<L>  |  78<H>  ----------------------------<  105<H>  4.6   |  22  |  6.70<H>    Ca    8.1<L>      03 Mar 2020 05:08  Phos  7.8     03-03  Mg     2.9     03-03    TPro  6.0  /  Alb  2.1<L>  /  TBili  1.0  /  DBili  x   /  AST  37  /  ALT  54  /  AlkPhos  115  03-03    03-03 @ 05:08  PT17.0 INR1.50  PTT28.8  02-29 @ 10:47  PT17.8 INR1.57  PTT29.2      RADIOLOGY & ADDITIONAL STUDIES:    IMPRESSION/RECOMMENDATIONS:

## 2020-03-03 NOTE — PROGRESS NOTE ADULT - ASSESSMENT
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   Assessment:    1. Left sided loculated pleural effusion- likely empyema pH 7.0, glu normal, 118 nucleated cells with neutrophil predominance  2. GUS- vanco toxicity? Likely ATN, follows appropriate time-line post vanco dosing w/ trough @23  3. Hematuria- resolved  4. Encephalopathy- likely secondary to pain medications and now likely uremia    Plan:  - Hematuria resolved, d/c flushes. Monitor for further bleeding. H/H stable.    - Continued oliguria, UOP <10ccs/hr. Nephro d/w family today re: likely HD. Agree pt needs HD. Cr continues to worsen and oliguria has been persistent. Delirium/confusion could be a product of uremia. Bun/Cr rising, serum Co2 downtrending.   - Monitor UOP w/ Strict I/Os.  Trend Cr. Avoid further nephrotoxic medications and continue dose adjustments for meds.   - Tolerating RA. PRN supplemental O2 as needed. Aspiration precautions in setting of sedating pain medications. Frequent POCUS. Cautious IVFs.   - Decreased output from chest tube despite todays dose of tpa/dornase (~100ccs). Not a candidate for VATs per ctsx.   - Will administer tpa/dornase tonight @ 1200. Give tPA first @1200, clamp for 1 hr, then let drain for 1 hr. @0200 will repeat same process w/ Dornase brenda. 1mg Dilaudid prior to tPA.   - On Zosyn for empyema. Cultures negative, initial body fluid cx negative pending repeat (testing now). Afebrile, no leukocytosis. Flu neg.   - As per conversation with son via phone, pain control is priority, will be cautious going forward but will need further pain control.   - Subq Heparin for dvt ppx  - Heme/onc following for ongoing CLL/MALToma  - Continued GOC discussions    DISPO: Full code. To remain in ICU.

## 2020-03-03 NOTE — PROGRESS NOTE ADULT - SUBJECTIVE AND OBJECTIVE BOX
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 Pt found to have loculated left pleural effusions on CT Chest, concerning for empyema. Pt underwent IR guided left chest tube on 2/27, initially w/ minimal drainage, subsequently transferred to [facility] ED for further management. Pt initially offered VATs, but family wanted more conservative measures. Pt subsequently treated with tPA/Dornase brenda via chest tube. Pt initially with sufficient output s/p first dose tPA now with decreased drainage on 2nd administration (~100-200 cc's, total 1.2L). ICU course complicated by worsening renal failure, Cr 1.00 2/26 to now 5.1, continued oliguria and hematuria (resolved).     Events last 24 hours: Hematuria resolved. Remains oliguric, <10ccs/hr thus far. Nephro d/w family re: likelihood of HD. Otherwise HD stable. Remains mildly confused, but pleasant. Minimal output from chest tube, s/p another dose tPA/dornase today.    PAST MEDICAL & SURGICAL HISTORY:  GI Bleed: 2007  Stomach Ulcer: H pylori 2007, treated with antibiotics  Osteoarthritis  Hyperlipidemia  Herniated Disc  Spinal Stenosis  Diverticulitis  GERD (Gastroesophageal Reflux Disease)  Chronic Lymphocytic Leukemia: followed by oncologist in [place]  S/P Tonsillectomy: childhood  Status Post Arthroscopy: right shoulder  History of Arthroscopy of Knee: bilateral      Review of Systems:  CONSTITUTIONAL: No fever, chills, or fatigue  EYES: No eye pain, visual disturbances, or discharge  ENMT:  No difficulty hearing, tinnitus, vertigo; No sinus or throat pain  NECK: No pain or stiffness  RESPIRATORY: No cough, wheezing, chills or hemoptysis; No shortness of breath  CARDIOVASCULAR: No chest pain, palpitations, dizziness, or leg swelling  GASTROINTESTINAL: No abdominal or epigastric pain. No nausea, vomiting, or hematemesis; No diarrhea or constipation. No melena or hematochezia.  GENITOURINARY: No dysuria, frequency, hematuria, or incontinence  NEUROLOGICAL: No headaches, memory loss, loss of strength, numbness, or tremors  SKIN: No itching, burning, rashes, or lesions   MUSCULOSKELETAL: No joint pain or swelling; No muscle, back, or extremity pain  PSYCHIATRIC: No depression, anxiety, mood swings, or difficulty sleeping    Medications:  piperacillin/tazobactam IVPB.. 3.375 Gram(s) IV Intermittent every 12 hours    tamsulosin 0.4 milliGRAM(s) Oral at bedtime    albuterol/ipratropium for Nebulization 3 milliLiter(s) Nebulizer every 6 hours PRN  dornase brenda Solution for Pleural Effusion 5 milliGRAM(s) IntraPleural. every 12 hours  guaiFENesin   Syrup  (Sugar-Free) 200 milliGRAM(s) Oral every 6 hours PRN    acetaminophen   Tablet .. 650 milliGRAM(s) Oral every 6 hours PRN  dronabinol 2.5 milliGRAM(s) Oral two times a day  HYDROmorphone  Injectable 0.5 milliGRAM(s) IV Push every 4 hours PRN  HYDROmorphone  Injectable 1 milliGRAM(s) IV Push every 4 hours PRN      alteplase  Injectable for Pleural Effusion 10 milliGRAM(s) IntraPleural. every 12 hours  heparin  Injectable 5000 Unit(s) SubCutaneous every 8 hours    aluminum hydroxide/magnesium hydroxide/simethicone Suspension 30 milliLiter(s) Oral every 6 hours PRN  famotidine    Tablet 20 milliGRAM(s) Oral daily  polyethylene glycol 3350 17 Gram(s) Oral two times a day      atorvastatin 10 milliGRAM(s) Oral at bedtime    cyanocobalamin Injectable 1000 MICROGram(s) IntraMuscular every 24 hours      chlorhexidine 2% Cloths 1 Application(s) Topical <User Schedule>    lactobacillus acidophilus 1 Tablet(s) Oral three times a day  sevelamer carbonate 800 milliGRAM(s) Oral three times a day with meals          ICU Vital Signs Last 24 Hrs  T(C): 37 (03 Mar 2020 00:06), Max: 37 (03 Mar 2020 00:06)  T(F): 98.6 (03 Mar 2020 00:06), Max: 98.6 (03 Mar 2020 00:06)  HR: 100 (02 Mar 2020 23:00) (94 - 109)  BP: 117/71 (02 Mar 2020 23:00) (85/55 - 145/63)  BP(mean): 85 (02 Mar 2020 23:00) (63 - 93)  ABP: --  ABP(mean): --  RR: 18 (02 Mar 2020 23:00) (15 - 43)  SpO2: 98% (02 Mar 2020 23:00) (91% - 100%)          I&O's Detail    01 Mar 2020 07:01  -  02 Mar 2020 07:00  --------------------------------------------------------  IN:    dexmedetomidine Infusion: 9.4 mL    Instillation: 250 mL    Lactated Ringers IV Bolus: 1000 mL    lactated ringers.: 350 mL    Oral Fluid: 710 mL    Solution: 200 mL  Total IN: 2519.4 mL    OUT:    Chest Tube: 30 mL    Indwelling Catheter - Urethral: 428 mL    Instillation: 160 mL  Total OUT: 618 mL    Total NET: 1901.4 mL      02 Mar 2020 07:01  -  03 Mar 2020 00:08  --------------------------------------------------------  IN:    Oral Fluid: 620 mL    Solution: 100 mL  Total IN: 720 mL    OUT:    Indwelling Catheter - Urethral: 133 mL  Total OUT: 133 mL    Total NET: 587 mL          LABS:                        9.7    9.98  )-----------( 268      ( 02 Mar 2020 05:16 )             28.6     03-02    131<L>  |  95<L>  |  67<H>  ----------------------------<  155<H>  4.9   |  20<L>  |  6.30<H>    Ca    8.3<L>      02 Mar 2020 13:59  Phos  6.9     03-02  Mg     2.6     03-02    TPro  5.8<L>  /  Alb  2.1<L>  /  TBili  1.1  /  DBili  x   /  AST  42<H>  /  ALT  64  /  AlkPhos  112  03-02          CAPILLARY BLOOD GLUCOSE            CULTURES:  Culture Results:   No growth (03-01 @ 13:24)  Culture Results:   Culture is being performed. (02-27 @ 21:38)  Culture Results:   No growth (02-27 @ 21:38)  Culture Results:   Testing in progress (02-27 @ 21:38)  Culture Results:   No growth at 5 days. (02-26 @ 22:29)  Culture Results:   No growth at 5 days. (02-26 @ 22:29)  Culture Results:   50,000 - 99,000 CFU/mL Coag Negative Staphylococcus  Susceptibilites not performed. (02-26 @ 11:46)  Culture Results:   No growth at 5 days. (02-25 @ 22:38)  Culture Results:   No growth at 5 days. (02-25 @ 22:38)      Physical Examination:    VITALS AT TIME OF EXAM:  HR: 98 nsr  BP: 108.59  RR: 20  SPO2: 98% RA    General: Elderly, lying in bed sleeping, in NAD, mildly confused    HEENT: Pupils equal, reactive to light.  Symmetric. No scleral icterus or injection    PULM: Clear to auscultation bilaterally with some dimnishment LLL, some upper airway congestion/reverberation, no wheezes, rales or rhonchi, no significant sputum production or increased respiratory effort    NECK: Supple, no lymphadenopathy, trachea midline    CVS: Regular rate and rhythm, no murmurs, +s1/s2    ABD: Soft, distended, nontender, normoactive bowel sounds    EXT: No edema, nontender    SKIN: Warm and well perfused, no rashes noted.    NEURO: Alert, interactive, nonfocal, mildly confused, knows name, date but not place    POCUS: scattered B-lines, largely a-line predominant, large left pleural effusion noted, complex/loculation.     DEVICES: left pigtail catheter  LINES: N  SOLIS: Y    RADIOLOGY: < from: CT Chest No Cont (03.01.20 @ 16:56) >    A left-sided pigtail catheter is present in the left pleural space. Loculated effusion with associated dense consolidation is present, the appearance is slightly more prominentsince the prior exam.    No thoracic aortic aneurysm or pericardial effusion. Cardiomegaly present. Coronary artery calcifications identified. The central airway appears intact. The thyroid gland is not enlarged.    Minimal atelectatic change right lung base. Calcification visible in the right pleura. Peribronchial thickening right infrahilar region. No pneumothorax.    The stomach is filled with food and air. A large gallstone is noted. The adrenal glands are not enlarged. The spleen is not enlarged. No acute appearing osseous abnormalities. Soft tissue anchors right humeral head. Air bubbles visible in the subcutaneous soft tissues left lateral lower thorax.    IMPRESSION: A left-sided pigtail catheter is present in the pleural space. Loculated effusion with associated dense consolidation is present, appearance slightly more prominent when compared to prior exam.

## 2020-03-03 NOTE — PROGRESS NOTE ADULT - SUBJECTIVE AND OBJECTIVE BOX
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 Pt found to have loculated left pleural effusions on CT Chest, concerning for empyema. Pt underwent IR guided left chest tube on 2/27, initially w/ minimal drainage, subsequently transferred to hospitals ED for further management. Pt initially offered VATs, but family wanted more conservative measures. Pt subsequently treated with tPA/Dornase brenda via chest tube. Pt initially with sufficient output s/p first dose tPA now with decreased drainage on 2nd administration (~100-200 cc's, total 1.2L). ICU course complicated by worsening renal failure, Cr 1.00 2/26 to now 5.1, continued oliguria and hematuria (resolved).     Events last 24 hours: ON, 6th and final treatment of MIST 22 protocol executed and completed     PAST MEDICAL & SURGICAL HISTORY:  GI Bleed: 2007  Stomach Ulcer: H pylori 2007, treated with antibiotics  Osteoarthritis  Hyperlipidemia  Herniated Disc  Spinal Stenosis  Diverticulitis  GERD (Gastroesophageal Reflux Disease)  Chronic Lymphocytic Leukemia: followed by oncologist in [place]  S/P Tonsillectomy: childhood  Status Post Arthroscopy: right shoulder  History of Arthroscopy of Knee: bilateral    Allergies    No Known Allergies    Intolerances      FAMILY HISTORY:  No pertinent family history in first degree relatives      Review of Systems:  CONSTITUTIONAL: No fever, chills, or fatigue  EYES: No visual disturbances  ENMT:  No sinus or throat pain  NECK: No pain or stiffness  RESPIRATORY: No cough,  No shortness of breath  CARDIOVASCULAR: No chest pain, palpitations,  leg swelling  GASTROINTESTINAL: No abdominal pain. No nausea, vomiting, or hematemesis; No diarrhea or constipation. No melena   GENITOURINARY: No dysuria, frequency, hematuria, or incontinence  NEUROLOGICAL: No headaches, numbness, or tremors  SKIN: No itching, burning, rashes, or lesions   MUSCULOSKELETAL: No muscle, back, or extremity pain  PSYCHIATRIC: No difficulty sleeping      Medications:  piperacillin/tazobactam IVPB.. 3.375 Gram(s) IV Intermittent every 12 hours  tamsulosin 0.4 milliGRAM(s) Oral at bedtime  albuterol/ipratropium for Nebulization 3 milliLiter(s) Nebulizer every 6 hours PRN  dornase brenda Solution for Pleural Effusion 5 milliGRAM(s) IntraPleural. every 12 hours  guaiFENesin   Syrup  (Sugar-Free) 200 milliGRAM(s) Oral every 6 hours PRN  acetaminophen   Tablet .. 650 milliGRAM(s) Oral every 6 hours PRN  dronabinol 2.5 milliGRAM(s) Oral two times a day  HYDROmorphone  Injectable 0.5 milliGRAM(s) IV Push every 4 hours PRN  HYDROmorphone  Injectable 1 milliGRAM(s) IV Push every 4 hours PRN  alteplase  Injectable for Pleural Effusion 10 milliGRAM(s) IntraPleural. every 12 hours  heparin  Injectable 5000 Unit(s) SubCutaneous every 8 hours  aluminum hydroxide/magnesium hydroxide/simethicone Suspension 30 milliLiter(s) Oral every 6 hours PRN  famotidine    Tablet 20 milliGRAM(s) Oral daily  polyethylene glycol 3350 17 Gram(s) Oral two times a day  atorvastatin 10 milliGRAM(s) Oral at bedtime  chlorhexidine 2% Cloths 1 Application(s) Topical <User Schedule>  lactobacillus acidophilus 1 Tablet(s) Oral three times a day  sevelamer carbonate 800 milliGRAM(s) Oral three times a day with meals    ICU Vital Signs Last 24 Hrs  T(C): 36.6 (03 Mar 2020 20:03), Max: 37.1 (03 Mar 2020 03:40)  T(F): 97.8 (03 Mar 2020 20:03), Max: 98.8 (03 Mar 2020 03:40)  HR: 93 (03 Mar 2020 21:00) (89 - 100)  BP: 109/57 (03 Mar 2020 21:00) (85/60 - 127/72)  BP(mean): 75 (03 Mar 2020 21:00) (60 - 95)  ABP: --  ABP(mean): --  RR: 23 (03 Mar 2020 21:00) (13 - 37)  SpO2: 91% (03 Mar 2020 21:00) (90% - 100%)    Vital Signs Last 24 Hrs  T(C): 36.6 (03 Mar 2020 20:03), Max: 37.1 (03 Mar 2020 03:40)  T(F): 97.8 (03 Mar 2020 20:03), Max: 98.8 (03 Mar 2020 03:40)  HR: 93 (03 Mar 2020 21:00) (89 - 100)  BP: 109/57 (03 Mar 2020 21:00) (85/60 - 127/72)  BP(mean): 75 (03 Mar 2020 21:00) (60 - 95)  RR: 23 (03 Mar 2020 21:00) (13 - 37)  SpO2: 91% (03 Mar 2020 21:00) (90% - 100%)        I&O's Detail    02 Mar 2020 07:01  -  03 Mar 2020 07:00  --------------------------------------------------------  IN:    Oral Fluid: 620 mL    Solution: 200 mL  Total IN: 820 mL    OUT:    Chest Tube: 20 mL    Indwelling Catheter - Urethral: 313 mL  Total OUT: 333 mL    Total NET: 487 mL      03 Mar 2020 07:01  -  03 Mar 2020 22:20  --------------------------------------------------------  IN:    Oral Fluid: 220 mL    Solution: 100 mL  Total IN: 320 mL    OUT:    Chest Tube: 270 mL    Indwelling Catheter - Urethral: 345 mL  Total OUT: 615 mL    Total NET: -295 mL            LABS:                        9.8    9.67  )-----------( 299      ( 03 Mar 2020 05:08 )             28.7     03-03    131<L>  |  95<L>  |  78<H>  ----------------------------<  105<H>  4.6   |  22  |  6.70<H>    Ca    8.1<L>      03 Mar 2020 05:08  Phos  7.8     03-03  Mg     2.9     03-03    TPro  6.0  /  Alb  2.1<L>  /  TBili  1.0  /  DBili  x   /  AST  37  /  ALT  54  /  AlkPhos  115  03-03          CAPILLARY BLOOD GLUCOSE        PT/INR - ( 03 Mar 2020 05:08 )   PT: 17.0 sec;   INR: 1.50 ratio         PTT - ( 03 Mar 2020 05:08 )  PTT:28.8 sec    CULTURES:  Culture Results:   No growth (03-01 @ 13:24)  Culture Results:   Culture is being performed. (02-27 @ 21:38)  Culture Results:   No growth at 5 days (02-27 @ 21:38)  Culture Results:   Testing in progress (02-27 @ 21:38)  Culture Results:   No growth at 5 days. (02-26 @ 22:29)  Culture Results:   No growth at 5 days. (02-26 @ 22:29)  Culture Results:   50,000 - 99,000 CFU/mL Coag Negative Staphylococcus  Susceptibilites not performed. (02-26 @ 11:46)  Culture Results:   No growth at 5 days. (02-25 @ 22:38)  Culture Results:   No growth at 5 days. (02-25 @ 22:38)    piperacillin/tazobactam IVPB.. 3.375 Gram(s) IV Intermittent every 12 hours      Physical Examination:  General: No acute distress.  Alert, oriented x2, interactive, confused at times, nonfocal  NEURO: A&O X2, confused at times, motor function 5/5 BL UE/LE  HEENT: Pupils equal, reactive to light.  Symmetric.  PULM: distant at bases CTA , no significant sputum production, no wheezes, rales, rhonchi  CVS: Regular rate and rhythm, no murmurs, rubs, or gallops  ABD: Soft, nondistended, nontender, normoactive bowel sounds, no masses  EXT: No edema, nontender  SKIN: Warm and well perfused, no rashes noted      EKG: NSR     RADIOLOGY: < from: Xray Chest 1 View- PORTABLE-Routine (03.03.20 @ 12:12) >  Frontal expiratory view of the chest shows the heart to be similar in size. Left chest pigtail catheter remains present. The lungs show progression of left infiltrate with pleural thickening and there is no evidence of pneumothorax nor right pleural effusion.    IMPRESSION:  Progression of left infiltrate.    Thank you for the courtesy of this referral.    < end of copied text >        CENTRAL LINE: N          DATE INSERTED:                  SOLIS: Y                        DATE INSERTED:                  A-LINE: N                       DATE INSERTED:                  GLOBAL ISSUE/BEST PRACTICE:  Analgesia: N/A  Sedation: N/A  HOB elevation: yes  Stress ulcer prophylaxis: protonix   VTE prophylaxis: Heparin SQ  Glycemic control: N/A  Nutrition: Diet

## 2020-03-03 NOTE — PROGRESS NOTE ADULT - ASSESSMENT
89 yo M with PMH of HLD and MALToma of the stomach (under surveillance), spinal stenosis with extensive lumbar surgery in 2011 and bilateral LE neuropathy, admitted for L side empyema and R ureteral stone. Patient initially reported what appeared to be stable occasional angina however later denied this.  Elevated proBNP however normal LV systolic function with EF 55% on echo and no evidence of meaningful volume overload on exam.   Review of EKG revealed possible inferior infarct however no wall motion abnormalities on 2/27 echo.   He is now in the intensive care unit status post lytic infusion therapy through a chest tube for his left-sided empyema.  He appears to have acute kidney injury possibly on the basis of vancomycin although a prerenal component should be considered given his lack of oral intake while on the floor.    Recommend  - chest tube management per ICU team. s/p tPA and DNAse on 3/2.  20cc output recorded for last 24 hours.   - remains with worsening renal function. renal will need to address need for HD  - IV hydration  - Tele shows SR with freq APCs and likely some runs of PAT (self limiting). This is likely reactive to underlying medical issues.  cont tele   - Monitor and replete electrolytes. Keep K>4.0 and Mg>2.0.   - BP soft. cont to monitor closely  - Continue Atorvastatin.  - Continue heparin SQ      - Rest of management per urology/CT surgery/primary team.  - Other cardiovascular workup will depend on clinical course. Will follow.

## 2020-03-03 NOTE — PROGRESS NOTE ADULT - ASSESSMENT
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 Pt found to have loculated left pleural effusions on CT Chest, concerning for empyema. Pt underwent IR guided left chest tube on 2/27, initially w/ minimal drainage, subsequently transferred to [facility] ED for further management. Pt initially offered VATs, but family wanted more conservative measures. Pt subsequently treated with tPA/Dornase brenda via chest tube. Pt initially with sufficient output s/p first dose tPA now with decreased drainage on 2nd administration (~100-200 cc's, total 1.2L). ICU course complicated by worsening renal failure, Cr 1.00 2/26 to now 5.1, continued oliguria and hematuria (resolved).   1.   2.   3.   4.   5.     PLAN     NEURO:     PULM:      CV:     GI:      :     ENDO:     ID:     HEME/DVT PPX:     ETHICS        CODE STATUS: Full Code       Care discussed with bedside provider and eICU attending.     Time Spent: 30 min

## 2020-03-03 NOTE — PROGRESS NOTE ADULT - PROBLEM SELECTOR PLAN 3
Possibly secondary to vanco toxicity  Trend Cr  Avoid nephrotoxic medications  Renal consult with Dr. JEZ patel

## 2020-03-03 NOTE — PROGRESS NOTE ADULT - SUBJECTIVE AND OBJECTIVE BOX
CHARLY LU  89y  Male    Patient is a 89y old  Male who presents with a chief complaint of Left Empyema (03 Mar 2020 11:08)      awake and alert  denies any chest pain or shortness of breath.       PAST MEDICAL & SURGICAL HISTORY:  GI Bleed: 2007  Stomach Ulcer: H pylori 2007, treated with antibiotics  Osteoarthritis  Hyperlipidemia  Herniated Disc  Spinal Stenosis  Diverticulitis  GERD (Gastroesophageal Reflux Disease)  Chronic Lymphocytic Leukemia: followed by oncologist in [place]  S/P Tonsillectomy: childhood  Status Post Arthroscopy: right shoulder  History of Arthroscopy of Knee: bilateral          PHYSICAL EXAM:    T(C): 36.6 (03-03-20 @ 12:36), Max: 37.1 (03-03-20 @ 03:40)  HR: 90 (03-03-20 @ 12:00) (89 - 105)  BP: 93/62 (03-03-20 @ 12:00) (85/60 - 121/60)  RR: 18 (03-03-20 @ 12:00) (13 - 43)  SpO2: 100% (03-03-20 @ 11:36) (91% - 100%)  Wt(kg): --    I&O's Detail    02 Mar 2020 07:01  -  03 Mar 2020 07:00  --------------------------------------------------------  IN:    Oral Fluid: 620 mL    Solution: 200 mL  Total IN: 820 mL    OUT:    Chest Tube: 20 mL    Indwelling Catheter - Urethral: 313 mL  Total OUT: 333 mL    Total NET: 487 mL      03 Mar 2020 07:01  -  03 Mar 2020 12:58  --------------------------------------------------------  IN:  Total IN: 0 mL    OUT:    Chest Tube: 10 mL    Indwelling Catheter - Urethral: 160 mL  Total OUT: 170 mL    Total NET: -170 mL          Respiratory: clear anteriorly, decreased BS at bases  Cardiovascular: S1 S2  Gastrointestinal: soft NT ND +BS  Extremities: trace trace edema   Neuro: Awake and alert    MEDICATIONS  (STANDING):  alteplase  Injectable for Pleural Effusion 10 milliGRAM(s) IntraPleural. every 12 hours  atorvastatin 10 milliGRAM(s) Oral at bedtime  chlorhexidine 2% Cloths 1 Application(s) Topical <User Schedule>  cyanocobalamin Injectable 1000 MICROGram(s) IntraMuscular every 24 hours  dornase brenda Solution for Pleural Effusion 5 milliGRAM(s) IntraPleural. every 12 hours  dronabinol 2.5 milliGRAM(s) Oral two times a day  famotidine    Tablet 20 milliGRAM(s) Oral daily  heparin  Injectable 5000 Unit(s) SubCutaneous every 8 hours  lactobacillus acidophilus 1 Tablet(s) Oral three times a day  piperacillin/tazobactam IVPB.. 3.375 Gram(s) IV Intermittent every 12 hours  polyethylene glycol 3350 17 Gram(s) Oral two times a day  sevelamer carbonate 800 milliGRAM(s) Oral three times a day with meals  tamsulosin 0.4 milliGRAM(s) Oral at bedtime    MEDICATIONS  (PRN):  acetaminophen   Tablet .. 650 milliGRAM(s) Oral every 6 hours PRN Temp greater or equal to 38C (100.4F), Mild Pain (1 - 3)  albuterol/ipratropium for Nebulization 3 milliLiter(s) Nebulizer every 6 hours PRN Shortness of Breath and/or Wheezing  aluminum hydroxide/magnesium hydroxide/simethicone Suspension 30 milliLiter(s) Oral every 6 hours PRN Dyspepsia  guaiFENesin   Syrup  (Sugar-Free) 200 milliGRAM(s) Oral every 6 hours PRN Cough  HYDROmorphone  Injectable 0.5 milliGRAM(s) IV Push every 4 hours PRN Moderate Pain (4 - 6)  HYDROmorphone  Injectable 1 milliGRAM(s) IV Push every 4 hours PRN Severe Pain (7 - 10)                            9.8    9.67  )-----------( 299      ( 03 Mar 2020 05:08 )             28.7       03-03    131<L>  |  95<L>  |  78<H>  ----------------------------<  105<H>  4.6   |  22  |  6.70<H>    Ca    8.1<L>      03 Mar 2020 05:08  Phos  7.8     03-03  Mg     2.9     03-03    TPro  6.0  /  Alb  2.1<L>  /  TBili  1.0  /  DBili  x   /  AST  37  /  ALT  54  /  AlkPhos  115  03-03      Creatinine Trend: Creatinine Trend: 6.70<--, 6.30<--, 6.00<--, 5.10<--, 4.50<--, 1.00<--

## 2020-03-03 NOTE — PROGRESS NOTE ADULT - SUBJECTIVE AND OBJECTIVE BOX
CHARLY LU is a 89yMale , patient examined and chart reviewed.     INTERVAL HPI/ OVERNIGHT EVENTS:   Awake. No distress.  Afebrile.    PAST MEDICAL & SURGICAL HISTORY:  GI Bleed: 2007  Stomach Ulcer: H pylori 2007, treated with antibiotics  Osteoarthritis  Hyperlipidemia  Herniated Disc  Spinal Stenosis  Diverticulitis  GERD (Gastroesophageal Reflux Disease)  Chronic Lymphocytic Leukemia: followed by oncologist in [place]  S/P Tonsillectomy: childhood  Status Post Arthroscopy: right shoulder  History of Arthroscopy of Knee: bilateral    For details regarding the patient's social history, family history, and other miscellaneous elements, please refer the initial infectious diseases consultation and/or the admitting history and physical examination for this admission.    ROS:  CONSTITUTIONAL:  Negative fever or chills  EYES:  Negative  blurry vision or double vision  CARDIOVASCULAR:  Negative for chest pain or palpitations  RESPIRATORY:  Negative for cough, wheezing, or SOB   GASTROINTESTINAL:  Negative for nausea, vomiting, diarrhea, constipation, or abdominal pain  GENITOURINARY:  Negative frequency, urgency or dysuria  NEUROLOGIC:  No headache, confusion, dizziness, lightheadedness  All other systems were reviewed and are negative     Current inpatient medications :    ANTIBIOTICS/RELEVANT:  piperacillin/tazobactam IVPB.. 3.375 Gram(s) IV Intermittent every 12 hours    MEDICATIONS  (STANDING):  alteplase  Injectable for Pleural Effusion 10 milliGRAM(s) IntraPleural. every 12 hours  atorvastatin 10 milliGRAM(s) Oral at bedtime  chlorhexidine 2% Cloths 1 Application(s) Topical <User Schedule>  cyanocobalamin Injectable 1000 MICROGram(s) IntraMuscular every 24 hours  dornase brenda Solution for Pleural Effusion 5 milliGRAM(s) IntraPleural. every 12 hours  dronabinol 2.5 milliGRAM(s) Oral two times a day  famotidine    Tablet 20 milliGRAM(s) Oral daily  heparin  Injectable 5000 Unit(s) SubCutaneous every 8 hours  lactobacillus acidophilus 1 Tablet(s) Oral three times a day  polyethylene glycol 3350 17 Gram(s) Oral two times a day  sevelamer carbonate 800 milliGRAM(s) Oral three times a day with meals  tamsulosin 0.4 milliGRAM(s) Oral at bedtime    MEDICATIONS  (PRN):  acetaminophen   Tablet .. 650 milliGRAM(s) Oral every 6 hours PRN Temp greater or equal to 38C (100.4F), Mild Pain (1 - 3)  albuterol/ipratropium for Nebulization 3 milliLiter(s) Nebulizer every 6 hours PRN Shortness of Breath and/or Wheezing  aluminum hydroxide/magnesium hydroxide/simethicone Suspension 30 milliLiter(s) Oral every 6 hours PRN Dyspepsia  guaiFENesin   Syrup  (Sugar-Free) 200 milliGRAM(s) Oral every 6 hours PRN Cough  HYDROmorphone  Injectable 0.5 milliGRAM(s) IV Push every 4 hours PRN Moderate Pain (4 - 6)  HYDROmorphone  Injectable 1 milliGRAM(s) IV Push every 4 hours PRN Severe Pain (7 - 10)      Objective:  ICU Vital Signs Last 24 Hrs  T(C): 36.9 (03 Mar 2020 08:05), Max: 37.1 (03 Mar 2020 03:40)  T(F): 98.5 (03 Mar 2020 08:05), Max: 98.8 (03 Mar 2020 03:40)  HR: 95 (03 Mar 2020 10:00) (91 - 109)  BP: 95/64 (03 Mar 2020 10:00) (85/60 - 145/63)  BP(mean): 76 (03 Mar 2020 10:00) (60 - 91)  RR: 19 (03 Mar 2020 10:00) (13 - 43)  SpO2: 100% (03 Mar 2020 10:00) (91% - 100%)      Physical Exam:  General:  no acute distress  Eyes: sclera anicteric, pupils equal and reactive to light  ENMT: buccal mucosa moist, pharynx not injected  Neck: supple, trachea midline  Lungs: decreased no wheeze/rhonchi Left lung pigtail  Cardiovascular: regular rate and rhythm, S1 S2  Abdomen: soft, nontender, no organomegaly present, bowel sounds normal  Neurological: alert and oriented x3, Cranial Nerves II-XII grossly intact  Skin: no increased ecchymosis/petechiae/purpura  Lymph Nodes: no palpable cervical/supraclavicular lymph nodes enlargements  Extremities: no cyanosis/clubbing/edema    LABS:                                   9.8    9.67  )-----------( 299      ( 03 Mar 2020 05:08 )             28.7   03-03    131<L>  |  95<L>  |  78<H>  ----------------------------<  105<H>  4.6   |  22  |  6.70<H>    Ca    8.1<L>      03 Mar 2020 05:08  Phos  7.8     03-03  Mg     2.9     03-03    TPro  6.0  /  Alb  2.1<L>  /  TBili  1.0  /  DBili  x   /  AST  37  /  ALT  54  /  AlkPhos  115  03-03      MICROBIOLOGY:  Culture - Body Fluid with Gram Stain (02.27.20 @ 21:38)    Gram Stain:   polymorphonuclear leukocytes  No organisms seen  by cytocentrifuge    Specimen Source: .Body Fluid Pleural Fluid    Culture Results:   No growth    Culture - Blood (collected 26 Feb 2020 22:29)  Source: .Blood Blood-Peripheral  Preliminary Report (27 Feb 2020 23:01):    No growth to date.    Culture - Blood (collected 26 Feb 2020 22:29)  Source: .Blood Blood-Peripheral  Preliminary Report (27 Feb 2020 23:01):    No growth to date.    Culture - Urine (collected 26 Feb 2020 11:46)  Source: .Urine Catheterized  Final Report (27 Feb 2020 15:05):    50,000 - 99,000 CFU/mL Coag Negative Staphylococcus    Susceptibilites not performed.    Culture - Blood (collected 25 Feb 2020 22:38)  Source: .Blood Blood-Peripheral  Preliminary Report (26 Feb 2020 23:01):    No growth to date.    Culture - Blood (collected 25 Feb 2020 22:38)  Source: .Blood Blood-Peripheral  Preliminary Report (26 Feb 2020 23:01):    No growth to date.    Legionella pneumophila Antigen, Urine (02.27.20 @ 02:53)    Legionella Antigen, Urine: Negative    Streptococcus Pneumoniae Ag Urine (02.27.20 @ 04:25)    Streptococcus Pneumoniae Ag Urine: Negative    RADIOLOGY & ADDITIONAL STUDIES:  EXAM:  CT RENAL STONE HUNT                          EXAM:  CT CHEST                                  PROCEDURE DATE:  02/25/2020          INTERPRETATION:  CLINICAL HISTORY:  Fever, left back and flank pain. History of gastric lymphoma.    Multiple axial images of the chest, abdomen and pelvis were obtained from the lung apices through symphysis pubis without the administration of oral or intravascular contrast limiting the sensitivity of evaluation. Reformatted coronal and sagittal images are submitted.    COMPARISON: PET/CT evaluation 6/19/2019.    FINDINGS: New moderately sized left pleural effusion is identified with portions that are loculated; air attenuation within the effusion as well as within the left pleural space suggests the presence of left-sided empyema. Consolidation is identified within the lingular segment as well as left lower lobe lung parenchyma; underlying neoplasm cannot be excluded. Hazy opacity within the left upper lobe is likely related to atelectasis. Subpleural opacity within the posterior right lower lobe and right middle lobe likely related to subpleural fibrosis. There is no evidence for right-sided pleural effusion or air.    No abnormally enlarged mediastinal or hilar lymph nodes are noted. There is no evidence for axillary lymphadenopathy. The heart size appears within normal limits. No pericardial effusion is identified. Thoracic aortic caliber demonstrates unremarkable caliber. Coronary arterial calcifications identified.    The central bronchial anatomy appears patent.    No mediastinal shift is noted.    The liver is normal in size. No focal hepatic masses are identified. There is no evidence for intrahepatic or extrahepatic biliary dilatation. A large gallstone is noted. No gallbladder wall thickening or pericholecystic fluid identified.    The spleen, pancreas and adrenal glands are unremarkable.    There is no evidence for hydronephrosis. No right renal calculi are identified. An 8 mm calculus is identified within the left-sided extrarenal pelvis, unchanged. There is no change in a 6 mm calculus identified within the distal right ureter at the level of the UVJ, without evidence for obstructive uropathy. A 1.3 cm right renal cyst is noted. The abdominal aorta is normal in course and caliber. No abnormally enlarged retroperitoneal or pelvic lymphadenopathy is noted.    Gastric wall thickening is identified allowing for nondistention with oral contrast; clinical correlation is suggested in light of the provided clinical history of gastric lymphoma.    Fecal material is scattered throughout the colon. There is no evidence for mechanical bowel obstruction. Colonic diverticulosis is noted. No colonic wall thickening is identified. There is no evidence for acute appendicitis.There is no evidence for free intraperitoneal air or fluid.    The prostate is enlarged and the urinary bladder appear unremarkable.     Postsurgical changes of the lumbar spine noted. Postprocedural changes of the right shoulder noted. Degenerative changes of the spine evident.    IMPRESSION:    1. New moderately sized left pleural effusion is identified with portions that are loculated; air attenuation within the effusion as well as within the left pleural space suggests the presence of left-sided empyema. Consolidation is identified within the lingular segment as well as left lower lobe lung parenchyma; underlying neoplasm cannot be excluded.   2. Gastric wall thickening is identified allowing for nondistention with oral contrast; clinicalcorrelation is suggested in light of the provided clinical history of gastric lymphoma.  3. An 8 mm calculus is identified within the left-sided extrarenal pelvis, unchanged. There is no change in a 6 mm calculus identified within the distal right ureter at the level of the UVJ, without evidence for obstructive uropathy. No evidence for bilateral hydronephrosis.      EXAM:  CT CHEST                            PROCEDURE DATE:  03/01/2020          INTERPRETATION:  Clinical information: Left-sided empyema    Comparison exam dated 2/25/2020    Axial images obtained, coronal and sagittal images computer reformatted    Noncontrast exam limits evaluation of hilar and mediastinal regions.    A left-sided pigtail catheter is present in the left pleural space. Loculated effusion with associated dense consolidation is present, the appearance is slightly more prominentsince the prior exam.    No thoracic aortic aneurysm or pericardial effusion. Cardiomegaly present. Coronary artery calcifications identified. The central airway appears intact. The thyroid gland is not enlarged.    Minimal atelectatic change right lung base. Calcification visible in the right pleura. Peribronchial thickening right infrahilar region. No pneumothorax.    The stomach is filled with food and air. A large gallstone is noted. The adrenal glands are not enlarged. The spleen is not enlarged. No acute appearing osseous abnormalities. Soft tissue anchors right humeral head. Air bubbles visible in the subcutaneous soft tissues left lateral lower thorax.    IMPRESSION: A left-sided pigtail catheter is present in the pleural space. Loculated effusion with associated dense consolidation is present, appearance slightly more prominent when compared to prior exam.        EXAM:  US KIDNEYS AND BLADDER                            PROCEDURE DATE:  03/01/2020          INTERPRETATION:    PROCEDURE INFORMATION:   Exam: US Retroperitoneal Limited, Kidneys   Exam date and time: 3/1/2020 6:31 AM   Age: 89 years old   Clinical indication: Other: Gus     TECHNIQUE:   Imaging protocol: Real-time ultrasound of the retroperitoneum with image   documentation. Examination was focused on the kidneys.     COMPARISON:   No relevant prior studies available.     FINDINGS:   Right kidney: The right kidney measures 11.8 cm in length. Normal parenchymal   echogenicity. No hydronephrosis.   Left kidney: The left kidney measures 11.7 cm in length. Normal parenchymal   echogenicity. Slightly irregular margin. No hydronephrosis.   Prostate: The prostate measures up to 3.7 cm in size.   Bladder: Bladder volume calculated to be 104 mL. No bladder wall thickening   allowing for lack of full distention. No bladder calculus.     IMPRESSION:   No hydronephrosis.        Assessment :   CHARLY LU is a 89y Male PMH CLL, gastric lymphoma ex smoker, hx of asbestos exp presenting to the hospital with cough, fever and left sided pleuritic chest pain. Febrile to 102 found to have left sided empyema. Seen by pulm and CT surgery. Sp IR drainage with pigtail placement 2/2/7/2020. Transferred to ICU sec tpa/dornase therapy through pigtail now on hold sec complications of hematuria. Had large clot and nick blood/mixed with urine. Mcnulty irrigation performed complicated with GUS. Worsening GUS. Repeat CT chest noted with persistent loculated effusion with associated dense consolidation.     Plan :  Cont Zosyn day 6/10  Trend temps and cbc  Tpa/dornase on hold  For possible VATs and decortication per CT surgery if family agrees  Trend renal fx      Continue with present regiment.  Appropriate use of antibiotics and adverse effects reviewed.      I have discussed the above plan of care with patient/ family in detail. They expressed understanding of the  treatment plan . Risks, benefits and alternatives discussed in detail. I have asked if they have any questions or concerns and appropriately addressed them to the best of my ability .    Critical care > 35 minutes were spent in direct patient care reviewing notes, medications ,labs data/ imaging , discussion with multidisciplinary team.    Thank you for allowing me to participate in care of your patient .    Jose Kiran MD  Infectious Disease  [phone]

## 2020-03-03 NOTE — PROGRESS NOTE ADULT - ASSESSMENT
89 y/o man known to our office, dx'd w gastric MALToma 2013, bone marrow and 2017 repeat gastric bx c/w Chronic Lymphocytic Leukemia/Small Lymphocytic Lymphoma, under serial monitoring w EGD and PETCT, has not required treatment. Also hx B12 deficiency.  Adm w left empyema, hx URI sx Jan 2020  Post IR pigtail insertion w minimal output and tx to ICU for dornase/alteplase adm w resultant good output  Now w progressive worsening of GUS    -on admission also w prolonged INR, improved w Vitamin K  -further drop in H/H yesterday-due to episode of gross hematuria w clot,, stable H/H today, no further active bleed noted. DNAse and tPA on hold due to episode  -CT chest still w collection, on Zosyn  -renal function continue to worsen but slowing, Nephrology on case

## 2020-03-03 NOTE — PROGRESS NOTE ADULT - ASSESSMENT
89 white male with a history of GERD, HLD, now with empyema on the left S/P IR drainage and is on alteplase.   Was given vancomycin and now the levels are supra therapeutic. GUS now oligo anuric, will monitor UO closely.   Minimal response to lasix IV. Spoke to patient's son who is a physician and explained that he may need RRT by tomorrow.   case discussed with ICU team, will follow closely.

## 2020-03-03 NOTE — PROGRESS NOTE ADULT - ASSESSMENT
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 Transferred to ICU for tPA/dornase brenda via chest tube. Found to have GUS 2/2 ATN, likely due to vancomycin.    1. Neuro: pain control with acetaminophen and hydromorphone prn    2. CV: HD stable, continue statin    3. Pulm: Sat well on RA. Continue chest tube drainage, monitor output. Administer intrapleural tPA/dornase brenda again today and monitor. Encourage incentive spirometry. Pulm following. No plan for Sx per discussion w/ CT Sx.    4. GI: regular diet as tolerated with Ensure, continue dronabinol. On lactobacillus    5. Renal: GUS of unclear etiology, likely drug toxicity due to vancomycin. Renally dose meds. Worsening renal function, Nephro following. Possible need for HD tomorrow. Strict I/O's, trend kidney function and lytes. Uro c/s. Keep celestin. Start phos binder.    6. ID: Continue Zosyn. UCx NGF. FU Pleural Cx/Fungi/AFB. ID following.    7. Heme: HSQ for DVT ppx. Maltoma and CLL being monitored, not on active treatment. H/O following.    8. Endo: no active issues    9. Skin: no lines or celestin    10. 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 Transferred to ICU for tPA/dornase brenda via chest tube. Found to have GUS 2/2 ATN, likely due to vancomycin.    1. Neuro: pain control with acetaminophen and hydromorphone prn    2. CV: HD stable, continue statin    3. Pulm: Sat well on RA. Continue chest tube drainage, monitor output. Administer intrapleural tPA/dornase brenda again today and monitor. Encourage incentive spirometry. Pulm following. No plan for Sx per discussion w/ CT Sx.    4. GI: regular diet as tolerated with Ensure, continue dronabinol. On lactobacillus    5. Renal: GUS of unclear etiology, likely drug toxicity due to vancomycin. Renally dose meds. Worsening renal function, Nephro following. Possible need for HD tomorrow. Lasix 20 today. Strict I/O's, trend kidney function and lytes. Uro c/s. Keep celestin. Start phos binder.    6. ID: Continue Zosyn. UCx NGF. FU Pleural Cx/Fungi/AFB. ID following.    7. Heme: HSQ for DVT ppx. Maltoma and CLL being monitored, not on active treatment. H/O following.    8. Endo: no active issues    9. Skin: no lines or celestin    10. 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 Transferred to ICU for tPA/dornase brenda via chest tube. Found to have GUS 2/2 ATN, likely due to vancomycin.    1. Neuro: pain control with acetaminophen and hydromorphone prn    2. CV: HD stable, continue statin    3. Pulm: Sat well on RA. Continue chest tube drainage, monitor output. Administer intrapleural tPA/dornase brenda again today and monitor. Encourage incentive spirometry. FU CXR. Pulm following. No plan for Sx per discussion w/ CT Sx.    4. GI: regular diet as tolerated with Ensure, continue dronabinol. On lactobacillus    5. Renal: GUS of unclear etiology, likely drug toxicity due to vancomycin. Renally dose meds. Worsening renal function, Nephro following. Possible need for HD tomorrow. Lasix 20 today. Strict I/O's, trend kidney function and lytes. Uro c/s. Keep celestin. Start phos binder.    6. ID: Continue Zosyn. UCx NGF. FU Pleural Cx/Fungi/AFB. ID following.    7. Heme: HSQ for DVT ppx. Maltoma and CLL being monitored, not on active treatment. H/O following.    8. Endo: no active issues    9. Skin: no lines or celestin    10. 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 Transferred to ICU for tPA/dornase brenda via chest tube. Found to have GUS 2/2 ATN, likely due to vancomycin.    1. Neuro: pain control with acetaminophen and hydromorphone prn    2. CV: HD stable, continue statin    3. Pulm: Sat well on RA. Continue chest tube drainage, monitor output. Administer intrapleural tPA/dornase brenda again today and monitor. Encourage incentive spirometry. FU CXR. Pulm following. No plan for Sx per discussion w/ CT Sx.    4. GI: regular diet as tolerated with Ensure, continue dronabinol. On lactobacillus    5. Renal: GUS of unclear etiology, likely drug toxicity due to vancomycin. Renally dose meds. Nephro following. Will hold of on HD for now as Cr may be plateauing possible need for HD tomorrow if renal function continues to worsen. Lasix 20 today. Strict I/O's, trend kidney function and lytes. Uro c/s. Keep celestin. Start phos binder.    6. ID: Continue Zosyn. UCx NGF. FU Pleural Cx/Fungi/AFB. ID following.    7. Heme: HSQ for DVT ppx. Maltoma and CLL being monitored, not on active treatment. H/O following.    8. Endo: no active issues    9. Skin: no lines or celestin    10. Dispo: full code, Critically ill, continue w/ ICU care

## 2020-03-03 NOTE — CHART NOTE - NSCHARTNOTEFT_GEN_A_CORE
Assessment: Pt confused. per EMR/dtr, poor po noted. Taking some Ensure at times, c/o frequent burping to dtr. Karen ordered prn, will discuss with RN. Large loose BM today. Still has chest tube. Possible HD to begin- cr 6.7, phos 7.8.  On renvela for elevated phos.     Factors impacting intake: [ ] none [ ] nausea  [ ] vomiting [ ] diarrhea [ ] constipation  [ ]chewing problems [ ] swallowing issues  [ ] other:     Diet Presciption: Diet, Regular:   Supplement Feeding Modality:  Oral  Ensure Enlive Cans or Servings Per Day:  1       Frequency:  Daily (03-01-20 @ 11:19)    Intake: 25-30%    Current Weight: Weight (kg): 86.2 (02-25 @ 22:44), 90.7 (02-25 @ 17:24)  % Weight Change    Pertinent Medications: MEDICATIONS  (STANDING):  alteplase  Injectable for Pleural Effusion 10 milliGRAM(s) IntraPleural. every 12 hours  atorvastatin 10 milliGRAM(s) Oral at bedtime  chlorhexidine 2% Cloths 1 Application(s) Topical <User Schedule>  cyanocobalamin Injectable 1000 MICROGram(s) IntraMuscular every 24 hours  dornase brenda Solution for Pleural Effusion 5 milliGRAM(s) IntraPleural. every 12 hours  dronabinol 2.5 milliGRAM(s) Oral two times a day  famotidine    Tablet 20 milliGRAM(s) Oral daily  heparin  Injectable 5000 Unit(s) SubCutaneous every 8 hours  lactobacillus acidophilus 1 Tablet(s) Oral three times a day  piperacillin/tazobactam IVPB.. 3.375 Gram(s) IV Intermittent every 12 hours  polyethylene glycol 3350 17 Gram(s) Oral two times a day  sevelamer carbonate 800 milliGRAM(s) Oral three times a day with meals  tamsulosin 0.4 milliGRAM(s) Oral at bedtime    MEDICATIONS  (PRN):  acetaminophen   Tablet .. 650 milliGRAM(s) Oral every 6 hours PRN Temp greater or equal to 38C (100.4F), Mild Pain (1 - 3)  albuterol/ipratropium for Nebulization 3 milliLiter(s) Nebulizer every 6 hours PRN Shortness of Breath and/or Wheezing  aluminum hydroxide/magnesium hydroxide/simethicone Suspension 30 milliLiter(s) Oral every 6 hours PRN Dyspepsia  guaiFENesin   Syrup  (Sugar-Free) 200 milliGRAM(s) Oral every 6 hours PRN Cough  HYDROmorphone  Injectable 0.5 milliGRAM(s) IV Push every 4 hours PRN Moderate Pain (4 - 6)  HYDROmorphone  Injectable 1 milliGRAM(s) IV Push every 4 hours PRN Severe Pain (7 - 10)    Pertinent Labs: 03-03 Na131 mmol/L<L> Glu 105 mg/dL<H> K+ 4.6 mmol/L Cr  6.70 mg/dL<H> BUN 78 mg/dL<H> 03-03 Phos 7.8 mg/dL<H> 03-03 Alb 2.1 g/dL<L> 02-26 [id number] 5.6 %     CAPILLARY BLOOD GLUCOSE        Skin: buttock st 1 pressure injury    Estimated Needs:   [x ] no change since previous assessment  [ ] recalculated:     Previous Nutrition Diagnosis:   [x ] Inadequate Energy Intake [ ]Inadequate Oral Intake [ ] Excessive Energy Intake   [ ] Underweight [ ] Increased Nutrient Needs [ ] Overweight/Obesity   [ ] Altered GI Function [ ] Unintended Weight Loss [ ] Food & Nutrition Related Knowledge Deficit [ ] Malnutrition     Nutrition Diagnosis is [x ] ongoing  [ ] resolved [ ] not applicable     New Nutrition Diagnosis: [ ] not applicable       Interventions:  Increase Ensure to BID, continue liberalized diet with poor po intake. Await decision regarding HD.  Recommend  [ ] Change Diet To:  [ ] Nutrition Supplement  [ ] Nutrition Support  [ ] Other:     Monitoring and Evaluation:   [ ] PO intake [ x ] Tolerance to diet prescription [ x ] weights [ x ] labs[ x ] follow up per protocol  [ ] other:

## 2020-03-04 LAB
ALBUMIN SERPL ELPH-MCNC: 2 G/DL — LOW (ref 3.3–5)
ALP SERPL-CCNC: 91 U/L — SIGNIFICANT CHANGE UP (ref 40–120)
ALT FLD-CCNC: 49 U/L — SIGNIFICANT CHANGE UP (ref 12–78)
ANION GAP SERPL CALC-SCNC: 14 MMOL/L — SIGNIFICANT CHANGE UP (ref 5–17)
APTT BLD: 28.5 SEC — SIGNIFICANT CHANGE UP (ref 28.5–37)
AST SERPL-CCNC: 40 U/L — HIGH (ref 15–37)
BASOPHILS # BLD AUTO: 0.04 K/UL — SIGNIFICANT CHANGE UP (ref 0–0.2)
BASOPHILS NFR BLD AUTO: 0.4 % — SIGNIFICANT CHANGE UP (ref 0–2)
BILIRUB SERPL-MCNC: 0.9 MG/DL — SIGNIFICANT CHANGE UP (ref 0.2–1.2)
BUN SERPL-MCNC: 90 MG/DL — HIGH (ref 7–23)
CALCIUM SERPL-MCNC: 8.1 MG/DL — LOW (ref 8.5–10.1)
CHLORIDE SERPL-SCNC: 94 MMOL/L — LOW (ref 96–108)
CO2 SERPL-SCNC: 22 MMOL/L — SIGNIFICANT CHANGE UP (ref 22–31)
CREAT SERPL-MCNC: 7.3 MG/DL — HIGH (ref 0.5–1.3)
EOSINOPHIL # BLD AUTO: 0.09 K/UL — SIGNIFICANT CHANGE UP (ref 0–0.5)
EOSINOPHIL NFR BLD AUTO: 1 % — SIGNIFICANT CHANGE UP (ref 0–6)
GLUCOSE SERPL-MCNC: 109 MG/DL — HIGH (ref 70–99)
HCT VFR BLD CALC: 26.1 % — LOW (ref 39–50)
HGB BLD-MCNC: 8.9 G/DL — LOW (ref 13–17)
IMM GRANULOCYTES NFR BLD AUTO: 0.6 % — SIGNIFICANT CHANGE UP (ref 0–1.5)
INR BLD: 1.47 RATIO — HIGH (ref 0.88–1.16)
LYMPHOCYTES # BLD AUTO: 1.28 K/UL — SIGNIFICANT CHANGE UP (ref 1–3.3)
LYMPHOCYTES # BLD AUTO: 14.2 % — SIGNIFICANT CHANGE UP (ref 13–44)
MAGNESIUM SERPL-MCNC: 3 MG/DL — HIGH (ref 1.6–2.6)
MCHC RBC-ENTMCNC: 30.8 PG — SIGNIFICANT CHANGE UP (ref 27–34)
MCHC RBC-ENTMCNC: 34.1 GM/DL — SIGNIFICANT CHANGE UP (ref 32–36)
MCV RBC AUTO: 90.3 FL — SIGNIFICANT CHANGE UP (ref 80–100)
MONOCYTES # BLD AUTO: 0.88 K/UL — SIGNIFICANT CHANGE UP (ref 0–0.9)
MONOCYTES NFR BLD AUTO: 9.7 % — SIGNIFICANT CHANGE UP (ref 2–14)
NEUTROPHILS # BLD AUTO: 6.7 K/UL — SIGNIFICANT CHANGE UP (ref 1.8–7.4)
NEUTROPHILS NFR BLD AUTO: 74.1 % — SIGNIFICANT CHANGE UP (ref 43–77)
NRBC # BLD: 0 /100 WBCS — SIGNIFICANT CHANGE UP (ref 0–0)
PHOSPHATE SERPL-MCNC: 7.9 MG/DL — HIGH (ref 2.5–4.5)
PLATELET # BLD AUTO: 327 K/UL — SIGNIFICANT CHANGE UP (ref 150–400)
POTASSIUM SERPL-MCNC: 5 MMOL/L — SIGNIFICANT CHANGE UP (ref 3.5–5.3)
POTASSIUM SERPL-SCNC: 5 MMOL/L — SIGNIFICANT CHANGE UP (ref 3.5–5.3)
PROT SERPL-MCNC: 5.7 G/DL — LOW (ref 6–8.3)
PROTHROM AB SERPL-ACNC: 16.7 SEC — HIGH (ref 10–12.9)
RBC # BLD: 2.89 M/UL — LOW (ref 4.2–5.8)
RBC # FLD: 13 % — SIGNIFICANT CHANGE UP (ref 10.3–14.5)
SODIUM SERPL-SCNC: 130 MMOL/L — LOW (ref 135–145)
WBC # BLD: 9.04 K/UL — SIGNIFICANT CHANGE UP (ref 3.8–10.5)
WBC # FLD AUTO: 9.04 K/UL — SIGNIFICANT CHANGE UP (ref 3.8–10.5)

## 2020-03-04 PROCEDURE — 99233 SBSQ HOSP IP/OBS HIGH 50: CPT | Mod: GC

## 2020-03-04 PROCEDURE — 99291 CRITICAL CARE FIRST HOUR: CPT

## 2020-03-04 PROCEDURE — 71045 X-RAY EXAM CHEST 1 VIEW: CPT | Mod: 26,77

## 2020-03-04 PROCEDURE — 99231 SBSQ HOSP IP/OBS SF/LOW 25: CPT

## 2020-03-04 PROCEDURE — 71045 X-RAY EXAM CHEST 1 VIEW: CPT | Mod: 26

## 2020-03-04 RX ORDER — ONDANSETRON 8 MG/1
4 TABLET, FILM COATED ORAL ONCE
Refills: 0 | Status: COMPLETED | OUTPATIENT
Start: 2020-03-04 | End: 2020-03-04

## 2020-03-04 RX ORDER — CHLORHEXIDINE GLUCONATE 213 G/1000ML
1 SOLUTION TOPICAL
Refills: 0 | Status: DISCONTINUED | OUTPATIENT
Start: 2020-03-04 | End: 2020-03-06

## 2020-03-04 RX ORDER — SODIUM CHLORIDE 9 MG/ML
10 INJECTION INTRAMUSCULAR; INTRAVENOUS; SUBCUTANEOUS
Refills: 0 | Status: DISCONTINUED | OUTPATIENT
Start: 2020-03-04 | End: 2020-03-06

## 2020-03-04 RX ORDER — ERYTHROPOIETIN 10000 [IU]/ML
10000 INJECTION, SOLUTION INTRAVENOUS; SUBCUTANEOUS
Refills: 0 | Status: DISCONTINUED | OUTPATIENT
Start: 2020-03-04 | End: 2020-03-12

## 2020-03-04 RX ORDER — LANOLIN ALCOHOL/MO/W.PET/CERES
6 CREAM (GRAM) TOPICAL AT BEDTIME
Refills: 0 | Status: DISCONTINUED | OUTPATIENT
Start: 2020-03-04 | End: 2020-03-10

## 2020-03-04 RX ADMIN — ATORVASTATIN CALCIUM 10 MILLIGRAM(S): 80 TABLET, FILM COATED ORAL at 21:11

## 2020-03-04 RX ADMIN — TAMSULOSIN HYDROCHLORIDE 0.4 MILLIGRAM(S): 0.4 CAPSULE ORAL at 21:11

## 2020-03-04 RX ADMIN — Medication 2.5 MILLIGRAM(S): at 05:16

## 2020-03-04 RX ADMIN — Medication 1 TABLET(S): at 21:11

## 2020-03-04 RX ADMIN — CHLORHEXIDINE GLUCONATE 1 APPLICATION(S): 213 SOLUTION TOPICAL at 09:04

## 2020-03-04 RX ADMIN — HEPARIN SODIUM 5000 UNIT(S): 5000 INJECTION INTRAVENOUS; SUBCUTANEOUS at 21:12

## 2020-03-04 RX ADMIN — ERYTHROPOIETIN 10000 UNIT(S): 10000 INJECTION, SOLUTION INTRAVENOUS; SUBCUTANEOUS at 17:53

## 2020-03-04 RX ADMIN — ALTEPLASE 10 MILLIGRAM(S): KIT at 03:05

## 2020-03-04 RX ADMIN — Medication 1 TABLET(S): at 13:23

## 2020-03-04 RX ADMIN — FAMOTIDINE 20 MILLIGRAM(S): 10 INJECTION INTRAVENOUS at 11:20

## 2020-03-04 RX ADMIN — HEPARIN SODIUM 5000 UNIT(S): 5000 INJECTION INTRAVENOUS; SUBCUTANEOUS at 13:22

## 2020-03-04 RX ADMIN — DORNASE ALFA 5 MILLIGRAM(S): 1 SOLUTION RESPIRATORY (INHALATION) at 05:03

## 2020-03-04 RX ADMIN — Medication 6 MILLIGRAM(S): at 23:47

## 2020-03-04 RX ADMIN — SEVELAMER CARBONATE 800 MILLIGRAM(S): 2400 POWDER, FOR SUSPENSION ORAL at 12:17

## 2020-03-04 RX ADMIN — SEVELAMER CARBONATE 800 MILLIGRAM(S): 2400 POWDER, FOR SUSPENSION ORAL at 08:26

## 2020-03-04 RX ADMIN — PIPERACILLIN AND TAZOBACTAM 25 GRAM(S): 4; .5 INJECTION, POWDER, LYOPHILIZED, FOR SOLUTION INTRAVENOUS at 13:22

## 2020-03-04 RX ADMIN — ONDANSETRON 4 MILLIGRAM(S): 8 TABLET, FILM COATED ORAL at 14:38

## 2020-03-04 RX ADMIN — HYDROMORPHONE HYDROCHLORIDE 1 MILLIGRAM(S): 2 INJECTION INTRAMUSCULAR; INTRAVENOUS; SUBCUTANEOUS at 00:05

## 2020-03-04 RX ADMIN — Medication 1 TABLET(S): at 05:16

## 2020-03-04 RX ADMIN — HEPARIN SODIUM 5000 UNIT(S): 5000 INJECTION INTRAVENOUS; SUBCUTANEOUS at 05:16

## 2020-03-04 RX ADMIN — PIPERACILLIN AND TAZOBACTAM 25 GRAM(S): 4; .5 INJECTION, POWDER, LYOPHILIZED, FOR SOLUTION INTRAVENOUS at 01:53

## 2020-03-04 NOTE — PROGRESS NOTE ADULT - SUBJECTIVE AND OBJECTIVE BOX
Patient is a 89y old  Male who presents with a chief complaint of Left Empyema (04 Mar 2020 06:32)    24 hour events:   CXR demonstrating ***************  Chest tube output 560 mL serosanguinous drainage over 24hr    REVIEW OF SYSTEMS  Constitutional: No fever, chills, fatigue  Neuro: No headache, numbness, weakness  Resp: No cough, wheezing, shortness of breath  CVS: No chest pain, palpitations, leg swelling  GI: No abdominal pain, nausea, vomiting, diarrhea   : No dysuria, frequency, incontinence  Skin: No itching, burning, rashes, or lesions   Msk: No joint pain or swelling  Psych: No depression, anxiety, mood swings  Heme: No bleeding    T(F): 97.6 (03-04-20 @ 07:35), Max: 98.5 (03-03-20 @ 08:05)  HR: 86 (03-04-20 @ 07:00) (83 - 99)  BP: 115/57 (03-04-20 @ 07:00) (77/56 - 132/60)  RR: 25 (03-04-20 @ 07:00) (13 - 36)  SpO2: 94% (03-04-20 @ 07:00) (90% - 100%)  Wt(kg): --      I&O's Summary    03-03 @ 07:01  -  03-04 @ 07:00  --------------------------------------------------------  IN: 540 mL / OUT: 1155 mL / NET: -615 mL      PHYSICAL EXAM  General:   CNS:   HEENT:   Resp:   CVS:   Abd:   Ext:   Skin:     MEDICATIONS  piperacillin/tazobactam IVPB.. IV Intermittent    tamsulosin Oral    atorvastatin Oral    albuterol/ipratropium for Nebulization Nebulizer PRN  guaiFENesin   Syrup  (Sugar-Free) Oral PRN    acetaminophen   Tablet .. Oral PRN  dronabinol Oral  HYDROmorphone  Injectable IV Push PRN  HYDROmorphone  Injectable IV Push PRN      heparin  Injectable SubCutaneous    aluminum hydroxide/magnesium hydroxide/simethicone Suspension Oral PRN  famotidine    Tablet Oral  polyethylene glycol 3350 Oral          chlorhexidine 2% Cloths Topical    lactobacillus acidophilus Oral  sevelamer carbonate Oral                          8.9    9.04  )-----------( 327      ( 04 Mar 2020 05:21 )             26.1       03-04    130<L>  |  94<L>  |  90<H>  ----------------------------<  109<H>  5.0   |  22  |  7.30<H>    Ca    8.1<L>      04 Mar 2020 05:21  Phos  7.9     03-04  Mg     3.0     03-04    TPro  5.7<L>  /  Alb  2.0<L>  /  TBili  0.9  /  DBili  x   /  AST  40<H>  /  ALT  49  /  AlkPhos  91  03-04          PT/INR - ( 04 Mar 2020 05:21 )   PT: 16.7 sec;   INR: 1.47 ratio         PTT - ( 04 Mar 2020 05:21 )  PTT:28.5 sec    .Urine Catheterized   No growth -- 03-01 @ 13:24        Radiology: ***  Bedside lung ultrasound: ***  Bedside ECHO: ***    CENTRAL LINE: Y/N          DATE INSERTED:              REMOVE: Y/N  SOLIS: Y/N                        DATE INSERTED:              REMOVE: Y/N  A-LINE: Y/N                       DATE INSERTED:              REMOVE: Y/N    GLOBAL ISSUE/BEST PRACTICE  Analgesia:   Sedation:   CAM-ICU:   HOB elevation: yes  Stress ulcer prophylaxis:   VTE prophylaxis:   Glycemic control:   Nutrition:     CODE STATUS: ***  [facility] discussion: Y Patient is a 89y old  Male who presents with a chief complaint of Left Empyema (04 Mar 2020 06:32)    24 hour events:   CXR demonstrating ***************  Chest tube output 560 mL serosanguinous drainage over 24hr    REVIEW OF SYSTEMS  Constitutional: No fever, chills, fatigue  Neuro: No headache, numbness, weakness  Resp: No cough, wheezing, shortness of breath  CVS: No chest pain, palpitations, leg swelling  GI: No abdominal pain, nausea, vomiting, diarrhea   : No dysuria, frequency, incontinence  Skin: No itching, burning, rashes, or lesions   Msk: No joint pain or swelling  Psych: No depression, anxiety, mood swings  Heme: No bleeding    T(F): 97.6 (03-04-20 @ 07:35), Max: 98.5 (03-03-20 @ 08:05)  HR: 86 (03-04-20 @ 07:00) (83 - 99)  BP: 115/57 (03-04-20 @ 07:00) (77/56 - 132/60)  RR: 25 (03-04-20 @ 07:00) (13 - 36)  SpO2: 94% (03-04-20 @ 07:00) (90% - 100%)  Wt(kg): --      I&O's Summary    03-03 @ 07:01  -  03-04 @ 07:00  --------------------------------------------------------  IN: 540 mL / OUT: 1155 mL / NET: -615 mL    MEDICATIONS  piperacillin/tazobactam IVPB.. IV Intermittent  tamsulosin Oral  atorvastatin Oral  albuterol/ipratropium for Nebulization Nebulizer PRN  guaiFENesin   Syrup  (Sugar-Free) Oral PRN  acetaminophen   Tablet .. Oral PRN  dronabinol Oral  HYDROmorphone  Injectable IV Push PRN  HYDROmorphone  Injectable IV Push PRN  heparin  Injectable SubCutaneous  aluminum hydroxide/magnesium hydroxide/simethicone Suspension Oral PRN  famotidine    Tablet Oral  polyethylene glycol 3350 Oral  chlorhexidine 2% Cloths Topical  lactobacillus acidophilus Oral  sevelamer carbonate Oral                        8.9    9.04  )-----------( 327      ( 04 Mar 2020 05:21 )             26.1       03-04    130<L>  |  94<L>  |  90<H>  ----------------------------<  109<H>  5.0   |  22  |  7.30<H>    Ca    8.1<L>      04 Mar 2020 05:21  Phos  7.9     03-04  Mg     3.0     03-04    TPro  5.7<L>  /  Alb  2.0<L>  /  TBili  0.9  /  DBili  x   /  AST  40<H>  /  ALT  49  /  AlkPhos  91  03-04  PT/INR - ( 04 Mar 2020 05:21 )   PT: 16.7 sec;   INR: 1.47 ratio    PTT - ( 04 Mar 2020 05:21 )  PTT:28.5 sec    .Urine Catheterized   No growth -- 03-01 @ 13:24 Patient is a 89y old  Male who presents with a chief complaint of Left Empyema (04 Mar 2020 06:32)    24 hour events:   Chest tube output 560 mL serosanguinous drainage over 24hr  Urine output slowly improving    REVIEW OF SYSTEMS  Constitutional: No fever, chills, fatigue  Neuro: No headache, numbness, weakness  Resp: No cough, wheezing, shortness of breath  CVS: No chest pain, palpitations, leg swelling  GI: No abdominal pain, nausea, vomiting, diarrhea   : No dysuria, frequency, incontinence  Skin: No itching, burning, rashes, or lesions   Msk: No joint pain or swelling  Psych: No depression, anxiety, mood swings  Heme: No bleeding    T(F): 97.6 (03-04-20 @ 07:35), Max: 98.5 (03-03-20 @ 08:05)  HR: 86 (03-04-20 @ 07:00) (83 - 99)  BP: 115/57 (03-04-20 @ 07:00) (77/56 - 132/60)  RR: 25 (03-04-20 @ 07:00) (13 - 36)  SpO2: 94% (03-04-20 @ 07:00) (90% - 100%)  Wt(kg): --      I&O's Summary    03-03 @ 07:01  -  03-04 @ 07:00  --------------------------------------------------------  IN: 540 mL / OUT: 1155 mL / NET: -615 mL    MEDICATIONS  piperacillin/tazobactam IVPB.. IV Intermittent  tamsulosin Oral  atorvastatin Oral  albuterol/ipratropium for Nebulization Nebulizer PRN  guaiFENesin   Syrup  (Sugar-Free) Oral PRN  acetaminophen   Tablet .. Oral PRN  dronabinol Oral  HYDROmorphone  Injectable IV Push PRN  HYDROmorphone  Injectable IV Push PRN  heparin  Injectable SubCutaneous  aluminum hydroxide/magnesium hydroxide/simethicone Suspension Oral PRN  famotidine    Tablet Oral  polyethylene glycol 3350 Oral  chlorhexidine 2% Cloths Topical  lactobacillus acidophilus Oral  sevelamer carbonate Oral                        8.9    9.04  )-----------( 327      ( 04 Mar 2020 05:21 )             26.1       03-04    130<L>  |  94<L>  |  90<H>  ----------------------------<  109<H>  5.0   |  22  |  7.30<H>    Ca    8.1<L>      04 Mar 2020 05:21  Phos  7.9     03-04  Mg     3.0     03-04    TPro  5.7<L>  /  Alb  2.0<L>  /  TBili  0.9  /  DBili  x   /  AST  40<H>  /  ALT  49  /  AlkPhos  91  03-04  PT/INR - ( 04 Mar 2020 05:21 )   PT: 16.7 sec;   INR: 1.47 ratio    PTT - ( 04 Mar 2020 05:21 )  PTT:28.5 sec    .Urine Catheterized   No growth -- 03-01 @ 13:24 Patient is a 89y old  Male who presents with a chief complaint of Left Empyema (04 Mar 2020 06:32)    24 hour events:   Chest tube output 560 mL serosanguinous drainage over 24hr  Urine output slowly improving    REVIEW OF SYSTEMS  Constitutional: No fever, chills, fatigue  Neuro: No headache, numbness, weakness  Resp: No cough, wheezing, shortness of breath  CVS: No chest pain, palpitations, leg swelling  GI: No abdominal pain, nausea, vomiting, diarrhea   : No dysuria, frequency, incontinence  Skin: No itching, burning, rashes, or lesions   Msk: No joint pain or swelling  Psych: No depression, anxiety, mood swings  Heme: No bleeding    T(F): 97.6 (03-04-20 @ 07:35), Max: 98.5 (03-03-20 @ 08:05)  HR: 86 (03-04-20 @ 07:00) (83 - 99)  BP: 115/57 (03-04-20 @ 07:00) (77/56 - 132/60)  RR: 25 (03-04-20 @ 07:00) (13 - 36)  SpO2: 94% (03-04-20 @ 07:00) (90% - 100%)  Wt(kg): --      I&O's Summary    03-03 @ 07:01  -  03-04 @ 07:00  --------------------------------------------------------  IN: 540 mL / OUT: 1155 mL / NET: -615 mL    MEDICATIONS  piperacillin/tazobactam IVPB.. IV Intermittent  tamsulosin Oral  atorvastatin Oral  albuterol/ipratropium for Nebulization Nebulizer PRN  guaiFENesin   Syrup  (Sugar-Free) Oral PRN  acetaminophen   Tablet .. Oral PRN  dronabinol Oral  HYDROmorphone  Injectable IV Push PRN  HYDROmorphone  Injectable IV Push PRN  heparin  Injectable SubCutaneous  aluminum hydroxide/magnesium hydroxide/simethicone Suspension Oral PRN  famotidine    Tablet Oral  polyethylene glycol 3350 Oral  chlorhexidine 2% Cloths Topical  lactobacillus acidophilus Oral  sevelamer carbonate Oral                        8.9    9.04  )-----------( 327      ( 04 Mar 2020 05:21 )             26.1       03-04    130<L>  |  94<L>  |  90<H>  ----------------------------<  109<H>  5.0   |  22  |  7.30<H>    Ca    8.1<L>      04 Mar 2020 05:21  Phos  7.9     03-04  Mg     3.0     03-04    TPro  5.7<L>  /  Alb  2.0<L>  /  TBili  0.9  /  DBili  x   /  AST  40<H>  /  ALT  49  /  AlkPhos  91  03-04  PT/INR - ( 04 Mar 2020 05:21 )   PT: 16.7 sec;   INR: 1.47 ratio    PTT - ( 04 Mar 2020 05:21 )  PTT:28.5 sec    .Urine Catheterized   No growth -- 03-01 @ 13:24    PHYSICAL EXAM  General: Elderly male, NAD, following commands  CNS: A/Ox2, grossly moving all extremities, sensation grossly intact  HEENT: EOMI, PERLL  Resp: Wheezing BL, L Chest tube on suction on L w/ serosanguinous drainage, no air leak  CVS: RRR  Abd: NTND, +BSx4  Skin: brisk cap refill, warm/well perfused, no edema        CENTRAL LINE: N  SOLIS: Y  A-LINE: N    GLOBAL ISSUE/BEST PRACTICE  Analgesia: Y  Sedation: N  CAM-ICU: Y  HOB elevation: yes  Stress ulcer prophylaxis: N  VTE prophylaxis: Y  Glycemic control: N  Nutrition: Y    CODE STATUS: Full code Patient is a 89y old  Male who presents with a chief complaint of Left Empyema (04 Mar 2020 06:32)    24 hour events:   Chest tube output 560 mL serosanguinous drainage over 24hr  Urine output slowly improving        T(F): 97.6 (03-04-20 @ 07:35), Max: 98.5 (03-03-20 @ 08:05)  HR: 86 (03-04-20 @ 07:00) (83 - 99)  BP: 115/57 (03-04-20 @ 07:00) (77/56 - 132/60)  RR: 25 (03-04-20 @ 07:00) (13 - 36)  SpO2: 94% (03-04-20 @ 07:00) (90% - 100%)  Wt(kg): --      I&O's Summary    03-03 @ 07:01  -  03-04 @ 07:00  --------------------------------------------------------  IN: 540 mL / OUT: 1155 mL / NET: -615 mL    MEDICATIONS  piperacillin/tazobactam IVPB.. IV Intermittent  tamsulosin Oral  atorvastatin Oral  albuterol/ipratropium for Nebulization Nebulizer PRN  guaiFENesin   Syrup  (Sugar-Free) Oral PRN  acetaminophen   Tablet .. Oral PRN  dronabinol Oral  HYDROmorphone  Injectable IV Push PRN  HYDROmorphone  Injectable IV Push PRN  heparin  Injectable SubCutaneous  aluminum hydroxide/magnesium hydroxide/simethicone Suspension Oral PRN  famotidine    Tablet Oral  polyethylene glycol 3350 Oral  chlorhexidine 2% Cloths Topical  lactobacillus acidophilus Oral  sevelamer carbonate Oral                        8.9    9.04  )-----------( 327      ( 04 Mar 2020 05:21 )             26.1       03-04    130<L>  |  94<L>  |  90<H>  ----------------------------<  109<H>  5.0   |  22  |  7.30<H>    Ca    8.1<L>      04 Mar 2020 05:21  Phos  7.9     03-04  Mg     3.0     03-04    TPro  5.7<L>  /  Alb  2.0<L>  /  TBili  0.9  /  DBili  x   /  AST  40<H>  /  ALT  49  /  AlkPhos  91  03-04  PT/INR - ( 04 Mar 2020 05:21 )   PT: 16.7 sec;   INR: 1.47 ratio    PTT - ( 04 Mar 2020 05:21 )  PTT:28.5 sec    .Urine Catheterized   No growth -- 03-01 @ 13:24    PHYSICAL EXAM  General: Elderly male, NAD, following commands  CNS: A/Ox2, grossly moving all extremities, sensation grossly intact  HEENT: EOMI, PERLL  Resp: Wheezing BL, L Chest tube on suction on L w/ serosanguinous drainage, no air leak  CVS: RRR  Abd: NTND, +BSx4  Skin: brisk cap refill, warm/well perfused, no edema          < from: Xray Chest 1 View- PORTABLE-Urgent (03.04.20 @ 16:03) >    INTERPRETATION:  INDICATION: Evaluation following central venous catheter introduction.    PRIORS: 3/4/2020    VIEWS: Portable AP radiography of the chest performed.    FINDINGS: Heart size appears at the upper limits of normal. Since prior evaluation a left IJ central venous catheter has been introduced, the distal tip of which overlies the expected region of the brachiocephalic venous confluence. There is no significant interval change in position of an indwelling pigtail drainage catheter which overlies the left lower lung field. Opacity throughout the left thorax remains unchanged, likely representative of continued loculated left pleural effusion and left lower lobe consolidation/atelectasis. There is no evidence for acute right lung infiltrate. No right pleural effusion is noted. There is no evidence for pneumothorax. No mediastinal shift is noted. Degenerative change of the thoracicspine noted.    IMPRESSION: No evidence of pneumothorax following central venous catheter introduction.                CASA BAUTISTA   This document has been electronically signed. Mar  4 2020  4:56PM        < end of copied text >    < from: Xray Chest 1 View- PORTABLE-Routine (03.04.20 @ 11:56) >  INTERPRETATION:  Chest one view    HISTORY: Chest tube    COMPARISON STUDY: 3/1/2020    Frontal expiratory view of the chest shows the heart to be similar in size. The lungs show infiltrate or residual effusion at the left base and there is no evidence of pneumothorax nor right pleural effusion.    IMPRESSION:  Left base as described. No pneumothorax.    Thank you for the courtesy of this referral.    < end of copied text >      CENTRAL LINE: N  SOLIS: Y  A-LINE: N    GLOBAL ISSUE/BEST PRACTICE  Analgesia: Y  Sedation: N  CAM-ICU: Y  HOB elevation: yes  Stress ulcer prophylaxis: N  VTE prophylaxis: Y  Glycemic control: N  Nutrition: Y    CODE STATUS: Full code

## 2020-03-04 NOTE — PROGRESS NOTE ADULT - SUBJECTIVE AND OBJECTIVE BOX
[facility] - Warren Posada, Dandre, Abelardo, Lucina, Kelvin Travis  Office Number:  [phone]    Patient resting comfortably in bed in NAD.  Laying flat with no respiratory distress.  No complaints of chest pain, dyspnea, palpitations, PND, or orthopnea.  No overnight events.    F/U for:  CHF, pleural effusion    Telemetry:  SR, PACs    MEDICATIONS  (STANDING):  atorvastatin 10 milliGRAM(s) Oral at bedtime  chlorhexidine 2% Cloths 1 Application(s) Topical <User Schedule>  dronabinol 2.5 milliGRAM(s) Oral two times a day  famotidine    Tablet 20 milliGRAM(s) Oral daily  heparin  Injectable 5000 Unit(s) SubCutaneous every 8 hours  lactobacillus acidophilus 1 Tablet(s) Oral three times a day  piperacillin/tazobactam IVPB.. 3.375 Gram(s) IV Intermittent every 12 hours  polyethylene glycol 3350 17 Gram(s) Oral two times a day  sevelamer carbonate 800 milliGRAM(s) Oral three times a day with meals  tamsulosin 0.4 milliGRAM(s) Oral at bedtime    MEDICATIONS  (PRN):  acetaminophen   Tablet .. 650 milliGRAM(s) Oral every 6 hours PRN Temp greater or equal to 38C (100.4F), Mild Pain (1 - 3)  albuterol/ipratropium for Nebulization 3 milliLiter(s) Nebulizer every 6 hours PRN Shortness of Breath and/or Wheezing  aluminum hydroxide/magnesium hydroxide/simethicone Suspension 30 milliLiter(s) Oral every 6 hours PRN Dyspepsia  guaiFENesin   Syrup  (Sugar-Free) 200 milliGRAM(s) Oral every 6 hours PRN Cough  HYDROmorphone  Injectable 0.5 milliGRAM(s) IV Push every 4 hours PRN Moderate Pain (4 - 6)  HYDROmorphone  Injectable 1 milliGRAM(s) IV Push every 4 hours PRN Severe Pain (7 - 10)      Allergies    No Known Allergies    Intolerances        Vital Signs Last 24 Hrs  T(C): 36.8 (04 Mar 2020 03:33), Max: 36.9 (03 Mar 2020 08:05)  T(F): 98.3 (04 Mar 2020 03:33), Max: 98.5 (03 Mar 2020 08:05)  HR: 94 (04 Mar 2020 06:00) (83 - 99)  BP: 104/61 (04 Mar 2020 06:00) (77/56 - 132/60)  BP(mean): 75 (04 Mar 2020 06:00) (62 - 95)  RR: 24 (04 Mar 2020 06:00) (13 - 36)  SpO2: 94% (04 Mar 2020 06:00) (90% - 100%)    I&O's Summary    02 Mar 2020 07:01  -  03 Mar 2020 07:00  --------------------------------------------------------  IN: 820 mL / OUT: 333 mL / NET: 487 mL    03 Mar 2020 07:01  -  04 Mar 2020 06:12  --------------------------------------------------------  IN: 540 mL / OUT: 1155 mL / NET: -615 mL        ON EXAM:    Constitutional: NAD, awake  HEENT: Moist Mucous Membranes, Anicteric  Pulmonary: Decreased breath sounds b/l. No rales, crackles or wheeze appreciated.   Cardiovascular: IRRR, S1 and S2, No murmurs, rubs, gallops or clicks  Gastrointestinal: Bowel Sounds present, soft, nontender.   Lymph: No peripheral edema. No lymphadenopathy.  Skin: No visible rashes or ulcers.  Psych:  Mood & affect appropriate for situation      LABS: All Labs Reviewed:                        8.9    9.04  )-----------( 327      ( 04 Mar 2020 05:21 )             26.1                         9.8    9.67  )-----------( 299      ( 03 Mar 2020 05:08 )             28.7                         9.7    9.98  )-----------( 268      ( 02 Mar 2020 05:16 )             28.6     04 Mar 2020 05:21    130    |  94     |  90     ----------------------------<  109    5.0     |  22     |  7.30   03 Mar 2020 05:08    131    |  95     |  78     ----------------------------<  105    4.6     |  22     |  6.70   02 Mar 2020 13:59    131    |  95     |  67     ----------------------------<  155    4.9     |  20     |  6.30     Ca    8.1        04 Mar 2020 05:21  Ca    8.1        03 Mar 2020 05:08  Ca    8.3        02 Mar 2020 13:59  Phos  7.9       04 Mar 2020 05:21  Phos  7.8       03 Mar 2020 05:08  Phos  6.9       02 Mar 2020 05:16  Mg     3.0       04 Mar 2020 05:21  Mg     2.9       03 Mar 2020 05:08  Mg     2.6       02 Mar 2020 05:16    TPro  5.7    /  Alb  2.0    /  TBili  0.9    /  DBili  x      /  AST  40     /  ALT  49     /  AlkPhos  91     04 Mar 2020 05:21  TPro  6.0    /  Alb  2.1    /  TBili  1.0    /  DBili  x      /  AST  37     /  ALT  54     /  AlkPhos  115    03 Mar 2020 05:08  TPro  5.8    /  Alb  2.1    /  TBili  1.1    /  DBili  x      /  AST  42     /  ALT  64     /  AlkPhos  112    02 Mar 2020 05:16    PT/INR - ( 04 Mar 2020 05:21 )   PT: 16.7 sec;   INR: 1.47 ratio         PTT - ( 04 Mar 2020 05:21 )  PTT:28.5 sec      Blood Culture: Organism --  Gram Stain Blood -- Gram Stain --  Specimen Source .Urine Catheterized  Culture-Blood --

## 2020-03-04 NOTE — PROGRESS NOTE ADULT - SUBJECTIVE AND OBJECTIVE BOX
SURGERY    89y    SUBJECTIVE:   Patient seen at bedside, TPA instilled in CT this am, output since approx 250cc of serosanguinous fluid drained.    Patient states he feels fine despite moaning, nurse adds he is "having bladder spasms"  No overnight events, no complaints noted and pain is controlled at this time.   Patient denies any headaches, chest pain, shortness of breath, nausea, vomiting, fever, chills, weakness, dysuria  Patient A+Ox3 in NAD at time of visit.    OBJECTIVE:   T(C): 36.4 (03-04-20 @ 07:35), Max: 36.9 (03-04-20 @ 00:22)  HR: 86 (03-04-20 @ 07:00) (83 - 99)  BP: 115/57 (03-04-20 @ 07:00) (77/56 - 132/60)  RR: 25 (03-04-20 @ 07:00) (13 - 36)  SpO2: 94% (03-04-20 @ 07:00) (90% - 100%)  Wt(kg): --  CAPILLARY BLOOD GLUCOSE        I&O's Detail    03 Mar 2020 07:01  -  04 Mar 2020 07:00  --------------------------------------------------------  IN:    Oral Fluid: 340 mL    Solution: 200 mL  Total IN: 540 mL    OUT:    Chest Tube: 560 mL+ 250= 810cc     Indwelling Catheter - Urethral: 595 mL  Total OUT: 1155 mL    Total NET: -615 mL          Physical exam:  General: A+O x 3 in NAD  HEENT: PERRLA, EOM intact  Neck: trachea midline  Chest: Clear through auscultation bilaterally, some diminished breath sounds on left. no Rales, rhonchi or wheeze noted at time  Left Chest tube site: dressing intact, no drainage noted      MEDICATIONS  (STANDING):  atorvastatin 10 milliGRAM(s) Oral at bedtime  chlorhexidine 2% Cloths 1 Application(s) Topical <User Schedule>  dronabinol 2.5 milliGRAM(s) Oral two times a day  famotidine    Tablet 20 milliGRAM(s) Oral daily  heparin  Injectable 5000 Unit(s) SubCutaneous every 8 hours  lactobacillus acidophilus 1 Tablet(s) Oral three times a day  piperacillin/tazobactam IVPB.. 3.375 Gram(s) IV Intermittent every 12 hours  polyethylene glycol 3350 17 Gram(s) Oral two times a day  sevelamer carbonate 800 milliGRAM(s) Oral three times a day with meals  tamsulosin 0.4 milliGRAM(s) Oral at bedtime    MEDICATIONS  (PRN):  acetaminophen   Tablet .. 650 milliGRAM(s) Oral every 6 hours PRN Temp greater or equal to 38C (100.4F), Mild Pain (1 - 3)  albuterol/ipratropium for Nebulization 3 milliLiter(s) Nebulizer every 6 hours PRN Shortness of Breath and/or Wheezing  aluminum hydroxide/magnesium hydroxide/simethicone Suspension 30 milliLiter(s) Oral every 6 hours PRN Dyspepsia  guaiFENesin   Syrup  (Sugar-Free) 200 milliGRAM(s) Oral every 6 hours PRN Cough  HYDROmorphone  Injectable 0.5 milliGRAM(s) IV Push every 4 hours PRN Moderate Pain (4 - 6)  HYDROmorphone  Injectable 1 milliGRAM(s) IV Push every 4 hours PRN Severe Pain (7 - 10)      LABS:                        8.9    9.04  )-----------( 327      ( 04 Mar 2020 05:21 )             26.1     03-04    130<L>  |  94<L>  |  90<H>  ----------------------------<  109<H>  5.0   |  22  |  7.30<H>    Ca    8.1<L>      04 Mar 2020 05:21  Phos  7.9     03-04  Mg     3.0     03-04    TPro  5.7<L>  /  Alb  2.0<L>  /  TBili  0.9  /  DBili  x   /  AST  40<H>  /  ALT  49  /  AlkPhos  91  03-04    PT/INR - ( 04 Mar 2020 05:21 )   PT: 16.7 sec;   INR: 1.47 ratio         PTT - ( 04 Mar 2020 05:21 )  PTT:28.5 sec      RADIOLOGY & ADDITIONAL STUDIES:    Assessment  Patient is a 89y old  Male with Left Empyema s/p IR Chest tube insertion on 2/27 with repeated doses of TPA/ Dornase brenda with increased outputs      Plan  - continue care as per ICU  - monitor outputs from chest tube, f/u IR  - ? valium for possible bladder spasms , urology consulted as per nurse  - consider vitamin K for INR  - cbc daily  - no surgical intervention at this time. will discuss with Dr. ADIS Steven      Surgical Team Contact Information

## 2020-03-04 NOTE — PROGRESS NOTE ADULT - SUBJECTIVE AND OBJECTIVE BOX
Date/Time Patient Seen:  		  Referring MD:   Data Reviewed	       Patient is a 89y old  Male who presents with a chief complaint of Left Empyema (04 Mar 2020 06:11)      Subjective/HPI     PAST MEDICAL & SURGICAL HISTORY:  GI Bleed: 2007  Stomach Ulcer: H pylori 2007, treated with antibiotics  Osteoarthritis  Hyperlipidemia  Herniated Disc  Spinal Stenosis  Diverticulitis  GERD (Gastroesophageal Reflux Disease)  Chronic Lymphocytic Leukemia: followed by oncologist in [place]  S/P Tonsillectomy: childhood  Status Post Arthroscopy: right shoulder  History of Arthroscopy of Knee: bilateral  Osteoarthritis  Stomach Ulcer: H pylori, treated with antibiotics        Medication list         MEDICATIONS  (STANDING):  atorvastatin 10 milliGRAM(s) Oral at bedtime  chlorhexidine 2% Cloths 1 Application(s) Topical <User Schedule>  dronabinol 2.5 milliGRAM(s) Oral two times a day  famotidine    Tablet 20 milliGRAM(s) Oral daily  heparin  Injectable 5000 Unit(s) SubCutaneous every 8 hours  lactobacillus acidophilus 1 Tablet(s) Oral three times a day  piperacillin/tazobactam IVPB.. 3.375 Gram(s) IV Intermittent every 12 hours  polyethylene glycol 3350 17 Gram(s) Oral two times a day  sevelamer carbonate 800 milliGRAM(s) Oral three times a day with meals  tamsulosin 0.4 milliGRAM(s) Oral at bedtime    MEDICATIONS  (PRN):  acetaminophen   Tablet .. 650 milliGRAM(s) Oral every 6 hours PRN Temp greater or equal to 38C (100.4F), Mild Pain (1 - 3)  albuterol/ipratropium for Nebulization 3 milliLiter(s) Nebulizer every 6 hours PRN Shortness of Breath and/or Wheezing  aluminum hydroxide/magnesium hydroxide/simethicone Suspension 30 milliLiter(s) Oral every 6 hours PRN Dyspepsia  guaiFENesin   Syrup  (Sugar-Free) 200 milliGRAM(s) Oral every 6 hours PRN Cough  HYDROmorphone  Injectable 0.5 milliGRAM(s) IV Push every 4 hours PRN Moderate Pain (4 - 6)  HYDROmorphone  Injectable 1 milliGRAM(s) IV Push every 4 hours PRN Severe Pain (7 - 10)         Vitals log        ICU Vital Signs Last 24 Hrs  T(C): 36.8 (04 Mar 2020 03:33), Max: 36.9 (03 Mar 2020 08:05)  T(F): 98.3 (04 Mar 2020 03:33), Max: 98.5 (03 Mar 2020 08:05)  HR: 94 (04 Mar 2020 06:00) (83 - 99)  BP: 104/61 (04 Mar 2020 06:00) (77/56 - 132/60)  BP(mean): 75 (04 Mar 2020 06:00) (62 - 95)  ABP: --  ABP(mean): --  RR: 24 (04 Mar 2020 06:00) (13 - 36)  SpO2: 94% (04 Mar 2020 06:00) (90% - 100%)           Input and Output:  I&O's Detail    02 Mar 2020 07:01  -  03 Mar 2020 07:00  --------------------------------------------------------  IN:    Oral Fluid: 620 mL    Solution: 200 mL  Total IN: 820 mL    OUT:    Chest Tube: 20 mL    Indwelling Catheter - Urethral: 313 mL  Total OUT: 333 mL    Total NET: 487 mL      03 Mar 2020 07:01  -  04 Mar 2020 06:33  --------------------------------------------------------  IN:    Oral Fluid: 340 mL    Solution: 200 mL  Total IN: 540 mL    OUT:    Chest Tube: 560 mL    Indwelling Catheter - Urethral: 595 mL  Total OUT: 1155 mL    Total NET: -615 mL          Lab Data                        8.9    9.04  )-----------( 327      ( 04 Mar 2020 05:21 )             26.1     03-04    130<L>  |  94<L>  |  90<H>  ----------------------------<  109<H>  5.0   |  22  |  7.30<H>    Ca    8.1<L>      04 Mar 2020 05:21  Phos  7.9     03-04  Mg     3.0     03-04    TPro  5.7<L>  /  Alb  2.0<L>  /  TBili  0.9  /  DBili  x   /  AST  40<H>  /  ALT  49  /  AlkPhos  91  03-04            Review of Systems	      Objective     Physical Examination    heart s1s2  lung dc BS  abd soft  head nc  head at      Pertinent Lab findings & Imaging      [place]let:  NO   Adequate UO     I&O's Detail    02 Mar 2020 07:01  -  03 Mar 2020 07:00  --------------------------------------------------------  IN:    Oral Fluid: 620 mL    Solution: 200 mL  Total IN: 820 mL    OUT:    Chest Tube: 20 mL    Indwelling Catheter - Urethral: 313 mL  Total OUT: 333 mL    Total NET: 487 mL      03 Mar 2020 07:01  -  04 Mar 2020 06:33  --------------------------------------------------------  IN:    Oral Fluid: 340 mL    Solution: 200 mL  Total IN: 540 mL    OUT:    Chest Tube: 560 mL    Indwelling Catheter - Urethral: 595 mL  Total OUT: 1155 mL    Total NET: -615 mL               Discussed with:     Cultures:	        Radiology

## 2020-03-04 NOTE — PROGRESS NOTE ADULT - ASSESSMENT
89 y/o man known to our office, dx'd w gastric MALToma 2013, bone marrow and 2017 repeat gastric bx c/w Chronic Lymphocytic Leukemia/Small Lymphocytic Lymphoma, under serial monitoring w EGD and PETCT, has not required treatment. Also hx B12 deficiency.  Adm w left empyema, hx URI sx Jan 2020  Post IR pigtail insertion w minimal output and tx to ICU for dornase/alteplase adm w resultant good output  Now w progressive worsening of GUS    -on admission also w prolonged INR, improved w Vitamin K  -further drop in H/H yesterday-due to episode of gross hematuria w clot,, stable H/H today, no further active bleed noted. DNAse and tPA on hold due to episode  -CT chest still w collection, on Zosyn  -renal function continue to worsen but slowing, Nephrology on case   3/4 case was discussed with Dr Moser and labs and notes were reviewed in detail. He has gross hematuria and left sided empyema is being treated with a pigtail cath.

## 2020-03-04 NOTE — PROGRESS NOTE ADULT - SUBJECTIVE AND OBJECTIVE BOX
Patient is a 89y old  Male who presents with a chief complaint of Left Empyema (02 Mar 2020 09:12)  Patient seen in follow up for GUS, ATN.     Rising creatinine trend.     PAST MEDICAL HISTORY:  GI Bleed  Stomach Ulcer  Osteoarthritis  Hyperlipidemia  Herniated Disc  Spinal Stenosis  Diverticulitis  GERD (Gastroesophageal Reflux Disease)  Chronic Lymphocytic Leukemia      MEDICATIONS  (STANDING):  atorvastatin 10 milliGRAM(s) Oral at bedtime  chlorhexidine 2% Cloths 1 Application(s) Topical <User Schedule>  dronabinol 2.5 milliGRAM(s) Oral two times a day  famotidine    Tablet 20 milliGRAM(s) Oral daily  heparin  Injectable 5000 Unit(s) SubCutaneous every 8 hours  lactobacillus acidophilus 1 Tablet(s) Oral three times a day  piperacillin/tazobactam IVPB.. 3.375 Gram(s) IV Intermittent every 12 hours  polyethylene glycol 3350 17 Gram(s) Oral two times a day  sevelamer carbonate 800 milliGRAM(s) Oral three times a day with meals  tamsulosin 0.4 milliGRAM(s) Oral at bedtime    MEDICATIONS  (PRN):  acetaminophen   Tablet .. 650 milliGRAM(s) Oral every 6 hours PRN Temp greater or equal to 38C (100.4F), Mild Pain (1 - 3)  albuterol/ipratropium for Nebulization 3 milliLiter(s) Nebulizer every 6 hours PRN Shortness of Breath and/or Wheezing  aluminum hydroxide/magnesium hydroxide/simethicone Suspension 30 milliLiter(s) Oral every 6 hours PRN Dyspepsia  guaiFENesin   Syrup  (Sugar-Free) 200 milliGRAM(s) Oral every 6 hours PRN Cough  HYDROmorphone  Injectable 0.5 milliGRAM(s) IV Push every 4 hours PRN Moderate Pain (4 - 6)  HYDROmorphone  Injectable 1 milliGRAM(s) IV Push every 4 hours PRN Severe Pain (7 - 10)    T(C): 36.4 (03-04-20 @ 07:35), Max: 37.1 (03-03-20 @ 03:40)  HR: 86 (03-04-20 @ 07:00) (83 - 109)  BP: 115/57 (03-04-20 @ 07:00) (77/56 - 145/63)  RR: 25 (03-04-20 @ 07:00)  SpO2: 94% (03-04-20 @ 07:00)  Wt(kg): --  I&O's Detail    03 Mar 2020 07:01  -  04 Mar 2020 07:00  --------------------------------------------------------  IN:    Oral Fluid: 340 mL    Solution: 200 mL  Total IN: 540 mL    OUT:    Chest Tube: 560 mL    Indwelling Catheter - Urethral: 625 mL  Total OUT: 1185 mL    Total NET: -645 mL      04 Mar 2020 07:01  -  04 Mar 2020 10:00  --------------------------------------------------------  IN:  Total IN: 0 mL    OUT:    Indwelling Catheter - Urethral: 55 mL  Total OUT: 55 mL    Total NET: -55 mL            PHYSICAL EXAM:  General: NAD  Respiratory: b/l air entry, left chest tube  Cardiovascular: S1 S2  Gastrointestinal: soft  Extremities:  no edema                     LABORATORY:                        8.9    9.04  )-----------( 327      ( 04 Mar 2020 05:21 )             26.1     03-04    130<L>  |  94<L>  |  90<H>  ----------------------------<  109<H>  5.0   |  22  |  7.30<H>    Ca    8.1<L>      04 Mar 2020 05:21  Phos  7.9     03-04  Mg     3.0     03-04    TPro  5.7<L>  /  Alb  2.0<L>  /  TBili  0.9  /  DBili  x   /  AST  40<H>  /  ALT  49  /  AlkPhos  91  03-04    Sodium, Serum: 130 mmol/L (03-04 @ 05:21)  Sodium, Serum: 131 mmol/L (03-03 @ 05:08)  Sodium, Serum: 131 mmol/L (03-02 @ 13:59)    Potassium, Serum: 5.0 mmol/L (03-04 @ 05:21)  Potassium, Serum: 4.6 mmol/L (03-03 @ 05:08)  Potassium, Serum: 4.9 mmol/L (03-02 @ 13:59)    Hemoglobin: 8.9 g/dL (03-04 @ 05:21)  Hemoglobin: 9.8 g/dL (03-03 @ 05:08)  Hemoglobin: 9.7 g/dL (03-02 @ 05:16)  Hemoglobin: 10.5 g/dL (03-01 @ 21:18)    Creatinine, Serum 7.30 (03-04 @ 05:21)  Creatinine, Serum 6.70 (03-03 @ 05:08)  Creatinine, Serum 6.30 (03-02 @ 13:59)  Creatinine, Serum 6.00 (03-02 @ 05:16)        LIVER FUNCTIONS - ( 04 Mar 2020 05:21 )  Alb: 2.0 g/dL / Pro: 5.7 g/dL / ALK PHOS: 91 U/L / ALT: 49 U/L / AST: 40 U/L / GGT: x

## 2020-03-04 NOTE — PROGRESS NOTE ADULT - ASSESSMENT
ASSESSMENT   89 y/o male with PMHx of CLL/MALToma not on active treatment, GERD, HLD, herniated discs/spinal stenosis s/p back surgery on medicinal Marijuana initially presented to [facility] ED w/ worsening back/chest pain. Pt found to have loculated left pleural effusions on CT Chest, concerning for empyema. Pt underwent IR guided left chest tube on 2/27, initially w/ minimal drainage, subsequently transferred to [facility] ED for further management. Pt initially offered VATs, but family wanted more conservative measures. Pt subsequently treated with tPA/Dornase brenda via chest tube. Pt initially with sufficient output s/p first dose tPA now with decreased drainage on 2nd administration (~100-200 cc's, total 1.2L). ICU course complicated by worsening renal failure, Cr 1.00 2/26 to now 5.1, continued oliguria and hematuria (resolved).     1. Empyema vs. complicated L complicated effusion    2. GUS- oliguria   3. PNA     PLAN   -Dilaudid for pain control   -pt on duonebs   -last dose of MIST 2 protocol given ON and completed, Tube unclamped   -Pt from respirtory standpoint asx ON    -Diet as tolerated   -Check repeat labs and monitor renal function trend  -Encourage PO intake as tolerated  -Avoid hypotension and optimize BP with IVF and pressor support if needed.   -Avoid nephrotoxic meds as possible. Avoid ACEI, ARB and NSAIDS  -Monitor input and output  -Monitor and replace electrolytes as needed   -Zosyn for PNA ? empyema with anaerobic coverage    -Heparin SQ     ETHICS        CODE STATUS: Full Code       Care discussed with bedside provider and eICU attending.     Time Spent: 30 min

## 2020-03-04 NOTE — PROGRESS NOTE ADULT - SUBJECTIVE AND OBJECTIVE BOX
Patient is a 89y old  Male who presents with a chief complaint of Left Empyema (04 Mar 2020 15:00)      INTERVAL /OVERNIGHT EVENTS: denies pain    MEDICATIONS  (STANDING):  atorvastatin 10 milliGRAM(s) Oral at bedtime  chlorhexidine 2% Cloths 1 Application(s) Topical <User Schedule>  chlorhexidine 4% Liquid 1 Application(s) Topical <User Schedule>  dronabinol 2.5 milliGRAM(s) Oral two times a day  epoetin brenda Injectable 52877 Unit(s) IV Push <User Schedule>  famotidine    Tablet 20 milliGRAM(s) Oral daily  heparin  Injectable 5000 Unit(s) SubCutaneous every 8 hours  lactobacillus acidophilus 1 Tablet(s) Oral three times a day  piperacillin/tazobactam IVPB.. 3.375 Gram(s) IV Intermittent every 12 hours  polyethylene glycol 3350 17 Gram(s) Oral two times a day  sevelamer carbonate 800 milliGRAM(s) Oral three times a day with meals  tamsulosin 0.4 milliGRAM(s) Oral at bedtime    MEDICATIONS  (PRN):  acetaminophen   Tablet .. 650 milliGRAM(s) Oral every 6 hours PRN Temp greater or equal to 38C (100.4F), Mild Pain (1 - 3)  albuterol/ipratropium for Nebulization 3 milliLiter(s) Nebulizer every 6 hours PRN Shortness of Breath and/or Wheezing  aluminum hydroxide/magnesium hydroxide/simethicone Suspension 30 milliLiter(s) Oral every 6 hours PRN Dyspepsia  guaiFENesin   Syrup  (Sugar-Free) 200 milliGRAM(s) Oral every 6 hours PRN Cough  sodium chloride 0.9% lock flush 10 milliLiter(s) IV Push every 1 hour PRN Pre/post blood products, medications, blood draw, and to maintain line patency      Allergies    No Known Allergies    Intolerances        REVIEW OF SYSTEMS:  CONSTITUTIONAL: No fever, weight loss, or fatigue  EYES: No eye pain, visual disturbances, or discharge  ENMT:  No difficulty hearing, tinnitus, vertigo; No sinus or throat pain  NECK: No pain or stiffness  RESPIRATORY: No cough, wheezing, chills or hemoptysis; No shortness of breath  CARDIOVASCULAR: No chest pain, palpitations, dizziness, or leg swelling  GASTROINTESTINAL: No abdominal or epigastric pain. No nausea, vomiting, or hematemesis; No diarrhea or constipation. No melena or hematochezia.  GENITOURINARY: No dysuria, frequency, hematuria, or incontinence  NEUROLOGICAL: No headaches, memory loss, loss of strength, numbness, or tremors  SKIN: No itching, burning, rashes, or lesions   LYMPH NODES: No enlarged glands  ENDOCRINE: No heat or cold intolerance; No hair loss; No polydipsia or polyuria  MUSCULOSKELETAL: No joint pain or swelling; No muscle, back, or extremity pain  PSYCHIATRIC: No depression, anxiety, mood swings, or difficulty sleeping  HEME/LYMPH: No easy bruising, or bleeding gums  ALLERGY AND IMMUNOLOGIC: No hives or eczema    Vital Signs Last 24 Hrs  T(C): 36.4 (04 Mar 2020 12:00), Max: 36.9 (04 Mar 2020 00:22)  T(F): 97.6 (04 Mar 2020 12:00), Max: 98.4 (04 Mar 2020 00:22)  HR: 86 (04 Mar 2020 16:00) (83 - 99)  BP: 100/60 (04 Mar 2020 16:00) (77/56 - 132/60)  BP(mean): 73 (04 Mar 2020 16:00) (62 - 94)  RR: 29 (04 Mar 2020 16:00) (17 - 36)  SpO2: 93% (04 Mar 2020 16:00) (90% - 100%)    PHYSICAL EXAM:  GENERAL: NAD, well-groomed, well-developed  HEAD:  Atraumatic, Normocephalic  EYES: EOMI, PERRLA, conjunctiva and sclera clear  ENMT: No tonsillar erythema, exudates, or enlargement; Moist mucous membranes, Good dentition, No lesions  NECK: Supple, No JVD, Normal thyroid  NERVOUS SYSTEM:  Alert & Oriented X3, Good concentration; Motor Strength 5/5 B/L upper and lower extremities; DTRs 2+ intact and symmetric  CHEST/LUNG: Clear to auscultation bilaterally; No rales, rhonchi, wheezing, or rubs Chest tube draining  HEART: Regular rate and rhythm; No murmurs, rubs, or gallops  ABDOMEN: Soft, Nontender, Nondistended; Bowel sounds present  EXTREMITIES:  2+ Peripheral Pulses, No clubbing, cyanosis, or edema  LYMPH: No lymphadenopathy noted  SKIN: No rashes or lesions    LABS:                        8.9    9.04  )-----------( 327      ( 04 Mar 2020 05:21 )             26.1     04 Mar 2020 05:21    130    |  94     |  90     ----------------------------<  109    5.0     |  22     |  7.30     Ca    8.1        04 Mar 2020 05:21  Phos  7.9       04 Mar 2020 05:21  Mg     3.0       04 Mar 2020 05:21    TPro  5.7    /  Alb  2.0    /  TBili  0.9    /  DBili  x      /  AST  40     /  ALT  49     /  AlkPhos  91     04 Mar 2020 05:21    PT/INR - ( 04 Mar 2020 05:21 )   PT: 16.7 sec;   INR: 1.47 ratio         PTT - ( 04 Mar 2020 05:21 )  PTT:28.5 sec    CAPILLARY BLOOD GLUCOSE          RADIOLOGY & ADDITIONAL TESTS:    Notes Reviewed:  [x ] YES  [ ] NO    Care Discussed with Consultants/Other Providers [x ] YES  [ ] NO

## 2020-03-04 NOTE — PROGRESS NOTE ADULT - PROBLEM SELECTOR PLAN 3
Possibly secondary to vanco toxicity  Trend Cr, for HD today  Avoid nephrotoxic medications  Renal consult with Dr. JEZ patel

## 2020-03-04 NOTE — PROGRESS NOTE ADULT - SUBJECTIVE AND OBJECTIVE BOX
CHARLY LU is a 89yMale , patient examined and chart reviewed.     INTERVAL HPI/ OVERNIGHT EVENTS:   Awake. No distress.  Afebrile. Sitting in chair,  +UO.    PAST MEDICAL & SURGICAL HISTORY:  GI Bleed: 2007  Stomach Ulcer: H pylori 2007, treated with antibiotics  Osteoarthritis  Hyperlipidemia  Herniated Disc  Spinal Stenosis  Diverticulitis  GERD (Gastroesophageal Reflux Disease)  Chronic Lymphocytic Leukemia: followed by oncologist in [place]  S/P Tonsillectomy: childhood  Status Post Arthroscopy: right shoulder  History of Arthroscopy of Knee: bilateral    For details regarding the patient's social history, family history, and other miscellaneous elements, please refer the initial infectious diseases consultation and/or the admitting history and physical examination for this admission.    ROS:  CONSTITUTIONAL:  Negative fever or chills  EYES:  Negative  blurry vision or double vision  CARDIOVASCULAR:  Negative for chest pain or palpitations  RESPIRATORY:  Negative for cough, wheezing, or SOB   GASTROINTESTINAL:  Negative for nausea, vomiting, diarrhea, constipation, or abdominal pain  GENITOURINARY:  Negative frequency, urgency or dysuria  NEUROLOGIC:  No headache, confusion, dizziness, lightheadedness  All other systems were reviewed and are negative     Current inpatient medications :    ANTIBIOTICS/RELEVANT:  piperacillin/tazobactam IVPB.. 3.375 Gram(s) IV Intermittent every 12 hours    MEDICATIONS  (STANDING):  atorvastatin 10 milliGRAM(s) Oral at bedtime  chlorhexidine 2% Cloths 1 Application(s) Topical <User Schedule>  dronabinol 2.5 milliGRAM(s) Oral two times a day  epoetin brenda Injectable 01312 Unit(s) IV Push <User Schedule>  famotidine    Tablet 20 milliGRAM(s) Oral daily  heparin  Injectable 5000 Unit(s) SubCutaneous every 8 hours  lactobacillus acidophilus 1 Tablet(s) Oral three times a day  polyethylene glycol 3350 17 Gram(s) Oral two times a day  sevelamer carbonate 800 milliGRAM(s) Oral three times a day with meals  tamsulosin 0.4 milliGRAM(s) Oral at bedtime    MEDICATIONS  (PRN):  acetaminophen   Tablet .. 650 milliGRAM(s) Oral every 6 hours PRN Temp greater or equal to 38C (100.4F), Mild Pain (1 - 3)  albuterol/ipratropium for Nebulization 3 milliLiter(s) Nebulizer every 6 hours PRN Shortness of Breath and/or Wheezing  aluminum hydroxide/magnesium hydroxide/simethicone Suspension 30 milliLiter(s) Oral every 6 hours PRN Dyspepsia  guaiFENesin   Syrup  (Sugar-Free) 200 milliGRAM(s) Oral every 6 hours PRN Cough        Objective:  ICU Vital Signs Last 24 Hrs  T(C): 36.9 (03 Mar 2020 08:05), Max: 37.1 (03 Mar 2020 03:40)  T(F): 98.5 (03 Mar 2020 08:05), Max: 98.8 (03 Mar 2020 03:40)  HR: 95 (03 Mar 2020 10:00) (91 - 109)  BP: 95/64 (03 Mar 2020 10:00) (85/60 - 145/63)  BP(mean): 76 (03 Mar 2020 10:00) (60 - 91)  RR: 19 (03 Mar 2020 10:00) (13 - 43)  SpO2: 100% (03 Mar 2020 10:00) (91% - 100%)      Physical Exam:  General:  no acute distress  Eyes: sclera anicteric, pupils equal and reactive to light  ENMT: buccal mucosa moist, pharynx not injected  Neck: supple, trachea midline  Lungs: decreased no wheeze/rhonchi Left lung pigtail  Cardiovascular: regular rate and rhythm, S1 S2  Abdomen: soft, nontender, no organomegaly present, bowel sounds normal  Neurological: alert and oriented x3, Cranial Nerves II-XII grossly intact  Skin: no increased ecchymosis/petechiae/purpura  Lymph Nodes: no palpable cervical/supraclavicular lymph nodes enlargements  Extremities: +edema    LABS:                                   9.8    9.67  )-----------( 299      ( 03 Mar 2020 05:08 )             28.7   03-03    131<L>  |  95<L>  |  78<H>  ----------------------------<  105<H>  4.6   |  22  |  6.70<H>    Ca    8.1<L>      03 Mar 2020 05:08  Phos  7.8     03-03  Mg     2.9     03-03    TPro  6.0  /  Alb  2.1<L>  /  TBili  1.0  /  DBili  x   /  AST  37  /  ALT  54  /  AlkPhos  115  03-03      MICROBIOLOGY:  Culture - Body Fluid with Gram Stain (02.27.20 @ 21:38)    Gram Stain:   polymorphonuclear leukocytes  No organisms seen  by cytocentrifuge    Specimen Source: .Body Fluid Pleural Fluid    Culture Results:   No growth    Culture - Blood (collected 26 Feb 2020 22:29)  Source: .Blood Blood-Peripheral  Preliminary Report (27 Feb 2020 23:01):    No growth to date.    Culture - Blood (collected 26 Feb 2020 22:29)  Source: .Blood Blood-Peripheral  Preliminary Report (27 Feb 2020 23:01):    No growth to date.    Culture - Urine (collected 26 Feb 2020 11:46)  Source: .Urine Catheterized  Final Report (27 Feb 2020 15:05):    50,000 - 99,000 CFU/mL Coag Negative Staphylococcus    Susceptibilites not performed.    Culture - Blood (collected 25 Feb 2020 22:38)  Source: .Blood Blood-Peripheral  Preliminary Report (26 Feb 2020 23:01):    No growth to date.    Culture - Blood (collected 25 Feb 2020 22:38)  Source: .Blood Blood-Peripheral  Preliminary Report (26 Feb 2020 23:01):    No growth to date.    Legionella pneumophila Antigen, Urine (02.27.20 @ 02:53)    Legionella Antigen, Urine: Negative    Streptococcus Pneumoniae Ag Urine (02.27.20 @ 04:25)    Streptococcus Pneumoniae Ag Urine: Negative    RADIOLOGY & ADDITIONAL STUDIES:  EXAM:  CT RENAL STONE HUNT                          EXAM:  CT CHEST                                  PROCEDURE DATE:  02/25/2020          INTERPRETATION:  CLINICAL HISTORY:  Fever, left back and flank pain. History of gastric lymphoma.    Multiple axial images of the chest, abdomen and pelvis were obtained from the lung apices through symphysis pubis without the administration of oral or intravascular contrast limiting the sensitivity of evaluation. Reformatted coronal and sagittal images are submitted.    COMPARISON: PET/CT evaluation 6/19/2019.    FINDINGS: New moderately sized left pleural effusion is identified with portions that are loculated; air attenuation within the effusion as well as within the left pleural space suggests the presence of left-sided empyema. Consolidation is identified within the lingular segment as well as left lower lobe lung parenchyma; underlying neoplasm cannot be excluded. Hazy opacity within the left upper lobe is likely related to atelectasis. Subpleural opacity within the posterior right lower lobe and right middle lobe likely related to subpleural fibrosis. There is no evidence for right-sided pleural effusion or air.    No abnormally enlarged mediastinal or hilar lymph nodes are noted. There is no evidence for axillary lymphadenopathy. The heart size appears within normal limits. No pericardial effusion is identified. Thoracic aortic caliber demonstrates unremarkable caliber. Coronary arterial calcifications identified.    The central bronchial anatomy appears patent.    No mediastinal shift is noted.    The liver is normal in size. No focal hepatic masses are identified. There is no evidence for intrahepatic or extrahepatic biliary dilatation. A large gallstone is noted. No gallbladder wall thickening or pericholecystic fluid identified.    The spleen, pancreas and adrenal glands are unremarkable.    There is no evidence for hydronephrosis. No right renal calculi are identified. An 8 mm calculus is identified within the left-sided extrarenal pelvis, unchanged. There is no change in a 6 mm calculus identified within the distal right ureter at the level of the UVJ, without evidence for obstructive uropathy. A 1.3 cm right renal cyst is noted. The abdominal aorta is normal in course and caliber. No abnormally enlarged retroperitoneal or pelvic lymphadenopathy is noted.    Gastric wall thickening is identified allowing for nondistention with oral contrast; clinical correlation is suggested in light of the provided clinical history of gastric lymphoma.    Fecal material is scattered throughout the colon. There is no evidence for mechanical bowel obstruction. Colonic diverticulosis is noted. No colonic wall thickening is identified. There is no evidence for acute appendicitis.There is no evidence for free intraperitoneal air or fluid.    The prostate is enlarged and the urinary bladder appear unremarkable.     Postsurgical changes of the lumbar spine noted. Postprocedural changes of the right shoulder noted. Degenerative changes of the spine evident.    IMPRESSION:    1. New moderately sized left pleural effusion is identified with portions that are loculated; air attenuation within the effusion as well as within the left pleural space suggests the presence of left-sided empyema. Consolidation is identified within the lingular segment as well as left lower lobe lung parenchyma; underlying neoplasm cannot be excluded.   2. Gastric wall thickening is identified allowing for nondistention with oral contrast; clinicalcorrelation is suggested in light of the provided clinical history of gastric lymphoma.  3. An 8 mm calculus is identified within the left-sided extrarenal pelvis, unchanged. There is no change in a 6 mm calculus identified within the distal right ureter at the level of the UVJ, without evidence for obstructive uropathy. No evidence for bilateral hydronephrosis.      EXAM:  CT CHEST                            PROCEDURE DATE:  03/01/2020          INTERPRETATION:  Clinical information: Left-sided empyema    Comparison exam dated 2/25/2020    Axial images obtained, coronal and sagittal images computer reformatted    Noncontrast exam limits evaluation of hilar and mediastinal regions.    A left-sided pigtail catheter is present in the left pleural space. Loculated effusion with associated dense consolidation is present, the appearance is slightly more prominentsince the prior exam.    No thoracic aortic aneurysm or pericardial effusion. Cardiomegaly present. Coronary artery calcifications identified. The central airway appears intact. The thyroid gland is not enlarged.    Minimal atelectatic change right lung base. Calcification visible in the right pleura. Peribronchial thickening right infrahilar region. No pneumothorax.    The stomach is filled with food and air. A large gallstone is noted. The adrenal glands are not enlarged. The spleen is not enlarged. No acute appearing osseous abnormalities. Soft tissue anchors right humeral head. Air bubbles visible in the subcutaneous soft tissues left lateral lower thorax.    IMPRESSION: A left-sided pigtail catheter is present in the pleural space. Loculated effusion with associated dense consolidation is present, appearance slightly more prominent when compared to prior exam.        EXAM:  US KIDNEYS AND BLADDER                            PROCEDURE DATE:  03/01/2020          INTERPRETATION:    PROCEDURE INFORMATION:   Exam: US Retroperitoneal Limited, Kidneys   Exam date and time: 3/1/2020 6:31 AM   Age: 89 years old   Clinical indication: Other: Gus     TECHNIQUE:   Imaging protocol: Real-time ultrasound of the retroperitoneum with image   documentation. Examination was focused on the kidneys.     COMPARISON:   No relevant prior studies available.     FINDINGS:   Right kidney: The right kidney measures 11.8 cm in length. Normal parenchymal   echogenicity. No hydronephrosis.   Left kidney: The left kidney measures 11.7 cm in length. Normal parenchymal   echogenicity. Slightly irregular margin. No hydronephrosis.   Prostate: The prostate measures up to 3.7 cm in size.   Bladder: Bladder volume calculated to be 104 mL. No bladder wall thickening   allowing for lack of full distention. No bladder calculus.     IMPRESSION:   No hydronephrosis.        Assessment :   CHARLY LU is a 89y Male PMH CLL, gastric lymphoma ex smoker, hx of asbestos exp presenting to the hospital with cough, fever and left sided pleuritic chest pain. Febrile to 102 found to have left sided empyema. Seen by pulm and CT surgery. Sp IR drainage with pigtail placement 2/2/7/2020. Transferred to ICU sec tpa/dornase therapy through pigtail now on hold sec complications of hematuria. Had large clot and nick blood/mixed with urine. Mcnulty irrigation performed complicated with GUS. Worsening GUS- may need HD per renal. Repeat CT chest noted with persistent loculated effusion with associated dense consolidation.     Plan :  Cont Zosyn day 7/10  Trend temps and cbc  Tpa/dornase on hold  For possible VATs and decortication per CT surgery if family agrees  Trend renal fx      Continue with present regiment.  Appropriate use of antibiotics and adverse effects reviewed.      I have discussed the above plan of care with patient/ family in detail. They expressed understanding of the  treatment plan . Risks, benefits and alternatives discussed in detail. I have asked if they have any questions or concerns and appropriately addressed them to the best of my ability .    Critical care > 35 minutes were spent in direct patient care reviewing notes, medications ,labs data/ imaging , discussion with multidisciplinary team.    Thank you for allowing me to participate in care of your patient .    Jose Kiran MD  Infectious Disease  [phone]

## 2020-03-04 NOTE — PROGRESS NOTE ADULT - SUBJECTIVE AND OBJECTIVE BOX
INTERVAL HPI/OVERNIGHT EVENTS:  pt seen and examined  denies n/v/abd pain  last day of mst protocol    MEDICATIONS  (STANDING):  atorvastatin 10 milliGRAM(s) Oral at bedtime  chlorhexidine 2% Cloths 1 Application(s) Topical <User Schedule>  cyanocobalamin Injectable 1000 MICROGram(s) IntraMuscular every 24 hours  dronabinol 2.5 milliGRAM(s) Oral two times a day  famotidine    Tablet 20 milliGRAM(s) Oral daily  furosemide   Injectable 20 milliGRAM(s) IV Push once  heparin  Injectable 5000 Unit(s) SubCutaneous every 8 hours  lactobacillus acidophilus 1 Tablet(s) Oral three times a day  piperacillin/tazobactam IVPB.. 3.375 Gram(s) IV Intermittent every 12 hours  polyethylene glycol 3350 17 Gram(s) Oral two times a day  sevelamer carbonate 800 milliGRAM(s) Oral three times a day with meals  tamsulosin 0.4 milliGRAM(s) Oral at bedtime    MEDICATIONS  (PRN):  acetaminophen   Tablet .. 650 milliGRAM(s) Oral every 6 hours PRN Temp greater or equal to 38C (100.4F), Mild Pain (1 - 3)  albuterol/ipratropium for Nebulization 3 milliLiter(s) Nebulizer every 6 hours PRN Shortness of Breath and/or Wheezing  aluminum hydroxide/magnesium hydroxide/simethicone Suspension 30 milliLiter(s) Oral every 6 hours PRN Dyspepsia  guaiFENesin   Syrup  (Sugar-Free) 200 milliGRAM(s) Oral every 6 hours PRN Cough  HYDROmorphone  Injectable 0.5 milliGRAM(s) IV Push every 4 hours PRN Moderate Pain (4 - 6)  HYDROmorphone  Injectable 1 milliGRAM(s) IV Push every 4 hours PRN Severe Pain (7 - 10)      Allergies    No Known Allergies    Intolerances        Review of Systems:    General:  No wt loss, fevers, chills, night sweats, fatigue   Eyes:  Good vision, no reported pain  ENT:  No sore throat, pain, runny nose, dysphagia  CV:  No pain, palpitations, hypo/hypertension  Resp:  No dyspnea, cough, tachypnea, wheezing  GI:  No pain, No nausea, No vomiting, No diarrhea, No constipation, No weight loss, No fever, No pruritis, No rectal bleeding, No melena, No dysphagia  :  No pain, bleeding, incontinence, nocturia  Muscle:  No pain, weakness  Neuro:  No weakness, tingling, memory problems  Psych:  No fatigue, insomnia, mood problems, depression  Endocrine:  No polyuria, polydypsia, cold/heat intolerance  Heme:  No petechiae, ecchymosis, easy bruisability  Skin:  No rash, tattoos, scars, edema      Vital Signs Last 24 Hrs  T(C): 36.9 (03 Mar 2020 08:05), Max: 37.1 (03 Mar 2020 03:40)  T(F): 98.5 (03 Mar 2020 08:05), Max: 98.8 (03 Mar 2020 03:40)  HR: 93 (03 Mar 2020 08:00) (91 - 109)  BP: 102/63 (03 Mar 2020 08:00) (85/60 - 145/63)  BP(mean): 76 (03 Mar 2020 08:00) (60 - 91)  RR: 17 (03 Mar 2020 08:00) (13 - 43)  SpO2: 100% (03 Mar 2020 08:00) (91% - 100%)    PHYSICAL EXAM:  General:  lying in  bed  HEENT:  NC/AT  Abdomen:  Soft nt +dt  Extremities:  no edema  Neuro/Psych:  Awake alert appropriate      LABS:                        9.8    9.67  )-----------( 299      ( 03 Mar 2020 05:08 )             28.7     03-03    131<L>  |  95<L>  |  78<H>  ----------------------------<  105<H>  4.6   |  22  |  6.70<H>    Ca    8.1<L>      03 Mar 2020 05:08  Phos  7.8     03-03  Mg     2.9     03-03    TPro  6.0  /  Alb  2.1<L>  /  TBili  1.0  /  DBili  x   /  AST  37  /  ALT  54  /  AlkPhos  115  03-03    PT/INR - ( 03 Mar 2020 05:08 )   PT: 17.0 sec;   INR: 1.50 ratio         PTT - ( 03 Mar 2020 05:08 )  PTT:28.8 sec      RADIOLOGY & ADDITIONAL TESTS:

## 2020-03-04 NOTE — PROGRESS NOTE ADULT - ASSESSMENT
89 yo M with PMH of HLD and MALToma of the stomach (under surveillance), spinal stenosis with extensive lumbar surgery in 2011 and bilateral LE neuropathy, admitted for L side empyema and R ureteral stone. Patient initially reported what appeared to be stable occasional angina however later denied this.  Elevated proBNP however normal LV systolic function with EF 55% on echo and no evidence of meaningful volume overload on exam.   Review of EKG revealed possible inferior infarct however no wall motion abnormalities on 2/27 echo.   He is now in the intensive care unit status post lytic infusion therapy through a chest tube for his left-sided empyema.  He appears to have acute kidney injury possibly on the basis of vancomycin although a prerenal component should be considered given his lack of oral intake while on the floor.    Recommend  - chest tube management per ICU team. s/p tPA and DNAse on 3/2.  560cc output recorded for last 24 hours.   - remains with worsening renal function. renal will need to address need for HD  - IV hydration  - Tele shows SR with freq APCs and likely some runs of PAT (self limiting). This is likely reactive to underlying medical issues.  cont tele   - Monitor and replete electrolytes. Keep K>4.0 and Mg>2.0.   - BP soft. cont to monitor closely  - Continue Atorvastatin.  - Continue heparin SQ      - Rest of management per urology/CT surgery/primary team.  - Other cardiovascular workup will depend on clinical course. Will follow.

## 2020-03-04 NOTE — PROCEDURE NOTE - ADDITIONAL PROCEDURE DETAILS
89 yr old male with PMHx CLL/MALT, GERD, HLD, spinal stenosis found to have loculated left pleural effusion requiring chest tube with MIST protocol. Course c/b worsening renal function requiring placement of double lumen dialysis catheter for H.D.

## 2020-03-04 NOTE — PROGRESS NOTE ADULT - SUBJECTIVE AND OBJECTIVE BOX
All interim records and events noted.    awake responsive  family present    3/4 case was discussed with Dr Moser and labs and notes were reviewed in detail. He has gross hematuria and left sided empyema is being treated with a pigtail cath.   MEDICATIONS  (STANDING):  atorvastatin 10 milliGRAM(s) Oral at bedtime  chlorhexidine 2% Cloths 1 Application(s) Topical <User Schedule>  dronabinol 2.5 milliGRAM(s) Oral two times a day  epoetin brenda Injectable 49999 Unit(s) IV Push <User Schedule>  famotidine    Tablet 20 milliGRAM(s) Oral daily  heparin  Injectable 5000 Unit(s) SubCutaneous every 8 hours  lactobacillus acidophilus 1 Tablet(s) Oral three times a day  ondansetron Injectable 4 milliGRAM(s) IV Push once  piperacillin/tazobactam IVPB.. 3.375 Gram(s) IV Intermittent every 12 hours  polyethylene glycol 3350 17 Gram(s) Oral two times a day  sevelamer carbonate 800 milliGRAM(s) Oral three times a day with meals  tamsulosin 0.4 milliGRAM(s) Oral at bedtime      PHYSICAL EXAM  pt being treated by the staff and I will return  Head: atraumatic, normocephalic  ENT: sclera anicteric, buccal mucosa moist  ICU Vital Signs Last 24 Hrs  T(C): 36.4 (04 Mar 2020 12:00), Max: 36.9 (04 Mar 2020 00:22)  T(F): 97.6 (04 Mar 2020 12:00), Max: 98.4 (04 Mar 2020 00:22)  HR: 97 (04 Mar 2020 13:00) (83 - 99)  BP: 106/58 (04 Mar 2020 13:00) (77/56 - 132/60)  BP(mean): 75 (04 Mar 2020 13:00) (62 - 95)  ABP: --  ABP(mean): --  RR: 23 (04 Mar 2020 13:00) (17 - 36)  SpO2: 95% (04 Mar 2020 13:00) (90% - 96%)  Neck: supple, trachea midline  CV: S1 S2, regular rate and rhythm  Lungs: clear to auscultation, no wheezes/rhonchi  Abdomen: soft, nontender, bowel sounds present, no palpable masses, pouchy  Extrem: no clubbing/cyanosis/edema  Skin: no significant increased ecchymosis/petechiae  Neuro: alert and oriented X3,  no focal deficits                          8.9    9.04  )-----------( 327      ( 04 Mar 2020 05:21 )             26.1                9.7    9.98  )-----------( 268      ( 03-02 @ 05:16 )             28.6                10.5   9.59  )-----------( 243      ( 03-01 @ 21:18 )             31.4       03-03    131<L>  |  95<L>  |  78<H>  ----------------------------<  105<H>  4.6   |  22  |  6.70<H>    Ca    8.1<L>      03 Mar 2020 05:08  Phos  7.8     03-03  Mg     2.9     03-03    TPro  6.0  /  Alb  2.1<L>  /  TBili  1.0  /  DBili  x   /  AST  37  /  ALT  54  /  AlkPhos  115  03-03    03-03 @ 05:08  PT17.0 INR1.50  PTT28.8  02-29 @ 10:47  PT17.8 INR1.57  PTT29.2      RADIOLOGY & ADDITIONAL STUDIES:    IMPRESSION/RECOMMENDATIONS:

## 2020-03-04 NOTE — PROCEDURE NOTE - NSPOSTPRCRAD_GEN_A_CORE
guidewire ascertained in Left IJ via POCUS, catheter filtered over guidewire, guidewire removed in entirety. Lung sliding ascertained via POCUS/central line located in the

## 2020-03-04 NOTE — PROGRESS NOTE ADULT - ASSESSMENT
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 Transferred to ICU for tPA/dornase brenda via chest tube. Found to have GUS 2/2 ATN, likely due to vancomycin.    1. Neuro: pain control with acetaminophen and hydromorphone prn    2. CV: HD stable, continue statin    3. Pulm: Sat well on RA. Continue chest tube drainage, monitor output. Administer intrapleural tPA/dornase brenda again today and monitor. Encourage incentive spirometry. FU CXR. Pulm following. No plan for Sx per discussion w/ CT Sx.    4. GI: regular diet as tolerated with Ensure, continue dronabinol. On lactobacillus    5. Renal: GUS of unclear etiology, likely drug toxicity due to vancomycin. Renally dose meds. Nephro following. Will hold of on HD for now as Cr may be plateauing possible need for HD tomorrow if renal function continues to worsen. Lasix 20 today. Strict I/O's, trend kidney function and lytes. Uro c/s. Keep celestin. Start phos binder.    6. ID: Continue Zosyn. UCx NGF. FU Pleural Cx/Fungi/AFB. ID following.    7. Heme: HSQ for DVT ppx. Maltoma and CLL being monitored, not on active treatment. H/O following.    8. Endo: no active issues    9. Skin: no lines or celestin    10. 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 Transferred to ICU for tPA/dornase brenda via chest tube. Found to have GUS 2/2 ATN, likely due to vancomycin.    1. Neuro: pain control with acetaminophen and hydromorphone prn    2. CV: HD stable, continue statin. Cards following.    3. Pulm: Sat well on RA. Chest tube output 560 mL serosanguinous drainage over 24hr. Continue chest tube drainage, monitor output. Encourage incentive spirometry. Pulm following. No plan for Sx per discussion w/ CT Sx.     4. GI: regular diet as tolerated with Ensure, continue dronabinol. On lactobacillus. GERD precautions. GI following.    5. Renal: GUS of unclear etiology, likely drug toxicity due to vancomycin. Renally dose meds. Output Noted. continue to monitor strict I/Os, trend kidney function and lytes. Will hold of on HD for now as Cr may be plateauing possible need for HD tomorrow if renal function continues to worsen.******** s/p Lasix 20. Uro c/s. Keep celestin. Start phos binder. Nephro following.    6. ID: Continue Zosyn. UCx NGF. FU Pleural Cx/Fungi/AFB. ID following.    7. Heme: H/H drop noted. Trend CBC. HSQ for DVT ppx. Maltoma and CLL being monitored, not on active treatment. H/O following.    8. Endo: no active issues    9. Skin: no lines or celestin. L Chest tube.    10. 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 Transferred to ICU for tPA/dornase brenda via chest tube. Found to have GUS 2/2 ATN, likely due to vancomycin.    1. Neuro: pain control with acetaminophen and hydromorphone prn    2. CV: HD stable, continue statin. Cards following.    3. Pulm: Sat well on RA. Chest tube output 560 mL serosanguinous drainage over 24hr. Continue chest tube drainage, monitor output. Encourage incentive spirometry. Pulm following. No plan for Sx per discussion w/ CT Sx.     4. GI: regular diet as tolerated with Ensure, continue dronabinol. On lactobacillus. GERD precautions. GI following.    5. Renal: GUS of unclear etiology, likely drug toxicity due to vancomycin. Renally dose meds. Output Noted. continue to monitor strict I/Os, trend kidney function and lytes. s/p Lasix 20.  FU Uro c/s. Keep celestin. c/w phos binder. Plan for HD today. Nephro following.    6. ID: Continue Zosyn. UCx NGF. FU Pleural Cx/Fungi/AFB. ID following.    7. Heme: H/H drop noted. Trend CBC. HSQ for DVT ppx. Maltoma and CLL being monitored, not on active treatment. H/O following.    8. Endo: no active issues    9. Skin: no lines or celestin. L Chest tube.    10. 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 Transferred to ICU for tPA/dornase brenda via chest tube. Found to have GUS 2/2 ATN, likely due to vancomycin.    1. Neuro: pain control with acetaminophen and hydromorphone prn    2. CV: HD stable, continue statin. Cards following.    3. Pulm: Sat well on RA. Chest tube output 560 mL serosanguinous drainage over 24hr. Continue chest tube drainage, monitor output. Encourage incentive spirometry. Pulm following. No plan for Sx per discussion w/ CT Sx. FU Rpt CXR.    4. GI: regular diet as tolerated with Ensure, continue dronabinol. On lactobacillus. GERD precautions. GI following.    5. Renal: GUS of unclear etiology, likely drug toxicity due to vancomycin. Renally dose meds. Output Noted. continue to monitor strict I/Os, trend kidney function and lytes. s/p Lasix 20.  FU Uro c/s. Keep celestin. c/w phos binder. Plan for HD today. Nephro following.    6. ID: Continue Zosyn. UCx NGF. FU Pleural Cx/Fungi/AFB. ID following.    7. Heme: H/H drop noted. Trend CBC. HSQ for DVT ppx. Maltoma and CLL being monitored, not on active treatment. H/O following.    8. Endo: no active issues    9. Skin: no lines, + celestin. + L Chest tube.    10. Dispo: full code, Critically ill, continue w/ ICU care

## 2020-03-04 NOTE — PROGRESS NOTE ADULT - ASSESSMENT
1.	GUS: ATN (Nephrotoxic, Ischemic)  2.	Loculated pleural effusions, s/p Chest tube  3.	Anemia    Worsening renal indices. UO slowly improving. D/w pt regarding need to initiate hemodialysis. Risks and benefits explained to pt.   Pt in agreement to start hemodialysis. D/w pt's son over the phone ([phone]). Avoid nephrotoxic meds as possible. Avoid ACEI, ARB, NSAIDs and IV contrast.   D/w ICU team.

## 2020-03-04 NOTE — PROGRESS NOTE ADULT - SUBJECTIVE AND OBJECTIVE BOX
CC:  Patient is a 89y old  Male who presents with a chief complaint of Left Empyema (03 Mar 2020 22:19)    HPI/BRIEF HOSPITAL COURSE:   89 y/o male with PMHx of CLL/MALToma not on active treatment, GERD, HLD, herniated discs/spinal stenosis s/p back surgery on medicinal Marijuana initially presented to [facility] ED w/ worsening back/chest pain. Pt found to have loculated left pleural effusions on CT Chest, concerning for empyema. Pt underwent IR guided left chest tube on 2/27, initially w/ minimal drainage, subsequently transferred to hospitals ED for further management. Pt initially offered VATs, but family wanted more conservative measures. Pt subsequently treated with tPA/Dornase brenda via chest tube. Pt initially with sufficient output s/p first dose tPA now with decreased drainage on 2nd administration (~100-200 cc's, total 1.2L). ICU course complicated by worsening renal failure, Cr 1.00 2/26 to now 5.1, continued oliguria and hematuria (resolved).     Events last 24 hours: ON, 6th and final treatment of MIST 2 protocol executed and completed     PAST MEDICAL & SURGICAL HISTORY:  GI Bleed: 2007  Stomach Ulcer: H pylori 2007, treated with antibiotics  Osteoarthritis  Hyperlipidemia  Herniated Disc  Spinal Stenosis  Diverticulitis  GERD (Gastroesophageal Reflux Disease)  Chronic Lymphocytic Leukemia: followed by oncologist in [place]  S/P Tonsillectomy: childhood  Status Post Arthroscopy: right shoulder  History of Arthroscopy of Knee: bilateral    Allergies    No Known Allergies    Intolerances      FAMILY HISTORY:  No pertinent family history in first degree relatives  ROS:   CONSTITUTIONAL: No fever, chills, or fatigue  EYES: No visual disturbances  ENMT:  No sinus or throat pain  NECK: No pain or stiffness  RESPIRATORY: No cough,  No shortness of breath  CARDIOVASCULAR: No chest pain, palpitations,  leg swelling  GASTROINTESTINAL: No abdominal pain. No nausea, vomiting, or hematemesis; No diarrhea or constipation. No melena   GENITOURINARY: No dysuria, frequency, hematuria, or incontinence  NEUROLOGICAL: No headaches, numbness, or tremors  SKIN: No itching, burning, rashes, or lesions   MUSCULOSKELETAL: No muscle, back, or extremity pain  PSYCHIATRIC: No difficulty sleeping    Medications:  piperacillin/tazobactam IVPB.. 3.375 Gram(s) IV Intermittent every 12 hours  tamsulosin 0.4 milliGRAM(s) Oral at bedtime  albuterol/ipratropium for Nebulization 3 milliLiter(s) Nebulizer every 6 hours PRN  guaiFENesin   Syrup  (Sugar-Free) 200 milliGRAM(s) Oral every 6 hours PRN  acetaminophen   Tablet .. 650 milliGRAM(s) Oral every 6 hours PRN  dronabinol 2.5 milliGRAM(s) Oral two times a day  HYDROmorphone  Injectable 0.5 milliGRAM(s) IV Push every 4 hours PRN  HYDROmorphone  Injectable 1 milliGRAM(s) IV Push every 4 hours PRN  heparin  Injectable 5000 Unit(s) SubCutaneous every 8 hours  aluminum hydroxide/magnesium hydroxide/simethicone Suspension 30 milliLiter(s) Oral every 6 hours PRN  famotidine    Tablet 20 milliGRAM(s) Oral daily  polyethylene glycol 3350 17 Gram(s) Oral two times a day  atorvastatin 10 milliGRAM(s) Oral at bedtime  chlorhexidine 2% Cloths 1 Application(s) Topical <User Schedule>  lactobacillus acidophilus 1 Tablet(s) Oral three times a day  sevelamer carbonate 800 milliGRAM(s) Oral three times a day with meals      ICU Vital Signs Last 24 Hrs  T(C): 36.8 (04 Mar 2020 03:33), Max: 36.9 (03 Mar 2020 08:05)  T(F): 98.3 (04 Mar 2020 03:33), Max: 98.5 (03 Mar 2020 08:05)  HR: 91 (04 Mar 2020 05:00) (83 - 99)  BP: 132/60 (04 Mar 2020 05:00) (77/56 - 132/60)  BP(mean): 84 (04 Mar 2020 05:00) (60 - 95)  ABP: --  ABP(mean): --  RR: 21 (04 Mar 2020 05:00) (13 - 36)  SpO2: 94% (04 Mar 2020 05:00) (90% - 100%)    Vital Signs Last 24 Hrs  T(C): 36.8 (04 Mar 2020 03:33), Max: 36.9 (03 Mar 2020 08:05)  T(F): 98.3 (04 Mar 2020 03:33), Max: 98.5 (03 Mar 2020 08:05)  HR: 91 (04 Mar 2020 05:00) (83 - 99)  BP: 132/60 (04 Mar 2020 05:00) (77/56 - 132/60)  BP(mean): 84 (04 Mar 2020 05:00) (60 - 95)  RR: 21 (04 Mar 2020 05:00) (13 - 36)  SpO2: 94% (04 Mar 2020 05:00) (90% - 100%)        I&O's Detail    02 Mar 2020 07:01  -  03 Mar 2020 07:00  --------------------------------------------------------  IN:    Oral Fluid: 620 mL    Solution: 200 mL  Total IN: 820 mL    OUT:    Chest Tube: 20 mL    Indwelling Catheter - Urethral: 313 mL  Total OUT: 333 mL    Total NET: 487 mL      03 Mar 2020 07:01  -  04 Mar 2020 05:48  --------------------------------------------------------  IN:    Oral Fluid: 340 mL    Solution: 200 mL  Total IN: 540 mL    OUT:    Chest Tube: 270 mL    Indwelling Catheter - Urethral: 580 mL  Total OUT: 850 mL    Total NET: -310 mL            LABS:                        8.9    9.04  )-----------( 327      ( 04 Mar 2020 05:21 )             26.1     03-04    130<L>  |  94<L>  |  90<H>  ----------------------------<  109<H>  5.0   |  22  |  7.30<H>    Ca    8.1<L>      04 Mar 2020 05:21  Phos  7.9     03-04  Mg     3.0     03-04    TPro  5.7<L>  /  Alb  2.0<L>  /  TBili  0.9  /  DBili  x   /  AST  40<H>  /  ALT  49  /  AlkPhos  91  03-04          CAPILLARY BLOOD GLUCOSE        PT/INR - ( 04 Mar 2020 05:21 )   PT: 16.7 sec;   INR: 1.47 ratio         PTT - ( 04 Mar 2020 05:21 )  PTT:28.5 sec    CULTURES:  Culture Results:   No growth (03-01 @ 13:24)  Culture Results:   Culture is being performed. (02-27 @ 21:38)  Culture Results:   No growth at 5 days (02-27 @ 21:38)  Culture Results:   Testing in progress (02-27 @ 21:38)  Culture Results:   No growth at 5 days. (02-26 @ 22:29)  Culture Results:   No growth at 5 days. (02-26 @ 22:29)  Culture Results:   50,000 - 99,000 CFU/mL Coag Negative Staphylococcus  Susceptibilites not performed. (02-26 @ 11:46)    piperacillin/tazobactam IVPB.. 3.375 Gram(s) IV Intermittent every 12 hours    Physical Examination:  General: No acute distress.  Alert, oriented x2, interactive, confused at times, nonfocal  NEURO: A&O X2, confused at times, motor function 5/5 BL UE/LE  HEENT: Pupils equal, reactive to light.  Symmetric.  PULM: distant at bases CTA , no significant sputum production, no wheezes, rales, rhonchi  CVS: Regular rate and rhythm, no murmurs, rubs, or gallops  ABD: Soft, nondistended, nontender, normoactive bowel sounds, no masses  EXT: No edema, nontender  SKIN: Warm and well perfused, no rashes noted        EKG: NSR     RADIOLOGY: < from: Xray Chest 1 View- PORTABLE-Routine (03.03.20 @ 12:12) >  Frontal expiratory view of the chest shows the heart to be similar in size. Left chest pigtail catheter remains present. The lungs show progression of left infiltrate with pleural thickening and there is no evidence of pneumothorax nor right pleural effusion.    IMPRESSION:  Progression of left infiltrate.    Thank you for the courtesy of this referral.    < end of copied text >        CENTRAL LINE: N          DATE INSERTED:                  SOLIS: Y                        DATE INSERTED:                  A-LINE: N                       DATE INSERTED:                  GLOBAL ISSUE/BEST PRACTICE:  Analgesia: N/A  Sedation: N/A  HOB elevation: yes  Stress ulcer prophylaxis: protonix   VTE prophylaxis: Heparin SQ  Glycemic control: N/A  Nutrition: Diet

## 2020-03-05 DIAGNOSIS — I95.9 HYPOTENSION, UNSPECIFIED: ICD-10-CM

## 2020-03-05 DIAGNOSIS — R41.0 DISORIENTATION, UNSPECIFIED: ICD-10-CM

## 2020-03-05 LAB
ALBUMIN SERPL ELPH-MCNC: 1.9 G/DL — LOW (ref 3.3–5)
ALP SERPL-CCNC: 84 U/L — SIGNIFICANT CHANGE UP (ref 40–120)
ALT FLD-CCNC: 46 U/L — SIGNIFICANT CHANGE UP (ref 12–78)
ANION GAP SERPL CALC-SCNC: 13 MMOL/L — SIGNIFICANT CHANGE UP (ref 5–17)
APTT BLD: 28.2 SEC — LOW (ref 28.5–37)
AST SERPL-CCNC: 37 U/L — SIGNIFICANT CHANGE UP (ref 15–37)
BASOPHILS # BLD AUTO: 0.02 K/UL — SIGNIFICANT CHANGE UP (ref 0–0.2)
BASOPHILS NFR BLD AUTO: 0.3 % — SIGNIFICANT CHANGE UP (ref 0–2)
BILIRUB SERPL-MCNC: 0.8 MG/DL — SIGNIFICANT CHANGE UP (ref 0.2–1.2)
BUN SERPL-MCNC: 72 MG/DL — HIGH (ref 7–23)
CALCIUM SERPL-MCNC: 7.7 MG/DL — LOW (ref 8.5–10.1)
CHLORIDE SERPL-SCNC: 97 MMOL/L — SIGNIFICANT CHANGE UP (ref 96–108)
CO2 SERPL-SCNC: 23 MMOL/L — SIGNIFICANT CHANGE UP (ref 22–31)
CREAT SERPL-MCNC: 5.7 MG/DL — HIGH (ref 0.5–1.3)
EOSINOPHIL # BLD AUTO: 0.05 K/UL — SIGNIFICANT CHANGE UP (ref 0–0.5)
EOSINOPHIL NFR BLD AUTO: 0.7 % — SIGNIFICANT CHANGE UP (ref 0–6)
GLUCOSE SERPL-MCNC: 104 MG/DL — HIGH (ref 70–99)
HBV CORE AB SER-ACNC: SIGNIFICANT CHANGE UP
HBV SURFACE AB SER-ACNC: <3 MIU/ML — LOW
HBV SURFACE AG SER-ACNC: SIGNIFICANT CHANGE UP
HCT VFR BLD CALC: 24 % — LOW (ref 39–50)
HCV AB S/CO SERPL IA: 0.09 S/CO — SIGNIFICANT CHANGE UP (ref 0–0.99)
HCV AB SERPL-IMP: SIGNIFICANT CHANGE UP
HGB BLD-MCNC: 8.2 G/DL — LOW (ref 13–17)
IMM GRANULOCYTES NFR BLD AUTO: 0.8 % — SIGNIFICANT CHANGE UP (ref 0–1.5)
INR BLD: 1.55 RATIO — HIGH (ref 0.88–1.16)
LYMPHOCYTES # BLD AUTO: 1.25 K/UL — SIGNIFICANT CHANGE UP (ref 1–3.3)
LYMPHOCYTES # BLD AUTO: 16.5 % — SIGNIFICANT CHANGE UP (ref 13–44)
MAGNESIUM SERPL-MCNC: 2.9 MG/DL — HIGH (ref 1.6–2.6)
MCHC RBC-ENTMCNC: 31.1 PG — SIGNIFICANT CHANGE UP (ref 27–34)
MCHC RBC-ENTMCNC: 34.2 GM/DL — SIGNIFICANT CHANGE UP (ref 32–36)
MCV RBC AUTO: 90.9 FL — SIGNIFICANT CHANGE UP (ref 80–100)
MONOCYTES # BLD AUTO: 0.72 K/UL — SIGNIFICANT CHANGE UP (ref 0–0.9)
MONOCYTES NFR BLD AUTO: 9.5 % — SIGNIFICANT CHANGE UP (ref 2–14)
NEUTROPHILS # BLD AUTO: 5.49 K/UL — SIGNIFICANT CHANGE UP (ref 1.8–7.4)
NEUTROPHILS NFR BLD AUTO: 72.2 % — SIGNIFICANT CHANGE UP (ref 43–77)
NRBC # BLD: 0 /100 WBCS — SIGNIFICANT CHANGE UP (ref 0–0)
PHOSPHATE SERPL-MCNC: 5.7 MG/DL — HIGH (ref 2.5–4.5)
PLATELET # BLD AUTO: 313 K/UL — SIGNIFICANT CHANGE UP (ref 150–400)
POTASSIUM SERPL-MCNC: 4.6 MMOL/L — SIGNIFICANT CHANGE UP (ref 3.5–5.3)
POTASSIUM SERPL-SCNC: 4.6 MMOL/L — SIGNIFICANT CHANGE UP (ref 3.5–5.3)
PROT SERPL-MCNC: 5.2 G/DL — LOW (ref 6–8.3)
PROTHROM AB SERPL-ACNC: 17.6 SEC — HIGH (ref 10–12.9)
RBC # BLD: 2.64 M/UL — LOW (ref 4.2–5.8)
RBC # FLD: 13.1 % — SIGNIFICANT CHANGE UP (ref 10.3–14.5)
SODIUM SERPL-SCNC: 133 MMOL/L — LOW (ref 135–145)
WBC # BLD: 7.59 K/UL — SIGNIFICANT CHANGE UP (ref 3.8–10.5)
WBC # FLD AUTO: 7.59 K/UL — SIGNIFICANT CHANGE UP (ref 3.8–10.5)

## 2020-03-05 PROCEDURE — 99233 SBSQ HOSP IP/OBS HIGH 50: CPT | Mod: GC

## 2020-03-05 PROCEDURE — 99222 1ST HOSP IP/OBS MODERATE 55: CPT

## 2020-03-05 PROCEDURE — 99233 SBSQ HOSP IP/OBS HIGH 50: CPT

## 2020-03-05 RX ORDER — ALTEPLASE 100 MG
10 KIT INTRAVENOUS EVERY 12 HOURS
Refills: 0 | Status: COMPLETED | OUTPATIENT
Start: 2020-03-05 | End: 2020-03-06

## 2020-03-05 RX ORDER — HALOPERIDOL DECANOATE 100 MG/ML
2.5 INJECTION INTRAMUSCULAR ONCE
Refills: 0 | Status: COMPLETED | OUTPATIENT
Start: 2020-03-05 | End: 2020-03-05

## 2020-03-05 RX ORDER — HYDROMORPHONE HYDROCHLORIDE 2 MG/ML
1 INJECTION INTRAMUSCULAR; INTRAVENOUS; SUBCUTANEOUS
Refills: 0 | Status: DISCONTINUED | OUTPATIENT
Start: 2020-03-05 | End: 2020-03-06

## 2020-03-05 RX ORDER — ALBUMIN HUMAN 25 %
50 VIAL (ML) INTRAVENOUS
Refills: 0 | Status: COMPLETED | OUTPATIENT
Start: 2020-03-05 | End: 2020-03-05

## 2020-03-05 RX ORDER — QUETIAPINE FUMARATE 200 MG/1
25 TABLET, FILM COATED ORAL AT BEDTIME
Refills: 0 | Status: DISCONTINUED | OUTPATIENT
Start: 2020-03-05 | End: 2020-03-06

## 2020-03-05 RX ORDER — ALBUMIN HUMAN 25 %
100 VIAL (ML) INTRAVENOUS ONCE
Refills: 0 | Status: DISCONTINUED | OUTPATIENT
Start: 2020-03-05 | End: 2020-03-05

## 2020-03-05 RX ORDER — HYDROMORPHONE HYDROCHLORIDE 2 MG/ML
1 INJECTION INTRAMUSCULAR; INTRAVENOUS; SUBCUTANEOUS ONCE
Refills: 0 | Status: DISCONTINUED | OUTPATIENT
Start: 2020-03-05 | End: 2020-03-06

## 2020-03-05 RX ORDER — SODIUM CHLORIDE 9 MG/ML
500 INJECTION, SOLUTION INTRAVENOUS ONCE
Refills: 0 | Status: COMPLETED | OUTPATIENT
Start: 2020-03-05 | End: 2020-03-05

## 2020-03-05 RX ORDER — PANTOPRAZOLE SODIUM 20 MG/1
40 TABLET, DELAYED RELEASE ORAL DAILY
Refills: 0 | Status: DISCONTINUED | OUTPATIENT
Start: 2020-03-05 | End: 2020-03-05

## 2020-03-05 RX ORDER — DORNASE ALFA 1 MG/ML
5 SOLUTION RESPIRATORY (INHALATION) EVERY 12 HOURS
Refills: 0 | Status: COMPLETED | OUTPATIENT
Start: 2020-03-05 | End: 2020-03-06

## 2020-03-05 RX ORDER — MIDODRINE HYDROCHLORIDE 2.5 MG/1
10 TABLET ORAL EVERY 8 HOURS
Refills: 0 | Status: DISCONTINUED | OUTPATIENT
Start: 2020-03-05 | End: 2020-03-06

## 2020-03-05 RX ORDER — ALBUMIN HUMAN 25 %
50 VIAL (ML) INTRAVENOUS EVERY 6 HOURS
Refills: 0 | Status: COMPLETED | OUTPATIENT
Start: 2020-03-05 | End: 2020-03-05

## 2020-03-05 RX ORDER — DEXMEDETOMIDINE HYDROCHLORIDE IN 0.9% SODIUM CHLORIDE 4 UG/ML
0.1 INJECTION INTRAVENOUS
Qty: 200 | Refills: 0 | Status: DISCONTINUED | OUTPATIENT
Start: 2020-03-05 | End: 2020-03-06

## 2020-03-05 RX ORDER — NOREPINEPHRINE BITARTRATE/D5W 8 MG/250ML
0.05 PLASTIC BAG, INJECTION (ML) INTRAVENOUS
Qty: 8 | Refills: 0 | Status: DISCONTINUED | OUTPATIENT
Start: 2020-03-05 | End: 2020-03-06

## 2020-03-05 RX ORDER — MIDODRINE HYDROCHLORIDE 2.5 MG/1
10 TABLET ORAL ONCE
Refills: 0 | Status: COMPLETED | OUTPATIENT
Start: 2020-03-05 | End: 2020-03-05

## 2020-03-05 RX ADMIN — HYDROMORPHONE HYDROCHLORIDE 1 MILLIGRAM(S): 2 INJECTION INTRAMUSCULAR; INTRAVENOUS; SUBCUTANEOUS at 19:51

## 2020-03-05 RX ADMIN — Medication 2.5 MILLIGRAM(S): at 17:23

## 2020-03-05 RX ADMIN — QUETIAPINE FUMARATE 25 MILLIGRAM(S): 200 TABLET, FILM COATED ORAL at 22:40

## 2020-03-05 RX ADMIN — Medication 1 TABLET(S): at 12:18

## 2020-03-05 RX ADMIN — SODIUM CHLORIDE 500 MILLILITER(S): 9 INJECTION, SOLUTION INTRAVENOUS at 06:22

## 2020-03-05 RX ADMIN — SEVELAMER CARBONATE 800 MILLIGRAM(S): 2400 POWDER, FOR SUSPENSION ORAL at 17:23

## 2020-03-05 RX ADMIN — Medication 6 MILLIGRAM(S): at 21:24

## 2020-03-05 RX ADMIN — Medication 50 MILLILITER(S): at 16:48

## 2020-03-05 RX ADMIN — DORNASE ALFA 5 MILLIGRAM(S): 1 SOLUTION RESPIRATORY (INHALATION) at 22:15

## 2020-03-05 RX ADMIN — Medication 1 TABLET(S): at 05:05

## 2020-03-05 RX ADMIN — HYDROMORPHONE HYDROCHLORIDE 1 MILLIGRAM(S): 2 INJECTION INTRAMUSCULAR; INTRAVENOUS; SUBCUTANEOUS at 20:05

## 2020-03-05 RX ADMIN — DEXMEDETOMIDINE HYDROCHLORIDE IN 0.9% SODIUM CHLORIDE 2.15 MICROGRAM(S)/KG/HR: 4 INJECTION INTRAVENOUS at 22:49

## 2020-03-05 RX ADMIN — PIPERACILLIN AND TAZOBACTAM 25 GRAM(S): 4; .5 INJECTION, POWDER, LYOPHILIZED, FOR SOLUTION INTRAVENOUS at 13:25

## 2020-03-05 RX ADMIN — SEVELAMER CARBONATE 800 MILLIGRAM(S): 2400 POWDER, FOR SUSPENSION ORAL at 12:13

## 2020-03-05 RX ADMIN — PIPERACILLIN AND TAZOBACTAM 25 GRAM(S): 4; .5 INJECTION, POWDER, LYOPHILIZED, FOR SOLUTION INTRAVENOUS at 02:44

## 2020-03-05 RX ADMIN — Medication 50 MILLILITER(S): at 04:58

## 2020-03-05 RX ADMIN — FAMOTIDINE 20 MILLIGRAM(S): 10 INJECTION INTRAVENOUS at 12:14

## 2020-03-05 RX ADMIN — ATORVASTATIN CALCIUM 10 MILLIGRAM(S): 80 TABLET, FILM COATED ORAL at 21:24

## 2020-03-05 RX ADMIN — MIDODRINE HYDROCHLORIDE 10 MILLIGRAM(S): 2.5 TABLET ORAL at 21:24

## 2020-03-05 RX ADMIN — MIDODRINE HYDROCHLORIDE 10 MILLIGRAM(S): 2.5 TABLET ORAL at 12:11

## 2020-03-05 RX ADMIN — Medication 8.08 MICROGRAM(S)/KG/MIN: at 14:20

## 2020-03-05 RX ADMIN — HEPARIN SODIUM 5000 UNIT(S): 5000 INJECTION INTRAVENOUS; SUBCUTANEOUS at 05:04

## 2020-03-05 RX ADMIN — Medication 650 MILLIGRAM(S): at 01:30

## 2020-03-05 RX ADMIN — HEPARIN SODIUM 5000 UNIT(S): 5000 INJECTION INTRAVENOUS; SUBCUTANEOUS at 12:19

## 2020-03-05 RX ADMIN — Medication 50 MILLILITER(S): at 10:51

## 2020-03-05 RX ADMIN — MIDODRINE HYDROCHLORIDE 10 MILLIGRAM(S): 2.5 TABLET ORAL at 13:24

## 2020-03-05 RX ADMIN — HEPARIN SODIUM 5000 UNIT(S): 5000 INJECTION INTRAVENOUS; SUBCUTANEOUS at 21:24

## 2020-03-05 RX ADMIN — Medication 1 TABLET(S): at 21:24

## 2020-03-05 RX ADMIN — ALTEPLASE 10 MILLIGRAM(S): KIT at 20:15

## 2020-03-05 RX ADMIN — HALOPERIDOL DECANOATE 2.5 MILLIGRAM(S): 100 INJECTION INTRAMUSCULAR at 21:55

## 2020-03-05 RX ADMIN — Medication 650 MILLIGRAM(S): at 00:38

## 2020-03-05 RX ADMIN — Medication 2.5 MILLIGRAM(S): at 05:04

## 2020-03-05 RX ADMIN — CHLORHEXIDINE GLUCONATE 1 APPLICATION(S): 213 SOLUTION TOPICAL at 12:11

## 2020-03-05 RX ADMIN — Medication 50 MILLILITER(S): at 05:55

## 2020-03-05 NOTE — PROGRESS NOTE ADULT - SUBJECTIVE AND OBJECTIVE BOX
Patient is a 89y old  Male who presents with a chief complaint of Left Empyema (02 Mar 2020 09:12)  Patient seen in follow up for GUS, ATN.     s/p HD yesterday.     PAST MEDICAL HISTORY:  GI Bleed  Stomach Ulcer  Osteoarthritis  Hyperlipidemia  Herniated Disc  Spinal Stenosis  Diverticulitis  GERD (Gastroesophageal Reflux Disease)  Chronic Lymphocytic Leukemia      MEDICATIONS  (STANDING):  albumin human 25% IVPB 50 milliLiter(s) IV Intermittent every 6 hours  alteplase  Injectable for Pleural Effusion 10 milliGRAM(s) IntraPleural. every 12 hours  atorvastatin 10 milliGRAM(s) Oral at bedtime  chlorhexidine 2% Cloths 1 Application(s) Topical <User Schedule>  chlorhexidine 4% Liquid 1 Application(s) Topical <User Schedule>  dornase brenda Solution for Pleural Effusion 5 milliGRAM(s) IntraPleural. every 12 hours  dronabinol 2.5 milliGRAM(s) Oral two times a day  epoetin brenda Injectable 34207 Unit(s) IV Push <User Schedule>  famotidine    Tablet 20 milliGRAM(s) Oral daily  heparin  Injectable 5000 Unit(s) SubCutaneous every 8 hours  lactobacillus acidophilus 1 Tablet(s) Oral three times a day  melatonin 6 milliGRAM(s) Oral at bedtime  midodrine 10 milliGRAM(s) Oral every 8 hours  piperacillin/tazobactam IVPB.. 3.375 Gram(s) IV Intermittent every 12 hours  polyethylene glycol 3350 17 Gram(s) Oral two times a day  sevelamer carbonate 800 milliGRAM(s) Oral three times a day with meals  tamsulosin 0.4 milliGRAM(s) Oral at bedtime    MEDICATIONS  (PRN):  acetaminophen   Tablet .. 650 milliGRAM(s) Oral every 6 hours PRN Temp greater or equal to 38C (100.4F), Mild Pain (1 - 3)  albuterol/ipratropium for Nebulization 3 milliLiter(s) Nebulizer every 6 hours PRN Shortness of Breath and/or Wheezing  aluminum hydroxide/magnesium hydroxide/simethicone Suspension 30 milliLiter(s) Oral every 6 hours PRN Dyspepsia  guaiFENesin   Syrup  (Sugar-Free) 200 milliGRAM(s) Oral every 6 hours PRN Cough  sodium chloride 0.9% lock flush 10 milliLiter(s) IV Push every 1 hour PRN Pre/post blood products, medications, blood draw, and to maintain line patency    T(C): 36.8 (03-05-20 @ 08:00), Max: 37.1 (03-04-20 @ 17:30)  HR: 87 (03-05-20 @ 10:14) (80 - 99)  BP: 88/53 (03-05-20 @ 10:14) (77/56 - 132/60)  RR: 28 (03-05-20 @ 10:14)  SpO2: 94% (03-05-20 @ 10:14)  Wt(kg): --  I&O's Detail    04 Mar 2020 07:01  -  05 Mar 2020 07:00  --------------------------------------------------------  IN:    Albumin 25%: 100 mL    Lactated Ringers IV Bolus: 500 mL    Oral Fluid: 674 mL    Other: 1000 mL    Solution: 100 mL  Total IN: 2374 mL    OUT:    Chest Tube: 155 mL    Indwelling Catheter - Urethral: 665 mL    Other: 1500 mL  Total OUT: 2320 mL    Total NET: 54 mL      05 Mar 2020 07:01  -  05 Mar 2020 10:26  --------------------------------------------------------  IN:    Oral Fluid: 100 mL  Total IN: 100 mL    OUT:    Indwelling Catheter - Urethral: 80 mL  Total OUT: 80 mL    Total NET: 20 mL          PHYSICAL EXAM:  General: NAD  Respiratory: b/l air entry, left chest tube  Cardiovascular: S1 S2  Gastrointestinal: soft  Extremities:  no edema                      LABORATORY:                        8.2    7.59  )-----------( 313      ( 05 Mar 2020 06:54 )             24.0     03-05    133<L>  |  97  |  72<H>  ----------------------------<  104<H>  4.6   |  23  |  5.70<H>    Ca    7.7<L>      05 Mar 2020 06:54  Phos  5.7     03-05  Mg     2.9     03-05    TPro  5.2<L>  /  Alb  1.9<L>  /  TBili  0.8  /  DBili  x   /  AST  37  /  ALT  46  /  AlkPhos  84  03-05    Sodium, Serum: 133 mmol/L (03-05 @ 06:54)  Sodium, Serum: 130 mmol/L (03-04 @ 05:21)    Potassium, Serum: 4.6 mmol/L (03-05 @ 06:54)  Potassium, Serum: 5.0 mmol/L (03-04 @ 05:21)    Hemoglobin: 8.2 g/dL (03-05 @ 06:54)  Hemoglobin: 8.9 g/dL (03-04 @ 05:21)  Hemoglobin: 9.8 g/dL (03-03 @ 05:08)    Creatinine, Serum 5.70 (03-05 @ 06:54)  Creatinine, Serum 7.30 (03-04 @ 05:21)  Creatinine, Serum 6.70 (03-03 @ 05:08)  Creatinine, Serum 6.30 (03-02 @ 13:59)        LIVER FUNCTIONS - ( 05 Mar 2020 06:54 )  Alb: 1.9 g/dL / Pro: 5.2 g/dL / ALK PHOS: 84 U/L / ALT: 46 U/L / AST: 37 U/L / GGT: x

## 2020-03-05 NOTE — BEHAVIORAL HEALTH ASSESSMENT NOTE - HPI (INCLUDE ILLNESS QUALITY, SEVERITY, DURATION, TIMING, CONTEXT, MODIFYING FACTORS, ASSOCIATED SIGNS AND SYMPTOMS)
Patient seen, evaluated and chart reviewed. Patient is a 89 Y/O WW Male, no prior psychiatric hospitalizations or significant treatment, brought to [place] ED complaining of flank pain, fever and cough for 4 days. Patient transferred from [place] ER for admission to [place].  At [place] patient found to have fever, CT scan revealed left sided empyema and right ureteral stone, patient received ceftriaxone and zosyn. Patient states he is feeling well now.  The issue of decision making capacity was raised. Patient is lucid right now, but has limited understanding of his medical situation. No evidence of psychosis, zully or depression.

## 2020-03-05 NOTE — BEHAVIORAL HEALTH ASSESSMENT NOTE - ADDITIONAL DETAILS / COMMENTS
Patient is 1) cognitively grossly intact 2) Does not understand the nature of the medical condition, risks, benefits, alternatives and side-effects of treatment 3) Wants to make decisions voluntarily.  At this time patient has no decision making capacity regarding medical decisions.

## 2020-03-05 NOTE — PROGRESS NOTE ADULT - ASSESSMENT
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  Hospital course complicated by GUS from ATN    1. Empyema vs. complicated L complicated effusion    2. GUS- oliguria   3. PNA     PLAN   - SBP dropped to 90's ON given bolus of albumin and written for 50 q6h   -Dilaudid for pain control   -pt on duonebs   -last dose of MIST 2 protocol completed, Tube unclamped- ON pt had minimal output from pigtail-? Contact CTS if it would be indicated to continue MIST 2 given pt is not a candidate for CTS   -Pt from respiratory standpoint asx ON    -Diet as tolerated   -Check repeat labs and monitor renal function trend UOP 30cc/h ON   -Encourage PO intake as tolerated  -Avoid hypotension and optimize BP with IVF and pressor support if needed.   -Avoid nephrotoxic meds as possible. Avoid ACEI, ARB and NSAIDS  -Monitor input and output  -Monitor and replace electrolytes as needed   -Zosyn for PNA ? 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  Hospital course complicated by GUS from ATN    1. Empyema vs. complicated L complicated effusion    2. GUS- oliguria   3. PNA -sepsis  4. hypotension -hypovolemia intravascularly depleted  5. Delirium vs. AMS vs. Metabolic encephalopathy     PLAN   - SBP dropped to 90's ON given bolus of albumin and written for 50 q6h   -Dilaudid for pain control   -pt on duonebs   -last dose of MIST 2 protocol completed, Tube unclamped- ON pt had minimal output from pigtail-? Contact CTS if it would be indicated to continue MIST 2 given pt is not a candidate for CTS   -Pt from respiratory standpoint asx ON    -Diet as tolerated   -Check repeat labs and monitor renal function trend UOP 30cc/h ON   -Encourage PO intake as tolerated  -Avoid hypotension and optimize BP with IVF and pressor support if needed.   -Avoid nephrotoxic meds as possible. Avoid ACEI, ARB and NSAIDS  -Monitor input and output  -Monitor and replace electrolytes as needed   -Zosyn for PNA ? empyema with anaerobic coverage    -Heparin SQ     ETHICS        CODE STATUS: Full Code   Discussed plan with bedside provider   Time Spent: 30 min

## 2020-03-05 NOTE — BEHAVIORAL HEALTH ASSESSMENT NOTE - SUMMARY
89 Y/O WW Male, no prior psychiatric hospitalizations or significant treatment, brought to [place] ED complaining of flank pain, fever and cough for 4 days. Patient transferred from [place] ER for admission to [place].  At [place] patient found to have fever, CT scan revealed left sided empyema and right ureteral stone, patient received ceftriaxone and zosyn. Patient states he is feeling well now.  The issue of decision making capacity was raised. Patient is lucid right now, but has limited understanding of his medical situation. No evidence of psychosis, zully or depression.    IMP; R/O dementia    REC: Patient has no decision making capacity to make medical decisions

## 2020-03-05 NOTE — PROGRESS NOTE ADULT - PROBLEM SELECTOR PLAN 3
Possibly secondary to vanco toxicity  Trend Cr, for HD yesterday  Avoid nephrotoxic medications  Renal consult with Dr. JEZ patel

## 2020-03-05 NOTE — PROGRESS NOTE ADULT - SUBJECTIVE AND OBJECTIVE BOX
Follow up: pleural effusion    HPI:  CHARLY LU is a 89 YO Male brought to [place] ED complaining of flank pain, fever and cough for 4 days. Patient transferred from [place] ER for admission to [place].  Daughter at the bedside states she took him to the ER for evaluation of back pain, chest discomfort. At [place] patient found to have fever, CT scan revealed left sided empyema and right ureteral stone, patient received ceftriaxone and zosyn. Dr. Maxwell pulmonary and Dr. Barnard urology were aware of transfer. Patient states he is feeling well now. (26 Feb 2020 05:01)    He is sleeping but arousable, confused. He underwent lytic therapy for his left sided loculated pleural effusion. He had hemodialysis yesterday with removal of 500 cc of fluid.      PAST MEDICAL & SURGICAL HISTORY:  GI Bleed: 2007  Stomach Ulcer: H pylori 2007, treated with antibiotics  Osteoarthritis  Hyperlipidemia  Herniated Disc  Spinal Stenosis  Diverticulitis  GERD (Gastroesophageal Reflux Disease)  Chronic Lymphocytic Leukemia: followed by oncologist in [place]  S/P Tonsillectomy: childhood  Status Post Arthroscopy: right shoulder  History of Arthroscopy of Knee: bilateral      MEDICATIONS  (STANDING):  albumin human 25% IVPB 50 milliLiter(s) IV Intermittent every 6 hours  alteplase  Injectable for Pleural Effusion 10 milliGRAM(s) IntraPleural. every 12 hours  atorvastatin 10 milliGRAM(s) Oral at bedtime  chlorhexidine 2% Cloths 1 Application(s) Topical <User Schedule>  chlorhexidine 4% Liquid 1 Application(s) Topical <User Schedule>  dornase brenda Solution for Pleural Effusion 5 milliGRAM(s) IntraPleural. every 12 hours  dronabinol 2.5 milliGRAM(s) Oral two times a day  epoetin brenda Injectable 95058 Unit(s) IV Push <User Schedule>  famotidine    Tablet 20 milliGRAM(s) Oral daily  heparin  Injectable 5000 Unit(s) SubCutaneous every 8 hours  HYDROmorphone  Injectable 1 milliGRAM(s) IV Push <User Schedule>  lactobacillus acidophilus 1 Tablet(s) Oral three times a day  melatonin 6 milliGRAM(s) Oral at bedtime  midodrine 10 milliGRAM(s) Oral every 8 hours  midodrine. 10 milliGRAM(s) Oral once  piperacillin/tazobactam IVPB.. 3.375 Gram(s) IV Intermittent every 12 hours  polyethylene glycol 3350 17 Gram(s) Oral two times a day  sevelamer carbonate 800 milliGRAM(s) Oral three times a day with meals  tamsulosin 0.4 milliGRAM(s) Oral at bedtime    MEDICATIONS  (PRN):  acetaminophen   Tablet .. 650 milliGRAM(s) Oral every 6 hours PRN Temp greater or equal to 38C (100.4F), Mild Pain (1 - 3)  albuterol/ipratropium for Nebulization 3 milliLiter(s) Nebulizer every 6 hours PRN Shortness of Breath and/or Wheezing  aluminum hydroxide/magnesium hydroxide/simethicone Suspension 30 milliLiter(s) Oral every 6 hours PRN Dyspepsia  guaiFENesin   Syrup  (Sugar-Free) 200 milliGRAM(s) Oral every 6 hours PRN Cough  sodium chloride 0.9% lock flush 10 milliLiter(s) IV Push every 1 hour PRN Pre/post blood products, medications, blood draw, and to maintain line patency    Vital Signs Last 24 Hrs  T(C): 36.8 (05 Mar 2020 08:00), Max: 37.1 (04 Mar 2020 17:30)  T(F): 98.3 (05 Mar 2020 08:00), Max: 98.8 (04 Mar 2020 17:30)  HR: 77 (05 Mar 2020 11:00) (77 - 97)  BP: 88/53 (05 Mar 2020 11:00) (78/51 - 109/54)  BP(mean): 65 (05 Mar 2020 10:14) (60 - 79)  RR: 35 (05 Mar 2020 11:00) (18 - 38)  SpO2: 95% (05 Mar 2020 11:00) (92% - 100%)    I&O's Summary    04 Mar 2020 07:01  -  05 Mar 2020 07:00  --------------------------------------------------------  IN: 2374 mL / OUT: 2320 mL / NET: 54 mL    05 Mar 2020 07:01  -  05 Mar 2020 12:08  --------------------------------------------------------  IN: 200 mL / OUT: 110 mL / NET: 90 mL        PHYSICAL EXAM:    Constitutional: lethargic and confused  Eyes:   Pupils round, no lesions  ENMT: no exudate or erythema  Pulmonary:  breath sounds are clear bilaterally, No wheezing, rales or rhonchi  Cardiovascular: PMI not palpable RRR normal S1 and S2, no murmurs, rubs, gallops or clicks  Gastrointestinal: Bowel Sounds present, soft, nontender.   Lymph: No cervical lymphadenopathy.  Neurological: no focal deficits  Skin: No rashes.  No cyanosis.  Psych: cannot assess   Ext: No lower ext edema                                8.2    7.59  )-----------( 313      ( 05 Mar 2020 06:54 )             24.0     03-05    133<L>  |  97  |  72<H>  ----------------------------<  104<H>  4.6   |  23  |  5.70<H>    Ca    7.7<L>      05 Mar 2020 06:54  Phos  5.7     03-05  Mg     2.9     03-05    TPro  5.2<L>  /  Alb  1.9<L>  /  TBili  0.8  /  DBili  x   /  AST  37  /  ALT  46  /  AlkPhos  84  03-05    < from: 12 Lead ECG (02.25.20 @ 18:14) >    Ventricular Rate 100 BPM    Atrial Rate 100 BPM    P-R Interval 194 ms    QRS Duration 90 ms    Q-T Interval 332 ms    QTC Calculation(Bezet) 428 ms    P Axis 57 degrees    R Axis -46 degrees    T Axis 60 degrees    Diagnosis Line Normal sinus rhythm  Left axis deviation  Inferior infarct , age undetermined  Abnormal ECG  No previous ECGs available  Confirmed by Vince Hopkins MD ([id number]) on 2/26/2020 11:18:08 PM    < end of copied text >  < from: Xray Chest 1 View- PORTABLE-Urgent (03.04.20 @ 16:03) >    EXAM:  XR CHEST PORTABLE URGENT 1V                            PROCEDURE DATE:  03/04/2020          INTERPRETATION:  INDICATION: Evaluation following central venous catheter introduction.    PRIORS: 3/4/2020    VIEWS: Portable AP radiography of the chest performed.    FINDINGS: Heart size appears at the upper limits of normal. Since prior evaluation a left IJ central venous catheter has been introduced, the distal tip of which overlies the expected region of the brachiocephalic venous confluence. There is no significant interval change in position of an indwelling pigtail drainage catheter which overlies the left lower lung field. Opacity throughout the left thorax remains unchanged, likely representative of continued loculated left pleural effusion and left lower lobe consolidation/atelectasis. There is no evidence for acute right lung infiltrate. No right pleural effusion is noted. There is no evidence for pneumothorax. No mediastinal shift is noted. Degenerative change of the thoracicspine noted.    IMPRESSION: No evidence of pneumothorax following central venous catheter introduction.                CASA BAUTISTA   This document has been electronically signed. Mar  4 2020  4:56PM                < end of copied text >    < from: TTE Echo Doppler w/o Cont (02.27.20 @ 11:47) >     EXAM:  ECHO TTE WO CON COMP W DOPPLR         PROCEDURE DATE:  02/27/2020        INTERPRETATION:  INDICATION: Heart failure  Sonographer PH    Blood Pressure 107/68    Height 177.8 cm     Weight 86.2 kg       BSA 2.0 sq m    Dimensions:    LA 3.7    Normal Values: 2.0 - 4.0 cm    Ao 3.3        Normal Values: 2.0 - 3.8 cm  SEPTUM Normal Values: 0.6 - 1.2 cm  PWT Normal Values: 0.6 - 1.1 cm  LVIDd Normal Values: 3.0 - 5.6 cm  LVIDs Normal Values: 1.8 - 4.0 cm      OBSERVATIONS:  Technically difficult and limited study  Mitral Valve: normal, trace physiologic MR.  Aortic Valve/Aorta: Sclerotic trileaflet aortic valve with normal opening.  Tricuspid Valve: normal with trace TR.  Pulmonic Valve: Not well-visualized  Left Atrium: normal  RightAtrium: Not well-visualized  Left Ventricle: normal LV size and systolic function, estimated LVEF of 55%.  Right Ventricle: Not well-visualized  Pericardium/Pleura: normal, small anterior pericardial effusion without signs of hemodynamic compromise.  Pulmonary/RV Pressure: estimated PA systolic pressure of 34mmHg. IVC is dilated    Conclusion:   Technically difficult and limited study  Normal left ventricular internal dimensions and systolic function, estimated LVEF of 55%.   Right ventricle is not well-visualized  Sclerotic trileaflet aortic valve, without AI.   Trace physiologic MR and TR.                       TYLER AYALA   This document has been electronically signed. Feb 28 2020  8:07AM                < end of copied text >

## 2020-03-05 NOTE — PROGRESS NOTE ADULT - SUBJECTIVE AND OBJECTIVE BOX
INTERVAL HPI/OVERNIGHT EVENTS:  pt seen and examined  denies n/v/abd pain  +bm  no acute gi issues per rn  hypotensive     MEDICATIONS  (STANDING):  albumin human 25% IVPB 50 milliLiter(s) IV Intermittent every 6 hours  atorvastatin 10 milliGRAM(s) Oral at bedtime  chlorhexidine 2% Cloths 1 Application(s) Topical <User Schedule>  chlorhexidine 4% Liquid 1 Application(s) Topical <User Schedule>  dronabinol 2.5 milliGRAM(s) Oral two times a day  epoetin brenda Injectable 41525 Unit(s) IV Push <User Schedule>  famotidine    Tablet 20 milliGRAM(s) Oral daily  heparin  Injectable 5000 Unit(s) SubCutaneous every 8 hours  lactobacillus acidophilus 1 Tablet(s) Oral three times a day  melatonin 6 milliGRAM(s) Oral at bedtime  midodrine 10 milliGRAM(s) Oral every 8 hours  piperacillin/tazobactam IVPB.. 3.375 Gram(s) IV Intermittent every 12 hours  polyethylene glycol 3350 17 Gram(s) Oral two times a day  sevelamer carbonate 800 milliGRAM(s) Oral three times a day with meals  tamsulosin 0.4 milliGRAM(s) Oral at bedtime    MEDICATIONS  (PRN):  acetaminophen   Tablet .. 650 milliGRAM(s) Oral every 6 hours PRN Temp greater or equal to 38C (100.4F), Mild Pain (1 - 3)  albuterol/ipratropium for Nebulization 3 milliLiter(s) Nebulizer every 6 hours PRN Shortness of Breath and/or Wheezing  aluminum hydroxide/magnesium hydroxide/simethicone Suspension 30 milliLiter(s) Oral every 6 hours PRN Dyspepsia  guaiFENesin   Syrup  (Sugar-Free) 200 milliGRAM(s) Oral every 6 hours PRN Cough  sodium chloride 0.9% lock flush 10 milliLiter(s) IV Push every 1 hour PRN Pre/post blood products, medications, blood draw, and to maintain line patency      Allergies    No Known Allergies    Intolerances        Review of Systems:    unable to obtain in entirety     Vital Signs Last 24 Hrs  T(C): 36.8 (05 Mar 2020 08:00), Max: 37.1 (04 Mar 2020 17:30)  T(F): 98.3 (05 Mar 2020 08:00), Max: 98.8 (04 Mar 2020 17:30)  HR: 87 (05 Mar 2020 06:00) (80 - 97)  BP: 85/54 (05 Mar 2020 06:00) (80/51 - 111/57)  BP(mean): 64 (05 Mar 2020 06:00) (60 - 80)  RR: 29 (05 Mar 2020 06:00) (18 - 29)  SpO2: 94% (05 Mar 2020 06:00) (92% - 100%)    PHYSICAL EXAM:    General:  lying in  bed  HEENT:  NC/AT  Abdomen:  Soft nt mild dt  Extremities:  no edema  Neuro/Psych:  Awake alert appropriate    LABS:                        8.2    7.59  )-----------( 313      ( 05 Mar 2020 06:54 )             24.0     03-05    133<L>  |  97  |  72<H>  ----------------------------<  104<H>  4.6   |  23  |  5.70<H>    Ca    7.7<L>      05 Mar 2020 06:54  Phos  5.7     03-05  Mg     2.9     03-05    TPro  5.2<L>  /  Alb  1.9<L>  /  TBili  0.8  /  DBili  x   /  AST  37  /  ALT  46  /  AlkPhos  84  03-05    PT/INR - ( 05 Mar 2020 06:54 )   PT: 17.6 sec;   INR: 1.55 ratio         PTT - ( 05 Mar 2020 06:54 )  PTT:28.2 sec      RADIOLOGY & ADDITIONAL TESTS:

## 2020-03-05 NOTE — BEHAVIORAL HEALTH ASSESSMENT NOTE - NSBHCHARTREVIEWVS_PSY_A_CORE FT
Vital Signs Last 24 Hrs  T(C): 36.8 (05 Mar 2020 08:00), Max: 37.1 (04 Mar 2020 17:30)  T(F): 98.3 (05 Mar 2020 08:00), Max: 98.8 (04 Mar 2020 17:30)  HR: 86 (05 Mar 2020 09:00) (80 - 97)  BP: 89/55 (05 Mar 2020 09:00) (80/51 - 109/54)  BP(mean): 67 (05 Mar 2020 09:00) (60 - 79)  RR: 33 (05 Mar 2020 09:00) (18 - 36)  SpO2: 94% (05 Mar 2020 09:00) (92% - 100%)

## 2020-03-05 NOTE — PROGRESS NOTE ADULT - ASSESSMENT
h/o nhl- gastric maltoma  abnormal ct  left empyema, sp drainage  gastric wall thickening  roberto       prior nc ct ap showing gastric wall thickening  per dtr has had op egd w/in past yr  gerd precautions  cont pepcid  cont marinol  cont bowel regimen  diet as tolerated  monitor cbc daily  dialysis per renal  further management per primary; cont icu care    Advanced care planning was discussed with patient and family.  Advanced care planning forms were reviewed and discussed.  Risks, benefits and alternatives of gastroenterologic procedures were discussed in detail and all questions were answered.    30 minutes spent.

## 2020-03-05 NOTE — BEHAVIORAL HEALTH ASSESSMENT NOTE - NSBHCHARTREVIEWLAB_PSY_A_CORE FT
8.2    7.59  )-----------( 313      ( 05 Mar 2020 06:54 )             24.0   03-05    133<L>  |  97  |  72<H>  ----------------------------<  104<H>  4.6   |  23  |  5.70<H>    Ca    7.7<L>      05 Mar 2020 06:54  Phos  5.7     03-05  Mg     2.9     03-05    TPro  5.2<L>  /  Alb  1.9<L>  /  TBili  0.8  /  DBili  x   /  AST  37  /  ALT  46  /  AlkPhos  84  03-05

## 2020-03-05 NOTE — PROGRESS NOTE ADULT - ASSESSMENT
89 y/o man known to our office, dx'd w gastric MALToma 2013, bone marrow and 2017 repeat gastric bx c/w Chronic Lymphocytic Leukemia/Small Lymphocytic Lymphoma, under serial monitoring w EGD and PETCT, has not required treatment. Also hx B12 deficiency.  Adm w left empyema, hx URI sx Jan 2020  Post IR pigtail insertion w minimal output and tx to ICU for dornase/alteplase adm w resultant good output  Now w progressive worsening of GUS    -on admission also w prolonged INR, improved w Vitamin K  -further drop in H/H yesterday-due to episode of gross hematuria w clot,, stable H/H today, no further active bleed noted. DNAse and tPA on hold due to episode  -CT chest still w collection, on Zosyn  -renal function continue to worsen but slowing, Nephrology on case   3/4 case was discussed with Dr Moser and labs and notes were reviewed in detail. He has gross hematuria and left sided empyema is being treated with a pigtail cath.   continues with decreased hgb, hypotension.    to continue current ICU care

## 2020-03-05 NOTE — PROVIDER CONTACT NOTE (OTHER) - ASSESSMENT
Less than 5cc's drainage from Chest tube, no crepitus or emphysema, DSG dry and intact and no knick in lines.

## 2020-03-05 NOTE — PROGRESS NOTE ADULT - SUBJECTIVE AND OBJECTIVE BOX
CHARLY LU is a 89yMale , patient examined and chart reviewed.     INTERVAL HPI/ OVERNIGHT EVENTS:   Awake. No distress. SP HD x 1 yesterday.  Afebrile. Daughter at bedside.  BP labile.     PAST MEDICAL & SURGICAL HISTORY:  GI Bleed: 2007  Stomach Ulcer: H pylori 2007, treated with antibiotics  Osteoarthritis  Hyperlipidemia  Herniated Disc  Spinal Stenosis  Diverticulitis  GERD (Gastroesophageal Reflux Disease)  Chronic Lymphocytic Leukemia: followed by oncologist in [place]  S/P Tonsillectomy: childhood  Status Post Arthroscopy: right shoulder  History of Arthroscopy of Knee: bilateral    For details regarding the patient's social history, family history, and other miscellaneous elements, please refer the initial infectious diseases consultation and/or the admitting history and physical examination for this admission.    ROS:  CONSTITUTIONAL:  Negative fever or chills  EYES:  Negative  blurry vision or double vision  CARDIOVASCULAR:  Negative for chest pain or palpitations  RESPIRATORY:  Negative for cough, wheezing, or SOB   GASTROINTESTINAL:  Negative for nausea, vomiting, diarrhea, constipation, or abdominal pain  GENITOURINARY:  Negative frequency, urgency or dysuria  NEUROLOGIC:  No headache, confusion, dizziness, lightheadedness  All other systems were reviewed and are negative     Current inpatient medications :    ANTIBIOTICS/RELEVANT:  piperacillin/tazobactam IVPB.. 3.375 Gram(s) IV Intermittent every 12 hours    MEDICATIONS  (STANDING):  albumin human 25% IVPB 50 milliLiter(s) IV Intermittent every 6 hours  alteplase  Injectable for Pleural Effusion 10 milliGRAM(s) IntraPleural. every 12 hours  atorvastatin 10 milliGRAM(s) Oral at bedtime  chlorhexidine 2% Cloths 1 Application(s) Topical <User Schedule>  chlorhexidine 4% Liquid 1 Application(s) Topical <User Schedule>  dornase brenda Solution for Pleural Effusion 5 milliGRAM(s) IntraPleural. every 12 hours  dronabinol 2.5 milliGRAM(s) Oral two times a day  epoetin brenda Injectable 84046 Unit(s) IV Push <User Schedule>  famotidine    Tablet 20 milliGRAM(s) Oral daily  heparin  Injectable 5000 Unit(s) SubCutaneous every 8 hours  HYDROmorphone  Injectable 1 milliGRAM(s) IV Push <User Schedule>  lactobacillus acidophilus 1 Tablet(s) Oral three times a day  melatonin 6 milliGRAM(s) Oral at bedtime  midodrine 10 milliGRAM(s) Oral every 8 hours  norepinephrine Infusion 0.05 MICROgram(s)/kG/Min (8.081 mL/Hr) IV Continuous <Continuous>  polyethylene glycol 3350 17 Gram(s) Oral two times a day  sevelamer carbonate 800 milliGRAM(s) Oral three times a day with meals    MEDICATIONS  (PRN):  acetaminophen   Tablet .. 650 milliGRAM(s) Oral every 6 hours PRN Temp greater or equal to 38C (100.4F), Mild Pain (1 - 3)  albuterol/ipratropium for Nebulization 3 milliLiter(s) Nebulizer every 6 hours PRN Shortness of Breath and/or Wheezing  aluminum hydroxide/magnesium hydroxide/simethicone Suspension 30 milliLiter(s) Oral every 6 hours PRN Dyspepsia  guaiFENesin   Syrup  (Sugar-Free) 200 milliGRAM(s) Oral every 6 hours PRN Cough  sodium chloride 0.9% lock flush 10 milliLiter(s) IV Push every 1 hour PRN Pre/post blood products, medications, blood draw, and to maintain line patency      Objective:  ICU Vital Signs Last 24 Hrs  T(C): 36.8 (05 Mar 2020 15:49), Max: 37.1 (04 Mar 2020 17:30)  T(F): 98.3 (05 Mar 2020 15:49), Max: 98.8 (04 Mar 2020 17:30)  HR: 92 (05 Mar 2020 16:00) (68 - 97)  BP: 100/54 (05 Mar 2020 16:00) (78/45 - 109/54)  BP(mean): 73 (05 Mar 2020 16:00) (57 - 76)  RR: 34 (05 Mar 2020 16:00) (18 - 38)  SpO2: 95% (05 Mar 2020 16:00) (92% - 96%)      Physical Exam:  General:  no acute distress  Eyes: sclera anicteric, pupils equal and reactive to light  ENMT: buccal mucosa moist, pharynx not injected  Neck: supple, trachea midline  Lungs: decreased no wheeze/rhonchi Left lung pigtail  Cardiovascular: regular rate and rhythm, S1 S2  Abdomen: soft, nontender, no organomegaly present, bowel sounds normal  Neurological: alert and oriented x3, Cranial Nerves II-XII grossly intact  Skin: no increased ecchymosis/petechiae/purpura  Lymph Nodes: no palpable cervical/supraclavicular lymph nodes enlargements  Extremities: +edema    LABS:                        8.2    7.59  )-----------( 313      ( 05 Mar 2020 06:54 )             24.0   03-05    133<L>  |  97  |  72<H>  ----------------------------<  104<H>  4.6   |  23  |  5.70<H>    Ca    7.7<L>      05 Mar 2020 06:54  Phos  5.7     03-05  Mg     2.9     03-05    TPro  5.2<L>  /  Alb  1.9<L>  /  TBili  0.8  /  DBili  x   /  AST  37  /  ALT  46  /  AlkPhos  84  03-05    MICROBIOLOGY:  Culture - Body Fluid with Gram Stain (02.27.20 @ 21:38)    Gram Stain:   polymorphonuclear leukocytes  No organisms seen  by cytocentrifuge    Specimen Source: .Body Fluid Pleural Fluid    Culture Results:   No growth    Culture - Blood (collected 26 Feb 2020 22:29)  Source: .Blood Blood-Peripheral  Preliminary Report (27 Feb 2020 23:01):    No growth to date.    Culture - Blood (collected 26 Feb 2020 22:29)  Source: .Blood Blood-Peripheral  Preliminary Report (27 Feb 2020 23:01):    No growth to date.    Culture - Urine (collected 26 Feb 2020 11:46)  Source: .Urine Catheterized  Final Report (27 Feb 2020 15:05):    50,000 - 99,000 CFU/mL Coag Negative Staphylococcus    Susceptibilites not performed.    Culture - Blood (collected 25 Feb 2020 22:38)  Source: .Blood Blood-Peripheral  Preliminary Report (26 Feb 2020 23:01):    No growth to date.    Culture - Blood (collected 25 Feb 2020 22:38)  Source: .Blood Blood-Peripheral  Preliminary Report (26 Feb 2020 23:01):    No growth to date.    Legionella pneumophila Antigen, Urine (02.27.20 @ 02:53)    Legionella Antigen, Urine: Negative    Streptococcus Pneumoniae Ag Urine (02.27.20 @ 04:25)    Streptococcus Pneumoniae Ag Urine: Negative    RADIOLOGY & ADDITIONAL STUDIES:  EXAM:  CT RENAL STONE HUNT                          EXAM:  CT CHEST                                  PROCEDURE DATE:  02/25/2020          INTERPRETATION:  CLINICAL HISTORY:  Fever, left back and flank pain. History of gastric lymphoma.    Multiple axial images of the chest, abdomen and pelvis were obtained from the lung apices through symphysis pubis without the administration of oral or intravascular contrast limiting the sensitivity of evaluation. Reformatted coronal and sagittal images are submitted.    COMPARISON: PET/CT evaluation 6/19/2019.    FINDINGS: New moderately sized left pleural effusion is identified with portions that are loculated; air attenuation within the effusion as well as within the left pleural space suggests the presence of left-sided empyema. Consolidation is identified within the lingular segment as well as left lower lobe lung parenchyma; underlying neoplasm cannot be excluded. Hazy opacity within the left upper lobe is likely related to atelectasis. Subpleural opacity within the posterior right lower lobe and right middle lobe likely related to subpleural fibrosis. There is no evidence for right-sided pleural effusion or air.    No abnormally enlarged mediastinal or hilar lymph nodes are noted. There is no evidence for axillary lymphadenopathy. The heart size appears within normal limits. No pericardial effusion is identified. Thoracic aortic caliber demonstrates unremarkable caliber. Coronary arterial calcifications identified.    The central bronchial anatomy appears patent.    No mediastinal shift is noted.    The liver is normal in size. No focal hepatic masses are identified. There is no evidence for intrahepatic or extrahepatic biliary dilatation. A large gallstone is noted. No gallbladder wall thickening or pericholecystic fluid identified.    The spleen, pancreas and adrenal glands are unremarkable.    There is no evidence for hydronephrosis. No right renal calculi are identified. An 8 mm calculus is identified within the left-sided extrarenal pelvis, unchanged. There is no change in a 6 mm calculus identified within the distal right ureter at the level of the UVJ, without evidence for obstructive uropathy. A 1.3 cm right renal cyst is noted. The abdominal aorta is normal in course and caliber. No abnormally enlarged retroperitoneal or pelvic lymphadenopathy is noted.    Gastric wall thickening is identified allowing for nondistention with oral contrast; clinical correlation is suggested in light of the provided clinical history of gastric lymphoma.    Fecal material is scattered throughout the colon. There is no evidence for mechanical bowel obstruction. Colonic diverticulosis is noted. No colonic wall thickening is identified. There is no evidence for acute appendicitis.There is no evidence for free intraperitoneal air or fluid.    The prostate is enlarged and the urinary bladder appear unremarkable.     Postsurgical changes of the lumbar spine noted. Postprocedural changes of the right shoulder noted. Degenerative changes of the spine evident.    IMPRESSION:    1. New moderately sized left pleural effusion is identified with portions that are loculated; air attenuation within the effusion as well as within the left pleural space suggests the presence of left-sided empyema. Consolidation is identified within the lingular segment as well as left lower lobe lung parenchyma; underlying neoplasm cannot be excluded.   2. Gastric wall thickening is identified allowing for nondistention with oral contrast; clinicalcorrelation is suggested in light of the provided clinical history of gastric lymphoma.  3. An 8 mm calculus is identified within the left-sided extrarenal pelvis, unchanged. There is no change in a 6 mm calculus identified within the distal right ureter at the level of the UVJ, without evidence for obstructive uropathy. No evidence for bilateral hydronephrosis.      EXAM:  CT CHEST                            PROCEDURE DATE:  03/01/2020          INTERPRETATION:  Clinical information: Left-sided empyema    Comparison exam dated 2/25/2020    Axial images obtained, coronal and sagittal images computer reformatted    Noncontrast exam limits evaluation of hilar and mediastinal regions.    A left-sided pigtail catheter is present in the left pleural space. Loculated effusion with associated dense consolidation is present, the appearance is slightly more prominentsince the prior exam.    No thoracic aortic aneurysm or pericardial effusion. Cardiomegaly present. Coronary artery calcifications identified. The central airway appears intact. The thyroid gland is not enlarged.    Minimal atelectatic change right lung base. Calcification visible in the right pleura. Peribronchial thickening right infrahilar region. No pneumothorax.    The stomach is filled with food and air. A large gallstone is noted. The adrenal glands are not enlarged. The spleen is not enlarged. No acute appearing osseous abnormalities. Soft tissue anchors right humeral head. Air bubbles visible in the subcutaneous soft tissues left lateral lower thorax.    IMPRESSION: A left-sided pigtail catheter is present in the pleural space. Loculated effusion with associated dense consolidation is present, appearance slightly more prominent when compared to prior exam.        EXAM:  US KIDNEYS AND BLADDER                            PROCEDURE DATE:  03/01/2020          INTERPRETATION:    PROCEDURE INFORMATION:   Exam: US Retroperitoneal Limited, Kidneys   Exam date and time: 3/1/2020 6:31 AM   Age: 89 years old   Clinical indication: Other: Gus     TECHNIQUE:   Imaging protocol: Real-time ultrasound of the retroperitoneum with image   documentation. Examination was focused on the kidneys.     COMPARISON:   No relevant prior studies available.     FINDINGS:   Right kidney: The right kidney measures 11.8 cm in length. Normal parenchymal   echogenicity. No hydronephrosis.   Left kidney: The left kidney measures 11.7 cm in length. Normal parenchymal   echogenicity. Slightly irregular margin. No hydronephrosis.   Prostate: The prostate measures up to 3.7 cm in size.   Bladder: Bladder volume calculated to be 104 mL. No bladder wall thickening   allowing for lack of full distention. No bladder calculus.     IMPRESSION:   No hydronephrosis.        Assessment :   CHARLY LU is a 89y Male PMH CLL, gastric lymphoma ex smoker, hx of asbestos exp presenting to the hospital with cough, fever and left sided pleuritic chest pain. Febrile to 102 found to have left sided empyema. Seen by pulm and CT surgery. Sp IR drainage with pigtail placement 2/2/7/2020. Transferred to ICU sec tpa/dornase therapy through pigtail now on hold sec complications of hematuria. Had large clot and nick blood/mixed with urine. Mcnulty irrigation performed complicated with GUS. Worsening GUS- started on HD per renal yesterday. For repeat TPA via pigtail today. Started on pressors for BP support,     Plan :  Cont Zosyn day 8/10  Trend temps and cbc  Trend renal fx  CT surgery following  HD per renal     D/w ICU team    Continue with present regiment.  Appropriate use of antibiotics and adverse effects reviewed.      I have discussed the above plan of care with patient/ family in detail. They expressed understanding of the  treatment plan . Risks, benefits and alternatives discussed in detail. I have asked if they have any questions or concerns and appropriately addressed them to the best of my ability .    Critical care > 35 minutes were spent in direct patient care reviewing notes, medications ,labs data/ imaging , discussion with multidisciplinary team.    Thank you for allowing me to participate in care of your patient .    Jose Kiran MD  Infectious Disease  [phone]

## 2020-03-05 NOTE — PROGRESS NOTE ADULT - SUBJECTIVE AND OBJECTIVE BOX
Date/Time Patient Seen:  		  Referring MD:   Data Reviewed	       Patient is a 89y old  Male who presents with a chief complaint of Left Empyema (05 Mar 2020 06:43)      Subjective/HPI     PAST MEDICAL & SURGICAL HISTORY:  GI Bleed: 2007  Stomach Ulcer: H pylori 2007, treated with antibiotics  Osteoarthritis  Hyperlipidemia  Herniated Disc  Spinal Stenosis  Diverticulitis  GERD (Gastroesophageal Reflux Disease)  Chronic Lymphocytic Leukemia: followed by oncologist in [place]  S/P Tonsillectomy: childhood  Status Post Arthroscopy: right shoulder  History of Arthroscopy of Knee: bilateral  Osteoarthritis  Stomach Ulcer: H pylori, treated with antibiotics        Medication list         MEDICATIONS  (STANDING):  albumin human 25% IVPB 50 milliLiter(s) IV Intermittent every 6 hours  atorvastatin 10 milliGRAM(s) Oral at bedtime  chlorhexidine 2% Cloths 1 Application(s) Topical <User Schedule>  chlorhexidine 4% Liquid 1 Application(s) Topical <User Schedule>  dronabinol 2.5 milliGRAM(s) Oral two times a day  epoetin brenda Injectable 64125 Unit(s) IV Push <User Schedule>  famotidine    Tablet 20 milliGRAM(s) Oral daily  heparin  Injectable 5000 Unit(s) SubCutaneous every 8 hours  lactobacillus acidophilus 1 Tablet(s) Oral three times a day  melatonin 6 milliGRAM(s) Oral at bedtime  midodrine 10 milliGRAM(s) Oral every 8 hours  piperacillin/tazobactam IVPB.. 3.375 Gram(s) IV Intermittent every 12 hours  polyethylene glycol 3350 17 Gram(s) Oral two times a day  sevelamer carbonate 800 milliGRAM(s) Oral three times a day with meals  tamsulosin 0.4 milliGRAM(s) Oral at bedtime    MEDICATIONS  (PRN):  acetaminophen   Tablet .. 650 milliGRAM(s) Oral every 6 hours PRN Temp greater or equal to 38C (100.4F), Mild Pain (1 - 3)  albuterol/ipratropium for Nebulization 3 milliLiter(s) Nebulizer every 6 hours PRN Shortness of Breath and/or Wheezing  aluminum hydroxide/magnesium hydroxide/simethicone Suspension 30 milliLiter(s) Oral every 6 hours PRN Dyspepsia  guaiFENesin   Syrup  (Sugar-Free) 200 milliGRAM(s) Oral every 6 hours PRN Cough  sodium chloride 0.9% lock flush 10 milliLiter(s) IV Push every 1 hour PRN Pre/post blood products, medications, blood draw, and to maintain line patency         Vitals log        ICU Vital Signs Last 24 Hrs  T(C): 36.7 (05 Mar 2020 04:05), Max: 37.1 (04 Mar 2020 17:30)  T(F): 98 (05 Mar 2020 04:05), Max: 98.8 (04 Mar 2020 17:30)  HR: 87 (05 Mar 2020 06:00) (80 - 97)  BP: 85/54 (05 Mar 2020 06:00) (80/51 - 113/75)  BP(mean): 64 (05 Mar 2020 06:00) (60 - 88)  ABP: --  ABP(mean): --  RR: 29 (05 Mar 2020 06:00) (18 - 29)  SpO2: 94% (05 Mar 2020 06:00) (92% - 100%)           Input and Output:  I&O's Detail    04 Mar 2020 07:01  -  05 Mar 2020 07:00  --------------------------------------------------------  IN:    Albumin 25%: 100 mL    Lactated Ringers IV Bolus: 500 mL    Oral Fluid: 674 mL    Other: 1000 mL    Solution: 100 mL  Total IN: 2374 mL    OUT:    Chest Tube: 155 mL    Indwelling Catheter - Urethral: 635 mL    Other: 1500 mL  Total OUT: 2290 mL    Total NET: 84 mL          Lab Data                        8.2    7.59  )-----------( 313      ( 05 Mar 2020 06:54 )             24.0     03-05    133<L>  |  97  |  72<H>  ----------------------------<  104<H>  4.6   |  23  |  5.70<H>    Ca    7.7<L>      05 Mar 2020 06:54  Phos  5.7     03-05  Mg     2.9     03-05    TPro  5.2<L>  /  Alb  1.9<L>  /  TBili  0.8  /  DBili  x   /  AST  37  /  ALT  46  /  AlkPhos  84  03-05            Review of Systems	      Objective     Physical Examination    heart s1s2  lung dec BS  abd soft  head nc  head at  frail  weak      Pertinent Lab findings & Imaging      [place]:  NO   Adequate UO     I&O's Detail    04 Mar 2020 07:01  -  05 Mar 2020 07:00  --------------------------------------------------------  IN:    Albumin 25%: 100 mL    Lactated Ringers IV Bolus: 500 mL    Oral Fluid: 674 mL    Other: 1000 mL    Solution: 100 mL  Total IN: 2374 mL    OUT:    Chest Tube: 155 mL    Indwelling Catheter - Urethral: 635 mL    Other: 1500 mL  Total OUT: 2290 mL    Total NET: 84 mL               Discussed with:     Cultures:	        Radiology

## 2020-03-05 NOTE — PROGRESS NOTE ADULT - ASSESSMENT
1.	GUS: ATN (Nephrotoxic, Ischemic)  2.	Loculated pleural effusions, s/p Chest tube  3.	Anemia  4.	Hypotension    s/p HD yesterday. Will hold HD today. Low BP. Monitor UO. To continue current meds. Avoid nephrotoxic meds as possible.   Monitor h/h trend. Avoid ACEI, ARB, NSAIDs and IV contrast. ICU management. D/w ICU team.

## 2020-03-05 NOTE — PROGRESS NOTE ADULT - ASSESSMENT
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 Transferred to ICU for tPA/dornase brenda via chest tube. Found to have GUS 2/2 ATN, likely due to vancomycin.    1. Neuro: pain control with acetaminophen and hydromorphone prn    2. CV: HD stable, continue statin. Cards following.    3. Pulm: Sat well on RA. Chest tube output 560 mL serosanguinous drainage over 24hr. Continue chest tube drainage, monitor output. Encourage incentive spirometry. Pulm following. No plan for Sx per discussion w/ CT Sx. FU Rpt CXR.    4. GI: regular diet as tolerated with Ensure, continue dronabinol. On lactobacillus. GERD precautions. GI following.    5. Renal: GUS of unclear etiology, likely drug toxicity due to vancomycin. Renally dose meds. Output Noted. continue to monitor strict I/Os, trend kidney function and lytes. s/p Lasix 20.  FU Uro c/s. Keep celestin. c/w phos binder. Plan for HD today. Nephro following.    6. ID: Continue Zosyn. UCx NGF. FU Pleural Cx/Fungi/AFB. ID following.    7. Heme: H/H drop noted. Trend CBC. HSQ for DVT ppx. Maltoma and CLL being monitored, not on active treatment. H/O following.    8. Endo: no active issues    9. Skin: no lines, + celestin. + L Chest tube.    10. 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 Transferred to ICU for tPA/dornase brenda via chest tube. Found to have GUS 2/2 ATN, likely due to vancomycin.    1. Neuro: pain control with acetaminophen and hydromorphone prn    2. CV: HD stable, continue statin. Cards following.    3. Pulm: Sat well on RA. Chest tube output -155mL serosanguinous drainage over 24hr, significant decrease overnight. Continue chest tube drainage, monitor output. Encourage incentive spirometry. Pulm following. No plan for Sx per discussion w/ CT Sx.     4. GI: regular diet as tolerated with Ensure, continue dronabinol. On lactobacillus. GERD precautions. GI following.    5. Renal: GUS of unclear etiology, likely drug toxicity due to vancomycin. Renally dose meds. Output Noted. continue to monitor strict I/Os, trend kidney function and lytes. FU Uro c/s. Keep celestin. c/w phos binder. s/p HD -500mL. Nephro following.    6. ID: Continue Zosyn. UCx NGF. FU Pleural Cx/Fungi/AFB. ID following.    7. Heme: H/H drop noted. Trend CBC. HSQ for DVT ppx. Maltoma and CLL being monitored, not on active treatment. H/O following.    8. Endo: no active issues    9. Skin: +L IJ for HD, + celestin. + L Chest tube.    10. 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 Transferred to ICU for tPA/dornase brenda via chest tube. Found to have GUS 2/2 ATN, likely due to vancomycin.    1. Neuro: pain control with acetaminophen  prn    2. CV: HD stable, continue statin. Cards following.    3. Pulm: Sat well on RA. Chest tube output -155mL serosanguinous drainage over 24hr, significant decrease overnight. Continue chest tube drainage, monitor output. Encourage incentive spirometry. Pulm following. No plan for Sx per discussion w/ CT Sx.     4. GI: regular diet as tolerated with Ensure, continue dronabinol. On lactobacillus. GERD precautions. GI following.    5. Renal: GUS of unclear etiology, likely drug toxicity due to vancomycin. Renally dose meds. Output Noted. continue to monitor strict I/Os, trend kidney function and lytes. FU Uro c/s. Keep celestin. c/w phos binder. s/p HD -500mL. Nephro following.    6. ID: Continue Zosyn. UCx NGF. FU Pleural Cx/Fungi/AFB. ID following.    7. Heme: H/H drop noted. Trend CBC. HSQ for DVT ppx. Maltoma and CLL being monitored, not on active treatment. H/O following.    8. Endo: no active issues    9. Skin: +L IJ for HD, + ceelstin. + L Chest tube.    10. 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 Transferred to ICU for tPA/dornase brenda via chest tube. Found to have GUS 2/2 ATN, likely due to vancomycin.    1. Neuro: pain control with acetaminophen PRN, dilaudid prior to alteplase    2. CV: HD stable, continue statin. Cards following. c/w albumin, start midodrine for low bp    3. Pulm: Sat well on RA. Chest tube output -155mL serosanguinous drainage over 24hr, significant decrease overnight. Restart alteplase/dornase via chest tube for at least 2 doses. Continue chest tube drainage, monitor output. Encourage incentive spirometry. Pulm following. No plan for Sx per discussion w/ CT Sx.     4. GI: regular diet as tolerated with Ensure, continue dronabinol. On lactobacillus. GERD precautions. GI following.    5. Renal: GUS of unclear etiology, likely drug toxicity due to vancomycin. Renally dose meds. Output Noted. continue to monitor strict I/Os, trend kidney function and lytes. FU Uro c/s. Keep celestin. c/w phos binder. s/p HD -500mL. Hold HD today. Nephro following.    6. ID: Continue Zosyn. UCx NGF. FU Pleural Cx/Fungi/AFB. ID following.    7. Heme: H/H drop noted. Trend CBC. HSQ for DVT ppx. Maltoma and CLL being monitored, not on active treatment. H/O following.    8. Endo: no active issues    9. Skin: +L IJ for HD, + celestin. + L Chest tube.    10. 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 Transferred to ICU for tPA/dornase brenda via chest tube. Found to have GUS 2/2 ATN, likely due to vancomycin.    1. Neuro: pain control with acetaminophen PRN, dilaudid prior to alteplase    2. CV: HD stable, continue statin. Cards following. c/w albumin, start midodrine for low bp    3. Pulm: Sat well on RA. Chest tube output -155mL serosanguinous drainage over 24hr, significant decrease overnight. Restart alteplase/dornase via chest tube for at least 2 doses. Continue chest tube drainage, monitor output. Encourage incentive spirometry. Pulm following. No plan for Sx per discussion w/ CT Sx.     4. GI: regular diet as tolerated with Ensure, continue dronabinol. On lactobacillus. GERD precautions. GI following.    5. Renal: GUS of unclear etiology, likely drug toxicity due to vancomycin. Renally dose meds. Avoid nephrotoxic agents (Avoid ACEI, ARB, NSAIDs, IV contrast). Output Noted. continue to monitor strict I/Os, trend kidney function and lytes. FU Uro c/s. Keep celestin. c/w phos binder. s/p HD -500mL. Hold HD today. Nephro following.    6. ID: Continue Zosyn. UCx NGF. FU Pleural Cx/Fungi/AFB. ID following.    7. Heme: H/H drop noted. Trend CBC. HSQ for DVT ppx. Maltoma and CLL being monitored, not on active treatment. H/O following.    8. Endo: no active issues    9. Skin: +L IJ for HD, + celestin. + L Chest tube.    10. 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 Transferred to ICU for tPA/dornase brenda via chest tube. Found to have GUS 2/2 ATN, likely due to vancomycin.    1. Neuro: pain control with acetaminophen PRN, dilaudid prior to alteplase    2. CV: HD stable, continue statin. Cards following. c/w albumin, start midodrine for low bp    3. Pulm: Sat well on RA. Chest tube output -155mL serosanguinous drainage over 24hr, significant decrease overnight. Restart alteplase/dornase via chest tube for at least 2 doses. Continue chest tube drainage, monitor output. Encourage incentive spirometry. Pulm following. No plan for Sx per discussion w/ CT Sx.     4. GI: regular diet as tolerated with Ensure, continue dronabinol. On lactobacillus. GERD precautions. GI following.    5. Renal: GUS of unclear etiology, likely drug toxicity due to vancomycin. Renally dose meds. Avoid nephrotoxic agents (Avoid ACEI, ARB, NSAIDs, IV contrast). Output Noted. continue to monitor strict I/Os, trend kidney function and lytes. FU Uro c/s. Keep celestin. c/w phos binder. s/p HD -500mL. Hold HD today. Nephro following.    6. ID: Continue Zosyn. UCx NGF. Pleural Cx NGF FU Pleural Fungi/AFB. ID following.    7. Heme: H/H drop noted. Trend CBC. HSQ for DVT ppx. Maltoma and CLL being monitored, not on active treatment. H/O following.    8. Endo: no active issues    9. Skin: +L IJ for HD, + celestin. + L Chest tube.    10. Dispo: full code, Condition improved, stable for transfer to

## 2020-03-05 NOTE — BEHAVIORAL HEALTH ASSESSMENT NOTE - NSBHCHARTREVIEWIMAGING_PSY_A_CORE FT
< from: Xray Chest 1 View- PORTABLE-Urgent (03.04.20 @ 16:03) >    EXAM:  XR CHEST PORTABLE URGENT 1V                            PROCEDURE DATE:  03/04/2020          INTERPRETATION:  INDICATION: Evaluation following central venous catheter introduction.    PRIORS: 3/4/2020    VIEWS: Portable AP radiography of the chest performed.    FINDINGS: Heart size appears at the upper limits of normal. Since prior evaluation a left IJ central venous catheter has been introduced, the distal tip of which overlies the expected region of the brachiocephalic venous confluence. There is no significant interval change in position of an indwelling pigtail drainage catheter which overlies the left lower lung field. Opacity throughout the left thorax remains unchanged, likely representative of continued loculated left pleural effusion and left lower lobe consolidation/atelectasis. There is no evidence for acute right lung infiltrate. No right pleural effusion is noted. There is no evidence for pneumothorax. No mediastinal shift is noted. Degenerative change of the thoracicspine noted.    IMPRESSION: No evidence of pneumothorax following central venous catheter introduction.                CASA BAUTISTA     < end of copied text >

## 2020-03-05 NOTE — BEHAVIORAL HEALTH ASSESSMENT NOTE - NSBHCHARTREVIEWINVESTIGATE_PSY_A_CORE FT
< from: 12 Lead ECG (02.25.20 @ 18:14) >    Ventricular Rate 100 BPM    Atrial Rate 100 BPM    P-R Interval 194 ms    QRS Duration 90 ms    Q-T Interval 332 ms    QTC Calculation(Bezet) 428 ms    P Axis 57 degrees    R Axis -46 degrees    T Axis 60 degrees    Diagnosis Line Normal sinus rhythm  Left axis deviation  Inferior infarct , age undetermined  Abnormal ECG  No previous ECGs available  Confirmed by Vince Hopkins MD ([id number]) on 2/26/2020 11:18:08 PM    < end of copied text >

## 2020-03-05 NOTE — PROGRESS NOTE ADULT - SUBJECTIVE AND OBJECTIVE BOX
CC:  Patient is a 89y old  Male who presents with a chief complaint of Left Empyema (04 Mar 2020 17:06)      HPI/BRIEF HOSPITAL COURSE:   89 y/o male with PMHx of CLL/MALToma not on active treatment, GERD, HLD, herniated discs/spinal stenosis s/p back surgery on medicinal Marijuana initially presented to [facility] ED w/ worsening back/chest pain. Pt found to have loculated left pleural effusions on CT Chest, concerning for empyema. Pt underwent IR guided left chest tube on 2/27, initially w/ minimal drainage, subsequently transferred to [facility] ED for further management. Pt initially offered VATs, but family wanted more conservative measures. Pt subsequently treated with tPA/Dornase brenda via chest tube. Pt initially with sufficient output s/p first dose tPA now with decreased drainage on 2nd administration (~100-200 cc's, total 1.2L). ICU course complicated by worsening renal failure, Cr 1.00 2/26 to now 5.1, continued oliguria and hematuria (resolved).       Events last 24 hours:   ON pt had minimal output from pigtail. SBP dropped to 90's ON given bolus of albumin and written for 50 q6h     PAST MEDICAL & SURGICAL HISTORY:  GI Bleed: 2007  Stomach Ulcer: H pylori 2007, treated with antibiotics  Osteoarthritis  Hyperlipidemia  Herniated Disc  Spinal Stenosis  Diverticulitis  GERD (Gastroesophageal Reflux Disease)  Chronic Lymphocytic Leukemia: followed by oncologist in [place]  S/P Tonsillectomy: childhood  Status Post Arthroscopy: right shoulder  History of Arthroscopy of Knee: bilateral    Allergies    No Known Allergies    Intolerances      FAMILY HISTORY:  No pertinent family history in first degree relatives      Review of Systems:  ROS unreliable 2/2 to confusion and mental status       Medications:  piperacillin/tazobactam IVPB.. 3.375 Gram(s) IV Intermittent every 12 hours  tamsulosin 0.4 milliGRAM(s) Oral at bedtime  albuterol/ipratropium for Nebulization 3 milliLiter(s) Nebulizer every 6 hours PRN  guaiFENesin   Syrup  (Sugar-Free) 200 milliGRAM(s) Oral every 6 hours PRN  acetaminophen   Tablet .. 650 milliGRAM(s) Oral every 6 hours PRN  dronabinol 2.5 milliGRAM(s) Oral two times a day  melatonin 6 milliGRAM(s) Oral at bedtime  heparin  njectable 5000 Unit(s) SubCutaneous every 8 hours  aluminum hydroxide/magnesium hydroxide/simethicone Suspension 30 milliLiter(s) Oral every 6 hours PRN  famotidine    Tablet 20 milliGRAM(s) Oral daily  polyethylene glycol 3350 17 Gram(s) Oral two times a day  atorvastatin 10 milliGRAM(s) Oral at bedtime  albumin human 25% IVPB 50 milliLiter(s) IV Intermittent every 1 hour  sodium chloride 0.9% lock flush 10 milliLiter(s) IV Push every 1 hour PRN  epoetin brenda Injectable 28010 Unit(s) IV Push <User Schedule>  chlorhexidine 2% Cloths 1 Application(s) Topical <User Schedule>  chlorhexidine 4% Liquid 1 Application(s) Topical <User Schedule>  lactobacillus acidophilus 1 Tablet(s) Oral three times a day  sevelamer carbonate 800 milliGRAM(s) Oral three times a day with meals    ICU Vital Signs Last 24 Hrs  T(C): 36.7 (05 Mar 2020 04:05), Max: 37.1 (04 Mar 2020 17:30)  T(F): 98 (05 Mar 2020 04:05), Max: 98.8 (04 Mar 2020 17:30)  HR: 91 (05 Mar 2020 05:00) (80 - 97)  BP: 86/52 (05 Mar 2020 05:00) (80/51 - 115/57)  BP(mean): 64 (05 Mar 2020 05:00) (60 - 88)  ABP: --  ABP(mean): --  RR: 25 (05 Mar 2020 05:00) (18 - 29)  SpO2: 94% (05 Mar 2020 05:00) (92% - 100%)    Vital Signs Last 24 Hrs  T(C): 36.7 (05 Mar 2020 04:05), Max: 37.1 (04 Mar 2020 17:30)  T(F): 98 (05 Mar 2020 04:05), Max: 98.8 (04 Mar 2020 17:30)  HR: 91 (05 Mar 2020 05:00) (80 - 97)  BP: 86/52 (05 Mar 2020 05:00) (80/51 - 115/57)  BP(mean): 64 (05 Mar 2020 05:00) (60 - 88)  RR: 25 (05 Mar 2020 05:00) (18 - 29)  SpO2: 94% (05 Mar 2020 05:00) (92% - 100%)        I&O's Detail    03 Mar 2020 07:01  -  04 Mar 2020 07:00  --------------------------------------------------------  IN:    Oral Fluid: 340 mL    Solution: 200 mL  Total IN: 540 mL    OUT:    Chest Tube: 560 mL    Indwelling Catheter - Urethral: 625 mL  Total OUT: 1185 mL    Total NET: -645 mL      04 Mar 2020 07:01  -  05 Mar 2020 05:21  --------------------------------------------------------  IN:    Albumin 25%: 50 mL    Oral Fluid: 674 mL    Other: 1000 mL    Solution: 100 mL  Total IN: 1824 mL    OUT:    Chest Tube: 150 mL    Indwelling Catheter - Urethral: 555 mL    Other: 1500 mL  Total OUT: 2205 mL    Total NET: -381 mL            LABS:                        8.9    9.04  )-----------( 327      ( 04 Mar 2020 05:21 )             26.1     03-04    130<L>  |  94<L>  |  90<H>  ----------------------------<  109<H>  5.0   |  22  |  7.30<H>    Ca    8.1<L>      04 Mar 2020 05:21  Phos  7.9     03-04  Mg     3.0     03-04    TPro  5.7<L>  /  Alb  2.0<L>  /  TBili  0.9  /  DBili  x   /  AST  40<H>  /  ALT  49  /  AlkPhos  91  03-04          CAPILLARY BLOOD GLUCOSE        PT/INR - ( 04 Mar 2020 05:21 )   PT: 16.7 sec;   INR: 1.47 ratio         PTT - ( 04 Mar 2020 05:21 )  PTT:28.5 sec    CULTURES:  Culture Results:   No growth (03-01 @ 13:24)  Culture Results:   Culture is being performed. (02-27 @ 21:38)  Culture Results:   No growth at 5 days (02-27 @ 21:38)  Culture Results:   Testing in progress (02-27 @ 21:38)    piperacillin/tazobactam IVPB.. 3.375 Gram(s) IV Intermittent every 12 hours    Physical Examination:  General: No acute distress.  Alert, oriented x2, interactive, confused at times, nonfocal  NEURO: A&O X2, confused at times, motor function 5/5 BL UE/LE  HEENT: Pupils equal, reactive to light.  Symmetric.  PULM: distant at bases CTA , no significant sputum production, no wheezes, rales, rhonchi  CVS: Regular rate and rhythm, no murmurs, rubs, or gallops  ABD: Soft, nondistended, nontender, normoactive bowel sounds, no masses  EXT: No edema, nontender  SKIN: Warm and well perfused, no rashes noted        EKG: NSR     RADIOLOGY: < from: Xray Chest 1 View- PORTABLE-Routine (03.03.20 @ 12:12) >  Frontal expiratory view of the chest shows the heart to be similar in size. Left chest pigtail catheter remains present. The lungs show progression of left infiltrate with pleural thickening and there is no evidence of pneumothorax nor right pleural effusion.    IMPRESSION:  Progression of left infiltrate.    Thank you for the courtesy of this referral.    < end of copied text >        CENTRAL LINE: N          DATE INSERTED:                  SOLIS: Y                        DATE INSERTED:                  A-LINE: N                       DATE INSERTED:                  GLOBAL ISSUE/BEST PRACTICE:  Analgesia: N/A  Sedation: N/A  HOB elevation: yes  Stress ulcer prophylaxis: protonix   VTE prophylaxis: Heparin SQ

## 2020-03-05 NOTE — PROGRESS NOTE ADULT - SUBJECTIVE AND OBJECTIVE BOX
Interval History:  continues in ICU with records reviewed  Chart reviewed and events noted;   Overnight events:    MEDICATIONS  (STANDING):  albumin human 25% IVPB 50 milliLiter(s) IV Intermittent every 6 hours  alteplase  Injectable for Pleural Effusion 10 milliGRAM(s) IntraPleural. every 12 hours  atorvastatin 10 milliGRAM(s) Oral at bedtime  chlorhexidine 2% Cloths 1 Application(s) Topical <User Schedule>  chlorhexidine 4% Liquid 1 Application(s) Topical <User Schedule>  dornase brenda Solution for Pleural Effusion 5 milliGRAM(s) IntraPleural. every 12 hours  dronabinol 2.5 milliGRAM(s) Oral two times a day  epoetin brenda Injectable 66619 Unit(s) IV Push <User Schedule>  famotidine    Tablet 20 milliGRAM(s) Oral daily  heparin  Injectable 5000 Unit(s) SubCutaneous every 8 hours  HYDROmorphone  Injectable 1 milliGRAM(s) IV Push <User Schedule>  lactobacillus acidophilus 1 Tablet(s) Oral three times a day  melatonin 6 milliGRAM(s) Oral at bedtime  midodrine 10 milliGRAM(s) Oral every 8 hours  norepinephrine Infusion 0.05 MICROgram(s)/kG/Min (8.081 mL/Hr) IV Continuous <Continuous>  piperacillin/tazobactam IVPB.. 3.375 Gram(s) IV Intermittent every 12 hours  polyethylene glycol 3350 17 Gram(s) Oral two times a day  sevelamer carbonate 800 milliGRAM(s) Oral three times a day with meals    MEDICATIONS  (PRN):  acetaminophen   Tablet .. 650 milliGRAM(s) Oral every 6 hours PRN Temp greater or equal to 38C (100.4F), Mild Pain (1 - 3)  albuterol/ipratropium for Nebulization 3 milliLiter(s) Nebulizer every 6 hours PRN Shortness of Breath and/or Wheezing  aluminum hydroxide/magnesium hydroxide/simethicone Suspension 30 milliLiter(s) Oral every 6 hours PRN Dyspepsia  guaiFENesin   Syrup  (Sugar-Free) 200 milliGRAM(s) Oral every 6 hours PRN Cough  sodium chloride 0.9% lock flush 10 milliLiter(s) IV Push every 1 hour PRN Pre/post blood products, medications, blood draw, and to maintain line patency      Vital Signs Last 24 Hrs  T(C): 36.8 (05 Mar 2020 15:49), Max: 37.1 (04 Mar 2020 17:30)  T(F): 98.3 (05 Mar 2020 15:49), Max: 98.8 (04 Mar 2020 17:30)  HR: 92 (05 Mar 2020 16:00) (68 - 97)  BP: 100/54 (05 Mar 2020 16:00) (78/45 - 109/54)  BP(mean): 73 (05 Mar 2020 16:00) (57 - 76)  RR: 34 (05 Mar 2020 16:00) (18 - 38)  SpO2: 95% (05 Mar 2020 16:00) (92% - 96%)    PHYSICAL EXAM  General: adult in NAD, chronically ill appearing  HEENT: clear oropharynx, anicteric sclera, pink conjunctivae  Neck: supple  CV: normal S1S2 with no murmur rubs or gallops  Lungs: clear to auscultation, no wheezes, no rhales  Abdomen: soft non-tender non-distended, no hepato/splenomegaly  Ext: no clubbing cyanosis or edema  Skin: no rashes and no petichiae  Neuro: alert       LABS:  CBC Full  -  ( 05 Mar 2020 06:54 )  WBC Count : 7.59 K/uL  RBC Count : 2.64 M/uL  Hemoglobin : 8.2 g/dL  Hematocrit : 24.0 %  Platelet Count - Automated : 313 K/uL  Mean Cell Volume : 90.9 fl  Mean Cell Hemoglobin : 31.1 pg  Mean Cell Hemoglobin Concentration : 34.2 gm/dL  Auto Neutrophil # : 5.49 K/uL  Auto Lymphocyte # : 1.25 K/uL  Auto Monocyte # : 0.72 K/uL  Auto Eosinophil # : 0.05 K/uL  Auto Basophil # : 0.02 K/uL  Auto Neutrophil % : 72.2 %  Auto Lymphocyte % : 16.5 %  Auto Monocyte % : 9.5 %  Auto Eosinophil % : 0.7 %  Auto Basophil % : 0.3 %    03-05    133<L>  |  97  |  72<H>  ----------------------------<  104<H>  4.6   |  23  |  5.70<H>    Ca    7.7<L>      05 Mar 2020 06:54  Phos  5.7     03-05  Mg     2.9     03-05    TPro  5.2<L>  /  Alb  1.9<L>  /  TBili  0.8  /  DBili  x   /  AST  37  /  ALT  46  /  AlkPhos  84  03-05    PT/INR - ( 05 Mar 2020 06:54 )   PT: 17.6 sec;   INR: 1.55 ratio         PTT - ( 05 Mar 2020 06:54 )  PTT:28.2 sec    fe studies      WBC trend  7.59 K/uL (03-05-20 @ 06:54)  9.04 K/uL (03-04-20 @ 05:21)  9.67 K/uL (03-03-20 @ 05:08)      Hgb trend  8.2 g/dL (03-05-20 @ 06:54)  8.9 g/dL (03-04-20 @ 05:21)  9.8 g/dL (03-03-20 @ 05:08)      plt trend  313 K/uL (03-05-20 @ 06:54)  327 K/uL (03-04-20 @ 05:21)  299 K/uL (03-03-20 @ 05:08)        RADIOLOGY & ADDITIONAL STUDIES:

## 2020-03-05 NOTE — PROGRESS NOTE ADULT - SUBJECTIVE AND OBJECTIVE BOX
Patient is a 89y old  Male who presents with a chief complaint of Left Empyema (05 Mar 2020 14:58)      INTERVAL /OVERNIGHT EVENTS: pain better    MEDICATIONS  (STANDING):  albumin human 25% IVPB 50 milliLiter(s) IV Intermittent every 6 hours  alteplase  Injectable for Pleural Effusion 10 milliGRAM(s) IntraPleural. every 12 hours  atorvastatin 10 milliGRAM(s) Oral at bedtime  chlorhexidine 2% Cloths 1 Application(s) Topical <User Schedule>  chlorhexidine 4% Liquid 1 Application(s) Topical <User Schedule>  dornase brenda Solution for Pleural Effusion 5 milliGRAM(s) IntraPleural. every 12 hours  dronabinol 2.5 milliGRAM(s) Oral two times a day  epoetin brenda Injectable 79303 Unit(s) IV Push <User Schedule>  famotidine    Tablet 20 milliGRAM(s) Oral daily  heparin  Injectable 5000 Unit(s) SubCutaneous every 8 hours  HYDROmorphone  Injectable 1 milliGRAM(s) IV Push <User Schedule>  lactobacillus acidophilus 1 Tablet(s) Oral three times a day  melatonin 6 milliGRAM(s) Oral at bedtime  midodrine 10 milliGRAM(s) Oral every 8 hours  norepinephrine Infusion 0.05 MICROgram(s)/kG/Min (8.081 mL/Hr) IV Continuous <Continuous>  piperacillin/tazobactam IVPB.. 3.375 Gram(s) IV Intermittent every 12 hours  polyethylene glycol 3350 17 Gram(s) Oral two times a day  sevelamer carbonate 800 milliGRAM(s) Oral three times a day with meals    MEDICATIONS  (PRN):  acetaminophen   Tablet .. 650 milliGRAM(s) Oral every 6 hours PRN Temp greater or equal to 38C (100.4F), Mild Pain (1 - 3)  albuterol/ipratropium for Nebulization 3 milliLiter(s) Nebulizer every 6 hours PRN Shortness of Breath and/or Wheezing  aluminum hydroxide/magnesium hydroxide/simethicone Suspension 30 milliLiter(s) Oral every 6 hours PRN Dyspepsia  guaiFENesin   Syrup  (Sugar-Free) 200 milliGRAM(s) Oral every 6 hours PRN Cough  sodium chloride 0.9% lock flush 10 milliLiter(s) IV Push every 1 hour PRN Pre/post blood products, medications, blood draw, and to maintain line patency      Allergies    No Known Allergies    Intolerances        REVIEW OF SYSTEMS: denies    Vital Signs Last 24 Hrs  T(C): 36.8 (05 Mar 2020 15:49), Max: 37.1 (04 Mar 2020 17:30)  T(F): 98.3 (05 Mar 2020 15:49), Max: 98.8 (04 Mar 2020 17:30)  HR: 92 (05 Mar 2020 16:00) (68 - 97)  BP: 100/54 (05 Mar 2020 16:00) (78/45 - 109/54)  BP(mean): 73 (05 Mar 2020 16:00) (57 - 76)  RR: 34 (05 Mar 2020 16:00) (18 - 38)  SpO2: 95% (05 Mar 2020 16:00) (92% - 96%)    PHYSICAL EXAM:  GENERAL: NAD, well-groomed, well-developed  HEAD:  Atraumatic, Normocephalic  EYES: EOMI, PERRLA, conjunctiva and sclera clear  ENMT: No tonsillar erythema, exudates, or enlargement; Moist mucous membranes, Good dentition, No lesions  NECK: Supple, No JVD, Normal thyroid  NERVOUS SYSTEM:  Alert & Oriented X3, Good concentration; Motor Strength 5/5 B/L upper and lower extremities; DTRs 2+ intact and symmetric  CHEST/LUNG: Clear to auscultation bilaterally; No rales, rhonchi, wheezing, or rubs, CT draining  HEART: Regular rate and rhythm; No murmurs, rubs, or gallops  ABDOMEN: Soft, Nontender, Nondistended; Bowel sounds present  EXTREMITIES:  2+ Peripheral Pulses, No clubbing, cyanosis, or edema  LYMPH: No lymphadenopathy noted  SKIN: No rashes or lesions    LABS:                        8.2    7.59  )-----------( 313      ( 05 Mar 2020 06:54 )             24.0     05 Mar 2020 06:54    133    |  97     |  72     ----------------------------<  104    4.6     |  23     |  5.70     Ca    7.7        05 Mar 2020 06:54  Phos  5.7       05 Mar 2020 06:54  Mg     2.9       05 Mar 2020 06:54    TPro  5.2    /  Alb  1.9    /  TBili  0.8    /  DBili  x      /  AST  37     /  ALT  46     /  AlkPhos  84     05 Mar 2020 06:54    PT/INR - ( 05 Mar 2020 06:54 )   PT: 17.6 sec;   INR: 1.55 ratio         PTT - ( 05 Mar 2020 06:54 )  PTT:28.2 sec    CAPILLARY BLOOD GLUCOSE          RADIOLOGY & ADDITIONAL TESTS:    Notes Reviewed:  [x ] YES  [ ] NO    Care Discussed with Consultants/Other Providers [ x] YES  [ ] NO

## 2020-03-05 NOTE — PROGRESS NOTE ADULT - SUBJECTIVE AND OBJECTIVE BOX
SURGERY  [facility] [phone]    Patient seen and examined at bedside, no overnight events. Per ICU attending, BP labile, on albumin/boluses, may require pressure suppport. Denies any CP, SOB, cough, nausea/vomiting.    T(C): 36.8 (03-05-20 @ 12:29), Max: 37.1 (03-04-20 @ 17:30)  HR: 68 (03-05-20 @ 14:30) (68 - 97)  BP: 90/65 (03-05-20 @ 14:30) (78/45 - 109/54)  RR: 32 (03-05-20 @ 14:30) (18 - 38)  SpO2: 96% (03-05-20 @ 14:30) (92% - 100%)  Wt(kg): --  ICU Vital Signs Last 24 Hrs  T(C): 36.8 (05 Mar 2020 12:29), Max: 37.1 (04 Mar 2020 17:30)  T(F): 98.2 (05 Mar 2020 12:29), Max: 98.8 (04 Mar 2020 17:30)  HR: 68 (05 Mar 2020 14:30) (68 - 97)  BP: 90/65 (05 Mar 2020 14:30) (78/45 - 109/54)  BP(mean): 74 (05 Mar 2020 14:30) (57 - 76)  ABP: --  ABP(mean): --  RR: 32 (05 Mar 2020 14:30) (18 - 38)  SpO2: 96% (05 Mar 2020 14:30) (92% - 100%)    CAPILLARY BLOOD GLUCOSE        I&O's Detail    04 Mar 2020 07:01  -  05 Mar 2020 07:00  --------------------------------------------------------  IN:    Albumin 25%: 100 mL    Lactated Ringers IV Bolus: 500 mL    Oral Fluid: 674 mL    Other: 1000 mL    Solution: 100 mL  Total IN: 2374 mL    OUT:    Chest Tube: 155 mL    Indwelling Catheter - Urethral: 665 mL    Other: 1500 mL  Total OUT: 2320 mL    Total NET: 54 mL      05 Mar 2020 07:01  -  05 Mar 2020 14:58  --------------------------------------------------------  IN:    Albumin 25%: 50 mL    Oral Fluid: 150 mL  Total IN: 200 mL    OUT:    Indwelling Catheter - Urethral: 150 mL  Total OUT: 150 mL    Total NET: 50 mL        I&O's Summary    04 Mar 2020 07:01  -  05 Mar 2020 07:00  --------------------------------------------------------  IN: 2374 mL / OUT: 2320 mL / NET: 54 mL    05 Mar 2020 07:01  -  05 Mar 2020 14:58  --------------------------------------------------------  IN: 200 mL / OUT: 150 mL / NET: 50 mL    Physical exam: Pt sitting comfortably in bed in NAD, Left IJ in place  Chest- Dec breath sounds, Chest tube in place.  CV- S1 & S2, RRR  Abdomen- Soft, non-tender, non-distended                        8.2    7.59  )-----------( 313      ( 05 Mar 2020 06:54 )             24.0     03-05    133<L>  |  97  |  72<H>  ----------------------------<  104<H>  4.6   |  23  |  5.70<H>    Ca    7.7<L>      05 Mar 2020 06:54  Phos  5.7     03-05  Mg     2.9     03-05    TPro  5.2<L>  /  Alb  1.9<L>  /  TBili  0.8  /  DBili  x   /  AST  37  /  ALT  46  /  AlkPhos  84  03-05    Transcutaneous Bilirubin    PT/INR - ( 05 Mar 2020 06:54 )   PT: 17.6 sec;   INR: 1.55 ratio         PTT - ( 05 Mar 2020 06:54 )  PTT:28.2 sec    Assessment/Plan: 89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 Transferred to ICU for tPA/dornase brenda via chest tube. Found to have GUS 2/2 ATN, likely due to vancomycin on HD, traumatic celestin, coude placed 3/1/20, decreased output from chest tube, retrial of TPA per ICU.    -Continue DVT Prophylaxis with SCD's/SQH  -Continue IV Antibiotics- Zosyn  -Continue Incentive Spirometry  -Continue analgesia  -Pressure support, on Levophed   -TPA via Chest tube per ICU  -will discuss with Dr. Steven

## 2020-03-05 NOTE — PROGRESS NOTE ADULT - SUBJECTIVE AND OBJECTIVE BOX
Patient is a 89y old  Male who presents with a chief complaint of Left Empyema (05 Mar 2020 05:20)    24 hour events: ***    REVIEW OF SYSTEMS  Constitutional: No fever, chills, fatigue  Neuro: No headache, numbness, weakness  Resp: No cough, wheezing, shortness of breath  CVS: No chest pain, palpitations, leg swelling  GI: No abdominal pain, nausea, vomiting, diarrhea   : No dysuria, frequency, incontinence  Skin: No itching, burning, rashes, or lesions   Msk: No joint pain or swelling  Psych: No depression, anxiety, mood swings  Heme: No bleeding    T(F): 98 (03-05-20 @ 04:05), Max: 98.8 (03-04-20 @ 17:30)  HR: 87 (03-05-20 @ 06:00) (80 - 97)  BP: 85/54 (03-05-20 @ 06:00) (80/51 - 115/57)  RR: 29 (03-05-20 @ 06:00) (18 - 29)  SpO2: 94% (03-05-20 @ 06:00) (92% - 100%)  Wt(kg): --    I&O's Summary    03-03 @ 07:01  -  03-04 @ 07:00  --------------------------------------------------------  IN: 540 mL / OUT: 1185 mL / NET: -645 mL    03-04 @ 07:01  -  03-05 @ 06:44  --------------------------------------------------------  IN: 2374 mL / OUT: 2290 mL / NET: 84 mL    MEDICATIONS  piperacillin/tazobactam IVPB.. IV Intermittent  midodrine Oral  tamsulosin Oral  atorvastatin Oral  albuterol/ipratropium for Nebulization Nebulizer PRN  guaiFENesin   Syrup  (Sugar-Free) Oral PRN  acetaminophen   Tablet .. Oral PRN  dronabinol Oral  melatonin Oral  heparin  Injectable SubCutaneous  aluminum hydroxide/magnesium hydroxide/simethicone Suspension Oral PRN  famotidine    Tablet Oral  polyethylene glycol 3350 Oral  albumin human 25% IVPB IV Intermittent  sodium chloride 0.9% lock flush IV Push PRN  epoetin brenda Injectable IV Push  chlorhexidine 2% Cloths Topical  chlorhexidine 4% Liquid Topical  lactobacillus acidophilus Oral  sevelamer carbonate Oral                          8.9    9.04  )-----------( 327      ( 04 Mar 2020 05:21 )             26.1       03-04    130<L>  |  94<L>  |  90<H>  ----------------------------<  109<H>  5.0   |  22  |  7.30<H>    Ca    8.1<L>      04 Mar 2020 05:21  Phos  7.9     03-04  Mg     3.0     03-04    TPro  5.7<L>  /  Alb  2.0<L>  /  TBili  0.9  /  DBili  x   /  AST  40<H>  /  ALT  49  /  AlkPhos  91  03-04    PT/INR - ( 04 Mar 2020 05:21 )   PT: 16.7 sec;   INR: 1.47 ratio    PTT - ( 04 Mar 2020 05:21 )  PTT:28.5 sec    .Urine Catheterized   No growth -- 03-01 @ 13:24 Patient is a 89y old  Male who presents with a chief complaint of Left Empyema (05 Mar 2020 05:20)    24 hour events:   Episode of hypotension overnight, received 500ml bolus & started albumin.  L IJ for HD placed  HD -500mL  Minimal CT output overnight    REVIEW OF SYSTEMS  Constitutional: No fever, chills, fatigue  Neuro: No headache, numbness, weakness  Resp: No cough, wheezing, shortness of breath  CVS: No chest pain, palpitations, leg swelling  GI: No abdominal pain, nausea, vomiting, diarrhea   : No dysuria, frequency, incontinence  Skin: No itching, burning, rashes, or lesions   Msk: No joint pain or swelling  Psych: No depression, anxiety, mood swings  Heme: No bleeding    T(F): 98 (03-05-20 @ 04:05), Max: 98.8 (03-04-20 @ 17:30)  HR: 87 (03-05-20 @ 06:00) (80 - 97)  BP: 85/54 (03-05-20 @ 06:00) (80/51 - 115/57)  RR: 29 (03-05-20 @ 06:00) (18 - 29)  SpO2: 94% (03-05-20 @ 06:00) (92% - 100%)  Wt(kg): --    I&O's Summary    03-03 @ 07:01  -  03-04 @ 07:00  --------------------------------------------------------  IN: 540 mL / OUT: 1185 mL / NET: -645 mL    03-04 @ 07:01  -  03-05 @ 06:44  --------------------------------------------------------  IN: 2374 mL / OUT: 2290 mL / NET: 84 mL    MEDICATIONS  piperacillin/tazobactam IVPB.. IV Intermittent  midodrine Oral  tamsulosin Oral  atorvastatin Oral  albuterol/ipratropium for Nebulization Nebulizer PRN  guaiFENesin   Syrup  (Sugar-Free) Oral PRN  acetaminophen   Tablet .. Oral PRN  dronabinol Oral  melatonin Oral  heparin  Injectable SubCutaneous  aluminum hydroxide/magnesium hydroxide/simethicone Suspension Oral PRN  famotidine    Tablet Oral  polyethylene glycol 3350 Oral  albumin human 25% IVPB IV Intermittent  sodium chloride 0.9% lock flush IV Push PRN  epoetin brenda Injectable IV Push  chlorhexidine 2% Cloths Topical  chlorhexidine 4% Liquid Topical  lactobacillus acidophilus Oral  sevelamer carbonate Oral                          8.9    9.04  )-----------( 327      ( 04 Mar 2020 05:21 )             26.1       03-04    130<L>  |  94<L>  |  90<H>  ----------------------------<  109<H>  5.0   |  22  |  7.30<H>    Ca    8.1<L>      04 Mar 2020 05:21  Phos  7.9     03-04  Mg     3.0     03-04    TPro  5.7<L>  /  Alb  2.0<L>  /  TBili  0.9  /  DBili  x   /  AST  40<H>  /  ALT  49  /  AlkPhos  91  03-04    PT/INR - ( 04 Mar 2020 05:21 )   PT: 16.7 sec;   INR: 1.47 ratio    PTT - ( 04 Mar 2020 05:21 )  PTT:28.5 sec    .Urine Catheterized   No growth -- 03-01 @ 13:24    PHYSICAL EXAM  General: Elderly male, NAD, following commands  CNS: A/Ox2, grossly moving all extremities, sensation grossly intact  HEENT: PERLL, L IJ for HD noted  Resp: Wheezing BL, L Chest tube on suction on L w/ serosanguinous drainage, no air leak  CVS: RRR  Abd: NTND, +BSx4  Skin: brisk cap refill, warm/well perfused, no edema        CENTRAL LINE: L IJ for HD  SOLIS: Y  A-LINE: N    GLOBAL ISSUE/BEST PRACTICE  Analgesia: Y  Sedation: N  CAM-ICU: Y  HOB elevation: yes  Stress ulcer prophylaxis: N  VTE prophylaxis: Y  Glycemic control: N  Nutrition: Y    CODE STATUS: Full code Patient is a 89y old  Male who presents with a chief complaint of Left Empyema (05 Mar 2020 05:20)    24 hour events:   L IJ for HD placed  1st HD session yesterday, -500mL  Minimal CT output overnight  Episode of hypotension overnight, received 500ml bolus & started albumin.      T(F): 98 (03-05-20 @ 04:05), Max: 98.8 (03-04-20 @ 17:30)  HR: 87 (03-05-20 @ 06:00) (80 - 97)  BP: 85/54 (03-05-20 @ 06:00) (80/51 - 115/57)  RR: 29 (03-05-20 @ 06:00) (18 - 29)  SpO2: 94% (03-05-20 @ 06:00) (92% - 100%)  Wt(kg): --    I&O's Summary    03-03 @ 07:01  -  03-04 @ 07:00  --------------------------------------------------------  IN: 540 mL / OUT: 1185 mL / NET: -645 mL    03-04 @ 07:01  -  03-05 @ 06:44  --------------------------------------------------------  IN: 2374 mL / OUT: 2290 mL / NET: 84 mL    MEDICATIONS  piperacillin/tazobactam IVPB.. IV Intermittent  midodrine Oral  tamsulosin Oral  atorvastatin Oral  albuterol/ipratropium for Nebulization Nebulizer PRN  guaiFENesin   Syrup  (Sugar-Free) Oral PRN  acetaminophen   Tablet .. Oral PRN  dronabinol Oral  melatonin Oral  heparin  Injectable SubCutaneous  aluminum hydroxide/magnesium hydroxide/simethicone Suspension Oral PRN  famotidine    Tablet Oral  polyethylene glycol 3350 Oral  albumin human 25% IVPB IV Intermittent  sodium chloride 0.9% lock flush IV Push PRN  epoetin brenda Injectable IV Push  chlorhexidine 2% Cloths Topical  chlorhexidine 4% Liquid Topical  lactobacillus acidophilus Oral  sevelamer carbonate Oral                          8.9    9.04  )-----------( 327      ( 04 Mar 2020 05:21 )             26.1       03-04    130<L>  |  94<L>  |  90<H>  ----------------------------<  109<H>  5.0   |  22  |  7.30<H>    Ca    8.1<L>      04 Mar 2020 05:21  Phos  7.9     03-04  Mg     3.0     03-04    TPro  5.7<L>  /  Alb  2.0<L>  /  TBili  0.9  /  DBili  x   /  AST  40<H>  /  ALT  49  /  AlkPhos  91  03-04    PT/INR - ( 04 Mar 2020 05:21 )   PT: 16.7 sec;   INR: 1.47 ratio    PTT - ( 04 Mar 2020 05:21 )  PTT:28.5 sec    .Urine Catheterized   No growth -- 03-01 @ 13:24    PHYSICAL EXAM  General: Elderly male, NAD, following commands  CNS: A/Ox2, grossly moving all extremities, sensation grossly intact  HEENT: PERLL, L IJ for HD noted  Resp: Wheezing BL, L Chest tube on suction on L w/ serosanguinous drainage, no air leak  CVS: RRR  Abd: NTND, +BSx4  Skin: brisk cap refill, warm/well perfused, no edema        CENTRAL LINE: L IJ for HD  SOLIS: Y  A-LINE: N    GLOBAL ISSUE/BEST PRACTICE  Analgesia: Y  Sedation: N  CAM-ICU: Y  HOB elevation: yes  Stress ulcer prophylaxis: N  VTE prophylaxis: Y  Glycemic control: N  Nutrition: Y    CODE STATUS: Full code

## 2020-03-05 NOTE — PROGRESS NOTE ADULT - ASSESSMENT
89 yo M with PMH of HLD and MALToma of the stomach (under surveillance), spinal stenosis with extensive lumbar surgery in 2011 and bilateral LE neuropathy, admitted for L side empyema and R ureteral stone. Patient initially reported what appeared to be stable occasional angina however later denied this.  Elevated proBNP however normal LV systolic function with EF 55% on echo and no evidence of meaningful volume overload on exam.   Review of EKG revealed possible inferior infarct however no wall motion abnormalities on 2/27 echo.   He is now in the intensive care unit status post lytic infusion therapy through a chest tube for his left-sided empyema.  GUS now s/p HD yesterday    Recommend  - chest tube management per ICU team. s/p tPA and DNAse on 3/2.   - s/p HD; renal f/u  - Tele shows SR with freq APCs and likely some runs of PAT (self limiting). This is likely reactive to underlying medical issues.  cont tele   - Monitor and replete electrolytes. Keep K>4.0 and Mg>2.0.   - cont midodrine  - Continue Atorvastatin.  - Continue heparin SQ      - Rest of management per urology/CT surgery/primary team.  - Other cardiovascular workup will depend on clinical course. Will follow.    30 minutes of critical care time spent with patient and seen going over issues and plan

## 2020-03-06 LAB
ALBUMIN SERPL ELPH-MCNC: 2.4 G/DL — LOW (ref 3.3–5)
ALP SERPL-CCNC: 81 U/L — SIGNIFICANT CHANGE UP (ref 40–120)
ALT FLD-CCNC: 43 U/L — SIGNIFICANT CHANGE UP (ref 12–78)
ANION GAP SERPL CALC-SCNC: 14 MMOL/L — SIGNIFICANT CHANGE UP (ref 5–17)
APTT BLD: 22.7 SEC — LOW (ref 28.5–37)
AST SERPL-CCNC: 34 U/L — SIGNIFICANT CHANGE UP (ref 15–37)
BASOPHILS # BLD AUTO: 0.03 K/UL — SIGNIFICANT CHANGE UP (ref 0–0.2)
BASOPHILS NFR BLD AUTO: 0.4 % — SIGNIFICANT CHANGE UP (ref 0–2)
BILIRUB SERPL-MCNC: 0.9 MG/DL — SIGNIFICANT CHANGE UP (ref 0.2–1.2)
BUN SERPL-MCNC: 74 MG/DL — HIGH (ref 7–23)
CALCIUM SERPL-MCNC: 8.1 MG/DL — LOW (ref 8.5–10.1)
CHLORIDE SERPL-SCNC: 97 MMOL/L — SIGNIFICANT CHANGE UP (ref 96–108)
CO2 SERPL-SCNC: 21 MMOL/L — LOW (ref 22–31)
CREAT SERPL-MCNC: 6.2 MG/DL — HIGH (ref 0.5–1.3)
EOSINOPHIL # BLD AUTO: 0.06 K/UL — SIGNIFICANT CHANGE UP (ref 0–0.5)
EOSINOPHIL NFR BLD AUTO: 0.8 % — SIGNIFICANT CHANGE UP (ref 0–6)
GLUCOSE SERPL-MCNC: 132 MG/DL — HIGH (ref 70–99)
HCT VFR BLD CALC: 25.5 % — LOW (ref 39–50)
HGB BLD-MCNC: 8.5 G/DL — LOW (ref 13–17)
IMM GRANULOCYTES NFR BLD AUTO: 1.1 % — SIGNIFICANT CHANGE UP (ref 0–1.5)
INR BLD: 1.54 RATIO — HIGH (ref 0.88–1.16)
LYMPHOCYTES # BLD AUTO: 1.64 K/UL — SIGNIFICANT CHANGE UP (ref 1–3.3)
LYMPHOCYTES # BLD AUTO: 20.9 % — SIGNIFICANT CHANGE UP (ref 13–44)
MAGNESIUM SERPL-MCNC: 3 MG/DL — HIGH (ref 1.6–2.6)
MCHC RBC-ENTMCNC: 30.6 PG — SIGNIFICANT CHANGE UP (ref 27–34)
MCHC RBC-ENTMCNC: 33.3 GM/DL — SIGNIFICANT CHANGE UP (ref 32–36)
MCV RBC AUTO: 91.7 FL — SIGNIFICANT CHANGE UP (ref 80–100)
MONOCYTES # BLD AUTO: 0.71 K/UL — SIGNIFICANT CHANGE UP (ref 0–0.9)
MONOCYTES NFR BLD AUTO: 9 % — SIGNIFICANT CHANGE UP (ref 2–14)
NEUTROPHILS # BLD AUTO: 5.32 K/UL — SIGNIFICANT CHANGE UP (ref 1.8–7.4)
NEUTROPHILS NFR BLD AUTO: 67.8 % — SIGNIFICANT CHANGE UP (ref 43–77)
NRBC # BLD: 0 /100 WBCS — SIGNIFICANT CHANGE UP (ref 0–0)
PHOSPHATE SERPL-MCNC: 5.9 MG/DL — HIGH (ref 2.5–4.5)
PLATELET # BLD AUTO: 302 K/UL — SIGNIFICANT CHANGE UP (ref 150–400)
POTASSIUM SERPL-MCNC: 5 MMOL/L — SIGNIFICANT CHANGE UP (ref 3.5–5.3)
POTASSIUM SERPL-SCNC: 5 MMOL/L — SIGNIFICANT CHANGE UP (ref 3.5–5.3)
PROT SERPL-MCNC: 5.8 G/DL — LOW (ref 6–8.3)
PROTHROM AB SERPL-ACNC: 17.5 SEC — HIGH (ref 10–12.9)
RBC # BLD: 2.78 M/UL — LOW (ref 4.2–5.8)
RBC # FLD: 13 % — SIGNIFICANT CHANGE UP (ref 10.3–14.5)
SODIUM SERPL-SCNC: 132 MMOL/L — LOW (ref 135–145)
WBC # BLD: 7.85 K/UL — SIGNIFICANT CHANGE UP (ref 3.8–10.5)
WBC # FLD AUTO: 7.85 K/UL — SIGNIFICANT CHANGE UP (ref 3.8–10.5)

## 2020-03-06 PROCEDURE — 99292 CRITICAL CARE ADDL 30 MIN: CPT

## 2020-03-06 PROCEDURE — 99291 CRITICAL CARE FIRST HOUR: CPT

## 2020-03-06 PROCEDURE — 71045 X-RAY EXAM CHEST 1 VIEW: CPT | Mod: 26

## 2020-03-06 RX ORDER — HYDROMORPHONE HYDROCHLORIDE 2 MG/ML
1 INJECTION INTRAMUSCULAR; INTRAVENOUS; SUBCUTANEOUS ONCE
Refills: 0 | Status: DISCONTINUED | OUTPATIENT
Start: 2020-03-06 | End: 2020-03-06

## 2020-03-06 RX ORDER — DORNASE ALFA 1 MG/ML
5 SOLUTION RESPIRATORY (INHALATION) EVERY 12 HOURS
Refills: 0 | Status: COMPLETED | OUTPATIENT
Start: 2020-03-07 | End: 2020-03-07

## 2020-03-06 RX ORDER — SODIUM CHLORIDE 9 MG/ML
500 INJECTION, SOLUTION INTRAVENOUS ONCE
Refills: 0 | Status: COMPLETED | OUTPATIENT
Start: 2020-03-06 | End: 2020-03-06

## 2020-03-06 RX ORDER — DRONABINOL 2.5 MG
2.5 CAPSULE ORAL
Refills: 0 | Status: DISCONTINUED | OUTPATIENT
Start: 2020-03-07 | End: 2020-03-14

## 2020-03-06 RX ORDER — MIDODRINE HYDROCHLORIDE 2.5 MG/1
15 TABLET ORAL EVERY 8 HOURS
Refills: 0 | Status: DISCONTINUED | OUTPATIENT
Start: 2020-03-06 | End: 2020-03-08

## 2020-03-06 RX ORDER — ALTEPLASE 100 MG
10 KIT INTRAVENOUS EVERY 12 HOURS
Refills: 0 | Status: DISCONTINUED | OUTPATIENT
Start: 2020-03-06 | End: 2020-03-07

## 2020-03-06 RX ADMIN — ATORVASTATIN CALCIUM 10 MILLIGRAM(S): 80 TABLET, FILM COATED ORAL at 21:54

## 2020-03-06 RX ADMIN — HYDROMORPHONE HYDROCHLORIDE 1 MILLIGRAM(S): 2 INJECTION INTRAMUSCULAR; INTRAVENOUS; SUBCUTANEOUS at 07:47

## 2020-03-06 RX ADMIN — ERYTHROPOIETIN 10000 UNIT(S): 10000 INJECTION, SOLUTION INTRAVENOUS; SUBCUTANEOUS at 14:12

## 2020-03-06 RX ADMIN — SEVELAMER CARBONATE 800 MILLIGRAM(S): 2400 POWDER, FOR SUSPENSION ORAL at 17:13

## 2020-03-06 RX ADMIN — DORNASE ALFA 5 MILLIGRAM(S): 1 SOLUTION RESPIRATORY (INHALATION) at 10:10

## 2020-03-06 RX ADMIN — Medication 650 MILLIGRAM(S): at 06:20

## 2020-03-06 RX ADMIN — HYDROMORPHONE HYDROCHLORIDE 1 MILLIGRAM(S): 2 INJECTION INTRAMUSCULAR; INTRAVENOUS; SUBCUTANEOUS at 22:10

## 2020-03-06 RX ADMIN — PIPERACILLIN AND TAZOBACTAM 25 GRAM(S): 4; .5 INJECTION, POWDER, LYOPHILIZED, FOR SOLUTION INTRAVENOUS at 14:11

## 2020-03-06 RX ADMIN — FAMOTIDINE 20 MILLIGRAM(S): 10 INJECTION INTRAVENOUS at 12:26

## 2020-03-06 RX ADMIN — HEPARIN SODIUM 5000 UNIT(S): 5000 INJECTION INTRAVENOUS; SUBCUTANEOUS at 05:21

## 2020-03-06 RX ADMIN — Medication 1 TABLET(S): at 14:13

## 2020-03-06 RX ADMIN — CHLORHEXIDINE GLUCONATE 1 APPLICATION(S): 213 SOLUTION TOPICAL at 14:05

## 2020-03-06 RX ADMIN — ALTEPLASE 10 MILLIGRAM(S): KIT at 22:30

## 2020-03-06 RX ADMIN — SEVELAMER CARBONATE 800 MILLIGRAM(S): 2400 POWDER, FOR SUSPENSION ORAL at 07:55

## 2020-03-06 RX ADMIN — Medication 650 MILLIGRAM(S): at 05:20

## 2020-03-06 RX ADMIN — HYDROMORPHONE HYDROCHLORIDE 1 MILLIGRAM(S): 2 INJECTION INTRAMUSCULAR; INTRAVENOUS; SUBCUTANEOUS at 21:53

## 2020-03-06 RX ADMIN — DEXMEDETOMIDINE HYDROCHLORIDE IN 0.9% SODIUM CHLORIDE 2.15 MICROGRAM(S)/KG/HR: 4 INJECTION INTRAVENOUS at 01:00

## 2020-03-06 RX ADMIN — Medication 6 MILLIGRAM(S): at 21:53

## 2020-03-06 RX ADMIN — PIPERACILLIN AND TAZOBACTAM 25 GRAM(S): 4; .5 INJECTION, POWDER, LYOPHILIZED, FOR SOLUTION INTRAVENOUS at 02:17

## 2020-03-06 RX ADMIN — SODIUM CHLORIDE 250 MILLILITER(S): 9 INJECTION, SOLUTION INTRAVENOUS at 02:29

## 2020-03-06 RX ADMIN — Medication 1 TABLET(S): at 05:21

## 2020-03-06 RX ADMIN — ALTEPLASE 10 MILLIGRAM(S): KIT at 07:57

## 2020-03-06 RX ADMIN — Medication 2.5 MILLIGRAM(S): at 05:20

## 2020-03-06 RX ADMIN — Medication 1 TABLET(S): at 21:54

## 2020-03-06 RX ADMIN — HEPARIN SODIUM 5000 UNIT(S): 5000 INJECTION INTRAVENOUS; SUBCUTANEOUS at 21:54

## 2020-03-06 RX ADMIN — HEPARIN SODIUM 5000 UNIT(S): 5000 INJECTION INTRAVENOUS; SUBCUTANEOUS at 14:12

## 2020-03-06 RX ADMIN — MIDODRINE HYDROCHLORIDE 15 MILLIGRAM(S): 2.5 TABLET ORAL at 14:14

## 2020-03-06 RX ADMIN — POLYETHYLENE GLYCOL 3350 17 GRAM(S): 17 POWDER, FOR SOLUTION ORAL at 17:13

## 2020-03-06 RX ADMIN — SEVELAMER CARBONATE 800 MILLIGRAM(S): 2400 POWDER, FOR SUSPENSION ORAL at 12:30

## 2020-03-06 RX ADMIN — MIDODRINE HYDROCHLORIDE 10 MILLIGRAM(S): 2.5 TABLET ORAL at 05:21

## 2020-03-06 RX ADMIN — Medication 2.5 MILLIGRAM(S): at 17:12

## 2020-03-06 RX ADMIN — MIDODRINE HYDROCHLORIDE 15 MILLIGRAM(S): 2.5 TABLET ORAL at 21:53

## 2020-03-06 NOTE — PROGRESS NOTE ADULT - ASSESSMENT
89 yo M with PMH of HLD and MALToma of the stomach (under surveillance), spinal stenosis with extensive lumbar surgery in 2011 and bilateral LE neuropathy, admitted for L side empyema and R ureteral stone. Patient initially reported what appeared to be stable occasional angina however later denied this.  Elevated proBNP however normal LV systolic function with EF 55% on echo and no evidence of meaningful volume overload on exam.   Review of EKG revealed possible inferior infarct however no wall motion abnormalities on 2/27 echo.   He is now in the intensive care unit status post lytic infusion therapy through a chest tube for his left-sided empyema.  GUS now s/p HD yesterday    -no clear acute ischemia  -ekg has suggested possible imi, but no wma on echo  -cont statin    -no significant arrhythmias noted  -did have brief pat, generally maintaining nsr. pat likely reactive to acute illness    -no meaningful volume overload  -low urine output overnight, given ivf bolus  -now on hd    -persistent hypotension  -midodrine  -levo gtt, titrate as tolerated    - chest tube management per ICU team. s/p tPA and DNAse on 3/2.      - Monitor and replete electrolytes. Keep K>4.0 and Mg>2.0.       - Rest of management per urology/CT surgery/primary team.  - Other cardiovascular workup will depend on clinical course. Will follow.    charting in progress 89 yo M with PMH of HLD and MALToma of the stomach (under surveillance), spinal stenosis with extensive lumbar surgery in 2011 and bilateral LE neuropathy, admitted for L side empyema and R ureteral stone. Patient initially reported what appeared to be stable occasional angina however later denied this.  Elevated proBNP however normal LV systolic function with EF 55% on echo and no evidence of meaningful volume overload on exam.   Review of EKG revealed possible inferior infarct however no wall motion abnormalities on 2/27 echo.   He is now in the intensive care unit status post lytic infusion therapy through a chest tube for his left-sided empyema.  GUS now s/p HD     -no clear acute ischemia  -ekg has suggested possible imi, but no wma on echo  -cont statin    -no significant arrhythmias noted  -did have brief pat, generally maintaining nsr. pat likely reactive to acute illness    -no meaningful volume overload  -low urine output overnight, given ivf bolus  -now on hd    -persistent hypotension  -midodrine  -levo gtt, titrate down/off as tolerated    - chest tube management per ICU team. s/p tPA and DNAse on 3/2.      - Monitor and replete electrolytes. Keep K>4.0 and Mg>2.0.       - Rest of management per urology/CT surgery/primary team.  - Other cardiovascular workup will depend on clinical course. Will follow.     The patient is at risk of abrupt decompensation.  I have personally provided  35 minutes of critical care time, excluding time spent on separate procedures.

## 2020-03-06 NOTE — PROGRESS NOTE ADULT - SUBJECTIVE AND OBJECTIVE BOX
[facility]    Warren Posada, Dandre, Abelardo, Lucina, Thaddeus, Kelvin      [phone]    CHIEF COMPLAINT: Patient is a 89y old  Male who presents with a chief complaint of Left Empyema (05 Mar 2020 16:39)      Follow Up: pleural effusion, roberto, pat, hypotension on pressors    Interim history: events noted    MEDICATIONS  (STANDING):  alteplase  Injectable for Pleural Effusion 10 milliGRAM(s) IntraPleural. every 12 hours  atorvastatin 10 milliGRAM(s) Oral at bedtime  chlorhexidine 2% Cloths 1 Application(s) Topical <User Schedule>  chlorhexidine 4% Liquid 1 Application(s) Topical <User Schedule>  dexMEDEtomidine Infusion 0.1 MICROgram(s)/kG/Hr (2.155 mL/Hr) IV Continuous <Continuous>  dornase brenda Solution for Pleural Effusion 5 milliGRAM(s) IntraPleural. every 12 hours  dronabinol 2.5 milliGRAM(s) Oral two times a day  epoetin brenda Injectable 66218 Unit(s) IV Push <User Schedule>  famotidine    Tablet 20 milliGRAM(s) Oral daily  heparin  Injectable 5000 Unit(s) SubCutaneous every 8 hours  HYDROmorphone  Injectable 1 milliGRAM(s) IV Push <User Schedule>  lactobacillus acidophilus 1 Tablet(s) Oral three times a day  melatonin 6 milliGRAM(s) Oral at bedtime  midodrine 10 milliGRAM(s) Oral every 8 hours  norepinephrine Infusion 0.05 MICROgram(s)/kG/Min (8.081 mL/Hr) IV Continuous <Continuous>  piperacillin/tazobactam IVPB.. 3.375 Gram(s) IV Intermittent every 12 hours  polyethylene glycol 3350 17 Gram(s) Oral two times a day  QUEtiapine 25 milliGRAM(s) Oral at bedtime  sevelamer carbonate 800 milliGRAM(s) Oral three times a day with meals    MEDICATIONS  (PRN):  acetaminophen   Tablet .. 650 milliGRAM(s) Oral every 6 hours PRN Temp greater or equal to 38C (100.4F), Mild Pain (1 - 3)  albuterol/ipratropium for Nebulization 3 milliLiter(s) Nebulizer every 6 hours PRN Shortness of Breath and/or Wheezing  aluminum hydroxide/magnesium hydroxide/simethicone Suspension 30 milliLiter(s) Oral every 6 hours PRN Dyspepsia  guaiFENesin   Syrup  (Sugar-Free) 200 milliGRAM(s) Oral every 6 hours PRN Cough  HYDROmorphone  Injectable 1 milliGRAM(s) IV Push once PRN Pain  sodium chloride 0.9% lock flush 10 milliLiter(s) IV Push every 1 hour PRN Pre/post blood products, medications, blood draw, and to maintain line patency      REVIEW OF SYSTEMS:  eye, ent, GI, , allergic, dermatologic, musculoskeletal and neurologic are negative except as described above    Vital Signs Last 24 Hrs  T(C): 36.4 (06 Mar 2020 03:46), Max: 36.8 (05 Mar 2020 08:00)  T(F): 97.5 (06 Mar 2020 03:46), Max: 98.3 (05 Mar 2020 08:00)  HR: 58 (06 Mar 2020 04:00) (58 - 98)  BP: 90/52 (06 Mar 2020 04:00) (78/45 - 115/59)  BP(mean): 65 (06 Mar 2020 04:00) (57 - 83)  RR: 16 (06 Mar 2020 04:00) (13 - 38)  SpO2: 97% (06 Mar 2020 04:00) (92% - 97%)    I&O's Summary    04 Mar 2020 07:01  -  05 Mar 2020 07:00  --------------------------------------------------------  IN: 2374 mL / OUT: 2320 mL / NET: 54 mL    05 Mar 2020 07:01  -  06 Mar 2020 05:22  --------------------------------------------------------  IN: 1448.7 mL / OUT: 580 mL / NET: 868.7 mL        Telemetry past 24h:    PHYSICAL EXAM:    Constitutional: well-nourished, well-developed, NAD   HEENT:  MMM, sclerae anicteric, conjunctivae clear, no oral cyanosis.  Pulmonary: Non-labored, breath sounds are clear bilaterally, No wheezing, rales or rhonchi  Cardiovascular: Regular, S1 and S2.  No murmur.  No rubs, gallops or clicks  Gastrointestinal: Bowel Sounds present, soft, nontender.   Lymph: No peripheral edema.   Neurological: Alert, no focal deficits  Skin: No rashes.  Psych:  Mood & affect appropriate    LABS: All Labs Reviewed:                        8.2    7.59  )-----------( 313      ( 05 Mar 2020 06:54 )             24.0                         8.9    9.04  )-----------( 327      ( 04 Mar 2020 05:21 )             26.1     05 Mar 2020 06:54    133    |  97     |  72     ----------------------------<  104    4.6     |  23     |  5.70   04 Mar 2020 05:21    130    |  94     |  90     ----------------------------<  109    5.0     |  22     |  7.30     Ca    7.7        05 Mar 2020 06:54  Ca    8.1        04 Mar 2020 05:21  Phos  5.7       05 Mar 2020 06:54  Phos  7.9       04 Mar 2020 05:21  Mg     2.9       05 Mar 2020 06:54  Mg     3.0       04 Mar 2020 05:21    TPro  5.2    /  Alb  1.9    /  TBili  0.8    /  DBili  x      /  AST  37     /  ALT  46     /  AlkPhos  84     05 Mar 2020 06:54  TPro  5.7    /  Alb  2.0    /  TBili  0.9    /  DBili  x      /  AST  40     /  ALT  49     /  AlkPhos  91     04 Mar 2020 05:21    PT/INR - ( 05 Mar 2020 06:54 )   PT: 17.6 sec;   INR: 1.55 ratio         PTT - ( 05 Mar 2020 06:54 )  PTT:28.2 sec      Blood Culture: Organism --  Gram Stain Blood -- Gram Stain --  Specimen Source .Urine Catheterized  Culture-Blood --            RADIOLOGY:    EKG:    Echo:  < from: TTE Echo Doppler w/o Cont (02.27.20 @ 11:47) >     EXAM:  ECHO TTE WO CON COMP W DOPPLR         PROCEDURE DATE:  02/27/2020        INTERPRETATION:  INDICATION: Heart failure  Sonographer PH    Blood Pressure 107/68    Height 177.8 cm     Weight 86.2 kg       BSA 2.0 sq m    Dimensions:    LA 3.7    Normal Values: 2.0 - 4.0 cm    Ao 3.3        Normal Values: 2.0 - 3.8 cm  SEPTUM Normal Values: 0.6 - 1.2 cm  PWT Normal Values: 0.6 - 1.1 cm  LVIDd Normal Values: 3.0 - 5.6 cm  LVIDs Normal Values: 1.8 - 4.0 cm      OBSERVATIONS:  Technically difficult and limited study  Mitral Valve: normal, trace physiologic MR.  Aortic Valve/Aorta: Sclerotic trileaflet aortic valve with normal opening.  Tricuspid Valve: normal with trace TR.  Pulmonic Valve: Not well-visualized  Left Atrium: normal  RightAtrium: Not well-visualized  Left Ventricle: normal LV size and systolic function, estimated LVEF of 55%.  Right Ventricle: Not well-visualized  Pericardium/Pleura: normal, small anterior pericardial effusion without signs of hemodynamic compromise.  Pulmonary/RV Pressure: estimated PA systolic pressure of 34mmHg. IVC is dilated    Conclusion:   Technically difficult and limited study  Normal left ventricular internal dimensions and systolic function, estimated LVEF of 55%.   Right ventricle is not well-visualized  Sclerotic trileaflet aortic valve, without AI.   Trace physiologic MR and TR.                       TYLER AYALA   This document has been electronically signed. Feb 28 2020  8:07AM                < end of copied text > [facility]    Warren Posada, Dandre, Abelardo, Lucina, Thaddeus, Kelvin      [phone]    CHIEF COMPLAINT: Patient is a 89y old  Male who presents with a chief complaint of Left Empyema (05 Mar 2020 16:39)      Follow Up: pleural effusion, roberto, pat, hypotension on pressors    Interim history: events noted, unable to provide a hx. sedated    MEDICATIONS  (STANDING):  alteplase  Injectable for Pleural Effusion 10 milliGRAM(s) IntraPleural. every 12 hours  atorvastatin 10 milliGRAM(s) Oral at bedtime  chlorhexidine 2% Cloths 1 Application(s) Topical <User Schedule>  chlorhexidine 4% Liquid 1 Application(s) Topical <User Schedule>  dexMEDEtomidine Infusion 0.1 MICROgram(s)/kG/Hr (2.155 mL/Hr) IV Continuous <Continuous>  dornase brenda Solution for Pleural Effusion 5 milliGRAM(s) IntraPleural. every 12 hours  dronabinol 2.5 milliGRAM(s) Oral two times a day  epoetin brenda Injectable 89995 Unit(s) IV Push <User Schedule>  famotidine    Tablet 20 milliGRAM(s) Oral daily  heparin  Injectable 5000 Unit(s) SubCutaneous every 8 hours  HYDROmorphone  Injectable 1 milliGRAM(s) IV Push <User Schedule>  lactobacillus acidophilus 1 Tablet(s) Oral three times a day  melatonin 6 milliGRAM(s) Oral at bedtime  midodrine 10 milliGRAM(s) Oral every 8 hours  norepinephrine Infusion 0.05 MICROgram(s)/kG/Min (8.081 mL/Hr) IV Continuous <Continuous>  piperacillin/tazobactam IVPB.. 3.375 Gram(s) IV Intermittent every 12 hours  polyethylene glycol 3350 17 Gram(s) Oral two times a day  QUEtiapine 25 milliGRAM(s) Oral at bedtime  sevelamer carbonate 800 milliGRAM(s) Oral three times a day with meals    MEDICATIONS  (PRN):  acetaminophen   Tablet .. 650 milliGRAM(s) Oral every 6 hours PRN Temp greater or equal to 38C (100.4F), Mild Pain (1 - 3)  albuterol/ipratropium for Nebulization 3 milliLiter(s) Nebulizer every 6 hours PRN Shortness of Breath and/or Wheezing  aluminum hydroxide/magnesium hydroxide/simethicone Suspension 30 milliLiter(s) Oral every 6 hours PRN Dyspepsia  guaiFENesin   Syrup  (Sugar-Free) 200 milliGRAM(s) Oral every 6 hours PRN Cough  HYDROmorphone  Injectable 1 milliGRAM(s) IV Push once PRN Pain  sodium chloride 0.9% lock flush 10 milliLiter(s) IV Push every 1 hour PRN Pre/post blood products, medications, blood draw, and to maintain line patency      REVIEW OF SYSTEMS: sedated and unable to provide a hx    Vital Signs Last 24 Hrs  T(C): 36.4 (06 Mar 2020 03:46), Max: 36.8 (05 Mar 2020 08:00)  T(F): 97.5 (06 Mar 2020 03:46), Max: 98.3 (05 Mar 2020 08:00)  HR: 58 (06 Mar 2020 04:00) (58 - 98)  BP: 90/52 (06 Mar 2020 04:00) (78/45 - 115/59)  BP(mean): 65 (06 Mar 2020 04:00) (57 - 83)  RR: 16 (06 Mar 2020 04:00) (13 - 38)  SpO2: 97% (06 Mar 2020 04:00) (92% - 97%)    I&O's Summary    04 Mar 2020 07:01  -  05 Mar 2020 07:00  --------------------------------------------------------  IN: 2374 mL / OUT: 2320 mL / NET: 54 mL    05 Mar 2020 07:01  -  06 Mar 2020 05:22  --------------------------------------------------------  IN: 1448.7 mL / OUT: 580 mL / NET: 868.7 mL        Telemetry past 24h: sr st, no more svt    PHYSICAL EXAM:    Constitutional: well-nourished, well-developed, NAD   HEENT:  MMM, sclerae anicteric, conjunctivae clear, no oral cyanosis. LIJ hd cath  Pulmonary: Non-labored, breath sounds are decr left, No wheezing, rales or rhonchi  Cardiovascular: Regular, S1 and S2.  No murmur.  No rubs, gallops or clicks  Gastrointestinal: Bowel Sounds present, soft, nontender.   Lymph: No peripheral edema.   Neurological: confused, sed  Skin: No rashes.  Psych:  confused, sed    LABS: All Labs Reviewed:                        8.2    7.59  )-----------( 313      ( 05 Mar 2020 06:54 )             24.0                         8.9    9.04  )-----------( 327      ( 04 Mar 2020 05:21 )             26.1     05 Mar 2020 06:54    133    |  97     |  72     ----------------------------<  104    4.6     |  23     |  5.70   04 Mar 2020 05:21    130    |  94     |  90     ----------------------------<  109    5.0     |  22     |  7.30     Ca    7.7        05 Mar 2020 06:54  Ca    8.1        04 Mar 2020 05:21  Phos  5.7       05 Mar 2020 06:54  Phos  7.9       04 Mar 2020 05:21  Mg     2.9       05 Mar 2020 06:54  Mg     3.0       04 Mar 2020 05:21    TPro  5.2    /  Alb  1.9    /  TBili  0.8    /  DBili  x      /  AST  37     /  ALT  46     /  AlkPhos  84     05 Mar 2020 06:54  TPro  5.7    /  Alb  2.0    /  TBili  0.9    /  DBili  x      /  AST  40     /  ALT  49     /  AlkPhos  91     04 Mar 2020 05:21    PT/INR - ( 05 Mar 2020 06:54 )   PT: 17.6 sec;   INR: 1.55 ratio         PTT - ( 05 Mar 2020 06:54 )  PTT:28.2 sec      Blood Culture: Organism --  Gram Stain Blood -- Gram Stain --  Specimen Source .Urine Catheterized  Culture-Blood --            RADIOLOGY:    EKG:    Echo:  < from: TTE Echo Doppler w/o Cont (02.27.20 @ 11:47) >     EXAM:  ECHO TTE WO CON COMP W DOPPLR         PROCEDURE DATE:  02/27/2020        INTERPRETATION:  INDICATION: Heart failure  Sonographer PH    Blood Pressure 107/68    Height 177.8 cm     Weight 86.2 kg       BSA 2.0 sq m    Dimensions:    LA 3.7    Normal Values: 2.0 - 4.0 cm    Ao 3.3        Normal Values: 2.0 - 3.8 cm  SEPTUM Normal Values: 0.6 - 1.2 cm  PWT Normal Values: 0.6 - 1.1 cm  LVIDd Normal Values: 3.0 - 5.6 cm  LVIDs Normal Values: 1.8 - 4.0 cm      OBSERVATIONS:  Technically difficult and limited study  Mitral Valve: normal, trace physiologic MR.  Aortic Valve/Aorta: Sclerotic trileaflet aortic valve with normal opening.  Tricuspid Valve: normal with trace TR.  Pulmonic Valve: Not well-visualized  Left Atrium: normal  RightAtrium: Not well-visualized  Left Ventricle: normal LV size and systolic function, estimated LVEF of 55%.  Right Ventricle: Not well-visualized  Pericardium/Pleura: normal, small anterior pericardial effusion without signs of hemodynamic compromise.  Pulmonary/RV Pressure: estimated PA systolic pressure of 34mmHg. IVC is dilated    Conclusion:   Technically difficult and limited study  Normal left ventricular internal dimensions and systolic function, estimated LVEF of 55%.   Right ventricle is not well-visualized  Sclerotic trileaflet aortic valve, without AI.   Trace physiologic MR and TR.                       TYLER AYALA   This document has been electronically signed. Feb 28 2020  8:07AM                < end of copied text >

## 2020-03-06 NOTE — PROGRESS NOTE ADULT - SUBJECTIVE AND OBJECTIVE BOX
Patient is a 89y old  Male who presents with a chief complaint of Left Empyema (06 Mar 2020 06:44)    24 hour events:  *****************      REVIEW OF SYSTEMS  Constitutional: No fever, chills, fatigue  Neuro: No headache, numbness, weakness  Resp: No cough, wheezing, shortness of breath  CVS: No chest pain, palpitations, leg swelling  GI: No abdominal pain, nausea, vomiting, diarrhea   : No dysuria, frequency, incontinence  Skin: No itching, burning, rashes, or lesions   Msk: No joint pain or swelling  Psych: No depression, anxiety, mood swings  Heme: No bleeding    T(F): 97.5 (03-06-20 @ 03:46), Max: 98.3 (03-05-20 @ 08:00)  HR: 69 (03-06-20 @ 07:00) (58 - 107)  BP: 87/55 (03-06-20 @ 07:00) (78/45 - 165/71)  RR: 25 (03-06-20 @ 07:00) (13 - 38)  SpO2: 95% (03-06-20 @ 07:00) (92% - 97%)  Wt(kg): --      I&O's Summary    03-05 @ 07:01  -  03-06 @ 07:00  --------------------------------------------------------  IN: 1575.4 mL / OUT: 905 mL / NET: 670.4 mL    MEDICATIONS  piperacillin/tazobactam IVPB.. IV Intermittent  midodrine Oral  norepinephrine Infusion IV Continuous  atorvastatin Oral  albuterol/ipratropium for Nebulization Nebulizer PRN  dornase brenda Solution for Pleural Effusion IntraPleural.  guaiFENesin   Syrup  (Sugar-Free) Oral PRN  acetaminophen   Tablet .. Oral PRN  dexMEDEtomidine Infusion IV Continuous  dronabinol Oral  HYDROmorphone  Injectable IV Push  HYDROmorphone  Injectable IV Push PRN  melatonin Oral  QUEtiapine Oral  alteplase  Injectable for Pleural Effusion IntraPleural.  heparin  Injectable SubCutaneous  aluminum hydroxide/magnesium hydroxide/simethicone Suspension Oral PRN  famotidine    Tablet Oral  polyethylene glycol 3350 Oral  sodium chloride 0.9% lock flush IV Push PRN  epoetin brenda Injectable IV Push  chlorhexidine 2% Cloths Topical  chlorhexidine 4% Liquid Topical  lactobacillus acidophilus Oral  sevelamer carbonate Oral                          8.5    7.85  )-----------( 302      ( 06 Mar 2020 06:06 )             25.5       03-06    132<L>  |  97  |  74<H>  ----------------------------<  132<H>  5.0   |  21<L>  |  6.20<H>    Ca    8.1<L>      06 Mar 2020 06:06  Phos  5.9     03-06  Mg     3.0     03-06    TPro  5.8<L>  /  Alb  2.4<L>  /  TBili  0.9  /  DBili  x   /  AST  34  /  ALT  43  /  AlkPhos  81  03-06  PT/INR - ( 06 Mar 2020 06:06 )   PT: 17.5 sec;   INR: 1.54 ratio    PTT - ( 06 Mar 2020 06:06 )  PTT:22.7 sec    .Urine Catheterized   No growth -- 03-01 @ 13:24 Patient is a 89y old  Male who presents with a chief complaint of Left Empyema (06 Mar 2020 06:44)    24 hour events:  Agitation overnight requiring precedex/haloperidol/wrist restraints  Hypotension requiring levo    REVIEW OF SYSTEMS  Constitutional: No fever, chills, fatigue  Neuro: No headache, numbness, weakness  Resp: No cough, wheezing, shortness of breath  CVS: No chest pain, palpitations, leg swelling  GI: No abdominal pain, nausea, vomiting, diarrhea   : No dysuria, frequency, incontinence  Skin: No itching, burning, rashes, or lesions   Msk: No joint pain or swelling  Psych: No depression, anxiety, mood swings  Heme: No bleeding    T(F): 97.5 (03-06-20 @ 03:46), Max: 98.3 (03-05-20 @ 08:00)  HR: 69 (03-06-20 @ 07:00) (58 - 107)  BP: 87/55 (03-06-20 @ 07:00) (78/45 - 165/71)  RR: 25 (03-06-20 @ 07:00) (13 - 38)  SpO2: 95% (03-06-20 @ 07:00) (92% - 97%)  Wt(kg): --    I&O's Summary    03-05 @ 07:01  -  03-06 @ 07:00  --------------------------------------------------------  IN: 1575.4 mL / OUT: 905 mL / NET: 670.4 mL    MEDICATIONS  piperacillin/tazobactam IVPB.. IV Intermittent  midodrine Oral  norepinephrine Infusion IV Continuous  atorvastatin Oral  albuterol/ipratropium for Nebulization Nebulizer PRN  dornase brenda Solution for Pleural Effusion IntraPleural.  guaiFENesin   Syrup  (Sugar-Free) Oral PRN  acetaminophen   Tablet .. Oral PRN  dexMEDEtomidine Infusion IV Continuous  dronabinol Oral  HYDROmorphone  Injectable IV Push  HYDROmorphone  Injectable IV Push PRN  melatonin Oral  QUEtiapine Oral  alteplase  Injectable for Pleural Effusion IntraPleural.  heparin  Injectable SubCutaneous  aluminum hydroxide/magnesium hydroxide/simethicone Suspension Oral PRN  famotidine    Tablet Oral  polyethylene glycol 3350 Oral  sodium chloride 0.9% lock flush IV Push PRN  epoetin brenda Injectable IV Push  chlorhexidine 2% Cloths Topical  chlorhexidine 4% Liquid Topical  lactobacillus acidophilus Oral  sevelamer carbonate Oral                          8.5    7.85  )-----------( 302      ( 06 Mar 2020 06:06 )             25.5       03-06    132<L>  |  97  |  74<H>  ----------------------------<  132<H>  5.0   |  21<L>  |  6.20<H>    Ca    8.1<L>      06 Mar 2020 06:06  Phos  5.9     03-06  Mg     3.0     03-06    TPro  5.8<L>  /  Alb  2.4<L>  /  TBili  0.9  /  DBili  x   /  AST  34  /  ALT  43  /  AlkPhos  81  03-06  PT/INR - ( 06 Mar 2020 06:06 )   PT: 17.5 sec;   INR: 1.54 ratio    PTT - ( 06 Mar 2020 06:06 )  PTT:22.7 sec    .Urine Catheterized   No growth -- 03-01 @ 13:24    PHYSICAL EXAM  General: Elderly male, NAD, following commands  CNS: LEthargic but arousable, A/Ox1, grossly moving all extremities, sensation grossly intact  HEENT: PERLL, L IJ for HD noted  Resp: CTA BL, decreased breath sounds at bases L > R, L Chest tube on suction on L w/ serosanguinous drainage, no air leak  CVS: RRR  Abd: NTND, +BSx4  Skin: brisk cap refill, warm/well perfused, no edema        CENTRAL LINE: L IJ for HD  SOLIS: Y  A-LINE: N    GLOBAL ISSUE/BEST PRACTICE  Analgesia: Y  Sedation: N  CAM-ICU: Y  HOB elevation: yes  Stress ulcer prophylaxis: N  VTE prophylaxis: Y  Glycemic control: N  Nutrition: Y    CODE STATUS: Full Patient is a 89y old  Male who presents with a chief complaint of Left Empyema (06 Mar 2020 06:44)    24 hour events:  Agitation overnight requiring precedex/haloperidol/wrist restraints  Hypotension requiring levo        T(F): 97.5 (03-06-20 @ 03:46), Max: 98.3 (03-05-20 @ 08:00)  HR: 69 (03-06-20 @ 07:00) (58 - 107)  BP: 87/55 (03-06-20 @ 07:00) (78/45 - 165/71)  RR: 25 (03-06-20 @ 07:00) (13 - 38)  SpO2: 95% (03-06-20 @ 07:00) (92% - 97%)  Wt(kg): --    I&O's Summary    03-05 @ 07:01  -  03-06 @ 07:00  --------------------------------------------------------  IN: 1575.4 mL / OUT: 905 mL / NET: 670.4 mL    MEDICATIONS  piperacillin/tazobactam IVPB.. IV Intermittent  midodrine Oral  norepinephrine Infusion IV Continuous  atorvastatin Oral  albuterol/ipratropium for Nebulization Nebulizer PRN  dornase brenda Solution for Pleural Effusion IntraPleural.  guaiFENesin   Syrup  (Sugar-Free) Oral PRN  acetaminophen   Tablet .. Oral PRN  dexMEDEtomidine Infusion IV Continuous  dronabinol Oral  HYDROmorphone  Injectable IV Push  HYDROmorphone  Injectable IV Push PRN  melatonin Oral  QUEtiapine Oral  alteplase  Injectable for Pleural Effusion IntraPleural.  heparin  Injectable SubCutaneous  aluminum hydroxide/magnesium hydroxide/simethicone Suspension Oral PRN  famotidine    Tablet Oral  polyethylene glycol 3350 Oral  sodium chloride 0.9% lock flush IV Push PRN  epoetin brenda Injectable IV Push  chlorhexidine 2% Cloths Topical  chlorhexidine 4% Liquid Topical  lactobacillus acidophilus Oral  sevelamer carbonate Oral                          8.5    7.85  )-----------( 302      ( 06 Mar 2020 06:06 )             25.5       03-06    132<L>  |  97  |  74<H>  ----------------------------<  132<H>  5.0   |  21<L>  |  6.20<H>    Ca    8.1<L>      06 Mar 2020 06:06  Phos  5.9     03-06  Mg     3.0     03-06    TPro  5.8<L>  /  Alb  2.4<L>  /  TBili  0.9  /  DBili  x   /  AST  34  /  ALT  43  /  AlkPhos  81  03-06  PT/INR - ( 06 Mar 2020 06:06 )   PT: 17.5 sec;   INR: 1.54 ratio    PTT - ( 06 Mar 2020 06:06 )  PTT:22.7 sec    .Urine Catheterized   No growth -- 03-01 @ 13:24    PHYSICAL EXAM  General: Elderly male, NAD, following commands  CNS: LEthargic but arousable, A/Ox1, grossly moving all extremities, sensation grossly intact  HEENT: PERLL, L IJ for HD noted  Resp: CTA BL, decreased breath sounds at bases L > R, L Chest tube on suction on L w/ serosanguinous drainage, no air leak  CVS: RRR  Abd: NTND, +BSx4  Skin: brisk cap refill, warm/well perfused, no edema        CENTRAL LINE: L IJ for HD  SOLIS: Y  A-LINE: N    GLOBAL ISSUE/BEST PRACTICE  Analgesia: Y  Sedation: N  CAM-ICU: Y  HOB elevation: yes  Stress ulcer prophylaxis: N  VTE prophylaxis: Y  Glycemic control: N  Nutrition: Y    CODE STATUS: Full

## 2020-03-06 NOTE — PROGRESS NOTE ADULT - SUBJECTIVE AND OBJECTIVE BOX
INTERVAL HPI/OVERNIGHT EVENTS:  pt seen and examined  resting in bed  some agitation overnight  hypotensive    MEDICATIONS  (STANDING):  atorvastatin 10 milliGRAM(s) Oral at bedtime  chlorhexidine 2% Cloths 1 Application(s) Topical <User Schedule>  chlorhexidine 4% Liquid 1 Application(s) Topical <User Schedule>  dexMEDEtomidine Infusion 0.1 MICROgram(s)/kG/Hr (2.155 mL/Hr) IV Continuous <Continuous>  dornase brenda Solution for Pleural Effusion 5 milliGRAM(s) IntraPleural. every 12 hours  dronabinol 2.5 milliGRAM(s) Oral two times a day  epoetin brenda Injectable 48378 Unit(s) IV Push <User Schedule>  famotidine    Tablet 20 milliGRAM(s) Oral daily  heparin  Injectable 5000 Unit(s) SubCutaneous every 8 hours  lactobacillus acidophilus 1 Tablet(s) Oral three times a day  melatonin 6 milliGRAM(s) Oral at bedtime  midodrine 10 milliGRAM(s) Oral every 8 hours  norepinephrine Infusion 0.05 MICROgram(s)/kG/Min (8.081 mL/Hr) IV Continuous <Continuous>  piperacillin/tazobactam IVPB.. 3.375 Gram(s) IV Intermittent every 12 hours  polyethylene glycol 3350 17 Gram(s) Oral two times a day  QUEtiapine 25 milliGRAM(s) Oral at bedtime  sevelamer carbonate 800 milliGRAM(s) Oral three times a day with meals    MEDICATIONS  (PRN):  acetaminophen   Tablet .. 650 milliGRAM(s) Oral every 6 hours PRN Temp greater or equal to 38C (100.4F), Mild Pain (1 - 3)  albuterol/ipratropium for Nebulization 3 milliLiter(s) Nebulizer every 6 hours PRN Shortness of Breath and/or Wheezing  aluminum hydroxide/magnesium hydroxide/simethicone Suspension 30 milliLiter(s) Oral every 6 hours PRN Dyspepsia  guaiFENesin   Syrup  (Sugar-Free) 200 milliGRAM(s) Oral every 6 hours PRN Cough  HYDROmorphone  Injectable 1 milliGRAM(s) IV Push once PRN Pain  sodium chloride 0.9% lock flush 10 milliLiter(s) IV Push every 1 hour PRN Pre/post blood products, medications, blood draw, and to maintain line patency      Allergies    No Known Allergies    Intolerances        Review of Systems:    General:  No wt loss, fevers, chills, night sweats, fatigue   Eyes:  Good vision, no reported pain  ENT:  No sore throat, pain, runny nose, dysphagia  CV:  No pain, palpitations, hypo/hypertension  Resp:  No dyspnea, cough, tachypnea, wheezing  GI:  No pain, No nausea, No vomiting, No diarrhea, No constipation, No weight loss, No fever, No pruritis, No rectal bleeding, No melena, No dysphagia  :  No pain, bleeding, incontinence, nocturia  Muscle:  No pain, weakness  Neuro:  No weakness, tingling, memory problems  Psych:  No fatigue, insomnia, mood problems, depression  Endocrine:  No polyuria, polydypsia, cold/heat intolerance  Heme:  No petechiae, ecchymosis, easy bruisability  Skin:  No rash, tattoos, scars, edema      Vital Signs Last 24 Hrs  T(C): 36.4 (06 Mar 2020 08:00), Max: 36.8 (05 Mar 2020 12:29)  T(F): 97.6 (06 Mar 2020 08:00), Max: 98.3 (05 Mar 2020 15:49)  HR: 79 (06 Mar 2020 09:30) (58 - 107)  BP: 104/56 (06 Mar 2020 09:30) (75/52 - 165/71)  BP(mean): 71 (06 Mar 2020 09:30) (57 - 98)  RR: 27 (06 Mar 2020 09:30) (13 - 38)  SpO2: 94% (06 Mar 2020 09:30) (92% - 97%)    PHYSICAL EXAM:    General:  lying in  bed  HEENT:  NC/AT  Abdomen:  Soft nt mild dt  Extremities:  no edema  Neuro/Psych:  Awake alert some confusion      LABS:                        8.5    7.85  )-----------( 302      ( 06 Mar 2020 06:06 )             25.5     03-06    132<L>  |  97  |  74<H>  ----------------------------<  132<H>  5.0   |  21<L>  |  6.20<H>    Ca    8.1<L>      06 Mar 2020 06:06  Phos  5.9     03-06  Mg     3.0     03-06    TPro  5.8<L>  /  Alb  2.4<L>  /  TBili  0.9  /  DBili  x   /  AST  34  /  ALT  43  /  AlkPhos  81  03-06    PT/INR - ( 06 Mar 2020 06:06 )   PT: 17.5 sec;   INR: 1.54 ratio         PTT - ( 06 Mar 2020 06:06 )  PTT:22.7 sec      RADIOLOGY & ADDITIONAL TESTS:

## 2020-03-06 NOTE — GOALS OF CARE CONVERSATION - ADVANCED CARE PLANNING - CONVERSATION DETAILS
met pt with daughter, Esperanza, provided copy of hcp, living will, aware of pt directives, pt at this time is a full code, will abide by living will under circumstances.  PC RN contact # given
extensive discussion with son Sohail (physician) and daughter Esperanza.  Both are HCPs.  We discussed in detail the prognosis for the GUS, the pleural effusion, and the delirium.  We discussed the possibility that the pleural effusion may be malignant rather than infectious.  Also discussed the quality of life that would be acceptable for pt.  They all agree he will be DNR, DNI.  They do not think they would peruse permanent HD, but will make that decision when necessary.

## 2020-03-06 NOTE — PROGRESS NOTE ADULT - SUBJECTIVE AND OBJECTIVE BOX
Patient is a 89y old  Male who presents with a chief complaint of Left Empyema (06 Mar 2020 13:10)      INTERVAL /OVERNIGHT EVENTS: feels cold    MEDICATIONS  (STANDING):  atorvastatin 10 milliGRAM(s) Oral at bedtime  chlorhexidine 2% Cloths 1 Application(s) Topical <User Schedule>  chlorhexidine 4% Liquid 1 Application(s) Topical <User Schedule>  dexMEDEtomidine Infusion 0.1 MICROgram(s)/kG/Hr (2.155 mL/Hr) IV Continuous <Continuous>  epoetin brenda Injectable 29273 Unit(s) IV Push <User Schedule>  famotidine    Tablet 20 milliGRAM(s) Oral daily  heparin  Injectable 5000 Unit(s) SubCutaneous every 8 hours  lactobacillus acidophilus 1 Tablet(s) Oral three times a day  melatonin 6 milliGRAM(s) Oral at bedtime  midodrine 15 milliGRAM(s) Oral every 8 hours  norepinephrine Infusion 0.05 MICROgram(s)/kG/Min (8.081 mL/Hr) IV Continuous <Continuous>  polyethylene glycol 3350 17 Gram(s) Oral two times a day  sevelamer carbonate 800 milliGRAM(s) Oral three times a day with meals    MEDICATIONS  (PRN):  acetaminophen   Tablet .. 650 milliGRAM(s) Oral every 6 hours PRN Temp greater or equal to 38C (100.4F), Mild Pain (1 - 3)  albuterol/ipratropium for Nebulization 3 milliLiter(s) Nebulizer every 6 hours PRN Shortness of Breath and/or Wheezing      Allergies    No Known Allergies    Intolerances        REVIEW OF SYSTEMS: unable to obtain    Vital Signs Last 24 Hrs  T(C): 36.4 (06 Mar 2020 15:10), Max: 36.7 (05 Mar 2020 20:54)  T(F): 97.6 (06 Mar 2020 15:10), Max: 98.1 (05 Mar 2020 20:54)  HR: 82 (06 Mar 2020 17:00) (58 - 107)  BP: 106/55 (06 Mar 2020 17:00) (75/52 - 165/71)  BP(mean): 76 (06 Mar 2020 17:00) (59 - 98)  RR: 22 (06 Mar 2020 17:00) (13 - 34)  SpO2: 95% (06 Mar 2020 17:00) (92% - 97%)    PHYSICAL EXAM:  GENERAL: NAD, well-groomed, well-developed  HEAD:  Atraumatic, Normocephalic  EYES: EOMI, PERRLA, conjunctiva and sclera clear  ENMT: No tonsillar erythema, exudates, or enlargement; Moist mucous membranes, Good dentition, No lesions  NECK: Supple, No JVD, Normal thyroid  NERVOUS SYSTEM:  Alert & awake, Motor Strength 5/5 B/L upper and lower extremities; DTRs 2+ intact and symmetric  CHEST/LUNG: Clear to auscultation bilaterally; No rales, rhonchi, wheezing, or rubs  HEART: Regular rate and rhythm; No murmurs, rubs, or gallops  ABDOMEN: Soft, Nontender, Nondistended; Bowel sounds present  EXTREMITIES:  2+ Peripheral Pulses, No clubbing, cyanosis, or edema  LYMPH: No lymphadenopathy noted  SKIN: No rashes or lesions    LABS:                        8.5    7.85  )-----------( 302      ( 06 Mar 2020 06:06 )             25.5     06 Mar 2020 06:06    132    |  97     |  74     ----------------------------<  132    5.0     |  21     |  6.20     Ca    8.1        06 Mar 2020 06:06  Phos  5.9       06 Mar 2020 06:06  Mg     3.0       06 Mar 2020 06:06    TPro  5.8    /  Alb  2.4    /  TBili  0.9    /  DBili  x      /  AST  34     /  ALT  43     /  AlkPhos  81     06 Mar 2020 06:06    PT/INR - ( 06 Mar 2020 06:06 )   PT: 17.5 sec;   INR: 1.54 ratio         PTT - ( 06 Mar 2020 06:06 )  PTT:22.7 sec    CAPILLARY BLOOD GLUCOSE          RADIOLOGY & ADDITIONAL TESTS:    Notes Reviewed:  [x ] YES  [ ] NO    Care Discussed with Consultants/Other Providers [x ] YES  [ ] NO

## 2020-03-06 NOTE — PROGRESS NOTE ADULT - SUBJECTIVE AND OBJECTIVE BOX
Date/Time Patient Seen:  		  Referring MD:   Data Reviewed	       Patient is a 89y old  Male who presents with a chief complaint of Left Empyema (06 Mar 2020 05:22)      Subjective/HPI     PAST MEDICAL & SURGICAL HISTORY:  GI Bleed: 2007  Stomach Ulcer: H pylori 2007, treated with antibiotics  Osteoarthritis  Hyperlipidemia  Herniated Disc  Spinal Stenosis  Diverticulitis  GERD (Gastroesophageal Reflux Disease)  Chronic Lymphocytic Leukemia: followed by oncologist in [place]  S/P Tonsillectomy: childhood  Status Post Arthroscopy: right shoulder  History of Arthroscopy of Knee: bilateral  Osteoarthritis  Stomach Ulcer: H pylori, treated with antibiotics        Medication list         MEDICATIONS  (STANDING):  alteplase  Injectable for Pleural Effusion 10 milliGRAM(s) IntraPleural. every 12 hours  atorvastatin 10 milliGRAM(s) Oral at bedtime  chlorhexidine 2% Cloths 1 Application(s) Topical <User Schedule>  chlorhexidine 4% Liquid 1 Application(s) Topical <User Schedule>  dexMEDEtomidine Infusion 0.1 MICROgram(s)/kG/Hr (2.155 mL/Hr) IV Continuous <Continuous>  dornase brenda Solution for Pleural Effusion 5 milliGRAM(s) IntraPleural. every 12 hours  dronabinol 2.5 milliGRAM(s) Oral two times a day  epoetin brenda Injectable 20218 Unit(s) IV Push <User Schedule>  famotidine    Tablet 20 milliGRAM(s) Oral daily  heparin  Injectable 5000 Unit(s) SubCutaneous every 8 hours  HYDROmorphone  Injectable 1 milliGRAM(s) IV Push <User Schedule>  lactobacillus acidophilus 1 Tablet(s) Oral three times a day  melatonin 6 milliGRAM(s) Oral at bedtime  midodrine 10 milliGRAM(s) Oral every 8 hours  norepinephrine Infusion 0.05 MICROgram(s)/kG/Min (8.081 mL/Hr) IV Continuous <Continuous>  piperacillin/tazobactam IVPB.. 3.375 Gram(s) IV Intermittent every 12 hours  polyethylene glycol 3350 17 Gram(s) Oral two times a day  QUEtiapine 25 milliGRAM(s) Oral at bedtime  sevelamer carbonate 800 milliGRAM(s) Oral three times a day with meals    MEDICATIONS  (PRN):  acetaminophen   Tablet .. 650 milliGRAM(s) Oral every 6 hours PRN Temp greater or equal to 38C (100.4F), Mild Pain (1 - 3)  albuterol/ipratropium for Nebulization 3 milliLiter(s) Nebulizer every 6 hours PRN Shortness of Breath and/or Wheezing  aluminum hydroxide/magnesium hydroxide/simethicone Suspension 30 milliLiter(s) Oral every 6 hours PRN Dyspepsia  guaiFENesin   Syrup  (Sugar-Free) 200 milliGRAM(s) Oral every 6 hours PRN Cough  HYDROmorphone  Injectable 1 milliGRAM(s) IV Push once PRN Pain  sodium chloride 0.9% lock flush 10 milliLiter(s) IV Push every 1 hour PRN Pre/post blood products, medications, blood draw, and to maintain line patency         Vitals log        ICU Vital Signs Last 24 Hrs  T(C): 36.4 (06 Mar 2020 03:46), Max: 36.8 (05 Mar 2020 08:00)  T(F): 97.5 (06 Mar 2020 03:46), Max: 98.3 (05 Mar 2020 08:00)  HR: 88 (06 Mar 2020 06:00) (58 - 107)  BP: 95/55 (06 Mar 2020 06:00) (78/45 - 165/71)  BP(mean): 69 (06 Mar 2020 06:00) (57 - 98)  ABP: --  ABP(mean): --  RR: 28 (06 Mar 2020 06:00) (13 - 38)  SpO2: 94% (06 Mar 2020 06:00) (92% - 97%)           Input and Output:  I&O's Detail    04 Mar 2020 07:01  -  05 Mar 2020 07:00  --------------------------------------------------------  IN:    Albumin 25%: 100 mL    Lactated Ringers IV Bolus: 500 mL    Oral Fluid: 674 mL    Other: 1000 mL    Solution: 100 mL  Total IN: 2374 mL    OUT:    Chest Tube: 155 mL    Indwelling Catheter - Urethral: 665 mL    Other: 1500 mL  Total OUT: 2320 mL    Total NET: 54 mL      05 Mar 2020 07:01  -  06 Mar 2020 06:44  --------------------------------------------------------  IN:    Albumin 25%: 100 mL    dexmedetomidine Infusion: 49.4 mL    Lactated Ringers IV Bolus: 500 mL    norepinephrine Infusion: 116.4 mL    Oral Fluid: 600 mL    Solution: 200 mL  Total IN: 1565.8 mL    OUT:    Chest Tube: 235 mL    Indwelling Catheter - Urethral: 670 mL  Total OUT: 905 mL    Total NET: 660.8 mL          Lab Data                        8.5    7.85  )-----------( 302      ( 06 Mar 2020 06:06 )             25.5     03-06    132<L>  |  97  |  74<H>  ----------------------------<  132<H>  5.0   |  21<L>  |  6.20<H>    Ca    8.1<L>      06 Mar 2020 06:06  Phos  5.9     03-06  Mg     3.0     03-06    TPro  5.8<L>  /  Alb  2.4<L>  /  TBili  0.9  /  DBili  x   /  AST  34  /  ALT  43  /  AlkPhos  81  03-06            Review of Systems	      Objective     Physical Examination    heart s1s2  lung dc BS  abd soft  head nc  confused  delirious      Pertinent Lab findings & Imaging      [place]:  NO   Adequate UO     I&O's Detail    04 Mar 2020 07:01  -  05 Mar 2020 07:00  --------------------------------------------------------  IN:    Albumin 25%: 100 mL    Lactated Ringers IV Bolus: 500 mL    Oral Fluid: 674 mL    Other: 1000 mL    Solution: 100 mL  Total IN: 2374 mL    OUT:    Chest Tube: 155 mL    Indwelling Catheter - Urethral: 665 mL    Other: 1500 mL  Total OUT: 2320 mL    Total NET: 54 mL      05 Mar 2020 07:01  -  06 Mar 2020 06:44  --------------------------------------------------------  IN:    Albumin 25%: 100 mL    dexmedetomidine Infusion: 49.4 mL    Lactated Ringers IV Bolus: 500 mL    norepinephrine Infusion: 116.4 mL    Oral Fluid: 600 mL    Solution: 200 mL  Total IN: 1565.8 mL    OUT:    Chest Tube: 235 mL    Indwelling Catheter - Urethral: 670 mL  Total OUT: 905 mL    Total NET: 660.8 mL               Discussed with:     Cultures:	        Radiology

## 2020-03-06 NOTE — PROGRESS NOTE ADULT - PROBLEM SELECTOR PLAN 3
Possibly secondary to vanco toxicity  Trend Cr, S/P HD   Avoid nephrotoxic medications  Renal consult with Dr. JEZ patel

## 2020-03-06 NOTE — PROGRESS NOTE ADULT - ASSESSMENT
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 Transferred to ICU for tPA/dornase brenda via chest tube. Found to have GUS 2/2 ATN, likely due to vancomycin.    1. Neuro: pain control with acetaminophen PRN, dilaudid prior to alteplase    2. CV: HD stable, continue statin. Cards following. c/w albumin, start midodrine for low bp    3. Pulm: Sat well on RA. Chest tube output -155mL serosanguinous drainage over 24hr, significant decrease overnight. Restart alteplase/dornase via chest tube for at least 2 doses. Continue chest tube drainage, monitor output. Encourage incentive spirometry. Pulm following. No plan for Sx per discussion w/ CT Sx.     4. GI: regular diet as tolerated with Ensure, continue dronabinol. On lactobacillus. GERD precautions. GI following.    5. Renal: GUS of unclear etiology, likely drug toxicity due to vancomycin. Renally dose meds. Avoid nephrotoxic agents (Avoid ACEI, ARB, NSAIDs, IV contrast). Output Noted. continue to monitor strict I/Os, trend kidney function and lytes. FU Uro c/s. Keep celestin. c/w phos binder. s/p HD -500mL. Hold HD today. Nephro following.    6. ID: Continue Zosyn. UCx NGF. Pleural Cx NGF FU Pleural Fungi/AFB. ID following.    7. Heme: H/H drop noted. Trend CBC. HSQ for DVT ppx. Maltoma and CLL being monitored, not on active treatment. H/O following.    8. Endo: no active issues    9. Skin: +L IJ for HD, + celestin. + L Chest tube.    10. 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 Transferred to ICU for tPA/dornase brenda via chest tube. Found to have GUS 2/2 ATN, likely due to vancomycin.    1. Neuro: pain control with acetaminophen PRN, dilaudid prior to alteplase. Precedex/haloperidol/restraints prn agitation.    2. CV: HD stable, continue statin. Cards following. c/w albumin, c/w Levo, titrate to MAP 65-70. midodrine for low bp    3. Pulm: Sat well on RA. Chest tube output -155mL serosanguinous drainage over 24hr, significant decrease overnight. c/w alteplase/dornase via chest tube for at least 2 doses. Continue chest tube drainage, monitor output. Encourage incentive spirometry. c/w duoneb/robitussin. Pulm following. CT Sx following. No plan for Sx per discussion w/ CT Sx.     4. GI: regular diet as tolerated with Ensure, continue dronabinol. On lactobacillus. GERD precautions. GI following.    5. Renal: GUS of unclear etiology, likely drug toxicity due to vancomycin. Renally dose meds. Avoid nephrotoxic agents (Avoid ACEI, ARB, NSAIDs, IV contrast). Output Noted. continue to monitor strict I/Os, trend kidney function and lytes. FU Uro c/s. Keep celestin. c/w phos binder. s/p HD -500mL. Hold HD today. Nephro following.    6. ID: Continue Zosyn. UCx NGF. Pleural Cx NGF FU Pleural Fungi/AFB. ID following.    7. Heme: H/H drop noted. Trend CBC. HSQ for DVT ppx. Maltoma and CLL being monitored, not on active treatment. H/O following.    8. Endo: no active issues    9. Skin: +L IJ for HD, + celestin. + L Chest tube.    10. Dispo: full code, Condition improved, stable for transfer to

## 2020-03-06 NOTE — CHART NOTE - NSCHARTNOTEFT_GEN_A_CORE
Assessment: Pt remains in ICU, on levophed at present, received HD x1. Cr 6.2, phos  5.9. on renvela Rx. Confused at present. Pt with empyema s/p thoracentesis with pigtail drain. BM noted yesterday. Buttock st 1 pressure injury. Poor po noted on marinol Rx. Ensure increased to 2x day, building up at bedside. Son present, attempted to obtain food preferences, but pt confused. Ate small amount of pancake this am. Health care proxy noted, states no artificial nutrition. Recommend palliative care eval to discuss GOC/complete MOLST.    Factors impacting intake: [ ] none [ ] nausea  [ ] vomiting [ ] diarrhea [ ] constipation  [ ]chewing problems [ ] swallowing issues  [ ] other:     Diet Presciption: Diet, Regular:   Supplement Feeding Modality:  Oral  Ensure Enlive Cans or Servings Per Day:  2       Frequency:  Two Times a day (03-03-20 @ 14:46)    Intake: poor    Current Weight:   % Weight Change    Pertinent Medications: MEDICATIONS  (STANDING):  atorvastatin 10 milliGRAM(s) Oral at bedtime  chlorhexidine 2% Cloths 1 Application(s) Topical <User Schedule>  chlorhexidine 4% Liquid 1 Application(s) Topical <User Schedule>  dexMEDEtomidine Infusion 0.1 MICROgram(s)/kG/Hr (2.155 mL/Hr) IV Continuous <Continuous>  dornase brenda Solution for Pleural Effusion 5 milliGRAM(s) IntraPleural. every 12 hours  dronabinol 2.5 milliGRAM(s) Oral two times a day  epoetin brenda Injectable 05769 Unit(s) IV Push <User Schedule>  famotidine    Tablet 20 milliGRAM(s) Oral daily  heparin  Injectable 5000 Unit(s) SubCutaneous every 8 hours  lactobacillus acidophilus 1 Tablet(s) Oral three times a day  melatonin 6 milliGRAM(s) Oral at bedtime  midodrine 10 milliGRAM(s) Oral every 8 hours  norepinephrine Infusion 0.05 MICROgram(s)/kG/Min (8.081 mL/Hr) IV Continuous <Continuous>  piperacillin/tazobactam IVPB.. 3.375 Gram(s) IV Intermittent every 12 hours  polyethylene glycol 3350 17 Gram(s) Oral two times a day  QUEtiapine 25 milliGRAM(s) Oral at bedtime  sevelamer carbonate 800 milliGRAM(s) Oral three times a day with meals    MEDICATIONS  (PRN):  acetaminophen   Tablet .. 650 milliGRAM(s) Oral every 6 hours PRN Temp greater or equal to 38C (100.4F), Mild Pain (1 - 3)  albuterol/ipratropium for Nebulization 3 milliLiter(s) Nebulizer every 6 hours PRN Shortness of Breath and/or Wheezing  aluminum hydroxide/magnesium hydroxide/simethicone Suspension 30 milliLiter(s) Oral every 6 hours PRN Dyspepsia  guaiFENesin   Syrup  (Sugar-Free) 200 milliGRAM(s) Oral every 6 hours PRN Cough  HYDROmorphone  Injectable 1 milliGRAM(s) IV Push once PRN Pain  sodium chloride 0.9% lock flush 10 milliLiter(s) IV Push every 1 hour PRN Pre/post blood products, medications, blood draw, and to maintain line patency    Pertinent Labs: 03-06 Na132 mmol/L<L> Glu 132 mg/dL<H> K+ 5.0 mmol/L Cr  6.20 mg/dL<H> BUN 74 mg/dL<H> 03-06 Phos 5.9 mg/dL<H> 03-06 Alb 2.4 g/dL<L> 02-26 [id number] 5.6 %     CAPILLARY BLOOD GLUCOSE        Skin:     Estimated Needs:   [x ] no change since previous assessment  [ ] recalculated:     Previous Nutrition Diagnosis:   [x ] Inadequate Energy Intake [ ]Inadequate Oral Intake [ ] Excessive Energy Intake   [ ] Underweight [ ] Increased Nutrient Needs [ ] Overweight/Obesity   [ ] Altered GI Function [ ] Unintended Weight Loss [ ] Food & Nutrition Related Knowledge Deficit [ ] Malnutrition     Nutrition Diagnosis is [x ] ongoing  [ ] resolved [ ] not applicable     New Nutrition Diagnosis: [ ] not applicable       Interventions: Snacks added per pt preferences, Ensure encouraged.   Recommend  [ ] Change Diet To:  [ ] Nutrition Supplement  [ ] Nutrition Support  [x ] Other:   Consider renal MVI, consider palliative care eval for GOC discussion  Monitoring and Evaluation:   [ ] PO intake [ x ] Tolerance to diet prescription [ x ] weights [ x ] labs[ x ] follow up per protocol  [ ] other:

## 2020-03-06 NOTE — PROGRESS NOTE ADULT - SUBJECTIVE AND OBJECTIVE BOX
CHARLY LU  89y  Male    Patient is a 89y old  Male who presents with a chief complaint of Left Empyema (06 Mar 2020 09:44)    lethargic but arousable.  urine out put is marginal  patient's son at bed side.       PAST MEDICAL & SURGICAL HISTORY:  GI Bleed: 2007  Stomach Ulcer: H pylori 2007, treated with antibiotics  Osteoarthritis  Hyperlipidemia  Herniated Disc  Spinal Stenosis  Diverticulitis  GERD (Gastroesophageal Reflux Disease)  Chronic Lymphocytic Leukemia: followed by oncologist in [place]  S/P Tonsillectomy: childhood  Status Post Arthroscopy: right shoulder  History of Arthroscopy of Knee: bilateral      PHYSICAL EXAM:    T(C): 36.4 (03-06-20 @ 08:00), Max: 36.8 (03-05-20 @ 15:49)  HR: 66 (03-06-20 @ 11:00) (58 - 107)  BP: 90/52 (03-06-20 @ 11:00) (75/52 - 165/71)  RR: 21 (03-06-20 @ 11:00) (13 - 34)  SpO2: 94% (03-06-20 @ 11:00) (92% - 97%)  Wt(kg): --    I&O's Detail    05 Mar 2020 07:01  -  06 Mar 2020 07:00  --------------------------------------------------------  IN:    Albumin 25%: 100 mL    dexmedetomidine Infusion: 55.8 mL    Lactated Ringers IV Bolus: 500 mL    norepinephrine Infusion: 119.6 mL    Oral Fluid: 600 mL    Solution: 200 mL  Total IN: 1575.4 mL    OUT:    Chest Tube: 235 mL    Indwelling Catheter - Urethral: 670 mL  Total OUT: 905 mL    Total NET: 670.4 mL      06 Mar 2020 07:01  -  06 Mar 2020 13:10  --------------------------------------------------------  IN:    dexmedetomidine Infusion: 9.6 mL    norepinephrine Infusion: 24.2 mL    Oral Fluid: 250 mL  Total IN: 283.8 mL    OUT:    Indwelling Catheter - Urethral: 130 mL  Total OUT: 130 mL    Total NET: 153.8 mL          Respiratory: clear anteriorly, decreased BS at bases  Cardiovascular: S1 S2  Gastrointestinal: soft NT ND +BS  Extremities: edema   Neuro: Awake and alert    MEDICATIONS  (STANDING):  atorvastatin 10 milliGRAM(s) Oral at bedtime  chlorhexidine 2% Cloths 1 Application(s) Topical <User Schedule>  chlorhexidine 4% Liquid 1 Application(s) Topical <User Schedule>  dexMEDEtomidine Infusion 0.1 MICROgram(s)/kG/Hr (2.155 mL/Hr) IV Continuous <Continuous>  dronabinol 2.5 milliGRAM(s) Oral two times a day  epoetin brenda Injectable 54088 Unit(s) IV Push <User Schedule>  famotidine    Tablet 20 milliGRAM(s) Oral daily  heparin  Injectable 5000 Unit(s) SubCutaneous every 8 hours  lactobacillus acidophilus 1 Tablet(s) Oral three times a day  melatonin 6 milliGRAM(s) Oral at bedtime  midodrine 15 milliGRAM(s) Oral every 8 hours  norepinephrine Infusion 0.05 MICROgram(s)/kG/Min (8.081 mL/Hr) IV Continuous <Continuous>  piperacillin/tazobactam IVPB.. 3.375 Gram(s) IV Intermittent every 12 hours  polyethylene glycol 3350 17 Gram(s) Oral two times a day  sevelamer carbonate 800 milliGRAM(s) Oral three times a day with meals    MEDICATIONS  (PRN):  acetaminophen   Tablet .. 650 milliGRAM(s) Oral every 6 hours PRN Temp greater or equal to 38C (100.4F), Mild Pain (1 - 3)  albuterol/ipratropium for Nebulization 3 milliLiter(s) Nebulizer every 6 hours PRN Shortness of Breath and/or Wheezing  aluminum hydroxide/magnesium hydroxide/simethicone Suspension 30 milliLiter(s) Oral every 6 hours PRN Dyspepsia  guaiFENesin   Syrup  (Sugar-Free) 200 milliGRAM(s) Oral every 6 hours PRN Cough  HYDROmorphone  Injectable 1 milliGRAM(s) IV Push once PRN Pain  sodium chloride 0.9% lock flush 10 milliLiter(s) IV Push every 1 hour PRN Pre/post blood products, medications, blood draw, and to maintain line patency                            8.5    7.85  )-----------( 302      ( 06 Mar 2020 06:06 )             25.5       03-06    132<L>  |  97  |  74<H>  ----------------------------<  132<H>  5.0   |  21<L>  |  6.20<H>    Ca    8.1<L>      06 Mar 2020 06:06  Phos  5.9     03-06  Mg     3.0     03-06    TPro  5.8<L>  /  Alb  2.4<L>  /  TBili  0.9  /  DBili  x   /  AST  34  /  ALT  43  /  AlkPhos  81  03-06      Creatinine Trend: Creatinine Trend: 6.20<--, 5.70<--, 7.30<--, 6.70<--, 6.30<--, 6.00<--

## 2020-03-06 NOTE — PROGRESS NOTE ADULT - SUBJECTIVE AND OBJECTIVE BOX
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 Pt found to have loculated left pleural effusions on CT Chest, concerning for empyema. Pt underwent IR guided left chest tube on 2/27, initially w/ minimal drainage, subsequently transferred to [facility] ED for further management. Pt initially offered VATs, but family wanted more conservative measures. Pt subsequently treated with tPA/Dornase brenda via chest tube. Pt initially with sufficient output s/p first dose tPA now with decreased drainage on 2nd administration (~100-200 cc's, total 1.2L). ICU course complicated by worsening renal failure, Cr 1.00 2/26 to now 5.1, continued oliguria and hematuria (resolved).       Events last 24 hours:   -Pt on levophed for hypotension titrated down to 0.02 from 0.05  -Pt was combative and agitated getting out of the bed, was give haldol and seroquel with no improvement started on precedex gtt ON, was titrated off this AM     PAST MEDICAL & SURGICAL HISTORY:  GI Bleed: 2007  Stomach Ulcer: H pylori 2007, treated with antibiotics  Osteoarthritis  Hyperlipidemia  Herniated Disc  Spinal Stenosis  Diverticulitis  GERD (Gastroesophageal Reflux Disease)  Chronic Lymphocytic Leukemia: followed by oncologist in [place]  S/P Tonsillectomy: childhood  Status Post Arthroscopy: right shoulder  History of Arthroscopy of Knee: bilateral    Allergies    No Known Allergies    Intolerances      FAMILY HISTORY:  No pertinent family history in first degree relatives      Review of Systems:  ROS unreliable 2/2 to confusion and mental status       Medications:  piperacillin/tazobactam IVPB.. 3.375 Gram(s) IV Intermittent every 12 hours    midodrine 10 milliGRAM(s) Oral every 8 hours  norepinephrine Infusion 0.05 MICROgram(s)/kG/Min IV Continuous <Continuous>    albuterol/ipratropium for Nebulization 3 milliLiter(s) Nebulizer every 6 hours PRN  dornase brenda Solution for Pleural Effusion 5 milliGRAM(s) IntraPleural. every 12 hours  guaiFENesin   Syrup  (Sugar-Free) 200 milliGRAM(s) Oral every 6 hours PRN    acetaminophen   Tablet .. 650 milliGRAM(s) Oral every 6 hours PRN  dexMEDEtomidine Infusion 0.1 MICROgram(s)/kG/Hr IV Continuous <Continuous>  dronabinol 2.5 milliGRAM(s) Oral two times a day  HYDROmorphone  Injectable 1 milliGRAM(s) IV Push <User Schedule>  HYDROmorphone  Injectable 1 milliGRAM(s) IV Push once PRN  melatonin 6 milliGRAM(s) Oral at bedtime  QUEtiapine 25 milliGRAM(s) Oral at bedtime      alteplase  Injectable for Pleural Effusion 10 milliGRAM(s) IntraPleural. every 12 hours  heparin  Injectable 5000 Unit(s) SubCutaneous every 8 hours    aluminum hydroxide/magnesium hydroxide/simethicone Suspension 30 milliLiter(s) Oral every 6 hours PRN  famotidine    Tablet 20 milliGRAM(s) Oral daily  polyethylene glycol 3350 17 Gram(s) Oral two times a day      atorvastatin 10 milliGRAM(s) Oral at bedtime    sodium chloride 0.9% lock flush 10 milliLiter(s) IV Push every 1 hour PRN    epoetin brenda Injectable 79944 Unit(s) IV Push <User Schedule>    chlorhexidine 2% Cloths 1 Application(s) Topical <User Schedule>  chlorhexidine 4% Liquid 1 Application(s) Topical <User Schedule>    lactobacillus acidophilus 1 Tablet(s) Oral three times a day  sevelamer carbonate 800 milliGRAM(s) Oral three times a day with meals          ICU Vital Signs Last 24 Hrs  T(C): 36.4 (06 Mar 2020 03:46), Max: 36.8 (05 Mar 2020 08:00)  T(F): 97.5 (06 Mar 2020 03:46), Max: 98.3 (05 Mar 2020 08:00)  HR: 88 (06 Mar 2020 06:00) (58 - 107)  BP: 95/55 (06 Mar 2020 06:00) (78/45 - 165/71)  BP(mean): 69 (06 Mar 2020 06:00) (57 - 98)  ABP: --  ABP(mean): --  RR: 28 (06 Mar 2020 06:00) (13 - 38)  SpO2: 94% (06 Mar 2020 06:00) (92% - 97%)    Vital Signs Last 24 Hrs  T(C): 36.4 (06 Mar 2020 03:46), Max: 36.8 (05 Mar 2020 08:00)  T(F): 97.5 (06 Mar 2020 03:46), Max: 98.3 (05 Mar 2020 08:00)  HR: 88 (06 Mar 2020 06:00) (58 - 107)  BP: 95/55 (06 Mar 2020 06:00) (78/45 - 165/71)  BP(mean): 69 (06 Mar 2020 06:00) (57 - 98)  RR: 28 (06 Mar 2020 06:00) (13 - 38)  SpO2: 94% (06 Mar 2020 06:00) (92% - 97%)        I&O's Detail    05 Mar 2020 07:01  -  06 Mar 2020 07:00  --------------------------------------------------------  IN:    Albumin 25%: 100 mL    dexmedetomidine Infusion: 49.4 mL    Lactated Ringers IV Bolus: 500 mL    norepinephrine Infusion: 116.4 mL    Oral Fluid: 600 mL    Solution: 200 mL  Total IN: 1565.8 mL    OUT:    Chest Tube: 235 mL    Indwelling Catheter - Urethral: 670 mL  Total OUT: 905 mL    Total NET: 660.8 mL            LABS:                        8.5    7.85  )-----------( 302      ( 06 Mar 2020 06:06 )             25.5     03-06    132<L>  |  97  |  74<H>  ----------------------------<  132<H>  5.0   |  21<L>  |  6.20<H>    Ca    8.1<L>      06 Mar 2020 06:06  Phos  5.9     03-06  Mg     3.0     03-06    TPro  5.8<L>  /  Alb  2.4<L>  /  TBili  0.9  /  DBili  x   /  AST  34  /  ALT  43  /  AlkPhos  81  03-06          CAPILLARY BLOOD GLUCOSE        PT/INR - ( 06 Mar 2020 06:06 )   PT: 17.5 sec;   INR: 1.54 ratio         PTT - ( 06 Mar 2020 06:06 )  PTT:22.7 sec    CULTURES:  Culture Results:   No growth (03-01 @ 13:24)    piperacillin/tazobactam IVPB.. 3.375 Gram(s) IV Intermittent every 12 hours      Physical Examination:  General: No acute distress.  Alert, oriented x2, interactive, confused at times, nonfocal  NEURO: A&O X2, confused at times, motor function 5/5 BL UE/LE  HEENT: Pupils equal, reactive to light.  Symmetric.  PULM: distant at bases CTA , no significant sputum production, no wheezes, rales, rhonchi  CVS: Regular rate and rhythm, no murmurs, rubs, or gallops  ABD: Soft, nondistended, nontender, normoactive bowel sounds, no masses  EXT: No edema, nontender  SKIN: Warm and well perfused, no rashes noted        EKG: NSR     RADIOLOGY: < from: Xray Chest 1 View- PORTABLE-Routine (03.03.20 @ 12:12) >  Frontal expiratory view of the chest shows the heart to be similar in size. Left chest pigtail catheter remains present. The lungs show progression of left infiltrate with pleural thickening and there is no evidence of pneumothorax nor right pleural effusion.    IMPRESSION:  Progression of left infiltrate.    Thank you for the courtesy of this referral.    < end of copied text >        CENTRAL LINE: N          DATE INSERTED:                  SOLIS: Y                        DATE INSERTED:                  A-LINE: N                       DATE INSERTED:                  GLOBAL ISSUE/BEST PRACTICE:  Analgesia: N/A  Sedation: N/A  HOB elevation: yes  Stress ulcer prophylaxis: protonix   VTE prophylaxis: Heparin SQ

## 2020-03-06 NOTE — PROGRESS NOTE ADULT - ASSESSMENT
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  Hospital course complicated by GUS from [facility]    1. Empyema vs. complicated L complicated effusion    2. GUS- oliguria   3. PNA     PLAN   - SBP dropped to 90's ON given bolus of albumin and written for 50 q6h   -Pt on levophed for hypotension   -Pt was combative and agitated getting out of the bed, was give haldol and seroquel with no improvement started on precedex gtt ON, was titrated off this AM   -Dilaudid for pain control   -pt on duonebs   -restarted MIST 2 protocol CT output 235  -Pt from respiratory standpoint asx ON    -Diet as tolerated   -Check repeat labs and monitor renal function trend UOP 30cc/h ON   -Encourage PO intake as tolerated  -Avoid hypotension and optimize BP with IVF and pressor support if needed.   -Avoid nephrotoxic meds as possible. Avoid ACEI, ARB and NSAIDS  -Monitor input and output  -Monitor and replace electrolytes as needed   -Zosyn for PNA ? 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  Hospital course complicated by GUS from [facility]    1. Empyema vs. complicated L complicated effusion    2. GUS- oliguria   3. PNA   4. septic shock vs. hypovolemia vs. drug induced hypotension   5. AMS- metabolic encephalopathy agitation     PLAN   - SBP dropped to 90's ON given bolus of albumin and written for 50 q6h   -Pt on levophed for hypotension   -Pt was combative and agitated getting out of the bed, was give haldol and seroquel with no improvement started on precedex gtt ON, was titrated off this AM   -Dilaudid for pain control   -pt on duonebs   -restarted MIST 2 protocol CT output 235  -Pt from respiratory standpoint asx ON    -Diet as tolerated   -Check repeat labs and monitor renal function trend UOP 30cc/h ON   -Encourage PO intake as tolerated  -Avoid hypotension and optimize BP with IVF and pressor support if needed.   -Avoid nephrotoxic meds as possible. Avoid ACEI, ARB and NSAIDS  -Monitor input and output  -Monitor and replace electrolytes as needed   -Zosyn for PNA ? empyema with anaerobic coverage    -Heparin SQ     ETHICS        CODE STATUS: Full Code   Discussed plan with bedside provider   Time Spent: 30 min

## 2020-03-06 NOTE — PROGRESS NOTE ADULT - ASSESSMENT
89 white male with a history of GERD, HLD, now with empyema on the left S/P IR drainage and is on alteplase.   Was given vancomycin and now has GUS. Urine out put is marginal. will attempt dialysis in AM if patient tolerates.   Minimal response to lasix IV. Spoke to patient's son who is a physician at bed side. Will reevaluate in AM for dialysis.    case discussed with ICU team, will follow closely.

## 2020-03-06 NOTE — PROGRESS NOTE ADULT - SUBJECTIVE AND OBJECTIVE BOX
CHARLY LU is a 89yMale , patient examined and chart reviewed.     INTERVAL HPI/ OVERNIGHT EVENTS:   Awake. No distress. Sp TPA via chest tube.  Afebrile.    PAST MEDICAL & SURGICAL HISTORY:  GI Bleed: 2007  Stomach Ulcer: H pylori 2007, treated with antibiotics  Osteoarthritis  Hyperlipidemia  Herniated Disc  Spinal Stenosis  Diverticulitis  GERD (Gastroesophageal Reflux Disease)  Chronic Lymphocytic Leukemia: followed by oncologist in [place]  S/P Tonsillectomy: childhood  Status Post Arthroscopy: right shoulder  History of Arthroscopy of Knee: bilateral    For details regarding the patient's social history, family history, and other miscellaneous elements, please refer the initial infectious diseases consultation and/or the admitting history and physical examination for this admission.    ROS:  CONSTITUTIONAL:  Negative fever or chills  EYES:  Negative  blurry vision or double vision  CARDIOVASCULAR:  Negative for chest pain or palpitations  RESPIRATORY:  Negative for cough, wheezing, or SOB   GASTROINTESTINAL:  Negative for nausea, vomiting, diarrhea, constipation, or abdominal pain  GENITOURINARY:  Negative frequency, urgency or dysuria  NEUROLOGIC:  No headache, confusion, dizziness, lightheadedness  All other systems were reviewed and are negative     Current inpatient medications :    ANTIBIOTICS/RELEVANT:  piperacillin/tazobactam IVPB.. 3.375 Gram(s) IV Intermittent every 12 hours    MEDICATIONS  (STANDING):  atorvastatin 10 milliGRAM(s) Oral at bedtime  chlorhexidine 2% Cloths 1 Application(s) Topical <User Schedule>  chlorhexidine 4% Liquid 1 Application(s) Topical <User Schedule>  dexMEDEtomidine Infusion 0.1 MICROgram(s)/kG/Hr (2.155 mL/Hr) IV Continuous <Continuous>  epoetin brenda Injectable 28585 Unit(s) IV Push <User Schedule>  famotidine    Tablet 20 milliGRAM(s) Oral daily  heparin  Injectable 5000 Unit(s) SubCutaneous every 8 hours  lactobacillus acidophilus 1 Tablet(s) Oral three times a day  melatonin 6 milliGRAM(s) Oral at bedtime  midodrine 15 milliGRAM(s) Oral every 8 hours  norepinephrine Infusion 0.05 MICROgram(s)/kG/Min (8.081 mL/Hr) IV Continuous <Continuous>  polyethylene glycol 3350 17 Gram(s) Oral two times a day  sevelamer carbonate 800 milliGRAM(s) Oral three times a day with meals    MEDICATIONS  (PRN):  acetaminophen   Tablet .. 650 milliGRAM(s) Oral every 6 hours PRN Temp greater or equal to 38C (100.4F), Mild Pain (1 - 3)  albuterol/ipratropium for Nebulization 3 milliLiter(s) Nebulizer every 6 hours PRN Shortness of Breath and/or Wheezing    Objective:  ICU Vital Signs Last 24 Hrs  T(C): 36.8 (05 Mar 2020 15:49), Max: 37.1 (04 Mar 2020 17:30)  T(F): 98.3 (05 Mar 2020 15:49), Max: 98.8 (04 Mar 2020 17:30)  HR: 92 (05 Mar 2020 16:00) (68 - 97)  BP: 100/54 (05 Mar 2020 16:00) (78/45 - 109/54)  BP(mean): 73 (05 Mar 2020 16:00) (57 - 76)  RR: 34 (05 Mar 2020 16:00) (18 - 38)  SpO2: 95% (05 Mar 2020 16:00) (92% - 96%)      Physical Exam:  General:  no acute distress  Eyes: sclera anicteric, pupils equal and reactive to light  ENMT: buccal mucosa moist, pharynx not injected  Neck: supple, trachea midline  Lungs: decreased no wheeze/rhonchi Left lung pigtail  Cardiovascular: regular rate and rhythm, S1 S2  Abdomen: soft, nontender, no organomegaly present, bowel sounds normal  Neurological: alert and oriented x3, Cranial Nerves II-XII grossly intact  Skin: no increased ecchymosis/petechiae/purpura  Lymph Nodes: no palpable cervical/supraclavicular lymph nodes enlargements  Extremities: +edema    LABS:                                   8.5    7.85  )-----------( 302      ( 06 Mar 2020 06:06 )             25.5   03-06    132<L>  |  97  |  74<H>  ----------------------------<  132<H>  5.0   |  21<L>  |  6.20<H>    Ca    8.1<L>      06 Mar 2020 06:06  Phos  5.9     03-06  Mg     3.0     03-06    TPro  5.8<L>  /  Alb  2.4<L>  /  TBili  0.9  /  DBili  x   /  AST  34  /  ALT  43  /  AlkPhos  81  03-06    MICROBIOLOGY:  Culture - Body Fluid with Gram Stain (02.27.20 @ 21:38)    Gram Stain:   polymorphonuclear leukocytes  No organisms seen  by cytocentrifuge    Specimen Source: .Body Fluid Pleural Fluid    Culture Results:   No growth    Culture - Blood (collected 26 Feb 2020 22:29)  Source: .Blood Blood-Peripheral  Preliminary Report (27 Feb 2020 23:01):    No growth to date.    Culture - Blood (collected 26 Feb 2020 22:29)  Source: .Blood Blood-Peripheral  Preliminary Report (27 Feb 2020 23:01):    No growth to date.    Culture - Urine (collected 26 Feb 2020 11:46)  Source: .Urine Catheterized  Final Report (27 Feb 2020 15:05):    50,000 - 99,000 CFU/mL Coag Negative Staphylococcus    Susceptibilites not performed.    Culture - Blood (collected 25 Feb 2020 22:38)  Source: .Blood Blood-Peripheral  Preliminary Report (26 Feb 2020 23:01):    No growth to date.    Culture - Blood (collected 25 Feb 2020 22:38)  Source: .Blood Blood-Peripheral  Preliminary Report (26 Feb 2020 23:01):    No growth to date.    Legionella pneumophila Antigen, Urine (02.27.20 @ 02:53)    Legionella Antigen, Urine: Negative    Streptococcus Pneumoniae Ag Urine (02.27.20 @ 04:25)    Streptococcus Pneumoniae Ag Urine: Negative    RADIOLOGY & ADDITIONAL STUDIES:  EXAM:  CT RENAL STONE HUNT                          EXAM:  CT CHEST                                  PROCEDURE DATE:  02/25/2020          INTERPRETATION:  CLINICAL HISTORY:  Fever, left back and flank pain. History of gastric lymphoma.    Multiple axial images of the chest, abdomen and pelvis were obtained from the lung apices through symphysis pubis without the administration of oral or intravascular contrast limiting the sensitivity of evaluation. Reformatted coronal and sagittal images are submitted.    COMPARISON: PET/CT evaluation 6/19/2019.    FINDINGS: New moderately sized left pleural effusion is identified with portions that are loculated; air attenuation within the effusion as well as within the left pleural space suggests the presence of left-sided empyema. Consolidation is identified within the lingular segment as well as left lower lobe lung parenchyma; underlying neoplasm cannot be excluded. Hazy opacity within the left upper lobe is likely related to atelectasis. Subpleural opacity within the posterior right lower lobe and right middle lobe likely related to subpleural fibrosis. There is no evidence for right-sided pleural effusion or air.    No abnormally enlarged mediastinal or hilar lymph nodes are noted. There is no evidence for axillary lymphadenopathy. The heart size appears within normal limits. No pericardial effusion is identified. Thoracic aortic caliber demonstrates unremarkable caliber. Coronary arterial calcifications identified.    The central bronchial anatomy appears patent.    No mediastinal shift is noted.    The liver is normal in size. No focal hepatic masses are identified. There is no evidence for intrahepatic or extrahepatic biliary dilatation. A large gallstone is noted. No gallbladder wall thickening or pericholecystic fluid identified.    The spleen, pancreas and adrenal glands are unremarkable.    There is no evidence for hydronephrosis. No right renal calculi are identified. An 8 mm calculus is identified within the left-sided extrarenal pelvis, unchanged. There is no change in a 6 mm calculus identified within the distal right ureter at the level of the UVJ, without evidence for obstructive uropathy. A 1.3 cm right renal cyst is noted. The abdominal aorta is normal in course and caliber. No abnormally enlarged retroperitoneal or pelvic lymphadenopathy is noted.    Gastric wall thickening is identified allowing for nondistention with oral contrast; clinical correlation is suggested in light of the provided clinical history of gastric lymphoma.    Fecal material is scattered throughout the colon. There is no evidence for mechanical bowel obstruction. Colonic diverticulosis is noted. No colonic wall thickening is identified. There is no evidence for acute appendicitis.There is no evidence for free intraperitoneal air or fluid.    The prostate is enlarged and the urinary bladder appear unremarkable.     Postsurgical changes of the lumbar spine noted. Postprocedural changes of the right shoulder noted. Degenerative changes of the spine evident.    IMPRESSION:    1. New moderately sized left pleural effusion is identified with portions that are loculated; air attenuation within the effusion as well as within the left pleural space suggests the presence of left-sided empyema. Consolidation is identified within the lingular segment as well as left lower lobe lung parenchyma; underlying neoplasm cannot be excluded.   2. Gastric wall thickening is identified allowing for nondistention with oral contrast; clinicalcorrelation is suggested in light of the provided clinical history of gastric lymphoma.  3. An 8 mm calculus is identified within the left-sided extrarenal pelvis, unchanged. There is no change in a 6 mm calculus identified within the distal right ureter at the level of the UVJ, without evidence for obstructive uropathy. No evidence for bilateral hydronephrosis.      EXAM:  CT CHEST                            PROCEDURE DATE:  03/01/2020          INTERPRETATION:  Clinical information: Left-sided empyema    Comparison exam dated 2/25/2020    Axial images obtained, coronal and sagittal images computer reformatted    Noncontrast exam limits evaluation of hilar and mediastinal regions.    A left-sided pigtail catheter is present in the left pleural space. Loculated effusion with associated dense consolidation is present, the appearance is slightly more prominentsince the prior exam.    No thoracic aortic aneurysm or pericardial effusion. Cardiomegaly present. Coronary artery calcifications identified. The central airway appears intact. The thyroid gland is not enlarged.    Minimal atelectatic change right lung base. Calcification visible in the right pleura. Peribronchial thickening right infrahilar region. No pneumothorax.    The stomach is filled with food and air. A large gallstone is noted. The adrenal glands are not enlarged. The spleen is not enlarged. No acute appearing osseous abnormalities. Soft tissue anchors right humeral head. Air bubbles visible in the subcutaneous soft tissues left lateral lower thorax.    IMPRESSION: A left-sided pigtail catheter is present in the pleural space. Loculated effusion with associated dense consolidation is present, appearance slightly more prominent when compared to prior exam.        EXAM:  US KIDNEYS AND BLADDER                            PROCEDURE DATE:  03/01/2020          INTERPRETATION:    PROCEDURE INFORMATION:   Exam: US Retroperitoneal Limited, Kidneys   Exam date and time: 3/1/2020 6:31 AM   Age: 89 years old   Clinical indication: Other: Gus     TECHNIQUE:   Imaging protocol: Real-time ultrasound of the retroperitoneum with image   documentation. Examination was focused on the kidneys.     COMPARISON:   No relevant prior studies available.     FINDINGS:   Right kidney: The right kidney measures 11.8 cm in length. Normal parenchymal   echogenicity. No hydronephrosis.   Left kidney: The left kidney measures 11.7 cm in length. Normal parenchymal   echogenicity. Slightly irregular margin. No hydronephrosis.   Prostate: The prostate measures up to 3.7 cm in size.   Bladder: Bladder volume calculated to be 104 mL. No bladder wall thickening   allowing for lack of full distention. No bladder calculus.     IMPRESSION:   No hydronephrosis.        Assessment :   CHARLY LU is a 89y Male PMH CLL, gastric lymphoma ex smoker, hx of asbestos exp presenting to the hospital with cough, fever and left sided pleuritic chest pain. Febrile to 102 found to have left sided empyema. Seen by pulm and CT surgery. Sp IR drainage with pigtail placement 2/2/7/2020. Transferred to ICU sec tpa/dornase therapy through pigtail now on hold sec complications of hematuria. Had large clot and nick blood/mixed with urine. Mcnulty irrigation performed complicated with GUS. Worsening GUS- started on HD per renal yesterday. Sp repeat TPA/dornase via pigtail. Started on pressors for BP support, now off.    Plan :  Completed course of Zosyn   Trend temps and cbc  Trend renal fx  CT surgery following  HD per renal     D/w ICU team    Continue with present regiment.  Appropriate use of antibiotics and adverse effects reviewed.      I have discussed the above plan of care with patient/ family in detail. They expressed understanding of the  treatment plan . Risks, benefits and alternatives discussed in detail. I have asked if they have any questions or concerns and appropriately addressed them to the best of my ability .    Critical care > 35 minutes were spent in direct patient care reviewing notes, medications ,labs data/ imaging , discussion with multidisciplinary team.    Thank you for allowing me to participate in care of your patient .    Jose Kiran MD  Infectious Disease  [phone]

## 2020-03-07 LAB
ALBUMIN SERPL ELPH-MCNC: 2.2 G/DL — LOW (ref 3.3–5)
ALP SERPL-CCNC: 74 U/L — SIGNIFICANT CHANGE UP (ref 40–120)
ALT FLD-CCNC: 39 U/L — SIGNIFICANT CHANGE UP (ref 12–78)
ANION GAP SERPL CALC-SCNC: 13 MMOL/L — SIGNIFICANT CHANGE UP (ref 5–17)
AST SERPL-CCNC: 31 U/L — SIGNIFICANT CHANGE UP (ref 15–37)
BASOPHILS # BLD AUTO: 0.04 K/UL — SIGNIFICANT CHANGE UP (ref 0–0.2)
BASOPHILS NFR BLD AUTO: 0.4 % — SIGNIFICANT CHANGE UP (ref 0–2)
BILIRUB SERPL-MCNC: 0.7 MG/DL — SIGNIFICANT CHANGE UP (ref 0.2–1.2)
BUN SERPL-MCNC: 79 MG/DL — HIGH (ref 7–23)
CALCIUM SERPL-MCNC: 8 MG/DL — LOW (ref 8.5–10.1)
CHLORIDE SERPL-SCNC: 100 MMOL/L — SIGNIFICANT CHANGE UP (ref 96–108)
CO2 SERPL-SCNC: 21 MMOL/L — LOW (ref 22–31)
CREAT SERPL-MCNC: 6.8 MG/DL — HIGH (ref 0.5–1.3)
EOSINOPHIL # BLD AUTO: 0.04 K/UL — SIGNIFICANT CHANGE UP (ref 0–0.5)
EOSINOPHIL NFR BLD AUTO: 0.4 % — SIGNIFICANT CHANGE UP (ref 0–6)
GLUCOSE SERPL-MCNC: 102 MG/DL — HIGH (ref 70–99)
HCT VFR BLD CALC: 24.7 % — LOW (ref 39–50)
HGB BLD-MCNC: 8.3 G/DL — LOW (ref 13–17)
IMM GRANULOCYTES NFR BLD AUTO: 1.6 % — HIGH (ref 0–1.5)
LYMPHOCYTES # BLD AUTO: 1.2 K/UL — SIGNIFICANT CHANGE UP (ref 1–3.3)
LYMPHOCYTES # BLD AUTO: 12.7 % — LOW (ref 13–44)
MAGNESIUM SERPL-MCNC: 3.1 MG/DL — HIGH (ref 1.6–2.6)
MCHC RBC-ENTMCNC: 30.7 PG — SIGNIFICANT CHANGE UP (ref 27–34)
MCHC RBC-ENTMCNC: 33.6 GM/DL — SIGNIFICANT CHANGE UP (ref 32–36)
MCV RBC AUTO: 91.5 FL — SIGNIFICANT CHANGE UP (ref 80–100)
MONOCYTES # BLD AUTO: 0.79 K/UL — SIGNIFICANT CHANGE UP (ref 0–0.9)
MONOCYTES NFR BLD AUTO: 8.4 % — SIGNIFICANT CHANGE UP (ref 2–14)
NEUTROPHILS # BLD AUTO: 7.23 K/UL — SIGNIFICANT CHANGE UP (ref 1.8–7.4)
NEUTROPHILS NFR BLD AUTO: 76.5 % — SIGNIFICANT CHANGE UP (ref 43–77)
NRBC # BLD: 0 /100 WBCS — SIGNIFICANT CHANGE UP (ref 0–0)
PHOSPHATE SERPL-MCNC: 6.7 MG/DL — HIGH (ref 2.5–4.5)
PLATELET # BLD AUTO: 350 K/UL — SIGNIFICANT CHANGE UP (ref 150–400)
POTASSIUM SERPL-MCNC: 5.5 MMOL/L — HIGH (ref 3.5–5.3)
POTASSIUM SERPL-SCNC: 5.5 MMOL/L — HIGH (ref 3.5–5.3)
PROT SERPL-MCNC: 5.4 G/DL — LOW (ref 6–8.3)
RBC # BLD: 2.7 M/UL — LOW (ref 4.2–5.8)
RBC # FLD: 13.2 % — SIGNIFICANT CHANGE UP (ref 10.3–14.5)
SODIUM SERPL-SCNC: 134 MMOL/L — LOW (ref 135–145)
WBC # BLD: 9.45 K/UL — SIGNIFICANT CHANGE UP (ref 3.8–10.5)
WBC # FLD AUTO: 9.45 K/UL — SIGNIFICANT CHANGE UP (ref 3.8–10.5)

## 2020-03-07 PROCEDURE — 99233 SBSQ HOSP IP/OBS HIGH 50: CPT

## 2020-03-07 PROCEDURE — 99291 CRITICAL CARE FIRST HOUR: CPT

## 2020-03-07 RX ORDER — HYDROMORPHONE HYDROCHLORIDE 2 MG/ML
1 INJECTION INTRAMUSCULAR; INTRAVENOUS; SUBCUTANEOUS ONCE
Refills: 0 | Status: DISCONTINUED | OUTPATIENT
Start: 2020-03-07 | End: 2020-03-07

## 2020-03-07 RX ORDER — HALOPERIDOL DECANOATE 100 MG/ML
2.5 INJECTION INTRAMUSCULAR ONCE
Refills: 0 | Status: COMPLETED | OUTPATIENT
Start: 2020-03-07 | End: 2020-03-07

## 2020-03-07 RX ORDER — ALTEPLASE 100 MG
10 KIT INTRAVENOUS EVERY 12 HOURS
Refills: 0 | Status: COMPLETED | OUTPATIENT
Start: 2020-03-07 | End: 2020-03-08

## 2020-03-07 RX ORDER — DORNASE ALFA 1 MG/ML
5 SOLUTION RESPIRATORY (INHALATION) EVERY 12 HOURS
Refills: 0 | Status: DISCONTINUED | OUTPATIENT
Start: 2020-03-07 | End: 2020-03-08

## 2020-03-07 RX ORDER — HALOPERIDOL DECANOATE 100 MG/ML
5 INJECTION INTRAMUSCULAR ONCE
Refills: 0 | Status: DISCONTINUED | OUTPATIENT
Start: 2020-03-07 | End: 2020-03-07

## 2020-03-07 RX ORDER — SODIUM POLYSTYRENE SULFONATE 4.1 MEQ/G
30 POWDER, FOR SUSPENSION ORAL ONCE
Refills: 0 | Status: COMPLETED | OUTPATIENT
Start: 2020-03-07 | End: 2020-03-07

## 2020-03-07 RX ADMIN — MIDODRINE HYDROCHLORIDE 15 MILLIGRAM(S): 2.5 TABLET ORAL at 05:13

## 2020-03-07 RX ADMIN — Medication 1 TABLET(S): at 21:17

## 2020-03-07 RX ADMIN — SODIUM POLYSTYRENE SULFONATE 30 GRAM(S): 4.1 POWDER, FOR SUSPENSION ORAL at 08:43

## 2020-03-07 RX ADMIN — CHLORHEXIDINE GLUCONATE 1 APPLICATION(S): 213 SOLUTION TOPICAL at 10:30

## 2020-03-07 RX ADMIN — FAMOTIDINE 20 MILLIGRAM(S): 10 INJECTION INTRAVENOUS at 11:56

## 2020-03-07 RX ADMIN — HALOPERIDOL DECANOATE 2.5 MILLIGRAM(S): 100 INJECTION INTRAMUSCULAR at 20:38

## 2020-03-07 RX ADMIN — POLYETHYLENE GLYCOL 3350 17 GRAM(S): 17 POWDER, FOR SOLUTION ORAL at 05:14

## 2020-03-07 RX ADMIN — SEVELAMER CARBONATE 800 MILLIGRAM(S): 2400 POWDER, FOR SUSPENSION ORAL at 08:43

## 2020-03-07 RX ADMIN — MIDODRINE HYDROCHLORIDE 15 MILLIGRAM(S): 2.5 TABLET ORAL at 21:17

## 2020-03-07 RX ADMIN — MIDODRINE HYDROCHLORIDE 15 MILLIGRAM(S): 2.5 TABLET ORAL at 15:04

## 2020-03-07 RX ADMIN — Medication 2.5 MILLIGRAM(S): at 05:13

## 2020-03-07 RX ADMIN — SEVELAMER CARBONATE 800 MILLIGRAM(S): 2400 POWDER, FOR SUSPENSION ORAL at 11:56

## 2020-03-07 RX ADMIN — Medication 2.5 MILLIGRAM(S): at 17:17

## 2020-03-07 RX ADMIN — SEVELAMER CARBONATE 800 MILLIGRAM(S): 2400 POWDER, FOR SUSPENSION ORAL at 17:17

## 2020-03-07 RX ADMIN — ALTEPLASE 10 MILLIGRAM(S): KIT at 12:37

## 2020-03-07 RX ADMIN — HEPARIN SODIUM 5000 UNIT(S): 5000 INJECTION INTRAVENOUS; SUBCUTANEOUS at 05:13

## 2020-03-07 RX ADMIN — DORNASE ALFA 5 MILLIGRAM(S): 1 SOLUTION RESPIRATORY (INHALATION) at 14:30

## 2020-03-07 RX ADMIN — Medication 1 TABLET(S): at 15:03

## 2020-03-07 RX ADMIN — HEPARIN SODIUM 5000 UNIT(S): 5000 INJECTION INTRAVENOUS; SUBCUTANEOUS at 15:03

## 2020-03-07 RX ADMIN — HYDROMORPHONE HYDROCHLORIDE 1 MILLIGRAM(S): 2 INJECTION INTRAMUSCULAR; INTRAVENOUS; SUBCUTANEOUS at 12:19

## 2020-03-07 RX ADMIN — Medication 6 MILLIGRAM(S): at 21:17

## 2020-03-07 RX ADMIN — HYDROMORPHONE HYDROCHLORIDE 1 MILLIGRAM(S): 2 INJECTION INTRAMUSCULAR; INTRAVENOUS; SUBCUTANEOUS at 12:49

## 2020-03-07 RX ADMIN — Medication 1 TABLET(S): at 05:13

## 2020-03-07 RX ADMIN — HEPARIN SODIUM 5000 UNIT(S): 5000 INJECTION INTRAVENOUS; SUBCUTANEOUS at 21:17

## 2020-03-07 RX ADMIN — ATORVASTATIN CALCIUM 10 MILLIGRAM(S): 80 TABLET, FILM COATED ORAL at 21:17

## 2020-03-07 RX ADMIN — DORNASE ALFA 5 MILLIGRAM(S): 1 SOLUTION RESPIRATORY (INHALATION) at 00:00

## 2020-03-07 NOTE — PROGRESS NOTE ADULT - SUBJECTIVE AND OBJECTIVE BOX
Patient is a 89y old  Male who presents with a chief complaint of Left Empyema (07 Mar 2020 17:15)      INTERVAL /OVERNIGHT EVENTS: denies pain    MEDICATIONS  (STANDING):  alteplase  Injectable for Pleural Effusion 10 milliGRAM(s) IntraPleural. every 12 hours  atorvastatin 10 milliGRAM(s) Oral at bedtime  chlorhexidine 2% Cloths 1 Application(s) Topical <User Schedule>  dornase brenda Solution for Pleural Effusion 5 milliGRAM(s) IntraPleural. every 12 hours  dronabinol 2.5 milliGRAM(s) Oral two times a day  epoetin brenda Injectable 33179 Unit(s) IV Push <User Schedule>  famotidine    Tablet 20 milliGRAM(s) Oral daily  heparin  Injectable 5000 Unit(s) SubCutaneous every 8 hours  lactobacillus acidophilus 1 Tablet(s) Oral three times a day  melatonin 6 milliGRAM(s) Oral at bedtime  midodrine 15 milliGRAM(s) Oral every 8 hours  polyethylene glycol 3350 17 Gram(s) Oral two times a day  sevelamer carbonate 800 milliGRAM(s) Oral three times a day with meals    MEDICATIONS  (PRN):  acetaminophen   Tablet .. 650 milliGRAM(s) Oral every 6 hours PRN Temp greater or equal to 38C (100.4F), Mild Pain (1 - 3)  albuterol/ipratropium for Nebulization 3 milliLiter(s) Nebulizer every 6 hours PRN Shortness of Breath and/or Wheezing      Allergies    No Known Allergies    Intolerances        REVIEW OF SYSTEMS: denies      Vital Signs Last 24 Hrs  T(C): 36.8 (07 Mar 2020 15:08), Max: 37 (07 Mar 2020 00:00)  T(F): 98.3 (07 Mar 2020 15:08), Max: 98.6 (07 Mar 2020 00:00)  HR: 84 (07 Mar 2020 18:00) (80 - 110)  BP: 114/67 (07 Mar 2020 18:00) (94/57 - 138/63)  BP(mean): 85 (07 Mar 2020 18:00) (70 - 94)  RR: 39 (07 Mar 2020 18:00) (21 - 39)  SpO2: 96% (07 Mar 2020 18:00) (91% - 96%)    PHYSICAL EXAM:  GENERAL: NAD, well-groomed, well-developed  HEAD:  Atraumatic, Normocephalic  EYES: EOMI, PERRLA, conjunctiva and sclera clear  ENMT: No tonsillar erythema, exudates, or enlargement; Moist mucous membranes, Good dentition, No lesions  NECK: Supple, No JVD, Normal thyroid  NERVOUS SYSTEM:  Alert & awake; Motor Strength 5/5 B/L upper and lower extremities; DTRs 2+ intact and symmetric  CHEST/LUNG: Clear to auscultation bilaterally; No rales, rhonchi, wheezing, or rubs  HEART: Regular rate and rhythm; No murmurs, rubs, or gallops  ABDOMEN: Soft, Nontender, Nondistended; Bowel sounds present  EXTREMITIES:  2+ Peripheral Pulses, No clubbing, cyanosis, or edema  LYMPH: No lymphadenopathy noted  SKIN: No rashes or lesions    LABS:                        8.3    9.45  )-----------( 350      ( 07 Mar 2020 06:39 )             24.7     07 Mar 2020 06:39    134    |  100    |  79     ----------------------------<  102    5.5     |  21     |  6.80     Ca    8.0        07 Mar 2020 06:39  Phos  6.7       07 Mar 2020 06:39  Mg     3.1       07 Mar 2020 06:39    TPro  5.4    /  Alb  2.2    /  TBili  0.7    /  DBili  x      /  AST  31     /  ALT  39     /  AlkPhos  74     07 Mar 2020 06:39    PT/INR - ( 06 Mar 2020 06:06 )   PT: 17.5 sec;   INR: 1.54 ratio         PTT - ( 06 Mar 2020 06:06 )  PTT:22.7 sec    CAPILLARY BLOOD GLUCOSE          RADIOLOGY & ADDITIONAL TESTS:    Notes Reviewed:  [x ] YES  [ ] NO    Care Discussed with Consultants/Other Providers [x ] YES  [ ] NO

## 2020-03-07 NOTE — PROGRESS NOTE ADULT - SUBJECTIVE AND OBJECTIVE BOX
Patient seen and examined;  Chart reviewed and events noted;   reports poor appetite; urine output seems to have improved; son at bedside    MEDICATIONS  (STANDING):  alteplase  Injectable for Pleural Effusion 10 milliGRAM(s) IntraPleural. every 12 hours  atorvastatin 10 milliGRAM(s) Oral at bedtime  chlorhexidine 2% Cloths 1 Application(s) Topical <User Schedule>  dornase brenda Solution for Pleural Effusion 5 milliGRAM(s) IntraPleural. every 12 hours  dornase brenda Solution for Pleural Effusion 5 milliGRAM(s) IntraPleural. every 12 hours  dronabinol 2.5 milliGRAM(s) Oral two times a day  epoetin brenda Injectable 03736 Unit(s) IV Push <User Schedule>  famotidine    Tablet 20 milliGRAM(s) Oral daily  heparin  Injectable 5000 Unit(s) SubCutaneous every 8 hours  lactobacillus acidophilus 1 Tablet(s) Oral three times a day  melatonin 6 milliGRAM(s) Oral at bedtime  midodrine 15 milliGRAM(s) Oral every 8 hours  polyethylene glycol 3350 17 Gram(s) Oral two times a day  sevelamer carbonate 800 milliGRAM(s) Oral three times a day with meals    MEDICATIONS  (PRN):  acetaminophen   Tablet .. 650 milliGRAM(s) Oral every 6 hours PRN Temp greater or equal to 38C (100.4F), Mild Pain (1 - 3)  albuterol/ipratropium for Nebulization 3 milliLiter(s) Nebulizer every 6 hours PRN Shortness of Breath and/or Wheezing      Vital Signs Last 24 Hrs  T(C): 36.8 (07 Mar 2020 07:29), Max: 37 (07 Mar 2020 00:00)  T(F): 98.3 (07 Mar 2020 07:29), Max: 98.6 (07 Mar 2020 00:00)  HR: 94 (07 Mar 2020 09:15) (66 - 110)  BP: 123/60 (07 Mar 2020 09:15) (84/46 - 138/63)  BP(mean): 87 (07 Mar 2020 09:15) (59 - 94)  RR: 24 (07 Mar 2020 09:15) (20 - 32)  SpO2: 96% (07 Mar 2020 09:15) (91% - 97%)    PHYSICAL EXAM  General: adult elderly male in NAD  HEENT: clear oropharynx, anicteric sclera, pink conjunctivae  Neck: supple  CV: normal S1S2 with no murmur rubs or gallops  Lungs: + chest tube in left side; + crackles at left base   Abdomen: soft non-tender non-distended, no hepato/splenomegaly  Ext: no clubbing cyanosis or edema  Skin: no rashes and no petichiae  Neuro: alert and oriented X3 no focal deficits      LABS:                        8.3    9.45  )-----------( 350      ( 07 Mar 2020 06:39 )             24.7     Hemoglobin: 8.3 g/dL (03-07 @ 06:39)  Hemoglobin: 8.5 g/dL (03-06 @ 06:06)  Hemoglobin: 8.2 g/dL (03-05 @ 06:54)  Hemoglobin: 8.9 g/dL (03-04 @ 05:21)  Hemoglobin: 9.8 g/dL (03-03 @ 05:08)    03-07    134<L>  |  100  |  79<H>  ----------------------------<  102<H>  5.5<H>   |  21<L>  |  6.80<H>    Ca    8.0<L>      07 Mar 2020 06:39  Phos  6.7     03-07  Mg     3.1     03-07    TPro  5.4<L>  /  Alb  2.2<L>  /  TBili  0.7  /  DBili  x   /  AST  31  /  ALT  39  /  AlkPhos  74  03-07    PT/INR - ( 06 Mar 2020 06:06 )   PT: 17.5 sec;   INR: 1.54 ratio    PTT - ( 06 Mar 2020 06:06 )  PTT:22.7 sec

## 2020-03-07 NOTE — PROGRESS NOTE ADULT - SUBJECTIVE AND OBJECTIVE BOX
Patient seen in follow up for GUS. The patient is pleasantly confused and conversant, not dyspneic, denies pain or SOB. Brisk urine output since yesterday.    GI Bleed  Stomach Ulcer  Osteoarthritis  Hyperlipidemia  Herniated Disc  Spinal Stenosis  Diverticulitis  GERD (Gastroesophageal Reflux Disease)  Chronic Lymphocytic Leukemia    MEDICATIONS  (STANDING):  alteplase  Injectable for Pleural Effusion 10 milliGRAM(s) IntraPleural. every 12 hours  atorvastatin 10 milliGRAM(s) Oral at bedtime  chlorhexidine 2% Cloths 1 Application(s) Topical <User Schedule>  dornase brenda Solution for Pleural Effusion 5 milliGRAM(s) IntraPleural. every 12 hours  dornase brenda Solution for Pleural Effusion 5 milliGRAM(s) IntraPleural. every 12 hours  dronabinol 2.5 milliGRAM(s) Oral two times a day  epoetin brenda Injectable 30729 Unit(s) IV Push <User Schedule>  famotidine    Tablet 20 milliGRAM(s) Oral daily  heparin  Injectable 5000 Unit(s) SubCutaneous every 8 hours  lactobacillus acidophilus 1 Tablet(s) Oral three times a day  melatonin 6 milliGRAM(s) Oral at bedtime  midodrine 15 milliGRAM(s) Oral every 8 hours  polyethylene glycol 3350 17 Gram(s) Oral two times a day  sevelamer carbonate 800 milliGRAM(s) Oral three times a day with meals    MEDICATIONS  (PRN):  acetaminophen   Tablet .. 650 milliGRAM(s) Oral every 6 hours PRN Temp greater or equal to 38C (100.4F), Mild Pain (1 - 3)  albuterol/ipratropium for Nebulization 3 milliLiter(s) Nebulizer every 6 hours PRN Shortness of Breath and/or Wheezing    T(C): 36.8 (03-07-20 @ 07:29), Max: 37 (03-07-20 @ 00:00)  HR: 94 (03-07-20 @ 09:15) (58 - 110)  BP: 123/60 (03-07-20 @ 09:15) (75/52 - 165/71)  RR: 24 (03-07-20 @ 09:15) (13 - 34)  SpO2: 96% (03-07-20 @ 09:15) (91% - 97%)  Wt(kg): --  I&O's Detail    06 Mar 2020 07:01  -  07 Mar 2020 07:00  --------------------------------------------------------  IN:    dexmedetomidine Infusion: 9.6 mL    norepinephrine Infusion: 34.2 mL    Oral Fluid: 810 mL    Solution: 100 mL  Total IN: 953.8 mL    OUT:    Chest Tube: 500 mL    Indwelling Catheter - Urethral: 1890 mL  Total OUT: 2390 mL    Total NET: -1436.2 mL      07 Mar 2020 06:01  -  07 Mar 2020 11:11  --------------------------------------------------------  IN:    Oral Fluid: 340 mL  Total IN: 340 mL    OUT:    Indwelling Catheter - Urethral: 225 mL  Total OUT: 225 mL    Total NET: 115 mL      PHYSICAL EXAM:  General: NAD, sitting up in bed  Has Right [employer] cath in place  Respiratory: b/l air entry, clear anteriorly  Cardiovascular: S1 S2  Gastrointestinal: soft, NT  Extremities: trace thighs edema     CBC Full  -  ( 07 Mar 2020 06:39 )  WBC Count : 9.45 K/uL  RBC Count : 2.70 M/uL  Hemoglobin : 8.3 g/dL  Hematocrit : 24.7 %  Platelet Count - Automated : 350 K/uL  Mean Cell Volume : 91.5 fl  Mean Cell Hemoglobin : 30.7 pg  Mean Cell Hemoglobin Concentration : 33.6 gm/dL  Auto Neutrophil # : 7.23 K/uL  Auto Lymphocyte # : 1.20 K/uL  Auto Monocyte # : 0.79 K/uL  Auto Eosinophil # : 0.04 K/uL  Auto Basophil # : 0.04 K/uL  Auto Neutrophil % : 76.5 %  Auto Lymphocyte % : 12.7 %  Auto Monocyte % : 8.4 %  Auto Eosinophil % : 0.4 %  Auto Basophil % : 0.4 %    03-07    134<L>  |  100  |  79<H>  ----------------------------<  102<H>  5.5<H>   |  21<L>  |  6.80<H>    Ca    8.0<L>      07 Mar 2020 06:39  Phos  6.7     03-07  Mg     3.1     03-07    TPro  5.4<L>  /  Alb  2.2<L>  /  TBili  0.7  /  DBili  x   /  AST  31  /  ALT  39  /  AlkPhos  74  03-07        Sodium, Serum: 134 (03-07 @ 06:39)  Sodium, Serum: 132 (03-06 @ 06:06)  Sodium, Serum: 133 (03-05 @ 06:54)    Creatinine, Serum: 6.80 (03-07 @ 06:39)  Creatinine, Serum: 6.20 (03-06 @ 06:06)  Creatinine, Serum: 5.70 (03-05 @ 06:54)    Potassium, Serum: 5.5 (03-07 @ 06:39)  Potassium, Serum: 5.0 (03-06 @ 06:06)  Potassium, Serum: 4.6 (03-05 @ 06:54)    Hemoglobin: 8.3 (03-07 @ 06:39)  Hemoglobin: 8.5 (03-06 @ 06:06)  Hemoglobin: 8.2 (03-05 @ 06:54)

## 2020-03-07 NOTE — PROGRESS NOTE ADULT - SUBJECTIVE AND OBJECTIVE BOX
Date/Time Patient Seen:  		  Referring MD:   Data Reviewed	       Patient is a 89y old  Male who presents with a chief complaint of Left Empyema (07 Mar 2020 05:16)      Subjective/HPI     PAST MEDICAL & SURGICAL HISTORY:  GI Bleed: 2007  Stomach Ulcer: H pylori 2007, treated with antibiotics  Osteoarthritis  Hyperlipidemia  Herniated Disc  Spinal Stenosis  Diverticulitis  GERD (Gastroesophageal Reflux Disease)  Chronic Lymphocytic Leukemia: followed by oncologist in [place]  S/P Tonsillectomy: childhood  Status Post Arthroscopy: right shoulder  History of Arthroscopy of Knee: bilateral  Osteoarthritis  Stomach Ulcer: H pylori, treated with antibiotics        Medication list         MEDICATIONS  (STANDING):  alteplase  Injectable for Pleural Effusion 10 milliGRAM(s) IntraPleural. every 12 hours  atorvastatin 10 milliGRAM(s) Oral at bedtime  chlorhexidine 2% Cloths 1 Application(s) Topical <User Schedule>  dornase brenda Solution for Pleural Effusion 5 milliGRAM(s) IntraPleural. every 12 hours  dronabinol 2.5 milliGRAM(s) Oral two times a day  epoetin brenda Injectable 60296 Unit(s) IV Push <User Schedule>  famotidine    Tablet 20 milliGRAM(s) Oral daily  heparin  Injectable 5000 Unit(s) SubCutaneous every 8 hours  lactobacillus acidophilus 1 Tablet(s) Oral three times a day  melatonin 6 milliGRAM(s) Oral at bedtime  midodrine 15 milliGRAM(s) Oral every 8 hours  polyethylene glycol 3350 17 Gram(s) Oral two times a day  sevelamer carbonate 800 milliGRAM(s) Oral three times a day with meals    MEDICATIONS  (PRN):  acetaminophen   Tablet .. 650 milliGRAM(s) Oral every 6 hours PRN Temp greater or equal to 38C (100.4F), Mild Pain (1 - 3)  albuterol/ipratropium for Nebulization 3 milliLiter(s) Nebulizer every 6 hours PRN Shortness of Breath and/or Wheezing         Vitals log        ICU Vital Signs Last 24 Hrs  T(C): 36.8 (07 Mar 2020 03:30), Max: 37 (07 Mar 2020 00:00)  T(F): 98.3 (07 Mar 2020 03:30), Max: 98.6 (07 Mar 2020 00:00)  HR: 93 (07 Mar 2020 06:00) (66 - 110)  BP: 121/52 (07 Mar 2020 06:00) (75/52 - 121/52)  BP(mean): 70 (07 Mar 2020 06:00) (59 - 90)  ABP: --  ABP(mean): --  RR: 26 (07 Mar 2020 06:00) (16 - 34)  SpO2: 92% (07 Mar 2020 06:00) (91% - 97%)           Input and Output:  I&O's Detail    05 Mar 2020 07:01  -  06 Mar 2020 07:00  --------------------------------------------------------  IN:    Albumin 25%: 100 mL    dexmedetomidine Infusion: 55.8 mL    Lactated Ringers IV Bolus: 500 mL    norepinephrine Infusion: 119.6 mL    Oral Fluid: 600 mL    Solution: 200 mL  Total IN: 1575.4 mL    OUT:    Chest Tube: 235 mL    Indwelling Catheter - Urethral: 670 mL  Total OUT: 905 mL    Total NET: 670.4 mL      06 Mar 2020 07:01  -  07 Mar 2020 06:20  --------------------------------------------------------  IN:    dexmedetomidine Infusion: 9.6 mL    norepinephrine Infusion: 34.2 mL    Oral Fluid: 810 mL    Solution: 100 mL  Total IN: 953.8 mL    OUT:    Chest Tube: 500 mL    Indwelling Catheter - Urethral: 1790 mL  Total OUT: 2290 mL    Total NET: -1336.2 mL          Lab Data                        8.5    7.85  )-----------( 302      ( 06 Mar 2020 06:06 )             25.5     03-06    132<L>  |  97  |  74<H>  ----------------------------<  132<H>  5.0   |  21<L>  |  6.20<H>    Ca    8.1<L>      06 Mar 2020 06:06  Phos  5.9     03-06  Mg     3.0     03-06    TPro  5.8<L>  /  Alb  2.4<L>  /  TBili  0.9  /  DBili  x   /  AST  34  /  ALT  43  /  AlkPhos  81  03-06            Review of Systems	      Objective     Physical Examination    heart s1s2  lung dc BS  abd soft  head nc  head at      Pertinent Lab findings & Imaging      [place]:  NO   Adequate UO     I&O's Detail    05 Mar 2020 07:01  -  06 Mar 2020 07:00  --------------------------------------------------------  IN:    Albumin 25%: 100 mL    dexmedetomidine Infusion: 55.8 mL    Lactated Ringers IV Bolus: 500 mL    norepinephrine Infusion: 119.6 mL    Oral Fluid: 600 mL    Solution: 200 mL  Total IN: 1575.4 mL    OUT:    Chest Tube: 235 mL    Indwelling Catheter - Urethral: 670 mL  Total OUT: 905 mL    Total NET: 670.4 mL      06 Mar 2020 07:01  -  07 Mar 2020 06:20  --------------------------------------------------------  IN:    dexmedetomidine Infusion: 9.6 mL    norepinephrine Infusion: 34.2 mL    Oral Fluid: 810 mL    Solution: 100 mL  Total IN: 953.8 mL    OUT:    Chest Tube: 500 mL    Indwelling Catheter - Urethral: 1790 mL  Total OUT: 2290 mL    Total NET: -1336.2 mL               Discussed with:     Cultures:	        Radiology

## 2020-03-07 NOTE — PROGRESS NOTE ADULT - ASSESSMENT
89 yo M with PMH of HLD and MALToma of the stomach (under surveillance), spinal stenosis with extensive lumbar surgery in 2011 and bilateral LE neuropathy, admitted for L side empyema and R ureteral stone. Patient initially reported what appeared to be stable occasional angina however later denied this.  Elevated proBNP however normal LV systolic function with EF 55% on echo and no evidence of meaningful volume overload on exam.   Review of EKG revealed possible inferior infarct however no wall motion abnormalities on 2/27 echo.   He is now in the intensive care unit status post lytic infusion therapy through a chest tube for his left-sided empyema.  GUS now s/p HD     -no clear acute ischemia  -ekg has suggested possible imi, but no wma on echo  -cont statin    -no significant arrhythmias noted  -did have brief pat, generally maintaining nsr. pat likely reactive to acute illness    -no meaningful volume overload   -now on hd    -persistent hypotension  -midodrine  -levo gtt, titrated off      - chest tube management per ICU team. s/p tPA and DNAse on 3/2.      - Monitor and replete electrolytes. Keep K>4.0 and Mg>2.0.       - Rest of management per urology/CT surgery/primary team.  - Other cardiovascular workup will depend on clinical course. Will follow.     The patient is at risk of abrupt decompensation.  I have personally provided  35 minutes of critical care time, excluding time spent on separate procedures.    charting in progress 89 yo M with PMH of HLD and MALToma of the stomach (under surveillance), spinal stenosis with extensive lumbar surgery in 2011 and bilateral LE neuropathy, admitted for L side empyema and R ureteral stone. Patient initially reported what appeared to be stable occasional angina however later denied this.  Elevated proBNP however normal LV systolic function with EF 55% on echo and no evidence of meaningful volume overload on exam.   Review of EKG revealed possible inferior infarct however no wall motion abnormalities on 2/27 echo.   He is now in the intensive care unit status post lytic infusion therapy through a chest tube for his left-sided empyema. GUS now s/p HD     -no clear acute ischemia  -ekg has suggested possible imi, but no wma on echo  -cont statin    -no significant arrhythmias noted  -did have brief pat, generally maintaining nsr. pat likely reactive to acute illness    -no meaningful volume overload   -now on hd    -hypotension improved  -midodrine  at high dose of 15 q8  -levo gtt, titrated off      - chest tube management per ICU team. s/p tPA and DNAse on 3/2.      - Monitor and replete electrolytes. Keep K>4.0 and Mg>2.0.       - Rest of management per urology/CT surgery/primary team.  - Other cardiovascular workup will depend on clinical course. Will follow.     The patient is at risk of abrupt decompensation.  I have personally provided  35 minutes of critical care time, excluding time spent on separate procedures.

## 2020-03-07 NOTE — PROGRESS NOTE ADULT - ASSESSMENT
Impression:  ATN in recovery phase, brisk urine output  No immediate dialytic indications  Continue with medical management of hyperkalemia  Avoid hypotension/hypovolemia/nephrotoxins as possible  Will continue to evaluate for dialytic indications daily  Hope to see fast renal recovery  Spoke with patient's son at bedside  D/w Dr. Martinez

## 2020-03-07 NOTE — PROGRESS NOTE ADULT - ASSESSMENT
Patient is a 89 year old male with a pmhx of CLL/MALToma not on active treatment, GERD, HLD, herniated discs/spinal stenosis s/p back surgery on medicinal Marijuana initially presented to [facility] ED w/ worsening back/chest pain, found to have:      1. Loculated pleural effusion   2. AMS  3. GUS requiring HD  4. Septic shock   5. Delerium       Plan  Neuro: AMS likely from delirium and septic/uremic encephalopathy. holding Seroquel tonight. Received Precedex and haldol overnight last night, has responded to reorientation this evening.   Cardio: Titrated off levophed, cont midodrine 15mg TID   Pulm: Respiratory status although tenuous remains stable. cont 2L NC. cont nebs. CT chest consistent with loculated effusion, concerning for empyema. Will administer TPA and dornase to attempt to promote drainage from lung. Poor surgical candidate per CTS for VATS.    GI: Regular diet   Renal: GUS worsening requiring dialysis. Now has only received 1 dialysis session. Has poor response to IV lasix. Will need to re-eval tomorrow as Cr is rising. Strict I/Os, avoid nephrotoxic meds   Heme: heparin for DVT ppx  ID: completed course of zosyn   Endo: No active issues       Dispo: Remains in ICU. DNR/DNI

## 2020-03-07 NOTE — CHART NOTE - NSCHARTNOTEFT_GEN_A_CORE
Time of evaluation: 22:38    Called to evaluate patient for restraints        Other interventions attempted: de-escalation, orientation check, environment modification, medication assessment    REVIEW OF SYSTEMS:  CONSTITUTIONAL: [  ] fever   [  ] fatigue  EYES: [  ] visual disturbances  ENMT:  [  ]difficulty hearing  [  ] throat pain  RESPIRATORY:  [  ]cough  [   ] wheezing  [   ] hemoptysis [  ] shortness of breath  CARDIOVASCULAR: [  ]chest pain  [  ] palpitations   GASTROINTESTINAL: [  ]  abdominal pain [  ] nausea [  ] vomiting  [  ] diarrhea  [  ] constipation  GENITOURINARY: [  ] dysuria [  ] frequency  [  ] hematuria [  ] incontinence  NEUROLOGICAL: [  ] headaches [  ] new numbness  SKIN: [  ]itching [  ] new rash   MUSCULOSKELETAL: [  ] back pain  [  ] extremity pain  PSYCHIATRIC: [  ] depression  [  ] anxiety  [  ] mood swings [  ] difficulty sleeping  ALLERGY AND IMMUNOLOGIC: [  ] hives    [ x ]Unable to perfrom ROS due to: AMs  [  ] ROS reviewed and all negative    PAST MEDICAL & SURGICAL HISTORY:  GI Bleed: 2007  Stomach Ulcer: H pylori 2007, treated with antibiotics  Osteoarthritis  Hyperlipidemia  Herniated Disc  Spinal Stenosis  Diverticulitis  GERD (Gastroesophageal Reflux Disease)  Chronic Lymphocytic Leukemia: followed by oncologist in [place]  S/P Tonsillectomy: childhood  Status Post Arthroscopy: right shoulder  History of Arthroscopy of Knee: bilateral    MEDICATIONS  (STANDING):  atorvastatin 10 milliGRAM(s) Oral at bedtime  chlorhexidine 2% Cloths 1 Application(s) Topical <User Schedule>  dornase brenda Solution for Pleural Effusion 5 milliGRAM(s) IntraPleural. every 12 hours  dronabinol 2.5 milliGRAM(s) Oral two times a day  epoetin brenda Injectable 17757 Unit(s) IV Push <User Schedule>  famotidine    Tablet 20 milliGRAM(s) Oral daily  heparin  Injectable 5000 Unit(s) SubCutaneous every 8 hours  lactobacillus acidophilus 1 Tablet(s) Oral three times a day  melatonin 6 milliGRAM(s) Oral at bedtime  midodrine 15 milliGRAM(s) Oral every 8 hours  polyethylene glycol 3350 17 Gram(s) Oral two times a day  sevelamer carbonate 800 milliGRAM(s) Oral three times a day with meals    MEDICATIONS  (PRN):  acetaminophen   Tablet .. 650 milliGRAM(s) Oral every 6 hours PRN Temp greater or equal to 38C (100.4F), Mild Pain (1 - 3)  albuterol/ipratropium for Nebulization 3 milliLiter(s) Nebulizer every 6 hours PRN Shortness of Breath and/or Wheezing                          8.3    9.45  )-----------( 350      ( 07 Mar 2020 06:39 )             24.7     03-07    134<L>  |  100  |  79<H>  ----------------------------<  102<H>  5.5<H>   |  21<L>  |  6.80<H>    Ca    8.0<L>      07 Mar 2020 06:39  Phos  6.7     03-07  Mg     3.1     03-07    TPro  5.4<L>  /  Alb  2.2<L>  /  TBili  0.7  /  DBili  x   /  AST  31  /  ALT  39  /  AlkPhos  74  03-07      Vital Signs Last 24 Hrs  T(C): 37.1 (08 Mar 2020 03:22), Max: 37.3 (08 Mar 2020 00:02)  T(F): 98.7 (08 Mar 2020 03:22), Max: 99.1 (08 Mar 2020 00:02)  HR: 91 (08 Mar 2020 04:00) (80 - 117)  BP: 111/64 (08 Mar 2020 04:00) (98/68 - 138/63)  BP(mean): 81 (08 Mar 2020 04:00) (70 - 97)  RR: 30 (08 Mar 2020 04:00) (21 - 67)  SpO2: 93% (08 Mar 2020 04:00) (91% - 96%)    Physical Examination:  Physical Exam:   Gen: NAD  Neuro: alert  HEENT: OP clear  Resp: clear on R, absent at L base  chest tube to suction, no air leak  CVS: RRR  Abd: soft, NTND  Ext: no edema   Skin: WWP      [  ] Unable to perform physical exam due to    [X ] I have evaluated this patient and have determined that restraints are warranted to optimize medical care. Patient was assessed for current physical and psychological risk factors as well as special needs. There are no medical conditions or limitations that would place this patient at risk while in restraints.    Type of restraint: Bilateral soft wrist restraints    Behavioral criteria for discontinuation of restraint: [ X ] See order    Attending MD Aware

## 2020-03-07 NOTE — PROGRESS NOTE ADULT - ASSESSMENT
89 y/o man known to our office, dx'd with gastric MALToma 2013, bone marrow and 2017 repeat gastric bx c/w Chronic Lymphocytic Leukemia/Small Lymphocytic Lymphoma, under serial monitoring w EGD and PETCT, has not required treatment. Also hx B12 deficiency.  Adm w left empyema, hx URI sx Jan 2020  Post IR pigtail insertion w minimal output and tx to ICU for dornase/alteplase adm w resultant partial output but not dramatic;   Course complicated by GUS in setting of hypotension and exposure to Vancomycin    -renal function continue to worsen; could not tolerate HD due to hypotension however urine output is improving despite rising creatinine  -will review images with IR on Monday and determine if feasible to undergo CT guided biopsy to evaluate for MALT lymphoma vs. SLL in the lung which is possibility. Treatment options would clearly be limited given age and renal dysfunction and patient's son who is at bedside understands this. Patient is not a candidate for open biopsy in OR by CT surgery  at this time  - will continue to follow with you  - case discussed with Dr. Martinez (ICU attending)

## 2020-03-07 NOTE — PROGRESS NOTE ADULT - SUBJECTIVE AND OBJECTIVE BOX
INTERVAL HPI/OVERNIGHT EVENTS:  pt seen and examined- no acute overnight events  resting in bed, no complaints          MEDICATIONS  (STANDING):  alteplase  Injectable for Pleural Effusion 10 milliGRAM(s) IntraPleural. every 12 hours  atorvastatin 10 milliGRAM(s) Oral at bedtime  chlorhexidine 2% Cloths 1 Application(s) Topical <User Schedule>  dornase brenda Solution for Pleural Effusion 5 milliGRAM(s) IntraPleural. every 12 hours  dornase brenda Solution for Pleural Effusion 5 milliGRAM(s) IntraPleural. every 12 hours  dronabinol 2.5 milliGRAM(s) Oral two times a day  epoetin brenda Injectable 30952 Unit(s) IV Push <User Schedule>  famotidine    Tablet 20 milliGRAM(s) Oral daily  heparin  Injectable 5000 Unit(s) SubCutaneous every 8 hours  lactobacillus acidophilus 1 Tablet(s) Oral three times a day  melatonin 6 milliGRAM(s) Oral at bedtime  midodrine 15 milliGRAM(s) Oral every 8 hours  polyethylene glycol 3350 17 Gram(s) Oral two times a day  sevelamer carbonate 800 milliGRAM(s) Oral three times a day with meals    MEDICATIONS  (PRN):  acetaminophen   Tablet .. 650 milliGRAM(s) Oral every 6 hours PRN Temp greater or equal to 38C (100.4F), Mild Pain (1 - 3)  albuterol/ipratropium for Nebulization 3 milliLiter(s) Nebulizer every 6 hours PRN Shortness of Breath and/or Wheezing      Allergies    No Known Allergies    Intolerances        Review of Systems:    General:  No wt loss, fevers, chills, night sweats, fatigue   Eyes:  Good vision, no reported pain  ENT:  No sore throat, pain, runny nose, dysphagia  CV:  No pain, palpitations, hypo/hypertension  Resp:  No dyspnea, cough, tachypnea, wheezing  GI:  No pain, No nausea, No vomiting, No diarrhea, No constipation, No weight loss, No fever, No pruritis, No rectal bleeding, No melena, No dysphagia  :  No pain, bleeding, incontinence, nocturia  Muscle:  No pain, weakness  Neuro:  No weakness, tingling, memory problems  Psych:  No fatigue, insomnia, mood problems, depression  Endocrine:  No polyuria, polydypsia, cold/heat intolerance  Heme:  No petechiae, ecchymosis, easy bruisability  Skin:  No rash, tattoos, scars, edema        Vital Signs Last 24 Hrs  T(C): 36.8 (07 Mar 2020 07:29), Max: 37 (07 Mar 2020 00:00)  T(F): 98.3 (07 Mar 2020 07:29), Max: 98.6 (07 Mar 2020 00:00)  HR: 94 (07 Mar 2020 09:15) (66 - 110)  BP: 123/60 (07 Mar 2020 09:15) (84/46 - 138/63)  BP(mean): 87 (07 Mar 2020 09:15) (59 - 94)  RR: 24 (07 Mar 2020 09:15) (16 - 32)  SpO2: 96% (07 Mar 2020 09:15) (91% - 97%)      PHYSICAL EXAM:    General:  lying in  bed  HEENT:  NC/AT  Abdomen:  Soft nt mild dt  Extremities:  no edema  Neuro/Psych:  Awake alert some confusion      LABS:                        8.3    9.45  )-----------( 350      ( 07 Mar 2020 06:39 )             24.7     03-07    134<L>  |  100  |  79<H>  ----------------------------<  102<H>  5.5<H>   |  21<L>  |  6.80<H>    Ca    8.0<L>      07 Mar 2020 06:39  Phos  6.7     03-07  Mg     3.1     03-07    TPro  5.4<L>  /  Alb  2.2<L>  /  TBili  0.7  /  DBili  x   /  AST  31  /  ALT  39  /  AlkPhos  74  03-07    PT/INR - ( 06 Mar 2020 06:06 )   PT: 17.5 sec;   INR: 1.54 ratio         PTT - ( 06 Mar 2020 06:06 )  PTT:22.7 sec    RADIOLOGY & ADDITIONAL TESTS:

## 2020-03-07 NOTE — PROGRESS NOTE ADULT - SUBJECTIVE AND OBJECTIVE BOX
CHARLY LU is a 89yMale , patient examined and chart reviewed.     INTERVAL HPI/ OVERNIGHT EVENTS:   Awake. No distress. Sp TPA via chest tube.  Afebrile. No events.  Good urine output.    PAST MEDICAL & SURGICAL HISTORY:  GI Bleed: 2007  Stomach Ulcer: H pylori 2007, treated with antibiotics  Osteoarthritis  Hyperlipidemia  Herniated Disc  Spinal Stenosis  Diverticulitis  GERD (Gastroesophageal Reflux Disease)  Chronic Lymphocytic Leukemia: followed by oncologist in [place]  S/P Tonsillectomy: childhood  Status Post Arthroscopy: right shoulder  History of Arthroscopy of Knee: bilateral    For details regarding the patient's social history, family history, and other miscellaneous elements, please refer the initial infectious diseases consultation and/or the admitting history and physical examination for this admission.    ROS:  CONSTITUTIONAL:  Negative fever or chills  EYES:  Negative  blurry vision or double vision  CARDIOVASCULAR:  Negative for chest pain or palpitations  RESPIRATORY:  Negative for cough, wheezing, or SOB   GASTROINTESTINAL:  Negative for nausea, vomiting, diarrhea, constipation, or abdominal pain  GENITOURINARY:  Negative frequency, urgency or dysuria  NEUROLOGIC:  No headache, confusion, dizziness, lightheadedness  All other systems were reviewed and are negative     Current inpatient medications :  MEDICATIONS  (STANDING):  alteplase  Injectable for Pleural Effusion 10 milliGRAM(s) IntraPleural. every 12 hours  atorvastatin 10 milliGRAM(s) Oral at bedtime  chlorhexidine 2% Cloths 1 Application(s) Topical <User Schedule>  dornase brenda Solution for Pleural Effusion 5 milliGRAM(s) IntraPleural. every 12 hours  dronabinol 2.5 milliGRAM(s) Oral two times a day  epoetin brenda Injectable 47068 Unit(s) IV Push <User Schedule>  famotidine    Tablet 20 milliGRAM(s) Oral daily  heparin  Injectable 5000 Unit(s) SubCutaneous every 8 hours  lactobacillus acidophilus 1 Tablet(s) Oral three times a day  melatonin 6 milliGRAM(s) Oral at bedtime  midodrine 15 milliGRAM(s) Oral every 8 hours  polyethylene glycol 3350 17 Gram(s) Oral two times a day  sevelamer carbonate 800 milliGRAM(s) Oral three times a day with meals    MEDICATIONS  (PRN):  acetaminophen   Tablet .. 650 milliGRAM(s) Oral every 6 hours PRN Temp greater or equal to 38C (100.4F), Mild Pain (1 - 3)  albuterol/ipratropium for Nebulization 3 milliLiter(s) Nebulizer every 6 hours PRN Shortness of Breath and/or Wheezing      Objective:  ICU Vital Signs Last 24 Hrs  T(C): 36.8 (07 Mar 2020 15:08), Max: 37 (07 Mar 2020 00:00)  T(F): 98.3 (07 Mar 2020 15:08), Max: 98.6 (07 Mar 2020 00:00)  HR: 87 (07 Mar 2020 16:00) (75 - 110)  BP: 106/64 (07 Mar 2020 15:00) (94/52 - 138/63)  BP(mean): 80 (07 Mar 2020 15:00) (69 - 94)  RR: 25 (07 Mar 2020 16:00) (20 - 38)  SpO2: 94% (07 Mar 2020 16:00) (91% - 96%)      Physical Exam:  General:  no acute distress  Eyes: sclera anicteric, pupils equal and reactive to light  ENMT: buccal mucosa moist, pharynx not injected  Neck: supple, trachea midline  Lungs: decreased no wheeze/rhonchi Left lung pigtail  Cardiovascular: regular rate and rhythm, S1 S2  Abdomen: soft, nontender, no organomegaly present, bowel sounds normal  Neurological: alert and oriented x3, Cranial Nerves II-XII grossly intact  Skin: no increased ecchymosis/petechiae/purpura  Lymph Nodes: no palpable cervical/supraclavicular lymph nodes enlargements  Extremities: +edema    LABS:                        8.3    9.45  )-----------( 350      ( 07 Mar 2020 06:39 )             24.7   03-07    134<L>  |  100  |  79<H>  ----------------------------<  102<H>  5.5<H>   |  21<L>  |  6.80<H>    Ca    8.0<L>      07 Mar 2020 06:39  Phos  6.7     03-07  Mg     3.1     03-07    TPro  5.4<L>  /  Alb  2.2<L>  /  TBili  0.7  /  DBili  x   /  AST  31  /  ALT  39  /  AlkPhos  74  03-07    MICROBIOLOGY:  Culture - Body Fluid with Gram Stain (02.27.20 @ 21:38)    Gram Stain:   polymorphonuclear leukocytes  No organisms seen  by cytocentrifuge    Specimen Source: .Body Fluid Pleural Fluid    Culture Results:   No growth    Culture - Blood (collected 26 Feb 2020 22:29)  Source: .Blood Blood-Peripheral  Preliminary Report (27 Feb 2020 23:01):    No growth to date.    Culture - Blood (collected 26 Feb 2020 22:29)  Source: .Blood Blood-Peripheral  Preliminary Report (27 Feb 2020 23:01):    No growth to date.    Culture - Urine (collected 26 Feb 2020 11:46)  Source: .Urine Catheterized  Final Report (27 Feb 2020 15:05):    50,000 - 99,000 CFU/mL Coag Negative Staphylococcus    Susceptibilites not performed.    Culture - Blood (collected 25 Feb 2020 22:38)  Source: .Blood Blood-Peripheral  Preliminary Report (26 Feb 2020 23:01):    No growth to date.    Culture - Blood (collected 25 Feb 2020 22:38)  Source: .Blood Blood-Peripheral  Preliminary Report (26 Feb 2020 23:01):    No growth to date.    Legionella pneumophila Antigen, Urine (02.27.20 @ 02:53)    Legionella Antigen, Urine: Negative    Streptococcus Pneumoniae Ag Urine (02.27.20 @ 04:25)    Streptococcus Pneumoniae Ag Urine: Negative    RADIOLOGY & ADDITIONAL STUDIES:  EXAM:  CT RENAL STONE HUNT                          EXAM:  CT CHEST                                  PROCEDURE DATE:  02/25/2020          INTERPRETATION:  CLINICAL HISTORY:  Fever, left back and flank pain. History of gastric lymphoma.    Multiple axial images of the chest, abdomen and pelvis were obtained from the lung apices through symphysis pubis without the administration of oral or intravascular contrast limiting the sensitivity of evaluation. Reformatted coronal and sagittal images are submitted.    COMPARISON: PET/CT evaluation 6/19/2019.    FINDINGS: New moderately sized left pleural effusion is identified with portions that are loculated; air attenuation within the effusion as well as within the left pleural space suggests the presence of left-sided empyema. Consolidation is identified within the lingular segment as well as left lower lobe lung parenchyma; underlying neoplasm cannot be excluded. Hazy opacity within the left upper lobe is likely related to atelectasis. Subpleural opacity within the posterior right lower lobe and right middle lobe likely related to subpleural fibrosis. There is no evidence for right-sided pleural effusion or air.    No abnormally enlarged mediastinal or hilar lymph nodes are noted. There is no evidence for axillary lymphadenopathy. The heart size appears within normal limits. No pericardial effusion is identified. Thoracic aortic caliber demonstrates unremarkable caliber. Coronary arterial calcifications identified.    The central bronchial anatomy appears patent.    No mediastinal shift is noted.    The liver is normal in size. No focal hepatic masses are identified. There is no evidence for intrahepatic or extrahepatic biliary dilatation. A large gallstone is noted. No gallbladder wall thickening or pericholecystic fluid identified.    The spleen, pancreas and adrenal glands are unremarkable.    There is no evidence for hydronephrosis. No right renal calculi are identified. An 8 mm calculus is identified within the left-sided extrarenal pelvis, unchanged. There is no change in a 6 mm calculus identified within the distal right ureter at the level of the UVJ, without evidence for obstructive uropathy. A 1.3 cm right renal cyst is noted. The abdominal aorta is normal in course and caliber. No abnormally enlarged retroperitoneal or pelvic lymphadenopathy is noted.    Gastric wall thickening is identified allowing for nondistention with oral contrast; clinical correlation is suggested in light of the provided clinical history of gastric lymphoma.    Fecal material is scattered throughout the colon. There is no evidence for mechanical bowel obstruction. Colonic diverticulosis is noted. No colonic wall thickening is identified. There is no evidence for acute appendicitis.There is no evidence for free intraperitoneal air or fluid.    The prostate is enlarged and the urinary bladder appear unremarkable.     Postsurgical changes of the lumbar spine noted. Postprocedural changes of the right shoulder noted. Degenerative changes of the spine evident.    IMPRESSION:    1. New moderately sized left pleural effusion is identified with portions that are loculated; air attenuation within the effusion as well as within the left pleural space suggests the presence of left-sided empyema. Consolidation is identified within the lingular segment as well as left lower lobe lung parenchyma; underlying neoplasm cannot be excluded.   2. Gastric wall thickening is identified allowing for nondistention with oral contrast; clinicalcorrelation is suggested in light of the provided clinical history of gastric lymphoma.  3. An 8 mm calculus is identified within the left-sided extrarenal pelvis, unchanged. There is no change in a 6 mm calculus identified within the distal right ureter at the level of the UVJ, without evidence for obstructive uropathy. No evidence for bilateral hydronephrosis.      EXAM:  CT CHEST                            PROCEDURE DATE:  03/01/2020          INTERPRETATION:  Clinical information: Left-sided empyema    Comparison exam dated 2/25/2020    Axial images obtained, coronal and sagittal images computer reformatted    Noncontrast exam limits evaluation of hilar and mediastinal regions.    A left-sided pigtail catheter is present in the left pleural space. Loculated effusion with associated dense consolidation is present, the appearance is slightly more prominentsince the prior exam.    No thoracic aortic aneurysm or pericardial effusion. Cardiomegaly present. Coronary artery calcifications identified. The central airway appears intact. The thyroid gland is not enlarged.    Minimal atelectatic change right lung base. Calcification visible in the right pleura. Peribronchial thickening right infrahilar region. No pneumothorax.    The stomach is filled with food and air. A large gallstone is noted. The adrenal glands are not enlarged. The spleen is not enlarged. No acute appearing osseous abnormalities. Soft tissue anchors right humeral head. Air bubbles visible in the subcutaneous soft tissues left lateral lower thorax.    IMPRESSION: A left-sided pigtail catheter is present in the pleural space. Loculated effusion with associated dense consolidation is present, appearance slightly more prominent when compared to prior exam.        EXAM:  US KIDNEYS AND BLADDER                            PROCEDURE DATE:  03/01/2020          INTERPRETATION:    PROCEDURE INFORMATION:   Exam: US Retroperitoneal Limited, Kidneys   Exam date and time: 3/1/2020 6:31 AM   Age: 89 years old   Clinical indication: Other: Gus     TECHNIQUE:   Imaging protocol: Real-time ultrasound of the retroperitoneum with image   documentation. Examination was focused on the kidneys.     COMPARISON:   No relevant prior studies available.     FINDINGS:   Right kidney: The right kidney measures 11.8 cm in length. Normal parenchymal   echogenicity. No hydronephrosis.   Left kidney: The left kidney measures 11.7 cm in length. Normal parenchymal   echogenicity. Slightly irregular margin. No hydronephrosis.   Prostate: The prostate measures up to 3.7 cm in size.   Bladder: Bladder volume calculated to be 104 mL. No bladder wall thickening   allowing for lack of full distention. No bladder calculus.     IMPRESSION:   No hydronephrosis.        Assessment :   CHARLY LU is a 89y Male PMH CLL, gastric lymphoma ex smoker, hx of asbestos exp presenting to the hospital with cough, fever and left sided pleuritic chest pain. Febrile to 102 found to have left sided empyema. Seen by pulm and CT surgery. Sp IR drainage with pigtail placement 2/2/7/2020. Transferred to ICU sec tpa/dornase therapy through pigtail now on hold sec complications of hematuria. Had large clot and nick blood/mixed with urine. Mcnulty irrigation performed complicated with GUS. Worsening GUS- started on HD per renal. Sp repeat TPA/dornase via pigtail. Urine output improved. Overall stable    Plan :  Completed course of Zosyn   Trend temps and cbc  Trend renal fx  CT surgery following  HD per renal   Stable from ID standpoint    D/w ICU team    Continue with present regiment.  Appropriate use of antibiotics and adverse effects reviewed.      I have discussed the above plan of care with patient/ family in detail. They expressed understanding of the  treatment plan . Risks, benefits and alternatives discussed in detail. I have asked if they have any questions or concerns and appropriately addressed them to the best of my ability .    Critical care > 35 minutes were spent in direct patient care reviewing notes, medications ,labs data/ imaging , discussion with multidisciplinary team.    Thank you for allowing me to participate in care of your patient .    Jose Kiran MD  Infectious Disease  [phone]

## 2020-03-07 NOTE — PROGRESS NOTE ADULT - SUBJECTIVE AND OBJECTIVE BOX
24 hour events:   off levophed since 3pm yesterday  marked improvement in UOP yesterday (1790cc for 24h)  received tpa and dornase pleural treatment, chest tube drained 500cc in 24h  family meeting, made DNR, DNI      T(F): 98.3 (03-07-20 @ 07:29), Max: 98.6 (03-07-20 @ 00:00)  HR: 93 (03-07-20 @ 08:00) (66 - 110)  BP: 138/63 (03-07-20 @ 08:00) (84/46 - 138/63)  RR: 25 (03-07-20 @ 08:00) (16 - 32)  SpO2: 93% (03-07-20 @ 08:00) (91% - 97%)  Wt(kg): --      03-06-20 @ 07:01  -  03-07-20 @ 07:00  --------------------------------------------------------  IN: 953.8 mL / OUT: 2290 mL / NET: -1336.2 mL        CAPILLARY BLOOD GLUCOSE          I&O's Summary    06 Mar 2020 07:01  -  07 Mar 2020 07:00  --------------------------------------------------------  IN: 953.8 mL / OUT: 2290 mL / NET: -1336.2 mL        Physical Exam:   Gen:  Neuro:  HEENT:  Resp:  CVS:  Abd:  Ext:  Skin:    Meds:    midodrine Oral    atorvastatin Oral    albuterol/ipratropium for Nebulization Nebulizer PRN  dornase brenda Solution for Pleural Effusion IntraPleural.    acetaminophen   Tablet .. Oral PRN  dronabinol Oral  melatonin Oral      heparin  Injectable SubCutaneous    famotidine    Tablet Oral  polyethylene glycol 3350 Oral        epoetin brenda Injectable IV Push    chlorhexidine 2% Cloths Topical    lactobacillus acidophilus Oral  sevelamer carbonate Oral  sodium polystyrene sulfonate Suspension Oral                            8.3    9.45  )-----------( 350      ( 07 Mar 2020 06:39 )             24.7       03-07    134<L>  |  100  |  79<H>  ----------------------------<  102<H>  5.5<H>   |  21<L>  |  6.80<H>    Ca    8.0<L>      07 Mar 2020 06:39  Phos  6.7     03-07  Mg     3.1     03-07    TPro  5.4<L>  /  Alb  2.2<L>  /  TBili  0.7  /  DBili  x   /  AST  31  /  ALT  39  /  AlkPhos  74  03-07          PT/INR - ( 06 Mar 2020 06:06 )   PT: 17.5 sec;   INR: 1.54 ratio         PTT - ( 06 Mar 2020 06:06 )  PTT:22.7 sec                Radiology: ***  Bedside ultrasound: ***    CENTRAL LINE: N/Y          DATE INSERTED:              REMOVE: Y/N  SOLIS: N/Y                       DATE INSERTED:              REMOVE: Y/N  A-LINE: N/Y                       DATE INSERTED:              REMOVE: Y/N    GLOBAL ISSUE/BEST PRACTICE:  Analgesia:  Sedation:  CAM-ICU:   HOB elevation: yes  Stress ulcer prophylaxis:  VTE prophylaxis:  Glycemic control:  Nutrition:    CODE STATUS: *** 24 hour events:   off levophed since 3pm yesterday  marked improvement in UOP yesterday (1790cc for 24h)  received tpa and dornase pleural treatment, chest tube drained 500cc in 24h  family meeting, made DNR, DNI      T(F): 98.3 (03-07-20 @ 07:29), Max: 98.6 (03-07-20 @ 00:00)  HR: 93 (03-07-20 @ 08:00) (66 - 110)  BP: 138/63 (03-07-20 @ 08:00) (84/46 - 138/63)  RR: 25 (03-07-20 @ 08:00) (16 - 32)  SpO2: 93% (03-07-20 @ 08:00) (91% - 97%)  Wt(kg): --      03-06-20 @ 07:01  -  03-07-20 @ 07:00  --------------------------------------------------------  IN: 953.8 mL / OUT: 2290 mL / NET: -1336.2 mL        CAPILLARY BLOOD GLUCOSE          I&O's Summary    06 Mar 2020 07:01  -  07 Mar 2020 07:00  --------------------------------------------------------  IN: 953.8 mL / OUT: 2290 mL / NET: -1336.2 mL        Physical Exam:   Gen: NAD  Neuro: alert  HEENT: OP clear  Resp: clear on R, absent at L base  chest tube to suction, no air leak  CVS: RRR  Abd: soft, NTND  Ext: no edema   Skin: WWP    Meds:    midodrine Oral    atorvastatin Oral    albuterol/ipratropium for Nebulization Nebulizer PRN  dornase brenda Solution for Pleural Effusion IntraPleural.    acetaminophen   Tablet .. Oral PRN  dronabinol Oral  melatonin Oral      heparin  Injectable SubCutaneous    famotidine    Tablet Oral  polyethylene glycol 3350 Oral        epoetin brenda Injectable IV Push    chlorhexidine 2% Cloths Topical    lactobacillus acidophilus Oral  sevelamer carbonate Oral  sodium polystyrene sulfonate Suspension Oral                            8.3    9.45  )-----------( 350      ( 07 Mar 2020 06:39 )             24.7       03-07    134<L>  |  100  |  79<H>  ----------------------------<  102<H>  5.5<H>   |  21<L>  |  6.80<H>    Ca    8.0<L>      07 Mar 2020 06:39  Phos  6.7     03-07  Mg     3.1     03-07    TPro  5.4<L>  /  Alb  2.2<L>  /  TBili  0.7  /  DBili  x   /  AST  31  /  ALT  39  /  AlkPhos  74  03-07          PT/INR - ( 06 Mar 2020 06:06 )   PT: 17.5 sec;   INR: 1.54 ratio         PTT - ( 06 Mar 2020 06:06 )  PTT:22.7 sec      Radiology: ***  < from: Xray Chest 1 View AP/PA (03.06.20 @ 17:00) >    INTERPRETATION:  History: Shortness of breath    Portable chest radiographs are performed and correlated with 3/4/2020.    Right-sided IJ line is again seenwith the tip at the junction of the innominate and superior vena cava. The trachea is midline. The heart is not enlarged. The right lung is clear. Large left pleural effusion is again seen. Osteopenia and degenerative changes of the bony structures are again noted. Pigtail thoracotomy catheter again overlies the left lower chest without evidence of pneumothorax.    Impression: Persistent large left pleural effusion.    < end of copied text >      CENTRAL LINE: N/Y          DATE INSERTED:              REMOVE: Y/N  SOLIS: N/Y                       DATE INSERTED:              REMOVE: Y/N  A-LINE: N/Y                       DATE INSERTED:              REMOVE: Y/N    GLOBAL ISSUE/BEST PRACTICE:  Analgesia: Y  Sedation: N  HOB elevation: yes  Stress ulcer prophylaxis: Y  VTE prophylaxis: Y  Glycemic control: Y  Nutrition: Y    CODE STATUS: DNR, DNI

## 2020-03-07 NOTE — PROGRESS NOTE ADULT - SUBJECTIVE AND OBJECTIVE BOX
[facility]    Warren Posada, Dandre, Abelardo, Lucina, Thaddeus, Kelvin      [phone]    CHIEF COMPLAINT: Patient is a 89y old  Male who presents with a chief complaint of Left Empyema (07 Mar 2020 02:08)      Follow Up: pleural effusion , pat, hypotension    Interim history: confused and unable to provide a reliable hx    MEDICATIONS  (STANDING):  alteplase  Injectable for Pleural Effusion 10 milliGRAM(s) IntraPleural. every 12 hours  atorvastatin 10 milliGRAM(s) Oral at bedtime  chlorhexidine 2% Cloths 1 Application(s) Topical <User Schedule>  dornase brenda Solution for Pleural Effusion 5 milliGRAM(s) IntraPleural. every 12 hours  dronabinol 2.5 milliGRAM(s) Oral two times a day  epoetin brenda Injectable 33042 Unit(s) IV Push <User Schedule>  famotidine    Tablet 20 milliGRAM(s) Oral daily  heparin  Injectable 5000 Unit(s) SubCutaneous every 8 hours  lactobacillus acidophilus 1 Tablet(s) Oral three times a day  melatonin 6 milliGRAM(s) Oral at bedtime  midodrine 15 milliGRAM(s) Oral every 8 hours  polyethylene glycol 3350 17 Gram(s) Oral two times a day  sevelamer carbonate 800 milliGRAM(s) Oral three times a day with meals    MEDICATIONS  (PRN):  acetaminophen   Tablet .. 650 milliGRAM(s) Oral every 6 hours PRN Temp greater or equal to 38C (100.4F), Mild Pain (1 - 3)  albuterol/ipratropium for Nebulization 3 milliLiter(s) Nebulizer every 6 hours PRN Shortness of Breath and/or Wheezing      REVIEW OF SYSTEMS: unable (ms)    Vital Signs Last 24 Hrs  T(C): 36.8 (07 Mar 2020 03:30), Max: 37 (07 Mar 2020 00:00)  T(F): 98.3 (07 Mar 2020 03:30), Max: 98.6 (07 Mar 2020 00:00)  HR: 94 (07 Mar 2020 04:00) (66 - 110)  BP: 97/65 (07 Mar 2020 04:00) (75/52 - 115/77)  BP(mean): 75 (07 Mar 2020 04:00) (59 - 90)  RR: 28 (07 Mar 2020 04:00) (16 - 34)  SpO2: 93% (07 Mar 2020 04:00) (91% - 97%)    I&O's Summary    05 Mar 2020 07:01  -  06 Mar 2020 07:00  --------------------------------------------------------  IN: 1575.4 mL / OUT: 905 mL / NET: 670.4 mL    06 Mar 2020 07:01  -  07 Mar 2020 05:16  --------------------------------------------------------  IN: 713.8 mL / OUT: 1640 mL / NET: -926.2 mL        Telemetry past 24h: sr st    PHYSICAL EXAM:    Constitutional: well-nourished, well-developed, NAD   HEENT:  MMM, sclerae anicteric, conjunctivae clear, no oral cyanosis.  Pulmonary: Non-labored, breath sounds are clear bilaterally, No wheezing, rales or rhonchi  Cardiovascular: Regular, S1 and S2.  No murmur.  No rubs, gallops or clicks  Gastrointestinal: Bowel Sounds present, soft, nontender.   Lymph: No peripheral edema.   Neurological: leth  Skin: No rashes.  Psych:  leth    LABS: All Labs Reviewed:                        8.5    7.85  )-----------( 302      ( 06 Mar 2020 06:06 )             25.5                         8.2    7.59  )-----------( 313      ( 05 Mar 2020 06:54 )             24.0                         8.9    9.04  )-----------( 327      ( 04 Mar 2020 05:21 )             26.1     06 Mar 2020 06:06    132    |  97     |  74     ----------------------------<  132    5.0     |  21     |  6.20   05 Mar 2020 06:54    133    |  97     |  72     ----------------------------<  104    4.6     |  23     |  5.70   04 Mar 2020 05:21    130    |  94     |  90     ----------------------------<  109    5.0     |  22     |  7.30     Ca    8.1        06 Mar 2020 06:06  Ca    7.7        05 Mar 2020 06:54  Ca    8.1        04 Mar 2020 05:21  Phos  5.9       06 Mar 2020 06:06  Phos  5.7       05 Mar 2020 06:54  Phos  7.9       04 Mar 2020 05:21  Mg     3.0       06 Mar 2020 06:06  Mg     2.9       05 Mar 2020 06:54  Mg     3.0       04 Mar 2020 05:21    TPro  5.8    /  Alb  2.4    /  TBili  0.9    /  DBili  x      /  AST  34     /  ALT  43     /  AlkPhos  81     06 Mar 2020 06:06  TPro  5.2    /  Alb  1.9    /  TBili  0.8    /  DBili  x      /  AST  37     /  ALT  46     /  AlkPhos  84     05 Mar 2020 06:54  TPro  5.7    /  Alb  2.0    /  TBili  0.9    /  DBili  x      /  AST  40     /  ALT  49     /  AlkPhos  91     04 Mar 2020 05:21    PT/INR - ( 06 Mar 2020 06:06 )   PT: 17.5 sec;   INR: 1.54 ratio         PTT - ( 06 Mar 2020 06:06 )  PTT:22.7 sec      Blood Culture:         RADIOLOGY:    EKG:    Echo: [facility]    Warren Posada, Dandre, Abelardo, Lucina, Thaddeus, Tahirajian      [phone]    CHIEF COMPLAINT: Patient is a 89y old  Male who presents with a chief complaint of Left Empyema (07 Mar 2020 02:08)      Follow Up: pleural effusion , pat, hypotension    Interim history: The patient reports no new symptoms.  Denies chest discomfort and shortness of breath.  No abdominal pain.  No new neurologic symptoms.      MEDICATIONS  (STANDING):  alteplase  Injectable for Pleural Effusion 10 milliGRAM(s) IntraPleural. every 12 hours  atorvastatin 10 milliGRAM(s) Oral at bedtime  chlorhexidine 2% Cloths 1 Application(s) Topical <User Schedule>  dornase brenda Solution for Pleural Effusion 5 milliGRAM(s) IntraPleural. every 12 hours  dronabinol 2.5 milliGRAM(s) Oral two times a day  epoetin brenda Injectable 59558 Unit(s) IV Push <User Schedule>  famotidine    Tablet 20 milliGRAM(s) Oral daily  heparin  Injectable 5000 Unit(s) SubCutaneous every 8 hours  lactobacillus acidophilus 1 Tablet(s) Oral three times a day  melatonin 6 milliGRAM(s) Oral at bedtime  midodrine 15 milliGRAM(s) Oral every 8 hours  polyethylene glycol 3350 17 Gram(s) Oral two times a day  sevelamer carbonate 800 milliGRAM(s) Oral three times a day with meals    MEDICATIONS  (PRN):  acetaminophen   Tablet .. 650 milliGRAM(s) Oral every 6 hours PRN Temp greater or equal to 38C (100.4F), Mild Pain (1 - 3)  albuterol/ipratropium for Nebulization 3 milliLiter(s) Nebulizer every 6 hours PRN Shortness of Breath and/or Wheezing      REVIEW OF SYSTEMS: neg for eye, ent, gi, gu, allergic, derm, musc, neur, except as described above    Vital Signs Last 24 Hrs  T(C): 36.8 (07 Mar 2020 03:30), Max: 37 (07 Mar 2020 00:00)  T(F): 98.3 (07 Mar 2020 03:30), Max: 98.6 (07 Mar 2020 00:00)  HR: 94 (07 Mar 2020 04:00) (66 - 110)  BP: 97/65 (07 Mar 2020 04:00) (75/52 - 115/77)  BP(mean): 75 (07 Mar 2020 04:00) (59 - 90)  RR: 28 (07 Mar 2020 04:00) (16 - 34)  SpO2: 93% (07 Mar 2020 04:00) (91% - 97%)    I&O's Summary    05 Mar 2020 07:01  -  06 Mar 2020 07:00  --------------------------------------------------------  IN: 1575.4 mL / OUT: 905 mL / NET: 670.4 mL    06 Mar 2020 07:01  -  07 Mar 2020 05:16  --------------------------------------------------------  IN: 713.8 mL / OUT: 1640 mL / NET: -926.2 mL        Telemetry past 24h: sr st brief svt?pat    PHYSICAL EXAM:    Constitutional: well-nourished, well-developed, NAD   HEENT:  MMM, sclerae anicteric, conjunctivae clear, no oral cyanosis.  Pulmonary: Non-labored, breath sounds are decr left. No wheezing, rales or rhonchi  Cardiovascular: Regular, S1 and S2.  No murmur.  No rubs, gallops or clicks  Gastrointestinal: Bowel Sounds present, soft, nontender.   Lymph: No peripheral edema.   Neurological: no focal deficits  Skin: No rashes.  Psych:  alert interactive    LABS: All Labs Reviewed:                        8.5    7.85  )-----------( 302      ( 06 Mar 2020 06:06 )             25.5                         8.2    7.59  )-----------( 313      ( 05 Mar 2020 06:54 )             24.0                         8.9    9.04  )-----------( 327      ( 04 Mar 2020 05:21 )             26.1     06 Mar 2020 06:06    132    |  97     |  74     ----------------------------<  132    5.0     |  21     |  6.20   05 Mar 2020 06:54    133    |  97     |  72     ----------------------------<  104    4.6     |  23     |  5.70   04 Mar 2020 05:21    130    |  94     |  90     ----------------------------<  109    5.0     |  22     |  7.30     Ca    8.1        06 Mar 2020 06:06  Ca    7.7        05 Mar 2020 06:54  Ca    8.1        04 Mar 2020 05:21  Phos  5.9       06 Mar 2020 06:06  Phos  5.7       05 Mar 2020 06:54  Phos  7.9       04 Mar 2020 05:21  Mg     3.0       06 Mar 2020 06:06  Mg     2.9       05 Mar 2020 06:54  Mg     3.0       04 Mar 2020 05:21    TPro  5.8    /  Alb  2.4    /  TBili  0.9    /  DBili  x      /  AST  34     /  ALT  43     /  AlkPhos  81     06 Mar 2020 06:06  TPro  5.2    /  Alb  1.9    /  TBili  0.8    /  DBili  x      /  AST  37     /  ALT  46     /  AlkPhos  84     05 Mar 2020 06:54  TPro  5.7    /  Alb  2.0    /  TBili  0.9    /  DBili  x      /  AST  40     /  ALT  49     /  AlkPhos  91     04 Mar 2020 05:21    PT/INR - ( 06 Mar 2020 06:06 )   PT: 17.5 sec;   INR: 1.54 ratio         PTT - ( 06 Mar 2020 06:06 )  PTT:22.7 sec      Blood Culture:         RADIOLOGY:    EKG:    Echo:  < from: TTE Echo Doppler w/o Cont (02.27.20 @ 11:47) >     EXAM:  ECHO TTE WO CON COMP W DOPPLR         PROCEDURE DATE:  02/27/2020        INTERPRETATION:  INDICATION: Heart failure  Sonographer PH    Blood Pressure 107/68    Height 177.8 cm     Weight 86.2 kg       BSA 2.0 sq m    Dimensions:    LA 3.7    Normal Values: 2.0 - 4.0 cm    Ao 3.3        Normal Values: 2.0 - 3.8 cm  SEPTUM Normal Values: 0.6 - 1.2 cm  PWT Normal Values: 0.6 - 1.1 cm  LVIDd Normal Values: 3.0 - 5.6 cm  LVIDs Normal Values: 1.8 - 4.0 cm      OBSERVATIONS:  Technically difficult and limited study  Mitral Valve: normal, trace physiologic MR.  Aortic Valve/Aorta: Sclerotic trileaflet aortic valve with normal opening.  Tricuspid Valve: normal with trace TR.  Pulmonic Valve: Not well-visualized  Left Atrium: normal  RightAtrium: Not well-visualized  Left Ventricle: normal LV size and systolic function, estimated LVEF of 55%.  Right Ventricle: Not well-visualized  Pericardium/Pleura: normal, small anterior pericardial effusion without signs of hemodynamic compromise.  Pulmonary/RV Pressure: estimated PA systolic pressure of 34mmHg. IVC is dilated    Conclusion:   Technically difficult and limited study  Normal left ventricular internal dimensions and systolic function, estimated LVEF of 55%.   Right ventricle is not well-visualized  Sclerotic trileaflet aortic valve, without AI.   Trace physiologic MR and TR.                       TYLER AYALA   This document has been electronically signed. Feb 28 2020  8:07AM                < end of copied text >

## 2020-03-08 LAB
ANION GAP SERPL CALC-SCNC: 11 MMOL/L — SIGNIFICANT CHANGE UP (ref 5–17)
ANION GAP SERPL CALC-SCNC: 11 MMOL/L — SIGNIFICANT CHANGE UP (ref 5–17)
APTT BLD: 28.5 SEC — SIGNIFICANT CHANGE UP (ref 28.5–37)
BASOPHILS # BLD AUTO: 0.06 K/UL — SIGNIFICANT CHANGE UP (ref 0–0.2)
BASOPHILS NFR BLD AUTO: 0.5 % — SIGNIFICANT CHANGE UP (ref 0–2)
BUN SERPL-MCNC: 79 MG/DL — HIGH (ref 7–23)
BUN SERPL-MCNC: 82 MG/DL — HIGH (ref 7–23)
CALCIUM SERPL-MCNC: 7.8 MG/DL — LOW (ref 8.5–10.1)
CALCIUM SERPL-MCNC: <5 MG/DL — CRITICAL LOW (ref 8.5–10.1)
CHLORIDE SERPL-SCNC: 101 MMOL/L — SIGNIFICANT CHANGE UP (ref 96–108)
CHLORIDE SERPL-SCNC: 101 MMOL/L — SIGNIFICANT CHANGE UP (ref 96–108)
CO2 SERPL-SCNC: 23 MMOL/L — SIGNIFICANT CHANGE UP (ref 22–31)
CO2 SERPL-SCNC: 25 MMOL/L — SIGNIFICANT CHANGE UP (ref 22–31)
CREAT SERPL-MCNC: 7.2 MG/DL — HIGH (ref 0.5–1.3)
CREAT SERPL-MCNC: 7.3 MG/DL — HIGH (ref 0.5–1.3)
EOSINOPHIL # BLD AUTO: 0.07 K/UL — SIGNIFICANT CHANGE UP (ref 0–0.5)
EOSINOPHIL NFR BLD AUTO: 0.6 % — SIGNIFICANT CHANGE UP (ref 0–6)
GLUCOSE SERPL-MCNC: 129 MG/DL — HIGH (ref 70–99)
GLUCOSE SERPL-MCNC: 136 MG/DL — HIGH (ref 70–99)
HCT VFR BLD CALC: 26 % — LOW (ref 39–50)
HGB BLD-MCNC: 8.7 G/DL — LOW (ref 13–17)
IMM GRANULOCYTES NFR BLD AUTO: 1.2 % — SIGNIFICANT CHANGE UP (ref 0–1.5)
INR BLD: 1.52 RATIO — HIGH (ref 0.88–1.16)
LYMPHOCYTES # BLD AUTO: 1.28 K/UL — SIGNIFICANT CHANGE UP (ref 1–3.3)
LYMPHOCYTES # BLD AUTO: 10.4 % — LOW (ref 13–44)
MAGNESIUM SERPL-MCNC: 2.8 MG/DL — HIGH (ref 1.6–2.6)
MCHC RBC-ENTMCNC: 30.6 PG — SIGNIFICANT CHANGE UP (ref 27–34)
MCHC RBC-ENTMCNC: 33.5 GM/DL — SIGNIFICANT CHANGE UP (ref 32–36)
MCV RBC AUTO: 91.5 FL — SIGNIFICANT CHANGE UP (ref 80–100)
MONOCYTES # BLD AUTO: 0.87 K/UL — SIGNIFICANT CHANGE UP (ref 0–0.9)
MONOCYTES NFR BLD AUTO: 7.1 % — SIGNIFICANT CHANGE UP (ref 2–14)
NEUTROPHILS # BLD AUTO: 9.85 K/UL — HIGH (ref 1.8–7.4)
NEUTROPHILS NFR BLD AUTO: 80.2 % — HIGH (ref 43–77)
NRBC # BLD: 0 /100 WBCS — SIGNIFICANT CHANGE UP (ref 0–0)
PHOSPHATE SERPL-MCNC: 7.1 MG/DL — HIGH (ref 2.5–4.5)
PLATELET # BLD AUTO: 484 K/UL — HIGH (ref 150–400)
POTASSIUM SERPL-MCNC: 5 MMOL/L — SIGNIFICANT CHANGE UP (ref 3.5–5.3)
POTASSIUM SERPL-MCNC: 9.2 MMOL/L — CRITICAL HIGH (ref 3.5–5.3)
POTASSIUM SERPL-SCNC: 5 MMOL/L — SIGNIFICANT CHANGE UP (ref 3.5–5.3)
POTASSIUM SERPL-SCNC: 9.2 MMOL/L — CRITICAL HIGH (ref 3.5–5.3)
PROTHROM AB SERPL-ACNC: 17.3 SEC — HIGH (ref 10–12.9)
RBC # BLD: 2.84 M/UL — LOW (ref 4.2–5.8)
RBC # FLD: 13.2 % — SIGNIFICANT CHANGE UP (ref 10.3–14.5)
SODIUM SERPL-SCNC: 135 MMOL/L — SIGNIFICANT CHANGE UP (ref 135–145)
SODIUM SERPL-SCNC: 137 MMOL/L — SIGNIFICANT CHANGE UP (ref 135–145)
WBC # BLD: 12.28 K/UL — HIGH (ref 3.8–10.5)
WBC # FLD AUTO: 12.28 K/UL — HIGH (ref 3.8–10.5)

## 2020-03-08 PROCEDURE — 99233 SBSQ HOSP IP/OBS HIGH 50: CPT

## 2020-03-08 PROCEDURE — 99291 CRITICAL CARE FIRST HOUR: CPT

## 2020-03-08 RX ORDER — ALTEPLASE 100 MG
10 KIT INTRAVENOUS EVERY 12 HOURS
Refills: 0 | Status: COMPLETED | OUTPATIENT
Start: 2020-03-08 | End: 2020-03-08

## 2020-03-08 RX ORDER — DORNASE ALFA 1 MG/ML
5 SOLUTION RESPIRATORY (INHALATION) EVERY 12 HOURS
Refills: 0 | Status: COMPLETED | OUTPATIENT
Start: 2020-03-08 | End: 2020-03-09

## 2020-03-08 RX ORDER — HYDROMORPHONE HYDROCHLORIDE 2 MG/ML
1 INJECTION INTRAMUSCULAR; INTRAVENOUS; SUBCUTANEOUS ONCE
Refills: 0 | Status: DISCONTINUED | OUTPATIENT
Start: 2020-03-08 | End: 2020-03-08

## 2020-03-08 RX ORDER — ALBUTEROL 90 UG/1
1.25 AEROSOL, METERED ORAL ONCE
Refills: 0 | Status: DISCONTINUED | OUTPATIENT
Start: 2020-03-08 | End: 2020-03-08

## 2020-03-08 RX ORDER — MIDODRINE HYDROCHLORIDE 2.5 MG/1
10 TABLET ORAL EVERY 8 HOURS
Refills: 0 | Status: DISCONTINUED | OUTPATIENT
Start: 2020-03-08 | End: 2020-03-09

## 2020-03-08 RX ORDER — ALBUTEROL 90 UG/1
2.5 AEROSOL, METERED ORAL ONCE
Refills: 0 | Status: DISCONTINUED | OUTPATIENT
Start: 2020-03-08 | End: 2020-03-08

## 2020-03-08 RX ADMIN — MIDODRINE HYDROCHLORIDE 10 MILLIGRAM(S): 2.5 TABLET ORAL at 21:59

## 2020-03-08 RX ADMIN — Medication 1 TABLET(S): at 14:14

## 2020-03-08 RX ADMIN — Medication 2.5 MILLIGRAM(S): at 05:19

## 2020-03-08 RX ADMIN — Medication 1 TABLET(S): at 21:59

## 2020-03-08 RX ADMIN — Medication 6 MILLIGRAM(S): at 21:58

## 2020-03-08 RX ADMIN — DORNASE ALFA 5 MILLIGRAM(S): 1 SOLUTION RESPIRATORY (INHALATION) at 03:08

## 2020-03-08 RX ADMIN — SEVELAMER CARBONATE 800 MILLIGRAM(S): 2400 POWDER, FOR SUSPENSION ORAL at 17:47

## 2020-03-08 RX ADMIN — HEPARIN SODIUM 5000 UNIT(S): 5000 INJECTION INTRAVENOUS; SUBCUTANEOUS at 05:19

## 2020-03-08 RX ADMIN — SEVELAMER CARBONATE 800 MILLIGRAM(S): 2400 POWDER, FOR SUSPENSION ORAL at 08:18

## 2020-03-08 RX ADMIN — HYDROMORPHONE HYDROCHLORIDE 1 MILLIGRAM(S): 2 INJECTION INTRAMUSCULAR; INTRAVENOUS; SUBCUTANEOUS at 00:38

## 2020-03-08 RX ADMIN — DORNASE ALFA 5 MILLIGRAM(S): 1 SOLUTION RESPIRATORY (INHALATION) at 12:27

## 2020-03-08 RX ADMIN — HYDROMORPHONE HYDROCHLORIDE 1 MILLIGRAM(S): 2 INJECTION INTRAMUSCULAR; INTRAVENOUS; SUBCUTANEOUS at 10:09

## 2020-03-08 RX ADMIN — MIDODRINE HYDROCHLORIDE 15 MILLIGRAM(S): 2.5 TABLET ORAL at 14:14

## 2020-03-08 RX ADMIN — HYDROMORPHONE HYDROCHLORIDE 1 MILLIGRAM(S): 2 INJECTION INTRAMUSCULAR; INTRAVENOUS; SUBCUTANEOUS at 00:08

## 2020-03-08 RX ADMIN — CHLORHEXIDINE GLUCONATE 1 APPLICATION(S): 213 SOLUTION TOPICAL at 10:08

## 2020-03-08 RX ADMIN — SEVELAMER CARBONATE 800 MILLIGRAM(S): 2400 POWDER, FOR SUSPENSION ORAL at 11:49

## 2020-03-08 RX ADMIN — MIDODRINE HYDROCHLORIDE 15 MILLIGRAM(S): 2.5 TABLET ORAL at 05:19

## 2020-03-08 RX ADMIN — Medication 2.5 MILLIGRAM(S): at 17:47

## 2020-03-08 RX ADMIN — POLYETHYLENE GLYCOL 3350 17 GRAM(S): 17 POWDER, FOR SOLUTION ORAL at 17:47

## 2020-03-08 RX ADMIN — HEPARIN SODIUM 5000 UNIT(S): 5000 INJECTION INTRAVENOUS; SUBCUTANEOUS at 21:59

## 2020-03-08 RX ADMIN — FAMOTIDINE 20 MILLIGRAM(S): 10 INJECTION INTRAVENOUS at 11:49

## 2020-03-08 RX ADMIN — HYDROMORPHONE HYDROCHLORIDE 1 MILLIGRAM(S): 2 INJECTION INTRAMUSCULAR; INTRAVENOUS; SUBCUTANEOUS at 10:39

## 2020-03-08 RX ADMIN — ATORVASTATIN CALCIUM 10 MILLIGRAM(S): 80 TABLET, FILM COATED ORAL at 21:59

## 2020-03-08 RX ADMIN — Medication 1 TABLET(S): at 05:19

## 2020-03-08 RX ADMIN — HEPARIN SODIUM 5000 UNIT(S): 5000 INJECTION INTRAVENOUS; SUBCUTANEOUS at 14:14

## 2020-03-08 RX ADMIN — ALTEPLASE 10 MILLIGRAM(S): KIT at 00:16

## 2020-03-08 RX ADMIN — ALTEPLASE 10 MILLIGRAM(S): KIT at 10:21

## 2020-03-08 NOTE — PROGRESS NOTE ADULT - SUBJECTIVE AND OBJECTIVE BOX
Date/Time Patient Seen:  		  Referring MD:   Data Reviewed	       Patient is a 89y old  Male who presents with a chief complaint of Left Empyema (08 Mar 2020 05:27)      Subjective/HPI     PAST MEDICAL & SURGICAL HISTORY:  GI Bleed: 2007  Stomach Ulcer: H pylori 2007, treated with antibiotics  Osteoarthritis  Hyperlipidemia  Herniated Disc  Spinal Stenosis  Diverticulitis  GERD (Gastroesophageal Reflux Disease)  Chronic Lymphocytic Leukemia: followed by oncologist in [place]  S/P Tonsillectomy: childhood  Status Post Arthroscopy: right shoulder  History of Arthroscopy of Knee: bilateral  Osteoarthritis  Stomach Ulcer: H pylori, treated with antibiotics        Medication list         MEDICATIONS  (STANDING):  ALBUTerol    0.083% 2.5 milliGRAM(s) Nebulizer once  atorvastatin 10 milliGRAM(s) Oral at bedtime  chlorhexidine 2% Cloths 1 Application(s) Topical <User Schedule>  dornase brenda Solution for Pleural Effusion 5 milliGRAM(s) IntraPleural. every 12 hours  dronabinol 2.5 milliGRAM(s) Oral two times a day  epoetin brenda Injectable 32136 Unit(s) IV Push <User Schedule>  famotidine    Tablet 20 milliGRAM(s) Oral daily  heparin  Injectable 5000 Unit(s) SubCutaneous every 8 hours  lactobacillus acidophilus 1 Tablet(s) Oral three times a day  melatonin 6 milliGRAM(s) Oral at bedtime  midodrine 15 milliGRAM(s) Oral every 8 hours  polyethylene glycol 3350 17 Gram(s) Oral two times a day  sevelamer carbonate 800 milliGRAM(s) Oral three times a day with meals    MEDICATIONS  (PRN):  acetaminophen   Tablet .. 650 milliGRAM(s) Oral every 6 hours PRN Temp greater or equal to 38C (100.4F), Mild Pain (1 - 3)  albuterol/ipratropium for Nebulization 3 milliLiter(s) Nebulizer every 6 hours PRN Shortness of Breath and/or Wheezing         Vitals log        ICU Vital Signs Last 24 Hrs  T(C): 37.1 (08 Mar 2020 03:22), Max: 37.3 (08 Mar 2020 00:02)  T(F): 98.7 (08 Mar 2020 03:22), Max: 99.1 (08 Mar 2020 00:02)  HR: 91 (08 Mar 2020 04:00) (80 - 117)  BP: 111/64 (08 Mar 2020 04:00) (98/68 - 138/63)  BP(mean): 81 (08 Mar 2020 04:00) (78 - 97)  ABP: --  ABP(mean): --  RR: 30 (08 Mar 2020 04:00) (21 - 67)  SpO2: 93% (08 Mar 2020 04:00) (91% - 96%)           Input and Output:  I&O's Detail    06 Mar 2020 07:01  -  07 Mar 2020 07:00  --------------------------------------------------------  IN:    dexmedetomidine Infusion: 9.6 mL    norepinephrine Infusion: 34.2 mL    Oral Fluid: 810 mL    Solution: 100 mL  Total IN: 953.8 mL    OUT:    Chest Tube: 500 mL    Indwelling Catheter - Urethral: 1890 mL  Total OUT: 2390 mL    Total NET: -1436.2 mL      07 Mar 2020 06:01  -  08 Mar 2020 06:30  --------------------------------------------------------  IN:    Oral Fluid: 840 mL  Total IN: 840 mL    OUT:    Chest Tube: 220 mL    Indwelling Catheter - Urethral: 1000 mL  Total OUT: 1220 mL    Total NET: -380 mL          Lab Data                        8.7    12.28 )-----------( 484      ( 08 Mar 2020 05:58 )             26.0     03-08    135  |  101  |  79<H>  ----------------------------<  129<H>  9.2<HH>   |  23  |  7.30<H>    Ca    8.0<L>      07 Mar 2020 06:39  Phos  6.7     03-07  Mg     2.8     03-08    TPro  5.4<L>  /  Alb  2.2<L>  /  TBili  0.7  /  DBili  x   /  AST  31  /  ALT  39  /  AlkPhos  74  03-07            Review of Systems	      Objective     Physical Examination    heart s1s2  lung dc BS  abd soft  head nc  confused    Pertinent Lab findings & Imaging      [place]:  NO   Adequate UO     I&O's Detail    06 Mar 2020 07:01  -  07 Mar 2020 07:00  --------------------------------------------------------  IN:    dexmedetomidine Infusion: 9.6 mL    norepinephrine Infusion: 34.2 mL    Oral Fluid: 810 mL    Solution: 100 mL  Total IN: 953.8 mL    OUT:    Chest Tube: 500 mL    Indwelling Catheter - Urethral: 1890 mL  Total OUT: 2390 mL    Total NET: -1436.2 mL      07 Mar 2020 06:01  -  08 Mar 2020 06:30  --------------------------------------------------------  IN:    Oral Fluid: 840 mL  Total IN: 840 mL    OUT:    Chest Tube: 220 mL    Indwelling Catheter - Urethral: 1000 mL  Total OUT: 1220 mL    Total NET: -380 mL               Discussed with:     Cultures:	        Radiology

## 2020-03-08 NOTE — PROGRESS NOTE ADULT - SUBJECTIVE AND OBJECTIVE BOX
24 hour events:   agitated overnight and received 2.5 haldol    T(F): 98.7 (03-08-20 @ 07:36), Max: 99.1 (03-08-20 @ 00:02)  HR: 95 (03-08-20 @ 07:00) (80 - 117)  BP: 107/83 (03-08-20 @ 07:00) (98/68 - 138/63)  RR: 34 (03-08-20 @ 07:00) (21 - 67)  SpO2: 96% (03-08-20 @ 07:00) (91% - 96%)  Wt(kg): --      03-07-20 @ 06:01  -  03-08-20 @ 07:00  --------------------------------------------------------  IN: 1040 mL / OUT: 1980 mL / NET: -940 mL    03-08-20 @ 07:01  -  03-08-20 @ 07:59  --------------------------------------------------------  IN: 120 mL / OUT: 0 mL / NET: 120 mL  urine 1.6L  chest tube 330      CAPILLARY BLOOD GLUCOSE          I&O's Summary    07 Mar 2020 06:01  -  08 Mar 2020 07:00  --------------------------------------------------------  IN: 1040 mL / OUT: 1980 mL / NET: -940 mL    08 Mar 2020 07:01  -  08 Mar 2020 07:59  --------------------------------------------------------  IN: 120 mL / OUT: 0 mL / NET: 120 mL        Physical Exam:   Gen:  Neuro:  HEENT:  Resp:  CVS:  Abd:  Ext:  Skin:    Meds:    midodrine Oral    atorvastatin Oral    albuterol/ipratropium for Nebulization Nebulizer PRN    acetaminophen   Tablet .. Oral PRN  dronabinol Oral  melatonin Oral      heparin  Injectable SubCutaneous    famotidine    Tablet Oral  polyethylene glycol 3350 Oral        epoetin brenda Injectable IV Push    chlorhexidine 2% Cloths Topical    lactobacillus acidophilus Oral  sevelamer carbonate Oral                            8.7    12.28 )-----------( 484      ( 08 Mar 2020 05:58 )             26.0       03-08    137  |  101  |  82<H>  ----------------------------<  136<H>  5.0   |  25  |  7.20<H>    Ca    7.8<L>      08 Mar 2020 07:06  Phos  7.1     03-08  Mg     2.8     03-08    TPro  5.4<L>  /  Alb  2.2<L>  /  TBili  0.7  /  DBili  x   /  AST  31  /  ALT  39  /  AlkPhos  74  03-07          PT/INR - ( 08 Mar 2020 05:58 )   PT: 17.3 sec;   INR: 1.52 ratio         PTT - ( 08 Mar 2020 05:58 )  PTT:28.5 sec                Radiology: ***  Bedside ultrasound: ***    CENTRAL LINE: N/Y          DATE INSERTED:              REMOVE: Y/N  SOLIS: N/Y                       DATE INSERTED:              REMOVE: Y/N  A-LINE: N/Y                       DATE INSERTED:              REMOVE: Y/N    GLOBAL ISSUE/BEST PRACTICE:  Analgesia:  Sedation:  CAM-ICU:   HOB elevation: yes  Stress ulcer prophylaxis:  VTE prophylaxis:  Glycemic control:  Nutrition:    CODE STATUS: *** 24 hour events:   agitated overnight and received 2.5 haldol    T(F): 98.7 (03-08-20 @ 07:36), Max: 99.1 (03-08-20 @ 00:02)  HR: 95 (03-08-20 @ 07:00) (80 - 117)  BP: 107/83 (03-08-20 @ 07:00) (98/68 - 138/63)  RR: 34 (03-08-20 @ 07:00) (21 - 67)  SpO2: 96% (03-08-20 @ 07:00) (91% - 96%)  Wt(kg): --      03-07-20 @ 06:01  -  03-08-20 @ 07:00  --------------------------------------------------------  IN: 1040 mL / OUT: 1980 mL / NET: -940 mL    03-08-20 @ 07:01  -  03-08-20 @ 07:59  --------------------------------------------------------  IN: 120 mL / OUT: 0 mL / NET: 120 mL  urine 1.6L  chest tube 330      CAPILLARY BLOOD GLUCOSE          I&O's Summary    07 Mar 2020 06:01  -  08 Mar 2020 07:00  --------------------------------------------------------  IN: 1040 mL / OUT: 1980 mL / NET: -940 mL    08 Mar 2020 07:01  -  08 Mar 2020 07:59  --------------------------------------------------------  IN: 120 mL / OUT: 0 mL / NET: 120 mL        Physical Exam:   Gen: frail and elderly, NAD  Neuro: alert  HEENT: PERRL  Resp: reduced at L base, expiratory wheeze  CT to suction with serosanginous drainage  CVS: RRR  Abd: soft, NTND  Skin: WWP    Meds:    midodrine Oral    atorvastatin Oral    albuterol/ipratropium for Nebulization Nebulizer PRN    acetaminophen   Tablet .. Oral PRN  dronabinol Oral  melatonin Oral      heparin  Injectable SubCutaneous    famotidine    Tablet Oral  polyethylene glycol 3350 Oral        epoetin brenda Injectable IV Push    chlorhexidine 2% Cloths Topical    lactobacillus acidophilus Oral  sevelamer carbonate Oral                            8.7    12.28 )-----------( 484      ( 08 Mar 2020 05:58 )             26.0       03-08    137  |  101  |  82<H>  ----------------------------<  136<H>  5.0   |  25  |  7.20<H>    Ca    7.8<L>      08 Mar 2020 07:06  Phos  7.1     03-08  Mg     2.8     03-08    TPro  5.4<L>  /  Alb  2.2<L>  /  TBili  0.7  /  DBili  x   /  AST  31  /  ALT  39  /  AlkPhos  74  03-07          PT/INR - ( 08 Mar 2020 05:58 )   PT: 17.3 sec;   INR: 1.52 ratio         PTT - ( 08 Mar 2020 05:58 )  PTT:28.5 sec        CENTRAL LINE: [facility]  SOLIS: Y  A-LINE: N    GLOBAL ISSUE/BEST PRACTICE:  Analgesia: N  Sedation: N  HOB elevation: yes  Stress ulcer prophylaxis: Y  VTE prophylaxis: Y  Glycemic control: Y  Nutrition: Y    CODE STATUS: DNR, DNI

## 2020-03-08 NOTE — PROGRESS NOTE ADULT - SUBJECTIVE AND OBJECTIVE BOX
[facility]    Warren Posada, Dandre, Abelardo, Lucina, Thaddeus, Kelvin      [phone]    CHIEF COMPLAINT: Patient is a 89y old  Male who presents with a chief complaint of Left Empyema (07 Mar 2020 19:09)      Follow Up: empyema, atn, hypotension and pat    Interim history: The patient reports no new symptoms.  Denies chest discomfort and shortness of breath.  No abdominal pain.  No new neurologic symptoms.      MEDICATIONS  (STANDING):  atorvastatin 10 milliGRAM(s) Oral at bedtime  chlorhexidine 2% Cloths 1 Application(s) Topical <User Schedule>  dornase brenda Solution for Pleural Effusion 5 milliGRAM(s) IntraPleural. every 12 hours  dronabinol 2.5 milliGRAM(s) Oral two times a day  epoetin brenda Injectable 79046 Unit(s) IV Push <User Schedule>  famotidine    Tablet 20 milliGRAM(s) Oral daily  heparin  Injectable 5000 Unit(s) SubCutaneous every 8 hours  lactobacillus acidophilus 1 Tablet(s) Oral three times a day  melatonin 6 milliGRAM(s) Oral at bedtime  midodrine 15 milliGRAM(s) Oral every 8 hours  polyethylene glycol 3350 17 Gram(s) Oral two times a day  sevelamer carbonate 800 milliGRAM(s) Oral three times a day with meals    MEDICATIONS  (PRN):  acetaminophen   Tablet .. 650 milliGRAM(s) Oral every 6 hours PRN Temp greater or equal to 38C (100.4F), Mild Pain (1 - 3)  albuterol/ipratropium for Nebulization 3 milliLiter(s) Nebulizer every 6 hours PRN Shortness of Breath and/or Wheezing      REVIEW OF SYSTEMS:  eye, ent, GI, , allergic, dermatologic, musculoskeletal and neurologic are negative except as described above    Vital Signs Last 24 Hrs  T(C): 37.1 (08 Mar 2020 03:22), Max: 37.3 (08 Mar 2020 00:02)  T(F): 98.7 (08 Mar 2020 03:22), Max: 99.1 (08 Mar 2020 00:02)  HR: 91 (08 Mar 2020 04:00) (80 - 117)  BP: 111/64 (08 Mar 2020 04:00) (98/68 - 138/63)  BP(mean): 81 (08 Mar 2020 04:00) (70 - 97)  RR: 30 (08 Mar 2020 04:00) (21 - 67)  SpO2: 93% (08 Mar 2020 04:00) (91% - 96%)    I&O's Summary    06 Mar 2020 07:01  -  07 Mar 2020 07:00  --------------------------------------------------------  IN: 953.8 mL / OUT: 2390 mL / NET: -1436.2 mL    07 Mar 2020 06:01  -  08 Mar 2020 05:27  --------------------------------------------------------  IN: 840 mL / OUT: 1220 mL / NET: -380 mL        Telemetry past 24h: sr, brief svt    PHYSICAL EXAM:    Constitutional: well-nourished, well-developed, NAD   HEENT:  MMM, sclerae anicteric, conjunctivae clear, no oral cyanosis.  Pulmonary: Non-labored, breath sounds are clear bilaterally, No wheezing, rales or rhonchi  Cardiovascular: Regular, S1 and S2.  No murmur.  No rubs, gallops or clicks  Gastrointestinal: Bowel Sounds present, soft, nontender.   Lymph: No peripheral edema.   Neurological: Alert, no focal deficits  Skin: No rashes.  Psych:  Mood & affect appropriate    LABS: All Labs Reviewed:                        8.3    9.45  )-----------( 350      ( 07 Mar 2020 06:39 )             24.7                         8.5    7.85  )-----------( 302      ( 06 Mar 2020 06:06 )             25.5                         8.2    7.59  )-----------( 313      ( 05 Mar 2020 06:54 )             24.0     07 Mar 2020 06:39    134    |  100    |  79     ----------------------------<  102    5.5     |  21     |  6.80   06 Mar 2020 06:06    132    |  97     |  74     ----------------------------<  132    5.0     |  21     |  6.20   05 Mar 2020 06:54    133    |  97     |  72     ----------------------------<  104    4.6     |  23     |  5.70     Ca    8.0        07 Mar 2020 06:39  Ca    8.1        06 Mar 2020 06:06  Ca    7.7        05 Mar 2020 06:54  Phos  6.7       07 Mar 2020 06:39  Phos  5.9       06 Mar 2020 06:06  Phos  5.7       05 Mar 2020 06:54  Mg     3.1       07 Mar 2020 06:39  Mg     3.0       06 Mar 2020 06:06  Mg     2.9       05 Mar 2020 06:54    TPro  5.4    /  Alb  2.2    /  TBili  0.7    /  DBili  x      /  AST  31     /  ALT  39     /  AlkPhos  74     07 Mar 2020 06:39  TPro  5.8    /  Alb  2.4    /  TBili  0.9    /  DBili  x      /  AST  34     /  ALT  43     /  AlkPhos  81     06 Mar 2020 06:06  TPro  5.2    /  Alb  1.9    /  TBili  0.8    /  DBili  x      /  AST  37     /  ALT  46     /  AlkPhos  84     05 Mar 2020 06:54    PT/INR - ( 06 Mar 2020 06:06 )   PT: 17.5 sec;   INR: 1.54 ratio         PTT - ( 06 Mar 2020 06:06 )  PTT:22.7 sec      Blood Culture:         RADIOLOGY:    EKG:    Echo:    < from: TTE Echo Doppler w/o Cont (02.27.20 @ 11:47) >     EXAM:  ECHO TTE WO CON COMP W DOPPLR         PROCEDURE DATE:  02/27/2020        INTERPRETATION:  INDICATION: Heart failure  Sonographer PH    Blood Pressure 107/68    Height 177.8 cm     Weight 86.2 kg       BSA 2.0 sq m    Dimensions:    LA 3.7    Normal Values: 2.0 - 4.0 cm    Ao 3.3        Normal Values: 2.0 - 3.8 cm  SEPTUM Normal Values: 0.6 - 1.2 cm  PWT Normal Values: 0.6 - 1.1 cm  LVIDd Normal Values: 3.0 - 5.6 cm  LVIDs Normal Values: 1.8 - 4.0 cm      OBSERVATIONS:  Technically difficult and limited study  Mitral Valve: normal, trace physiologic MR.  Aortic Valve/Aorta: Sclerotic trileaflet aortic valve with normal opening.  Tricuspid Valve: normal with trace TR.  Pulmonic Valve: Not well-visualized  Left Atrium: normal  RightAtrium: Not well-visualized  Left Ventricle: normal LV size and systolic function, estimated LVEF of 55%.  Right Ventricle: Not well-visualized  Pericardium/Pleura: normal, small anterior pericardial effusion without signs of hemodynamic compromise.  Pulmonary/RV Pressure: estimated PA systolic pressure of 34mmHg. IVC is dilated    Conclusion:   Technically difficult and limited study  Normal left ventricular internal dimensions and systolic function, estimated LVEF of 55%.   Right ventricle is not well-visualized  Sclerotic trileaflet aortic valve, without AI.   Trace physiologic MR and TR.                       TYLER AYALA   This document has been electronically signed. Feb 28 2020  8:07AM                < end of copied text > [facility]    Warren Posada, Dandre, Abelardo, Lucina, Thaddeus, Kelvin      [phone]    CHIEF COMPLAINT: Patient is a 89y old  Male who presents with a chief complaint of Left Empyema (07 Mar 2020 19:09)      Follow Up: empyema, atn, hypotension and pat    Interim history: The patient reports no new symptoms.  Denies chest discomfort and shortness of breath.  No abdominal pain.  No new neurologic symptoms.      MEDICATIONS  (STANDING):  atorvastatin 10 milliGRAM(s) Oral at bedtime  chlorhexidine 2% Cloths 1 Application(s) Topical <User Schedule>  dornase brenda Solution for Pleural Effusion 5 milliGRAM(s) IntraPleural. every 12 hours  dronabinol 2.5 milliGRAM(s) Oral two times a day  epoetin brenda Injectable 01834 Unit(s) IV Push <User Schedule>  famotidine    Tablet 20 milliGRAM(s) Oral daily  heparin  Injectable 5000 Unit(s) SubCutaneous every 8 hours  lactobacillus acidophilus 1 Tablet(s) Oral three times a day  melatonin 6 milliGRAM(s) Oral at bedtime  midodrine 15 milliGRAM(s) Oral every 8 hours  polyethylene glycol 3350 17 Gram(s) Oral two times a day  sevelamer carbonate 800 milliGRAM(s) Oral three times a day with meals    MEDICATIONS  (PRN):  acetaminophen   Tablet .. 650 milliGRAM(s) Oral every 6 hours PRN Temp greater or equal to 38C (100.4F), Mild Pain (1 - 3)  albuterol/ipratropium for Nebulization 3 milliLiter(s) Nebulizer every 6 hours PRN Shortness of Breath and/or Wheezing      REVIEW OF SYSTEMS:  eye, ent, GI, , allergic, dermatologic, musculoskeletal and neurologic are negative except as described above    Vital Signs Last 24 Hrs  T(C): 37.1 (08 Mar 2020 03:22), Max: 37.3 (08 Mar 2020 00:02)  T(F): 98.7 (08 Mar 2020 03:22), Max: 99.1 (08 Mar 2020 00:02)  HR: 91 (08 Mar 2020 04:00) (80 - 117)  BP: 111/64 (08 Mar 2020 04:00) (98/68 - 138/63)  BP(mean): 81 (08 Mar 2020 04:00) (70 - 97)  RR: 30 (08 Mar 2020 04:00) (21 - 67)  SpO2: 93% (08 Mar 2020 04:00) (91% - 96%)    I&O's Summary    06 Mar 2020 07:01  -  07 Mar 2020 07:00  --------------------------------------------------------  IN: 953.8 mL / OUT: 2390 mL / NET: -1436.2 mL    07 Mar 2020 06:01  -  08 Mar 2020 05:27  --------------------------------------------------------  IN: 840 mL / OUT: 1220 mL / NET: -380 mL        Telemetry past 24h: sr, brief svt. wct 4 beats    PHYSICAL EXAM:    Constitutional: well-nourished, well-developed, NAD   HEENT:  MMM, sclerae anicteric, conjunctivae clear, no oral cyanosis.  Pulmonary: Non-labored, breath sounds are clear bilaterally, No wheezing, rales or rhonchi  Cardiovascular: Regular, S1 and S2.  No murmur.  No rubs, gallops or clicks  Gastrointestinal: Bowel Sounds present, soft, nontender.   Lymph: No peripheral edema.   Neurological: Alert, no focal deficits  Skin: No rashes.  Psych:  Mood & affect appropriate    LABS: All Labs Reviewed:                        8.3    9.45  )-----------( 350      ( 07 Mar 2020 06:39 )             24.7                         8.5    7.85  )-----------( 302      ( 06 Mar 2020 06:06 )             25.5                         8.2    7.59  )-----------( 313      ( 05 Mar 2020 06:54 )             24.0     07 Mar 2020 06:39    134    |  100    |  79     ----------------------------<  102    5.5     |  21     |  6.80   06 Mar 2020 06:06    132    |  97     |  74     ----------------------------<  132    5.0     |  21     |  6.20   05 Mar 2020 06:54    133    |  97     |  72     ----------------------------<  104    4.6     |  23     |  5.70     Ca    8.0        07 Mar 2020 06:39  Ca    8.1        06 Mar 2020 06:06  Ca    7.7        05 Mar 2020 06:54  Phos  6.7       07 Mar 2020 06:39  Phos  5.9       06 Mar 2020 06:06  Phos  5.7       05 Mar 2020 06:54  Mg     3.1       07 Mar 2020 06:39  Mg     3.0       06 Mar 2020 06:06  Mg     2.9       05 Mar 2020 06:54    TPro  5.4    /  Alb  2.2    /  TBili  0.7    /  DBili  x      /  AST  31     /  ALT  39     /  AlkPhos  74     07 Mar 2020 06:39  TPro  5.8    /  Alb  2.4    /  TBili  0.9    /  DBili  x      /  AST  34     /  ALT  43     /  AlkPhos  81     06 Mar 2020 06:06  TPro  5.2    /  Alb  1.9    /  TBili  0.8    /  DBili  x      /  AST  37     /  ALT  46     /  AlkPhos  84     05 Mar 2020 06:54    PT/INR - ( 06 Mar 2020 06:06 )   PT: 17.5 sec;   INR: 1.54 ratio         PTT - ( 06 Mar 2020 06:06 )  PTT:22.7 sec      Blood Culture:         RADIOLOGY:    EKG:    Echo:    < from: TTE Echo Doppler w/o Cont (02.27.20 @ 11:47) >     EXAM:  ECHO TTE WO CON COMP W DOPPLR         PROCEDURE DATE:  02/27/2020        INTERPRETATION:  INDICATION: Heart failure  Sonographer PH    Blood Pressure 107/68    Height 177.8 cm     Weight 86.2 kg       BSA 2.0 sq m    Dimensions:    LA 3.7    Normal Values: 2.0 - 4.0 cm    Ao 3.3        Normal Values: 2.0 - 3.8 cm  SEPTUM Normal Values: 0.6 - 1.2 cm  PWT Normal Values: 0.6 - 1.1 cm  LVIDd Normal Values: 3.0 - 5.6 cm  LVIDs Normal Values: 1.8 - 4.0 cm      OBSERVATIONS:  Technically difficult and limited study  Mitral Valve: normal, trace physiologic MR.  Aortic Valve/Aorta: Sclerotic trileaflet aortic valve with normal opening.  Tricuspid Valve: normal with trace TR.  Pulmonic Valve: Not well-visualized  Left Atrium: normal  RightAtrium: Not well-visualized  Left Ventricle: normal LV size and systolic function, estimated LVEF of 55%.  Right Ventricle: Not well-visualized  Pericardium/Pleura: normal, small anterior pericardial effusion without signs of hemodynamic compromise.  Pulmonary/RV Pressure: estimated PA systolic pressure of 34mmHg. IVC is dilated    Conclusion:   Technically difficult and limited study  Normal left ventricular internal dimensions and systolic function, estimated LVEF of 55%.   Right ventricle is not well-visualized  Sclerotic trileaflet aortic valve, without AI.   Trace physiologic MR and TR.                       TYLER AYALA   This document has been electronically signed. Feb 28 2020  8:07AM                < end of copied text >

## 2020-03-08 NOTE — PROGRESS NOTE ADULT - SUBJECTIVE AND OBJECTIVE BOX
INTERVAL HPI/OVERNIGHT EVENTS:  pt seen and examined- no acute overnight events  resting in bed, no complaints  denies abd pain N/V/D        MEDICATIONS  (STANDING):  alteplase  Injectable for Pleural Effusion 10 milliGRAM(s) IntraPleural. every 12 hours  atorvastatin 10 milliGRAM(s) Oral at bedtime  chlorhexidine 2% Cloths 1 Application(s) Topical <User Schedule>  dornase brenda Solution for Pleural Effusion 5 milliGRAM(s) IntraPleural. every 12 hours  dornase brenda Solution for Pleural Effusion 5 milliGRAM(s) IntraPleural. every 12 hours  dronabinol 2.5 milliGRAM(s) Oral two times a day  epoetin brenda Injectable 68842 Unit(s) IV Push <User Schedule>  famotidine    Tablet 20 milliGRAM(s) Oral daily  heparin  Injectable 5000 Unit(s) SubCutaneous every 8 hours  lactobacillus acidophilus 1 Tablet(s) Oral three times a day  melatonin 6 milliGRAM(s) Oral at bedtime  midodrine 15 milliGRAM(s) Oral every 8 hours  polyethylene glycol 3350 17 Gram(s) Oral two times a day  sevelamer carbonate 800 milliGRAM(s) Oral three times a day with meals    MEDICATIONS  (PRN):  acetaminophen   Tablet .. 650 milliGRAM(s) Oral every 6 hours PRN Temp greater or equal to 38C (100.4F), Mild Pain (1 - 3)  albuterol/ipratropium for Nebulization 3 milliLiter(s) Nebulizer every 6 hours PRN Shortness of Breath and/or Wheezing      Allergies    No Known Allergies    Intolerances        Review of Systems:    General:  No wt loss, fevers, chills, night sweats, fatigue   Eyes:  Good vision, no reported pain  ENT:  No sore throat, pain, runny nose, dysphagia  CV:  No pain, palpitations, hypo/hypertension  Resp:  No dyspnea, cough, tachypnea, wheezing  GI:  No pain, No nausea, No vomiting, No diarrhea, No constipation, No weight loss, No fever, No pruritis, No rectal bleeding, No melena, No dysphagia  :  No pain, bleeding, incontinence, nocturia  Muscle:  No pain, weakness  Neuro:  No weakness, tingling, memory problems  Psych:  No fatigue, insomnia, mood problems, depression  Endocrine:  No polyuria, polydypsia, cold/heat intolerance  Heme:  No petechiae, ecchymosis, easy bruisability  Skin:  No rash, tattoos, scars, edema        Vital Signs Last 24 Hrs  T(C): 37.1 (08 Mar 2020 07:36), Max: 37.3 (08 Mar 2020 00:02)  T(F): 98.7 (08 Mar 2020 07:36), Max: 99.1 (08 Mar 2020 00:02)  HR: 92 (08 Mar 2020 10:00) (80 - 127)  BP: 107/57 (08 Mar 2020 10:00) (98/68 - 126/71)  BP(mean): 75 (08 Mar 2020 10:00) (75 - 97)  RR: 29 (08 Mar 2020 10:00) (21 - 67)  SpO2: 94% (08 Mar 2020 10:00) (92% - 96%)        PHYSICAL EXAM:    General:  lying in  bed  HEENT:  NC/AT  Abdomen:  Soft nt mild dt  Extremities:  no edema  Neuro/Psych:  Awake alert some confusion        LABS:                        8.7    12.28 )-----------( 484      ( 08 Mar 2020 05:58 )             26.0     03-08    137  |  101  |  82<H>  ----------------------------<  136<H>  5.0   |  25  |  7.20<H>    Ca    7.8<L>      08 Mar 2020 07:06  Phos  7.1     03-08  Mg     2.8     03-08    TPro  5.4<L>  /  Alb  2.2<L>  /  TBili  0.7  /  DBili  x   /  AST  31  /  ALT  39  /  AlkPhos  74  03-07    PT/INR - ( 08 Mar 2020 05:58 )   PT: 17.3 sec;   INR: 1.52 ratio         PTT - ( 08 Mar 2020 05:58 )  PTT:28.5 sec        RADIOLOGY & ADDITIONAL TESTS:

## 2020-03-08 NOTE — PROGRESS NOTE ADULT - ASSESSMENT
89 yo M with PMH of HLD and MALToma of the stomach (under surveillance), spinal stenosis with extensive lumbar surgery in 2011 and bilateral LE neuropathy, admitted for L side empyema and R ureteral stone. Patient initially reported what appeared to be stable occasional angina however later denied this.  Elevated proBNP however normal LV systolic function with EF 55% on echo and no evidence of meaningful volume overload on exam.   Review of EKG revealed possible inferior infarct however no wall motion abnormalities on 2/27 echo.   He is now in the intensive care unit status post lytic infusion therapy through a chest tube for his left-sided empyema. GUS now s/p HD     -no clear acute ischemia  -ekg has suggested possible imi, but no wma on echo  -cont statin    -no significant arrhythmias noted  -did have brief pat, generally maintaining nsr. pat likely reactive to acute illness    -no meaningful volume overload   -now on hd prn  -improving atn    -hypotension improved  -midodrine  at high dose of 15 q8  -levo gtt, titrated off       - Monitor and replete electrolytes. Keep K>4.0 and Mg>2.0.       - Rest of management per urology/CT surgery/primary team.  - Other cardiovascular workup will depend on clinical course. Will follow.     The patient is at risk of abrupt decompensation.  I have personally provided  35 minutes of critical care time, excluding time spent on separate procedures.     charting in progress 89 yo M with PMH of HLD and MALToma of the stomach (under surveillance), spinal stenosis with extensive lumbar surgery in 2011 and bilateral LE neuropathy, admitted for L side empyema and R ureteral stone. Patient initially reported what appeared to be stable occasional angina however later denied this.  Elevated proBNP however normal LV systolic function with EF 55% on echo and no evidence of meaningful volume overload on exam.   Review of EKG revealed possible inferior infarct however no wall motion abnormalities on 2/27 echo.   He is now in the intensive care unit status post lytic infusion therapy through a chest tube for his left-sided empyema. GUS now s/p HD     -no clear acute ischemia  -ekg has suggested possible imi, but no wma on echo  -cont statin    -no significant arrhythmias noted  -did have brief pat, generally maintaining nsr. pat likely reactive to acute illness    -no meaningful volume overload   -now on hd prn  -improving atn in terms of uo though creatinine is uptrending    -hypotension improved though continues to have marginal bp at times  -midodrine  at high dose of 15 q8  -levo gtt, titrated off       - Monitor and replete electrolytes. Keep K>4.0 and Mg>2.0.       - Rest of management per urology/CT surgery/primary team.  - Other cardiovascular workup will depend on clinical course. Will follow.     The patient remains at risk of abrupt decompensation.  I have personally provided  35 minutes of critical care time, excluding time spent on separate procedures.

## 2020-03-08 NOTE — PROGRESS NOTE ADULT - ASSESSMENT
Impression:  ATN in recovery phase, brisk urine output  BUN/Creat slightly higher today  He may be uremic in addition to acute illness encephalopathy  Dr. Martinez is requesting dialysis today for urea clearance and I concur  Avoid hypotension/hypovolemia/nephrotoxins as possible  Would lower midodrine dose as BP are on a high side  Hope to see progressive renal recovery  Spoke with patient's family at bedside  On-call dialysis nurse informed on dialysis need today

## 2020-03-08 NOTE — PROGRESS NOTE ADULT - SUBJECTIVE AND OBJECTIVE BOX
Patient seen and examined;  Chart reviewed and events noted;   More confused/agitated this morning; required restraints; still making adequate urine     MEDICATIONS  (STANDING):  alteplase  Injectable for Pleural Effusion 10 milliGRAM(s) IntraPleural. every 12 hours  atorvastatin 10 milliGRAM(s) Oral at bedtime  chlorhexidine 2% Cloths 1 Application(s) Topical <User Schedule>  dornase brenda Solution for Pleural Effusion 5 milliGRAM(s) IntraPleural. every 12 hours  dronabinol 2.5 milliGRAM(s) Oral two times a day  epoetin brenda Injectable 15745 Unit(s) IV Push <User Schedule>  famotidine    Tablet 20 milliGRAM(s) Oral daily  heparin  Injectable 5000 Unit(s) SubCutaneous every 8 hours  lactobacillus acidophilus 1 Tablet(s) Oral three times a day  melatonin 6 milliGRAM(s) Oral at bedtime  midodrine 15 milliGRAM(s) Oral every 8 hours  polyethylene glycol 3350 17 Gram(s) Oral two times a day  sevelamer carbonate 800 milliGRAM(s) Oral three times a day with meals    MEDICATIONS  (PRN):  acetaminophen   Tablet .. 650 milliGRAM(s) Oral every 6 hours PRN Temp greater or equal to 38C (100.4F), Mild Pain (1 - 3)  albuterol/ipratropium for Nebulization 3 milliLiter(s) Nebulizer every 6 hours PRN Shortness of Breath and/or Wheezing      Vital Signs Last 24 Hrs  T(C): 37.1 (08 Mar 2020 07:36), Max: 37.3 (08 Mar 2020 00:02)  T(F): 98.7 (08 Mar 2020 07:36), Max: 99.1 (08 Mar 2020 00:02)  HR: 92 (08 Mar 2020 10:00) (80 - 127)  BP: 107/57 (08 Mar 2020 10:00) (98/68 - 126/71)  BP(mean): 75 (08 Mar 2020 10:00) (75 - 97)  RR: 29 (08 Mar 2020 10:00) (21 - 67)  SpO2: 94% (08 Mar 2020 10:00) (91% - 96%)    PHYSICAL EXAM  General: adult elderly male  in NAD  HEENT: clear oropharynx, anicteric sclera, pink conjunctivae  Neck: supple  CV: normal S1S2 with no murmur rubs or gallops  Lungs: reduced breath sounds at bases; + left chest with serosanguinous drainage  Abdomen: soft non-tender non-distended, no hepato/splenomegaly  Ext: no clubbing cyanosis or edema  Skin: no rashes and no petichiae  Neuro: alert and cooperative but more confused      LABS:                        8.7    12.28 )-----------( 484      ( 08 Mar 2020 05:58 )             26.0     Hemoglobin: 8.7 g/dL (03-08 @ 05:58)  Hemoglobin: 8.3 g/dL (03-07 @ 06:39)  Hemoglobin: 8.5 g/dL (03-06 @ 06:06)  Hemoglobin: 8.2 g/dL (03-05 @ 06:54)  Hemoglobin: 8.9 g/dL (03-04 @ 05:21)    WBC Count: 12.28 K/uL (03-08 @ 05:58)  WBC Count: 9.45 K/uL (03-07 @ 06:39)  WBC Count: 7.85 K/uL (03-06 @ 06:06)  WBC Count: 7.59 K/uL (03-05 @ 06:54)  WBC Count: 9.04 K/uL (03-04 @ 05:21)    03-08    137  |  101  |  82<H>  ----------------------------<  136<H>  5.0   |  25  |  7.20<H>    Ca    7.8<L>      08 Mar 2020 07:06  Phos  7.1     03-08  Mg     2.8     03-08    TPro  5.4<L>  /  Alb  2.2<L>  /  TBili  0.7  /  DBili  x   /  AST  31  /  ALT  39  /  AlkPhos  74  03-07    PT/INR - ( 08 Mar 2020 05:58 )   PT: 17.3 sec;   INR: 1.52 ratio    PTT - ( 08 Mar 2020 05:58 )  PTT:28.5 sec

## 2020-03-08 NOTE — PROGRESS NOTE ADULT - ASSESSMENT
89 y/o man known to our office, dx'd with gastric MALToma 2013, bone marrow and 2017 repeat gastric bx c/w Chronic Lymphocytic Leukemia/Small Lymphocytic Lymphoma, under serial monitoring w EGD and PETCT, has not required treatment. Also hx B12 deficiency.  Adm w left empyema, hx URI sx Jan 2020  Post IR pigtail insertion w minimal output and tx to ICU for dornase/alteplase adm w resultant partial output but not dramatic;   Course complicated by GUS in setting of hypotension and exposure to Vancomycin    -renal function continue to worsen; could not tolerate HD due to hypotension per family;  urine output is improving despite rising creatinine; suspect patient more confused with uremia  -recommend review of images with IR on Monday and determine if feasible to undergo CT guided biopsy to evaluate for MALT lymphoma vs. SLL in the lung which is possibility. Treatment options would clearly be limited given age and renal dysfunction  -will continue to follow with you  -case discussed with Dr. Martinez (ICU attending)

## 2020-03-08 NOTE — PROGRESS NOTE ADULT - SUBJECTIVE AND OBJECTIVE BOX
Patient seen in follow up for GUS. Urine output remains brisk, BP's are high normal. In chair, knowns his name and age, confused about other subjects/objects/persons. In chair, eating lunch, states "food does not taste good", no SOB, no pain.    GI Bleed  Stomach Ulcer  Osteoarthritis  Hyperlipidemia  Herniated Disc  Spinal Stenosis  Diverticulitis  GERD (Gastroesophageal Reflux Disease)  Chronic Lymphocytic Leukemia    MEDICATIONS  (STANDING):  alteplase  Injectable for Pleural Effusion 10 milliGRAM(s) IntraPleural. every 12 hours  atorvastatin 10 milliGRAM(s) Oral at bedtime  chlorhexidine 2% Cloths 1 Application(s) Topical <User Schedule>  dornase brenda Solution for Pleural Effusion 5 milliGRAM(s) IntraPleural. every 12 hours  dronabinol 2.5 milliGRAM(s) Oral two times a day  epoetin brenda Injectable 46457 Unit(s) IV Push <User Schedule>  famotidine    Tablet 20 milliGRAM(s) Oral daily  heparin  Injectable 5000 Unit(s) SubCutaneous every 8 hours  lactobacillus acidophilus 1 Tablet(s) Oral three times a day  melatonin 6 milliGRAM(s) Oral at bedtime  midodrine 15 milliGRAM(s) Oral every 8 hours  polyethylene glycol 3350 17 Gram(s) Oral two times a day  sevelamer carbonate 800 milliGRAM(s) Oral three times a day with meals    MEDICATIONS  (PRN):  acetaminophen   Tablet .. 650 milliGRAM(s) Oral every 6 hours PRN Temp greater or equal to 38C (100.4F), Mild Pain (1 - 3)  albuterol/ipratropium for Nebulization 3 milliLiter(s) Nebulizer every 6 hours PRN Shortness of Breath and/or Wheezing    T(C): 36.4 (03-08-20 @ 11:50), Max: 37.3 (03-08-20 @ 00:02)  HR: 101 (03-08-20 @ 13:00) (80 - 127)  BP: 152/92 (03-08-20 @ 13:00) (94/57 - 152/92)  RR: 22 (03-08-20 @ 13:00) (16 - 67)  SpO2: 94% (03-08-20 @ 13:00) (91% - 96%)  Wt(kg): --  I&O's Detail    07 Mar 2020 06:01  -  08 Mar 2020 07:00  --------------------------------------------------------  IN:    Oral Fluid: 840 mL    Solution: 200 mL  Total IN: 1040 mL    OUT:    Chest Tube: 330 mL    Indwelling Catheter - Urethral: 1650 mL  Total OUT: 1980 mL    Total NET: -940 mL      08 Mar 2020 07:01  -  08 Mar 2020 13:13  --------------------------------------------------------  IN:    Oral Fluid: 420 mL  Total IN: 420 mL    OUT:    Indwelling Catheter - Urethral: 335 mL  Total OUT: 335 mL    Total NET: 85 mL      PHYSICAL EXAM:  General: NAD, confused, in chair  Respiratory: b/l air entry, bibasilar crackles  Left chest tube  Cardiovascular: S1 S2 irreg irreg, no rub  Gastrointestinal: soft, NT, BS present  Extremities: trace dependent edema  Neuro: no gross motor deficit, he has asterexis     CBC Full  -  ( 08 Mar 2020 05:58 )  WBC Count : 12.28 K/uL  RBC Count : 2.84 M/uL  Hemoglobin : 8.7 g/dL  Hematocrit : 26.0 %  Platelet Count - Automated : 484 K/uL  Mean Cell Volume : 91.5 fl  Mean Cell Hemoglobin : 30.6 pg  Mean Cell Hemoglobin Concentration : 33.5 gm/dL  Auto Neutrophil # : 9.85 K/uL  Auto Lymphocyte # : 1.28 K/uL  Auto Monocyte # : 0.87 K/uL  Auto Eosinophil # : 0.07 K/uL  Auto Basophil # : 0.06 K/uL  Auto Neutrophil % : 80.2 %  Auto Lymphocyte % : 10.4 %  Auto Monocyte % : 7.1 %  Auto Eosinophil % : 0.6 %  Auto Basophil % : 0.5 %    03-08    137  |  101  |  82<H>  ----------------------------<  136<H>  5.0   |  25  |  7.20<H>    Ca    7.8<L>      08 Mar 2020 07:06  Phos  7.1     03-08  Mg     2.8     03-08    TPro  5.4<L>  /  Alb  2.2<L>  /  TBili  0.7  /  DBili  x   /  AST  31  /  ALT  39  /  AlkPhos  74  03-07        Sodium, Serum: 137 (03-08 @ 07:06)  Sodium, Serum: 135 (03-08 @ 05:58)  Sodium, Serum: 134 (03-07 @ 06:39)  Sodium, Serum: 132 (03-06 @ 06:06)    Creatinine, Serum: 7.20 (03-08 @ 07:06)  Creatinine, Serum: 7.30 (03-08 @ 05:58)  Creatinine, Serum: 6.80 (03-07 @ 06:39)  Creatinine, Serum: 6.20 (03-06 @ 06:06)    Potassium, Serum: 5.0 (03-08 @ 07:06)  Potassium, Serum: 9.2 (03-08 @ 05:58)  Potassium, Serum: 5.5 (03-07 @ 06:39)  Potassium, Serum: 5.0 (03-06 @ 06:06)    Hemoglobin: 8.7 (03-08 @ 05:58)  Hemoglobin: 8.3 (03-07 @ 06:39)  Hemoglobin: 8.5 (03-06 @ 06:06)

## 2020-03-08 NOTE — PROGRESS NOTE ADULT - SUBJECTIVE AND OBJECTIVE BOX
STATUS POST:  L CT insertion    SUBJECTIVE:  Patient seen and examined at bedside. Currently in restraints for trying to pull chest tube. Pt laying comfortably. No acute complaints.    Vital Signs Last 24 Hrs  T(C): 37.1 (08 Mar 2020 07:36), Max: 37.3 (08 Mar 2020 00:02)  T(F): 98.7 (08 Mar 2020 07:36), Max: 99.1 (08 Mar 2020 00:02)  HR: 92 (08 Mar 2020 10:00) (80 - 127)  BP: 107/57 (08 Mar 2020 10:00) (98/68 - 126/71)  BP(mean): 75 (08 Mar 2020 10:00) (75 - 97)  RR: 29 (08 Mar 2020 10:00) (21 - 67)  SpO2: 94% (08 Mar 2020 10:00) (92% - 96%)    PHYSICAL EXAM:  GENERAL: No acute distress, well-developed  HEAD:  Atraumatic, Normocephalic  CHEST/LUNG: decreased BS b/l; No wheezes, rales, or rhonchi. L chest tube in place with serosanguinous drainage.  HEART: s1 s2; No murmurs, rubs, or gallops  ABDOMEN: Soft, non-tender, non-distended    I&O's Summary    07 Mar 2020 06:01  -  08 Mar 2020 07:00  --------------------------------------------------------  IN: 1040 mL / OUT: 1980 mL / NET: -940 mL    08 Mar 2020 07:01  -  08 Mar 2020 11:30  --------------------------------------------------------  IN: 320 mL / OUT: 200 mL / NET: 120 mL      I&O's Detail    07 Mar 2020 06:01  -  08 Mar 2020 07:00  --------------------------------------------------------  IN:    Oral Fluid: 840 mL    Solution: 200 mL  Total IN: 1040 mL    OUT:    Chest Tube: 330 mL    Indwelling Catheter - Urethral: 1650 mL  Total OUT: 1980 mL    Total NET: -940 mL      08 Mar 2020 07:01  -  08 Mar 2020 11:30  --------------------------------------------------------  IN:    Oral Fluid: 320 mL  Total IN: 320 mL    OUT:    Indwelling Catheter - Urethral: 200 mL  Total OUT: 200 mL    Total NET: 120 mL    MEDICATIONS  (STANDING):  alteplase  Injectable for Pleural Effusion 10 milliGRAM(s) IntraPleural. every 12 hours  atorvastatin 10 milliGRAM(s) Oral at bedtime  chlorhexidine 2% Cloths 1 Application(s) Topical <User Schedule>  dornase brenda Solution for Pleural Effusion 5 milliGRAM(s) IntraPleural. every 12 hours  dronabinol 2.5 milliGRAM(s) Oral two times a day  epoetin brenda Injectable 10313 Unit(s) IV Push <User Schedule>  famotidine    Tablet 20 milliGRAM(s) Oral daily  heparin  Injectable 5000 Unit(s) SubCutaneous every 8 hours  lactobacillus acidophilus 1 Tablet(s) Oral three times a day  melatonin 6 milliGRAM(s) Oral at bedtime  midodrine 15 milliGRAM(s) Oral every 8 hours  polyethylene glycol 3350 17 Gram(s) Oral two times a day  sevelamer carbonate 800 milliGRAM(s) Oral three times a day with meals    MEDICATIONS  (PRN):  acetaminophen   Tablet .. 650 milliGRAM(s) Oral every 6 hours PRN Temp greater or equal to 38C (100.4F), Mild Pain (1 - 3)  albuterol/ipratropium for Nebulization 3 milliLiter(s) Nebulizer every 6 hours PRN Shortness of Breath and/or Wheezing    LABS:                        8.7    12.28 )-----------( 484      ( 08 Mar 2020 05:58 )             26.0     03-08    137  |  101  |  82<H>  ----------------------------<  136<H>  5.0   |  25  |  7.20<H>    Ca    7.8<L>      08 Mar 2020 07:06  Phos  7.1     03-08  Mg     2.8     03-08    TPro  5.4<L>  /  Alb  2.2<L>  /  TBili  0.7  /  DBili  x   /  AST  31  /  ALT  39  /  AlkPhos  74  03-07    PT/INR - ( 08 Mar 2020 05:58 )   PT: 17.3 sec;   INR: 1.52 ratio         PTT - ( 08 Mar 2020 05:58 )  PTT:28.5 sec    RADIOLOGY & ADDITIONAL STUDIES:  < from: Xray Chest 1 View AP/PA (03.06.20 @ 17:00) >  Portable chest radiographs are performed and correlated with 3/4/2020.    Right-sided IJ line is again seenwith the tip at the junction of the innominate and superior vena cava. The trachea is midline. The heart is not enlarged. The right lung is clear. Large left pleural effusion is again seen. Osteopenia and degenerative changes of the bony structures are again noted. Pigtail thoracotomy catheter again overlies the left lower chest without evidence of pneumothorax.    Impression: Persistent large left pleural effusion.    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 Transferred to ICU for tPA/dornase brenda via chest tube. Found to have GUS 2/2 ATN, likely due to vancomycin on HD, traumatic celestin, coude placed 3/1/20, decreased output from chest tube, retrial of TPA per ICU.    - Continue DVT Prophylaxis with SCD's/SQH  - Continue Incentive Spirometry  - Continue analgesia  - TPA via Chest tube yesterday  - 110mL/12hrs 330/24, continue tube  - Discussed with Dr. Steven    Surgical Team Contact Information  Spectralink: Ext: [phone] or [phone]  Pager: [phone]

## 2020-03-09 LAB
ANION GAP SERPL CALC-SCNC: 10 MMOL/L — SIGNIFICANT CHANGE UP (ref 5–17)
ANION GAP SERPL CALC-SCNC: 9 MMOL/L — SIGNIFICANT CHANGE UP (ref 5–17)
APTT BLD: 29.5 SEC — SIGNIFICANT CHANGE UP (ref 28.5–37)
BASOPHILS # BLD AUTO: 0.04 K/UL — SIGNIFICANT CHANGE UP (ref 0–0.2)
BASOPHILS NFR BLD AUTO: 0.4 % — SIGNIFICANT CHANGE UP (ref 0–2)
BUN SERPL-MCNC: 47 MG/DL — HIGH (ref 7–23)
BUN SERPL-MCNC: 51 MG/DL — HIGH (ref 7–23)
CALCIUM SERPL-MCNC: 7.8 MG/DL — LOW (ref 8.5–10.1)
CALCIUM SERPL-MCNC: 8 MG/DL — LOW (ref 8.5–10.1)
CHLORIDE SERPL-SCNC: 103 MMOL/L — SIGNIFICANT CHANGE UP (ref 96–108)
CHLORIDE SERPL-SCNC: 103 MMOL/L — SIGNIFICANT CHANGE UP (ref 96–108)
CO2 SERPL-SCNC: 25 MMOL/L — SIGNIFICANT CHANGE UP (ref 22–31)
CO2 SERPL-SCNC: 27 MMOL/L — SIGNIFICANT CHANGE UP (ref 22–31)
CREAT SERPL-MCNC: 4.9 MG/DL — HIGH (ref 0.5–1.3)
CREAT SERPL-MCNC: 5 MG/DL — HIGH (ref 0.5–1.3)
EOSINOPHIL # BLD AUTO: 0.03 K/UL — SIGNIFICANT CHANGE UP (ref 0–0.5)
EOSINOPHIL NFR BLD AUTO: 0.3 % — SIGNIFICANT CHANGE UP (ref 0–6)
GLUCOSE SERPL-MCNC: 128 MG/DL — HIGH (ref 70–99)
GLUCOSE SERPL-MCNC: 133 MG/DL — HIGH (ref 70–99)
HCT VFR BLD CALC: 23.3 % — LOW (ref 39–50)
HGB BLD-MCNC: 7.6 G/DL — LOW (ref 13–17)
IMM GRANULOCYTES NFR BLD AUTO: 1.1 % — SIGNIFICANT CHANGE UP (ref 0–1.5)
INR BLD: 1.69 RATIO — HIGH (ref 0.88–1.16)
LYMPHOCYTES # BLD AUTO: 1.2 K/UL — SIGNIFICANT CHANGE UP (ref 1–3.3)
LYMPHOCYTES # BLD AUTO: 12.4 % — LOW (ref 13–44)
MAGNESIUM SERPL-MCNC: 2.5 MG/DL — SIGNIFICANT CHANGE UP (ref 1.6–2.6)
MAGNESIUM SERPL-MCNC: 2.6 MG/DL — SIGNIFICANT CHANGE UP (ref 1.6–2.6)
MCHC RBC-ENTMCNC: 30.6 PG — SIGNIFICANT CHANGE UP (ref 27–34)
MCHC RBC-ENTMCNC: 32.6 GM/DL — SIGNIFICANT CHANGE UP (ref 32–36)
MCV RBC AUTO: 94 FL — SIGNIFICANT CHANGE UP (ref 80–100)
MONOCYTES # BLD AUTO: 0.81 K/UL — SIGNIFICANT CHANGE UP (ref 0–0.9)
MONOCYTES NFR BLD AUTO: 8.4 % — SIGNIFICANT CHANGE UP (ref 2–14)
NEUTROPHILS # BLD AUTO: 7.47 K/UL — HIGH (ref 1.8–7.4)
NEUTROPHILS NFR BLD AUTO: 77.4 % — HIGH (ref 43–77)
NRBC # BLD: 0 /100 WBCS — SIGNIFICANT CHANGE UP (ref 0–0)
PHOSPHATE SERPL-MCNC: 5 MG/DL — HIGH (ref 2.5–4.5)
PLATELET # BLD AUTO: 363 K/UL — SIGNIFICANT CHANGE UP (ref 150–400)
POTASSIUM SERPL-MCNC: 4.8 MMOL/L — SIGNIFICANT CHANGE UP (ref 3.5–5.3)
POTASSIUM SERPL-MCNC: 5.2 MMOL/L — SIGNIFICANT CHANGE UP (ref 3.5–5.3)
POTASSIUM SERPL-SCNC: 4.8 MMOL/L — SIGNIFICANT CHANGE UP (ref 3.5–5.3)
POTASSIUM SERPL-SCNC: 5.2 MMOL/L — SIGNIFICANT CHANGE UP (ref 3.5–5.3)
PROTHROM AB SERPL-ACNC: 19.2 SEC — HIGH (ref 10–12.9)
RBC # BLD: 2.48 M/UL — LOW (ref 4.2–5.8)
RBC # FLD: 13.7 % — SIGNIFICANT CHANGE UP (ref 10.3–14.5)
SODIUM SERPL-SCNC: 137 MMOL/L — SIGNIFICANT CHANGE UP (ref 135–145)
SODIUM SERPL-SCNC: 140 MMOL/L — SIGNIFICANT CHANGE UP (ref 135–145)
T4 AB SER-ACNC: 4.5 UG/DL — LOW (ref 4.6–12)
TSH SERPL-MCNC: 3.59 UIU/ML — SIGNIFICANT CHANGE UP (ref 0.36–3.74)
WBC # BLD: 9.66 K/UL — SIGNIFICANT CHANGE UP (ref 3.8–10.5)
WBC # FLD AUTO: 9.66 K/UL — SIGNIFICANT CHANGE UP (ref 3.8–10.5)

## 2020-03-09 PROCEDURE — 99291 CRITICAL CARE FIRST HOUR: CPT

## 2020-03-09 PROCEDURE — 76604 US EXAM CHEST: CPT | Mod: 26

## 2020-03-09 PROCEDURE — 99233 SBSQ HOSP IP/OBS HIGH 50: CPT

## 2020-03-09 PROCEDURE — 71250 CT THORAX DX C-: CPT | Mod: 26

## 2020-03-09 RX ORDER — METOPROLOL TARTRATE 50 MG
5 TABLET ORAL ONCE
Refills: 0 | Status: COMPLETED | OUTPATIENT
Start: 2020-03-09 | End: 2020-03-09

## 2020-03-09 RX ORDER — HALOPERIDOL DECANOATE 100 MG/ML
2.5 INJECTION INTRAMUSCULAR ONCE
Refills: 0 | Status: COMPLETED | OUTPATIENT
Start: 2020-03-09 | End: 2020-03-09

## 2020-03-09 RX ORDER — SODIUM CHLORIDE 9 MG/ML
250 INJECTION INTRAMUSCULAR; INTRAVENOUS; SUBCUTANEOUS ONCE
Refills: 0 | Status: COMPLETED | OUTPATIENT
Start: 2020-03-09 | End: 2020-03-09

## 2020-03-09 RX ORDER — AMIODARONE HYDROCHLORIDE 400 MG/1
TABLET ORAL
Refills: 0 | Status: DISCONTINUED | OUTPATIENT
Start: 2020-03-09 | End: 2020-03-17

## 2020-03-09 RX ORDER — AMIODARONE HYDROCHLORIDE 400 MG/1
400 TABLET ORAL EVERY 8 HOURS
Refills: 0 | Status: COMPLETED | OUTPATIENT
Start: 2020-03-09 | End: 2020-03-13

## 2020-03-09 RX ORDER — DILTIAZEM HCL 120 MG
10 CAPSULE, EXT RELEASE 24 HR ORAL ONCE
Refills: 0 | Status: COMPLETED | OUTPATIENT
Start: 2020-03-09 | End: 2020-03-09

## 2020-03-09 RX ORDER — MIDODRINE HYDROCHLORIDE 2.5 MG/1
5 TABLET ORAL EVERY 8 HOURS
Refills: 0 | Status: DISCONTINUED | OUTPATIENT
Start: 2020-03-09 | End: 2020-03-11

## 2020-03-09 RX ORDER — MIDODRINE HYDROCHLORIDE 2.5 MG/1
10 TABLET ORAL ONCE
Refills: 0 | Status: COMPLETED | OUTPATIENT
Start: 2020-03-09 | End: 2020-03-09

## 2020-03-09 RX ORDER — MIDODRINE HYDROCHLORIDE 2.5 MG/1
10 TABLET ORAL ONCE
Refills: 0 | Status: DISCONTINUED | OUTPATIENT
Start: 2020-03-09 | End: 2020-03-09

## 2020-03-09 RX ORDER — HYDROMORPHONE HYDROCHLORIDE 2 MG/ML
0.5 INJECTION INTRAMUSCULAR; INTRAVENOUS; SUBCUTANEOUS ONCE
Refills: 0 | Status: DISCONTINUED | OUTPATIENT
Start: 2020-03-09 | End: 2020-03-09

## 2020-03-09 RX ORDER — AMIODARONE HYDROCHLORIDE 400 MG/1
200 TABLET ORAL DAILY
Refills: 0 | Status: DISCONTINUED | OUTPATIENT
Start: 2020-03-13 | End: 2020-03-17

## 2020-03-09 RX ORDER — QUETIAPINE FUMARATE 200 MG/1
12.5 TABLET, FILM COATED ORAL AT BEDTIME
Refills: 0 | Status: DISCONTINUED | OUTPATIENT
Start: 2020-03-09 | End: 2020-03-10

## 2020-03-09 RX ADMIN — HEPARIN SODIUM 5000 UNIT(S): 5000 INJECTION INTRAVENOUS; SUBCUTANEOUS at 13:34

## 2020-03-09 RX ADMIN — FAMOTIDINE 20 MILLIGRAM(S): 10 INJECTION INTRAVENOUS at 12:42

## 2020-03-09 RX ADMIN — AMIODARONE HYDROCHLORIDE 400 MILLIGRAM(S): 400 TABLET ORAL at 13:39

## 2020-03-09 RX ADMIN — HEPARIN SODIUM 5000 UNIT(S): 5000 INJECTION INTRAVENOUS; SUBCUTANEOUS at 06:11

## 2020-03-09 RX ADMIN — HYDROMORPHONE HYDROCHLORIDE 0.5 MILLIGRAM(S): 2 INJECTION INTRAMUSCULAR; INTRAVENOUS; SUBCUTANEOUS at 00:22

## 2020-03-09 RX ADMIN — MIDODRINE HYDROCHLORIDE 5 MILLIGRAM(S): 2.5 TABLET ORAL at 21:29

## 2020-03-09 RX ADMIN — SODIUM CHLORIDE 1000 MILLILITER(S): 9 INJECTION INTRAMUSCULAR; INTRAVENOUS; SUBCUTANEOUS at 03:15

## 2020-03-09 RX ADMIN — HALOPERIDOL DECANOATE 2.5 MILLIGRAM(S): 100 INJECTION INTRAMUSCULAR at 01:49

## 2020-03-09 RX ADMIN — ERYTHROPOIETIN 10000 UNIT(S): 10000 INJECTION, SOLUTION INTRAVENOUS; SUBCUTANEOUS at 12:42

## 2020-03-09 RX ADMIN — MIDODRINE HYDROCHLORIDE 10 MILLIGRAM(S): 2.5 TABLET ORAL at 02:25

## 2020-03-09 RX ADMIN — MIDODRINE HYDROCHLORIDE 10 MILLIGRAM(S): 2.5 TABLET ORAL at 06:11

## 2020-03-09 RX ADMIN — SEVELAMER CARBONATE 800 MILLIGRAM(S): 2400 POWDER, FOR SUSPENSION ORAL at 12:43

## 2020-03-09 RX ADMIN — ALTEPLASE 10 MILLIGRAM(S): KIT at 00:35

## 2020-03-09 RX ADMIN — DORNASE ALFA 5 MILLIGRAM(S): 1 SOLUTION RESPIRATORY (INHALATION) at 01:53

## 2020-03-09 RX ADMIN — ATORVASTATIN CALCIUM 10 MILLIGRAM(S): 80 TABLET, FILM COATED ORAL at 21:29

## 2020-03-09 RX ADMIN — HEPARIN SODIUM 5000 UNIT(S): 5000 INJECTION INTRAVENOUS; SUBCUTANEOUS at 21:30

## 2020-03-09 RX ADMIN — CHLORHEXIDINE GLUCONATE 1 APPLICATION(S): 213 SOLUTION TOPICAL at 10:32

## 2020-03-09 RX ADMIN — MIDODRINE HYDROCHLORIDE 5 MILLIGRAM(S): 2.5 TABLET ORAL at 13:34

## 2020-03-09 RX ADMIN — Medication 2.5 MILLIGRAM(S): at 06:11

## 2020-03-09 RX ADMIN — AMIODARONE HYDROCHLORIDE 400 MILLIGRAM(S): 400 TABLET ORAL at 21:29

## 2020-03-09 RX ADMIN — Medication 10 MILLIGRAM(S): at 01:49

## 2020-03-09 RX ADMIN — Medication 1 TABLET(S): at 13:35

## 2020-03-09 RX ADMIN — QUETIAPINE FUMARATE 12.5 MILLIGRAM(S): 200 TABLET, FILM COATED ORAL at 21:30

## 2020-03-09 RX ADMIN — POLYETHYLENE GLYCOL 3350 17 GRAM(S): 17 POWDER, FOR SOLUTION ORAL at 06:11

## 2020-03-09 RX ADMIN — Medication 5 MILLIGRAM(S): at 02:26

## 2020-03-09 RX ADMIN — Medication 6 MILLIGRAM(S): at 21:28

## 2020-03-09 RX ADMIN — Medication 1 TABLET(S): at 06:11

## 2020-03-09 RX ADMIN — HALOPERIDOL DECANOATE 2.5 MILLIGRAM(S): 100 INJECTION INTRAMUSCULAR at 03:51

## 2020-03-09 RX ADMIN — Medication 1 TABLET(S): at 21:28

## 2020-03-09 RX ADMIN — SEVELAMER CARBONATE 800 MILLIGRAM(S): 2400 POWDER, FOR SUSPENSION ORAL at 08:41

## 2020-03-09 RX ADMIN — Medication 10 MILLIGRAM(S): at 00:44

## 2020-03-09 RX ADMIN — HYDROMORPHONE HYDROCHLORIDE 0.5 MILLIGRAM(S): 2 INJECTION INTRAMUSCULAR; INTRAVENOUS; SUBCUTANEOUS at 00:52

## 2020-03-09 NOTE — PROGRESS NOTE ADULT - SUBJECTIVE AND OBJECTIVE BOX
CHARLY LU is a 89yMale , patient examined and chart reviewed.     INTERVAL HPI/ OVERNIGHT EVENTS:   Awake. Confused.  Afebrile.   Good urine output.    PAST MEDICAL & SURGICAL HISTORY:  GI Bleed: 2007  Stomach Ulcer: H pylori 2007, treated with antibiotics  Osteoarthritis  Hyperlipidemia  Herniated Disc  Spinal Stenosis  Diverticulitis  GERD (Gastroesophageal Reflux Disease)  Chronic Lymphocytic Leukemia: followed by oncologist in [place]  S/P Tonsillectomy: childhood  Status Post Arthroscopy: right shoulder  History of Arthroscopy of Knee: bilateral    For details regarding the patient's social history, family history, and other miscellaneous elements, please refer the initial infectious diseases consultation and/or the admitting history and physical examination for this admission.    ROS: UTO sec confusion    Current inpatient medications :  MEDICATIONS  (STANDING):  aMIOdarone    Tablet   Oral   aMIOdarone    Tablet 400 milliGRAM(s) Oral every 8 hours  atorvastatin 10 milliGRAM(s) Oral at bedtime  chlorhexidine 2% Cloths 1 Application(s) Topical <User Schedule>  dronabinol 2.5 milliGRAM(s) Oral two times a day  epoetin brenda Injectable 36545 Unit(s) IV Push <User Schedule>  famotidine    Tablet 20 milliGRAM(s) Oral daily  heparin  Injectable 5000 Unit(s) SubCutaneous every 8 hours  lactobacillus acidophilus 1 Tablet(s) Oral three times a day  melatonin 6 milliGRAM(s) Oral at bedtime  midodrine 5 milliGRAM(s) Oral every 8 hours  polyethylene glycol 3350 17 Gram(s) Oral two times a day  QUEtiapine 12.5 milliGRAM(s) Oral at bedtime  sevelamer carbonate 800 milliGRAM(s) Oral three times a day with meals    MEDICATIONS  (PRN):  acetaminophen   Tablet .. 650 milliGRAM(s) Oral every 6 hours PRN Temp greater or equal to 38C (100.4F), Mild Pain (1 - 3)  albuterol/ipratropium for Nebulization 3 milliLiter(s) Nebulizer every 6 hours PRN Shortness of Breath and/or Wheezing    Objective:  ICU Vital Signs Last 24 Hrs  T(C): 36.8 (09 Mar 2020 15:15), Max: 37.2 (08 Mar 2020 23:32)  T(F): 98.3 (09 Mar 2020 15:15), Max: 98.9 (08 Mar 2020 23:32)  HR: 79 (09 Mar 2020 15:00) (70 - 141)  BP: 98/54 (09 Mar 2020 15:00) (79/53 - 140/111)  BP(mean): 69 (09 Mar 2020 15:00) (60 - 117)  RR: 24 (09 Mar 2020 15:00) (15 - 36)  SpO2: 94% (09 Mar 2020 15:00) (93% - 98%)    Physical Exam:  General:  no acute distress  Eyes: sclera anicteric, pupils equal and reactive to light  ENMT: buccal mucosa moist, pharynx not injected  Neck: supple, trachea midline  Lungs: decreased no wheeze/rhonchi Left lung pigtail  Cardiovascular: regular rate and rhythm, S1 S2  Abdomen: soft, nontender, no organomegaly present, bowel sounds normal  Neurological: alert and oriented x1, Cranial Nerves II-XII grossly intact  Skin: no increased ecchymosis/petechiae/purpura  Lymph Nodes: no palpable cervical/supraclavicular lymph nodes enlargements  Extremities: +edema    LABS:                        7.6    9.66  )-----------( 363      ( 09 Mar 2020 05:14 )             23.3   03-09    140  |  103  |  51<H>  ----------------------------<  133<H>  4.8   |  27  |  5.00<H>    Ca    7.8<L>      09 Mar 2020 05:14  Phos  5.0     03-09  Mg     2.5     03-09        MICROBIOLOGY:  Culture - Body Fluid with Gram Stain (02.27.20 @ 21:38)    Gram Stain:   polymorphonuclear leukocytes  No organisms seen  by cytocentrifuge    Specimen Source: .Body Fluid Pleural Fluid    Culture Results:   No growth    Culture - Blood (collected 26 Feb 2020 22:29)  Source: .Blood Blood-Peripheral  Preliminary Report (27 Feb 2020 23:01):    No growth to date.    Culture - Blood (collected 26 Feb 2020 22:29)  Source: .Blood Blood-Peripheral  Preliminary Report (27 Feb 2020 23:01):    No growth to date.    Culture - Urine (collected 26 Feb 2020 11:46)  Source: .Urine Catheterized  Final Report (27 Feb 2020 15:05):    50,000 - 99,000 CFU/mL Coag Negative Staphylococcus    Susceptibilites not performed.    Culture - Blood (collected 25 Feb 2020 22:38)  Source: .Blood Blood-Peripheral  Preliminary Report (26 Feb 2020 23:01):    No growth to date.    Culture - Blood (collected 25 Feb 2020 22:38)  Source: .Blood Blood-Peripheral  Preliminary Report (26 Feb 2020 23:01):    No growth to date.    Legionella pneumophila Antigen, Urine (02.27.20 @ 02:53)    Legionella Antigen, Urine: Negative    Streptococcus Pneumoniae Ag Urine (02.27.20 @ 04:25)    Streptococcus Pneumoniae Ag Urine: Negative    RADIOLOGY & ADDITIONAL STUDIES:  EXAM:  CT RENAL STONE HUNT                          EXAM:  CT CHEST                                  PROCEDURE DATE:  02/25/2020          INTERPRETATION:  CLINICAL HISTORY:  Fever, left back and flank pain. History of gastric lymphoma.    Multiple axial images of the chest, abdomen and pelvis were obtained from the lung apices through symphysis pubis without the administration of oral or intravascular contrast limiting the sensitivity of evaluation. Reformatted coronal and sagittal images are submitted.    COMPARISON: PET/CT evaluation 6/19/2019.    FINDINGS: New moderately sized left pleural effusion is identified with portions that are loculated; air attenuation within the effusion as well as within the left pleural space suggests the presence of left-sided empyema. Consolidation is identified within the lingular segment as well as left lower lobe lung parenchyma; underlying neoplasm cannot be excluded. Hazy opacity within the left upper lobe is likely related to atelectasis. Subpleural opacity within the posterior right lower lobe and right middle lobe likely related to subpleural fibrosis. There is no evidence for right-sided pleural effusion or air.    No abnormally enlarged mediastinal or hilar lymph nodes are noted. There is no evidence for axillary lymphadenopathy. The heart size appears within normal limits. No pericardial effusion is identified. Thoracic aortic caliber demonstrates unremarkable caliber. Coronary arterial calcifications identified.    The central bronchial anatomy appears patent.    No mediastinal shift is noted.    The liver is normal in size. No focal hepatic masses are identified. There is no evidence for intrahepatic or extrahepatic biliary dilatation. A large gallstone is noted. No gallbladder wall thickening or pericholecystic fluid identified.    The spleen, pancreas and adrenal glands are unremarkable.    There is no evidence for hydronephrosis. No right renal calculi are identified. An 8 mm calculus is identified within the left-sided extrarenal pelvis, unchanged. There is no change in a 6 mm calculus identified within the distal right ureter at the level of the UVJ, without evidence for obstructive uropathy. A 1.3 cm right renal cyst is noted. The abdominal aorta is normal in course and caliber. No abnormally enlarged retroperitoneal or pelvic lymphadenopathy is noted.    Gastric wall thickening is identified allowing for nondistention with oral contrast; clinical correlation is suggested in light of the provided clinical history of gastric lymphoma.    Fecal material is scattered throughout the colon. There is no evidence for mechanical bowel obstruction. Colonic diverticulosis is noted. No colonic wall thickening is identified. There is no evidence for acute appendicitis.There is no evidence for free intraperitoneal air or fluid.    The prostate is enlarged and the urinary bladder appear unremarkable.     Postsurgical changes of the lumbar spine noted. Postprocedural changes of the right shoulder noted. Degenerative changes of the spine evident.    IMPRESSION:    1. New moderately sized left pleural effusion is identified with portions that are loculated; air attenuation within the effusion as well as within the left pleural space suggests the presence of left-sided empyema. Consolidation is identified within the lingular segment as well as left lower lobe lung parenchyma; underlying neoplasm cannot be excluded.   2. Gastric wall thickening is identified allowing for nondistention with oral contrast; clinicalcorrelation is suggested in light of the provided clinical history of gastric lymphoma.  3. An 8 mm calculus is identified within the left-sided extrarenal pelvis, unchanged. There is no change in a 6 mm calculus identified within the distal right ureter at the level of the UVJ, without evidence for obstructive uropathy. No evidence for bilateral hydronephrosis.      EXAM:  CT CHEST                            PROCEDURE DATE:  03/01/2020          INTERPRETATION:  Clinical information: Left-sided empyema    Comparison exam dated 2/25/2020    Axial images obtained, coronal and sagittal images computer reformatted    Noncontrast exam limits evaluation of hilar and mediastinal regions.    A left-sided pigtail catheter is present in the left pleural space. Loculated effusion with associated dense consolidation is present, the appearance is slightly more prominentsince the prior exam.    No thoracic aortic aneurysm or pericardial effusion. Cardiomegaly present. Coronary artery calcifications identified. The central airway appears intact. The thyroid gland is not enlarged.    Minimal atelectatic change right lung base. Calcification visible in the right pleura. Peribronchial thickening right infrahilar region. No pneumothorax.    The stomach is filled with food and air. A large gallstone is noted. The adrenal glands are not enlarged. The spleen is not enlarged. No acute appearing osseous abnormalities. Soft tissue anchors right humeral head. Air bubbles visible in the subcutaneous soft tissues left lateral lower thorax.    IMPRESSION: A left-sided pigtail catheter is present in the pleural space. Loculated effusion with associated dense consolidation is present, appearance slightly more prominent when compared to prior exam.        EXAM:  US KIDNEYS AND BLADDER                            PROCEDURE DATE:  03/01/2020          INTERPRETATION:    PROCEDURE INFORMATION:   Exam: US Retroperitoneal Limited, Kidneys   Exam date and time: 3/1/2020 6:31 AM   Age: 89 years old   Clinical indication: Other: Gus     TECHNIQUE:   Imaging protocol: Real-time ultrasound of the retroperitoneum with image   documentation. Examination was focused on the kidneys.     COMPARISON:   No relevant prior studies available.     FINDINGS:   Right kidney: The right kidney measures 11.8 cm in length. Normal parenchymal   echogenicity. No hydronephrosis.   Left kidney: The left kidney measures 11.7 cm in length. Normal parenchymal   echogenicity. Slightly irregular margin. No hydronephrosis.   Prostate: The prostate measures up to 3.7 cm in size.   Bladder: Bladder volume calculated to be 104 mL. No bladder wall thickening   allowing for lack of full distention. No bladder calculus.     IMPRESSION:   No hydronephrosis.        Assessment :   CHARLY LU is a 89y Male PMH CLL, gastric lymphoma ex smoker, hx of asbestos exp presenting to the hospital with cough, fever and left sided pleuritic chest pain. Febrile to 102 found to have left sided empyema. Seen by pulm and CT surgery. Sp IR drainage with pigtail placement 2/2/7/2020. Transferred to ICU sec tpa/dornase therapy through pigtail sec complications of hematuria. Had large clot and nick blood/mixed with urine. Celestin irrigation performed complicated with GUS. Worsening GUS- started on HD per renal. Getting TPA/dornase via pigtail. Urine output improved. Hospital delirium. pulled out celestin last night reinserted.    Plan :  Completed course of Zosyn   Monitor off antibiotics  Trend temps and cbc  Trend renal fx  CT surgery following  HD per renal   Stable from ID standpoint    D/w ICU team    Continue with present regiment.  Appropriate use of antibiotics and adverse effects reviewed.      I have discussed the above plan of care with patient/ family in detail. They expressed understanding of the  treatment plan . Risks, benefits and alternatives discussed in detail. I have asked if they have any questions or concerns and appropriately addressed them to the best of my ability .    Critical care > 35 minutes were spent in direct patient care reviewing notes, medications ,labs data/ imaging , discussion with multidisciplinary team.    Thank you for allowing me to participate in care of your patient .    Jose Kiran MD  Infectious Disease  [phone]

## 2020-03-09 NOTE — PROGRESS NOTE ADULT - ASSESSMENT
89 y/o man known to our office, dx'd with gastric MALToma 2013, bone marrow and 2017 repeat gastric bx c/w Chronic Lymphocytic Leukemia/Small Lymphocytic Lymphoma, under serial monitoring w EGD and PETCT, has not required treatment. Also hx B12 deficiency.  Adm w left empyema, hx URI sx Jan 2020  Post IR pigtail insertion w minimal output and tx to ICU for dornase/alteplase adm w resultant partial output but not dramatic;   Course complicated by GUS in setting of hypotension and exposure to Vancomycin    -renal function continue to worsen requiring HD  -if feasible to undergo CT guided biopsy to evaluate for MALT lymphoma vs. SLL in the lung, would consider IR eval as patient not candidate for open biopsy in OR; Treatment options would clearly be limited given age and renal dysfunction   -will continue to follow with you  -case discussed with Dr. Venegas (ICU attending)

## 2020-03-09 NOTE — PROGRESS NOTE ADULT - ASSESSMENT
89yo M, with PMH/o gastric MALToma, CLL, spinal stenosis s/p back surgery, c/o flank pain, cough, and fever, found to have L empyema on CT chest now s/p IR-guided chest tube insertion on 2/27, receiving tPA/dornase treatment, with hospital course complicated by GUS from [facility], receiving HD, and new development of rapid a.fib. and hypotension.    NEURO:  CV:  PULM:  GI:  :  ID:  HEME:  ENDO:    Dispo: 89yo M, with PMH/o gastric MALToma, CLL, spinal stenosis s/p back surgery, c/o flank pain, cough, and fever, found to have L empyema on CT chest now s/p IR-guided chest tube insertion on 2/27, receiving tPA/dornase treatment, with hospital course complicated by GUS from [facility], receiving HD, and new development of rapid a.fib. and hypotension.      NEURO: Continues to have episodes of delirium, requiring 2x 2.5mg Haldol overnight. Likely 2/2 uremia, continue with reorientation measures.    CV: New onset afib with RVR overnight not responsive to 20mg Cardizem and 5mg Metoprolol, followed by episode of hypotension. Now in NSR. Cardio Dr. Verdugo following, will start amiodarone load with 400mg TID to 5g load, followed by 200mg qDaily. AC not recommended at this time d/t current hematuria and bloody output from chest tube. C/w Midodrine and statin. Monitor for hypotension or bradycardia as amio is loaded.    PULM: CT chest on admission demonstrating loculated L pleural effusion/empyema, cannot exclude underlying neoplasm. S/p IR-guided chest tube placement on 2/27 outputting serosanguinous fluid. tPA and dornase treatments held. Uncertain if empyema truly present, f/u repeat chest CT today. Family expresses wishes for less invasive therapies and patient is poor candidate for VATS.    GI: C/w renal diet. H/o gastric MALToma not currently receiving treatment.    : Developed GUS from [facility] likely 2/2 Vancomycin. S/p second HD session yesterday though no volume removed. Renal fxn downtrending and urine output increasing. C/w Phoslo. Coude catheter replaced overnight after pulled out by patient.    ID: Course of Zosyn completed for possible empyema. Continue to monitor off abx. Currently afebrile with improved leukocytosis.    HEME: Known h/o CLL not receiving tx at this time. Underlying malignancy cannot be excluded as cause of effusion/empyema. Palliative consulted to discuss goasl of care. C/w Heparin as DVT ppx.    ENDO: No active issues at this time.    Dispo: Continue with ICU level of care. 89yo M, with PMH/o gastric MALToma, CLL, spinal stenosis s/p back surgery, c/o flank pain, cough, and fever, found to have L empyema on CT chest now s/p IR-guided chest tube insertion on 2/27, receiving tPA/dornase treatment, with hospital course complicated by GUS from [facility], receiving HD, and new development of rapid a.fib. and hypotension.      NEURO: Continues to have episodes of delirium, requiring 2x 2.5mg Haldol overnight. Likely 2/2 uremia, continue with reorientation measures.    CV: New onset afib with RVR overnight not responsive to 20mg Cardizem and 5mg Metoprolol, followed by episode of hypotension. Now in NSR. TSH WNL. Cardio Dr. Verdugo following, will start amiodarone load with 400mg TID to 5g load, followed by 200mg qDaily. AC not recommended at this time d/t current hematuria and bloody output from chest tube. C/w Midodrine and statin. Monitor for hypotension or bradycardia as amio is loaded.    PULM: CT chest on admission demonstrating loculated L pleural effusion/empyema, cannot exclude underlying neoplasm. S/p IR-guided chest tube placement on 2/27 outputting serosanguinous fluid. tPA and dornase treatments held. Uncertain if empyema truly present, f/u repeat chest CT today. Family expresses wishes for less invasive therapies and patient is poor candidate for VATS.    GI: C/w renal diet. H/o gastric MALToma not currently receiving treatment.    : Developed GUS from [facility] likely 2/2 Vancomycin. S/p second HD session yesterday though no volume removed. Renal fxn downtrending and urine output increasing. C/w Phoslo. Coude catheter replaced overnight after pulled out by patient.    ID: Course of Zosyn completed for possible empyema. Continue to monitor off abx. Currently afebrile with improved leukocytosis.    HEME: Known h/o CLL not receiving tx at this time. Underlying malignancy cannot be excluded as cause of effusion/empyema. Palliative consulted to discuss goasl of care. C/w Heparin as DVT ppx. Monitor daily H/H, transfuse if Hb <7    ENDO: No active issues at this time.    Dispo: Continue with ICU level of care.

## 2020-03-09 NOTE — PROGRESS NOTE ADULT - SUBJECTIVE AND OBJECTIVE BOX
contact information: Office: [phone] Cell: [phone]    GENERAL SURGERY CONSULT NOTE    Patient is a 89y old  Male who presents with a chief complaint of Left Empyema (09 Mar 2020 08:42)      patient seen this Am, restrained due to an episode of delirium last night, non communicative at this time but able to open eyes this morning after beeing awakened, not answering questions. chest tube in place, Mcnulty re inserted .      PAST MEDICAL & SURGICAL HISTORY:  GI Bleed: 2007  Stomach Ulcer: H pylori 2007, treated with antibiotics  Osteoarthritis  Hyperlipidemia  Herniated Disc  Spinal Stenosis  Diverticulitis  GERD (Gastroesophageal Reflux Disease)  Chronic Lymphocytic Leukemia: followed by oncologist in [place]  S/P Tonsillectomy: childhood  Status Post Arthroscopy: right shoulder  History of Arthroscopy of Knee: bilateral        M  Vital Signs Last 24 Hrs  T(C): 36.4 (09 Mar 2020 07:24), Max: 37.2 (08 Mar 2020 23:32)  T(F): 97.5 (09 Mar 2020 07:24), Max: 98.9 (08 Mar 2020 23:32)  HR: 78 (09 Mar 2020 10:00) (78 - 141)  BP: 116/52 (09 Mar 2020 10:00) (79/53 - 152/92)  BP(mean): 74 (09 Mar 2020 10:00) (60 - 117)  RR: 24 (09 Mar 2020 10:00) (15 - 36)  SpO2: 95% (09 Mar 2020 10:00) (93% - 98%)    Physical Exam:PHYSICAL EXAM:  General: awake, alert  HEEBT: NCAT EOMI non icteric sclera  Neck: Left IJ HD catheter  CV: s1s2 irreg irreg  no murmurs gallops clicks or rubs  pulm: Clear right lung, decreased sounds on left.  CT in place with bloody rainage.   GI: soft NTND + BS  Extremities: no peripheral edema warm  : Folley in place   Skin/Nails: No cyanosis, erythema or pallor, no clubbing  Psych: awake when gently shaken, non communicative     LABS:                        7.6    9.66  )-----------( 363      ( 09 Mar 2020 05:14 )             23.3     03-09    140  |  103  |  51<H>  ----------------------------<  133<H>  4.8   |  27  |  5.00<H>    Ca    7.8<L>      09 Mar 2020 05:14  Phos  5.0     03-09  Mg     2.5     03-09      PT/INR - ( 09 Mar 2020 05:14 )   PT: 19.2 sec;   INR: 1.69 ratio     PTT - ( 09 Mar 2020 05:14 )  PTT:29.5 sec    Plan; pulmonary cardio note appreciated  patient is now DNR , tPA injection done yesterday. chest tube w 160 out put this AM   cont current care, will d/w Dr ram re plan ,

## 2020-03-09 NOTE — PROGRESS NOTE ADULT - SUBJECTIVE AND OBJECTIVE BOX
INTERVAL HPI/OVERNIGHT EVENTS:  pt seen and examined  having episodes of delirium  not eating well       MEDICATIONS  (STANDING):  alteplase  Injectable for Pleural Effusion 10 milliGRAM(s) IntraPleural. every 12 hours  atorvastatin 10 milliGRAM(s) Oral at bedtime  chlorhexidine 2% Cloths 1 Application(s) Topical <User Schedule>  dornase brenda Solution for Pleural Effusion 5 milliGRAM(s) IntraPleural. every 12 hours  dornase brenda Solution for Pleural Effusion 5 milliGRAM(s) IntraPleural. every 12 hours  dronabinol 2.5 milliGRAM(s) Oral two times a day  epoetin brenda Injectable 85280 Unit(s) IV Push <User Schedule>  famotidine    Tablet 20 milliGRAM(s) Oral daily  heparin  Injectable 5000 Unit(s) SubCutaneous every 8 hours  lactobacillus acidophilus 1 Tablet(s) Oral three times a day  melatonin 6 milliGRAM(s) Oral at bedtime  midodrine 15 milliGRAM(s) Oral every 8 hours  polyethylene glycol 3350 17 Gram(s) Oral two times a day  sevelamer carbonate 800 milliGRAM(s) Oral three times a day with meals    MEDICATIONS  (PRN):  acetaminophen   Tablet .. 650 milliGRAM(s) Oral every 6 hours PRN Temp greater or equal to 38C (100.4F), Mild Pain (1 - 3)  albuterol/ipratropium for Nebulization 3 milliLiter(s) Nebulizer every 6 hours PRN Shortness of Breath and/or Wheezing      Allergies    No Known Allergies    Intolerances        Review of Systems:    General:  No wt loss, fevers, chills, night sweats, fatigue   Eyes:  Good vision, no reported pain  ENT:  No sore throat, pain, runny nose, dysphagia  CV:  No pain, palpitations, hypo/hypertension  Resp:  No dyspnea, cough, tachypnea, wheezing  GI:  No pain, No nausea, No vomiting, No diarrhea, No constipation, No weight loss, No fever, No pruritis, No rectal bleeding, No melena, No dysphagia  :  No pain, bleeding, incontinence, nocturia  Muscle:  No pain, weakness  Neuro:  No weakness, tingling, memory problems  Psych:  No fatigue, insomnia, mood problems, depression  Endocrine:  No polyuria, polydypsia, cold/heat intolerance  Heme:  No petechiae, ecchymosis, easy bruisability  Skin:  No rash, tattoos, scars, edema        Vital Signs Last 24 Hrs  T(C): 37.1 (08 Mar 2020 07:36), Max: 37.3 (08 Mar 2020 00:02)  T(F): 98.7 (08 Mar 2020 07:36), Max: 99.1 (08 Mar 2020 00:02)  HR: 92 (08 Mar 2020 10:00) (80 - 127)  BP: 107/57 (08 Mar 2020 10:00) (98/68 - 126/71)  BP(mean): 75 (08 Mar 2020 10:00) (75 - 97)  RR: 29 (08 Mar 2020 10:00) (21 - 67)  SpO2: 94% (08 Mar 2020 10:00) (92% - 96%)        PHYSICAL EXAM:    General:  lying in  bed  HEENT:  NC/AT  Abdomen:  Soft nt mild dt  Extremities:  no edema  Neuro/Psych:  Awake alert some confusion        LABS:                        8.7    12.28 )-----------( 484      ( 08 Mar 2020 05:58 )             26.0     03-08    137  |  101  |  82<H>  ----------------------------<  136<H>  5.0   |  25  |  7.20<H>    Ca    7.8<L>      08 Mar 2020 07:06  Phos  7.1     03-08  Mg     2.8     03-08    TPro  5.4<L>  /  Alb  2.2<L>  /  TBili  0.7  /  DBili  x   /  AST  31  /  ALT  39  /  AlkPhos  74  03-07    PT/INR - ( 08 Mar 2020 05:58 )   PT: 17.3 sec;   INR: 1.52 ratio         PTT - ( 08 Mar 2020 05:58 )  PTT:28.5 sec        RADIOLOGY & ADDITIONAL TESTS:

## 2020-03-09 NOTE — CHART NOTE - NSCHARTNOTEFT_GEN_A_CORE
Assessment: Pt remains confused, unable to answer questions/provide preferences. Per RN, ate pancake this am, drank juice- slightly improved intake this am; needs full assist. Nepro at bedside not consumed. Diet changed to renal with Nepro supplements, was on regular with Ensure. Had K+ >0 over weekend, with kayexalate given. Doubt po intake is conributing to elevated K+/phos as pt not eating much; will follow to see if restricted diet needed. Per EMR, c/o food not tasting good, common issue with altered Cr/uremia(discussed this issue with family at past visits). BM yesterday. Buttock st 1 pressure injury. Has been receiving HD, but family does not want long term HD. Rec palliative care f/u for GOC discussion; pt with HC Proxy/no MOLST. On marinol to improve intake.    Factors impacting intake: [ ] none [ ] nausea  [ ] vomiting [ ] diarrhea [ ] constipation  [ ]chewing problems [ ] swallowing issues  [ ] other:     Diet Presciption: Diet, Renal Restrictions:   For patients receiving Renal Replacement - No Protein Restr, No Conc K, No Conc Phos, Low Sodium  Supplement Feeding Modality:  Oral  Nepro Cans or Servings Per Day:  2       Frequency:  Two Times a day (03-08-20 @ 14:59)    Intake: variable, usually poor, better this am    Current Weight:   % Weight Change    Pertinent Medications: MEDICATIONS  (STANDING):  atorvastatin 10 milliGRAM(s) Oral at bedtime  chlorhexidine 2% Cloths 1 Application(s) Topical <User Schedule>  dronabinol 2.5 milliGRAM(s) Oral two times a day  epoetin brenda Injectable 47758 Unit(s) IV Push <User Schedule>  famotidine    Tablet 20 milliGRAM(s) Oral daily  heparin  Injectable 5000 Unit(s) SubCutaneous every 8 hours  lactobacillus acidophilus 1 Tablet(s) Oral three times a day  melatonin 6 milliGRAM(s) Oral at bedtime  midodrine 10 milliGRAM(s) Oral every 8 hours  polyethylene glycol 3350 17 Gram(s) Oral two times a day  QUEtiapine 12.5 milliGRAM(s) Oral at bedtime  sevelamer carbonate 800 milliGRAM(s) Oral three times a day with meals    MEDICATIONS  (PRN):  acetaminophen   Tablet .. 650 milliGRAM(s) Oral every 6 hours PRN Temp greater or equal to 38C (100.4F), Mild Pain (1 - 3)  albuterol/ipratropium for Nebulization 3 milliLiter(s) Nebulizer every 6 hours PRN Shortness of Breath and/or Wheezing    Pertinent Labs: 03-09 Na140 mmol/L Glu 133 mg/dL<H> K+ 4.8 mmol/L Cr  5.00 mg/dL<H> BUN 51 mg/dL<H> 03-09 Phos 5.0 mg/dL<H> 03-07 Alb 2.2 g/dL<L> 02-26 [id number] 5.6 %     CAPILLARY BLOOD GLUCOSE        Skin: see above    Estimated Needs:   [x ] no change since previous assessment  [ ] recalculated:     Previous Nutrition Diagnosis: (X) altered nutrition related labs  [ ] Inadequate Energy Intake [ ]Inadequate Oral Intake [ ] Excessive Energy Intake   [ ] Underweight [ ] Increased Nutrient Needs [ ] Overweight/Obesity   [ ] Altered GI Function [ ] Unintended Weight Loss [ ] Food & Nutrition Related Knowledge Deficit [ ] Malnutrition     Nutrition Diagnosis is [x ] ongoing  [ ] resolved [ ] not applicable     New Nutrition Diagnosis: [ ] not applicable       Interventions:  Monitor/encourage intake of Nepro supplement  Recommend  [ ] Change Diet To:  [ ] Nutrition Supplement  [ ] Nutrition Support  [x ] Other:  consider renal mvi    Monitoring and Evaluation:   [ ] PO intake [ x ] Tolerance to diet prescription [ x ] weights [ x ] labs[ x ] follow up per protocol  [ ] other:

## 2020-03-09 NOTE — PROGRESS NOTE ADULT - SUBJECTIVE AND OBJECTIVE BOX
Patient is a 89y old  Male who presents with a chief complaint of Left Empyema (02 Mar 2020 09:12)  Patient seen in follow up for GUS, ATN.     s/p HD yesterday. UO good.     PAST MEDICAL HISTORY:  GI Bleed  Stomach Ulcer  Osteoarthritis  Hyperlipidemia  Herniated Disc  Spinal Stenosis  Diverticulitis  GERD (Gastroesophageal Reflux Disease)  Chronic Lymphocytic Leukemia    MEDICATIONS  (STANDING):  atorvastatin 10 milliGRAM(s) Oral at bedtime  chlorhexidine 2% Cloths 1 Application(s) Topical <User Schedule>  dronabinol 2.5 milliGRAM(s) Oral two times a day  epoetin brenad Injectable 29117 Unit(s) IV Push <User Schedule>  famotidine    Tablet 20 milliGRAM(s) Oral daily  heparin  Injectable 5000 Unit(s) SubCutaneous every 8 hours  lactobacillus acidophilus 1 Tablet(s) Oral three times a day  melatonin 6 milliGRAM(s) Oral at bedtime  midodrine 5 milliGRAM(s) Oral every 8 hours  polyethylene glycol 3350 17 Gram(s) Oral two times a day  QUEtiapine 12.5 milliGRAM(s) Oral at bedtime  sevelamer carbonate 800 milliGRAM(s) Oral three times a day with meals    MEDICATIONS  (PRN):  acetaminophen   Tablet .. 650 milliGRAM(s) Oral every 6 hours PRN Temp greater or equal to 38C (100.4F), Mild Pain (1 - 3)  albuterol/ipratropium for Nebulization 3 milliLiter(s) Nebulizer every 6 hours PRN Shortness of Breath and/or Wheezing    T(C): 36.7 (03-09-20 @ 12:00), Max: 37.3 (03-08-20 @ 00:02)  HR: 70 (03-09-20 @ 11:00) (70 - 141)  BP: 123/58 (03-09-20 @ 11:33) (79/53 - 152/92)  RR: 15 (03-09-20 @ 11:00)  SpO2: 96% (03-09-20 @ 11:00)  Wt(kg): --  I&O's Detail    08 Mar 2020 07:01  -  09 Mar 2020 07:00  --------------------------------------------------------  IN:    Oral Fluid: 740 mL    Sodium Chloride 0.9% IV Bolus: 250 mL  Total IN: 990 mL    OUT:    Chest Tube: 260 mL    Indwelling Catheter - Urethral: 1530 mL  Total OUT: 1790 mL    Total NET: -800 mL      09 Mar 2020 07:01  -  09 Mar 2020 13:14  --------------------------------------------------------  IN:  Total IN: 0 mL    OUT:    Indwelling Catheter - Urethral: 180 mL  Total OUT: 180 mL    Total NET: -180 mL        PHYSICAL EXAM:  General: in restraints  Respiratory: b/l air entry  Cardiovascular: S1 S2  Gastrointestinal: soft  Extremities:  no edema                      LABORATORY:                        7.6    9.66  )-----------( 363      ( 09 Mar 2020 05:14 )             23.3     03-09    140  |  103  |  51<H>  ----------------------------<  133<H>  4.8   |  27  |  5.00<H>    Ca    7.8<L>      09 Mar 2020 05:14  Phos  5.0     03-09  Mg     2.5     03-09      Sodium, Serum: 140 mmol/L (03-09 @ 05:14)  Sodium, Serum: 137 mmol/L (03-09 @ 01:44)  Sodium, Serum: 137 mmol/L (03-08 @ 07:06)  Sodium, Serum: 135 mmol/L (03-08 @ 05:58)    Potassium, Serum: 4.8 mmol/L (03-09 @ 05:14)  Potassium, Serum: 5.2 mmol/L (03-09 @ 01:44)  Potassium, Serum: 5.0 mmol/L (03-08 @ 07:06)  Potassium, Serum: 9.2 mmol/L (03-08 @ 05:58)    Hemoglobin: 7.6 g/dL (03-09 @ 05:14)  Hemoglobin: 8.7 g/dL (03-08 @ 05:58)  Hemoglobin: 8.3 g/dL (03-07 @ 06:39)    Creatinine, Serum 5.00 (03-09 @ 05:14)  Creatinine, Serum 4.90 (03-09 @ 01:44)  Creatinine, Serum 7.20 (03-08 @ 07:06)  Creatinine, Serum 7.30 (03-08 @ 05:58)

## 2020-03-09 NOTE — PROGRESS NOTE ADULT - ASSESSMENT
89 yo M with PMH of HLD and MALToma of the stomach (under surveillance), spinal stenosis with extensive lumbar surgery in 2011 and bilateral LE neuropathy, admitted for L side empyema and R ureteral stone. Patient initially reported what appeared to be stable occasional angina however later denied this.  Elevated proBNP however normal LV systolic function with EF 55% on echo and no evidence of meaningful volume overload on exam.   Review of EKG revealed possible inferior infarct however no wall motion abnormalities on 2/27 echo.   He is now in the intensive care unit status post lytic infusion therapy through a chest tube for his left-sided empyema. GUS now s/p HD     -no clear acute ischemia  -ekg has suggested possible imi, but no wma on echo  -cont statin    - Had rapid AF last night with borderline BP, now in NSR  - Maintaining NSR would be better in this patient.  Would start amiodarone 400 TID to 5 g load, than 200 QD  - No AC for now given history of hematuria and bloody chest tube drainage       -no meaningful volume overload   -now on hd prn    -still hypotensive.  Continue midodrine  at high dose of 15 q8  -levo gtt, titrated off       - Monitor and replete electrolytes. Keep K>4.0 and Mg>2.0.       - Rest of management per urology/CT surgery/primary team.  - Other cardiovascular workup will depend on clinical course. Will follow.     The patient remains at risk of abrupt decompensation.  I have personally provided  35 minutes of critical care time, excluding time spent on separate procedures.

## 2020-03-09 NOTE — PROGRESS NOTE ADULT - SUBJECTIVE AND OBJECTIVE BOX
Patient seen and examined;  Chart reviewed and events noted;   denies any complaints; more agitated  has had increased output in left chest tube drain  had Hemodialysis again on 3/8/20    MEDICATIONS  (STANDING):  atorvastatin 10 milliGRAM(s) Oral at bedtime  chlorhexidine 2% Cloths 1 Application(s) Topical <User Schedule>  dronabinol 2.5 milliGRAM(s) Oral two times a day  epoetin brenda Injectable 59926 Unit(s) IV Push <User Schedule>  famotidine    Tablet 20 milliGRAM(s) Oral daily  heparin  Injectable 5000 Unit(s) SubCutaneous every 8 hours  lactobacillus acidophilus 1 Tablet(s) Oral three times a day  melatonin 6 milliGRAM(s) Oral at bedtime  midodrine 5 milliGRAM(s) Oral every 8 hours  polyethylene glycol 3350 17 Gram(s) Oral two times a day  QUEtiapine 12.5 milliGRAM(s) Oral at bedtime  sevelamer carbonate 800 milliGRAM(s) Oral three times a day with meals    MEDICATIONS  (PRN):  acetaminophen   Tablet .. 650 milliGRAM(s) Oral every 6 hours PRN Temp greater or equal to 38C (100.4F), Mild Pain (1 - 3)  albuterol/ipratropium for Nebulization 3 milliLiter(s) Nebulizer every 6 hours PRN Shortness of Breath and/or Wheezing      Vital Signs Last 24 Hrs  T(C): 36.7 (09 Mar 2020 12:00), Max: 37.2 (08 Mar 2020 23:32)  T(F): 98 (09 Mar 2020 12:00), Max: 98.9 (08 Mar 2020 23:32)  HR: 70 (09 Mar 2020 11:00) (70 - 141)  BP: 123/58 (09 Mar 2020 11:33) (79/53 - 152/92)  BP(mean): 63 (09 Mar 2020 11:00) (60 - 117)  RR: 15 (09 Mar 2020 11:00) (15 - 36)  SpO2: 96% (09 Mar 2020 11:00) (93% - 98%)    PHYSICAL EXAM  General: adult elderly male in NAD  HEENT: clear oropharynx, anicteric sclera, pink conjunctivae  Neck: supple  CV: normal S1S2 with no murmur rubs or gallops  Lungs: clear on anterior exam; left chest tube draining serosanguinous fluid  Abdomen: soft non-tender non-distended, no hepato/splenomegaly  Ext: no clubbing cyanosis or edema  Skin: no rashes and no petichiae  Neuro: cooperative but agitated; moves all extremities      LABS:                        7.6    9.66  )-----------( 363      ( 09 Mar 2020 05:14 )             23.3     Hemoglobin: 7.6 g/dL (03-09 @ 05:14)  Hemoglobin: 8.7 g/dL (03-08 @ 05:58)  Hemoglobin: 8.3 g/dL (03-07 @ 06:39)  Hemoglobin: 8.5 g/dL (03-06 @ 06:06)  Hemoglobin: 8.2 g/dL (03-05 @ 06:54)    03-09    140  |  103  |  51<H>  ----------------------------<  133<H>  4.8   |  27  |  5.00<H>    Ca    7.8<L>      09 Mar 2020 05:14  Phos  5.0     03-09  Mg     2.5     03-09      PT/INR - ( 09 Mar 2020 05:14 )   PT: 19.2 sec;   INR: 1.69 ratio    PTT - ( 09 Mar 2020 05:14 )  PTT:29.5 sec

## 2020-03-09 NOTE — PROGRESS NOTE ADULT - SUBJECTIVE AND OBJECTIVE BOX
89y year old Male admitted for Patient is a 89y old  Male who presents with a chief complaint of Left Empyema (08 Mar 2020 13:12)          89 yo M admitted with empyema s/p L chest tube now with new onset a fib with RVR, also delirious and pulled out coude celestin.   interview limited by delirium but he has no complaints       PMH:  Empyema of pleural space without fistula  Yes  No pertinent family history in first degree relatives  Handoff  MEWS Score  GI Bleed  Stomach Ulcer  Osteoarthritis  Hyperlipidemia  Herniated Disc  Spinal Stenosis  Diverticulitis  GERD (Gastroesophageal Reflux Disease)  Chronic Lymphocytic Leukemia  Empyema lung  Hypotension  Delirium  GUS (acute kidney injury)  Ureteral stone  Parapneumonic effusion  GERD (Gastroesophageal Reflux Disease)  Spinal Stenosis  Hyperlipidemia  Osteoarthritis  Chronic Lymphocytic Leukemia  Preventive measure  Gastric wall thickening  Empyema lung  S/P Tonsillectomy  Status Post Arthroscopy  History of Arthroscopy of Knee  Osteoarthritis  Stomach Ulcer  FLANK PAIN  0        MEDICATIONS  (STANDING):  atorvastatin 10 milliGRAM(s) Oral at bedtime  chlorhexidine 2% Cloths 1 Application(s) Topical <User Schedule>  dronabinol 2.5 milliGRAM(s) Oral two times a day  epoetin brenda Injectable 08560 Unit(s) IV Push <User Schedule>  famotidine    Tablet 20 milliGRAM(s) Oral daily  heparin  Injectable 5000 Unit(s) SubCutaneous every 8 hours  lactobacillus acidophilus 1 Tablet(s) Oral three times a day  melatonin 6 milliGRAM(s) Oral at bedtime  midodrine 10 milliGRAM(s) Oral every 8 hours  polyethylene glycol 3350 17 Gram(s) Oral two times a day  QUEtiapine 12.5 milliGRAM(s) Oral at bedtime  sevelamer carbonate 800 milliGRAM(s) Oral three times a day with meals    MEDICATIONS  (PRN):  acetaminophen   Tablet .. 650 milliGRAM(s) Oral every 6 hours PRN Temp greater or equal to 38C (100.4F), Mild Pain (1 - 3)  albuterol/ipratropium for Nebulization 3 milliLiter(s) Nebulizer every 6 hours PRN Shortness of Breath and/or Wheezing      I&O's Summary    07 Mar 2020 06:01  -  08 Mar 2020 07:00  --------------------------------------------------------  IN: 1040 mL / OUT: 1980 mL / NET: -940 mL    08 Mar 2020 07:01  -  09 Mar 2020 05:51  --------------------------------------------------------  IN: 740 mL / OUT: 780 mL / NET: -40 mL      ICU Vital Signs Last 24 Hrs  T(C): 36.7 (09 Mar 2020 03:21), Max: 37.2 (08 Mar 2020 23:32)  T(F): 98.1 (09 Mar 2020 03:21), Max: 98.9 (08 Mar 2020 23:32)  HR: 92 (09 Mar 2020 05:00) (85 - 141)  BP: 83/51 (09 Mar 2020 05:00) (79/53 - 152/92)  BP(mean): 60 (09 Mar 2020 05:00) (60 - 117)  ABP: --  ABP(mean): --  RR: 28 (09 Mar 2020 05:00) (15 - 36)  SpO2: 95% (09 Mar 2020 05:00) (92% - 98%)      PHYSICAL EXAM:  General: awake, alert  HEEBT: NCAT EOMI non icteric sclera  Neck: Left IJ HD catheter  CV: s1s2 irreg irreg 130s no murmurs gallops clicks or rubs  pulm: Clear right lung, decreased sounds on left.  CT in place with bloody rainage.   GI: soft NTND + BS  Extremities: no peripheral edema warm  : blood from meatus after celestin pulled.  Skin/Nails: No cyanosis, erythema or pallor, no clubbing  Psych: awake, hyperactive delerium.   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 Tonight with new onset rapid atrial fibrillation and hypotension.  he is also delirious and pulled his coude celestin out  - new onset Afib probably from acute pulm illness.  Check chemistry and thyroid function panel.  Has recent echo from this admission  - He received 20mg cardizem without response, BP 80s/50s.  give additional dose of midodrine 10mg, when BP improved give 5mg IV metoprolol.    - had HD today without volume removal, making urine, celestin replaced, blood tinged urine and blood clots.  If urine output stops flush celestin  - give 2.5mg IM haldol for delerium. reorient patient  - will start seroquel 12.5 HS  - I gave Dornase and tPA treatment via CT tonight  - continue antibiotics  - continue ICU management

## 2020-03-09 NOTE — CHART NOTE - NSCHARTNOTEFT_GEN_A_CORE
Time of evaluation:     Called to evaluate patient for restraints        Other interventions attempted: de-escalation, orientation check, environment modification, medication assessment    REVIEW OF SYSTEMS:  CONSTITUTIONAL: [  ] fever   [  ] fatigue  EYES: [  ] visual disturbances  ENMT:  [  ]difficulty hearing  [  ] throat pain  RESPIRATORY:  [  ]cough  [   ] wheezing  [   ] hemoptysis [  ] shortness of breath  CARDIOVASCULAR: [  ]chest pain  [  ] palpitations   GASTROINTESTINAL: [  ]  abdominal pain [  ] nausea [  ] vomiting  [  ] diarrhea  [  ] constipation  GENITOURINARY: [  ] dysuria [  ] frequency  [  ] hematuria [  ] incontinence  NEUROLOGICAL: [  ] headaches [  ] new numbness  SKIN: [  ]itching [  ] new rash   MUSCULOSKELETAL: [  ] back pain  [  ] extremity pain  PSYCHIATRIC: [  ] depression  [  ] anxiety  [  ] mood swings [  ] difficulty sleeping  ALLERGY AND IMMUNOLOGIC: [  ] hives    [  ]Unable to perfrom ROS due to:  [  ] ROS reviewed and all negative    PAST MEDICAL & SURGICAL HISTORY:  GI Bleed: 2007  Stomach Ulcer: H pylori 2007, treated with antibiotics  Osteoarthritis  Hyperlipidemia  Herniated Disc  Spinal Stenosis  Diverticulitis  GERD (Gastroesophageal Reflux Disease)  Chronic Lymphocytic Leukemia: followed by oncologist in [place]  S/P Tonsillectomy: childhood  Status Post Arthroscopy: right shoulder  History of Arthroscopy of Knee: bilateral    MEDICATIONS  (STANDING):  atorvastatin 10 milliGRAM(s) Oral at bedtime  chlorhexidine 2% Cloths 1 Application(s) Topical <User Schedule>  diltiazem Injectable 10 milliGRAM(s) IV Push once  dornase brenda Solution for Pleural Effusion 5 milliGRAM(s) IntraPleural. every 12 hours  dronabinol 2.5 milliGRAM(s) Oral two times a day  epoetin brenda Injectable 56435 Unit(s) IV Push <User Schedule>  famotidine    Tablet 20 milliGRAM(s) Oral daily  haloperidol    Injectable 2.5 milliGRAM(s) IntraMuscular once  heparin  Injectable 5000 Unit(s) SubCutaneous every 8 hours  lactobacillus acidophilus 1 Tablet(s) Oral three times a day  melatonin 6 milliGRAM(s) Oral at bedtime  metoprolol tartrate Injectable 5 milliGRAM(s) IV Push once  midodrine 10 milliGRAM(s) Oral every 8 hours  polyethylene glycol 3350 17 Gram(s) Oral two times a day  sevelamer carbonate 800 milliGRAM(s) Oral three times a day with meals    MEDICATIONS  (PRN):  acetaminophen   Tablet .. 650 milliGRAM(s) Oral every 6 hours PRN Temp greater or equal to 38C (100.4F), Mild Pain (1 - 3)  albuterol/ipratropium for Nebulization 3 milliLiter(s) Nebulizer every 6 hours PRN Shortness of Breath and/or Wheezing                          8.7    12.28 )-----------( 484      ( 08 Mar 2020 05:58 )             26.0     03-08    137  |  101  |  82<H>  ----------------------------<  136<H>  5.0   |  25  |  7.20<H>    Ca    7.8<L>      08 Mar 2020 07:06  Phos  7.1     03-08  Mg     2.8     03-08    TPro  5.4<L>  /  Alb  2.2<L>  /  TBili  0.7  /  DBili  x   /  AST  31  /  ALT  39  /  AlkPhos  74  03-07      Vital Signs Last 24 Hrs  T(C): 37.2 (08 Mar 2020 23:32), Max: 37.2 (08 Mar 2020 23:32)  T(F): 98.9 (08 Mar 2020 23:32), Max: 98.9 (08 Mar 2020 23:32)  HR: 129 (09 Mar 2020 00:00) (85 - 130)  BP: 97/61 (09 Mar 2020 00:00) (92/54 - 152/92)  BP(mean): 68 (09 Mar 2020 00:00) (68 - 112)  RR: 36 (09 Mar 2020 00:00) (15 - 40)  SpO2: 95% (09 Mar 2020 00:00) (92% - 98%)    Physical Examination:  awake alert confused  pulling at lines and tubes, has CT. pulled out celestin, bleeding from meatus  no edema of extremities  s1s2  clear right lung, ronchi left lung      [  ] Unable to perform physical exam due to    [X ] I have evaluated this patient and have determined that restraints are warranted to optimize medical care. Patient was assessed for current physical and psychological risk factors as well as special needs. There are no medical conditions or limitations that would place this patient at risk while in restraints.    Type of restraint: Bilateral soft wrist restraints    Behavioral criteria for discontinuation of restraint: [ X ] See order    Attending MD Aware

## 2020-03-09 NOTE — PROGRESS NOTE ADULT - SUBJECTIVE AND OBJECTIVE BOX
CHARTING IN PROGRESS      Patient is a 89y old  Male who presents with a chief complaint of Left Empyema (09 Mar 2020 07:23)    24 hour events: ***    REVIEW OF SYSTEMS  Constitutional: No fever, chills, fatigue  Neuro: No headache, numbness, weakness  Resp: No cough, wheezing, shortness of breath  CVS: No chest pain, palpitations, leg swelling  GI: No abdominal pain, nausea, vomiting, diarrhea   : No dysuria, frequency, incontinence  Skin: No itching, burning, rashes, or lesions   Msk: No joint pain or swelling  Psych: No depression, anxiety, mood swings  Heme: No bleeding    T(F): 97.5 (03-09-20 @ 07:24), Max: 98.9 (03-08-20 @ 23:32)  HR: 91 (03-09-20 @ 08:00) (85 - 141)  BP: 107/56 (03-09-20 @ 08:00) (79/53 - 152/92)  RR: 22 (03-09-20 @ 08:00) (15 - 36)  SpO2: 95% (03-09-20 @ 08:00) (94% - 98%)  Wt(kg): --            I&O's Summary    03-08 @ 07:01  -  03-09 @ 07:00  --------------------------------------------------------  IN: 990 mL / OUT: 1790 mL / NET: -800 mL      PHYSICAL EXAM  General:   CNS:   HEENT:   Resp:   CVS:   Abd:   Ext:   Skin:     MEDICATIONS    midodrine Oral    atorvastatin Oral    albuterol/ipratropium for Nebulization Nebulizer PRN    acetaminophen   Tablet .. Oral PRN  dronabinol Oral  melatonin Oral  QUEtiapine Oral      heparin  Injectable SubCutaneous    famotidine    Tablet Oral  polyethylene glycol 3350 Oral        epoetin brenda Injectable IV Push    chlorhexidine 2% Cloths Topical    lactobacillus acidophilus Oral  sevelamer carbonate Oral                          7.6    9.66  )-----------( 363      ( 09 Mar 2020 05:14 )             23.3       03-09    140  |  103  |  51<H>  ----------------------------<  133<H>  4.8   |  27  |  5.00<H>    Ca    7.8<L>      09 Mar 2020 05:14  Phos  5.0     03-09  Mg     2.5     03-09            PT/INR - ( 09 Mar 2020 05:14 )   PT: 19.2 sec;   INR: 1.69 ratio         PTT - ( 09 Mar 2020 05:14 )  PTT:29.5 sec          Radiology: ***  Bedside lung ultrasound: ***  Bedside ECHO: ***    CENTRAL LINE: Y/N          DATE INSERTED:              REMOVE: Y/N  SOLIS: Y/N                        DATE INSERTED:              REMOVE: Y/N  A-LINE: Y/N                       DATE INSERTED:              REMOVE: Y/N    GLOBAL ISSUE/BEST PRACTICE  Analgesia:   Sedation:   CAM-ICU:   HOB elevation: yes  Stress ulcer prophylaxis:   VTE prophylaxis:   Glycemic control:   Nutrition:     CODE STATUS: ***  [facility] discussion: Y CHARTING IN PROGRESS      Patient is a 89y old  Male who presents with a chief complaint of Left Empyema (09 Mar 2020 07:23)    24 hour events: Patient developed new onset afib with RVR, with HR to 130-140s, that did not respond to 20mg Cardizem. Followed by period of hypotension. Delirious overnight, pulled out coude catheter which was successfully replaced. Received 2x 2.5mg Haldol. Patient still delirious this AM but without any complaints.    REVIEW OF SYSTEMS: Unable to obtain due to current medical condition.      T(F): 97.5 (03-09-20 @ 07:24), Max: 98.9 (03-08-20 @ 23:32)  HR: 91 (03-09-20 @ 08:00) (85 - 141)  BP: 107/56 (03-09-20 @ 08:00) (79/53 - 152/92)  RR: 22 (03-09-20 @ 08:00) (15 - 36)  SpO2: 95% (03-09-20 @ 08:00) (94% - 98%)      I&O's Summary    03-08 @ 07:01  -  03-09 @ 07:00  --------------------------------------------------------  IN: 990 mL / OUT: 1790 mL / NET: -800 mL      PHYSICAL EXAM  General: elderly male in NAD  CNS: AAOx0, awake, unable to follow commands or participate in conversation  HEENT: EOMI, PERRL  Resp: clear inspirations b/l with coarse expiratory breath sounds, L chest tube draining serosanguinous fluid  CVS: +S1S2, RRR, no m/r/g  Abd: soft, non-tender, non-distended, normoactive BS x4  Ext: no c/c/e of b/l LE  Skin: color normal for race, warm, dry, well-perfused      MEDICATIONS    midodrine Oral  atorvastatin Oral  albuterol/ipratropium for Nebulization Nebulizer PRN  acetaminophen   Tablet .. Oral PRN  dronabinol Oral  melatonin Oral  QUEtiapine Oral  heparin  Injectable SubCutaneous  famotidine    Tablet Oral  polyethylene glycol 3350 Oral  epoetin brenda Injectable IV Push  chlorhexidine 2% Cloths Topical  lactobacillus acidophilus Oral  sevelamer carbonate Oral                              7.6    9.66  )-----------( 363      ( 09 Mar 2020 05:14 )             23.3       03-09    140  |  103  |  51<H>  ----------------------------<  133<H>  4.8   |  27  |  5.00<H>    Ca    7.8<L>      09 Mar 2020 05:14  Phos  5.0     03-09  Mg     2.5     03-09    PT/INR - ( 09 Mar 2020 05:14 )   PT: 19.2 sec;   INR: 1.69 ratio      PTT - ( 09 Mar 2020 05:14 )  PTT:29.5 sec          Radiology: ***  Bedside lung ultrasound: ***  Bedside ECHO: ***      CENTRAL LINE: Y, L IJ central line for hemodialysis  SOLIS: Y  A-LINE: N    GLOBAL ISSUE/BEST PRACTICE  Analgesia: Y  Sedation: N  HOB elevation: Y  Stress ulcer prophylaxis: Y   VTE prophylaxis: Y  Glycemic control: Y   Nutrition: Y    CODE STATUS: DNR/dNI  GOC discussion: Y Patient is a 89y old  Male who presents with a chief complaint of Left Empyema (09 Mar 2020 07:23)    24 hour events: Patient developed new onset afib with RVR, with HR to 130-140s, that did not respond to 20mg Cardizem. Followed by period of hypotension. Delirious overnight, pulled out coude catheter which was successfully replaced. Received 2x 2.5mg Haldol. Patient still delirious this AM but without any complaints.    REVIEW OF SYSTEMS: Unable to obtain due to current medical condition.      T(F): 97.5 (03-09-20 @ 07:24), Max: 98.9 (03-08-20 @ 23:32)  HR: 91 (03-09-20 @ 08:00) (85 - 141)  BP: 107/56 (03-09-20 @ 08:00) (79/53 - 152/92)  RR: 22 (03-09-20 @ 08:00) (15 - 36)  SpO2: 95% (03-09-20 @ 08:00) (94% - 98%)      I&O's Summary    03-08 @ 07:01  -  03-09 @ 07:00  --------------------------------------------------------  IN: 990 mL / OUT: 1790 mL / NET: -800 mL      PHYSICAL EXAM  General: elderly male in NAD  CNS: AAOx0, awake, unable to follow commands or participate in conversation  HEENT: EOMI, PERRL  Resp: clear inspirations b/l with coarse expiratory breath sounds, L chest tube draining serosanguinous fluid  CVS: +S1S2, RRR, no m/r/g  Abd: soft, non-tender, non-distended, normoactive BS x4  Ext: no c/c/e of b/l LE  Skin: color normal for race, warm, dry, well-perfused      MEDICATIONS    midodrine Oral  atorvastatin Oral  albuterol/ipratropium for Nebulization Nebulizer PRN  acetaminophen   Tablet .. Oral PRN  dronabinol Oral  melatonin Oral  QUEtiapine Oral  heparin  Injectable SubCutaneous  famotidine    Tablet Oral  polyethylene glycol 3350 Oral  epoetin brenda Injectable IV Push  chlorhexidine 2% Cloths Topical  lactobacillus acidophilus Oral  sevelamer carbonate Oral                              7.6    9.66  )-----------( 363      ( 09 Mar 2020 05:14 )             23.3       03-09    140  |  103  |  51<H>  ----------------------------<  133<H>  4.8   |  27  |  5.00<H>    Ca    7.8<L>      09 Mar 2020 05:14  Phos  5.0     03-09  Mg     2.5     03-09    PT/INR - ( 09 Mar 2020 05:14 )   PT: 19.2 sec;   INR: 1.69 ratio      PTT - ( 09 Mar 2020 05:14 )  PTT:29.5 sec        Bedside ECHO:   A-line predominant, scattered B lines      CENTRAL LINE: Y, L IJ central line for hemodialysis  SOLIS: Y  A-LINE: N    GLOBAL ISSUE/BEST PRACTICE  Analgesia: Y  Sedation: N  HOB elevation: Y  Stress ulcer prophylaxis: Y   VTE prophylaxis: Y  Glycemic control: Y   Nutrition: Y    CODE STATUS: DNR/dNI  GOC discussion: Y

## 2020-03-09 NOTE — PROGRESS NOTE ADULT - ASSESSMENT
1.	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	Loculated pleural effusions, s/p Chest tube  3.	Anemia  4.	Hypotension    s/p HD yesterday. Hold HD today. BP better. Monitor UO. To continue current meds. Avoid nephrotoxic meds as possible.   Monitor h/h trend. Transfuse PRBC PRN. Avoid ACEI, ARB, NSAIDs and IV contrast. ICU management. D/w ICU team.

## 2020-03-09 NOTE — PROGRESS NOTE ADULT - SUBJECTIVE AND OBJECTIVE BOX
Patient is a 89y old  Male who presents with a chief complaint of left flank pain, fever, and cough.      INTERVAL HPI/OVERNIGHT EVENTS: Overnight, pt had new onset afib with RVR, with HR to 130-140s, that did not respond to 20mg Cardizem. Followed by period of hypotension. Pt also pulled out coude catheter which was later replaced. Pt given Haldol for agitation overnight. Does not wish to answer my ROS and tells me to leave.      MEDICATIONS  (STANDING):  atorvastatin 10 milliGRAM(s) Oral at bedtime  chlorhexidine 2% Cloths 1 Application(s) Topical <User Schedule>  dronabinol 2.5 milliGRAM(s) Oral two times a day  epoetin brenda Injectable 89257 Unit(s) IV Push <User Schedule>  famotidine    Tablet 20 milliGRAM(s) Oral daily  heparin  Injectable 5000 Unit(s) SubCutaneous every 8 hours  lactobacillus acidophilus 1 Tablet(s) Oral three times a day  melatonin 6 milliGRAM(s) Oral at bedtime  midodrine 5 milliGRAM(s) Oral every 8 hours  polyethylene glycol 3350 17 Gram(s) Oral two times a day  QUEtiapine 12.5 milliGRAM(s) Oral at bedtime  sevelamer carbonate 800 milliGRAM(s) Oral three times a day with meals    MEDICATIONS  (PRN):  acetaminophen   Tablet .. 650 milliGRAM(s) Oral every 6 hours PRN Temp greater or equal to 38C (100.4F), Mild Pain (1 - 3)  albuterol/ipratropium for Nebulization 3 milliLiter(s) Nebulizer every 6 hours PRN Shortness of Breath and/or Wheezing        Allergies    No Known Allergies    Intolerances        REVIEW OF SYSTEMS:  Cannot obtain ROS due to encephalopathy. Tells me to leave when attempting to obtain ROS.    Vital Signs Last 24 Hrs  T(C): 36.7 (09 Mar 2020 12:00), Max: 37.2 (08 Mar 2020 23:32)  T(F): 98 (09 Mar 2020 12:00), Max: 98.9 (08 Mar 2020 23:32)  HR: 70 (09 Mar 2020 11:00) (70 - 141)  BP: 123/58 (09 Mar 2020 11:33) (79/53 - 152/92)  BP(mean): 63 (09 Mar 2020 11:00) (60 - 117)  RR: 15 (09 Mar 2020 11:00) (15 - 36)  SpO2: 96% (09 Mar 2020 11:00) (93% - 98%)    PHYSICAL EXAM:  GENERAL: agitated, confused, no sign of acute distress due to pain or dyspnea  HEENT:  anicteric, moist mucous membranes  CHEST/LUNG:  diminished breath sounds ; left-sided chest tube with serosanguinous drainage  HEART:  RRR, S1, S2  ABDOMEN:  BS+, soft, nontender, nondistended  EXTREMITIES: no edema, cyanosis, or calf tenderness  NERVOUS SYSTEM: confused, answers few questions and follows few commands  PSYCH: agitated    LABS:                        7.6    9.66  )-----------( 363      ( 09 Mar 2020 05:14 )             23.3       03-09    140  |  103  |  51<H>  ----------------------------<  133<H>  4.8   |  27  |  5.00<H>    Ca    7.8<L>      09 Mar 2020 05:14  Phos  5.0     03-09  Mg     2.5     03-09    PT/INR - ( 09 Mar 2020 05:14 )   PT: 19.2 sec;   INR: 1.69 ratio      PTT - ( 09 Mar 2020 05:14 )  PTT:29.5 sec    CAPILLARY BLOOD GLUCOSE              RADIOLOGY & ADDITIONAL TESTS:    Personally reviewed.     Consultant(s) Notes Reviewed:  [x] YES  [ ] NO Patient is a 89y old  Male who presents with a chief complaint of left flank pain, fever, and cough.    INTERVAL HPI/OVERNIGHT EVENTS: Overnight, pt had new onset afib with RVR, with HR to 130-140s, that did not respond to 20mg Cardizem. Followed by period of hypotension. Pt also pulled out coude catheter which was later replaced. Pt given Haldol for agitation overnight. Does not wish to answer my ROS and tells me to leave.      MEDICATIONS  (STANDING):  atorvastatin 10 milliGRAM(s) Oral at bedtime  chlorhexidine 2% Cloths 1 Application(s) Topical <User Schedule>  dronabinol 2.5 milliGRAM(s) Oral two times a day  epoetin brenda Injectable 05567 Unit(s) IV Push <User Schedule>  famotidine    Tablet 20 milliGRAM(s) Oral daily  heparin  Injectable 5000 Unit(s) SubCutaneous every 8 hours  lactobacillus acidophilus 1 Tablet(s) Oral three times a day  melatonin 6 milliGRAM(s) Oral at bedtime  midodrine 5 milliGRAM(s) Oral every 8 hours  polyethylene glycol 3350 17 Gram(s) Oral two times a day  QUEtiapine 12.5 milliGRAM(s) Oral at bedtime  sevelamer carbonate 800 milliGRAM(s) Oral three times a day with meals    MEDICATIONS  (PRN):  acetaminophen   Tablet .. 650 milliGRAM(s) Oral every 6 hours PRN Temp greater or equal to 38C (100.4F), Mild Pain (1 - 3)  albuterol/ipratropium for Nebulization 3 milliLiter(s) Nebulizer every 6 hours PRN Shortness of Breath and/or Wheezing        Allergies    No Known Allergies    Intolerances        REVIEW OF SYSTEMS:  Cannot obtain ROS due to encephalopathy. Tells me to leave when attempting to obtain ROS.    Vital Signs Last 24 Hrs  T(C): 36.7 (09 Mar 2020 12:00), Max: 37.2 (08 Mar 2020 23:32)  T(F): 98 (09 Mar 2020 12:00), Max: 98.9 (08 Mar 2020 23:32)  HR: 70 (09 Mar 2020 11:00) (70 - 141)  BP: 123/58 (09 Mar 2020 11:33) (79/53 - 152/92)  BP(mean): 63 (09 Mar 2020 11:00) (60 - 117)  RR: 15 (09 Mar 2020 11:00) (15 - 36)  SpO2: 96% (09 Mar 2020 11:00) (93% - 98%)    PHYSICAL EXAM:  GENERAL: agitated, confused, no sign of acute distress due to pain or dyspnea  HEENT:  anicteric, moist mucous membranes  CHEST/LUNG:  diminished breath sounds ; left-sided chest tube with serosanguinous drainage  HEART:  RRR, S1, S2  ABDOMEN:  BS+, soft, nontender, nondistended  EXTREMITIES: no edema, cyanosis, or calf tenderness  NERVOUS SYSTEM: confused, answers few questions and follows few commands  PSYCH: agitated    LABS:                        7.6    9.66  )-----------( 363      ( 09 Mar 2020 05:14 )             23.3       03-09    140  |  103  |  51<H>  ----------------------------<  133<H>  4.8   |  27  |  5.00<H>    Ca    7.8<L>      09 Mar 2020 05:14  Phos  5.0     03-09  Mg     2.5     03-09    PT/INR - ( 09 Mar 2020 05:14 )   PT: 19.2 sec;   INR: 1.69 ratio      PTT - ( 09 Mar 2020 05:14 )  PTT:29.5 sec    CAPILLARY BLOOD GLUCOSE              RADIOLOGY & ADDITIONAL TESTS:    Personally reviewed.     Consultant(s) Notes Reviewed:  [x] YES  [ ] NO

## 2020-03-09 NOTE — PROGRESS NOTE ADULT - SUBJECTIVE AND OBJECTIVE BOX
Date/Time Patient Seen:  		  Referring MD:   Data Reviewed	       Patient is a 89y old  Male who presents with a chief complaint of Left Empyema (09 Mar 2020 02:00)      Subjective/HPI     PAST MEDICAL & SURGICAL HISTORY:  GI Bleed: 2007  Stomach Ulcer: H pylori 2007, treated with antibiotics  Osteoarthritis  Hyperlipidemia  Herniated Disc  Spinal Stenosis  Diverticulitis  GERD (Gastroesophageal Reflux Disease)  Chronic Lymphocytic Leukemia: followed by oncologist in [place]  S/P Tonsillectomy: childhood  Status Post Arthroscopy: right shoulder  History of Arthroscopy of Knee: bilateral  Osteoarthritis  Stomach Ulcer: H pylori, treated with antibiotics        Medication list         MEDICATIONS  (STANDING):  atorvastatin 10 milliGRAM(s) Oral at bedtime  chlorhexidine 2% Cloths 1 Application(s) Topical <User Schedule>  dronabinol 2.5 milliGRAM(s) Oral two times a day  epoetin brenda Injectable 14433 Unit(s) IV Push <User Schedule>  famotidine    Tablet 20 milliGRAM(s) Oral daily  heparin  Injectable 5000 Unit(s) SubCutaneous every 8 hours  lactobacillus acidophilus 1 Tablet(s) Oral three times a day  melatonin 6 milliGRAM(s) Oral at bedtime  midodrine 10 milliGRAM(s) Oral every 8 hours  polyethylene glycol 3350 17 Gram(s) Oral two times a day  QUEtiapine 12.5 milliGRAM(s) Oral at bedtime  sevelamer carbonate 800 milliGRAM(s) Oral three times a day with meals    MEDICATIONS  (PRN):  acetaminophen   Tablet .. 650 milliGRAM(s) Oral every 6 hours PRN Temp greater or equal to 38C (100.4F), Mild Pain (1 - 3)  albuterol/ipratropium for Nebulization 3 milliLiter(s) Nebulizer every 6 hours PRN Shortness of Breath and/or Wheezing         Vitals log        ICU Vital Signs Last 24 Hrs  T(C): 36.7 (09 Mar 2020 03:21), Max: 37.2 (08 Mar 2020 23:32)  T(F): 98.1 (09 Mar 2020 03:21), Max: 98.9 (08 Mar 2020 23:32)  HR: 92 (09 Mar 2020 05:00) (85 - 141)  BP: 83/51 (09 Mar 2020 05:00) (79/53 - 152/92)  BP(mean): 60 (09 Mar 2020 05:00) (60 - 117)  ABP: --  ABP(mean): --  RR: 28 (09 Mar 2020 05:00) (15 - 36)  SpO2: 95% (09 Mar 2020 05:00) (92% - 98%)           Input and Output:  I&O's Detail    07 Mar 2020 06:01  -  08 Mar 2020 07:00  --------------------------------------------------------  IN:    Oral Fluid: 840 mL    Solution: 200 mL  Total IN: 1040 mL    OUT:    Chest Tube: 330 mL    Indwelling Catheter - Urethral: 1650 mL  Total OUT: 1980 mL    Total NET: -940 mL      08 Mar 2020 07:01  -  09 Mar 2020 06:34  --------------------------------------------------------  IN:    Oral Fluid: 740 mL  Total IN: 740 mL    OUT:    Chest Tube: 100 mL    Indwelling Catheter - Urethral: 680 mL  Total OUT: 780 mL    Total NET: -40 mL          Lab Data                        7.6    9.66  )-----------( 363      ( 09 Mar 2020 05:14 )             23.3     03-09    140  |  103  |  51<H>  ----------------------------<  133<H>  4.8   |  27  |  5.00<H>    Ca    7.8<L>      09 Mar 2020 05:14  Phos  5.0     03-09  Mg     2.5     03-09    TPro  5.4<L>  /  Alb  2.2<L>  /  TBili  0.7  /  DBili  x   /  AST  31  /  ALT  39  /  AlkPhos  74  03-07            Review of Systems	      Objective     Physical Examination    heart s1s2  lung dec BS  abd soft  head nc  head at      Pertinent Lab findings & Imaging      Hamlet:  NO   Adequate UO     I&O's Detail    07 Mar 2020 06:01  -  08 Mar 2020 07:00  --------------------------------------------------------  IN:    Oral Fluid: 840 mL    Solution: 200 mL  Total IN: 1040 mL    OUT:    Chest Tube: 330 mL    Indwelling Catheter - Urethral: 1650 mL  Total OUT: 1980 mL    Total NET: -940 mL      08 Mar 2020 07:01  -  09 Mar 2020 06:34  --------------------------------------------------------  IN:    Oral Fluid: 740 mL  Total IN: 740 mL    OUT:    Chest Tube: 100 mL    Indwelling Catheter - Urethral: 680 mL  Total OUT: 780 mL    Total NET: -40 mL               Discussed with:     Cultures:	        Radiology

## 2020-03-09 NOTE — PROGRESS NOTE ADULT - SUBJECTIVE AND OBJECTIVE BOX
[facility] - Warren Posada, Dandre, Abelardo, Lucina, Kelvin Travis  Office Number:  [phone]    Patient resting comfortably in bed in NAD.  Laying flat with no respiratory distress.  No reliable interval history.  Had rapid AF last night with borderline blood pressure.  Now back in NSR    F/U for:  AF    Telemetry: NSR, rapid AF noted.     MEDICATIONS  (STANDING):  atorvastatin 10 milliGRAM(s) Oral at bedtime  chlorhexidine 2% Cloths 1 Application(s) Topical <User Schedule>  dronabinol 2.5 milliGRAM(s) Oral two times a day  epoetin brenda Injectable 57419 Unit(s) IV Push <User Schedule>  famotidine    Tablet 20 milliGRAM(s) Oral daily  heparin  Injectable 5000 Unit(s) SubCutaneous every 8 hours  lactobacillus acidophilus 1 Tablet(s) Oral three times a day  melatonin 6 milliGRAM(s) Oral at bedtime  midodrine 10 milliGRAM(s) Oral every 8 hours  polyethylene glycol 3350 17 Gram(s) Oral two times a day  QUEtiapine 12.5 milliGRAM(s) Oral at bedtime  sevelamer carbonate 800 milliGRAM(s) Oral three times a day with meals    MEDICATIONS  (PRN):  acetaminophen   Tablet .. 650 milliGRAM(s) Oral every 6 hours PRN Temp greater or equal to 38C (100.4F), Mild Pain (1 - 3)  albuterol/ipratropium for Nebulization 3 milliLiter(s) Nebulizer every 6 hours PRN Shortness of Breath and/or Wheezing      Allergies    No Known Allergies        Vital Signs Last 24 Hrs  T(C): 36.7 (09 Mar 2020 03:21), Max: 37.2 (08 Mar 2020 23:32)  T(F): 98.1 (09 Mar 2020 03:21), Max: 98.9 (08 Mar 2020 23:32)  HR: 92 (09 Mar 2020 05:00) (85 - 141)  BP: 83/51 (09 Mar 2020 05:00) (79/53 - 152/92)  BP(mean): 60 (09 Mar 2020 05:00) (60 - 117)  RR: 28 (09 Mar 2020 05:00) (15 - 36)  SpO2: 95% (09 Mar 2020 05:00) (92% - 98%)    I&O's Summary    08 Mar 2020 07:01  -  09 Mar 2020 07:00  --------------------------------------------------------  IN: 740 mL / OUT: 780 mL / NET: -40 mL        ON EXAM:    Constitutional: well-nourished, well-developed, NAD   HEENT:  MMM, sclerae anicteric, conjunctivae clear, no oral cyanosis.  Pulmonary: Non-labored, breath sounds are clear bilaterally, No wheezing, rales or rhonchi  Cardiovascular: Regular, S1 and S2.  No murmur.  No rubs, gallops or clicks  Gastrointestinal: Bowel Sounds present, soft, nontender.   Lymph: No peripheral edema.   Neurological: Alert, no focal deficits  Skin: No rashes.  Psych:  Mood & affect appropriate    LABS: All Labs Reviewed:                        7.6    9.66  )-----------( 363      ( 09 Mar 2020 05:14 )             23.3                         8.7    12.28 )-----------( 484      ( 08 Mar 2020 05:58 )             26.0                         8.3    9.45  )-----------( 350      ( 07 Mar 2020 06:39 )             24.7     09 Mar 2020 05:14    140    |  103    |  51     ----------------------------<  133    4.8     |  27     |  5.00   09 Mar 2020 01:44    137    |  103    |  47     ----------------------------<  128    5.2     |  25     |  4.90   08 Mar 2020 07:06    137    |  101    |  82     ----------------------------<  136    5.0     |  25     |  7.20     Ca    7.8        09 Mar 2020 05:14  Ca    8.0        09 Mar 2020 01:44  Ca    7.8        08 Mar 2020 07:06  Phos  5.0       09 Mar 2020 05:14  Phos  7.1       08 Mar 2020 05:58  Phos  6.7       07 Mar 2020 06:39  Mg     2.5       09 Mar 2020 05:14  Mg     2.6       09 Mar 2020 01:44  Mg     2.8       08 Mar 2020 05:58    TPro  5.4    /  Alb  2.2    /  TBili  0.7    /  DBili  x      /  AST  31     /  ALT  39     /  AlkPhos  74     07 Mar 2020 06:39    PT/INR - ( 09 Mar 2020 05:14 )   PT: 19.2 sec;   INR: 1.69 ratio         PTT - ( 09 Mar 2020 05:14 )  PTT:29.5 sec      03-09 @ 01:44  TSH: 3.59

## 2020-03-09 NOTE — PROGRESS NOTE ADULT - ASSESSMENT
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    #Empyema:  -left-sided empyema  -completed course of zosyn, monitoring off Abx  -had chest tube placed and drainage, but incomplete amount drained due to loculations  -has been on tPA/dornase brenda to help with drainage  -CT surgery, Dr. Steven, f/up recs  -heme/onc (Gostanian) recs appreciated -- if possible, consider CT-guided biopsy to evaluate for MALT lymphoma vs. SLL in the lung  -c/w midodrine for hypotension  -pulm (Hans), recs appreciated    #afib with RVR:  -new onset overnight  -difficult to control with AVN blockers and now BP too low to give more  -now in NSR but being started on amiodarone as pt is hypotensive and being in rapid afib makes him even more unstable  -cardio (Pannella), recs appreciated    #Acute renal failure:  -likely ATN in setting of hypotension and may have had toxicity from Abx  -started on HD  -nephro (Sudheer), recs appreciated    #gastric thickening  -h/o nhl- gastric maltoma  -GI (Kim) eval  -had EGD within past year as outpt  -no plan for EGD at this time  -outpt f/up    #Acute encephalopathy:  -likely uremic encephalopathy  -c/w HD as per neprho  -monitor mentation  -re-orientation  -on seroquel    #Anemia:  - c/w epogen as per nephro    #VTE ppx:  -HSQ

## 2020-03-10 LAB
ALBUMIN SERPL ELPH-MCNC: 2.2 G/DL — LOW (ref 3.3–5)
ALP SERPL-CCNC: 72 U/L — SIGNIFICANT CHANGE UP (ref 40–120)
ALT FLD-CCNC: 57 U/L — SIGNIFICANT CHANGE UP (ref 12–78)
ANION GAP SERPL CALC-SCNC: 11 MMOL/L — SIGNIFICANT CHANGE UP (ref 5–17)
APTT BLD: 28.4 SEC — LOW (ref 28.5–37)
AST SERPL-CCNC: 49 U/L — HIGH (ref 15–37)
BASOPHILS # BLD AUTO: 0.03 K/UL — SIGNIFICANT CHANGE UP (ref 0–0.2)
BASOPHILS NFR BLD AUTO: 0.2 % — SIGNIFICANT CHANGE UP (ref 0–2)
BILIRUB SERPL-MCNC: 0.6 MG/DL — SIGNIFICANT CHANGE UP (ref 0.2–1.2)
BUN SERPL-MCNC: 52 MG/DL — HIGH (ref 7–23)
CALCIUM SERPL-MCNC: 8.3 MG/DL — LOW (ref 8.5–10.1)
CHLORIDE SERPL-SCNC: 102 MMOL/L — SIGNIFICANT CHANGE UP (ref 96–108)
CO2 SERPL-SCNC: 26 MMOL/L — SIGNIFICANT CHANGE UP (ref 22–31)
CREAT SERPL-MCNC: 5.5 MG/DL — HIGH (ref 0.5–1.3)
EOSINOPHIL # BLD AUTO: 0.01 K/UL — SIGNIFICANT CHANGE UP (ref 0–0.5)
EOSINOPHIL NFR BLD AUTO: 0.1 % — SIGNIFICANT CHANGE UP (ref 0–6)
GLUCOSE SERPL-MCNC: 124 MG/DL — HIGH (ref 70–99)
HCT VFR BLD CALC: 24.1 % — LOW (ref 39–50)
HGB BLD-MCNC: 8.1 G/DL — LOW (ref 13–17)
IMM GRANULOCYTES NFR BLD AUTO: 1.3 % — SIGNIFICANT CHANGE UP (ref 0–1.5)
INR BLD: 1.52 RATIO — HIGH (ref 0.88–1.16)
LYMPHOCYTES # BLD AUTO: 1 K/UL — SIGNIFICANT CHANGE UP (ref 1–3.3)
LYMPHOCYTES # BLD AUTO: 7.9 % — LOW (ref 13–44)
MAGNESIUM SERPL-MCNC: 2.6 MG/DL — SIGNIFICANT CHANGE UP (ref 1.6–2.6)
MCHC RBC-ENTMCNC: 31.4 PG — SIGNIFICANT CHANGE UP (ref 27–34)
MCHC RBC-ENTMCNC: 33.6 GM/DL — SIGNIFICANT CHANGE UP (ref 32–36)
MCV RBC AUTO: 93.4 FL — SIGNIFICANT CHANGE UP (ref 80–100)
MONOCYTES # BLD AUTO: 0.77 K/UL — SIGNIFICANT CHANGE UP (ref 0–0.9)
MONOCYTES NFR BLD AUTO: 6.1 % — SIGNIFICANT CHANGE UP (ref 2–14)
NEUTROPHILS # BLD AUTO: 10.64 K/UL — HIGH (ref 1.8–7.4)
NEUTROPHILS NFR BLD AUTO: 84.4 % — HIGH (ref 43–77)
NRBC # BLD: 0 /100 WBCS — SIGNIFICANT CHANGE UP (ref 0–0)
PHOSPHATE SERPL-MCNC: 5.6 MG/DL — HIGH (ref 2.5–4.5)
PLATELET # BLD AUTO: 387 K/UL — SIGNIFICANT CHANGE UP (ref 150–400)
POTASSIUM SERPL-MCNC: 5 MMOL/L — SIGNIFICANT CHANGE UP (ref 3.5–5.3)
POTASSIUM SERPL-SCNC: 5 MMOL/L — SIGNIFICANT CHANGE UP (ref 3.5–5.3)
PROT SERPL-MCNC: 5.7 G/DL — LOW (ref 6–8.3)
PROTHROM AB SERPL-ACNC: 17.3 SEC — HIGH (ref 10–12.9)
RBC # BLD: 2.58 M/UL — LOW (ref 4.2–5.8)
RBC # FLD: 13.8 % — SIGNIFICANT CHANGE UP (ref 10.3–14.5)
SODIUM SERPL-SCNC: 139 MMOL/L — SIGNIFICANT CHANGE UP (ref 135–145)
WBC # BLD: 12.62 K/UL — HIGH (ref 3.8–10.5)
WBC # FLD AUTO: 12.62 K/UL — HIGH (ref 3.8–10.5)

## 2020-03-10 PROCEDURE — 99291 CRITICAL CARE FIRST HOUR: CPT

## 2020-03-10 PROCEDURE — 76604 US EXAM CHEST: CPT | Mod: 26

## 2020-03-10 PROCEDURE — 71045 X-RAY EXAM CHEST 1 VIEW: CPT | Mod: 26

## 2020-03-10 PROCEDURE — 99232 SBSQ HOSP IP/OBS MODERATE 35: CPT

## 2020-03-10 PROCEDURE — 99233 SBSQ HOSP IP/OBS HIGH 50: CPT

## 2020-03-10 RX ADMIN — HEPARIN SODIUM 5000 UNIT(S): 5000 INJECTION INTRAVENOUS; SUBCUTANEOUS at 13:04

## 2020-03-10 RX ADMIN — MIDODRINE HYDROCHLORIDE 5 MILLIGRAM(S): 2.5 TABLET ORAL at 13:04

## 2020-03-10 RX ADMIN — AMIODARONE HYDROCHLORIDE 400 MILLIGRAM(S): 400 TABLET ORAL at 21:41

## 2020-03-10 RX ADMIN — Medication 2.5 MILLIGRAM(S): at 18:07

## 2020-03-10 RX ADMIN — CHLORHEXIDINE GLUCONATE 1 APPLICATION(S): 213 SOLUTION TOPICAL at 10:44

## 2020-03-10 RX ADMIN — Medication 1 TABLET(S): at 13:03

## 2020-03-10 RX ADMIN — HEPARIN SODIUM 5000 UNIT(S): 5000 INJECTION INTRAVENOUS; SUBCUTANEOUS at 05:28

## 2020-03-10 RX ADMIN — MIDODRINE HYDROCHLORIDE 5 MILLIGRAM(S): 2.5 TABLET ORAL at 21:41

## 2020-03-10 RX ADMIN — SEVELAMER CARBONATE 800 MILLIGRAM(S): 2400 POWDER, FOR SUSPENSION ORAL at 18:07

## 2020-03-10 RX ADMIN — Medication 2.5 MILLIGRAM(S): at 05:32

## 2020-03-10 RX ADMIN — HEPARIN SODIUM 5000 UNIT(S): 5000 INJECTION INTRAVENOUS; SUBCUTANEOUS at 21:41

## 2020-03-10 RX ADMIN — MIDODRINE HYDROCHLORIDE 5 MILLIGRAM(S): 2.5 TABLET ORAL at 05:27

## 2020-03-10 RX ADMIN — POLYETHYLENE GLYCOL 3350 17 GRAM(S): 17 POWDER, FOR SOLUTION ORAL at 05:28

## 2020-03-10 RX ADMIN — Medication 1 TABLET(S): at 21:41

## 2020-03-10 RX ADMIN — SEVELAMER CARBONATE 800 MILLIGRAM(S): 2400 POWDER, FOR SUSPENSION ORAL at 13:04

## 2020-03-10 RX ADMIN — Medication 1 TABLET(S): at 05:28

## 2020-03-10 RX ADMIN — ATORVASTATIN CALCIUM 10 MILLIGRAM(S): 80 TABLET, FILM COATED ORAL at 21:41

## 2020-03-10 RX ADMIN — AMIODARONE HYDROCHLORIDE 400 MILLIGRAM(S): 400 TABLET ORAL at 13:04

## 2020-03-10 RX ADMIN — AMIODARONE HYDROCHLORIDE 400 MILLIGRAM(S): 400 TABLET ORAL at 05:28

## 2020-03-10 RX ADMIN — FAMOTIDINE 20 MILLIGRAM(S): 10 INJECTION INTRAVENOUS at 13:04

## 2020-03-10 RX ADMIN — SEVELAMER CARBONATE 800 MILLIGRAM(S): 2400 POWDER, FOR SUSPENSION ORAL at 09:18

## 2020-03-10 RX ADMIN — POLYETHYLENE GLYCOL 3350 17 GRAM(S): 17 POWDER, FOR SOLUTION ORAL at 18:07

## 2020-03-10 NOTE — CHART NOTE - NSCHARTNOTEFT_GEN_A_CORE
Time of evaluation: 1:40am    Called to evaluate patient for restraints        Other interventions attempted: de-escalation, orientation check, environment modification, medication assessment    REVIEW OF SYSTEMS:  CONSTITUTIONAL: [  ] fever   [  ] fatigue  EYES: [  ] visual disturbances  ENMT:  [  ]difficulty hearing  [  ] throat pain  RESPIRATORY:  [  ]cough  [   ] wheezing  [   ] hemoptysis [  ] shortness of breath  CARDIOVASCULAR: [  ]chest pain  [  ] palpitations   GASTROINTESTINAL: [  ]  abdominal pain [  ] nausea [  ] vomiting  [  ] diarrhea  [  ] constipation  GENITOURINARY: [  ] dysuria [  ] frequency  [  ] hematuria [  ] incontinence  NEUROLOGICAL: [  ] headaches [  ] new numbness  SKIN: [  ]itching [  ] new rash   MUSCULOSKELETAL: [  ] back pain  [  ] extremity pain  PSYCHIATRIC: [  ] depression  [  ] anxiety  [  ] mood swings [  ] difficulty sleeping  ALLERGY AND IMMUNOLOGIC: [  ] hives    [x  ]Unable to perfrom ROS due to:AMS  [  ] ROS reviewed and all negative    PAST MEDICAL & SURGICAL HISTORY:  GI Bleed: 2007  Stomach Ulcer: H pylori 2007, treated with antibiotics  Osteoarthritis  Hyperlipidemia  Herniated Disc  Spinal Stenosis  Diverticulitis  GERD (Gastroesophageal Reflux Disease)  Chronic Lymphocytic Leukemia: followed by oncologist in [place]  S/P Tonsillectomy: childhood  Status Post Arthroscopy: right shoulder  History of Arthroscopy of Knee: bilateral    MEDICATIONS  (STANDING):  aMIOdarone    Tablet   Oral   aMIOdarone    Tablet 400 milliGRAM(s) Oral every 8 hours  atorvastatin 10 milliGRAM(s) Oral at bedtime  chlorhexidine 2% Cloths 1 Application(s) Topical <User Schedule>  dronabinol 2.5 milliGRAM(s) Oral two times a day  epoetin brenda Injectable 56603 Unit(s) IV Push <User Schedule>  famotidine    Tablet 20 milliGRAM(s) Oral daily  heparin  Injectable 5000 Unit(s) SubCutaneous every 8 hours  lactobacillus acidophilus 1 Tablet(s) Oral three times a day  melatonin 6 milliGRAM(s) Oral at bedtime  midodrine 5 milliGRAM(s) Oral every 8 hours  polyethylene glycol 3350 17 Gram(s) Oral two times a day  QUEtiapine 12.5 milliGRAM(s) Oral at bedtime  sevelamer carbonate 800 milliGRAM(s) Oral three times a day with meals    MEDICATIONS  (PRN):  acetaminophen   Tablet .. 650 milliGRAM(s) Oral every 6 hours PRN Temp greater or equal to 38C (100.4F), Mild Pain (1 - 3)  albuterol/ipratropium for Nebulization 3 milliLiter(s) Nebulizer every 6 hours PRN Shortness of Breath and/or Wheezing                          7.6    9.66  )-----------( 363      ( 09 Mar 2020 05:14 )             23.3     03-09    140  |  103  |  51<H>  ----------------------------<  133<H>  4.8   |  27  |  5.00<H>    Ca    7.8<L>      09 Mar 2020 05:14  Phos  5.0     03-09  Mg     2.5     03-09        Vital Signs Last 24 Hrs  T(C): 37.5 (10 Mar 2020 01:00), Max: 37.5 (10 Mar 2020 01:00)  T(F): 99.5 (10 Mar 2020 01:00), Max: 99.5 (10 Mar 2020 01:00)  HR: 88 (10 Mar 2020 01:00) (70 - 108)  BP: 117/61 (10 Mar 2020 01:00) (79/53 - 140/111)  BP(mean): 84 (10 Mar 2020 01:00) (60 - 117)  RR: 22 (10 Mar 2020 01:00) (15 - 40)  SpO2: 100% (10 Mar 2020 01:00) (87% - 100%)    Physical Examination:  General: awake, alert  HEEBT: NCAT EOMI non icteric sclera  Neck: Left IJ HD catheter  CV: s1s2 irreg irreg 130s no murmurs gallops clicks or rubs  pulm: Clear right lung, decreased sounds on left.  CT in place with bloody rainage.   GI: soft NTND + BS  Extremities: no peripheral edema warm  : blood from meatus after celestin pulled.  Skin/Nails: No cyanosis, erythema or pallor, no clubbing  Psych: awake, hyperactive delerium.   neuro: oriented x 1, non focal       [  ] Unable to perform physical exam due to    [X ] I have evaluated this patient and have determined that restraints are warranted to optimize medical care. Patient was assessed for current physical and psychological risk factors as well as special needs. There are no medical conditions or limitations that would place this patient at risk while in restraints.    Type of restraint: Bilateral soft wrist restraints    Behavioral criteria for discontinuation of restraint: [ X ] See order    Attending MD Aware

## 2020-03-10 NOTE — PROGRESS NOTE ADULT - SUBJECTIVE AND OBJECTIVE BOX
All interim records and events noted.    lethargic      MEDICATIONS  (STANDING):  aMIOdarone    Tablet   Oral   aMIOdarone    Tablet 400 milliGRAM(s) Oral every 8 hours  atorvastatin 10 milliGRAM(s) Oral at bedtime  chlorhexidine 2% Cloths 1 Application(s) Topical <User Schedule>  dronabinol 2.5 milliGRAM(s) Oral two times a day  epoetin brenda Injectable 43710 Unit(s) IV Push <User Schedule>  famotidine    Tablet 20 milliGRAM(s) Oral daily  heparin  Injectable 5000 Unit(s) SubCutaneous every 8 hours  lactobacillus acidophilus 1 Tablet(s) Oral three times a day  midodrine 5 milliGRAM(s) Oral every 8 hours  polyethylene glycol 3350 17 Gram(s) Oral two times a day  sevelamer carbonate 800 milliGRAM(s) Oral three times a day with meals    MEDICATIONS  (PRN):  acetaminophen   Tablet .. 650 milliGRAM(s) Oral every 6 hours PRN Temp greater or equal to 38C (100.4F), Mild Pain (1 - 3)  albuterol/ipratropium for Nebulization 3 milliLiter(s) Nebulizer every 6 hours PRN Shortness of Breath and/or Wheezing      Vital Signs Last 24 Hrs  T(C): 36.9 (10 Mar 2020 11:25), Max: 37.5 (10 Mar 2020 01:00)  T(F): 98.4 (10 Mar 2020 11:25), Max: 99.5 (10 Mar 2020 01:00)  HR: 88 (10 Mar 2020 10:00) (71 - 106)  BP: 118/56 (10 Mar 2020 10:00) (93/50 - 133/73)  BP(mean): 72 (10 Mar 2020 10:00) (67 - 96)  RR: 31 (10 Mar 2020 10:00) (15 - 44)  SpO2: 100% (10 Mar 2020 10:00) (87% - 100%)    PHYSICAL EXAM  General: well developed  well nourished, but frail elderly man, in no acute distress  Head: atraumatic, normocephalic  Neck: supple, trachea midline  CV: S1 S2, regular rate and rhythm  Lungs: clear to auscultation, no wheezes/rhonchi  Abdomen: soft, nontender, bowel sounds present, no palpable masses, pouchy  Extrem: no clubbing/cyanosis/edema  Skin: no significant increased ecchymosis/petechiae        LABS:             8.1    12.62 )-----------( 387      ( 03-10 @ 05:09 )             24.1                7.6    9.66  )-----------( 363      ( 03-09 @ 05:14 )             23.3                8.7    12.28 )-----------( 484      ( 03-08 @ 05:58 )             26.0       03-10    139  |  102  |  52<H>  ----------------------------<  124<H>  5.0   |  26  |  5.50<H>    Ca    8.3<L>      10 Mar 2020 05:09  Phos  5.6     03-10  Mg     2.6     03-10    TPro  5.7<L>  /  Alb  2.2<L>  /  TBili  0.6  /  DBili  x   /  AST  49<H>  /  ALT  57  /  AlkPhos  72  03-10    03-10 @ 05:09  PT17.3 INR1.52  PTT28.4  03-09 @ 05:14  PT19.2 INR1.69  PTT29.5  03-08 @ 05:58  PT17.3 INR1.52  PTT28.5  03-06 @ 06:06  PT17.5 INR1.54  PTT22.7      RADIOLOGY & ADDITIONAL STUDIES:    IMPRESSION/RECOMMENDATIONS:

## 2020-03-10 NOTE — PROGRESS NOTE ADULT - SUBJECTIVE AND OBJECTIVE BOX
Patient is a 89y old  Male who presents with a chief complaint of Left Empyema (10 Mar 2020 10:01)    24 hour events: ***    REVIEW OF SYSTEMS  Constitutional: No fever, chills, fatigue  Neuro: No headache, numbness, weakness  Resp: No cough, wheezing, shortness of breath  CVS: No chest pain, palpitations, leg swelling  GI: No abdominal pain, nausea, vomiting, diarrhea   : No dysuria, frequency, incontinence  Skin: No itching, burning, rashes, or lesions   Msk: No joint pain or swelling  Psych: No depression, anxiety, mood swings  Heme: No bleeding    T(F): 98.4 (03-10-20 @ 11:25), Max: 99.5 (03-10-20 @ 01:00)  HR: 88 (03-10-20 @ 10:00) (71 - 106)  BP: 118/56 (03-10-20 @ 10:00) (93/50 - 133/73)  RR: 31 (03-10-20 @ 10:00) (15 - 44)  SpO2: 100% (03-10-20 @ 10:00) (87% - 100%)  Wt(kg): --            I&O's Summary    03-09 @ 07:01  -  03-10 @ 07:00  --------------------------------------------------------  IN: 590 mL / OUT: 945 mL / NET: -355 mL      PHYSICAL EXAM  General:   CNS:   HEENT:   Resp:   CVS:   Abd:   Ext:   Skin:     MEDICATIONS    aMIOdarone    Tablet Oral  aMIOdarone    Tablet Oral  midodrine Oral    atorvastatin Oral    albuterol/ipratropium for Nebulization Nebulizer PRN    acetaminophen   Tablet .. Oral PRN  dronabinol Oral      heparin  Injectable SubCutaneous    famotidine    Tablet Oral  polyethylene glycol 3350 Oral        epoetin brenda Injectable IV Push    chlorhexidine 2% Cloths Topical    lactobacillus acidophilus Oral  sevelamer carbonate Oral                          8.1    12.62 )-----------( 387      ( 10 Mar 2020 05:09 )             24.1       03-10    139  |  102  |  52<H>  ----------------------------<  124<H>  5.0   |  26  |  5.50<H>    Ca    8.3<L>      10 Mar 2020 05:09  Phos  5.6     03-10  Mg     2.6     03-10    TPro  5.7<L>  /  Alb  2.2<L>  /  TBili  0.6  /  DBili  x   /  AST  49<H>  /  ALT  57  /  AlkPhos  72  03-10          PT/INR - ( 10 Mar 2020 05:09 )   PT: 17.3 sec;   INR: 1.52 ratio         PTT - ( 10 Mar 2020 05:09 )  PTT:28.4 sec          Radiology: ***  Bedside lung ultrasound: ***  Bedside ECHO: ***    CENTRAL LINE: Y/N          DATE INSERTED:              REMOVE: Y/N  SOLIS: Y/N                        DATE INSERTED:              REMOVE: Y/N  A-LINE: Y/N                       DATE INSERTED:              REMOVE: Y/N    GLOBAL ISSUE/BEST PRACTICE  Analgesia:   Sedation:   CAM-ICU:   HOB elevation: yes  Stress ulcer prophylaxis:   VTE prophylaxis:   Glycemic control:   Nutrition:     CODE STATUS: ***  GOC discussion: Y Patient is a 89y old  Male who presents with a chief complaint of Left Empyema (10 Mar 2020 10:01)    24 hour events: Patient seen and examined at bedside. No acute events overnight. Patient's wife and daughter present at bedside. Wife expresses concerns regarding medication-induced somnolence, preferring to see him in a state more resemblant of "his normal self." Wife and daughter continue to defer to their son, a palliative physician in [place], regarding further care. Patient is arousable, able to open his eyes upon command and answers simple yes/no questions.      REVIEW OF SYSTEMS: Unable to obtain due to current medical condition.      T(F): 98.4 (03-10-20 @ 11:25), Max: 99.5 (03-10-20 @ 01:00)  HR: 88 (03-10-20 @ 10:00) (71 - 106)  BP: 118/56 (03-10-20 @ 10:00) (93/50 - 133/73)  RR: 31 (03-10-20 @ 10:00) (15 - 44)  SpO2: 100% (03-10-20 @ 10:00) (87% - 100%)      I&O's Summary    03-09 @ 07:01  -  03-10 @ 07:00  --------------------------------------------------------  IN: 590 mL / OUT: 945 mL / NET: -355 mL      PHYSICAL EXAM  General: somnolent elderly male, easily arousable, in NAD  CNS: awake but unable to participate in conversation  HEENT: EOMI, PERRL  Resp: clear inspirations, with coarse expiratory breath sounds though improved from yesterday, L chest tube in place  CVS: +S1S2, RRR, no m/r/g  Abd: soft, non-tender, non-distended, normoactive BS x4  Ext: no c/c/e of b/l LE  Skin: color normal for race, warm, dry, well perfused        MEDICATIONS    aMIOdarone    Tablet Oral  aMIOdarone    Tablet Oral  midodrine Oral  atorvastatin Oral  albuterol/ipratropium for Nebulization Nebulizer PRN  acetaminophen   Tablet .. Oral PRN  dronabinol Oral  heparin  Injectable SubCutaneous  famotidine    Tablet Oral  polyethylene glycol 3350 Oral  epoetin brenda Injectable IV Push  chlorhexidine 2% Cloths Topical  lactobacillus acidophilus Oral  sevelamer carbonate Oral                              8.1    12.62 )-----------( 387      ( 10 Mar 2020 05:09 )             24.1       03-10    139  |  102  |  52<H>  ----------------------------<  124<H>  5.0   |  26  |  5.50<H>    Ca    8.3<L>      10 Mar 2020 05:09  Phos  5.6     03-10  Mg     2.6     03-10    TPro  5.7<L>  /  Alb  2.2<L>  /  TBili  0.6  /  DBili  x   /  AST  49<H>  /  ALT  57  /  AlkPhos  72  03-10    PT/INR - ( 10 Mar 2020 05:09 )   PT: 17.3 sec;   INR: 1.52 ratio       PTT - ( 10 Mar 2020 05:09 )  PTT:28.4 sec          Radiology:    EXAM:  CT CHEST                        PROCEDURE DATE:  03/09/2020      INTERPRETATION:  CLINICAL INFORMATION: Left-sided pleural effusion, status post pigtail catheter. For interval follow-up.    COMPARISON: Chest CT 3/1/2020.    PROCEDURE:   CT of the Chest was performed without intravenous contrast.  Sagittal and coronal reformats were performed.    FINDINGS:    LUNGS AND AIRWAYS: Patent central airways.  There is consolidation of the left lower lobe, unchanged. There is now compressive atelectasis posteriorly of the right lung, especially the right lower lobe, new from the previous exam. There is intralobular septal thickening with groundglass attenuation in the aerated portion of the lungs bilaterally which may represent mildpulmonary vascular congestion.    PLEURA: Interval development of a small right pleural effusion. Scattered pleural calcifications are noted posteriorly in the right hemithorax. Again noted is a loculated left pleural collection posterior laterally with a pigtail pleural catheter at the base, below the level of the loculated fluid. The overall appearance and size of this loculated left pleural collection is unchanged.    MEDIASTINUM AND KAELYN: No lymphadenopathy.    VESSELS: The ascending aorta isdilated measuring 4.5 x 4.3 cm at its widest axial dimension. The main pulmonary artery is normal in caliber. There is coronary artery calcification.    HEART: Cardiomegaly. No pericardial effusion.    CHEST WALL AND LOWER NECK: The thyroid gland is within normal limits. There is no supraclavicular or axillary lymphadenopathy.    VISUALIZED UPPER ABDOMEN: The adrenal glands are within normal limits. There is a large calcified gallstone again noted within the gallbladder. The imaged portions of the unenhanced liver, spleen, pancreas and kidneys are within normal limits.    BONES: Multilevel degenerative change of the thoracic spine with osteophytes, degenerative disc disease and facet joint arthropathy.    IMPRESSION:     No significant interval change in size of the left loculated pleural collection. Please note that the left pigtail pleural catheter is located inferior to the level of the loculated fluid.    Interval development of a small right pleural effusion with compressive atelectasis of the adjacent lung parenchyma.    Stable left lower lobe consolidation.      Bedside lung ultrasound: focal B lines L>R, small R effusion    Bedside ECHO: slightly decreased LV function       CENTRAL LINE: Y, L IJ central line for HD  SOLIS: Y  A-LINE: N    GLOBAL ISSUE/BEST PRACTICE  Analgesia: Y  Sedation: N  HOB elevation: Y  Stress ulcer prophylaxis: Y  VTE prophylaxis: Y  Glycemic control: Y  Nutrition: Y    CODE STATUS: DNR/DNI  GOC discussion: Y

## 2020-03-10 NOTE — PROGRESS NOTE ADULT - ASSESSMENT
h/o nhl- gastric maltoma  abnormal ct  left empyema, sp drainage  gastric wall thickening  roberto       prior nc ct ap showing gastric wall thickening  per dtr has had op egd w/in past yr  gerd precautions  cont pepcid  cont marinol  cont bowel regimen  monitor cbc daily  diet as tolerated  encouragement/assistance at meals  further management per primary; cont icu care    Advanced care planning was discussed with patient and family.  Advanced care planning forms were reviewed and discussed.  Risks, benefits and alternatives of gastroenterologic procedures were discussed in detail and all questions were answered.    30 minutes spent.

## 2020-03-10 NOTE — PROGRESS NOTE ADULT - ASSESSMENT
89 yo M with PMH of HLD and MALToma of the stomach (under surveillance), spinal stenosis with extensive lumbar surgery in 2011 and bilateral LE neuropathy, admitted for L side empyema and R ureteral stone. Patient initially reported what appeared to be stable occasional angina however later denied this.  Elevated proBNP however normal LV systolic function with EF 55% on echo and no evidence of meaningful volume overload on exam.   Review of EKG revealed possible inferior infarct however no wall motion abnormalities on 2/27 echo.   He is now in the intensive care unit status post lytic infusion therapy through a chest tube for his left-sided empyema. GUS now s/p HD     - no clear acute ischemia  - ekg has suggested possible imi, but no wma on echo  - cont statin    - Had rapid AF over last 24-48 hours, now in SR  - Continue amiodarone 400 TID to 5 g load, than 200 QD  - No AC for now given history of hematuria and bloody chest tube drainage     - no meaningful volume overload   - now on hd prn  - CT management per CTS    - still hypotensive.  Continue midodrine  - levo gtt, titrated off       - Monitor and replete electrolytes. Keep K>4.0 and Mg>2.0.       - Rest of management per urology/CT surgery/primary team.  - Other cardiovascular workup will depend on clinical course. Will follow.     The patient remains at risk of abrupt decompensation.  I have personally provided  35 minutes of critical care time, excluding time spent on separate procedures.

## 2020-03-10 NOTE — PROGRESS NOTE ADULT - SUBJECTIVE AND OBJECTIVE BOX
INTERVAL HPI/OVERNIGHT EVENTS:  pt seen and examined  denies abd pain  taking po meds, had non bloody bm per rn    MEDICATIONS  (STANDING):  aMIOdarone    Tablet   Oral   aMIOdarone    Tablet 400 milliGRAM(s) Oral every 8 hours  atorvastatin 10 milliGRAM(s) Oral at bedtime  chlorhexidine 2% Cloths 1 Application(s) Topical <User Schedule>  dronabinol 2.5 milliGRAM(s) Oral two times a day  epoetin brenda Injectable 92080 Unit(s) IV Push <User Schedule>  famotidine    Tablet 20 milliGRAM(s) Oral daily  heparin  Injectable 5000 Unit(s) SubCutaneous every 8 hours  lactobacillus acidophilus 1 Tablet(s) Oral three times a day  melatonin 6 milliGRAM(s) Oral at bedtime  midodrine 5 milliGRAM(s) Oral every 8 hours  polyethylene glycol 3350 17 Gram(s) Oral two times a day  QUEtiapine 12.5 milliGRAM(s) Oral at bedtime  sevelamer carbonate 800 milliGRAM(s) Oral three times a day with meals    MEDICATIONS  (PRN):  acetaminophen   Tablet .. 650 milliGRAM(s) Oral every 6 hours PRN Temp greater or equal to 38C (100.4F), Mild Pain (1 - 3)  albuterol/ipratropium for Nebulization 3 milliLiter(s) Nebulizer every 6 hours PRN Shortness of Breath and/or Wheezing      Allergies    No Known Allergies    Intolerances        Review of Systems:    see above       Vital Signs Last 24 Hrs  T(C): 36.7 (10 Mar 2020 07:50), Max: 37.5 (10 Mar 2020 01:00)  T(F): 98 (10 Mar 2020 07:50), Max: 99.5 (10 Mar 2020 01:00)  HR: 90 (10 Mar 2020 07:00) (70 - 106)  BP: 120/64 (10 Mar 2020 07:00) (83/52 - 133/73)  BP(mean): 86 (10 Mar 2020 07:00) (63 - 96)  RR: 33 (10 Mar 2020 07:00) (15 - 40)  SpO2: 100% (10 Mar 2020 07:00) (87% - 100%)    PHYSICAL EXAM:      General:  lying in  bed  HEENT:  NC/AT  Abdomen:  Soft nt mild dt  Extremities:  no edema  Neuro/Psych:  Awake alert some confusion      LABS:                        8.1    12.62 )-----------( 387      ( 10 Mar 2020 05:09 )             24.1     03-10    139  |  102  |  52<H>  ----------------------------<  124<H>  5.0   |  26  |  5.50<H>    Ca    8.3<L>      10 Mar 2020 05:09  Phos  5.6     03-10  Mg     2.6     03-10    TPro  5.7<L>  /  Alb  2.2<L>  /  TBili  0.6  /  DBili  x   /  AST  49<H>  /  ALT  57  /  AlkPhos  72  03-10    PT/INR - ( 10 Mar 2020 05:09 )   PT: 17.3 sec;   INR: 1.52 ratio         PTT - ( 10 Mar 2020 05:09 )  PTT:28.4 sec      RADIOLOGY & ADDITIONAL TESTS:

## 2020-03-10 NOTE — PROGRESS NOTE ADULT - SUBJECTIVE AND OBJECTIVE BOX
CHARLY LU  89y  Male    Patient is a 89y old  Male who presents with a chief complaint of Left Empyema (10 Mar 2020 13:54)      lethargic but arousable.       PAST MEDICAL & SURGICAL HISTORY:  GI Bleed: 2007  Stomach Ulcer: H pylori 2007, treated with antibiotics  Osteoarthritis  Hyperlipidemia  Herniated Disc  Spinal Stenosis  Diverticulitis  GERD (Gastroesophageal Reflux Disease)  Chronic Lymphocytic Leukemia: followed by oncologist in [place]  S/P Tonsillectomy: childhood  Status Post Arthroscopy: right shoulder  History of Arthroscopy of Knee: bilateral          PHYSICAL EXAM:    T(C): 36.9 (03-10-20 @ 11:25), Max: 37.5 (03-10-20 @ 01:00)  HR: 88 (03-10-20 @ 10:00) (71 - 106)  BP: 118/56 (03-10-20 @ 10:00) (93/50 - 133/73)  RR: 31 (03-10-20 @ 10:00) (15 - 44)  SpO2: 100% (03-10-20 @ 10:00) (87% - 100%)  Wt(kg): --    I&O's Detail    09 Mar 2020 07:01  -  10 Mar 2020 07:00  --------------------------------------------------------  IN:    Oral Fluid: 590 mL  Total IN: 590 mL    OUT:    Chest Tube: 70 mL    Indwelling Catheter - Urethral: 875 mL  Total OUT: 945 mL    Total NET: -355 mL          Respiratory: clear anteriorly, decreased BS at bases  Cardiovascular: S1 S2  Gastrointestinal: soft NT ND +BS  Extremities: trace edema   Neuro: Awake and alert    MEDICATIONS  (STANDING):  aMIOdarone    Tablet   Oral   aMIOdarone    Tablet 400 milliGRAM(s) Oral every 8 hours  atorvastatin 10 milliGRAM(s) Oral at bedtime  chlorhexidine 2% Cloths 1 Application(s) Topical <User Schedule>  dronabinol 2.5 milliGRAM(s) Oral two times a day  epoetin brenda Injectable 53327 Unit(s) IV Push <User Schedule>  famotidine    Tablet 20 milliGRAM(s) Oral daily  heparin  Injectable 5000 Unit(s) SubCutaneous every 8 hours  lactobacillus acidophilus 1 Tablet(s) Oral three times a day  midodrine 5 milliGRAM(s) Oral every 8 hours  polyethylene glycol 3350 17 Gram(s) Oral two times a day  sevelamer carbonate 800 milliGRAM(s) Oral three times a day with meals    MEDICATIONS  (PRN):  acetaminophen   Tablet .. 650 milliGRAM(s) Oral every 6 hours PRN Temp greater or equal to 38C (100.4F), Mild Pain (1 - 3)  albuterol/ipratropium for Nebulization 3 milliLiter(s) Nebulizer every 6 hours PRN Shortness of Breath and/or Wheezing                            8.1    12.62 )-----------( 387      ( 10 Mar 2020 05:09 )             24.1       03-10    139  |  102  |  52<H>  ----------------------------<  124<H>  5.0   |  26  |  5.50<H>    Ca    8.3<L>      10 Mar 2020 05:09  Phos  5.6     03-10  Mg     2.6     03-10    TPro  5.7<L>  /  Alb  2.2<L>  /  TBili  0.6  /  DBili  x   /  AST  49<H>  /  ALT  57  /  AlkPhos  72  03-10      Creatinine Trend: Creatinine Trend: 5.50<--, 5.00<--, 4.90<--, 7.20<--, 7.30<--, 6.80<--

## 2020-03-10 NOTE — PROGRESS NOTE ADULT - SUBJECTIVE AND OBJECTIVE BOX
[facility] - Warren Posada, Dandre, Abelardo, Lucina, Kelvin Travis  Office Number:  [phone]    Patient resting comfortably in bed in NAD.  Laying flat with no respiratory distress. tired this morning, sleeping comfortably    ROS: negative unless otherwise mentioned.    Telemetry:  af to sr    MEDICATIONS  (STANDING):  aMIOdarone    Tablet   Oral   aMIOdarone    Tablet 400 milliGRAM(s) Oral every 8 hours  atorvastatin 10 milliGRAM(s) Oral at bedtime  chlorhexidine 2% Cloths 1 Application(s) Topical <User Schedule>  dronabinol 2.5 milliGRAM(s) Oral two times a day  epoetin brenda Injectable 27582 Unit(s) IV Push <User Schedule>  famotidine    Tablet 20 milliGRAM(s) Oral daily  heparin  Injectable 5000 Unit(s) SubCutaneous every 8 hours  lactobacillus acidophilus 1 Tablet(s) Oral three times a day  melatonin 6 milliGRAM(s) Oral at bedtime  midodrine 5 milliGRAM(s) Oral every 8 hours  polyethylene glycol 3350 17 Gram(s) Oral two times a day  QUEtiapine 12.5 milliGRAM(s) Oral at bedtime  sevelamer carbonate 800 milliGRAM(s) Oral three times a day with meals    MEDICATIONS  (PRN):  acetaminophen   Tablet .. 650 milliGRAM(s) Oral every 6 hours PRN Temp greater or equal to 38C (100.4F), Mild Pain (1 - 3)  albuterol/ipratropium for Nebulization 3 milliLiter(s) Nebulizer every 6 hours PRN Shortness of Breath and/or Wheezing      Allergies    No Known Allergies    Intolerances        Vital Signs Last 24 Hrs  T(C): 37.5 (10 Mar 2020 01:00), Max: 37.5 (10 Mar 2020 01:00)  T(F): 99.5 (10 Mar 2020 01:00), Max: 99.5 (10 Mar 2020 01:00)  HR: 92 (10 Mar 2020 06:00) (70 - 106)  BP: 103/57 (10 Mar 2020 06:00) (83/52 - 133/73)  BP(mean): 75 (10 Mar 2020 06:00) (63 - 96)  RR: 28 (10 Mar 2020 06:00) (15 - 40)  SpO2: 100% (10 Mar 2020 06:00) (87% - 100%)    I&O's Summary    08 Mar 2020 07:01  -  09 Mar 2020 07:00  --------------------------------------------------------  IN: 990 mL / OUT: 1790 mL / NET: -800 mL    09 Mar 2020 07:01  -  10 Mar 2020 06:51  --------------------------------------------------------  IN: 590 mL / OUT: 945 mL / NET: -355 mL        ON EXAM:    Constitutional: well-nourished, well-developed, NAD   HEENT:  MMM, sclerae anicteric, conjunctivae clear, no oral cyanosis.  Pulmonary: Non-labored, breath sounds are clear bilaterally, No wheezing, rales or rhonchi  Cardiovascular: Regular, S1 and S2.  No murmur.  No rubs, gallops or clicks  Gastrointestinal: Bowel Sounds present, soft, nontender.   Lymph: No peripheral edema.   Neurological: Alert, no focal deficits  Skin: No rashes.  Psych:  Mood & affect appropriate    LABS: All Labs Reviewed:                        8.1    12.62 )-----------( 387      ( 10 Mar 2020 05:09 )             24.1                         7.6    9.66  )-----------( 363      ( 09 Mar 2020 05:14 )             23.3                         8.7    12.28 )-----------( 484      ( 08 Mar 2020 05:58 )             26.0     10 Mar 2020 05:09    139    |  102    |  52     ----------------------------<  124    5.0     |  26     |  5.50   09 Mar 2020 05:14    140    |  103    |  51     ----------------------------<  133    4.8     |  27     |  5.00   09 Mar 2020 01:44    137    |  103    |  47     ----------------------------<  128    5.2     |  25     |  4.90     Ca    8.3        10 Mar 2020 05:09  Ca    7.8        09 Mar 2020 05:14  Ca    8.0        09 Mar 2020 01:44  Phos  5.6       10 Mar 2020 05:09  Phos  5.0       09 Mar 2020 05:14  Phos  7.1       08 Mar 2020 05:58  Mg     2.6       10 Mar 2020 05:09  Mg     2.5       09 Mar 2020 05:14  Mg     2.6       09 Mar 2020 01:44    TPro  5.7    /  Alb  2.2    /  TBili  0.6    /  DBili  x      /  AST  49     /  ALT  57     /  AlkPhos  72     10 Mar 2020 05:09    PT/INR - ( 10 Mar 2020 05:09 )   PT: 17.3 sec;   INR: 1.52 ratio         PTT - ( 10 Mar 2020 05:09 )  PTT:28.4 sec      Blood Culture:     03-09 @ 01:44  TSH: 3.59

## 2020-03-10 NOTE — PROGRESS NOTE ADULT - ASSESSMENT
89 white male with a history of GERD, HLD, now with empyema on the left S/P IR drainage and is on alteplase.   Was given vancomycin and now has GUS. Urine out put is marginal. will monitor closely and attempt dialysis in AM if need be.    Spoke to patient's son who is a physician at bed side.   case discussed with ICU team, will follow closely.

## 2020-03-10 NOTE — PROGRESS NOTE ADULT - ASSESSMENT
89 y/o man known to our office, dx'd with gastric MALToma 2013, bone marrow and 2017 repeat gastric bx c/w Chronic Lymphocytic Leukemia/Small Lymphocytic Lymphoma, under serial monitoring w EGD and PETCT, has not required treatment. Also hx B12 deficiency.  Adm w left empyema, hx URI sx Jan 2020  Post IR pigtail insertion w minimal output and tx to ICU for dornase/alteplase adm w resultant partial output. Repeat CT chest showed tip is inferior to the loculated fluid and CT removed.  Course complicated by GUS in setting of hypotension and exposure to Vancomycin    -now on hemodialysis for renal failure  -son had inquired biopsy of lung to see if lymphoma is present and cause of acute condition. However, even if able to be done, this would be a blind biopsy therefore negative findings does not equate no disease present. Also, resultswould not  of patient in view of present clinical condition as pt not candidate for any meaningful oncological treatment.    continue acute care

## 2020-03-10 NOTE — PROGRESS NOTE ADULT - ASSESSMENT
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    #Empyema:  -left-sided empyema  -completed course of zosyn, monitoring off Abx  -had chest tube placed and drainage, but incomplete amount drained due to loculations  -has been on tPA/dornase brenda to help with drainage  -CT surgery, Dr. Steven, f/up recs  -heme/onc (Shanti) recs appreciated -- do not recommend CT-guided biopsy of lung at this point as feel it would be a blind bx with poor yield and would not  at this time regardless  -chest tube to be removed  -c/w midodrine for hypotension  -pulm (Hans), recs appreciated    #afib with RVR:  -new onset couple of days ago  -now converted back to NSR  -now in NSR but being amiodarone loaded as pt is hypotensive and being in rapid afib makes him even more unstable  -cardio ([facility]), recs appreciated    #Acute renal failure:  -likely ATN in setting of hypotension and may have had toxicity from Abx  -started on HD  -nephro (Sudheer), recs appreciated    #gastric thickening  -h/o nhl- gastric maltoma  -GI (Kim) eval  -had EGD within past year as outpt  -no plan for EGD at this time  -outpt f/up    #Acute encephalopathy:  -likely uremic encephalopathy  -c/w HD as per neprho  -monitor mentation  -re-orientation  -on seroquel    #Anemia:  - c/w epogen as per nephro    #VTE ppx:  -HSQ

## 2020-03-10 NOTE — PROGRESS NOTE ADULT - SUBJECTIVE AND OBJECTIVE BOX
Patient is a 89y old  Male who presents with a chief complaint of left flank pain, fever, and cough.      INTERVAL HPI/OVERNIGHT EVENTS: Pt is somnolent and answering few simple questions. Denies chest pain, abd pain, fever, SOB, headache.     MEDICATIONS  (STANDING):  aMIOdarone    Tablet   Oral   aMIOdarone    Tablet 400 milliGRAM(s) Oral every 8 hours  atorvastatin 10 milliGRAM(s) Oral at bedtime  chlorhexidine 2% Cloths 1 Application(s) Topical <User Schedule>  dronabinol 2.5 milliGRAM(s) Oral two times a day  epoetin brenda Injectable 39315 Unit(s) IV Push <User Schedule>  famotidine    Tablet 20 milliGRAM(s) Oral daily  heparin  Injectable 5000 Unit(s) SubCutaneous every 8 hours  lactobacillus acidophilus 1 Tablet(s) Oral three times a day  midodrine 5 milliGRAM(s) Oral every 8 hours  polyethylene glycol 3350 17 Gram(s) Oral two times a day  sevelamer carbonate 800 milliGRAM(s) Oral three times a day with meals    MEDICATIONS  (PRN):  acetaminophen   Tablet .. 650 milliGRAM(s) Oral every 6 hours PRN Temp greater or equal to 38C (100.4F), Mild Pain (1 - 3)  albuterol/ipratropium for Nebulization 3 milliLiter(s) Nebulizer every 6 hours PRN Shortness of Breath and/or Wheezing      Allergies    No Known Allergies    Intolerances        REVIEW OF SYSTEMS: limited due to pt's somnolence  CONSTITUTIONAL: +fatigue; No fever   HEENT:  No headache  RESPIRATORY: No cough or shortness of breath  CARDIOVASCULAR: No chest pain  GASTROINTESTINAL: No abd pain, nausea  NEUROLOGICAL: no focal weakness  MUSCULOSKELETAL: no myalgias     Vital Signs Last 24 Hrs  T(C): 36.9 (10 Mar 2020 11:25), Max: 37.5 (10 Mar 2020 01:00)  T(F): 98.4 (10 Mar 2020 11:25), Max: 99.5 (10 Mar 2020 01:00)  HR: 88 (10 Mar 2020 10:00) (71 - 106)  BP: 118/56 (10 Mar 2020 10:00) (93/50 - 133/73)  BP(mean): 72 (10 Mar 2020 10:00) (67 - 96)  RR: 31 (10 Mar 2020 10:00) (15 - 44)  SpO2: 100% (10 Mar 2020 10:00) (87% - 100%)    PHYSICAL EXAM:  GENERAL: somnolent, no sign of acute distress due to pain or dyspnea  HEENT:  anicteric, moist mucous membranes  CHEST/LUNG:  diminished breath sounds ; left-sided chest tube   HEART:  RRR, S1, S2  ABDOMEN:  BS+, soft, nontender, nondistended  EXTREMITIES: no edema, cyanosis, or calf tenderness  NERVOUS SYSTEM: somnolent, answers few questions and follows few commands    LABS:                        8.1    12.62 )-----------( 387      ( 10 Mar 2020 05:09 )             24.1       03-10    139  |  102  |  52<H>  ----------------------------<  124<H>  5.0   |  26  |  5.50<H>    Ca    8.3<L>      10 Mar 2020 05:09  Phos  5.6     03-10  Mg     2.6     03-10    TPro  5.7<L>  /  Alb  2.2<L>  /  TBili  0.6  /  DBili  x   /  AST  49<H>  /  ALT  57  /  AlkPhos  72  03-10    CAPILLARY BLOOD GLUCOSE              RADIOLOGY & ADDITIONAL TESTS:    Personally reviewed.     Consultant(s) Notes Reviewed:  [x] YES  [ ] NO Patient is a 89y old  Male who presents with a chief complaint of left flank pain, fever, and cough.    INTERVAL HPI/OVERNIGHT EVENTS: Pt is somnolent and answering few simple questions. Denies chest pain, abd pain, fever, SOB, headache.     MEDICATIONS  (STANDING):  aMIOdarone    Tablet   Oral   aMIOdarone    Tablet 400 milliGRAM(s) Oral every 8 hours  atorvastatin 10 milliGRAM(s) Oral at bedtime  chlorhexidine 2% Cloths 1 Application(s) Topical <User Schedule>  dronabinol 2.5 milliGRAM(s) Oral two times a day  epoetin brenda Injectable 34218 Unit(s) IV Push <User Schedule>  famotidine    Tablet 20 milliGRAM(s) Oral daily  heparin  Injectable 5000 Unit(s) SubCutaneous every 8 hours  lactobacillus acidophilus 1 Tablet(s) Oral three times a day  midodrine 5 milliGRAM(s) Oral every 8 hours  polyethylene glycol 3350 17 Gram(s) Oral two times a day  sevelamer carbonate 800 milliGRAM(s) Oral three times a day with meals    MEDICATIONS  (PRN):  acetaminophen   Tablet .. 650 milliGRAM(s) Oral every 6 hours PRN Temp greater or equal to 38C (100.4F), Mild Pain (1 - 3)  albuterol/ipratropium for Nebulization 3 milliLiter(s) Nebulizer every 6 hours PRN Shortness of Breath and/or Wheezing      Allergies    No Known Allergies    Intolerances        REVIEW OF SYSTEMS: limited due to pt's somnolence  CONSTITUTIONAL: +fatigue; No fever   HEENT:  No headache  RESPIRATORY: No cough or shortness of breath  CARDIOVASCULAR: No chest pain  GASTROINTESTINAL: No abd pain, nausea  NEUROLOGICAL: no focal weakness  MUSCULOSKELETAL: no myalgias     Vital Signs Last 24 Hrs  T(C): 36.9 (10 Mar 2020 11:25), Max: 37.5 (10 Mar 2020 01:00)  T(F): 98.4 (10 Mar 2020 11:25), Max: 99.5 (10 Mar 2020 01:00)  HR: 88 (10 Mar 2020 10:00) (71 - 106)  BP: 118/56 (10 Mar 2020 10:00) (93/50 - 133/73)  BP(mean): 72 (10 Mar 2020 10:00) (67 - 96)  RR: 31 (10 Mar 2020 10:00) (15 - 44)  SpO2: 100% (10 Mar 2020 10:00) (87% - 100%)    PHYSICAL EXAM:  GENERAL: somnolent, no sign of acute distress due to pain or dyspnea  HEENT:  anicteric, moist mucous membranes  CHEST/LUNG:  diminished breath sounds ; left-sided chest tube   HEART:  RRR, S1, S2  ABDOMEN:  BS+, soft, nontender, nondistended  EXTREMITIES: no edema, cyanosis, or calf tenderness  NERVOUS SYSTEM: somnolent, answers few questions and follows few commands    LABS:                        8.1    12.62 )-----------( 387      ( 10 Mar 2020 05:09 )             24.1       03-10    139  |  102  |  52<H>  ----------------------------<  124<H>  5.0   |  26  |  5.50<H>    Ca    8.3<L>      10 Mar 2020 05:09  Phos  5.6     03-10  Mg     2.6     03-10    TPro  5.7<L>  /  Alb  2.2<L>  /  TBili  0.6  /  DBili  x   /  AST  49<H>  /  ALT  57  /  AlkPhos  72  03-10    CAPILLARY BLOOD GLUCOSE              RADIOLOGY & ADDITIONAL TESTS:    Personally reviewed.     Consultant(s) Notes Reviewed:  [x] YES  [ ] NO

## 2020-03-10 NOTE — PROGRESS NOTE ADULT - SUBJECTIVE AND OBJECTIVE BOX
Date/Time Patient Seen:  		  Referring MD:   Data Reviewed	       Patient is a 89y old  Male who presents with a chief complaint of Left Empyema (09 Mar 2020 14:47)      Subjective/HPI     PAST MEDICAL & SURGICAL HISTORY:  GI Bleed: 2007  Stomach Ulcer: H pylori 2007, treated with antibiotics  Osteoarthritis  Hyperlipidemia  Herniated Disc  Spinal Stenosis  Diverticulitis  GERD (Gastroesophageal Reflux Disease)  Chronic Lymphocytic Leukemia: followed by oncologist in [place]  S/P Tonsillectomy: childhood  Status Post Arthroscopy: right shoulder  History of Arthroscopy of Knee: bilateral  Osteoarthritis  Stomach Ulcer: H pylori, treated with antibiotics        Medication list         MEDICATIONS  (STANDING):  aMIOdarone    Tablet   Oral   aMIOdarone    Tablet 400 milliGRAM(s) Oral every 8 hours  atorvastatin 10 milliGRAM(s) Oral at bedtime  chlorhexidine 2% Cloths 1 Application(s) Topical <User Schedule>  dronabinol 2.5 milliGRAM(s) Oral two times a day  epoetin brenda Injectable 14575 Unit(s) IV Push <User Schedule>  famotidine    Tablet 20 milliGRAM(s) Oral daily  heparin  Injectable 5000 Unit(s) SubCutaneous every 8 hours  lactobacillus acidophilus 1 Tablet(s) Oral three times a day  melatonin 6 milliGRAM(s) Oral at bedtime  midodrine 5 milliGRAM(s) Oral every 8 hours  polyethylene glycol 3350 17 Gram(s) Oral two times a day  QUEtiapine 12.5 milliGRAM(s) Oral at bedtime  sevelamer carbonate 800 milliGRAM(s) Oral three times a day with meals    MEDICATIONS  (PRN):  acetaminophen   Tablet .. 650 milliGRAM(s) Oral every 6 hours PRN Temp greater or equal to 38C (100.4F), Mild Pain (1 - 3)  albuterol/ipratropium for Nebulization 3 milliLiter(s) Nebulizer every 6 hours PRN Shortness of Breath and/or Wheezing         Vitals log        ICU Vital Signs Last 24 Hrs  T(C): 37.5 (10 Mar 2020 01:00), Max: 37.5 (10 Mar 2020 01:00)  T(F): 99.5 (10 Mar 2020 01:00), Max: 99.5 (10 Mar 2020 01:00)  HR: 92 (10 Mar 2020 06:00) (70 - 106)  BP: 103/57 (10 Mar 2020 06:00) (83/52 - 133/73)  BP(mean): 75 (10 Mar 2020 06:00) (63 - 96)  ABP: --  ABP(mean): --  RR: 28 (10 Mar 2020 06:00) (15 - 40)  SpO2: 100% (10 Mar 2020 06:00) (87% - 100%)           Input and Output:  I&O's Detail    08 Mar 2020 07:01  -  09 Mar 2020 07:00  --------------------------------------------------------  IN:    Oral Fluid: 740 mL    Sodium Chloride 0.9% IV Bolus: 250 mL  Total IN: 990 mL    OUT:    Chest Tube: 260 mL    Indwelling Catheter - Urethral: 1530 mL  Total OUT: 1790 mL    Total NET: -800 mL      09 Mar 2020 07:01  -  10 Mar 2020 06:43  --------------------------------------------------------  IN:    Oral Fluid: 590 mL  Total IN: 590 mL    OUT:    Chest Tube: 70 mL    Indwelling Catheter - Urethral: 875 mL  Total OUT: 945 mL    Total NET: -355 mL          Lab Data                        8.1    12.62 )-----------( 387      ( 10 Mar 2020 05:09 )             24.1     03-10    139  |  102  |  52<H>  ----------------------------<  124<H>  5.0   |  26  |  5.50<H>    Ca    8.3<L>      10 Mar 2020 05:09  Phos  5.6     03-10  Mg     2.6     03-10    TPro  5.7<L>  /  Alb  2.2<L>  /  TBili  0.6  /  DBili  x   /  AST  49<H>  /  ALT  57  /  AlkPhos  72  03-10            Review of Systems	      Objective     Physical Examination    heart s1s2  lung dc BS  abd soft  head nc  head at  on o2 support        Pertinent Lab findings & Imaging      [place]:  NO   Adequate UO     I&O's Detail    08 Mar 2020 07:01  -  09 Mar 2020 07:00  --------------------------------------------------------  IN:    Oral Fluid: 740 mL    Sodium Chloride 0.9% IV Bolus: 250 mL  Total IN: 990 mL    OUT:    Chest Tube: 260 mL    Indwelling Catheter - Urethral: 1530 mL  Total OUT: 1790 mL    Total NET: -800 mL      09 Mar 2020 07:01  -  10 Mar 2020 06:43  --------------------------------------------------------  IN:    Oral Fluid: 590 mL  Total IN: 590 mL    OUT:    Chest Tube: 70 mL    Indwelling Catheter - Urethral: 875 mL  Total OUT: 945 mL    Total NET: -355 mL               Discussed with:     Cultures:	        Radiology

## 2020-03-11 ENCOUNTER — TRANSCRIPTION ENCOUNTER (OUTPATIENT)
Age: 85
End: 2020-03-11

## 2020-03-11 LAB
ALBUMIN SERPL ELPH-MCNC: 2.1 G/DL — LOW (ref 3.3–5)
ALP SERPL-CCNC: 71 U/L — SIGNIFICANT CHANGE UP (ref 40–120)
ALT FLD-CCNC: 50 U/L — SIGNIFICANT CHANGE UP (ref 12–78)
ANION GAP SERPL CALC-SCNC: 9 MMOL/L — SIGNIFICANT CHANGE UP (ref 5–17)
APTT BLD: 28 SEC — LOW (ref 28.5–37)
AST SERPL-CCNC: 35 U/L — SIGNIFICANT CHANGE UP (ref 15–37)
BASOPHILS # BLD AUTO: 0.03 K/UL — SIGNIFICANT CHANGE UP (ref 0–0.2)
BASOPHILS NFR BLD AUTO: 0.3 % — SIGNIFICANT CHANGE UP (ref 0–2)
BILIRUB SERPL-MCNC: 0.6 MG/DL — SIGNIFICANT CHANGE UP (ref 0.2–1.2)
BUN SERPL-MCNC: 56 MG/DL — HIGH (ref 7–23)
CALCIUM SERPL-MCNC: 8 MG/DL — LOW (ref 8.5–10.1)
CHLORIDE SERPL-SCNC: 104 MMOL/L — SIGNIFICANT CHANGE UP (ref 96–108)
CO2 SERPL-SCNC: 27 MMOL/L — SIGNIFICANT CHANGE UP (ref 22–31)
CREAT SERPL-MCNC: 5.7 MG/DL — HIGH (ref 0.5–1.3)
EOSINOPHIL # BLD AUTO: 0.06 K/UL — SIGNIFICANT CHANGE UP (ref 0–0.5)
EOSINOPHIL NFR BLD AUTO: 0.6 % — SIGNIFICANT CHANGE UP (ref 0–6)
GLUCOSE SERPL-MCNC: 87 MG/DL — SIGNIFICANT CHANGE UP (ref 70–99)
HCT VFR BLD CALC: 24.5 % — LOW (ref 39–50)
HGB BLD-MCNC: 7.9 G/DL — LOW (ref 13–17)
IMM GRANULOCYTES NFR BLD AUTO: 1.1 % — SIGNIFICANT CHANGE UP (ref 0–1.5)
INR BLD: 1.43 RATIO — HIGH (ref 0.88–1.16)
LYMPHOCYTES # BLD AUTO: 1.58 K/UL — SIGNIFICANT CHANGE UP (ref 1–3.3)
LYMPHOCYTES # BLD AUTO: 14.5 % — SIGNIFICANT CHANGE UP (ref 13–44)
MAGNESIUM SERPL-MCNC: 2.6 MG/DL — SIGNIFICANT CHANGE UP (ref 1.6–2.6)
MCHC RBC-ENTMCNC: 30.5 PG — SIGNIFICANT CHANGE UP (ref 27–34)
MCHC RBC-ENTMCNC: 32.2 GM/DL — SIGNIFICANT CHANGE UP (ref 32–36)
MCV RBC AUTO: 94.6 FL — SIGNIFICANT CHANGE UP (ref 80–100)
MONOCYTES # BLD AUTO: 0.78 K/UL — SIGNIFICANT CHANGE UP (ref 0–0.9)
MONOCYTES NFR BLD AUTO: 7.2 % — SIGNIFICANT CHANGE UP (ref 2–14)
NEUTROPHILS # BLD AUTO: 8.3 K/UL — HIGH (ref 1.8–7.4)
NEUTROPHILS NFR BLD AUTO: 76.3 % — SIGNIFICANT CHANGE UP (ref 43–77)
NRBC # BLD: 0 /100 WBCS — SIGNIFICANT CHANGE UP (ref 0–0)
PHOSPHATE SERPL-MCNC: 6 MG/DL — HIGH (ref 2.5–4.5)
PLATELET # BLD AUTO: 343 K/UL — SIGNIFICANT CHANGE UP (ref 150–400)
POTASSIUM SERPL-MCNC: 4.9 MMOL/L — SIGNIFICANT CHANGE UP (ref 3.5–5.3)
POTASSIUM SERPL-SCNC: 4.9 MMOL/L — SIGNIFICANT CHANGE UP (ref 3.5–5.3)
PROT SERPL-MCNC: 5.4 G/DL — LOW (ref 6–8.3)
PROTHROM AB SERPL-ACNC: 16.2 SEC — HIGH (ref 10–12.9)
RBC # BLD: 2.59 M/UL — LOW (ref 4.2–5.8)
RBC # FLD: 13.6 % — SIGNIFICANT CHANGE UP (ref 10.3–14.5)
SODIUM SERPL-SCNC: 140 MMOL/L — SIGNIFICANT CHANGE UP (ref 135–145)
WBC # BLD: 10.87 K/UL — HIGH (ref 3.8–10.5)
WBC # FLD AUTO: 10.87 K/UL — HIGH (ref 3.8–10.5)

## 2020-03-11 PROCEDURE — 99232 SBSQ HOSP IP/OBS MODERATE 35: CPT

## 2020-03-11 PROCEDURE — 99233 SBSQ HOSP IP/OBS HIGH 50: CPT

## 2020-03-11 PROCEDURE — 99291 CRITICAL CARE FIRST HOUR: CPT

## 2020-03-11 PROCEDURE — 93010 ELECTROCARDIOGRAM REPORT: CPT

## 2020-03-11 RX ADMIN — HEPARIN SODIUM 5000 UNIT(S): 5000 INJECTION INTRAVENOUS; SUBCUTANEOUS at 13:12

## 2020-03-11 RX ADMIN — Medication 1 TABLET(S): at 13:12

## 2020-03-11 RX ADMIN — Medication 2.5 MILLIGRAM(S): at 17:44

## 2020-03-11 RX ADMIN — Medication 1 TABLET(S): at 05:33

## 2020-03-11 RX ADMIN — POLYETHYLENE GLYCOL 3350 17 GRAM(S): 17 POWDER, FOR SOLUTION ORAL at 17:45

## 2020-03-11 RX ADMIN — Medication 2.5 MILLIGRAM(S): at 05:33

## 2020-03-11 RX ADMIN — ERYTHROPOIETIN 10000 UNIT(S): 10000 INJECTION, SOLUTION INTRAVENOUS; SUBCUTANEOUS at 17:50

## 2020-03-11 RX ADMIN — SEVELAMER CARBONATE 800 MILLIGRAM(S): 2400 POWDER, FOR SUSPENSION ORAL at 09:35

## 2020-03-11 RX ADMIN — AMIODARONE HYDROCHLORIDE 400 MILLIGRAM(S): 400 TABLET ORAL at 21:43

## 2020-03-11 RX ADMIN — Medication 1 TABLET(S): at 21:43

## 2020-03-11 RX ADMIN — ATORVASTATIN CALCIUM 10 MILLIGRAM(S): 80 TABLET, FILM COATED ORAL at 21:43

## 2020-03-11 RX ADMIN — POLYETHYLENE GLYCOL 3350 17 GRAM(S): 17 POWDER, FOR SOLUTION ORAL at 05:33

## 2020-03-11 RX ADMIN — HEPARIN SODIUM 5000 UNIT(S): 5000 INJECTION INTRAVENOUS; SUBCUTANEOUS at 05:33

## 2020-03-11 RX ADMIN — MIDODRINE HYDROCHLORIDE 5 MILLIGRAM(S): 2.5 TABLET ORAL at 05:33

## 2020-03-11 RX ADMIN — SEVELAMER CARBONATE 800 MILLIGRAM(S): 2400 POWDER, FOR SUSPENSION ORAL at 17:44

## 2020-03-11 RX ADMIN — AMIODARONE HYDROCHLORIDE 400 MILLIGRAM(S): 400 TABLET ORAL at 13:12

## 2020-03-11 RX ADMIN — AMIODARONE HYDROCHLORIDE 400 MILLIGRAM(S): 400 TABLET ORAL at 05:34

## 2020-03-11 RX ADMIN — HEPARIN SODIUM 5000 UNIT(S): 5000 INJECTION INTRAVENOUS; SUBCUTANEOUS at 21:43

## 2020-03-11 RX ADMIN — CHLORHEXIDINE GLUCONATE 1 APPLICATION(S): 213 SOLUTION TOPICAL at 09:35

## 2020-03-11 RX ADMIN — SEVELAMER CARBONATE 800 MILLIGRAM(S): 2400 POWDER, FOR SUSPENSION ORAL at 13:13

## 2020-03-11 RX ADMIN — FAMOTIDINE 20 MILLIGRAM(S): 10 INJECTION INTRAVENOUS at 13:12

## 2020-03-11 NOTE — CHART NOTE - NSCHARTNOTEFT_GEN_A_CORE
Time of evaluation: 1:39    Called to evaluate patient for restraints        Other interventions attempted: de-escalation, orientation check, environment modification, medication assessment    REVIEW OF SYSTEMS:  CONSTITUTIONAL: [  ] fever   [  ] fatigue  EYES: [  ] visual disturbances  ENMT:  [  ]difficulty hearing  [  ] throat pain  RESPIRATORY:  [  ]cough  [   ] wheezing  [   ] hemoptysis [  ] shortness of breath  CARDIOVASCULAR: [  ]chest pain  [  ] palpitations   GASTROINTESTINAL: [  ]  abdominal pain [  ] nausea [  ] vomiting  [  ] diarrhea  [  ] constipation  GENITOURINARY: [  ] dysuria [  ] frequency  [  ] hematuria [  ] incontinence  NEUROLOGICAL: [  ] headaches [  ] new numbness  SKIN: [  ]itching [  ] new rash   MUSCULOSKELETAL: [  ] back pain  [  ] extremity pain  PSYCHIATRIC: [  ] depression  [  ] anxiety  [  ] mood swings [  ] difficulty sleeping  ALLERGY AND IMMUNOLOGIC: [  ] hives    [ x ]Unable to perfrom ROS due to: AMS  [  ] ROS reviewed and all negative    PAST MEDICAL & SURGICAL HISTORY:  GI Bleed: 2007  Stomach Ulcer: H pylori 2007, treated with antibiotics  Osteoarthritis  Hyperlipidemia  Herniated Disc  Spinal Stenosis  Diverticulitis  GERD (Gastroesophageal Reflux Disease)  Chronic Lymphocytic Leukemia: followed by oncologist in [place]  S/P Tonsillectomy: childhood  Status Post Arthroscopy: right shoulder  History of Arthroscopy of Knee: bilateral    MEDICATIONS  (STANDING):  aMIOdarone    Tablet   Oral   aMIOdarone    Tablet 400 milliGRAM(s) Oral every 8 hours  atorvastatin 10 milliGRAM(s) Oral at bedtime  chlorhexidine 2% Cloths 1 Application(s) Topical <User Schedule>  dronabinol 2.5 milliGRAM(s) Oral two times a day  epoetin brenda Injectable 37197 Unit(s) IV Push <User Schedule>  famotidine    Tablet 20 milliGRAM(s) Oral daily  heparin  Injectable 5000 Unit(s) SubCutaneous every 8 hours  lactobacillus acidophilus 1 Tablet(s) Oral three times a day  midodrine 5 milliGRAM(s) Oral every 8 hours  polyethylene glycol 3350 17 Gram(s) Oral two times a day  sevelamer carbonate 800 milliGRAM(s) Oral three times a day with meals    MEDICATIONS  (PRN):  acetaminophen   Tablet .. 650 milliGRAM(s) Oral every 6 hours PRN Temp greater or equal to 38C (100.4F), Mild Pain (1 - 3)  albuterol/ipratropium for Nebulization 3 milliLiter(s) Nebulizer every 6 hours PRN Shortness of Breath and/or Wheezing                          8.1    12.62 )-----------( 387      ( 10 Mar 2020 05:09 )             24.1     03-10    139  |  102  |  52<H>  ----------------------------<  124<H>  5.0   |  26  |  5.50<H>    Ca    8.3<L>      10 Mar 2020 05:09  Phos  5.6     03-10  Mg     2.6     03-10    TPro  5.7<L>  /  Alb  2.2<L>  /  TBili  0.6  /  DBili  x   /  AST  49<H>  /  ALT  57  /  AlkPhos  72  03-10      Vital Signs Last 24 Hrs  T(C): 37 (11 Mar 2020 00:00), Max: 37 (10 Mar 2020 20:24)  T(F): 98.6 (11 Mar 2020 00:00), Max: 98.6 (10 Mar 2020 20:24)  HR: 77 (11 Mar 2020 00:00) (76 - 96)  BP: 118/61 (11 Mar 2020 00:00) (100/56 - 166/71)  BP(mean): 80 (11 Mar 2020 00:00) (72 - 102)  RR: 24 (11 Mar 2020 00:00) (13 - 44)  SpO2: 100% (11 Mar 2020 00:00) (100% - 100%)    Physical Examination:  General: awake, alert  HEEBT: NCAT EOMI non icteric sclera  Neck: Left IJ HD catheter  CV: s1s2 irreg irreg 130s no murmurs gallops clicks or rubs  pulm: Clear right lung, decreased sounds on left.  GI: soft NTND + BS  Extremities: no peripheral edema warm  : blood from meatus after celestin pulled.  Skin/Nails: No cyanosis, erythema or pallor, no clubbing  Psych: awake, hyperactive delerium.   neuro: oriented x 1, non focal       [  ] Unable to perform physical exam due to    [X ] I have evaluated this patient and have determined that restraints are warranted to optimize medical care. Patient was assessed for current physical and psychological risk factors as well as special needs. There are no medical conditions or limitations that would place this patient at risk while in restraints.    Type of restraint: Bilateral soft wrist restraints    Behavioral criteria for discontinuation of restraint: [ X ] See order    Attending MD Aware

## 2020-03-11 NOTE — PROGRESS NOTE ADULT - SUBJECTIVE AND OBJECTIVE BOX
All interim records and events noted.    lethargic    3/11 notes from the last few days were noted and discussed with the group. Pt has a white cell count of 10.87 hemoglobin of 7.9 platelet count of 931080 and normal diff.    MEDICATIONS  (STANDING):  aMIOdarone    Tablet   Oral   aMIOdarone    Tablet 400 milliGRAM(s) Oral every 8 hours  atorvastatin 10 milliGRAM(s) Oral at bedtime  chlorhexidine 2% Cloths 1 Application(s) Topical <User Schedule>  dronabinol 2.5 milliGRAM(s) Oral two times a day  epoetin brenda Injectable 90700 Unit(s) IV Push <User Schedule>  famotidine    Tablet 20 milliGRAM(s) Oral daily  heparin  Injectable 5000 Unit(s) SubCutaneous every 8 hours  lactobacillus acidophilus 1 Tablet(s) Oral three times a day  midodrine 5 milliGRAM(s) Oral every 8 hours  polyethylene glycol 3350 17 Gram(s) Oral two times a day  sevelamer carbonate 800 milliGRAM(s) Oral three times a day with meals    MEDICATIONS  (PRN):  acetaminophen   Tablet .. 650 milliGRAM(s) Oral every 6 hours PRN Temp greater or equal to 38C (100.4F), Mild Pain (1 - 3)  albuterol/ipratropium for Nebulization 3 milliLiter(s) Nebulizer every 6 hours PRN Shortness of Breath and/or Wheezing    ICU Vital Signs Last 24 Hrs  T(C): 36.8 (11 Mar 2020 15:19), Max: 37 (10 Mar 2020 20:24)  T(F): 98.3 (11 Mar 2020 15:19), Max: 98.6 (10 Mar 2020 20:24)  HR: 102 (11 Mar 2020 11:00) (75 - 108)  BP: 124/67 (11 Mar 2020 11:00) (100/56 - 166/71)  BP(mean): 90 (11 Mar 2020 11:00) (72 - 102)  ABP: --  ABP(mean): --  RR: 32 (11 Mar 2020 11:00) (13 - 33)  SpO2: 100% (11 Mar 2020 11:00) (98% - 100%)    PHYSICAL EXAM  General: well developed  well nourished, but frail elderly man, in no acute distress  Head: atraumatic, normocephalic  Neck: supple, trachea midline  CV: S1 S2, regular rate and rhythm  Lungs: clear to auscultation, no wheezes/rhonchi  Abdomen: soft, nontender, bowel sounds present, no palpable masses, pouchy  Extrem: no clubbing/cyanosis/edema  Skin: no significant increased ecchymosis/petechiae                              7.9    10.87 )-----------( 343      ( 11 Mar 2020 05:12 )             24.5                8.1    12.62 )-----------( 387      ( 03-10 @ 05:09 )             24.1                7.6    9.66  )-----------( 363      ( 03-09 @ 05:14 )             23.3                8.7    12.28 )-----------( 484      ( 03-08 @ 05:58 )             26.0       03-10    139  |  102  |  52<H>  ----------------------------<  124<H>  5.0   |  26  |  5.50<H>    Ca    8.3<L>      10 Mar 2020 05:09  Phos  5.6     03-10  Mg     2.6     03-10    TPro  5.7<L>  /  Alb  2.2<L>  /  TBili  0.6  /  DBili  x   /  AST  49<H>  /  ALT  57  /  AlkPhos  72  03-10    03-10 @ 05:09  PT17.3 INR1.52  PTT28.4  03-09 @ 05:14  PT19.2 INR1.69  PTT29.5  03-08 @ 05:58  PT17.3 INR1.52  PTT28.5  03-06 @ 06:06  PT17.5 INR1.54  PTT22.7      RADIOLOGY & ADDITIONAL STUDIES:    IMPRESSION/RECOMMENDATIONS:

## 2020-03-11 NOTE — PROGRESS NOTE ADULT - SUBJECTIVE AND OBJECTIVE BOX
INTERVAL HPI/OVERNIGHT EVENTS:  pt seen and examined  denies abd pain      MEDICATIONS  (STANDING):  aMIOdarone    Tablet   Oral   aMIOdarone    Tablet 400 milliGRAM(s) Oral every 8 hours  atorvastatin 10 milliGRAM(s) Oral at bedtime  chlorhexidine 2% Cloths 1 Application(s) Topical <User Schedule>  dronabinol 2.5 milliGRAM(s) Oral two times a day  epoetin brenda Injectable 16492 Unit(s) IV Push <User Schedule>  famotidine    Tablet 20 milliGRAM(s) Oral daily  heparin  Injectable 5000 Unit(s) SubCutaneous every 8 hours  lactobacillus acidophilus 1 Tablet(s) Oral three times a day  melatonin 6 milliGRAM(s) Oral at bedtime  midodrine 5 milliGRAM(s) Oral every 8 hours  polyethylene glycol 3350 17 Gram(s) Oral two times a day  QUEtiapine 12.5 milliGRAM(s) Oral at bedtime  sevelamer carbonate 800 milliGRAM(s) Oral three times a day with meals    MEDICATIONS  (PRN):  acetaminophen   Tablet .. 650 milliGRAM(s) Oral every 6 hours PRN Temp greater or equal to 38C (100.4F), Mild Pain (1 - 3)  albuterol/ipratropium for Nebulization 3 milliLiter(s) Nebulizer every 6 hours PRN Shortness of Breath and/or Wheezing      Allergies    No Known Allergies    Intolerances        Review of Systems:    see above       Vital Signs Last 24 Hrs  T(C): 36.7 (10 Mar 2020 07:50), Max: 37.5 (10 Mar 2020 01:00)  T(F): 98 (10 Mar 2020 07:50), Max: 99.5 (10 Mar 2020 01:00)  HR: 90 (10 Mar 2020 07:00) (70 - 106)  BP: 120/64 (10 Mar 2020 07:00) (83/52 - 133/73)  BP(mean): 86 (10 Mar 2020 07:00) (63 - 96)  RR: 33 (10 Mar 2020 07:00) (15 - 40)  SpO2: 100% (10 Mar 2020 07:00) (87% - 100%)    PHYSICAL EXAM:      General:  lying in  bed  HEENT:  NC/AT  Abdomen:  Soft nt mild dt  Extremities:  no edema  Neuro/Psych:  Awake alert some confusion      LABS:                        8.1    12.62 )-----------( 387      ( 10 Mar 2020 05:09 )             24.1     03-10    139  |  102  |  52<H>  ----------------------------<  124<H>  5.0   |  26  |  5.50<H>    Ca    8.3<L>      10 Mar 2020 05:09  Phos  5.6     03-10  Mg     2.6     03-10    TPro  5.7<L>  /  Alb  2.2<L>  /  TBili  0.6  /  DBili  x   /  AST  49<H>  /  ALT  57  /  AlkPhos  72  03-10    PT/INR - ( 10 Mar 2020 05:09 )   PT: 17.3 sec;   INR: 1.52 ratio         PTT - ( 10 Mar 2020 05:09 )  PTT:28.4 sec      RADIOLOGY & ADDITIONAL TESTS:

## 2020-03-11 NOTE — PROGRESS NOTE ADULT - SUBJECTIVE AND OBJECTIVE BOX
[facility]    Warren Posada, Dandre, Abelardo, Lucina, Thaddeus, Kelvin      [phone]    CHIEF COMPLAINT: Patient is a 89y old  Male who presents with a chief complaint of Left Empyema (10 Mar 2020 14:11)      Follow Up: pleural effusion, ct, pat, hypotension on midodrine, paf    Interim history: The patient reports no new symptoms.  Denies chest discomfort and shortness of breath.  No abdominal pain.  No new neurologic symptoms.        MEDICATIONS  (STANDING):  aMIOdarone    Tablet   Oral   aMIOdarone    Tablet 400 milliGRAM(s) Oral every 8 hours  atorvastatin 10 milliGRAM(s) Oral at bedtime  chlorhexidine 2% Cloths 1 Application(s) Topical <User Schedule>  dronabinol 2.5 milliGRAM(s) Oral two times a day  epoetin brenda Injectable 12991 Unit(s) IV Push <User Schedule>  famotidine    Tablet 20 milliGRAM(s) Oral daily  heparin  Injectable 5000 Unit(s) SubCutaneous every 8 hours  lactobacillus acidophilus 1 Tablet(s) Oral three times a day  midodrine 5 milliGRAM(s) Oral every 8 hours  polyethylene glycol 3350 17 Gram(s) Oral two times a day  sevelamer carbonate 800 milliGRAM(s) Oral three times a day with meals    MEDICATIONS  (PRN):  acetaminophen   Tablet .. 650 milliGRAM(s) Oral every 6 hours PRN Temp greater or equal to 38C (100.4F), Mild Pain (1 - 3)  albuterol/ipratropium for Nebulization 3 milliLiter(s) Nebulizer every 6 hours PRN Shortness of Breath and/or Wheezing      REVIEW OF SYSTEMS:  eye, ent, GI, , allergic, dermatologic, musculoskeletal and neurologic are negative except as described above    Vital Signs Last 24 Hrs  T(C): 37 (11 Mar 2020 00:00), Max: 37 (10 Mar 2020 20:24)  T(F): 98.6 (11 Mar 2020 00:00), Max: 98.6 (10 Mar 2020 20:24)  HR: 76 (11 Mar 2020 04:00) (75 - 96)  BP: 107/61 (11 Mar 2020 04:00) (100/56 - 166/71)  BP(mean): 79 (11 Mar 2020 04:00) (72 - 102)  RR: 23 (11 Mar 2020 04:00) (13 - 44)  SpO2: 100% (11 Mar 2020 04:00) (100% - 100%)    I&O's Summary    09 Mar 2020 07:01  -  10 Mar 2020 07:00  --------------------------------------------------------  IN: 590 mL / OUT: 945 mL / NET: -355 mL    10 Mar 2020 07:01  -  11 Mar 2020 05:23  --------------------------------------------------------  IN: 580 mL / OUT: 810 mL / NET: -230 mL        Telemetry past 24h: sr    PHYSICAL EXAM:    Constitutional: well-nourished, well-developed, NAD   HEENT:  MMM, sclerae anicteric, conjunctivae clear, no oral cyanosis.  Pulmonary: Non-labored, breath sounds are clear bilaterally, No wheezing, rales or rhonchi  Cardiovascular: Regular, S1 and S2.  No murmur.  No rubs, gallops or clicks  Gastrointestinal: Bowel Sounds present, soft, nontender.   Lymph: No peripheral edema.   Neurological: Alert, no focal deficits  Skin: No rashes.  Psych:  Mood & affect appropriate    LABS: All Labs Reviewed:                        8.1    12.62 )-----------( 387      ( 10 Mar 2020 05:09 )             24.1                         7.6    9.66  )-----------( 363      ( 09 Mar 2020 05:14 )             23.3                         8.7    12.28 )-----------( 484      ( 08 Mar 2020 05:58 )             26.0     10 Mar 2020 05:09    139    |  102    |  52     ----------------------------<  124    5.0     |  26     |  5.50   09 Mar 2020 05:14    140    |  103    |  51     ----------------------------<  133    4.8     |  27     |  5.00   09 Mar 2020 01:44    137    |  103    |  47     ----------------------------<  128    5.2     |  25     |  4.90     Ca    8.3        10 Mar 2020 05:09  Ca    7.8        09 Mar 2020 05:14  Ca    8.0        09 Mar 2020 01:44  Phos  5.6       10 Mar 2020 05:09  Phos  5.0       09 Mar 2020 05:14  Phos  7.1       08 Mar 2020 05:58  Mg     2.6       10 Mar 2020 05:09  Mg     2.5       09 Mar 2020 05:14  Mg     2.6       09 Mar 2020 01:44    TPro  5.7    /  Alb  2.2    /  TBili  0.6    /  DBili  x      /  AST  49     /  ALT  57     /  AlkPhos  72     10 Mar 2020 05:09    PT/INR - ( 10 Mar 2020 05:09 )   PT: 17.3 sec;   INR: 1.52 ratio         PTT - ( 10 Mar 2020 05:09 )  PTT:28.4 sec      Blood Culture:     03-09 @ 01:44  TSH: 3.59      RADIOLOGY:    EKG:    Echo:  < from: TTE Echo Doppler w/o Cont (02.27.20 @ 11:47) >     EXAM:  ECHO TTE WO CON COMP W DOPPLR         PROCEDURE DATE:  02/27/2020        INTERPRETATION:  INDICATION: Heart failure  Sonographer PH    Blood Pressure 107/68    Height 177.8 cm     Weight 86.2 kg       BSA 2.0 sq m    Dimensions:    LA 3.7    Normal Values: 2.0 - 4.0 cm    Ao 3.3        Normal Values: 2.0 - 3.8 cm  SEPTUM Normal Values: 0.6 - 1.2 cm  PWT Normal Values: 0.6 - 1.1 cm  LVIDd Normal Values: 3.0 - 5.6 cm  LVIDs Normal Values: 1.8 - 4.0 cm      OBSERVATIONS:  Technically difficult and limited study  Mitral Valve: normal, trace physiologic MR.  Aortic Valve/Aorta: Sclerotic trileaflet aortic valve with normal opening.  Tricuspid Valve: normal with trace TR.  Pulmonic Valve: Not well-visualized  Left Atrium: normal  RightAtrium: Not well-visualized  Left Ventricle: normal LV size and systolic function, estimated LVEF of 55%.  Right Ventricle: Not well-visualized  Pericardium/Pleura: normal, small anterior pericardial effusion without signs of hemodynamic compromise.  Pulmonary/RV Pressure: estimated PA systolic pressure of 34mmHg. IVC is dilated    Conclusion:   Technically difficult and limited study  Normal left ventricular internal dimensions and systolic function, estimated LVEF of 55%.   Right ventricle is not well-visualized  Sclerotic trileaflet aortic valve, without AI.   Trace physiologic MR and TR.                       TYLER AYALA   This document has been electronically signed. Feb 28 2020  8:07AM                < end of copied text > [facility]    Warren Posada, Dandre, Abelardo, Lucina, Thaddeus, Kelvin      [phone]    CHIEF COMPLAINT: Patient is a 89y old  Male who presents with a chief complaint of Left Empyema (10 Mar 2020 14:11)      Follow Up: pleural effusion, ct, pat, hypotension on midodrine, paf    Interim history: The patient reports no new symptoms.  Denies chest discomfort and shortness of breath.  No abdominal pain.  No new neurologic symptoms.        MEDICATIONS  (STANDING):  aMIOdarone    Tablet   Oral   aMIOdarone    Tablet 400 milliGRAM(s) Oral every 8 hours  atorvastatin 10 milliGRAM(s) Oral at bedtime  chlorhexidine 2% Cloths 1 Application(s) Topical <User Schedule>  dronabinol 2.5 milliGRAM(s) Oral two times a day  epoetin brenda Injectable 02045 Unit(s) IV Push <User Schedule>  famotidine    Tablet 20 milliGRAM(s) Oral daily  heparin  Injectable 5000 Unit(s) SubCutaneous every 8 hours  lactobacillus acidophilus 1 Tablet(s) Oral three times a day  midodrine 5 milliGRAM(s) Oral every 8 hours  polyethylene glycol 3350 17 Gram(s) Oral two times a day  sevelamer carbonate 800 milliGRAM(s) Oral three times a day with meals    MEDICATIONS  (PRN):  acetaminophen   Tablet .. 650 milliGRAM(s) Oral every 6 hours PRN Temp greater or equal to 38C (100.4F), Mild Pain (1 - 3)  albuterol/ipratropium for Nebulization 3 milliLiter(s) Nebulizer every 6 hours PRN Shortness of Breath and/or Wheezing      REVIEW OF SYSTEMS:  eye, ent, GI, , allergic, dermatologic, musculoskeletal and neurologic are negative except as described above    Vital Signs Last 24 Hrs  T(C): 37 (11 Mar 2020 00:00), Max: 37 (10 Mar 2020 20:24)  T(F): 98.6 (11 Mar 2020 00:00), Max: 98.6 (10 Mar 2020 20:24)  HR: 76 (11 Mar 2020 04:00) (75 - 96)  BP: 107/61 (11 Mar 2020 04:00) (100/56 - 166/71)  BP(mean): 79 (11 Mar 2020 04:00) (72 - 102)  RR: 23 (11 Mar 2020 04:00) (13 - 44)  SpO2: 100% (11 Mar 2020 04:00) (100% - 100%)    I&O's Summary    09 Mar 2020 07:01  -  10 Mar 2020 07:00  --------------------------------------------------------  IN: 590 mL / OUT: 945 mL / NET: -355 mL    10 Mar 2020 07:01  -  11 Mar 2020 05:23  --------------------------------------------------------  IN: 580 mL / OUT: 810 mL / NET: -230 mL        Telemetry past 24h: sr, brief psvt    PHYSICAL EXAM:    Constitutional: well-nourished, well-developed, NAD   HEENT:  MMM, sclerae anicteric, conjunctivae clear, no oral cyanosis. L ij hd catheter  Pulmonary: Non-labored, breath sounds are decr bilaterally, No wheezing, rales or rhonchi  Cardiovascular: Regular, S1 and S2.  No murmur.  No rubs, gallops or clicks  Gastrointestinal: Bowel Sounds present, soft, nontender.   Lymph: No peripheral edema.   Neurological: Alert, no focal deficits  Skin: No rashes.  Psych:  Mood & affect appropriate    LABS: All Labs Reviewed:                        8.1    12.62 )-----------( 387      ( 10 Mar 2020 05:09 )             24.1                         7.6    9.66  )-----------( 363      ( 09 Mar 2020 05:14 )             23.3                         8.7    12.28 )-----------( 484      ( 08 Mar 2020 05:58 )             26.0     10 Mar 2020 05:09    139    |  102    |  52     ----------------------------<  124    5.0     |  26     |  5.50   09 Mar 2020 05:14    140    |  103    |  51     ----------------------------<  133    4.8     |  27     |  5.00   09 Mar 2020 01:44    137    |  103    |  47     ----------------------------<  128    5.2     |  25     |  4.90     Ca    8.3        10 Mar 2020 05:09  Ca    7.8        09 Mar 2020 05:14  Ca    8.0        09 Mar 2020 01:44  Phos  5.6       10 Mar 2020 05:09  Phos  5.0       09 Mar 2020 05:14  Phos  7.1       08 Mar 2020 05:58  Mg     2.6       10 Mar 2020 05:09  Mg     2.5       09 Mar 2020 05:14  Mg     2.6       09 Mar 2020 01:44    TPro  5.7    /  Alb  2.2    /  TBili  0.6    /  DBili  x      /  AST  49     /  ALT  57     /  AlkPhos  72     10 Mar 2020 05:09    PT/INR - ( 10 Mar 2020 05:09 )   PT: 17.3 sec;   INR: 1.52 ratio         PTT - ( 10 Mar 2020 05:09 )  PTT:28.4 sec      Blood Culture:     03-09 @ 01:44  TSH: 3.59      RADIOLOGY:    EKG:    Echo:  < from: TTE Echo Doppler w/o Cont (02.27.20 @ 11:47) >     EXAM:  ECHO TTE WO CON COMP W DOPPLR         PROCEDURE DATE:  02/27/2020        INTERPRETATION:  INDICATION: Heart failure  Sonographer PH    Blood Pressure 107/68    Height 177.8 cm     Weight 86.2 kg       BSA 2.0 sq m    Dimensions:    LA 3.7    Normal Values: 2.0 - 4.0 cm    Ao 3.3        Normal Values: 2.0 - 3.8 cm  SEPTUM Normal Values: 0.6 - 1.2 cm  PWT Normal Values: 0.6 - 1.1 cm  LVIDd Normal Values: 3.0 - 5.6 cm  LVIDs Normal Values: 1.8 - 4.0 cm      OBSERVATIONS:  Technically difficult and limited study  Mitral Valve: normal, trace physiologic MR.  Aortic Valve/Aorta: Sclerotic trileaflet aortic valve with normal opening.  Tricuspid Valve: normal with trace TR.  Pulmonic Valve: Not well-visualized  Left Atrium: normal  RightAtrium: Not well-visualized  Left Ventricle: normal LV size and systolic function, estimated LVEF of 55%.  Right Ventricle: Not well-visualized  Pericardium/Pleura: normal, small anterior pericardial effusion without signs of hemodynamic compromise.  Pulmonary/RV Pressure: estimated PA systolic pressure of 34mmHg. IVC is dilated    Conclusion:   Technically difficult and limited study  Normal left ventricular internal dimensions and systolic function, estimated LVEF of 55%.   Right ventricle is not well-visualized  Sclerotic trileaflet aortic valve, without AI.   Trace physiologic MR and TR.                       TYLER AYALA   This document has been electronically signed. Feb 28 2020  8:07AM                < end of copied text >

## 2020-03-11 NOTE — PROGRESS NOTE ADULT - ASSESSMENT
89 y/o man known to our office, dx'd with gastric MALToma 2013, bone marrow and 2017 repeat gastric bx c/w Chronic Lymphocytic Leukemia/Small Lymphocytic Lymphoma, under serial monitoring w EGD and PETCT, has not required treatment. Also hx B12 deficiency.  Adm w left empyema, hx URI sx Jan 2020  Post IR pigtail insertion w minimal output and tx to ICU for dornase/alteplase adm w resultant partial output. Repeat CT chest showed tip is inferior to the loculated fluid and CT removed.  Course complicated by GUS in setting of hypotension and exposure to Vancomycin    -now on hemodialysis for renal failure  -son had inquired biopsy of lung to see if lymphoma is present and cause of acute condition. However, even if able to be done, this would be a blind biopsy therefore negative findings does not equate no disease present. Also, resultswould not  of patient in view of present clinical condition as pt not candidate for any meaningful oncological treatment.    continue acute care   3/11 notes from the last few days were noted and discussed with the group. Pt has a white cell count of 10.87 hemoglobin of 7.9 platelet count of 132072 and normal diff.

## 2020-03-11 NOTE — PROGRESS NOTE ADULT - SUBJECTIVE AND OBJECTIVE BOX
Date/Time Patient Seen:  		  Referring MD:   Data Reviewed	       Patient is a 89y old  Male who presents with a chief complaint of Left Empyema (11 Mar 2020 05:22)      Subjective/HPI     PAST MEDICAL & SURGICAL HISTORY:  GI Bleed: 2007  Stomach Ulcer: H pylori 2007, treated with antibiotics  Osteoarthritis  Hyperlipidemia  Herniated Disc  Spinal Stenosis  Diverticulitis  GERD (Gastroesophageal Reflux Disease)  Chronic Lymphocytic Leukemia: followed by oncologist in [place]  S/P Tonsillectomy: childhood  Status Post Arthroscopy: right shoulder  History of Arthroscopy of Knee: bilateral  Osteoarthritis  Stomach Ulcer: H pylori, treated with antibiotics        Medication list         MEDICATIONS  (STANDING):  aMIOdarone    Tablet   Oral   aMIOdarone    Tablet 400 milliGRAM(s) Oral every 8 hours  atorvastatin 10 milliGRAM(s) Oral at bedtime  chlorhexidine 2% Cloths 1 Application(s) Topical <User Schedule>  dronabinol 2.5 milliGRAM(s) Oral two times a day  epoetin brenda Injectable 66316 Unit(s) IV Push <User Schedule>  famotidine    Tablet 20 milliGRAM(s) Oral daily  heparin  Injectable 5000 Unit(s) SubCutaneous every 8 hours  lactobacillus acidophilus 1 Tablet(s) Oral three times a day  midodrine 5 milliGRAM(s) Oral every 8 hours  polyethylene glycol 3350 17 Gram(s) Oral two times a day  sevelamer carbonate 800 milliGRAM(s) Oral three times a day with meals    MEDICATIONS  (PRN):  acetaminophen   Tablet .. 650 milliGRAM(s) Oral every 6 hours PRN Temp greater or equal to 38C (100.4F), Mild Pain (1 - 3)  albuterol/ipratropium for Nebulization 3 milliLiter(s) Nebulizer every 6 hours PRN Shortness of Breath and/or Wheezing         Vitals log        ICU Vital Signs Last 24 Hrs  T(C): 37 (11 Mar 2020 00:00), Max: 37 (10 Mar 2020 20:24)  T(F): 98.6 (11 Mar 2020 00:00), Max: 98.6 (10 Mar 2020 20:24)  HR: 76 (11 Mar 2020 06:00) (75 - 96)  BP: 117/59 (11 Mar 2020 06:00) (100/56 - 166/71)  BP(mean): 83 (11 Mar 2020 06:00) (72 - 102)  ABP: --  ABP(mean): --  RR: 19 (11 Mar 2020 06:00) (13 - 44)  SpO2: 100% (11 Mar 2020 06:00) (100% - 100%)           Input and Output:  I&O's Detail    09 Mar 2020 07:01  -  10 Mar 2020 07:00  --------------------------------------------------------  IN:    Oral Fluid: 590 mL  Total IN: 590 mL    OUT:    Chest Tube: 70 mL    Indwelling Catheter - Urethral: 875 mL  Total OUT: 945 mL    Total NET: -355 mL      10 Mar 2020 07:01  -  11 Mar 2020 06:50  --------------------------------------------------------  IN:    Oral Fluid: 700 mL  Total IN: 700 mL    OUT:    Indwelling Catheter - Urethral: 1120 mL  Total OUT: 1120 mL    Total NET: -420 mL          Lab Data                        7.9    10.87 )-----------( 343      ( 11 Mar 2020 05:12 )             24.5     03-11    140  |  104  |  56<H>  ----------------------------<  87  4.9   |  27  |  5.70<H>    Ca    8.0<L>      11 Mar 2020 05:12  Phos  6.0     03-11  Mg     2.6     03-11    TPro  5.4<L>  /  Alb  2.1<L>  /  TBili  0.6  /  DBili  x   /  AST  35  /  ALT  50  /  AlkPhos  71  03-11            Review of Systems	      Objective     Physical Examination    heart s1s2  lung dec BS  abd soft  head nc  head at      Pertinent Lab findings & Imaging      [place]:  NO   Adequate UO     I&O's Detail    09 Mar 2020 07:01  -  10 Mar 2020 07:00  --------------------------------------------------------  IN:    Oral Fluid: 590 mL  Total IN: 590 mL    OUT:    Chest Tube: 70 mL    Indwelling Catheter - Urethral: 875 mL  Total OUT: 945 mL    Total NET: -355 mL      10 Mar 2020 07:01  -  11 Mar 2020 06:50  --------------------------------------------------------  IN:    Oral Fluid: 700 mL  Total IN: 700 mL    OUT:    Indwelling Catheter - Urethral: 1120 mL  Total OUT: 1120 mL    Total NET: -420 mL               Discussed with:     Cultures:	        Radiology

## 2020-03-11 NOTE — DISCHARGE NOTE PROVIDER - NSDCMRMEDTOKEN_GEN_ALL_CORE_FT
atorvastatin 10 mg oral tablet: 1 tab(s) orally once a day  medical marijuana:   pantoprazole 40 mg oral delayed release tablet: 1 tab(s) orally once a day

## 2020-03-11 NOTE — PROGRESS NOTE ADULT - SUBJECTIVE AND OBJECTIVE BOX
CHARLY LU is a 89yMale , patient examined and chart reviewed.     INTERVAL HPI/ OVERNIGHT EVENTS:   Awake. Confused.  Afebrile.     PAST MEDICAL & SURGICAL HISTORY:  GI Bleed: 2007  Stomach Ulcer: H pylori 2007, treated with antibiotics  Osteoarthritis  Hyperlipidemia  Herniated Disc  Spinal Stenosis  Diverticulitis  GERD (Gastroesophageal Reflux Disease)  Chronic Lymphocytic Leukemia: followed by oncologist in [place]  S/P Tonsillectomy: childhood  Status Post Arthroscopy: right shoulder  History of Arthroscopy of Knee: bilateral    For details regarding the patient's social history, family history, and other miscellaneous elements, please refer the initial infectious diseases consultation and/or the admitting history and physical examination for this admission.    ROS: UTO sec confusion    Current inpatient medications :  MEDICATIONS  (STANDING):  aMIOdarone    Tablet   Oral   aMIOdarone    Tablet 400 milliGRAM(s) Oral every 8 hours  atorvastatin 10 milliGRAM(s) Oral at bedtime  chlorhexidine 2% Cloths 1 Application(s) Topical <User Schedule>  dronabinol 2.5 milliGRAM(s) Oral two times a day  epoetin brenda Injectable 86418 Unit(s) IV Push <User Schedule>  famotidine    Tablet 20 milliGRAM(s) Oral daily  heparin  Injectable 5000 Unit(s) SubCutaneous every 8 hours  lactobacillus acidophilus 1 Tablet(s) Oral three times a day  polyethylene glycol 3350 17 Gram(s) Oral two times a day  sevelamer carbonate 800 milliGRAM(s) Oral three times a day with meals    MEDICATIONS  (PRN):  acetaminophen   Tablet .. 650 milliGRAM(s) Oral every 6 hours PRN Temp greater or equal to 38C (100.4F), Mild Pain (1 - 3)  albuterol/ipratropium for Nebulization 3 milliLiter(s) Nebulizer every 6 hours PRN Shortness of Breath and/or Wheezing    Objective:  ICU Vital Signs Last 24 Hrs  T(C): 36.6 (11 Mar 2020 20:52), Max: 37 (11 Mar 2020 00:00)  T(F): 97.9 (11 Mar 2020 20:52), Max: 98.6 (11 Mar 2020 00:00)  HR: 89 (11 Mar 2020 20:52) (75 - 108)  BP: 109/70 (11 Mar 2020 20:52) (102/59 - 134/71)  BP(mean): 82 (11 Mar 2020 19:00) (72 - 96)  RR: 21 (11 Mar 2020 20:52) (19 - 33)  SpO2: 95% (11 Mar 2020 20:52) (93% - 100%)      Physical Exam:  General:  no acute distress  Eyes: sclera anicteric, pupils equal and reactive to light  ENMT: buccal mucosa moist, pharynx not injected  Neck: supple, trachea midline  Lungs: decreased no wheeze/rhonchi Left lung pigtail  Cardiovascular: regular rate and rhythm, S1 S2  Abdomen: soft, nontender, no organomegaly present, bowel sounds normal  Neurological: alert and oriented x 2, Cranial Nerves II-XII grossly intact  Skin: no increased ecchymosis/petechiae/purpura  Lymph Nodes: no palpable cervical/supraclavicular lymph nodes enlargements  Extremities: +edema    LABS:                        7.9    10.87 )-----------( 343      ( 11 Mar 2020 05:12 )             24.5   03-11    140  |  104  |  56<H>  ----------------------------<  87  4.9   |  27  |  5.70<H>    Ca    8.0<L>      11 Mar 2020 05:12  Phos  6.0     03-11  Mg     2.6     03-11    TPro  5.4<L>  /  Alb  2.1<L>  /  TBili  0.6  /  DBili  x   /  AST  35  /  ALT  50  /  AlkPhos  71  03-11      MICROBIOLOGY:  Culture - Body Fluid with Gram Stain (02.27.20 @ 21:38)    Gram Stain:   polymorphonuclear leukocytes  No organisms seen  by cytocentrifuge    Specimen Source: .Body Fluid Pleural Fluid    Culture Results:   No growth    Culture - Blood (collected 26 Feb 2020 22:29)  Source: .Blood Blood-Peripheral  Preliminary Report (27 Feb 2020 23:01):    No growth to date.    Culture - Blood (collected 26 Feb 2020 22:29)  Source: .Blood Blood-Peripheral  Preliminary Report (27 Feb 2020 23:01):    No growth to date.    Culture - Urine (collected 26 Feb 2020 11:46)  Source: .Urine Catheterized  Final Report (27 Feb 2020 15:05):    50,000 - 99,000 CFU/mL Coag Negative Staphylococcus    Susceptibilites not performed.    Culture - Blood (collected 25 Feb 2020 22:38)  Source: .Blood Blood-Peripheral  Preliminary Report (26 Feb 2020 23:01):    No growth to date.    Culture - Blood (collected 25 Feb 2020 22:38)  Source: .Blood Blood-Peripheral  Preliminary Report (26 Feb 2020 23:01):    No growth to date.    Legionella pneumophila Antigen, Urine (02.27.20 @ 02:53)    Legionella Antigen, Urine: Negative    Streptococcus Pneumoniae Ag Urine (02.27.20 @ 04:25)    Streptococcus Pneumoniae Ag Urine: Negative    RADIOLOGY & ADDITIONAL STUDIES:  EXAM:  CT RENAL STONE HUNT                          EXAM:  CT CHEST                                  PROCEDURE DATE:  02/25/2020          INTERPRETATION:  CLINICAL HISTORY:  Fever, left back and flank pain. History of gastric lymphoma.    Multiple axial images of the chest, abdomen and pelvis were obtained from the lung apices through symphysis pubis without the administration of oral or intravascular contrast limiting the sensitivity of evaluation. Reformatted coronal and sagittal images are submitted.    COMPARISON: PET/CT evaluation 6/19/2019.    FINDINGS: New moderately sized left pleural effusion is identified with portions that are loculated; air attenuation within the effusion as well as within the left pleural space suggests the presence of left-sided empyema. Consolidation is identified within the lingular segment as well as left lower lobe lung parenchyma; underlying neoplasm cannot be excluded. Hazy opacity within the left upper lobe is likely related to atelectasis. Subpleural opacity within the posterior right lower lobe and right middle lobe likely related to subpleural fibrosis. There is no evidence for right-sided pleural effusion or air.    No abnormally enlarged mediastinal or hilar lymph nodes are noted. There is no evidence for axillary lymphadenopathy. The heart size appears within normal limits. No pericardial effusion is identified. Thoracic aortic caliber demonstrates unremarkable caliber. Coronary arterial calcifications identified.    The central bronchial anatomy appears patent.    No mediastinal shift is noted.    The liver is normal in size. No focal hepatic masses are identified. There is no evidence for intrahepatic or extrahepatic biliary dilatation. A large gallstone is noted. No gallbladder wall thickening or pericholecystic fluid identified.    The spleen, pancreas and adrenal glands are unremarkable.    There is no evidence for hydronephrosis. No right renal calculi are identified. An 8 mm calculus is identified within the left-sided extrarenal pelvis, unchanged. There is no change in a 6 mm calculus identified within the distal right ureter at the level of the UVJ, without evidence for obstructive uropathy. A 1.3 cm right renal cyst is noted. The abdominal aorta is normal in course and caliber. No abnormally enlarged retroperitoneal or pelvic lymphadenopathy is noted.    Gastric wall thickening is identified allowing for nondistention with oral contrast; clinical correlation is suggested in light of the provided clinical history of gastric lymphoma.    Fecal material is scattered throughout the colon. There is no evidence for mechanical bowel obstruction. Colonic diverticulosis is noted. No colonic wall thickening is identified. There is no evidence for acute appendicitis.There is no evidence for free intraperitoneal air or fluid.    The prostate is enlarged and the urinary bladder appear unremarkable.     Postsurgical changes of the lumbar spine noted. Postprocedural changes of the right shoulder noted. Degenerative changes of the spine evident.    IMPRESSION:    1. New moderately sized left pleural effusion is identified with portions that are loculated; air attenuation within the effusion as well as within the left pleural space suggests the presence of left-sided empyema. Consolidation is identified within the lingular segment as well as left lower lobe lung parenchyma; underlying neoplasm cannot be excluded.   2. Gastric wall thickening is identified allowing for nondistention with oral contrast; clinicalcorrelation is suggested in light of the provided clinical history of gastric lymphoma.  3. An 8 mm calculus is identified within the left-sided extrarenal pelvis, unchanged. There is no change in a 6 mm calculus identified within the distal right ureter at the level of the UVJ, without evidence for obstructive uropathy. No evidence for bilateral hydronephrosis.      EXAM:  CT CHEST                            PROCEDURE DATE:  03/01/2020          INTERPRETATION:  Clinical information: Left-sided empyema    Comparison exam dated 2/25/2020    Axial images obtained, coronal and sagittal images computer reformatted    Noncontrast exam limits evaluation of hilar and mediastinal regions.    A left-sided pigtail catheter is present in the left pleural space. Loculated effusion with associated dense consolidation is present, the appearance is slightly more prominentsince the prior exam.    No thoracic aortic aneurysm or pericardial effusion. Cardiomegaly present. Coronary artery calcifications identified. The central airway appears intact. The thyroid gland is not enlarged.    Minimal atelectatic change right lung base. Calcification visible in the right pleura. Peribronchial thickening right infrahilar region. No pneumothorax.    The stomach is filled with food and air. A large gallstone is noted. The adrenal glands are not enlarged. The spleen is not enlarged. No acute appearing osseous abnormalities. Soft tissue anchors right humeral head. Air bubbles visible in the subcutaneous soft tissues left lateral lower thorax.    IMPRESSION: A left-sided pigtail catheter is present in the pleural space. Loculated effusion with associated dense consolidation is present, appearance slightly more prominent when compared to prior exam.        EXAM:  US KIDNEYS AND BLADDER                            PROCEDURE DATE:  03/01/2020          INTERPRETATION:    PROCEDURE INFORMATION:   Exam: US Retroperitoneal Limited, Kidneys   Exam date and time: 3/1/2020 6:31 AM   Age: 89 years old   Clinical indication: Other: Gus     TECHNIQUE:   Imaging protocol: Real-time ultrasound of the retroperitoneum with image   documentation. Examination was focused on the kidneys.     COMPARISON:   No relevant prior studies available.     FINDINGS:   Right kidney: The right kidney measures 11.8 cm in length. Normal parenchymal   echogenicity. No hydronephrosis.   Left kidney: The left kidney measures 11.7 cm in length. Normal parenchymal   echogenicity. Slightly irregular margin. No hydronephrosis.   Prostate: The prostate measures up to 3.7 cm in size.   Bladder: Bladder volume calculated to be 104 mL. No bladder wall thickening   allowing for lack of full distention. No bladder calculus.     IMPRESSION:   No hydronephrosis.        Assessment :   CHARLY LU is a 89y Male PMH CLL, gastric lymphoma ex smoker, hx of asbestos exp presenting to the hospital with cough, fever and left sided pleuritic chest pain. Febrile to 102 found to have left sided empyema. Seen by pulm and CT surgery. Sp IR drainage with pigtail placement 2/2/7/2020. Transferred to ICU sec tpa/dornase therapy through pigtail sec complications of hematuria. Had large clot and nick blood/mixed with urine. Mcnulty irrigation performed complicated with GUS. Worsening GUS- started on HD per renal. Getting TPA/dornase via pigtail. Urine output improved. Pigtail removed 3/10/2020. Delirium better.      Plan :  Monitor off antibiotics  Completed course of Zosyn   Trend temps and cbc  Trend renal fx  CT surgery following  Renal following  Stable from ID standpoint    D/w ICU team    Continue with present regiment.  Appropriate use of antibiotics and adverse effects reviewed.      I have discussed the above plan of care with patient/ family in detail. They expressed understanding of the  treatment plan . Risks, benefits and alternatives discussed in detail. I have asked if they have any questions or concerns and appropriately addressed them to the best of my ability .    Critical care > 35 minutes were spent in direct patient care reviewing notes, medications ,labs data/ imaging , discussion with multidisciplinary team.    Thank you for allowing me to participate in care of your patient .    Jose Kiran MD  Infectious Disease  [phone]

## 2020-03-11 NOTE — PROGRESS NOTE ADULT - SUBJECTIVE AND OBJECTIVE BOX
SURGERY  Spectralink [id number]    Pt seen and examined. Pt reports pain is well controlled. Denies CP, SOB, dyspnea at present. Chest tube removed yesterday, CXR no evidence of pneumothorax.    T(C): 36.8 (03-11-20 @ 15:19), Max: 37 (03-10-20 @ 20:24)  HR: 102 (03-11-20 @ 11:00) (75 - 108)  BP: 124/67 (03-11-20 @ 11:00) (100/56 - 166/71)  RR: 32 (03-11-20 @ 11:00) (13 - 33)  SpO2: 100% (03-11-20 @ 11:00) (98% - 100%)  Wt(kg): --  ICU Vital Signs Last 24 Hrs  T(C): 36.8 (11 Mar 2020 15:19), Max: 37 (10 Mar 2020 20:24)  T(F): 98.3 (11 Mar 2020 15:19), Max: 98.6 (10 Mar 2020 20:24)  HR: 102 (11 Mar 2020 11:00) (75 - 108)  BP: 124/67 (11 Mar 2020 11:00) (100/56 - 166/71)  BP(mean): 90 (11 Mar 2020 11:00) (72 - 102)  ABP: --  ABP(mean): --  RR: 32 (11 Mar 2020 11:00) (13 - 33)  SpO2: 100% (11 Mar 2020 11:00) (98% - 100%)    CAPILLARY BLOOD GLUCOSE    I&O's Detail    10 Mar 2020 07:01  -  11 Mar 2020 07:00  --------------------------------------------------------  IN:    Oral Fluid: 700 mL  Total IN: 700 mL    OUT:    Indwelling Catheter - Urethral: 1120 mL  Total OUT: 1120 mL    Total NET: -420 mL      11 Mar 2020 07:01  -  11 Mar 2020 18:30  --------------------------------------------------------  IN:    Oral Fluid: 240 mL  Total IN: 240 mL    OUT:    Indwelling Catheter - Urethral: 330 mL  Total OUT: 330 mL    Total NET: -90 mL    Physical exam: Pt sitting comfortably in bed in NAD  Chest- CTA bilaterally   CV- S1 & S2, RRR  Abdomen- Soft, non-tender, non-distended. (  ) BS  Incision-    LABS:                        7.9    10.87 )-----------( 343      ( 11 Mar 2020 05:12 )             24.5     03-11    140  |  104  |  56<H>  ----------------------------<  87  4.9   |  27  |  5.70<H>    Ca    8.0<L>      11 Mar 2020 05:12  Phos  6.0     03-11  Mg     2.6     03-11    TPro  5.4<L>  /  Alb  2.1<L>  /  TBili  0.6  /  DBili  x   /  AST  35  /  ALT  50  /  AlkPhos  71  03-11    PT/INR - ( 11 Mar 2020 05:12 )   PT: 16.2 sec;   INR: 1.43 ratio         PTT - ( 11 Mar 2020 05:12 )  PTT:28.0 sec    Assessment/Plan: Patient is a 89y old  Male who presents with a chief complaint of Left Empyema s/p Left lung IR drainage, pigtail, multiple infusions of TPA, Chest tube removed on 3/10, repeat CT No significant interval change in size of the left loculated pleural collection. Interval development of a small right pleural effusion with compressive atelectasis of the adjacent lung parenchyma.Stable left lower lobe consolidation- ?malignant effusion.    -Continue DVT Prophylaxis with SCD's, SQH  -Continue IV Antibiotics- pt being monitored off antibiotics, leukocytosis likely due to CLL-stable  -Continue Incentive Spirometry  -Continue analgesia  -Encourage OOB/ambulation with assistance  -ARF- On HD  -Respiratory status stable, Above discussed with Dr. Steven- Signing off, please reconsult as needed. SURGERY  Spectralink [id number]    Pt seen and examined. Pt reports pain is well controlled. Denies CP, SOB, dyspnea at present. Chest tube removed yesterday, CXR no evidence of pneumothorax.    T(C): 36.8 (03-11-20 @ 15:19), Max: 37 (03-10-20 @ 20:24)  HR: 102 (03-11-20 @ 11:00) (75 - 108)  BP: 124/67 (03-11-20 @ 11:00) (100/56 - 166/71)  RR: 32 (03-11-20 @ 11:00) (13 - 33)  SpO2: 100% (03-11-20 @ 11:00) (98% - 100%)  Wt(kg): --  ICU Vital Signs Last 24 Hrs  T(C): 36.8 (11 Mar 2020 15:19), Max: 37 (10 Mar 2020 20:24)  T(F): 98.3 (11 Mar 2020 15:19), Max: 98.6 (10 Mar 2020 20:24)  HR: 102 (11 Mar 2020 11:00) (75 - 108)  BP: 124/67 (11 Mar 2020 11:00) (100/56 - 166/71)  BP(mean): 90 (11 Mar 2020 11:00) (72 - 102)  ABP: --  ABP(mean): --  RR: 32 (11 Mar 2020 11:00) (13 - 33)  SpO2: 100% (11 Mar 2020 11:00) (98% - 100%)    CAPILLARY BLOOD GLUCOSE    I&O's Detail    10 Mar 2020 07:01  -  11 Mar 2020 07:00  --------------------------------------------------------  IN:    Oral Fluid: 700 mL  Total IN: 700 mL    OUT:    Indwelling Catheter - Urethral: 1120 mL  Total OUT: 1120 mL    Total NET: -420 mL      11 Mar 2020 07:01  -  11 Mar 2020 18:30  --------------------------------------------------------  IN:    Oral Fluid: 240 mL  Total IN: 240 mL    OUT:    Indwelling Catheter - Urethral: 330 mL  Total OUT: 330 mL    Total NET: -90 mL    Physical exam: Pt sitting comfortably in bed in NAD  Chest- CTA bilaterally, no wheezing   CV- S1 & S2, RRR  Abdomen- Soft, non-tender, non-distended. ( + ) BS      LABS:                        7.9    10.87 )-----------( 343      ( 11 Mar 2020 05:12 )             24.5     03-11    140  |  104  |  56<H>  ----------------------------<  87  4.9   |  27  |  5.70<H>    Ca    8.0<L>      11 Mar 2020 05:12  Phos  6.0     03-11  Mg     2.6     03-11    TPro  5.4<L>  /  Alb  2.1<L>  /  TBili  0.6  /  DBili  x   /  AST  35  /  ALT  50  /  AlkPhos  71  03-11    PT/INR - ( 11 Mar 2020 05:12 )   PT: 16.2 sec;   INR: 1.43 ratio         PTT - ( 11 Mar 2020 05:12 )  PTT:28.0 sec    Assessment/Plan: Patient is a 89y old  Male who presents with a chief complaint of Left Empyema s/p Left Chest IR drainage, pigtail placement, multiple infusions of TPA, Chest tube removed on 3/10, repeat CT No significant interval change in size of the left loculated pleural collection. Interval development of a small right pleural effusion with compressive atelectasis of the adjacent lung parenchyma. Stable left lower lobe consolidation- ?malignant effusion.    -Continue DVT Prophylaxis with SCD's, SQH  -Continue IV Antibiotics- pt being monitored off antibiotics, leukocytosis likely due to CLL-stable  -Continue Incentive Spirometry  -Continue analgesia  -Encourage OOB/ambulation with assistance  -ARF- On HD  -Respiratory status stable, Above discussed with Dr. Steven- Signing off, please reconsult as needed.

## 2020-03-11 NOTE — PROVIDER CONTACT NOTE (OTHER) - REASON
As per Linnea in tele room ext [phone], Patient is Normal sinus rhytm with a first degree heart block, Heart rate 89, o2sat 93% on RA.

## 2020-03-11 NOTE — PROGRESS NOTE ADULT - SUBJECTIVE AND OBJECTIVE BOX
CHARTING IN PROGRESS        Patient is a 89y old  Male who presents with a chief complaint of Left Empyema (11 Mar 2020 06:50)    24 hour events: ***    REVIEW OF SYSTEMS  Constitutional: No fever, chills, fatigue  Neuro: No headache, numbness, weakness  Resp: No cough, wheezing, shortness of breath  CVS: No chest pain, palpitations, leg swelling  GI: No abdominal pain, nausea, vomiting, diarrhea   : No dysuria, frequency, incontinence  Skin: No itching, burning, rashes, or lesions   Msk: No joint pain or swelling  Psych: No depression, anxiety, mood swings  Heme: No bleeding    T(F): 98.6 (03-11-20 @ 00:00), Max: 98.6 (03-10-20 @ 20:24)  HR: 75 (03-11-20 @ 07:00) (75 - 96)  BP: 108/55 (03-11-20 @ 07:00) (100/56 - 166/71)  RR: 23 (03-11-20 @ 07:00) (13 - 44)  SpO2: 100% (03-11-20 @ 07:00) (100% - 100%)  Wt(kg): --            I&O's Summary    03-10 @ 07:01  -  03-11 @ 07:00  --------------------------------------------------------  IN: 700 mL / OUT: 1120 mL / NET: -420 mL      PHYSICAL EXAM  General:   CNS:   HEENT:   Resp:   CVS:   Abd:   Ext:   Skin:     MEDICATIONS    aMIOdarone    Tablet Oral  aMIOdarone    Tablet Oral  midodrine Oral    atorvastatin Oral    albuterol/ipratropium for Nebulization Nebulizer PRN    acetaminophen   Tablet .. Oral PRN  dronabinol Oral      heparin  Injectable SubCutaneous    famotidine    Tablet Oral  polyethylene glycol 3350 Oral        epoetin brenda Injectable IV Push    chlorhexidine 2% Cloths Topical    lactobacillus acidophilus Oral  sevelamer carbonate Oral                          7.9    10.87 )-----------( 343      ( 11 Mar 2020 05:12 )             24.5       03-11    140  |  104  |  56<H>  ----------------------------<  87  4.9   |  27  |  5.70<H>    Ca    8.0<L>      11 Mar 2020 05:12  Phos  6.0     03-11  Mg     2.6     03-11    TPro  5.4<L>  /  Alb  2.1<L>  /  TBili  0.6  /  DBili  x   /  AST  35  /  ALT  50  /  AlkPhos  71  03-11          PT/INR - ( 11 Mar 2020 05:12 )   PT: 16.2 sec;   INR: 1.43 ratio         PTT - ( 11 Mar 2020 05:12 )  PTT:28.0 sec          Radiology: ***  Bedside lung ultrasound: ***  Bedside ECHO: ***    CENTRAL LINE: Y/N          DATE INSERTED:              REMOVE: Y/N  SOLIS: Y/N                        DATE INSERTED:              REMOVE: Y/N  A-LINE: Y/N                       DATE INSERTED:              REMOVE: Y/N    GLOBAL ISSUE/BEST PRACTICE  Analgesia:   Sedation:   CAM-ICU:   HOB elevation: yes  Stress ulcer prophylaxis:   VTE prophylaxis:   Glycemic control:   Nutrition:     CODE STATUS: ***  GOC discussion: Y CHARTING IN PROGRESS        Patient is a 89y old  Male who presents with a chief complaint of Left Empyema (11 Mar 2020 06:50)    24 hour events: Patient seen and examined at bedside. No acute events overnight. Patient more awake and interactive today, able to hold conversation. Currently denies any acute complaints or concerns at this time. Motivated to eat more today; otherwise denies fever, chills, headache, vision changes, chest pain, palpitations, SOB, cough, abd pain, N/V/D/C.      REVIEW OF SYSTEMS  Constitutional: No fever, chills  Neuro: No headache, numbness, weakness  Resp: No cough, wheezing, shortness of breath  CVS: No chest pain, palpitations, leg swelling  GI: No abdominal pain, nausea, vomiting, diarrhea   : No dysuria, frequency, incontinence  Skin: No itching, burning, rashes, or lesions   Msk: No joint pain or swelling  Psych: No depression, anxiety, mood swings  Heme: No bleeding      T(F): 98.6 (03-11-20 @ 00:00), Max: 98.6 (03-10-20 @ 20:24)  HR: 75 (03-11-20 @ 07:00) (75 - 96)  BP: 108/55 (03-11-20 @ 07:00) (100/56 - 166/71)  RR: 23 (03-11-20 @ 07:00) (13 - 44)  SpO2: 100% (03-11-20 @ 07:00) (100% - 100%)      I&O's Summary    03-10 @ 07:01  -  03-11 @ 07:00  --------------------------------------------------------  IN: 700 mL / OUT: 1120 mL / NET: -420 mL      PHYSICAL EXAM  General: pleasant, elderly male laying in bed comfortably, in NAD  CNS: AAOx2 (person and place only), no focal deficits, no sensory deficits  HEENT: PERRL, EOMI, dry mucous membranes  Resp: CTA b/l, no w/r/r  CVS: +S1S2, RRR, no m/r/g  Abd: soft, non-tender, non-distended, normoactive BS x4  Ext: no c/c/e of b/l LE  Skin: color normal for race, warm, dry, well perfused      MEDICATIONS    aMIOdarone    Tablet Oral  aMIOdarone    Tablet Oral  midodrine Oral  atorvastatin Oral  albuterol/ipratropium for Nebulization Nebulizer PRN  acetaminophen   Tablet .. Oral PRN  dronabinol Oral  heparin  Injectable SubCutaneous  famotidine    Tablet Oral  polyethylene glycol 3350 Oral  epoetin brenda Injectable IV Push  chlorhexidine 2% Cloths Topical  lactobacillus acidophilus Oral  sevelamer carbonate Oral                              7.9    10.87 )-----------( 343      ( 11 Mar 2020 05:12 )             24.5       03-11    140  |  104  |  56<H>  ----------------------------<  87  4.9   |  27  |  5.70<H>    Ca    8.0<L>      11 Mar 2020 05:12  Phos  6.0     03-11  Mg     2.6     03-11    TPro  5.4<L>  /  Alb  2.1<L>  /  TBili  0.6  /  DBili  x   /  AST  35  /  ALT  50  /  AlkPhos  71  03-11    PT/INR - ( 11 Mar 2020 05:12 )   PT: 16.2 sec;   INR: 1.43 ratio       PTT - ( 11 Mar 2020 05:12 )  PTT:28.0 sec          Radiology:    EXAM:  XR CHEST PORTABLE URGENT 1V                        PROCEDURE DATE:  03/10/2020      INTERPRETATION:  Status post left chest tube removal.    AP chest. Prior 3/6/2020.    Left chest catheter has been removed. No definite pneumothorax or other gross change heart and lungs. Left internal jugular line reidentified in situ.    Impression: As above        Bedside lung ultrasound: ***  Bedside ECHO: ***      CENTRAL LINE: Y, L IJ central line in place for HD  SOLIS: Y  A-LINE: N    GLOBAL ISSUE/BEST PRACTICE  Analgesia: Y  Sedation: N  HOB elevation: Y  Stress ulcer prophylaxis: Y   VTE prophylaxis: Y  Glycemic control: Y  Nutrition: Y    CODE STATUS: DNR/DNI  GOC discussion: Y Patient is a 89y old  Male who presents with a chief complaint of Left Empyema (11 Mar 2020 06:50)    24 hour events: Patient seen and examined at bedside. No acute events overnight. Patient more awake and interactive today, able to hold conversation. Currently denies any acute complaints or concerns at this time. Motivated to eat more today; otherwise denies fever, chills, headache, vision changes, chest pain, palpitations, SOB, cough, abd pain, N/V/D/C.      REVIEW OF SYSTEMS  Constitutional: No fever, chills  Neuro: No headache, numbness, weakness  Resp: No cough, wheezing, shortness of breath  CVS: No chest pain, palpitations, leg swelling  GI: No abdominal pain, nausea, vomiting, diarrhea   : No dysuria, frequency, incontinence  Skin: No itching, burning, rashes, or lesions   Msk: No joint pain or swelling  Psych: No depression, anxiety, mood swings  Heme: No bleeding      T(F): 98.6 (03-11-20 @ 00:00), Max: 98.6 (03-10-20 @ 20:24)  HR: 75 (03-11-20 @ 07:00) (75 - 96)  BP: 108/55 (03-11-20 @ 07:00) (100/56 - 166/71)  RR: 23 (03-11-20 @ 07:00) (13 - 44)  SpO2: 100% (03-11-20 @ 07:00) (100% - 100%)      I&O's Summary    03-10 @ 07:01  -  03-11 @ 07:00  --------------------------------------------------------  IN: 700 mL / OUT: 1120 mL / NET: -420 mL      PHYSICAL EXAM  General: pleasant, elderly male laying in bed comfortably, in NAD  CNS: AAOx2 (person and place only), no focal deficits, no sensory deficits  HEENT: PERRL, EOMI, dry mucous membranes  Resp: CTA b/l, no w/r/r  CVS: +S1S2, RRR, no m/r/g  Abd: soft, non-tender, non-distended, normoactive BS x4  Ext: no c/c/e of b/l LE  Skin: color normal for race, warm, dry, well perfused      MEDICATIONS    aMIOdarone    Tablet Oral  aMIOdarone    Tablet Oral  midodrine Oral  atorvastatin Oral  albuterol/ipratropium for Nebulization Nebulizer PRN  acetaminophen   Tablet .. Oral PRN  dronabinol Oral  heparin  Injectable SubCutaneous  famotidine    Tablet Oral  polyethylene glycol 3350 Oral  epoetin brenda Injectable IV Push  chlorhexidine 2% Cloths Topical  lactobacillus acidophilus Oral  sevelamer carbonate Oral                              7.9    10.87 )-----------( 343      ( 11 Mar 2020 05:12 )             24.5       03-11    140  |  104  |  56<H>  ----------------------------<  87  4.9   |  27  |  5.70<H>    Ca    8.0<L>      11 Mar 2020 05:12  Phos  6.0     03-11  Mg     2.6     03-11    TPro  5.4<L>  /  Alb  2.1<L>  /  TBili  0.6  /  DBili  x   /  AST  35  /  ALT  50  /  AlkPhos  71  03-11    PT/INR - ( 11 Mar 2020 05:12 )   PT: 16.2 sec;   INR: 1.43 ratio       PTT - ( 11 Mar 2020 05:12 )  PTT:28.0 sec          Radiology:    EXAM:  XR CHEST PORTABLE URGENT 1V                        PROCEDURE DATE:  03/10/2020      INTERPRETATION:  Status post left chest tube removal.    AP chest. Prior 3/6/2020.    Left chest catheter has been removed. No definite pneumothorax or other gross change heart and lungs. Left internal jugular line reidentified in situ.    Impression: As above        CENTRAL LINE: Y, L IJ central line in place for HD  SOLIS: Y  A-LINE: N    GLOBAL ISSUE/BEST PRACTICE  Analgesia: Y  Sedation: N  HOB elevation: Y  Stress ulcer prophylaxis: Y   VTE prophylaxis: Y  Glycemic control: Y  Nutrition: Y    CODE STATUS: DNR/DNI  GOC discussion: Y

## 2020-03-11 NOTE — PROGRESS NOTE ADULT - ASSESSMENT
89yo M, with PMH/o gastric MALToma, CLL, spinal stenosis s/p back surgery, c/o flank pain, cough, and fever, found to have L empyema on CT chest now s/p IR-guided chest tube insertion on 2/27, receiving tPA/dornase treatment, with hospital course complicated by GUS from ATN, receiving HD, and new development of rapid afib. and hypotension.      NEURO:    CV:    PULM:    GI:    :    ID:    HEME:    ENDO:    DISPO: 89yo M, with PMH/o gastric MALToma, CLL, spinal stenosis s/p back surgery, c/o flank pain, cough, and fever, found to have L empyema on CT chest now s/p IR-guided chest tube insertion on 2/27, receiving tPA/dornase treatment, with hospital course complicated by GUS from ATN, receiving HD, and new development of rapid afib. and hypotension.      NEURO: Awake and interactive, Seroquel and Melatonin d/c'd yesterday, continue to monitor mental status    CV: Remains in NSR, continue with Amiodarone load for new onset afib to 5g load and continue with 200mg qDaily thereafter. No AC recommended at this time. ?C/w Midodrine. C/w statin. Monitor for hypotension and bradycardia as amio is loaded.    PULM: CT chest on admission demonstrating loculated L pleural effusion/empyema, cannot exclude underlying neoplasm. S/p IR-guided chest tube placement on 2/27 outputting serosanguinous fluid. tPA and dornase treatments completed. Repeat chest CR demonstrating unchanged loculated L fluid collection with pigtail inferior to fluid collection. Pigtail removed yesterday 3/10 with bedside US showing lung sliding and f/u XR without PTX. Satting well on ____L NC. Continue to monitor respiratory status. Low utility of IR lung bx as patient is a poor candidate for any possible future oncological therapy.    GI: Patient expresses motivation to eat more today. Known h/o MALToma, not currently receiving tx at this time.    : Developed GUS likely 2/2 Vacomycin. L IJ central line in place for HD as necessary. S/p 2 sessions of HD, thought family denies any wishes for HD dependency. Patient continues to produce urine, coude catheter in place, but Cr uptrending.    ID: Possible L empyema, has completed course of Zosyn. Remains afebrile, mild leukocytosis noted in the setting of CLL. Continue to monitor off abx.    HEME: Known h/o CLL, not currently receiving tx at this time. Heme/onc following. Cannot r/o malignancy as cause of L pleural fluid collection, but given patient's prognosis and underlying comorbidities, low utility for IR-guided lung bx.    ENDO: Stable, no active issues at this time.    DISPO: Continue with ICU level of care. 89yo M, with PMH/o gastric MALToma, CLL, spinal stenosis s/p back surgery, c/o flank pain, cough, and fever, found to have L empyema on CT chest now s/p IR-guided chest tube insertion on 2/27, receiving tPA/dornase treatment, with hospital course complicated by GUS from ATN, receiving HD, and new development of rapid afib. and hypotension.      NEURO: Awake and interactive, Seroquel and Melatonin d/c'd yesterday, continue to monitor mental status.    CV: Remains in NSR, continue with Amiodarone load for new onset afib to 5g load and continue with 200mg qDaily thereafter. No AC recommended at this time. Midodrine may be continued PRN before any future HD sessions. C/w statin. Monitor for hypotension and bradycardia as amio is loaded.    PULM: CT chest on admission demonstrating loculated L pleural effusion/empyema, cannot exclude underlying neoplasm. S/p IR-guided chest tube placement on 2/27 outputting serosanguinous fluid. tPA and dornase treatments completed. Repeat chest CR demonstrating unchanged loculated L fluid collection with pigtail inferior to fluid collection. Pigtail removed yesterday 3/10 with bedside US showing lung sliding and f/u XR without PTX. Satting well on NC. Continue to monitor respiratory status. Low utility of IR lung bx as patient is a poor candidate for any possible future oncological therapy.    GI: Patient expresses motivation to eat more today. Known h/o MALToma, not currently receiving tx at this time.    : Developed GUS likely 2/2 Vacomycin. L IJ central line in place for HD as necessary. S/p 2 sessions of HD, thought family denies any wishes for HD dependency. Patient continues to produce urine, coude catheter in place, but Cr uptrending. Nephro Dr. Willard following, recommending holding HD today with hopes that Cr will plateau. Monitor I/Os.    ID: Possible L empyema, has completed course of Zosyn. Remains afebrile, mild leukocytosis noted in the setting of CLL. Continue to monitor off abx.    HEME: Known h/o CLL, not currently receiving tx at this time. Heme/onc following. Cannot r/o malignancy as cause of L pleural fluid collection, but given patient's prognosis and underlying comorbidities, low utility for IR-guided lung bx.    ENDO: Stable, no active issues at this time.    DISPO: Continue with ICU level of care.

## 2020-03-11 NOTE — PROGRESS NOTE ADULT - SUBJECTIVE AND OBJECTIVE BOX
Patient is a 89y old  Male who presents with a chief complaint of Left Empyema (02 Mar 2020 09:12)  Patient seen in follow up for GUS, ATN.     UO good.     PAST MEDICAL HISTORY:  GI Bleed  Stomach Ulcer  Osteoarthritis  Hyperlipidemia  Herniated Disc  Spinal Stenosis  Diverticulitis  GERD (Gastroesophageal Reflux Disease)  Chronic Lymphocytic Leukemia    MEDICATIONS  (STANDING):  aMIOdarone    Tablet   Oral   aMIOdarone    Tablet 400 milliGRAM(s) Oral every 8 hours  atorvastatin 10 milliGRAM(s) Oral at bedtime  chlorhexidine 2% Cloths 1 Application(s) Topical <User Schedule>  dronabinol 2.5 milliGRAM(s) Oral two times a day  epoetin brenda Injectable 90574 Unit(s) IV Push <User Schedule>  famotidine    Tablet 20 milliGRAM(s) Oral daily  heparin  Injectable 5000 Unit(s) SubCutaneous every 8 hours  lactobacillus acidophilus 1 Tablet(s) Oral three times a day  midodrine 5 milliGRAM(s) Oral every 8 hours  polyethylene glycol 3350 17 Gram(s) Oral two times a day  sevelamer carbonate 800 milliGRAM(s) Oral three times a day with meals    MEDICATIONS  (PRN):  acetaminophen   Tablet .. 650 milliGRAM(s) Oral every 6 hours PRN Temp greater or equal to 38C (100.4F), Mild Pain (1 - 3)  albuterol/ipratropium for Nebulization 3 milliLiter(s) Nebulizer every 6 hours PRN Shortness of Breath and/or Wheezing    T(C): 36.7 (03-11-20 @ 07:41), Max: 37.5 (03-10-20 @ 01:00)  HR: 75 (03-11-20 @ 07:00) (70 - 106)  BP: 108/55 (03-11-20 @ 07:00) (83/52 - 166/71)  RR: 23 (03-11-20 @ 07:00)  SpO2: 100% (03-11-20 @ 07:00)  Wt(kg): --  I&O's Detail    10 Mar 2020 07:01  -  11 Mar 2020 07:00  --------------------------------------------------------  IN:    Oral Fluid: 700 mL  Total IN: 700 mL    OUT:    Indwelling Catheter - Urethral: 1120 mL  Total OUT: 1120 mL    Total NET: -420 mL        PHYSICAL EXAM:  General: in restraints  Respiratory: b/l air entry  Cardiovascular: S1 S2  Gastrointestinal: soft  Extremities:  no edema                      LABORATORY:                        7.9    10.87 )-----------( 343      ( 11 Mar 2020 05:12 )             24.5     03-11    140  |  104  |  56<H>  ----------------------------<  87  4.9   |  27  |  5.70<H>    Ca    8.0<L>      11 Mar 2020 05:12  Phos  6.0     03-11  Mg     2.6     03-11    TPro  5.4<L>  /  Alb  2.1<L>  /  TBili  0.6  /  DBili  x   /  AST  35  /  ALT  50  /  AlkPhos  71  03-11    Sodium, Serum: 140 mmol/L (03-11 @ 05:12)  Sodium, Serum: 139 mmol/L (03-10 @ 05:09)    Potassium, Serum: 4.9 mmol/L (03-11 @ 05:12)  Potassium, Serum: 5.0 mmol/L (03-10 @ 05:09)    Hemoglobin: 7.9 g/dL (03-11 @ 05:12)  Hemoglobin: 8.1 g/dL (03-10 @ 05:09)  Hemoglobin: 7.6 g/dL (03-09 @ 05:14)    Creatinine, Serum 5.70 (03-11 @ 05:12)  Creatinine, Serum 5.50 (03-10 @ 05:09)  Creatinine, Serum 5.00 (03-09 @ 05:14)  Creatinine, Serum 4.90 (03-09 @ 01:44)        LIVER FUNCTIONS - ( 11 Mar 2020 05:12 )  Alb: 2.1 g/dL / Pro: 5.4 g/dL / ALK PHOS: 71 U/L / ALT: 50 U/L / AST: 35 U/L / GGT: x

## 2020-03-11 NOTE — CHART NOTE - NSCHARTNOTEFT_GEN_A_CORE
Called by RN to assess patient after tele reported NSR with first degree heart block. Rate 89, SpO2 95% on room air. Patient is asymptomatic, denies chest pain, palpitations, shortness of breath.        T(C): 36.6 (03-11-20 @ 20:52), Max: 36.8 (03-11-20 @ 15:19)  HR: 89 (03-11-20 @ 20:52) (75 - 108)  BP: 109/70 (03-11-20 @ 20:52) (105/69 - 134/71)  RR: 21 (03-11-20 @ 20:52) (19 - 33)  SpO2: 95% (03-11-20 @ 20:52) (93% - 100%)      Physical:  Gen- NAD  HEENT- NCAT  Cardio- s+1,s+2, RRR  Lung- CTA b/l, no wheeze, no rhonchi, no rales   Abdomen- +BS, NT/ND, no guarding, no rebound  Ext- no edema, 2+ pulses b/l    Assessment/Plan:  First degree heart block  - Stat EKG showed CT interval of approximately 0.2 seconds.  - Cardiology following  - F/U AM labs

## 2020-03-11 NOTE — PROGRESS NOTE ADULT - SUBJECTIVE AND OBJECTIVE BOX
CHIEF COMPLAINT/INTERVAL HISTORY:  Pt. seen and evaluated for left empyema.  Pt. is in no distress.  Reports feeling well today.  Denies having any SOB.      REVIEW OF SYSTEMS:  No fever, CP, SOB, or abdominal pain.     Vital Signs Last 24 Hrs  T(C): 36.7 (11 Mar 2020 12:00), Max: 37 (10 Mar 2020 20:24)  T(F): 98 (11 Mar 2020 12:00), Max: 98.6 (10 Mar 2020 20:24)  HR: 102 (11 Mar 2020 11:00) (75 - 108)  BP: 124/67 (11 Mar 2020 11:00) (100/56 - 166/71)  BP(mean): 90 (11 Mar 2020 11:00) (72 - 102)  RR: 32 (11 Mar 2020 11:00) (13 - 33)  SpO2: 100% (11 Mar 2020 11:00) (98% - 100%)    PHYSICAL EXAM:  GENERAL: NAD  HEENT: EOMI, hearing normal, conjunctiva and sclera clear, HD catheter of left neck  Chest: CTA bilaterally, no wheezing  CV: S1S2, RRR,   GI: soft, +BS, NT/ND  Musculoskeletal: no edema  Psychiatric: affect nL, mood nL  Skin: warm and dry    LABS:                        7.9    10.87 )-----------( 343      ( 11 Mar 2020 05:12 )             24.5     03-11    140  |  104  |  56<H>  ----------------------------<  87  4.9   |  27  |  5.70<H>    Ca    8.0<L>      11 Mar 2020 05:12  Phos  6.0     03-11  Mg     2.6     03-11    TPro  5.4<L>  /  Alb  2.1<L>  /  TBili  0.6  /  DBili  x   /  AST  35  /  ALT  50  /  AlkPhos  71  03-11    PT/INR - ( 11 Mar 2020 05:12 )   PT: 16.2 sec;   INR: 1.43 ratio         PTT - ( 11 Mar 2020 05:12 )  PTT:28.0 sec      Assessment and Plan:  -Left empyema:  s/p IR chest tube placement on 2/27.  s/p tPA and dornase tx.  Completed course of IV Zosyn.  s/p removal of pigtail catheter on 3/10.  Continue Duoneb Q6h PRN and O2 support as needed.  Intensivist and Pulmonary f/u  -Afib with RVR:  Now in NSR.  Continue amiodarone loading dose.  No anticoagulation given history of hematuria and prior blood chest tube drainage.    Cardiology f/u  -GUS 2/2 likely ATN:  Holding off HD session today.  Continue to avoid nephrotoxic medications.  Nephrology f/u  -Gastric MALToma:  Outpatient follow up.  -Acute encephalopathy:  component of uremic encephalopathy.  Continue to monitor mental status.    -Anemia:  continue Epogen  -VTE ppx:  Heparin 5000 units SQ Q8h

## 2020-03-11 NOTE — DISCHARGE NOTE PROVIDER - HOSPITAL COURSE
FROM ADMISSION H+P:     HPI:    CHARLY LU is a 89 YO Male brought to [facility] ED complaining of flank pain, fever and cough for 4 days. Patient transferred from [facility] ER for admission to [place].  Daughter at the bedside states she took him to the ER for evaluation of back pain, chest discomfort. At [facility] patient found to have fever, CT scan revealed left sided empyema and right ureteral stone, patient received ceftriaxone and zosyn. Dr. Maxwell pulmonary and Dr. Barnard urology were aware of transfer. Patient states he is feeling well now. (26 Feb 2020 05:01)            ---    HOSPITAL COURSE:     Patient was subsequently admitted to telemetry. He was evaluated by pulm Dr. Maxwell and CT surgery Dr. Steven for further management of possible L empyema. On 2/27, L pigtail was placed to pleur-evac. Pt was evaluated by ID Dr. Kiran and was started on Vancomycin and Zosyn. On 2/29, pt was transferred to the ICU for tPA/dornase vis chest tube to increase output. Hospital course was complicated by acute renal failure likely 2/2 Vancomycin. Nephro Dr. Wang was consulted as was uro Dr. Barnard for extrarenal pelvis and urethral stones. Celestin was placed. Pt noted to be agitated, pulling at lines and was started on Precedex. Developed hematuria and celestin was replaced by coude catheter. Urine output decreased and creatinine steadily increased with development of encephalopathy and delirium likely d/t uremia. Precedex was weaned, but patient required 2 doses of Haldol overnight as well as Seroquel. Discussion was had with family regarding initiating hemodialysis. L intrajugular central line was placed for HD. Pt completed 2 sessions of HD during his ICU admission. Further goals of care d/w family who determined to continue with conservative management given pt's poor prognosis and underlying comorbidities, avoiding surgical measures for lung biopsy and HD-dependency. Pt was made DNR/DNI. Pt developed new onset rapid afib and was loaded with amiodarone, remaining in NSR during the rest of his ICU stay. Chest CT was repeated and demonstrated unchanged loculated L pleural fluid collection with chest tube inferior to collection. Pigtail was then removed with follow up imaging showing no evidence for PTX. Seroquel was d/c'd. Patient more awake, alert, and interactive.         ---    CONSULTANTS:     Pulmonology - Dr. Hans Del Rio/Onc - Dr. Kiran    CT Surgery - Dr. Steven    IR - Dr. Kang    GI - Dr. Kim    ID - Dr. Kiran    Urology - Dr. Barnard    Cardio - Dr. Amos    Critical care - Dr. Gusman    Nephro - Dr. Wang        ---    TIME SPENT:    The total amount of time spent reviewing the hospital notes, laboratory values, imaging findings, assessing/counseling the patient, discussing with consultant physicians, social work, nursing staff was -- minutes        ---    Primary care provider was made aware of plan for discharge:      [  ] NO     [  ] YES FROM ADMISSION H+P:     HPI:    CHARLY LU is a 89 YO Male brought to [place] ED complaining of flank pain, fever and cough for 4 days. Patient transferred from [place] ER for admission to [place].  Daughter at the bedside states she took him to the ER for evaluation of back pain, chest discomfort. At [place] patient found to have fever, CT scan revealed left sided empyema and right ureteral stone, patient received ceftriaxone and zosyn. Dr. Maxwell pulmonary and Dr. Barnard urology were aware of transfer. Patient states he is feeling well now. (26 Feb 2020 05:01)            ---    HOSPITAL COURSE:     Patient was subsequently admitted to telemetry. He was evaluated by pulm Dr. Maxwell and CT surgery Dr. Steven for further management of possible L empyema. On 2/27, L pigtail was placed to pleur-evac. Pt was evaluated by ID Dr. Kiran and was started on Vancomycin and Zosyn. On 2/29, pt was transferred to the ICU for tPA/dornase vis chest tube to increase output. Hospital course was complicated by acute renal failure likely 2/2 Vancomycin. Nephro Dr. Wang was consulted as was uro Dr. Barnard for extrarenal pelvis and urethral stones. Celestin was placed. Pt noted to be agitated, pulling at lines and was started on Precedex. Developed hematuria and celestin was replaced by coude catheter. Urine output decreased and creatinine steadily increased with development of encephalopathy and delirium likely d/t uremia. Precedex was weaned, but patient required 2 doses of Haldol overnight as well as Seroquel. Discussion was had with family regarding initiating hemodialysis. L intrajugular central line was placed for HD. Pt completed 2 sessions of HD during his ICU admission. Further goals of care d/w family who determined to continue with conservative management given pt's poor prognosis and underlying comorbidities, avoiding surgical measures for lung biopsy and HD-dependency. Pt was made DNR/DNI. Pt developed new onset rapid afib and was loaded with amiodarone, remaining in NSR during the rest of his ICU stay. Chest CT was repeated and demonstrated unchanged loculated L pleural fluid collection with chest tube inferior to collection. Pigtail was then removed with follow up imaging showing no evidence for PTX. Seroquel was d/c'd. Patient more awake, alert, and interactive.     pt was taken off HD after suffering from vanc tox and is doing better. celestin removed.    dc to [facility]        ---    CONSULTANTS:     Pulmonology - Dr. Hans Del Rio/Onc - Dr. Kiran    CT Surgery - Dr. Steven    IR - Dr. Kang    GI - Dr. Sheikh AUGUSTE - Dr. Kiran    Urology - Dr. Barnard    Cardio - Dr. Amos    Critical care - Dr. Gusman    Nephro - Dr. Wang        ---    TIME SPENT:    The total amount of time spent reviewing the hospital notes, laboratory values, imaging findings, assessing/counseling the patient, discussing with consultant physicians, social work, nursing staff was more than  33 minutesminutes        ---    Primary care provider was made aware of plan for discharge:      [  ] NO     [  ] YES

## 2020-03-11 NOTE — PROGRESS NOTE ADULT - ASSESSMENT
89 yo M with PMH of HLD and MALToma of the stomach (under surveillance), spinal stenosis with extensive lumbar surgery in 2011 and bilateral LE neuropathy, admitted for L side empyema and R ureteral stone. Patient initially reported what appeared to be stable occasional angina however later denied this.  Elevated proBNP however normal LV systolic function with EF 55% on echo and no evidence of meaningful volume overload on exam.   Review of EKG revealed possible inferior infarct however no wall motion abnormalities on 2/27 echo.   He is now in the intensive care unit status post lytic infusion therapy through a chest tube for his left-sided empyema. GUS now s/p HD     - no clear acute ischemia  - ekg has suggested possible imi, but no wma on echo  - cont statin    - Had rapid AF, now in SR  - Continue amiodarone 400 TID to 5 g load, than 200 QD  - No AC for now given history of hematuria and bloody chest tube drainage     - no meaningful volume overload   - now on hd prn  - CT management per CTS    - still hypotensive.  Continue midodrine  - levo gtt, titrated off       - Monitor and replete electrolytes. Keep K>4.0 and Mg>2.0.       - Rest of management per urology/CT surgery/primary team.  - Other cardiovascular workup will depend on clinical course. Will follow.     The patient remains at risk of abrupt decompensation.  I have personally provided  35 minutes of critical care time, excluding time spent on separate procedures.      charting in progress 89 yo M with PMH of HLD and MALToma of the stomach (under surveillance), spinal stenosis with extensive lumbar surgery in 2011 and bilateral LE neuropathy, admitted for L side empyema and R ureteral stone. Patient initially reported what appeared to be stable occasional angina however later denied this.  Elevated proBNP however normal LV systolic function with EF 55% on echo and no evidence of meaningful volume overload on exam.   Review of EKG revealed possible inferior infarct however no wall motion abnormalities on 2/27 echo.   He is now in the intensive care unit status post lytic infusion therapy through a chest tube for his left-sided empyema. GUS now s/p HD     - no clear acute ischemia  - ekg has suggested possible imi, but no wma on echo  - cont statin    - Had rapid AF, now in SR  - Continue amiodarone 400 TID to 5 g load, than 200 QD  - No AC for now given history of hematuria and bloody chest tube drainage     - no meaningful volume overload   - now on hd prn. creatinine continues to rise  - no indication for emergent hd    - still hypotensive req midodrine  - levo gtt, titrated off       - Monitor and replete electrolytes. Keep K>4.0 and Mg>2.0.       - Rest of management per urology/CT surgery/primary team.  - Other cardiovascular workup will depend on clinical course. Will follow.     The patient remains at risk of abrupt decompensation.  I have personally provided  35 minutes of critical care time, excluding time spent on separate procedures.

## 2020-03-11 NOTE — PROGRESS NOTE ADULT - ASSESSMENT
1.	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	Loculated pleural effusions, s/p Chest tube  3.	Anemia  4.	Hypotension  5.	h/o Lymphoma    Hold HD today. BP stable. Hopefully creatinine will plateau and improve. Monitor UO. To continue current meds. Avoid nephrotoxic meds as possible.   Monitor h/h trend. Transfuse PRBC PRN. Avoid ACEI, ARB, NSAIDs and IV contrast. ICU management. D/w ICU team.

## 2020-03-12 LAB
ANION GAP SERPL CALC-SCNC: 10 MMOL/L — SIGNIFICANT CHANGE UP (ref 5–17)
BUN SERPL-MCNC: 61 MG/DL — HIGH (ref 7–23)
CALCIUM SERPL-MCNC: 8.1 MG/DL — LOW (ref 8.5–10.1)
CHLORIDE SERPL-SCNC: 104 MMOL/L — SIGNIFICANT CHANGE UP (ref 96–108)
CO2 SERPL-SCNC: 26 MMOL/L — SIGNIFICANT CHANGE UP (ref 22–31)
CREAT SERPL-MCNC: 5.9 MG/DL — HIGH (ref 0.5–1.3)
GLUCOSE SERPL-MCNC: 102 MG/DL — HIGH (ref 70–99)
HCT VFR BLD CALC: 24.3 % — LOW (ref 39–50)
HGB BLD-MCNC: 7.9 G/DL — LOW (ref 13–17)
MAGNESIUM SERPL-MCNC: 2.7 MG/DL — HIGH (ref 1.6–2.6)
MCHC RBC-ENTMCNC: 30.5 PG — SIGNIFICANT CHANGE UP (ref 27–34)
MCHC RBC-ENTMCNC: 32.5 GM/DL — SIGNIFICANT CHANGE UP (ref 32–36)
MCV RBC AUTO: 93.8 FL — SIGNIFICANT CHANGE UP (ref 80–100)
NRBC # BLD: 0 /100 WBCS — SIGNIFICANT CHANGE UP (ref 0–0)
PLATELET # BLD AUTO: 312 K/UL — SIGNIFICANT CHANGE UP (ref 150–400)
POTASSIUM SERPL-MCNC: 4.9 MMOL/L — SIGNIFICANT CHANGE UP (ref 3.5–5.3)
POTASSIUM SERPL-SCNC: 4.9 MMOL/L — SIGNIFICANT CHANGE UP (ref 3.5–5.3)
RBC # BLD: 2.59 M/UL — LOW (ref 4.2–5.8)
RBC # FLD: 13.7 % — SIGNIFICANT CHANGE UP (ref 10.3–14.5)
SODIUM SERPL-SCNC: 140 MMOL/L — SIGNIFICANT CHANGE UP (ref 135–145)
WBC # BLD: 10.02 K/UL — SIGNIFICANT CHANGE UP (ref 3.8–10.5)
WBC # FLD AUTO: 10.02 K/UL — SIGNIFICANT CHANGE UP (ref 3.8–10.5)

## 2020-03-12 PROCEDURE — 99232 SBSQ HOSP IP/OBS MODERATE 35: CPT

## 2020-03-12 PROCEDURE — 99233 SBSQ HOSP IP/OBS HIGH 50: CPT | Mod: GC

## 2020-03-12 RX ORDER — ERYTHROPOIETIN 10000 [IU]/ML
10000 INJECTION, SOLUTION INTRAVENOUS; SUBCUTANEOUS ONCE
Refills: 0 | Status: COMPLETED | OUTPATIENT
Start: 2020-03-12 | End: 2020-03-12

## 2020-03-12 RX ADMIN — AMIODARONE HYDROCHLORIDE 400 MILLIGRAM(S): 400 TABLET ORAL at 23:46

## 2020-03-12 RX ADMIN — SEVELAMER CARBONATE 800 MILLIGRAM(S): 2400 POWDER, FOR SUSPENSION ORAL at 08:10

## 2020-03-12 RX ADMIN — POLYETHYLENE GLYCOL 3350 17 GRAM(S): 17 POWDER, FOR SOLUTION ORAL at 05:27

## 2020-03-12 RX ADMIN — HEPARIN SODIUM 5000 UNIT(S): 5000 INJECTION INTRAVENOUS; SUBCUTANEOUS at 05:28

## 2020-03-12 RX ADMIN — FAMOTIDINE 20 MILLIGRAM(S): 10 INJECTION INTRAVENOUS at 11:27

## 2020-03-12 RX ADMIN — ATORVASTATIN CALCIUM 10 MILLIGRAM(S): 80 TABLET, FILM COATED ORAL at 21:23

## 2020-03-12 RX ADMIN — CHLORHEXIDINE GLUCONATE 1 APPLICATION(S): 213 SOLUTION TOPICAL at 11:26

## 2020-03-12 RX ADMIN — Medication 2.5 MILLIGRAM(S): at 17:53

## 2020-03-12 RX ADMIN — Medication 2.5 MILLIGRAM(S): at 05:27

## 2020-03-12 RX ADMIN — AMIODARONE HYDROCHLORIDE 400 MILLIGRAM(S): 400 TABLET ORAL at 17:54

## 2020-03-12 RX ADMIN — Medication 1 TABLET(S): at 21:23

## 2020-03-12 RX ADMIN — HEPARIN SODIUM 5000 UNIT(S): 5000 INJECTION INTRAVENOUS; SUBCUTANEOUS at 17:53

## 2020-03-12 RX ADMIN — AMIODARONE HYDROCHLORIDE 400 MILLIGRAM(S): 400 TABLET ORAL at 05:27

## 2020-03-12 RX ADMIN — SEVELAMER CARBONATE 800 MILLIGRAM(S): 2400 POWDER, FOR SUSPENSION ORAL at 11:27

## 2020-03-12 RX ADMIN — Medication 1 TABLET(S): at 17:54

## 2020-03-12 RX ADMIN — SEVELAMER CARBONATE 800 MILLIGRAM(S): 2400 POWDER, FOR SUSPENSION ORAL at 17:54

## 2020-03-12 RX ADMIN — HEPARIN SODIUM 5000 UNIT(S): 5000 INJECTION INTRAVENOUS; SUBCUTANEOUS at 21:24

## 2020-03-12 RX ADMIN — ERYTHROPOIETIN 10000 UNIT(S): 10000 INJECTION, SOLUTION INTRAVENOUS; SUBCUTANEOUS at 15:44

## 2020-03-12 RX ADMIN — Medication 1 TABLET(S): at 05:28

## 2020-03-12 NOTE — PROGRESS NOTE ADULT - SUBJECTIVE AND OBJECTIVE BOX
Date/Time Patient Seen:  		  Referring MD:   Data Reviewed	       Patient is a 89y old  Male who presents with a chief complaint of Left Empyema (11 Mar 2020 18:30)      Subjective/HPI     PAST MEDICAL & SURGICAL HISTORY:  GI Bleed: 2007  Stomach Ulcer: H pylori 2007, treated with antibiotics  Osteoarthritis  Hyperlipidemia  Herniated Disc  Spinal Stenosis  Diverticulitis  GERD (Gastroesophageal Reflux Disease)  Chronic Lymphocytic Leukemia: followed by oncologist in [place]  S/P Tonsillectomy: childhood  Status Post Arthroscopy: right shoulder  History of Arthroscopy of Knee: bilateral  Osteoarthritis  Stomach Ulcer: H pylori, treated with antibiotics        Medication list         MEDICATIONS  (STANDING):  aMIOdarone    Tablet   Oral   aMIOdarone    Tablet 400 milliGRAM(s) Oral every 8 hours  atorvastatin 10 milliGRAM(s) Oral at bedtime  chlorhexidine 2% Cloths 1 Application(s) Topical <User Schedule>  dronabinol 2.5 milliGRAM(s) Oral two times a day  epoetin brenda Injectable 54797 Unit(s) IV Push <User Schedule>  famotidine    Tablet 20 milliGRAM(s) Oral daily  heparin  Injectable 5000 Unit(s) SubCutaneous every 8 hours  lactobacillus acidophilus 1 Tablet(s) Oral three times a day  polyethylene glycol 3350 17 Gram(s) Oral two times a day  sevelamer carbonate 800 milliGRAM(s) Oral three times a day with meals    MEDICATIONS  (PRN):  acetaminophen   Tablet .. 650 milliGRAM(s) Oral every 6 hours PRN Temp greater or equal to 38C (100.4F), Mild Pain (1 - 3)  albuterol/ipratropium for Nebulization 3 milliLiter(s) Nebulizer every 6 hours PRN Shortness of Breath and/or Wheezing         Vitals log        ICU Vital Signs Last 24 Hrs  T(C): 36.7 (12 Mar 2020 04:36), Max: 36.8 (11 Mar 2020 15:19)  T(F): 98.1 (12 Mar 2020 04:36), Max: 98.3 (11 Mar 2020 15:19)  HR: 88 (12 Mar 2020 04:36) (75 - 108)  BP: 116/64 (12 Mar 2020 04:36) (105/69 - 134/71)  BP(mean): 82 (11 Mar 2020 19:00) (72 - 96)  ABP: --  ABP(mean): --  RR: 17 (12 Mar 2020 04:36) (17 - 33)  SpO2: 94% (12 Mar 2020 04:36) (93% - 100%)           Input and Output:  I&O's Detail    10 Mar 2020 07:01  -  11 Mar 2020 07:00  --------------------------------------------------------  IN:    Oral Fluid: 700 mL  Total IN: 700 mL    OUT:    Indwelling Catheter - Urethral: 1120 mL  Total OUT: 1120 mL    Total NET: -420 mL      11 Mar 2020 07:01  -  12 Mar 2020 06:21  --------------------------------------------------------  IN:    Oral Fluid: 240 mL  Total IN: 240 mL    OUT:    Indwelling Catheter - Urethral: 1280 mL  Total OUT: 1280 mL    Total NET: -1040 mL          Lab Data                        7.9    10.02 )-----------( 312      ( 12 Mar 2020 05:33 )             24.3     03-12    140  |  104  |  61<H>  ----------------------------<  102<H>  4.9   |  26  |  5.90<H>    Ca    8.1<L>      12 Mar 2020 05:33  Phos  6.0     03-11  Mg     2.7     03-12    TPro  5.4<L>  /  Alb  2.1<L>  /  TBili  0.6  /  DBili  x   /  AST  35  /  ALT  50  /  AlkPhos  71  03-11            Review of Systems	      Objective     Physical Examination    heart s1s2  lung dec BS  on room air  head nc  left IJ cath chico    Pertinent Lab findings & Imaging      [place]:  NO   Adequate UO     I&O's Detail    10 Mar 2020 07:01  -  11 Mar 2020 07:00  --------------------------------------------------------  IN:    Oral Fluid: 700 mL  Total IN: 700 mL    OUT:    Indwelling Catheter - Urethral: 1120 mL  Total OUT: 1120 mL    Total NET: -420 mL      11 Mar 2020 07:01  -  12 Mar 2020 06:21  --------------------------------------------------------  IN:    Oral Fluid: 240 mL  Total IN: 240 mL    OUT:    Indwelling Catheter - Urethral: 1280 mL  Total OUT: 1280 mL    Total NET: -1040 mL               Discussed with:     Cultures:	        Radiology

## 2020-03-12 NOTE — PROGRESS NOTE ADULT - SUBJECTIVE AND OBJECTIVE BOX
All interim records and events noted.    lethargic    3/11 notes from the last few days were noted and discussed with the group. Pt has a white cell count of 10.87 hemoglobin of 7.9 platelet count of 477693 and normal diff. 3/12 pt was on the med floor and pigtail cath was out and NSR on amiodarone. Hemoglobin was 7.9.    MEDICATIONS  (STANDING):  aMIOdarone    Tablet   Oral   aMIOdarone    Tablet 400 milliGRAM(s) Oral every 8 hours  atorvastatin 10 milliGRAM(s) Oral at bedtime  chlorhexidine 2% Cloths 1 Application(s) Topical <User Schedule>  dronabinol 2.5 milliGRAM(s) Oral two times a day  epoetin brenda Injectable 83671 Unit(s) IV Push <User Schedule>  famotidine    Tablet 20 milliGRAM(s) Oral daily  heparin  Injectable 5000 Unit(s) SubCutaneous every 8 hours  lactobacillus acidophilus 1 Tablet(s) Oral three times a day  midodrine 5 milliGRAM(s) Oral every 8 hours  polyethylene glycol 3350 17 Gram(s) Oral two times a day  sevelamer carbonate 800 milliGRAM(s) Oral three times a day with meals    MEDICATIONS  (PRN):  acetaminophen   Tablet .. 650 milliGRAM(s) Oral every 6 hours PRN Temp greater or equal to 38C (100.4F), Mild Pain (1 - 3)  albuterol/ipratropium for Nebulization 3 milliLiter(s) Nebulizer every 6 hours PRN Shortness of Breath and/or Wheezing  ICU Vital Signs Last 24 Hrs  T(C): 36.8 (12 Mar 2020 16:45), Max: 36.8 (12 Mar 2020 12:59)  T(F): 98.2 (12 Mar 2020 16:45), Max: 98.3 (12 Mar 2020 12:59)  HR: 88 (12 Mar 2020 19:46) (88 - 93)  BP: 148/63 (12 Mar 2020 17:48) (107/68 - 148/63)  BP(mean): --  ABP: --  ABP(mean): --  RR: 18 (12 Mar 2020 17:48) (17 - 21)  SpO2: 93% (12 Mar 2020 19:46) (93% - 96%)    PHYSICAL EXAM sleeping in the bed and was stable will return later notes and PE noted.  General: well developed  well nourished, but frail elderly man, in no acute distress  Head: atraumatic, normocephalic  Neck: supple, trachea midline  CV: S1 S2, regular rate and rhythm  Lungs: clear to auscultation, no wheezes/rhonchi  Abdomen: soft, nontender, bowel sounds present, no palpable masses, pouchy  Extrem: no clubbing/cyanosis/edema  Skin: no significant increased ecchymosis/petechiae                            7.9    10.02 )-----------( 312      ( 12 Mar 2020 05:33 )             24.3                           7.9    10.87 )-----------( 343      ( 11 Mar 2020 05:12 )             24.5                8.1    12.62 )-----------( 387      ( 03-10 @ 05:09 )             24.1                7.6    9.66  )-----------( 363      ( 03-09 @ 05:14 )             23.3                8.7    12.28 )-----------( 484      ( 03-08 @ 05:58 )             26.0       03-10    139  |  102  |  52<H>  ----------------------------<  124<H>  5.0   |  26  |  5.50<H>    Ca    8.3<L>      10 Mar 2020 05:09  Phos  5.6     03-10  Mg     2.6     03-10    TPro  5.7<L>  /  Alb  2.2<L>  /  TBili  0.6  /  DBili  x   /  AST  49<H>  /  ALT  57  /  AlkPhos  72  03-10    03-10 @ 05:09  PT17.3 INR1.52  PTT28.4  03-09 @ 05:14  PT19.2 INR1.69  PTT29.5  03-08 @ 05:58  PT17.3 INR1.52  PTT28.5  03-06 @ 06:06  PT17.5 INR1.54  PTT22.7      RADIOLOGY & ADDITIONAL STUDIES:    IMPRESSION/RECOMMENDATIONS:

## 2020-03-12 NOTE — PROGRESS NOTE ADULT - ASSESSMENT
Assessment and Plan:  -Left empyema:  s/p IR chest tube placement on 2/27.  s/p tPA and dornase tx.  Completed course of IV Zosyn.  s/p removal of pigtail catheter on 3/10.  Continue Duoneb Q6h PRN.  Pulmonary f/u  -Afib with RVR:  Now in NSR.  Will change amiodarone to 200 mg q daily.  No anticoagulation given history of hematuria and prior blood chest tube drainage.    Cardiology f/u  -GUS 2/2 likely ATN:  HD today with Dr. Willard Continue to avoid nephrotoxic medications.  Nephrology f/u  -Gastric MALToma:  Outpatient follow up.  -Acute encephalopathy:  component of uremic encephalopathy.  Continue to monitor mental status.    -Anemia:  continue Epogen  -VTE ppx:  Heparin 5000 units SQ Q8h Assessment and Plan:  -Left empyema:  s/p IR chest tube placement on 2/27.  s/p tPA and dornase tx.  Completed course of IV Zosyn.  s/p removal of pigtail catheter on 3/10.  Continue Duoneb Q6h PRN.  Pulmonary f/u  -Afib with RVR:  Now in NSR.  Continue amiodarone loading.  No anticoagulation given history of hematuria and prior bloody chest tube drainage.    Cardiology f/u  -GUS 2/2 likely ATN:  HD today with Dr. Willadr Continue to avoid nephrotoxic medications.  Nephrology f/u  -Gastric MALToma:  Outpatient follow up.  -Acute encephalopathy:  component of uremic encephalopathy.  Continue to monitor mental status.    -Anemia:  continue Epogen  -VTE ppx:  Heparin 5000 units SQ Q8h Assessment and Plan:  -Left empyema:  s/p IR chest tube placement on 2/27.  s/p tPA and dornase tx.  Completed course of IV Zosyn.  s/p removal of pigtail catheter on 3/10.  Continue Duoneb Q6h PRN.  Pulmonary f/u  -Afib with RVR:  Now in NSR. Amiodarone to change to 200 mg q daily. Cardiology f/u  -GUS 2/2 likely ATN:  HD today with Dr. Willard Continue to avoid nephrotoxic medications.  Nephrology f/u  -Gastric MALToma:  Outpatient follow up.  -Acute encephalopathy:  component of uremic encephalopathy.  Continue to monitor mental status.    -Anemia:  continue Epogen  -VTE ppx:  Heparin 5000 units SQ Q8h Assessment and Plan:  -Left empyema:  s/p IR chest tube placement on 2/27.  s/p tPA and dornase tx.  Completed course of IV Zosyn.  s/p removal of pigtail catheter on 3/10.  Continue Duoneb Q6h PRN.  Pulmonary f/u  -Afib with RVR:  Now in NSR. Continue amiodarone 400 mg q8h, will change to maintenance dose tomorrow am to 200 mg. Cardiology f/u  -GUS 2/2 likely ATN:  HD today with Dr. Willard Continue to avoid nephrotoxic medications.  Nephrology f/u  -Gastric MALToma:  Outpatient follow up.  -Acute encephalopathy:  component of uremic encephalopathy.  Continue to monitor mental status.    -Anemia:  continue Epogen  -VTE ppx:  Heparin 5000 units SQ Q8h Assessment and Plan:  -Left empyema:  s/p IR chest tube placement on 2/27.  s/p tPA and dornase tx.  Completed course of IV Zosyn.  s/p removal of pigtail catheter on 3/10.  Continue Duoneb Q6h PRN.  Pulmonary f/u  -Afib with RVR:  Now in NSR. Continue amiodarone 400 mg q8h, will change to maintenance dose tomorrow am to 200 mg. Cardiology f/u  -GUS 2/2 likely ATN:  HD today with Dr. Willard Continue to avoid nephrotoxic medications. Will follow up family's decision regarding permacath placement for long-term HD.  Nephrology f/u  -Gastric MALToma:  Outpatient follow up.  -Acute encephalopathy:  component of uremic encephalopathy.  Continue to monitor mental status.    -Anemia:  continue Epogen  -VTE ppx:  Heparin 5000 units SQ Q8h Assessment and Plan:  -Left empyema:  s/p IR chest tube placement on 2/27.  s/p tPA and dornase tx.  Completed course of IV Zosyn.  s/p removal of pigtail catheter on 3/10.  Continue Duoneb Q6h PRN.  Pulmonary f/u  -Afib with RVR:  Now in NSR. Continue amiodarone 400 mg q8h, will change to maintenance dose tomorrow am to 200 mg. Cardiology f/u  -GUS 2/2 likely ATN:  HD today with Dr. Willard Continue to avoid nephrotoxic medications. Will follow up family's decision regarding permacath placement for long-term HD, currently L IJ in place.  Nephrology f/u  -Gastric MALToma:  Outpatient follow up.  -Acute encephalopathy:  Mental status AOx4.   -Anemia:  continue Epogen, will order T&S  -VTE ppx:  Heparin 5000 units SQ Q8h

## 2020-03-12 NOTE — PROGRESS NOTE ADULT - SUBJECTIVE AND OBJECTIVE BOX
FOLLOW UP:  pleural effusion, ct, pat, hypotension on midodrine, paf  SUBJECTIVE/OBSERVATIONS: Patient seen and examined. Patient reports shortness of breath and cough overnight  OVERNIGHT EVENTS: SOB and cough  TELE EVENTS:   sinus rhythm in the 80s  Vital Signs Last 24 Hrs  T(C): 36.7 (12 Mar 2020 04:36), Max: 36.8 (11 Mar 2020 15:19)  T(F): 98.1 (12 Mar 2020 04:36), Max: 98.3 (11 Mar 2020 15:19)  HR: 88 (12 Mar 2020 04:36) (81 - 98)  BP: 116/64 (12 Mar 2020 04:36) (105/69 - 134/71)  BP(mean): 82 (11 Mar 2020 19:00) (72 - 96)  RR: 17 (12 Mar 2020 04:36) (17 - 27)  SpO2: 94% (12 Mar 2020 04:36) (93% - 100%)  I&O's Summary    11 Mar 2020 07:01  -  12 Mar 2020 07:00  --------------------------------------------------------  IN: 240 mL / OUT: 1280 mL / NET: -1040 mL    12 Mar 2020 07:01  -  12 Mar 2020 11:57  --------------------------------------------------------  IN: 360 mL / OUT: 0 mL / NET: 360 mL    MEDICATIONS:  aMIOdarone    Tablet   Oral   aMIOdarone    Tablet 400 milliGRAM(s) Oral every 8 hours  heparin  Injectable 5000 Unit(s) SubCutaneous every 8 hours  albuterol/ipratropium for Nebulization 3 milliLiter(s) Nebulizer every 6 hours PRN  acetaminophen   Tablet .. 650 milliGRAM(s) Oral every 6 hours PRN  dronabinol 2.5 milliGRAM(s) Oral two times a day  famotidine    Tablet 20 milliGRAM(s) Oral daily  polyethylene glycol 3350 17 Gram(s) Oral two times a day  atorvastatin 10 milliGRAM(s) Oral at bedtime  chlorhexidine 2% Cloths 1 Application(s) Topical <User Schedule>  epoetin brenda Injectable 30087 Unit(s) IV Push <User Schedule>      REVIEW OF SYSTEMS:  Complete 10point ROS negative.    PHYSICAL EXAM:  General: NAD  Cardiovascular: Normal S1 S2, No JVD, No murmurs, No edema  Respiratory: decreased breath sounds on the left side, diminished on the right  Gastrointestinal:  Soft, Non-tender, + BS	  Skin: warm and dry, No rashes, No ecchymoses, No cyanosis	  Extremities: Normal range of motion, No clubbing, cyanosis or edema  Vascular: Peripheral pulses palpable 2+ bilaterally    LABS:	 	    CBC Full  -  ( 12 Mar 2020 05:33 )  WBC Count : 10.02 K/uL  Hemoglobin : 7.9 g/dL  Hematocrit : 24.3 %  Platelet Count - Automated : 312 K/uL  Mean Cell Volume : 93.8 fl  Mean Cell Hemoglobin : 30.5 pg  Mean Cell Hemoglobin Concentration : 32.5 gm/dL  Auto Neutrophil # : x  Auto Lymphocyte # : x  Auto Monocyte # : x  Auto Eosinophil # : x  Auto Basophil # : x  Auto Neutrophil % : x  Auto Lymphocyte % : x  Auto Monocyte % : x  Auto Eosinophil % : x  Auto Basophil % : x    03-12    140  |  104  |  61<H>  ----------------------------<  102<H>  4.9   |  26  |  5.90<H>  03-11    140  |  104  |  56<H>  ----------------------------<  87  4.9   |  27  |  5.70<H>    Ca    8.1<L>      12 Mar 2020 05:33  Ca    8.0<L>      11 Mar 2020 05:12  Phos  6.0     03-11  Mg     2.7     03-12  Mg     2.6     03-11    TPro  5.4<L>  /  Alb  2.1<L>  /  TBili  0.6  /  DBili  x   /  AST  35  /  ALT  50  /  AlkPhos  71  03-11      EKG:    Echo:  < from: TTE Echo Doppler w/o Cont (02.27.20 @ 11:47) >     EXAM:  ECHO TTE WO CON COMP W DOPPLR         PROCEDURE DATE:  02/27/2020        INTERPRETATION:  INDICATION: Heart failure  Sonographer PH    Blood Pressure 107/68    Height 177.8 cm     Weight 86.2 kg       BSA 2.0 sq m    Dimensions:    LA 3.7    Normal Values: 2.0 - 4.0 cm    Ao 3.3        Normal Values: 2.0 - 3.8 cm  SEPTUM Normal Values: 0.6 - 1.2 cm  PWT Normal Values: 0.6 - 1.1 cm  LVIDd Normal Values: 3.0 - 5.6 cm  LVIDs Normal Values: 1.8 - 4.0 cm      OBSERVATIONS:  Technically difficult and limited study  Mitral Valve: normal, trace physiologic MR.  Aortic Valve/Aorta: Sclerotic trileaflet aortic valve with normal opening.  Tricuspid Valve: normal with trace TR.  Pulmonic Valve: Not well-visualized  Left Atrium: normal  RightAtrium: Not well-visualized  Left Ventricle: normal LV size and systolic function, estimated LVEF of 55%.  Right Ventricle: Not well-visualized  Pericardium/Pleura: normal, small anterior pericardial effusion without signs of hemodynamic compromise.  Pulmonary/RV Pressure: estimated PA systolic pressure of 34mmHg. IVC is dilated    Conclusion:   Technically difficult and limited study  Normal left ventricular internal dimensions and systolic function, estimated LVEF of 55%.   Right ventricle is not well-visualized  Sclerotic trileaflet aortic valve, without AI.   Trace physiologic MR and TR.                       TYLER AYALA   This document has been electronically signed. Feb 28 2020  8:07AM                < end of copied text >

## 2020-03-12 NOTE — PROGRESS NOTE ADULT - ASSESSMENT
89 y/o man known to our office, dx'd with gastric MALToma 2013, bone marrow and 2017 repeat gastric bx c/w Chronic Lymphocytic Leukemia/Small Lymphocytic Lymphoma, under serial monitoring w EGD and PETCT, has not required treatment. Also hx B12 deficiency.  Adm w left empyema, hx URI sx Jan 2020  Post IR pigtail insertion w minimal output and tx to ICU for dornase/alteplase adm w resultant partial output. Repeat CT chest showed tip is inferior to the loculated fluid and CT removed.  Course complicated by GUS in setting of hypotension and exposure to Vancomycin    -now on hemodialysis for renal failure  -son had inquired biopsy of lung to see if lymphoma is present and cause of acute condition. However, even if able to be done, this would be a blind biopsy therefore negative findings does not equate no disease present. Also, resultswould not  of patient in view of present clinical condition as pt not candidate for any meaningful oncological treatment.    continue acute care   3/11 notes from the last few days were noted and discussed with the group. Pt has a white cell count of 10.87 hemoglobin of 7.9 platelet count of [id number] and normal diff.  3/12 pt was on the med floor and pigtail cath was out and NSR on amiodarone. Hemoglobin was 7.9.

## 2020-03-12 NOTE — PROGRESS NOTE ADULT - ASSESSMENT
1.	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	Loculated pleural effusions, s/p Chest tube  3.	Anemia  4.	Hypotension  5.	h/o Lymphoma    creatinine continues to trend up. HD today. BP stable. Monitor UO. To continue current meds. Avoid nephrotoxic meds as possible.   Monitor h/h trend. Transfuse PRBC PRN. Avoid ACEI, ARB, NSAIDs and IV contrast. D/w pt's son over the phone ([phone]).  Will need to remove dialysis catheter  and change to perma cath if family opts for long term dialysis. Pt's son wants to d/w his sister before we remove the [place] catheter. Will monitor for renal recovery.

## 2020-03-12 NOTE — PROGRESS NOTE ADULT - SUBJECTIVE AND OBJECTIVE BOX
CHARLY LU is a 89yMale , patient examined and chart reviewed.     INTERVAL HPI/ OVERNIGHT EVENTS:   Awake. No distress.  Afebrile.     PAST MEDICAL & SURGICAL HISTORY:  GI Bleed: 2007  Stomach Ulcer: H pylori 2007, treated with antibiotics  Osteoarthritis  Hyperlipidemia  Herniated Disc  Spinal Stenosis  Diverticulitis  GERD (Gastroesophageal Reflux Disease)  Chronic Lymphocytic Leukemia: followed by oncologist in [place]  S/P Tonsillectomy: childhood  Status Post Arthroscopy: right shoulder  History of Arthroscopy of Knee: bilateral    For details regarding the patient's social history, family history, and other miscellaneous elements, please refer the initial infectious diseases consultation and/or the admitting history and physical examination for this admission.    ROS:  CONSTITUTIONAL:  Negative fever or chills  EYES:  Negative  blurry vision or double vision  CARDIOVASCULAR:  Negative for chest pain or palpitations  RESPIRATORY:  Negative for cough, wheezing, or SOB   GASTROINTESTINAL:  Negative for nausea, vomiting, diarrhea, constipation, or abdominal pain  GENITOURINARY:  Negative frequency, urgency or dysuria  NEUROLOGIC:  No headache, confusion, dizziness, lightheadedness  All other systems were reviewed and are negative    Current inpatient medications :  MEDICATIONS  (STANDING):  aMIOdarone    Tablet   Oral   aMIOdarone    Tablet 400 milliGRAM(s) Oral every 8 hours  atorvastatin 10 milliGRAM(s) Oral at bedtime  chlorhexidine 2% Cloths 1 Application(s) Topical <User Schedule>  dronabinol 2.5 milliGRAM(s) Oral two times a day  famotidine    Tablet 20 milliGRAM(s) Oral daily  heparin  Injectable 5000 Unit(s) SubCutaneous every 8 hours  lactobacillus acidophilus 1 Tablet(s) Oral three times a day  polyethylene glycol 3350 17 Gram(s) Oral two times a day  sevelamer carbonate 800 milliGRAM(s) Oral three times a day with meals    MEDICATIONS  (PRN):  acetaminophen   Tablet .. 650 milliGRAM(s) Oral every 6 hours PRN Temp greater or equal to 38C (100.4F), Mild Pain (1 - 3)  albuterol/ipratropium for Nebulization 3 milliLiter(s) Nebulizer every 6 hours PRN Shortness of Breath and/or Wheezing    Objective:  Vital Signs Last 24 Hrs  T(C): 36.8 (12 Mar 2020 16:45), Max: 36.8 (12 Mar 2020 12:59)  T(F): 98.2 (12 Mar 2020 16:45), Max: 98.3 (12 Mar 2020 12:59)  HR: 88 (12 Mar 2020 19:46) (88 - 93)  BP: 148/63 (12 Mar 2020 17:48) (107/68 - 148/63)  RR: 18 (12 Mar 2020 17:48) (17 - 21)  SpO2: 93% (12 Mar 2020 19:46) (93% - 96%)    Physical Exam:  General:  no acute distress  Eyes: sclera anicteric, pupils equal and reactive to light  ENMT: buccal mucosa moist, pharynx not injected  Neck: supple, trachea midline  Lungs: decreased no wheeze/rhonchi    Cardiovascular: regular rate and rhythm, S1 S2  Abdomen: soft, nontender, no organomegaly present, bowel sounds normal  Neurological: alert and oriented x 2, Cranial Nerves II-XII grossly intact  Skin: no increased ecchymosis/petechiae/purpura  Lymph Nodes: no palpable cervical/supraclavicular lymph nodes enlargements  Extremities: +edema    LABS:                                 7.9    10.02 )-----------( 312      ( 12 Mar 2020 05:33 )             24.3   03-12    140  |  104  |  61<H>  ----------------------------<  102<H>  4.9   |  26  |  5.90<H>    Ca    8.1<L>      12 Mar 2020 05:33  Phos  6.0     03-11  Mg     2.7     03-12    TPro  5.4<L>  /  Alb  2.1<L>  /  TBili  0.6  /  DBili  x   /  AST  35  /  ALT  50  /  AlkPhos  71  03-11      MICROBIOLOGY:  Culture - Body Fluid with Gram Stain (02.27.20 @ 21:38)    Gram Stain:   polymorphonuclear leukocytes  No organisms seen  by cytocentrifuge    Specimen Source: .Body Fluid Pleural Fluid    Culture Results:   No growth    Culture - Blood (collected 26 Feb 2020 22:29)  Source: .Blood Blood-Peripheral  Preliminary Report (27 Feb 2020 23:01):    No growth to date.    Culture - Blood (collected 26 Feb 2020 22:29)  Source: .Blood Blood-Peripheral  Preliminary Report (27 Feb 2020 23:01):    No growth to date.    Culture - Urine (collected 26 Feb 2020 11:46)  Source: .Urine Catheterized  Final Report (27 Feb 2020 15:05):    50,000 - 99,000 CFU/mL Coag Negative Staphylococcus    Susceptibilites not performed.    Culture - Blood (collected 25 Feb 2020 22:38)  Source: .Blood Blood-Peripheral  Preliminary Report (26 Feb 2020 23:01):    No growth to date.    Culture - Blood (collected 25 Feb 2020 22:38)  Source: .Blood Blood-Peripheral  Preliminary Report (26 Feb 2020 23:01):    No growth to date.    Legionella pneumophila Antigen, Urine (02.27.20 @ 02:53)    Legionella Antigen, Urine: Negative    Streptococcus Pneumoniae Ag Urine (02.27.20 @ 04:25)    Streptococcus Pneumoniae Ag Urine: Negative    RADIOLOGY & ADDITIONAL STUDIES:  EXAM:  CT RENAL STONE HUNT                          EXAM:  CT CHEST                                  PROCEDURE DATE:  02/25/2020          INTERPRETATION:  CLINICAL HISTORY:  Fever, left back and flank pain. History of gastric lymphoma.    Multiple axial images of the chest, abdomen and pelvis were obtained from the lung apices through symphysis pubis without the administration of oral or intravascular contrast limiting the sensitivity of evaluation. Reformatted coronal and sagittal images are submitted.    COMPARISON: PET/CT evaluation 6/19/2019.    FINDINGS: New moderately sized left pleural effusion is identified with portions that are loculated; air attenuation within the effusion as well as within the left pleural space suggests the presence of left-sided empyema. Consolidation is identified within the lingular segment as well as left lower lobe lung parenchyma; underlying neoplasm cannot be excluded. Hazy opacity within the left upper lobe is likely related to atelectasis. Subpleural opacity within the posterior right lower lobe and right middle lobe likely related to subpleural fibrosis. There is no evidence for right-sided pleural effusion or air.    No abnormally enlarged mediastinal or hilar lymph nodes are noted. There is no evidence for axillary lymphadenopathy. The heart size appears within normal limits. No pericardial effusion is identified. Thoracic aortic caliber demonstrates unremarkable caliber. Coronary arterial calcifications identified.    The central bronchial anatomy appears patent.    No mediastinal shift is noted.    The liver is normal in size. No focal hepatic masses are identified. There is no evidence for intrahepatic or extrahepatic biliary dilatation. A large gallstone is noted. No gallbladder wall thickening or pericholecystic fluid identified.    The spleen, pancreas and adrenal glands are unremarkable.    There is no evidence for hydronephrosis. No right renal calculi are identified. An 8 mm calculus is identified within the left-sided extrarenal pelvis, unchanged. There is no change in a 6 mm calculus identified within the distal right ureter at the level of the UVJ, without evidence for obstructive uropathy. A 1.3 cm right renal cyst is noted. The abdominal aorta is normal in course and caliber. No abnormally enlarged retroperitoneal or pelvic lymphadenopathy is noted.    Gastric wall thickening is identified allowing for nondistention with oral contrast; clinical correlation is suggested in light of the provided clinical history of gastric lymphoma.    Fecal material is scattered throughout the colon. There is no evidence for mechanical bowel obstruction. Colonic diverticulosis is noted. No colonic wall thickening is identified. There is no evidence for acute appendicitis.There is no evidence for free intraperitoneal air or fluid.    The prostate is enlarged and the urinary bladder appear unremarkable.     Postsurgical changes of the lumbar spine noted. Postprocedural changes of the right shoulder noted. Degenerative changes of the spine evident.    IMPRESSION:    1. New moderately sized left pleural effusion is identified with portions that are loculated; air attenuation within the effusion as well as within the left pleural space suggests the presence of left-sided empyema. Consolidation is identified within the lingular segment as well as left lower lobe lung parenchyma; underlying neoplasm cannot be excluded.   2. Gastric wall thickening is identified allowing for nondistention with oral contrast; clinicalcorrelation is suggested in light of the provided clinical history of gastric lymphoma.  3. An 8 mm calculus is identified within the left-sided extrarenal pelvis, unchanged. There is no change in a 6 mm calculus identified within the distal right ureter at the level of the UVJ, without evidence for obstructive uropathy. No evidence for bilateral hydronephrosis.      EXAM:  CT CHEST                            PROCEDURE DATE:  03/01/2020          INTERPRETATION:  Clinical information: Left-sided empyema    Comparison exam dated 2/25/2020    Axial images obtained, coronal and sagittal images computer reformatted    Noncontrast exam limits evaluation of hilar and mediastinal regions.    A left-sided pigtail catheter is present in the left pleural space. Loculated effusion with associated dense consolidation is present, the appearance is slightly more prominentsince the prior exam.    No thoracic aortic aneurysm or pericardial effusion. Cardiomegaly present. Coronary artery calcifications identified. The central airway appears intact. The thyroid gland is not enlarged.    Minimal atelectatic change right lung base. Calcification visible in the right pleura. Peribronchial thickening right infrahilar region. No pneumothorax.    The stomach is filled with food and air. A large gallstone is noted. The adrenal glands are not enlarged. The spleen is not enlarged. No acute appearing osseous abnormalities. Soft tissue anchors right humeral head. Air bubbles visible in the subcutaneous soft tissues left lateral lower thorax.    IMPRESSION: A left-sided pigtail catheter is present in the pleural space. Loculated effusion with associated dense consolidation is present, appearance slightly more prominent when compared to prior exam.        EXAM:  US KIDNEYS AND BLADDER                            PROCEDURE DATE:  03/01/2020          INTERPRETATION:    PROCEDURE INFORMATION:   Exam: US Retroperitoneal Limited, Kidneys   Exam date and time: 3/1/2020 6:31 AM   Age: 89 years old   Clinical indication: Other: Gus     TECHNIQUE:   Imaging protocol: Real-time ultrasound of the retroperitoneum with image   documentation. Examination was focused on the kidneys.     COMPARISON:   No relevant prior studies available.     FINDINGS:   Right kidney: The right kidney measures 11.8 cm in length. Normal parenchymal   echogenicity. No hydronephrosis.   Left kidney: The left kidney measures 11.7 cm in length. Normal parenchymal   echogenicity. Slightly irregular margin. No hydronephrosis.   Prostate: The prostate measures up to 3.7 cm in size.   Bladder: Bladder volume calculated to be 104 mL. No bladder wall thickening   allowing for lack of full distention. No bladder calculus.     IMPRESSION:   No hydronephrosis.        Assessment :   CHARLY LU is a 89y Male PMH CLL, gastric lymphoma ex smoker, hx of asbestos exp presenting to the hospital with cough, fever and left sided pleuritic chest pain. Febrile to 102 found to have left sided empyema. Seen by pulm and CT surgery. Sp IR drainage with pigtail placement 2/2/7/2020. Transferred to ICU sec tpa/dornase therapy through pigtail sec complications of hematuria. Had large clot and nick blood/mixed with urine. Mcnulty irrigation performed complicated with GUS. Worsening GUS- started on HD per renal. Was on TPA/dornase via pigtail. Pigtail removed 3/10/2020. Overall stable.      Plan :  Monitor off antibiotics  Completed course of Zosyn   Trend temps and cbc  Trend renal fx  CT surgery following  Renal following  Stable from ID standpoint    Continue with present regiment.  Appropriate use of antibiotics and adverse effects reviewed.      I have discussed the above plan of care with patient/ family in detail. They expressed understanding of the  treatment plan . Risks, benefits and alternatives discussed in detail. I have asked if they have any questions or concerns and appropriately addressed them to the best of my ability .    25 minutes were spent in direct patient care reviewing notes, medications ,labs data/ imaging , discussion with multidisciplinary team.    Thank you for allowing me to participate in care of your patient .    Jose Kiran MD  Infectious Disease  [phone]

## 2020-03-12 NOTE — PROGRESS NOTE ADULT - SUBJECTIVE AND OBJECTIVE BOX
Patient is a 89y old  Male who presents with a chief complaint of Left Empyema (02 Mar 2020 09:12)  Patient seen in follow up for GUS, ATN.     UO good. Creatinine trending up.     PAST MEDICAL HISTORY:  GI Bleed  Stomach Ulcer  Osteoarthritis  Hyperlipidemia  Herniated Disc  Spinal Stenosis  Diverticulitis  GERD (Gastroesophageal Reflux Disease)  Chronic Lymphocytic Leukemia    MEDICATIONS  (STANDING):  aMIOdarone    Tablet   Oral   aMIOdarone    Tablet 400 milliGRAM(s) Oral every 8 hours  atorvastatin 10 milliGRAM(s) Oral at bedtime  chlorhexidine 2% Cloths 1 Application(s) Topical <User Schedule>  dronabinol 2.5 milliGRAM(s) Oral two times a day  epoetin brenda Injectable 72796 Unit(s) IV Push <User Schedule>  famotidine    Tablet 20 milliGRAM(s) Oral daily  heparin  Injectable 5000 Unit(s) SubCutaneous every 8 hours  lactobacillus acidophilus 1 Tablet(s) Oral three times a day  polyethylene glycol 3350 17 Gram(s) Oral two times a day  sevelamer carbonate 800 milliGRAM(s) Oral three times a day with meals    MEDICATIONS  (PRN):  acetaminophen   Tablet .. 650 milliGRAM(s) Oral every 6 hours PRN Temp greater or equal to 38C (100.4F), Mild Pain (1 - 3)  albuterol/ipratropium for Nebulization 3 milliLiter(s) Nebulizer every 6 hours PRN Shortness of Breath and/or Wheezing    T(C): 36.7 (03-12-20 @ 04:36), Max: 37 (03-10-20 @ 20:24)  HR: 88 (03-12-20 @ 04:36) (75 - 108)  BP: 116/64 (03-12-20 @ 04:36) (100/56 - 166/71)  RR: 17 (03-12-20 @ 04:36)  SpO2: 94% (03-12-20 @ 04:36)  Wt(kg): --  I&O's Detail    11 Mar 2020 07:01  -  12 Mar 2020 07:00  --------------------------------------------------------  IN:    Oral Fluid: 240 mL  Total IN: 240 mL    OUT:    Indwelling Catheter - Urethral: 1280 mL  Total OUT: 1280 mL    Total NET: -1040 mL      12 Mar 2020 07:01  -  12 Mar 2020 12:21  --------------------------------------------------------  IN:    Oral Fluid: 360 mL  Total IN: 360 mL    OUT:  Total OUT: 0 mL    Total NET: 360 mL      PHYSICAL EXAM:  General: NAD  Respiratory: b/l air entry  Cardiovascular: S1 S2  Gastrointestinal: soft  Extremities:  no edema                 LABORATORY:                        7.9    10.02 )-----------( 312      ( 12 Mar 2020 05:33 )             24.3     03-12    140  |  104  |  61<H>  ----------------------------<  102<H>  4.9   |  26  |  5.90<H>    Ca    8.1<L>      12 Mar 2020 05:33  Phos  6.0     03-11  Mg     2.7     03-12    TPro  5.4<L>  /  Alb  2.1<L>  /  TBili  0.6  /  DBili  x   /  AST  35  /  ALT  50  /  AlkPhos  71  03-11    Sodium, Serum: 140 mmol/L (03-12 @ 05:33)  Sodium, Serum: 140 mmol/L (03-11 @ 05:12)    Potassium, Serum: 4.9 mmol/L (03-12 @ 05:33)  Potassium, Serum: 4.9 mmol/L (03-11 @ 05:12)    Hemoglobin: 7.9 g/dL (03-12 @ 05:33)  Hemoglobin: 7.9 g/dL (03-11 @ 05:12)  Hemoglobin: 8.1 g/dL (03-10 @ 05:09)    Creatinine, Serum 5.90 (03-12 @ 05:33)  Creatinine, Serum 5.70 (03-11 @ 05:12)  Creatinine, Serum 5.50 (03-10 @ 05:09)    LIVER FUNCTIONS - ( 11 Mar 2020 05:12 )  Alb: 2.1 g/dL / Pro: 5.4 g/dL / ALK PHOS: 71 U/L / ALT: 50 U/L / AST: 35 U/L / GGT: x             < from: Xray Chest 1 View- PORTABLE-Urgent (03.10.20 @ 13:38) >  EXAM:  XR CHEST PORTABLE URGENT 1V                            PROCEDURE DATE:  03/10/2020        INTERPRETATION:  Status post left chest tube removal.    AP chest. Prior 3/6/2020.    Left chest catheter has been removed. No definite pneumothorax or other gross change heart and lungs. Left internal jugular line reidentified in situ.    Impression: As above    CATERINA HUTCHISON M.D., ATTENDING RADIOLOGIST  This document has been electronically signed. Mar 10 2020  2:35PM    < end of copied text >

## 2020-03-12 NOTE — PROGRESS NOTE ADULT - ASSESSMENT
h/o n[facility]- gastric maltoma  abnormal ct  left empyema, sp drainage  gastric wall thickening  roberto       prior nc ct ap showing gastric wall thickening  per dtr has had op egd w/in past yr  gerd precautions  cont pepcid  cont marinol  cont bowel regimen  monitor cbc daily  diet as tolerated  encouragement/assistance at meals  further management per primary    Advanced care planning was discussed with patient and family.  Advanced care planning forms were reviewed and discussed.  Risks, benefits and alternatives of gastroenterologic procedures were discussed in detail and all questions were answered.    30 minutes spent.

## 2020-03-12 NOTE — PROGRESS NOTE ADULT - ASSESSMENT
89 yo M with PMH of HLD and MALToma of the stomach (under surveillance), spinal stenosis with extensive lumbar surgery in 2011 and bilateral LE neuropathy, admitted for L side empyema and R ureteral stone. Patient initially reported anginal symptoms, s/p ICU stay for empyema, now on telemetry floor.    CHEST PAIN:  -resolved, no further reports of pain  - no clear acute ischemia  - ekg has suggested possible inferior wall MI, no wall motion abnormality on ECHO  -no further ischemic work up inpatient  - cont statin    - Had rapid AF, now in SR  - Continue amiodarone 400 TID to 5 g load, than 200 QD  - No AC for now given history of hematuria and bloody chest tube drainage     VOLUME STATUS:   -lungs with decreased breath sounds  -no appreciable volume overload on exam, would hold diuretics  -BUN and creatinine rise to 61/5.90 from 56/5.70  HD as per primary team  - Monitor and replete electrolytes. Keep K>4.0 and Mg>2.0.

## 2020-03-12 NOTE — PROGRESS NOTE ADULT - SUBJECTIVE AND OBJECTIVE BOX
INTERVAL HPI/OVERNIGHT EVENTS:  pt seen and examined on floors  some confusion but denies n/v/abd pain  +bm  states he ate dinner last night  no acute overnight events per nursing    MEDICATIONS  (STANDING):  aMIOdarone    Tablet   Oral   aMIOdarone    Tablet 400 milliGRAM(s) Oral every 8 hours  atorvastatin 10 milliGRAM(s) Oral at bedtime  chlorhexidine 2% Cloths 1 Application(s) Topical <User Schedule>  dronabinol 2.5 milliGRAM(s) Oral two times a day  epoetin brenda Injectable 06129 Unit(s) IV Push <User Schedule>  famotidine    Tablet 20 milliGRAM(s) Oral daily  heparin  Injectable 5000 Unit(s) SubCutaneous every 8 hours  lactobacillus acidophilus 1 Tablet(s) Oral three times a day  polyethylene glycol 3350 17 Gram(s) Oral two times a day  sevelamer carbonate 800 milliGRAM(s) Oral three times a day with meals    MEDICATIONS  (PRN):  acetaminophen   Tablet .. 650 milliGRAM(s) Oral every 6 hours PRN Temp greater or equal to 38C (100.4F), Mild Pain (1 - 3)  albuterol/ipratropium for Nebulization 3 milliLiter(s) Nebulizer every 6 hours PRN Shortness of Breath and/or Wheezing      Allergies    No Known Allergies    Intolerances        Review of Systems:    see above       Vital Signs Last 24 Hrs  T(C): 36.7 (12 Mar 2020 04:36), Max: 36.8 (11 Mar 2020 15:19)  T(F): 98.1 (12 Mar 2020 04:36), Max: 98.3 (11 Mar 2020 15:19)  HR: 88 (12 Mar 2020 04:36) (77 - 108)  BP: 116/64 (12 Mar 2020 04:36) (105/69 - 134/71)  BP(mean): 82 (11 Mar 2020 19:00) (72 - 96)  RR: 17 (12 Mar 2020 04:36) (17 - 33)  SpO2: 94% (12 Mar 2020 04:36) (93% - 100%)    PHYSICAL EXAM:    General:  lying in  bed  HEENT:  NC/AT  Abdomen:  Soft nt mild dt  Extremities:  no edema  Neuro/Psych:  Awake alert some confusion      LABS:                        7.9    10.02 )-----------( 312      ( 12 Mar 2020 05:33 )             24.3     03-12    140  |  104  |  61<H>  ----------------------------<  102<H>  4.9   |  26  |  5.90<H>    Ca    8.1<L>      12 Mar 2020 05:33  Phos  6.0     03-11  Mg     2.7     03-12    TPro  5.4<L>  /  Alb  2.1<L>  /  TBili  0.6  /  DBili  x   /  AST  35  /  ALT  50  /  AlkPhos  71  03-11    PT/INR - ( 11 Mar 2020 05:12 )   PT: 16.2 sec;   INR: 1.43 ratio         PTT - ( 11 Mar 2020 05:12 )  PTT:28.0 sec      RADIOLOGY & ADDITIONAL TESTS:

## 2020-03-12 NOTE — PROGRESS NOTE ADULT - SUBJECTIVE AND OBJECTIVE BOX
Patient is a 89y old  Male who presents with a chief complaint of Left Empyema (12 Mar 2020 08:37)      INTERVAL HPI/OVERNIGHT EVENTS: Patient examined at bedside. Patient going for hemodylaisis today with Dr. Bah. Patient reports doing well. Patient denies N/V/D, chest pain, SOB, abdominal pain. No other complaints at this time. No acute overnight events appreciated.    T(C): 36.7 (03-12-20 @ 04:36), Max: 36.8 (03-11-20 @ 15:19)  HR: 88 (03-12-20 @ 04:36) (81 - 98)  BP: 116/64 (03-12-20 @ 04:36) (105/69 - 134/71)  RR: 17 (03-12-20 @ 04:36) (17 - 27)  SpO2: 94% (03-12-20 @ 04:36) (93% - 100%)  Wt(kg): --  I&O's Summary    11 Mar 2020 07:01  -  12 Mar 2020 07:00  --------------------------------------------------------  IN: 240 mL / OUT: 1280 mL / NET: -1040 mL        LABS:                        7.9    10.02 )-----------( 312      ( 12 Mar 2020 05:33 )             24.3     03-12    140  |  104  |  61<H>  ----------------------------<  102<H>  4.9   |  26  |  5.90<H>    Ca    8.1<L>      12 Mar 2020 05:33  Phos  6.0     03-11  Mg     2.7     03-12    TPro  5.4<L>  /  Alb  2.1<L>  /  TBili  0.6  /  DBili  x   /  AST  35  /  ALT  50  /  AlkPhos  71  03-11    PT/INR - ( 11 Mar 2020 05:12 )   PT: 16.2 sec;   INR: 1.43 ratio         PTT - ( 11 Mar 2020 05:12 )  PTT:28.0 sec    CAPILLARY BLOOD GLUCOSE                MEDICATIONS  (STANDING):  aMIOdarone    Tablet   Oral   aMIOdarone    Tablet 400 milliGRAM(s) Oral every 8 hours  atorvastatin 10 milliGRAM(s) Oral at bedtime  chlorhexidine 2% Cloths 1 Application(s) Topical <User Schedule>  dronabinol 2.5 milliGRAM(s) Oral two times a day  epoetin brenda Injectable 11621 Unit(s) IV Push <User Schedule>  famotidine    Tablet 20 milliGRAM(s) Oral daily  heparin  Injectable 5000 Unit(s) SubCutaneous every 8 hours  lactobacillus acidophilus 1 Tablet(s) Oral three times a day  polyethylene glycol 3350 17 Gram(s) Oral two times a day  sevelamer carbonate 800 milliGRAM(s) Oral three times a day with meals    MEDICATIONS  (PRN):  acetaminophen   Tablet .. 650 milliGRAM(s) Oral every 6 hours PRN Temp greater or equal to 38C (100.4F), Mild Pain (1 - 3)  albuterol/ipratropium for Nebulization 3 milliLiter(s) Nebulizer every 6 hours PRN Shortness of Breath and/or Wheezing      REVIEW OF SYSTEMS:  CONSTITUTIONAL: No fever, weight loss, or fatigue  EYES: No visual disturbances  ENMT:  No difficulty hearing, tinnitus, vertigo; No throat pain  NECK: No pain or stiffness  RESPIRATORY: No cough, wheezing, chills or hemoptysis; No shortness of breath  CARDIOVASCULAR: No chest pain, palpitations, dizziness, or leg swelling  GASTROINTESTINAL: No abdominal or epigastric pain. No nausea, vomiting, or hematemesis; No diarrhea or constipation. No melena or hematochezia.  GENITOURINARY: No dysuria, frequency, hematuria, or incontinence  NEUROLOGICAL: No headaches, memory loss, loss of strength, numbness, or tremors  SKIN: No itching, burning, rashes, or lesions   ENDOCRINE: No heat or cold intolerance; No hair loss  MUSCULOSKELETAL: No joint pain or swelling; No muscle, back, or extremity pain  PSYCHIATRIC: No depression, anxiety, mood swings, or difficulty sleeping    RADIOLOGY & ADDITIONAL TESTS: No acute imaging in the last 24 hours    Imaging Personally Reviewed:  [ X ] YES  [ ] NO    Consultant(s) Notes Reviewed:  [ X ] YES  [ ] NO    PHYSICAL EXAM:  GENERAL: NAD  HEENT: EOMI, hearing normal, conjunctiva and sclera clear, HD catheter of left neck  Chest: CTA bilaterally, no wheezing  CV: S1S2, RRR,   GI: soft, +BS, NT/ND  Musculoskeletal: no edema  Psychiatric: affect nL, mood nL  Skin: warm and dry    Care Discussed with Consultants/Other Providers [ X ] YES  [ ] NO Patient is a 89y old  Male who presents with a chief complaint of Left Empyema (12 Mar 2020 08:37)      INTERVAL HPI/OVERNIGHT EVENTS: Patient examined at bedside. Patient going for hemodylaisis today with Dr. Willard. Patient reports doing well. Patient denies N/V/D, chest pain, SOB, abdominal pain. No other complaints at this time. No acute overnight events appreciated.    T(C): 36.7 (03-12-20 @ 04:36), Max: 36.8 (03-11-20 @ 15:19)  HR: 88 (03-12-20 @ 04:36) (81 - 98)  BP: 116/64 (03-12-20 @ 04:36) (105/69 - 134/71)  RR: 17 (03-12-20 @ 04:36) (17 - 27)  SpO2: 94% (03-12-20 @ 04:36) (93% - 100%)  Wt(kg): --  I&O's Summary    11 Mar 2020 07:01  -  12 Mar 2020 07:00  --------------------------------------------------------  IN: 240 mL / OUT: 1280 mL / NET: -1040 mL        LABS:                        7.9    10.02 )-----------( 312      ( 12 Mar 2020 05:33 )             24.3     03-12    140  |  104  |  61<H>  ----------------------------<  102<H>  4.9   |  26  |  5.90<H>    Ca    8.1<L>      12 Mar 2020 05:33  Phos  6.0     03-11  Mg     2.7     03-12    TPro  5.4<L>  /  Alb  2.1<L>  /  TBili  0.6  /  DBili  x   /  AST  35  /  ALT  50  /  AlkPhos  71  03-11    PT/INR - ( 11 Mar 2020 05:12 )   PT: 16.2 sec;   INR: 1.43 ratio         PTT - ( 11 Mar 2020 05:12 )  PTT:28.0 sec    CAPILLARY BLOOD GLUCOSE                MEDICATIONS  (STANDING):  aMIOdarone    Tablet   Oral   aMIOdarone    Tablet 400 milliGRAM(s) Oral every 8 hours  atorvastatin 10 milliGRAM(s) Oral at bedtime  chlorhexidine 2% Cloths 1 Application(s) Topical <User Schedule>  dronabinol 2.5 milliGRAM(s) Oral two times a day  epoetin brenda Injectable 83675 Unit(s) IV Push <User Schedule>  famotidine    Tablet 20 milliGRAM(s) Oral daily  heparin  Injectable 5000 Unit(s) SubCutaneous every 8 hours  lactobacillus acidophilus 1 Tablet(s) Oral three times a day  polyethylene glycol 3350 17 Gram(s) Oral two times a day  sevelamer carbonate 800 milliGRAM(s) Oral three times a day with meals    MEDICATIONS  (PRN):  acetaminophen   Tablet .. 650 milliGRAM(s) Oral every 6 hours PRN Temp greater or equal to 38C (100.4F), Mild Pain (1 - 3)  albuterol/ipratropium for Nebulization 3 milliLiter(s) Nebulizer every 6 hours PRN Shortness of Breath and/or Wheezing      REVIEW OF SYSTEMS:  CONSTITUTIONAL: No fever, weight loss, or fatigue  EYES: No visual disturbances  ENMT:  No difficulty hearing, tinnitus, vertigo; No throat pain  NECK: No pain or stiffness  RESPIRATORY: No cough, wheezing, chills or hemoptysis; No shortness of breath  CARDIOVASCULAR: No chest pain, palpitations, dizziness, or leg swelling  GASTROINTESTINAL: No abdominal or epigastric pain. No nausea, vomiting, or hematemesis; No diarrhea or constipation. No melena or hematochezia.  GENITOURINARY: No dysuria, frequency, hematuria, or incontinence  NEUROLOGICAL: No headaches, memory loss, loss of strength, numbness, or tremors  SKIN: No itching, burning, rashes, or lesions   ENDOCRINE: No heat or cold intolerance; No hair loss  MUSCULOSKELETAL: No joint pain or swelling; No muscle, back, or extremity pain  PSYCHIATRIC: No depression, anxiety, mood swings, or difficulty sleeping    RADIOLOGY & ADDITIONAL TESTS: No acute imaging in the last 24 hours    Imaging Personally Reviewed:  [ X ] YES  [ ] NO    Consultant(s) Notes Reviewed:  [ X ] YES  [ ] NO    PHYSICAL EXAM:  GENERAL: NAD  HEENT: EOMI, hearing normal, conjunctiva and sclera clear, HD catheter of left neck  Chest: CTA bilaterally, no wheezing  CV: S1S2, RRR,   GI: soft, +BS, NT/ND  Musculoskeletal: no edema  Psychiatric: affect nL, mood nL  Skin: warm and dry    Care Discussed with Consultants/Other Providers [ X ] YES  [ ] NO Patient is a 89y old  Male who presents with a chief complaint of Left Empyema (12 Mar 2020 08:37)      INTERVAL HPI/OVERNIGHT EVENTS: Patient examined at bedside. Patient going for hemodialysis today with Dr. Willard. Patient reports doing well and is breathing comfortably. Patient denies N/V/D, chest pain, SOB, abdominal pain. No other complaints at this time. No acute overnight events appreciated.    T(C): 36.7 (03-12-20 @ 04:36), Max: 36.8 (03-11-20 @ 15:19)  HR: 88 (03-12-20 @ 04:36) (81 - 98)  BP: 116/64 (03-12-20 @ 04:36) (105/69 - 134/71)  RR: 17 (03-12-20 @ 04:36) (17 - 27)  SpO2: 94% (03-12-20 @ 04:36) (93% - 100%)  Wt(kg): --  I&O's Summary    11 Mar 2020 07:01  -  12 Mar 2020 07:00  --------------------------------------------------------  IN: 240 mL / OUT: 1280 mL / NET: -1040 mL        LABS:                        7.9    10.02 )-----------( 312      ( 12 Mar 2020 05:33 )             24.3     03-12    140  |  104  |  61<H>  ----------------------------<  102<H>  4.9   |  26  |  5.90<H>    Ca    8.1<L>      12 Mar 2020 05:33  Phos  6.0     03-11  Mg     2.7     03-12    TPro  5.4<L>  /  Alb  2.1<L>  /  TBili  0.6  /  DBili  x   /  AST  35  /  ALT  50  /  AlkPhos  71  03-11    PT/INR - ( 11 Mar 2020 05:12 )   PT: 16.2 sec;   INR: 1.43 ratio         PTT - ( 11 Mar 2020 05:12 )  PTT:28.0 sec    CAPILLARY BLOOD GLUCOSE                MEDICATIONS  (STANDING):  aMIOdarone    Tablet   Oral   aMIOdarone    Tablet 400 milliGRAM(s) Oral every 8 hours  atorvastatin 10 milliGRAM(s) Oral at bedtime  chlorhexidine 2% Cloths 1 Application(s) Topical <User Schedule>  dronabinol 2.5 milliGRAM(s) Oral two times a day  epoetin brenda Injectable 73732 Unit(s) IV Push <User Schedule>  famotidine    Tablet 20 milliGRAM(s) Oral daily  heparin  Injectable 5000 Unit(s) SubCutaneous every 8 hours  lactobacillus acidophilus 1 Tablet(s) Oral three times a day  polyethylene glycol 3350 17 Gram(s) Oral two times a day  sevelamer carbonate 800 milliGRAM(s) Oral three times a day with meals    MEDICATIONS  (PRN):  acetaminophen   Tablet .. 650 milliGRAM(s) Oral every 6 hours PRN Temp greater or equal to 38C (100.4F), Mild Pain (1 - 3)  albuterol/ipratropium for Nebulization 3 milliLiter(s) Nebulizer every 6 hours PRN Shortness of Breath and/or Wheezing      REVIEW OF SYSTEMS:  CONSTITUTIONAL: No fever, weight loss, or fatigue  EYES: No visual disturbances  ENMT:  No difficulty hearing, tinnitus, vertigo; No throat pain  NECK: No pain or stiffness  RESPIRATORY: No cough, wheezing, chills or hemoptysis; No shortness of breath  CARDIOVASCULAR: No chest pain, palpitations, dizziness, or leg swelling  GASTROINTESTINAL: No abdominal or epigastric pain. No nausea, vomiting, or hematemesis; No diarrhea or constipation. No melena or hematochezia.  GENITOURINARY: No dysuria, frequency, hematuria, or incontinence  NEUROLOGICAL: No headaches, memory loss, loss of strength, numbness, or tremors  SKIN: No itching, burning, rashes, or lesions   ENDOCRINE: No heat or cold intolerance; No hair loss  MUSCULOSKELETAL: No joint pain or swelling; No muscle, back, or extremity pain  PSYCHIATRIC: No depression, anxiety, mood swings, or difficulty sleeping    RADIOLOGY & ADDITIONAL TESTS: No acute imaging in the last 24 hours    Imaging Personally Reviewed:  [ X ] YES  [ ] NO    Consultant(s) Notes Reviewed:  [ X ] YES  [ ] NO    PHYSICAL EXAM:  GENERAL: NAD  HEENT: EOMI, hearing normal, conjunctiva and sclera clear, HD catheter of left neck  Chest: CTA bilaterally, no wheezing  CV: S1S2, RRR,   GI: soft, +BS, NT/ND  Musculoskeletal: no edema  Psychiatric: affect nL, mood nL  Skin: warm and dry      PHYSICAL EXAM:  GENERAL: NAD  HEENT: EOMI, hearing normal, conjunctiva and sclera clear, HD catheter of left neck  Chest: CTA bilaterally, no wheezing  CV: S1S2, RRR,   GI: soft, +BS, NT/ND  Musculoskeletal: no edema  Psychiatric: affect nL, mood nL  Skin: warm and dry    Care Discussed with Consultants/Other Providers [ X ] YES  [ ] NO

## 2020-03-13 LAB
ANION GAP SERPL CALC-SCNC: 9 MMOL/L — SIGNIFICANT CHANGE UP (ref 5–17)
BUN SERPL-MCNC: 38 MG/DL — HIGH (ref 7–23)
CALCIUM SERPL-MCNC: 8.2 MG/DL — LOW (ref 8.5–10.1)
CHLORIDE SERPL-SCNC: 104 MMOL/L — SIGNIFICANT CHANGE UP (ref 96–108)
CO2 SERPL-SCNC: 27 MMOL/L — SIGNIFICANT CHANGE UP (ref 22–31)
CREAT SERPL-MCNC: 4.3 MG/DL — HIGH (ref 0.5–1.3)
FERRITIN SERPL-MCNC: 566 NG/ML — HIGH (ref 30–400)
GLUCOSE SERPL-MCNC: 108 MG/DL — HIGH (ref 70–99)
HCT VFR BLD CALC: 25.7 % — LOW (ref 39–50)
HGB BLD-MCNC: 8.3 G/DL — LOW (ref 13–17)
IRON SATN MFR SERPL: 12 % — LOW (ref 16–55)
IRON SATN MFR SERPL: 26 UG/DL — LOW (ref 45–165)
MAGNESIUM SERPL-MCNC: 2.3 MG/DL — SIGNIFICANT CHANGE UP (ref 1.6–2.6)
MCHC RBC-ENTMCNC: 30.7 PG — SIGNIFICANT CHANGE UP (ref 27–34)
MCHC RBC-ENTMCNC: 32.3 GM/DL — SIGNIFICANT CHANGE UP (ref 32–36)
MCV RBC AUTO: 95.2 FL — SIGNIFICANT CHANGE UP (ref 80–100)
NRBC # BLD: 0 /100 WBCS — SIGNIFICANT CHANGE UP (ref 0–0)
PHOSPHATE SERPL-MCNC: 4 MG/DL — SIGNIFICANT CHANGE UP (ref 2.5–4.5)
PLATELET # BLD AUTO: 344 K/UL — SIGNIFICANT CHANGE UP (ref 150–400)
POTASSIUM SERPL-MCNC: 4.5 MMOL/L — SIGNIFICANT CHANGE UP (ref 3.5–5.3)
POTASSIUM SERPL-SCNC: 4.5 MMOL/L — SIGNIFICANT CHANGE UP (ref 3.5–5.3)
RBC # BLD: 2.7 M/UL — LOW (ref 4.2–5.8)
RBC # FLD: 13.7 % — SIGNIFICANT CHANGE UP (ref 10.3–14.5)
SODIUM SERPL-SCNC: 140 MMOL/L — SIGNIFICANT CHANGE UP (ref 135–145)
TIBC SERPL-MCNC: 214 UG/DL — LOW (ref 220–430)
UIBC SERPL-MCNC: 188 UG/DL — SIGNIFICANT CHANGE UP (ref 110–370)
WBC # BLD: 9.78 K/UL — SIGNIFICANT CHANGE UP (ref 3.8–10.5)
WBC # FLD AUTO: 9.78 K/UL — SIGNIFICANT CHANGE UP (ref 3.8–10.5)

## 2020-03-13 PROCEDURE — 99232 SBSQ HOSP IP/OBS MODERATE 35: CPT

## 2020-03-13 PROCEDURE — 99232 SBSQ HOSP IP/OBS MODERATE 35: CPT | Mod: GC

## 2020-03-13 RX ADMIN — SEVELAMER CARBONATE 800 MILLIGRAM(S): 2400 POWDER, FOR SUSPENSION ORAL at 09:03

## 2020-03-13 RX ADMIN — Medication 1 TABLET(S): at 14:34

## 2020-03-13 RX ADMIN — SEVELAMER CARBONATE 800 MILLIGRAM(S): 2400 POWDER, FOR SUSPENSION ORAL at 12:35

## 2020-03-13 RX ADMIN — POLYETHYLENE GLYCOL 3350 17 GRAM(S): 17 POWDER, FOR SOLUTION ORAL at 18:02

## 2020-03-13 RX ADMIN — HEPARIN SODIUM 5000 UNIT(S): 5000 INJECTION INTRAVENOUS; SUBCUTANEOUS at 14:34

## 2020-03-13 RX ADMIN — ATORVASTATIN CALCIUM 10 MILLIGRAM(S): 80 TABLET, FILM COATED ORAL at 22:00

## 2020-03-13 RX ADMIN — Medication 2.5 MILLIGRAM(S): at 18:02

## 2020-03-13 RX ADMIN — FAMOTIDINE 20 MILLIGRAM(S): 10 INJECTION INTRAVENOUS at 12:35

## 2020-03-13 RX ADMIN — POLYETHYLENE GLYCOL 3350 17 GRAM(S): 17 POWDER, FOR SOLUTION ORAL at 05:10

## 2020-03-13 RX ADMIN — HEPARIN SODIUM 5000 UNIT(S): 5000 INJECTION INTRAVENOUS; SUBCUTANEOUS at 22:00

## 2020-03-13 RX ADMIN — Medication 1 TABLET(S): at 05:10

## 2020-03-13 RX ADMIN — Medication 1 TABLET(S): at 22:00

## 2020-03-13 RX ADMIN — HEPARIN SODIUM 5000 UNIT(S): 5000 INJECTION INTRAVENOUS; SUBCUTANEOUS at 05:10

## 2020-03-13 RX ADMIN — AMIODARONE HYDROCHLORIDE 400 MILLIGRAM(S): 400 TABLET ORAL at 09:03

## 2020-03-13 RX ADMIN — SEVELAMER CARBONATE 800 MILLIGRAM(S): 2400 POWDER, FOR SUSPENSION ORAL at 18:02

## 2020-03-13 RX ADMIN — Medication 2.5 MILLIGRAM(S): at 05:10

## 2020-03-13 NOTE — PROGRESS NOTE ADULT - SUBJECTIVE AND OBJECTIVE BOX
[facility] -- Warren Posada Grossman, Abelardo, Thaddeus Verdugo Savella, Goodger: Office # [phone]    Follow Up: hypotension on midodrine, p Afib     Subjective/Observations: Patient awake and alert. Resting comfortably in bed. No complaints of chest pain, SOB, LE edema, cough. No signs of orthopnea or PND.    REVIEW OF SYSTEMS: All review of systems is negative for eye, ENT, GI, , allergic, dermatologic, musculoskeletal and neurologic except as described above    PAST MEDICAL & SURGICAL HISTORY:  GI Bleed: 2007  Stomach Ulcer: H pylori 2007, treated with antibiotics  Osteoarthritis  Hyperlipidemia  Herniated Disc  Spinal Stenosis  Diverticulitis  GERD (Gastroesophageal Reflux Disease)  Chronic Lymphocytic Leukemia: followed by oncologist in [place]  S/P Tonsillectomy: childhood  Status Post Arthroscopy: right shoulder  History of Arthroscopy of Knee: bilateral    MEDICATIONS  (STANDING):  aMIOdarone    Tablet   Oral   aMIOdarone    Tablet 200 milliGRAM(s) Oral daily  atorvastatin 10 milliGRAM(s) Oral at bedtime  chlorhexidine 2% Cloths 1 Application(s) Topical <User Schedule>  dronabinol 2.5 milliGRAM(s) Oral two times a day  famotidine    Tablet 20 milliGRAM(s) Oral daily  heparin  Injectable 5000 Unit(s) SubCutaneous every 8 hours  lactobacillus acidophilus 1 Tablet(s) Oral three times a day  polyethylene glycol 3350 17 Gram(s) Oral two times a day  sevelamer carbonate 800 milliGRAM(s) Oral three times a day with meals    MEDICATIONS  (PRN):  acetaminophen   Tablet .. 650 milliGRAM(s) Oral every 6 hours PRN Temp greater or equal to 38C (100.4F), Mild Pain (1 - 3)  albuterol/ipratropium for Nebulization 3 milliLiter(s) Nebulizer every 6 hours PRN Shortness of Breath and/or Wheezing    Allergies  No Known Allergies    Vital Signs Last 24 Hrs  T(C): 36.4 (13 Mar 2020 05:12), Max: 36.8 (12 Mar 2020 12:59)  T(F): 97.5 (13 Mar 2020 05:12), Max: 98.3 (12 Mar 2020 12:59)  HR: 94 (13 Mar 2020 09:01) (88 - 104)  BP: 128/64 (13 Mar 2020 09:01) (107/68 - 148/63)  BP(mean): --  RR: 17 (13 Mar 2020 05:12) (17 - 18)  SpO2: 91% (13 Mar 2020 05:12) (91% - 96%)    I&O's Summary    12 Mar 2020 07:01  -  13 Mar 2020 07:00  --------------------------------------------------------  IN: 480 mL / OUT: 1150 mL / NET: -670 mL    TELE: NSR @ 93  PHYSICAL EXAM:  Constitutional: NAD, awake and alert, well-developed  HEENT: Moist Mucous Membranes, Anicteric  Pulmonary: Non-labored, breath sounds are clear bilaterally, No wheezing, No rales, No rhonchi  Cardiovascular: Regular, S1 and S2, No murmurs, No rubs, No gallops, No  clicks  Gastrointestinal: Bowel Sounds present, soft, nontender.   Lymph: No edema. No lymphadenopathy.  Musculoskeletal: No cyanosis, No joint swelling/tenderness  Skin: No visible rashes or ulcers.  Psych:  Mood & affect appropriate    LABS: All Labs Reviewed:                     8.3    9.78  )-----------( 344      ( 13 Mar 2020 07:14 )             25.7                         7.9    10.02 )-----------( 312      ( 12 Mar 2020 05:33 )             24.3                         7.9    10.87 )-----------( 343      ( 11 Mar 2020 05:12 )             24.5     13 Mar 2020 07:14    140    |  104    |  38     ----------------------------<  108    4.5     |  27     |  4.30   12 Mar 2020 05:33    140    |  104    |  61     ----------------------------<  102    4.9     |  26     |  5.90   11 Mar 2020 05:12    140    |  104    |  56     ----------------------------<  87     4.9     |  27     |  5.70     Ca    8.2        13 Mar 2020 07:14  Ca    8.1        12 Mar 2020 05:33  Ca    8.0        11 Mar 2020 05:12  Phos  4.0       13 Mar 2020 07:14  Phos  6.0       11 Mar 2020 05:12  Mg     2.3       13 Mar 2020 07:14  Mg     2.7       12 Mar 2020 05:33  Mg     2.6       11 Mar 2020 05:12    TPro  5.4    /  Alb  2.1    /  TBili  0.6    /  DBili  x      /  AST  35     /  ALT  50     /  AlkPhos  71     11 Mar 2020 05:12    12 Lead ECG:   Ventricular Rate 87 BPM  Atrial Rate 87 BPM  P-R Interval 202 ms  QRS Duration 100 ms  Q-T Interval 368 ms  QTC Calculation(Bezet) 442 ms  P Axis 49 degrees  R Axis -51 degrees  T Axis 51 degrees  Diagnosis Line Normal sinus rhythm with sinus arrhythmia  Left axis deviation  Low voltage QRS  Abnormal ECG  When compared with ECG of 12-MAR-2020 22:20, (Unconfirmed)  No significant change was found  Confirmed by Arnoldo Amos MD ([id number]) on 3/12/2020 10:51:30 AM (03-11-20 @ 22:20)    ECHOCARDIOGRAM  < from: TTE Echo Doppler w/o Cont (02.27.20 @ 11:47) >  Dimensions:    LA 3.7    Normal Values: 2.0 - 4.0 cm    Ao 3.3        Normal Values: 2.0 - 3.8 cm  SEPTUM Normal Values: 0.6 - 1.2 cm  PWT Normal Values: 0.6 - 1.1 cm  LVIDd Normal Values: 3.0 - 5.6 cm  LVIDs Normal Values: 1.8 - 4.0 cm    Conclusion:   Technically difficult and limited study  Normal left ventricular internal dimensions and systolic function, estimated LVEF of 55%.   Right ventricle is not well-visualized  Sclerotic trileaflet aortic valve, without AI.   Trace physiologic MR and TR. [facility] -- Warren Posada Grossman, Abelardo, Thaddeus Verdugo Savella, Goodger: Office # [phone]    Follow Up: hypotension on midodrine, p Afib     Subjective/Observations: Patient awake and alert. Resting comfortably in bed. No complaints of chest pain, SOB, LE edema, cough. No signs of orthopnea or PND. Lt IJ david    REVIEW OF SYSTEMS: All review of systems is negative for eye, ENT, GI, , allergic, dermatologic, musculoskeletal and neurologic except as described above    PAST MEDICAL & SURGICAL HISTORY:  GI Bleed: 2007  Stomach Ulcer: H pylori 2007, treated with antibiotics  Osteoarthritis  Hyperlipidemia  Herniated Disc  Spinal Stenosis  Diverticulitis  GERD (Gastroesophageal Reflux Disease)  Chronic Lymphocytic Leukemia: followed by oncologist in [place]  S/P Tonsillectomy: childhood  Status Post Arthroscopy: right shoulder  History of Arthroscopy of Knee: bilateral    MEDICATIONS  (STANDING):  aMIOdarone    Tablet   Oral   aMIOdarone    Tablet 200 milliGRAM(s) Oral daily  atorvastatin 10 milliGRAM(s) Oral at bedtime  chlorhexidine 2% Cloths 1 Application(s) Topical <User Schedule>  dronabinol 2.5 milliGRAM(s) Oral two times a day  famotidine    Tablet 20 milliGRAM(s) Oral daily  heparin  Injectable 5000 Unit(s) SubCutaneous every 8 hours  lactobacillus acidophilus 1 Tablet(s) Oral three times a day  polyethylene glycol 3350 17 Gram(s) Oral two times a day  sevelamer carbonate 800 milliGRAM(s) Oral three times a day with meals    MEDICATIONS  (PRN):  acetaminophen   Tablet .. 650 milliGRAM(s) Oral every 6 hours PRN Temp greater or equal to 38C (100.4F), Mild Pain (1 - 3)  albuterol/ipratropium for Nebulization 3 milliLiter(s) Nebulizer every 6 hours PRN Shortness of Breath and/or Wheezing    Allergies  No Known Allergies    Vital Signs Last 24 Hrs  T(C): 36.4 (13 Mar 2020 05:12), Max: 36.8 (12 Mar 2020 12:59)  T(F): 97.5 (13 Mar 2020 05:12), Max: 98.3 (12 Mar 2020 12:59)  HR: 94 (13 Mar 2020 09:01) (88 - 104)  BP: 128/64 (13 Mar 2020 09:01) (107/68 - 148/63)  BP(mean): --  RR: 17 (13 Mar 2020 05:12) (17 - 18)  SpO2: 91% (13 Mar 2020 05:12) (91% - 96%)    I&O's Summary    12 Mar 2020 07:01  -  13 Mar 2020 07:00  --------------------------------------------------------  IN: 480 mL / OUT: 1150 mL / NET: -670 mL    TELE: NSR @ 93  PHYSICAL EXAM:  Constitutional: NAD, awake and alert, well-developed  HEENT: Moist Mucous Membranes, Anicteric  Pulmonary: Non-labored, breath sounds are clear bilaterally, No wheezing, No rales, No rhonchi  Cardiovascular: Regular, S1 and S2, No murmurs, No rubs, No gallops, No  clicks. Lt IJ shiley  Gastrointestinal: Bowel Sounds present, soft, nontender.   Lymph: No edema. No lymphadenopathy.  Musculoskeletal: No cyanosis, No joint swelling/tenderness  Skin: No visible rashes or ulcers.  Psych:  Mood & affect appropriate    LABS: All Labs Reviewed:                     8.3    9.78  )-----------( 344      ( 13 Mar 2020 07:14 )             25.7                         7.9    10.02 )-----------( 312      ( 12 Mar 2020 05:33 )             24.3                         7.9    10.87 )-----------( 343      ( 11 Mar 2020 05:12 )             24.5     13 Mar 2020 07:14    140    |  104    |  38     ----------------------------<  108    4.5     |  27     |  4.30   12 Mar 2020 05:33    140    |  104    |  61     ----------------------------<  102    4.9     |  26     |  5.90   11 Mar 2020 05:12    140    |  104    |  56     ----------------------------<  87     4.9     |  27     |  5.70     Ca    8.2        13 Mar 2020 07:14  Ca    8.1        12 Mar 2020 05:33  Ca    8.0        11 Mar 2020 05:12  Phos  4.0       13 Mar 2020 07:14  Phos  6.0       11 Mar 2020 05:12  Mg     2.3       13 Mar 2020 07:14  Mg     2.7       12 Mar 2020 05:33  Mg     2.6       11 Mar 2020 05:12    TPro  5.4    /  Alb  2.1    /  TBili  0.6    /  DBili  x      /  AST  35     /  ALT  50     /  AlkPhos  71     11 Mar 2020 05:12    12 Lead ECG:   Ventricular Rate 87 BPM  Atrial Rate 87 BPM  P-R Interval 202 ms  QRS Duration 100 ms  Q-T Interval 368 ms  QTC Calculation(Bezet) 442 ms  P Axis 49 degrees  R Axis -51 degrees  T Axis 51 degrees  Diagnosis Line Normal sinus rhythm with sinus arrhythmia  Left axis deviation  Low voltage QRS  Abnormal ECG  When compared with ECG of 12-MAR-2020 22:20, (Unconfirmed)  No significant change was found  Confirmed by Arnoldo Amos MD ([id number]) on 3/12/2020 10:51:30 AM (03-11-20 @ 22:20)    ECHOCARDIOGRAM  < from: TTE Echo Doppler w/o Cont (02.27.20 @ 11:47) >  Dimensions:    LA 3.7    Normal Values: 2.0 - 4.0 cm    Ao 3.3        Normal Values: 2.0 - 3.8 cm  SEPTUM Normal Values: 0.6 - 1.2 cm  PWT Normal Values: 0.6 - 1.1 cm  LVIDd Normal Values: 3.0 - 5.6 cm  LVIDs Normal Values: 1.8 - 4.0 cm    Conclusion:   Technically difficult and limited study  Normal left ventricular internal dimensions and systolic function, estimated LVEF of 55%.   Right ventricle is not well-visualized  Sclerotic trileaflet aortic valve, without AI.   Trace physiologic MR and TR. [facility] -- Warren Posada Grossman, Abelardo, Thaddeus Verdugo Savella, Goodger: Office # [phone]    Follow Up: hypotension on midodrine, p Afib     Subjective/Observations: Patient awake and alert. Resting comfortably in bed. No complaints of chest pain, SOB, LE edema, cough. No signs of orthopnea or PND. Lt IJ david    REVIEW OF SYSTEMS: All review of systems is negative for eye, ENT, GI, , allergic, dermatologic, musculoskeletal and neurologic except as described above    PAST MEDICAL & SURGICAL HISTORY:  GI Bleed: 2007  Stomach Ulcer: H pylori 2007, treated with antibiotics  Osteoarthritis  Hyperlipidemia  Herniated Disc  Spinal Stenosis  Diverticulitis  GERD (Gastroesophageal Reflux Disease)  Chronic Lymphocytic Leukemia: followed by oncologist in [place]  S/P Tonsillectomy: childhood  Status Post Arthroscopy: right shoulder  History of Arthroscopy of Knee: bilateral    MEDICATIONS  (STANDING):  aMIOdarone    Tablet   Oral   aMIOdarone    Tablet 200 milliGRAM(s) Oral daily  atorvastatin 10 milliGRAM(s) Oral at bedtime  chlorhexidine 2% Cloths 1 Application(s) Topical <User Schedule>  dronabinol 2.5 milliGRAM(s) Oral two times a day  famotidine    Tablet 20 milliGRAM(s) Oral daily  heparin  Injectable 5000 Unit(s) SubCutaneous every 8 hours  lactobacillus acidophilus 1 Tablet(s) Oral three times a day  polyethylene glycol 3350 17 Gram(s) Oral two times a day  sevelamer carbonate 800 milliGRAM(s) Oral three times a day with meals    MEDICATIONS  (PRN):  acetaminophen   Tablet .. 650 milliGRAM(s) Oral every 6 hours PRN Temp greater or equal to 38C (100.4F), Mild Pain (1 - 3)  albuterol/ipratropium for Nebulization 3 milliLiter(s) Nebulizer every 6 hours PRN Shortness of Breath and/or Wheezing    Allergies  No Known Allergies    Vital Signs Last 24 Hrs  T(C): 36.4 (13 Mar 2020 05:12), Max: 36.8 (12 Mar 2020 12:59)  T(F): 97.5 (13 Mar 2020 05:12), Max: 98.3 (12 Mar 2020 12:59)  HR: 94 (13 Mar 2020 09:01) (88 - 104)  BP: 128/64 (13 Mar 2020 09:01) (107/68 - 148/63)  BP(mean): --  RR: 17 (13 Mar 2020 05:12) (17 - 18)  SpO2: 91% (13 Mar 2020 05:12) (91% - 96%)    I&O's Summary    12 Mar 2020 07:01  -  13 Mar 2020 07:00  --------------------------------------------------------  IN: 480 mL / OUT: 1150 mL / NET: -670 mL    TELE: NSR @ 93  PHYSICAL EXAM:  Constitutional: NAD, awake   HEENT: Moist Mucous Membranes, Anicteric  Pulmonary: Non-labored, breath sounds are clear bilaterally, No wheezing, No rales, No rhonchi  Cardiovascular: Regular, S1 and S2, No murmurs, No rubs, No gallops, No  clicks. Lt IJ shiley  Gastrointestinal: Bowel Sounds present, soft, nontender.   Lymph: No edema. No lymphadenopathy.  Musculoskeletal: No cyanosis, No joint swelling/tenderness  Skin: No visible rashes or ulcers.  Psych:  Mood & affect appropriate    LABS: All Labs Reviewed:                     8.3    9.78  )-----------( 344      ( 13 Mar 2020 07:14 )             25.7                         7.9    10.02 )-----------( 312      ( 12 Mar 2020 05:33 )             24.3                         7.9    10.87 )-----------( 343      ( 11 Mar 2020 05:12 )             24.5     13 Mar 2020 07:14    140    |  104    |  38     ----------------------------<  108    4.5     |  27     |  4.30   12 Mar 2020 05:33    140    |  104    |  61     ----------------------------<  102    4.9     |  26     |  5.90   11 Mar 2020 05:12    140    |  104    |  56     ----------------------------<  87     4.9     |  27     |  5.70     Ca    8.2        13 Mar 2020 07:14  Ca    8.1        12 Mar 2020 05:33  Ca    8.0        11 Mar 2020 05:12  Phos  4.0       13 Mar 2020 07:14  Phos  6.0       11 Mar 2020 05:12  Mg     2.3       13 Mar 2020 07:14  Mg     2.7       12 Mar 2020 05:33  Mg     2.6       11 Mar 2020 05:12    TPro  5.4    /  Alb  2.1    /  TBili  0.6    /  DBili  x      /  AST  35     /  ALT  50     /  AlkPhos  71     11 Mar 2020 05:12    12 Lead ECG:   Ventricular Rate 87 BPM  Atrial Rate 87 BPM  P-R Interval 202 ms  QRS Duration 100 ms  Q-T Interval 368 ms  QTC Calculation(Bezet) 442 ms  P Axis 49 degrees  R Axis -51 degrees  T Axis 51 degrees  Diagnosis Line Normal sinus rhythm with sinus arrhythmia  Left axis deviation  Low voltage QRS  Abnormal ECG  When compared with ECG of 12-MAR-2020 22:20, (Unconfirmed)  No significant change was found  Confirmed by Arnoldo Amos MD ([id number]) on 3/12/2020 10:51:30 AM (03-11-20 @ 22:20)    ECHOCARDIOGRAM  < from: TTE Echo Doppler w/o Cont (02.27.20 @ 11:47) >  Dimensions:    LA 3.7    Normal Values: 2.0 - 4.0 cm    Ao 3.3        Normal Values: 2.0 - 3.8 cm  SEPTUM Normal Values: 0.6 - 1.2 cm  PWT Normal Values: 0.6 - 1.1 cm  LVIDd Normal Values: 3.0 - 5.6 cm  LVIDs Normal Values: 1.8 - 4.0 cm    Conclusion:   Technically difficult and limited study  Normal left ventricular internal dimensions and systolic function, estimated LVEF of 55%.   Right ventricle is not well-visualized  Sclerotic trileaflet aortic valve, without AI.   Trace physiologic MR and TR.

## 2020-03-13 NOTE — PROGRESS NOTE ADULT - ASSESSMENT
Assessment and Plan:  -Left empyema:  s/p IR chest tube placement on 2/27.  s/p tPA and dornase tx.  Completed course of IV Zosyn.  s/p removal of pigtail catheter on 3/10.  Continue Duoneb Q6h PRN.  Pulmonary f/u  -Afib with RVR:  Now in NSR. Amiodarone 200 mg q daily to start today. 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 Willard. Continue to avoid nephrotoxic medications. Will follow up family's decision regarding permacath placement for long-term HD, currently L IJ in place.  Nephrology f/u  -Gastric MALToma:  Outpatient follow up.  -Acute encephalopathy:  Mental status AOx4.   -Anemia:  continue Epogen, will order T&S  -VTE ppx:  Heparin 5000 units SQ Q8h

## 2020-03-13 NOTE — PROGRESS NOTE ADULT - SUBJECTIVE AND OBJECTIVE BOX
CHARLY LU  89y  Male    Patient is a 89y old  Male who presents with a chief complaint of Left Empyema (13 Mar 2020 11:07)      out of bed to chair  denies any chest pain or shortness of breath.       PAST MEDICAL & SURGICAL HISTORY:  GI Bleed: 2007  Stomach Ulcer: H pylori 2007, treated with antibiotics  Osteoarthritis  Hyperlipidemia  Herniated Disc  Spinal Stenosis  Diverticulitis  GERD (Gastroesophageal Reflux Disease)  Chronic Lymphocytic Leukemia: followed by oncologist in [place]  S/P Tonsillectomy: childhood  Status Post Arthroscopy: right shoulder  History of Arthroscopy of Knee: bilateral          PHYSICAL EXAM:    T(C): 37.2 (03-13-20 @ 13:21), Max: 37.2 (03-13-20 @ 13:21)  HR: 94 (03-13-20 @ 13:21) (88 - 104)  BP: 117/67 (03-13-20 @ 13:21) (107/68 - 148/63)  RR: 17 (03-13-20 @ 13:21) (17 - 18)  SpO2: 98% (03-13-20 @ 13:21) (91% - 98%)  Wt(kg): --    I&O's Detail    12 Mar 2020 07:01  -  13 Mar 2020 07:00  --------------------------------------------------------  IN:    Oral Fluid: 480 mL  Total IN: 480 mL    OUT:    Indwelling Catheter - Urethral: 1150 mL  Total OUT: 1150 mL    Total NET: -670 mL          Respiratory: clear anteriorly, decreased BS at bases  Cardiovascular: S1 S2  Gastrointestinal: soft NT ND +BS  Extremities: one plus edema   Neuro: Awake and alert    MEDICATIONS  (STANDING):  aMIOdarone    Tablet   Oral   aMIOdarone    Tablet 200 milliGRAM(s) Oral daily  atorvastatin 10 milliGRAM(s) Oral at bedtime  dronabinol 2.5 milliGRAM(s) Oral two times a day  famotidine    Tablet 20 milliGRAM(s) Oral daily  heparin  Injectable 5000 Unit(s) SubCutaneous every 8 hours  lactobacillus acidophilus 1 Tablet(s) Oral three times a day  polyethylene glycol 3350 17 Gram(s) Oral two times a day  sevelamer carbonate 800 milliGRAM(s) Oral three times a day with meals    MEDICATIONS  (PRN):  acetaminophen   Tablet .. 650 milliGRAM(s) Oral every 6 hours PRN Temp greater or equal to 38C (100.4F), Mild Pain (1 - 3)  albuterol/ipratropium for Nebulization 3 milliLiter(s) Nebulizer every 6 hours PRN Shortness of Breath and/or Wheezing                            8.3    9.78  )-----------( 344      ( 13 Mar 2020 07:14 )             25.7       03-13    140  |  104  |  38<H>  ----------------------------<  108<H>  4.5   |  27  |  4.30<H>    Ca    8.2<L>      13 Mar 2020 07:14  Phos  4.0     03-13  Mg     2.3     03-13        Creatinine Trend: Creatinine Trend: 4.30<--, 5.90<--, 5.70<--, 5.50<--, 5.00<--, 4.90<-- CHARLY LU  89y  Male    Patient is a 89y old  Male who presents with a chief complaint of Left Empyema (13 Mar 2020 11:07)      out of bed to chair  denies any chest pain or shortness of breath.   urine out put has improved.       PAST MEDICAL & SURGICAL HISTORY:  GI Bleed: 2007  Stomach Ulcer: H pylori 2007, treated with antibiotics  Osteoarthritis  Hyperlipidemia  Herniated Disc  Spinal Stenosis  Diverticulitis  GERD (Gastroesophageal Reflux Disease)  Chronic Lymphocytic Leukemia: followed by oncologist in [place]  S/P Tonsillectomy: childhood  Status Post Arthroscopy: right shoulder  History of Arthroscopy of Knee: bilateral          PHYSICAL EXAM:    T(C): 37.2 (03-13-20 @ 13:21), Max: 37.2 (03-13-20 @ 13:21)  HR: 94 (03-13-20 @ 13:21) (88 - 104)  BP: 117/67 (03-13-20 @ 13:21) (107/68 - 148/63)  RR: 17 (03-13-20 @ 13:21) (17 - 18)  SpO2: 98% (03-13-20 @ 13:21) (91% - 98%)  Wt(kg): --    I&O's Detail    12 Mar 2020 07:01  -  13 Mar 2020 07:00  --------------------------------------------------------  IN:    Oral Fluid: 480 mL  Total IN: 480 mL    OUT:    Indwelling Catheter - Urethral: 1150 mL  Total OUT: 1150 mL    Total NET: -670 mL          Respiratory: clear anteriorly, decreased BS at bases  Cardiovascular: S1 S2  Gastrointestinal: soft NT ND +BS  Extremities: one plus edema   Neuro: Awake and alert    MEDICATIONS  (STANDING):  aMIOdarone    Tablet   Oral   aMIOdarone    Tablet 200 milliGRAM(s) Oral daily  atorvastatin 10 milliGRAM(s) Oral at bedtime  dronabinol 2.5 milliGRAM(s) Oral two times a day  famotidine    Tablet 20 milliGRAM(s) Oral daily  heparin  Injectable 5000 Unit(s) SubCutaneous every 8 hours  lactobacillus acidophilus 1 Tablet(s) Oral three times a day  polyethylene glycol 3350 17 Gram(s) Oral two times a day  sevelamer carbonate 800 milliGRAM(s) Oral three times a day with meals    MEDICATIONS  (PRN):  acetaminophen   Tablet .. 650 milliGRAM(s) Oral every 6 hours PRN Temp greater or equal to 38C (100.4F), Mild Pain (1 - 3)  albuterol/ipratropium for Nebulization 3 milliLiter(s) Nebulizer every 6 hours PRN Shortness of Breath and/or Wheezing                            8.3    9.78  )-----------( 344      ( 13 Mar 2020 07:14 )             25.7       03-13    140  |  104  |  38<H>  ----------------------------<  108<H>  4.5   |  27  |  4.30<H>    Ca    8.2<L>      13 Mar 2020 07:14  Phos  4.0     03-13  Mg     2.3     03-13        Creatinine Trend: Creatinine Trend: 4.30<--, 5.90<--, 5.70<--, 5.50<--, 5.00<--, 4.90<--

## 2020-03-13 NOTE — PROGRESS NOTE ADULT - ASSESSMENT
89 yo M with PMH of HLD and MALToma of the stomach (under surveillance), spinal stenosis with extensive lumbar surgery in 2011 and bilateral LE neuropathy, admitted for L side empyema and R ureteral stone. Patient initially reported anginal symptoms, s/p ICU stay for empyema, now on telemetry floor.    CHEST PAIN  - resolved, no further reports of pain  - EKG has suggested possible inferior wall MI, no wall motion abnormality on ECHO.   - No further ischemic work up inpatient  - Continue with statin    A fib  - Had rapid AF, now in SR  - Continue amiodarone 400 TID to 5 g load, than 200 QD  - No AC for now given history of hematuria and bloody chest tube drainage     GUS  - Lung sounds clear   - HD as per primary team        - Monitor and replete lytes, keep K>4, Mg>2.  - All other medical needs as per primary team.  - Other cardiovascular workup will depend on clinical course.  - Will continue to follow.      Chidi Severino, MS FNP, AGAP  Nurse Practitioner- Cardiology   Spectra #[phone]

## 2020-03-13 NOTE — CHART NOTE - NSCHARTNOTEFT_GEN_A_CORE
spoke with Son  discussed goals of care and course of care  son wishes to meet in person on tuesday at 9 am - rest of the family will be in presence  will discuss GOC - DC planning and HD needs

## 2020-03-13 NOTE — OCCUPATIONAL THERAPY INITIAL EVALUATION ADULT - ADDITIONAL COMMENTS
Pt is an unreliable historian; social info obtained from EMR. Pt lives with his spouse in a house. Pt has a stall shower. Pt ambulated using a rolling walker. There is a 24hour/7 days/wk HHA that assists patient and spouse at home. Pt requires assistance with ADL's and transfers due to decreased strength, decreased endurance, impaired cogntion and impaired sitting/standing balance. Pt performs functional transfers with mod/max A x 1 dependent upon height of surface.  Pt ambulates x 25 feet with RW with mod A x 1, NBOS, ataxic gait x 2 trials. Pt is an unreliable historian; social info obtained from EMR. Pt lives with his spouse in a house. Pt has a stall shower. Pt ambulated using a rolling walker. There is a 24hour/7 days/wk HHA that assists patient and spouse at home. Pt requires assistance with ADL's and transfers due to decreased strength, decreased endurance, impaired cognition and impaired sitting/standing balance. Pt performs functional transfers with mod/max A x 1 dependent upon height of surface.  Pt ambulated 20 feet using rolling walker with mod assist x 1 with ataxic gait and narrow ENRIQUETA noted.

## 2020-03-13 NOTE — PROGRESS NOTE ADULT - ASSESSMENT
89 white male with a history of GERD, HLD, now with empyema on the left S/P IR drainage and is on alteplase.   Developed oliguric renal failure and was placed on dialysis. Temp dialysis catheter removed. Will plan for a perma cath if family is in agreement. Will follow. 89 white male with a history of GERD, HLD, now with empyema on the left S/P IR drainage and is on alteplase.   Developed oliguric renal failure and was placed on dialysis. Temp dialysis catheter to be removed.   Will plan for a perma cath if patient needs further dialysis. Will stop the Renvela and give PRN doses of lasix.  Will follow.

## 2020-03-13 NOTE — PROGRESS NOTE ADULT - ASSESSMENT
h/o nhl- gastric maltoma  abnormal ct  left empyema, sp drainage  gastric wall thickening  roberto       prior nc ct ap showing gastric wall thickening  up to date w egd surveillance per dtr  gerd precautions  cont pepcid  cont marinol  prn bowel regimen  monitor cbc daily  diet as tolerated  encouragement/assistance at meals  op gi follow up upon dc  further management per primary    Advanced care planning was discussed with patient and family.  Advanced care planning forms were reviewed and discussed.  Risks, benefits and alternatives of gastroenterologic procedures were discussed in detail and all questions were answered.    30 minutes spent.

## 2020-03-13 NOTE — PROGRESS NOTE ADULT - SUBJECTIVE AND OBJECTIVE BOX
Patient is a 89y old  Male who presents with a chief complaint of Left Empyema (13 Mar 2020 09:18)      INTERVAL HPI/OVERNIGHT EVENTS: Patient examined at bedside. Patient reports feeling comfortable, breathing well. Pending decision of whether patient is willing to continue with possible permanent HD. Patient denies N/V/D, chest pain, SOB, abdominal pain. No other complaints at this time. No acute overnight events appreciated.    T(C): 36.4 (03-13-20 @ 05:12), Max: 36.8 (03-12-20 @ 12:59)  HR: 94 (03-13-20 @ 09:01) (88 - 104)  BP: 128/64 (03-13-20 @ 09:01) (107/68 - 148/63)  RR: 17 (03-13-20 @ 05:12) (17 - 18)  SpO2: 91% (03-13-20 @ 05:12) (91% - 96%)  Wt(kg): --  I&O's Summary    12 Mar 2020 07:01  -  13 Mar 2020 07:00  --------------------------------------------------------  IN: 480 mL / OUT: 1150 mL / NET: -670 mL        LABS:                        8.3    9.78  )-----------( 344      ( 13 Mar 2020 07:14 )             25.7     03-13    140  |  104  |  38<H>  ----------------------------<  108<H>  4.5   |  27  |  4.30<H>    Ca    8.2<L>      13 Mar 2020 07:14  Phos  4.0     03-13  Mg     2.3     03-13          CAPILLARY BLOOD GLUCOSE                MEDICATIONS  (STANDING):  aMIOdarone    Tablet   Oral   aMIOdarone    Tablet 200 milliGRAM(s) Oral daily  atorvastatin 10 milliGRAM(s) Oral at bedtime  dronabinol 2.5 milliGRAM(s) Oral two times a day  famotidine    Tablet 20 milliGRAM(s) Oral daily  heparin  Injectable 5000 Unit(s) SubCutaneous every 8 hours  lactobacillus acidophilus 1 Tablet(s) Oral three times a day  polyethylene glycol 3350 17 Gram(s) Oral two times a day  sevelamer carbonate 800 milliGRAM(s) Oral three times a day with meals    MEDICATIONS  (PRN):  acetaminophen   Tablet .. 650 milliGRAM(s) Oral every 6 hours PRN Temp greater or equal to 38C (100.4F), Mild Pain (1 - 3)  albuterol/ipratropium for Nebulization 3 milliLiter(s) Nebulizer every 6 hours PRN Shortness of Breath and/or Wheezing      REVIEW OF SYSTEMS:  REVIEW OF SYSTEMS:  CONSTITUTIONAL: No fever, weight loss, or fatigue  EYES: No visual disturbances  ENMT:  No difficulty hearing, tinnitus, vertigo; No throat pain  NECK: No pain or stiffness  RESPIRATORY: No cough, wheezing, chills or hemoptysis; No shortness of breath  CARDIOVASCULAR: No chest pain, palpitations, dizziness, or leg swelling  GASTROINTESTINAL: No abdominal or epigastric pain. No nausea, vomiting, or hematemesis; No diarrhea or constipation. No melena or hematochezia.  GENITOURINARY: No dysuria, frequency, hematuria, or incontinence  NEUROLOGICAL: No headaches, memory loss, loss of strength, numbness, or tremors  SKIN: No itching, burning, rashes, or lesions   ENDOCRINE: No heat or cold intolerance; No hair loss  MUSCULOSKELETAL: No joint pain or swelling; No muscle, back, or extremity pain  PSYCHIATRIC: No depression, anxiety, mood swings, or difficulty sleeping    RADIOLOGY & ADDITIONAL TESTS: No acute imaging in the last 24 hours    Imaging Personally Reviewed:  [ X ] YES  [ ] NO    Consultant(s) Notes Reviewed:  [ X ] YES  [ ] NO    PHYSICAL EXAM:  GENERAL: NAD  HEENT: EOMI, hearing normal, conjunctiva and sclera clear, HD catheter of left neck  Chest: Faint crackles b/l, no wheezing  CV: S1S2, RRR,   GI: soft, +BS, NT/ND  Musculoskeletal: no edema  Psychiatric: affect nL, mood nL  Skin: warm and dry      Care Discussed with Consultants/Other Providers [ X ] YES  [ ] NO Patient is a 89y old  Male who presents with a chief complaint of Left Empyema (13 Mar 2020 09:18)      INTERVAL HPI/OVERNIGHT EVENTS: Patient examined at bedside. Patient reports feeling comfortable, breathing well. Pending decision of whether patient is willing to continue with possible permanent HD. Patient denies N/V/D, chest pain, SOB, abdominal pain. No other complaints at this time. No acute overnight events appreciated.    T(C): 36.4 (03-13-20 @ 05:12), Max: 36.8 (03-12-20 @ 12:59)  HR: 94 (03-13-20 @ 09:01) (88 - 104)  BP: 128/64 (03-13-20 @ 09:01) (107/68 - 148/63)  RR: 17 (03-13-20 @ 05:12) (17 - 18)  SpO2: 91% (03-13-20 @ 05:12) (91% - 96%)  Wt(kg): --  I&O's Summary    12 Mar 2020 07:01  -  13 Mar 2020 07:00  --------------------------------------------------------  IN: 480 mL / OUT: 1150 mL / NET: -670 mL        LABS:                        8.3    9.78  )-----------( 344      ( 13 Mar 2020 07:14 )             25.7     03-13    140  |  104  |  38<H>  ----------------------------<  108<H>  4.5   |  27  |  4.30<H>    Ca    8.2<L>      13 Mar 2020 07:14  Phos  4.0     03-13  Mg     2.3     03-13          CAPILLARY BLOOD GLUCOSE                MEDICATIONS  (STANDING):  aMIOdarone    Tablet   Oral   aMIOdarone    Tablet 200 milliGRAM(s) Oral daily  atorvastatin 10 milliGRAM(s) Oral at bedtime  dronabinol 2.5 milliGRAM(s) Oral two times a day  famotidine    Tablet 20 milliGRAM(s) Oral daily  heparin  Injectable 5000 Unit(s) SubCutaneous every 8 hours  lactobacillus acidophilus 1 Tablet(s) Oral three times a day  polyethylene glycol 3350 17 Gram(s) Oral two times a day  sevelamer carbonate 800 milliGRAM(s) Oral three times a day with meals    MEDICATIONS  (PRN):  acetaminophen   Tablet .. 650 milliGRAM(s) Oral every 6 hours PRN Temp greater or equal to 38C (100.4F), Mild Pain (1 - 3)  albuterol/ipratropium for Nebulization 3 milliLiter(s) Nebulizer every 6 hours PRN Shortness of Breath and/or Wheezing      REVIEW OF SYSTEMS:  REVIEW OF SYSTEMS:  CONSTITUTIONAL: No fever, weight loss, or fatigue  EYES: No visual disturbances  ENMT:  No difficulty hearing, tinnitus, vertigo; No throat pain  NECK: No pain or stiffness  RESPIRATORY: No cough, wheezing, chills or hemoptysis; No shortness of breath  CARDIOVASCULAR: No chest pain, palpitations, dizziness, or leg swelling  GASTROINTESTINAL: No abdominal or epigastric pain. No nausea, vomiting, or hematemesis; No diarrhea or constipation. No melena or hematochezia.  GENITOURINARY: No dysuria, frequency, hematuria, or incontinence  NEUROLOGICAL: No headaches, memory loss, loss of strength, numbness, or tremors  SKIN: No itching, burning, rashes, or lesions   ENDOCRINE: No heat or cold intolerance; No hair loss  MUSCULOSKELETAL: No joint pain or swelling; No muscle, back, or extremity pain  PSYCHIATRIC: No depression, anxiety, mood swings, or difficulty sleeping    RADIOLOGY & ADDITIONAL TESTS: No acute imaging in the last 24 hours    Imaging Personally Reviewed:  [ X ] YES  [ ] NO    Consultant(s) Notes Reviewed:  [ X ] YES  [ ] NO    PHYSICAL EXAM:  GENERAL: NAD  HEENT: EOMI, hearing normal, conjunctiva and sclera clear, HD catheter of left neck  Chest: Faint crackles at bases b/l, no wheezing  CV: S1S2, RRR,   GI: soft, +BS, NT/ND  Musculoskeletal: no edema  Psychiatric: affect nL, mood nL  Skin: warm and dry      Care Discussed with Consultants/Other Providers [ X ] YES  [ ] NO

## 2020-03-13 NOTE — OCCUPATIONAL THERAPY INITIAL EVALUATION ADULT - GENERAL OBSERVATIONS, REHAB EVAL
Patient found seated in bedside chair with +chair alarm, +Mcnulty catheter, +left side of neck +HD catheter and +IV lock, call bell within reach.  Patient appears mildly confused.

## 2020-03-13 NOTE — PROGRESS NOTE ADULT - SUBJECTIVE AND OBJECTIVE BOX
[INTERVAL HX: ]  Patient seen and examined;  Chart reviewed and events noted;   feels better  anxious to walk  no CP, no SOB      Patient is a 89y Male with a known history of :  Empyema of pleural space without fistula (J86.9) [Active]  GI Bleed (578.9) [Active]  Stomach Ulcer (531.90) [Active]  Osteoarthritis (715.90) [Active]  Hyperlipidemia (272.4) [Active]  Herniated Disc (722.2) [Active]  Spinal Stenosis (724.00) [Active]  Diverticulitis (562.11) [Active]  GERD (Gastroesophageal Reflux Disease) (530.81) [Active]  Chronic Lymphocytic Leukemia (204.10) [Active]  S/P Tonsillectomy (V45.89) [Active]  Status Post Arthroscopy (V45.89) [Active]  History of Arthroscopy of Knee (V45.89) [Active]  Osteoarthritis (715.90) [Inactive]  Stomach Ulcer (531.90) [Inactive]    HPI:  CHARLY LU is a 89 YO Male brought to [place] ED complaining of flank pain, fever and cough for 4 days. Patient transferred from [place] ER for admission to [facility].  Daughter at the bedside states she took him to the ER for evaluation of back pain, chest discomfort. At [place] patient found to have fever, CT scan revealed left sided empyema and right ureteral stone, patient received ceftriaxone and zosyn. Dr. Maxwell pulmonary and Dr. Barnard urology were aware of transfer. Patient states he is feeling well now. (26 Feb 2020 05:01)      MEDICATIONS  (STANDING):  aMIOdarone    Tablet   Oral   aMIOdarone    Tablet 200 milliGRAM(s) Oral daily  atorvastatin 10 milliGRAM(s) Oral at bedtime  dronabinol 2.5 milliGRAM(s) Oral two times a day  famotidine    Tablet 20 milliGRAM(s) Oral daily  heparin  Injectable 5000 Unit(s) SubCutaneous every 8 hours  lactobacillus acidophilus 1 Tablet(s) Oral three times a day  polyethylene glycol 3350 17 Gram(s) Oral two times a day  sevelamer carbonate 800 milliGRAM(s) Oral three times a day with meals    MEDICATIONS  (PRN):  acetaminophen   Tablet .. 650 milliGRAM(s) Oral every 6 hours PRN Temp greater or equal to 38C (100.4F), Mild Pain (1 - 3)  albuterol/ipratropium for Nebulization 3 milliLiter(s) Nebulizer every 6 hours PRN Shortness of Breath and/or Wheezing      Vital Signs Last 24 Hrs  T(C): 37.2 (13 Mar 2020 13:21), Max: 37.2 (13 Mar 2020 13:21)  T(F): 99 (13 Mar 2020 13:21), Max: 99 (13 Mar 2020 13:21)  HR: 94 (13 Mar 2020 13:21) (88 - 104)  BP: 117/67 (13 Mar 2020 13:21) (107/68 - 148/63)  BP(mean): --  RR: 17 (13 Mar 2020 13:21) (17 - 18)  SpO2: 98% (13 Mar 2020 13:21) (91% - 98%)      [PHYSICAL EXAM]  General: adult in NAD,  WN,  WD.  HEENT: clear oropharynx, anicteric sclera, pink conjunctivae.  Neck: supple, no masses.  CV: normal S1S2, no murmur, no rubs, no gallops.  Lungs: clear to auscultation, no wheezes, no rales, no rhonchi.  Abdomen: soft, non-tender, non-distended, no hepatosplenomegaly, normal BS, no guarding.  Ext: no clubbing, no cyanosis, no edema.  Skin: no rashes,  no petechiae, no venous stasis changes.  Neuro: alert and oriented X3  , no focal motor deficits.  LN: no SC BETHEL.      [LABS:]                        8.3    9.78  )-----------( 344      ( 13 Mar 2020 07:14 )             25.7     03-13    140  |  104  |  38<H>  ----------------------------<  108<H>  4.5   |  27  |  4.30<H>    Ca    8.2<L>      13 Mar 2020 07:14  Phos  4.0     03-13  Mg     2.3     03-13        [RADIOLOGY STUDIES:]

## 2020-03-13 NOTE — PROGRESS NOTE ADULT - SUBJECTIVE AND OBJECTIVE BOX
Date/Time Patient Seen:  		  Referring MD:   Data Reviewed	       Patient is a 89y old  Male who presents with a chief complaint of Left Empyema (12 Mar 2020 20:49)      Subjective/HPI     PAST MEDICAL & SURGICAL HISTORY:  GI Bleed: 2007  Stomach Ulcer: H pylori 2007, treated with antibiotics  Osteoarthritis  Hyperlipidemia  Herniated Disc  Spinal Stenosis  Diverticulitis  GERD (Gastroesophageal Reflux Disease)  Chronic Lymphocytic Leukemia: followed by oncologist in [place]  S/P Tonsillectomy: childhood  Status Post Arthroscopy: right shoulder  History of Arthroscopy of Knee: bilateral  Osteoarthritis  Stomach Ulcer: H pylori, treated with antibiotics        Medication list         MEDICATIONS  (STANDING):  aMIOdarone    Tablet   Oral   aMIOdarone    Tablet 400 milliGRAM(s) Oral every 8 hours  aMIOdarone    Tablet 200 milliGRAM(s) Oral daily  atorvastatin 10 milliGRAM(s) Oral at bedtime  chlorhexidine 2% Cloths 1 Application(s) Topical <User Schedule>  dronabinol 2.5 milliGRAM(s) Oral two times a day  famotidine    Tablet 20 milliGRAM(s) Oral daily  heparin  Injectable 5000 Unit(s) SubCutaneous every 8 hours  lactobacillus acidophilus 1 Tablet(s) Oral three times a day  polyethylene glycol 3350 17 Gram(s) Oral two times a day  sevelamer carbonate 800 milliGRAM(s) Oral three times a day with meals    MEDICATIONS  (PRN):  acetaminophen   Tablet .. 650 milliGRAM(s) Oral every 6 hours PRN Temp greater or equal to 38C (100.4F), Mild Pain (1 - 3)  albuterol/ipratropium for Nebulization 3 milliLiter(s) Nebulizer every 6 hours PRN Shortness of Breath and/or Wheezing         Vitals log        ICU Vital Signs Last 24 Hrs  T(C): 36.4 (13 Mar 2020 05:12), Max: 36.8 (12 Mar 2020 12:59)  T(F): 97.5 (13 Mar 2020 05:12), Max: 98.3 (12 Mar 2020 12:59)  HR: 94 (13 Mar 2020 05:12) (88 - 104)  BP: 123/70 (13 Mar 2020 05:12) (107/68 - 148/63)  BP(mean): --  ABP: --  ABP(mean): --  RR: 17 (13 Mar 2020 05:12) (17 - 18)  SpO2: 91% (13 Mar 2020 05:12) (91% - 96%)           Input and Output:  I&O's Detail    11 Mar 2020 07:01  -  12 Mar 2020 07:00  --------------------------------------------------------  IN:    Oral Fluid: 240 mL  Total IN: 240 mL    OUT:    Indwelling Catheter - Urethral: 1280 mL  Total OUT: 1280 mL    Total NET: -1040 mL      12 Mar 2020 07:01  -  13 Mar 2020 06:35  --------------------------------------------------------  IN:    Oral Fluid: 480 mL  Total IN: 480 mL    OUT:    Indwelling Catheter - Urethral: 1150 mL  Total OUT: 1150 mL    Total NET: -670 mL          Lab Data                        7.9    10.02 )-----------( 312      ( 12 Mar 2020 05:33 )             24.3     03-12    140  |  104  |  61<H>  ----------------------------<  102<H>  4.9   |  26  |  5.90<H>    Ca    8.1<L>      12 Mar 2020 05:33  Mg     2.7     03-12              Review of Systems	      Objective     Physical Examination    heart s1s2  lung dec BS  abd soft  head nc  head at  LEFT IJ [place]      Pertinent Lab findings & Imaging      [place]:  NO   Adequate UO     I&O's Detail    11 Mar 2020 07:01  -  12 Mar 2020 07:00  --------------------------------------------------------  IN:    Oral Fluid: 240 mL  Total IN: 240 mL    OUT:    Indwelling Catheter - Urethral: 1280 mL  Total OUT: 1280 mL    Total NET: -1040 mL      12 Mar 2020 07:01  -  13 Mar 2020 06:35  --------------------------------------------------------  IN:    Oral Fluid: 480 mL  Total IN: 480 mL    OUT:    Indwelling Catheter - Urethral: 1150 mL  Total OUT: 1150 mL    Total NET: -670 mL               Discussed with:     Cultures:	        Radiology

## 2020-03-13 NOTE — PROGRESS NOTE ADULT - SUBJECTIVE AND OBJECTIVE BOX
INTERVAL HPI/OVERNIGHT EVENTS:  pt seen and examined  some confusion but offers no gi complaints  no acute overnight events per nursing    MEDICATIONS  (STANDING):  aMIOdarone    Tablet   Oral   aMIOdarone    Tablet 400 milliGRAM(s) Oral every 8 hours  aMIOdarone    Tablet 200 milliGRAM(s) Oral daily  atorvastatin 10 milliGRAM(s) Oral at bedtime  chlorhexidine 2% Cloths 1 Application(s) Topical <User Schedule>  dronabinol 2.5 milliGRAM(s) Oral two times a day  famotidine    Tablet 20 milliGRAM(s) Oral daily  heparin  Injectable 5000 Unit(s) SubCutaneous every 8 hours  lactobacillus acidophilus 1 Tablet(s) Oral three times a day  polyethylene glycol 3350 17 Gram(s) Oral two times a day  sevelamer carbonate 800 milliGRAM(s) Oral three times a day with meals    MEDICATIONS  (PRN):  acetaminophen   Tablet .. 650 milliGRAM(s) Oral every 6 hours PRN Temp greater or equal to 38C (100.4F), Mild Pain (1 - 3)  albuterol/ipratropium for Nebulization 3 milliLiter(s) Nebulizer every 6 hours PRN Shortness of Breath and/or Wheezing      Allergies    No Known Allergies    Intolerances        Review of Systems:    see above       Vital Signs Last 24 Hrs  T(C): 36.4 (13 Mar 2020 05:12), Max: 36.8 (12 Mar 2020 12:59)  T(F): 97.5 (13 Mar 2020 05:12), Max: 98.3 (12 Mar 2020 12:59)  HR: 94 (13 Mar 2020 05:12) (88 - 104)  BP: 123/70 (13 Mar 2020 05:12) (107/68 - 148/63)  BP(mean): --  RR: 17 (13 Mar 2020 05:12) (17 - 18)  SpO2: 91% (13 Mar 2020 05:12) (91% - 96%)    PHYSICAL EXAM:      General:  lying in  bed  HEENT:  NC/AT  Abdomen:  Soft nt mild dt  Extremities:  no edema  Neuro/Psych:  Awake alert some confusion      LABS:                        8.3    9.78  )-----------( 344      ( 13 Mar 2020 07:14 )             25.7     03-13    140  |  104  |  38<H>  ----------------------------<  108<H>  4.5   |  27  |  4.30<H>    Ca    8.2<L>      13 Mar 2020 07:14  Phos  4.0     03-13  Mg     2.3     03-13            RADIOLOGY & ADDITIONAL TESTS:

## 2020-03-13 NOTE — OCCUPATIONAL THERAPY INITIAL EVALUATION ADULT - PERTINENT HX OF CURRENT PROBLEM, REHAB EVAL
89 y/o male with PMH of HLD and Maltoma of the stomach (under surveillance), spinal stenosis with extensive lumbar surgery in 2011 and bilateral LE neuropathy, admitted for L side empyema and R ureteral stone. Pt initially reported anginal symptoms, s/p ICU stay for empyema, now on telemetry floor.

## 2020-03-13 NOTE — PROGRESS NOTE ADULT - ASSESSMENT
89 y/o man known to our office, dx'd with gastric MALToma 2013, bone marrow and 2017 repeat gastric bx c/w Chronic Lymphocytic Leukemia/Small Lymphocytic Lymphoma, under serial monitoring w EGD and PETCT, has not required treatment. Also hx B12 deficiency.  Adm w left empyema, hx URI sx Jan 2020  Post IR pigtail insertion w minimal output and tx to ICU for dornase/alteplase adm w resultant partial output. Repeat CT chest showed tip is inferior to the loculated fluid and CT removed.  Course complicated by GUS in setting of hypotension and exposure to Vancomycin    -now on hemodialysis for renal failure  -son had inquired biopsy of lung to see if lymphoma is present and cause of acute condition.   However, even if able to be done, this would be a blind biopsy therefore negative findings does not equate no disease present. Also, results would not  of patient in view of present clinical condition as pt not candidate for any meaningful oncological treatment.    continue acute care  03/11 notes from the last few days were noted and discussed with the group. Pt has a white cell count of 10.87 hemoglobin of 7.9 platelet count of [id number] and normal diff.  3/12 pt was on the med floor and pigtail cath was out and NSR on amiodarone. Hemoglobin was 7.9.    RECOMMEND:   Supportive care.   HD per renal.   OOB as pat.   PT/Rehab eval and followup     re-eval outpt once physically stronger, re onc issues.     Thank you for consulting us.   No additional recommendations at current time.   Will sign off on case for now.   Please call, or re-consult if needed.

## 2020-03-14 LAB
ANION GAP SERPL CALC-SCNC: 7 MMOL/L — SIGNIFICANT CHANGE UP (ref 5–17)
BUN SERPL-MCNC: 39 MG/DL — HIGH (ref 7–23)
CALCIUM SERPL-MCNC: 8.3 MG/DL — LOW (ref 8.5–10.1)
CHLORIDE SERPL-SCNC: 105 MMOL/L — SIGNIFICANT CHANGE UP (ref 96–108)
CO2 SERPL-SCNC: 29 MMOL/L — SIGNIFICANT CHANGE UP (ref 22–31)
CREAT SERPL-MCNC: 4.8 MG/DL — HIGH (ref 0.5–1.3)
GLUCOSE SERPL-MCNC: 95 MG/DL — SIGNIFICANT CHANGE UP (ref 70–99)
HCT VFR BLD CALC: 25.9 % — LOW (ref 39–50)
HGB BLD-MCNC: 8.2 G/DL — LOW (ref 13–17)
MAGNESIUM SERPL-MCNC: 2.5 MG/DL — SIGNIFICANT CHANGE UP (ref 1.6–2.6)
MCHC RBC-ENTMCNC: 30.3 PG — SIGNIFICANT CHANGE UP (ref 27–34)
MCHC RBC-ENTMCNC: 31.7 GM/DL — LOW (ref 32–36)
MCV RBC AUTO: 95.6 FL — SIGNIFICANT CHANGE UP (ref 80–100)
NRBC # BLD: 0 /100 WBCS — SIGNIFICANT CHANGE UP (ref 0–0)
PHOSPHATE SERPL-MCNC: 4.6 MG/DL — HIGH (ref 2.5–4.5)
PLATELET # BLD AUTO: 322 K/UL — SIGNIFICANT CHANGE UP (ref 150–400)
POTASSIUM SERPL-MCNC: 4.5 MMOL/L — SIGNIFICANT CHANGE UP (ref 3.5–5.3)
POTASSIUM SERPL-SCNC: 4.5 MMOL/L — SIGNIFICANT CHANGE UP (ref 3.5–5.3)
RBC # BLD: 2.71 M/UL — LOW (ref 4.2–5.8)
RBC # FLD: 13.8 % — SIGNIFICANT CHANGE UP (ref 10.3–14.5)
SODIUM SERPL-SCNC: 141 MMOL/L — SIGNIFICANT CHANGE UP (ref 135–145)
WBC # BLD: 9.68 K/UL — SIGNIFICANT CHANGE UP (ref 3.8–10.5)
WBC # FLD AUTO: 9.68 K/UL — SIGNIFICANT CHANGE UP (ref 3.8–10.5)

## 2020-03-14 PROCEDURE — 99232 SBSQ HOSP IP/OBS MODERATE 35: CPT

## 2020-03-14 RX ORDER — DRONABINOL 2.5 MG
2.5 CAPSULE ORAL
Refills: 0 | Status: DISCONTINUED | OUTPATIENT
Start: 2020-03-14 | End: 2020-03-17

## 2020-03-14 RX ADMIN — SEVELAMER CARBONATE 800 MILLIGRAM(S): 2400 POWDER, FOR SUSPENSION ORAL at 14:08

## 2020-03-14 RX ADMIN — Medication 2.5 MILLIGRAM(S): at 18:12

## 2020-03-14 RX ADMIN — FAMOTIDINE 20 MILLIGRAM(S): 10 INJECTION INTRAVENOUS at 14:08

## 2020-03-14 RX ADMIN — Medication 1 TABLET(S): at 14:08

## 2020-03-14 RX ADMIN — HEPARIN SODIUM 5000 UNIT(S): 5000 INJECTION INTRAVENOUS; SUBCUTANEOUS at 14:07

## 2020-03-14 RX ADMIN — Medication 1 TABLET(S): at 06:15

## 2020-03-14 RX ADMIN — ATORVASTATIN CALCIUM 10 MILLIGRAM(S): 80 TABLET, FILM COATED ORAL at 21:13

## 2020-03-14 RX ADMIN — POLYETHYLENE GLYCOL 3350 17 GRAM(S): 17 POWDER, FOR SOLUTION ORAL at 06:16

## 2020-03-14 RX ADMIN — SEVELAMER CARBONATE 800 MILLIGRAM(S): 2400 POWDER, FOR SUSPENSION ORAL at 10:19

## 2020-03-14 RX ADMIN — HEPARIN SODIUM 5000 UNIT(S): 5000 INJECTION INTRAVENOUS; SUBCUTANEOUS at 21:13

## 2020-03-14 RX ADMIN — Medication 1 TABLET(S): at 21:13

## 2020-03-14 RX ADMIN — HEPARIN SODIUM 5000 UNIT(S): 5000 INJECTION INTRAVENOUS; SUBCUTANEOUS at 06:15

## 2020-03-14 RX ADMIN — SEVELAMER CARBONATE 800 MILLIGRAM(S): 2400 POWDER, FOR SUSPENSION ORAL at 18:12

## 2020-03-14 RX ADMIN — Medication 2.5 MILLIGRAM(S): at 06:14

## 2020-03-14 RX ADMIN — AMIODARONE HYDROCHLORIDE 200 MILLIGRAM(S): 400 TABLET ORAL at 06:14

## 2020-03-14 NOTE — PROGRESS NOTE ADULT - ASSESSMENT
89 yo M with PMH of HLD and MALToma of the stomach (under surveillance), spinal stenosis with extensive lumbar surgery in 2011 and bilateral LE neuropathy, admitted for L side empyema and R ureteral stone. Patient initially reported anginal symptoms, s/p ICU stay for empyema, now on telemetry floor.    CHEST PAIN  - resolved, no further reports of pain  - EKG has suggested possible inferior wall MI, no wall motion abnormality on ECHO.   - No further ischemic work up inpatient  - Continue with statin    A fib  - Had rapid AF, now in SR  - Continue amiodarone 400 TID to 5 g load, than 200 QD  - No AC for now given history of hematuria and bloody chest tube drainage     GUS  - s/p HD   - rest of care as per nephrology    Left empyema:      - s/p IR chest tube placement on 2/27.   - s/p tPA and dornase tx.        - Plan for family meeting on Tuesday 9 am - to discuss GOC  - Monitor and replete lytes, keep K>4, Mg>2.  - All other medical needs as per primary team.  - Other cardiovascular workup will depend on clinical course.  - Will continue to follow.    Veronica Ramírez NP  Cardiology

## 2020-03-14 NOTE — PROGRESS NOTE ADULT - SUBJECTIVE AND OBJECTIVE BOX
[facility] -- Warren Posada, Abelardo Amos, Thaddeus Verdugo Savella  Office # [phone]    Follow Up:  hypotension on midodrine, p Afib     Subjective/Observations:     Patient awake and alert. Resting comfortably in bed. No complaints of chest pain, SOB, LE edema. s/p Left IJ catheter removal yesterday.      REVIEW OF SYSTEMS: All other review of systems is negative unless indicated above    PAST MEDICAL & SURGICAL HISTORY:  GI Bleed: 2007  Stomach Ulcer: H pylori 2007, treated with antibiotics  Osteoarthritis  Hyperlipidemia  Herniated Disc  Spinal Stenosis  Diverticulitis  GERD (Gastroesophageal Reflux Disease)  Chronic Lymphocytic Leukemia: followed by oncologist in [place]  S/P Tonsillectomy: childhood  Status Post Arthroscopy: right shoulder  History of Arthroscopy of Knee: bilateral      MEDICATIONS  (STANDING):  aMIOdarone    Tablet   Oral   aMIOdarone    Tablet 200 milliGRAM(s) Oral daily  atorvastatin 10 milliGRAM(s) Oral at bedtime  dronabinol 2.5 milliGRAM(s) Oral two times a day  famotidine    Tablet 20 milliGRAM(s) Oral daily  heparin  Injectable 5000 Unit(s) SubCutaneous every 8 hours  lactobacillus acidophilus 1 Tablet(s) Oral three times a day  polyethylene glycol 3350 17 Gram(s) Oral two times a day  sevelamer carbonate 800 milliGRAM(s) Oral three times a day with meals    MEDICATIONS  (PRN):  acetaminophen   Tablet .. 650 milliGRAM(s) Oral every 6 hours PRN Temp greater or equal to 38C (100.4F), Mild Pain (1 - 3)  albuterol/ipratropium for Nebulization 3 milliLiter(s) Nebulizer every 6 hours PRN Shortness of Breath and/or Wheezing      Allergies    No Known Allergies    Intolerances        Vital Signs Last 24 Hrs  T(C): 36.3 (14 Mar 2020 04:49), Max: 37.2 (13 Mar 2020 13:21)  T(F): 97.4 (14 Mar 2020 04:49), Max: 99 (13 Mar 2020 13:21)  HR: 98 (14 Mar 2020 04:49) (89 - 98)  BP: 122/75 (14 Mar 2020 04:49) (117/67 - 131/56)  BP(mean): --  RR: 16 (14 Mar 2020 04:49) (16 - 17)  SpO2: 98% (14 Mar 2020 04:49) (96% - 98%)    I&O's Summary    13 Mar 2020 07:01  -  14 Mar 2020 07:00  --------------------------------------------------------  IN: 0 mL / OUT: 1375 mL / NET: -1375 mL          PHYSICAL EXAM:  TELE:   Constitutional: NAD, awake and alert, well-developed  HEENT: Moist Mucous Membranes, Anicteric  Pulmonary: Non-labored, breath sounds diminished bilaterally, No wheezing, rales or rhonchi  Cardiovascular: Regular, S1 and S2, No murmurs, rubs, gallops or clicks  Gastrointestinal: Bowel Sounds present, soft, nontender.   ; Mcnulty catheter in place  Lymph: No peripheral edema. No lymphadenopathy.  Skin: No visible rashes or ulcers.  Psych:  Mood & affect appropriate    LABS: All Labs Reviewed:                        8.2    9.68  )-----------( 322      ( 14 Mar 2020 06:58 )             25.9                         8.3    9.78  )-----------( 344      ( 13 Mar 2020 07:14 )             25.7                         7.9    10.02 )-----------( 312      ( 12 Mar 2020 05:33 )             24.3     14 Mar 2020 06:58    141    |  105    |  39     ----------------------------<  95     4.5     |  29     |  4.80   13 Mar 2020 07:14    140    |  104    |  38     ----------------------------<  108    4.5     |  27     |  4.30   12 Mar 2020 05:33    140    |  104    |  61     ----------------------------<  102    4.9     |  26     |  5.90     Ca    8.3        14 Mar 2020 06:58  Ca    8.2        13 Mar 2020 07:14  Ca    8.1        12 Mar 2020 05:33  Phos  4.6       14 Mar 2020 06:58  Phos  4.0       13 Mar 2020 07:14  Mg     2.5       14 Mar 2020 06:58  Mg     2.3       13 Mar 2020 07:14  Mg     2.7       12 Mar 2020 05:33        EXAM:  CT CHEST                            PROCEDURE DATE:  03/09/2020          INTERPRETATION:  CLINICAL INFORMATION: Left-sided pleural effusion, status post pigtail catheter. For interval follow-up.    COMPARISON: Chest CT 3/1/2020.    PROCEDURE:   CT of the Chest was performed without intravenous contrast.  Sagittal and coronal reformats were performed.      FINDINGS:    LUNGS AND AIRWAYS: Patent central airways.  There is consolidation of the left lower lobe, unchanged. There is now compressive atelectasis posteriorly of the right lung, especially the right lower lobe, new from the previous exam. There is intralobular septal thickening with groundglass attenuation in the aerated portion of the lungs bilaterally which may represent mildpulmonary vascular congestion.    PLEURA: Interval development of a small right pleural effusion. Scattered pleural calcifications are noted posteriorly in the right hemithorax. Again noted is a loculated left pleural collection posterior laterally with a pigtail pleural catheter at the base, below the level of the loculated fluid. The overall appearance and size of this loculated left pleural collection is unchanged.    MEDIASTINUM AND KAELYN: No lymphadenopathy.    VESSELS: The ascending aorta isdilated measuring 4.5 x 4.3 cm at its widest axial dimension. The main pulmonary artery is normal in caliber. There is coronary artery calcification.    HEART: Cardiomegaly. No pericardial effusion.    CHEST WALL AND LOWER NECK: The thyroid gland is within normal limits. There is no supraclavicular or axillary lymphadenopathy.    VISUALIZED UPPER ABDOMEN: The adrenal glands are within normal limits. There is a large calcified gallstone again noted within the gallbladder. The imaged portions of the unenhanced liver, spleen, pancreas and kidneys are within normal limits.    BONES: Multilevel degenerative change of the thoracic spine with osteophytes, degenerative disc disease and facet joint arthropathy.    IMPRESSION:     No significant interval change in size of the left loculated pleural collection. Please note that the left pigtail pleural catheter is located inferior to the level of the loculated fluid.    Interval development of a small right pleural effusion with compressive atelectasis of the adjacent lung parenchyma.    Stable left lower lobe consolidation.        ROSCOE GUERRA M.D. ATTENDING RADIOLOGIST  This document has been electronically signed. Mar  9 2020  4:31PM [facility] -- Warren Posada, Abelardo Amos, Thaddeus Verdugo Savella  Office # [phone]    Follow Up:  hypotension on midodrine, p Afib     Subjective/Observations:     Patient awake and alert. Resting comfortably in bed. No complaints of chest pain, SOB, LE edema. s/p Left IJ catheter removal yesterday.      REVIEW OF SYSTEMS: All other review of systems is negative unless indicated above    PAST MEDICAL & SURGICAL HISTORY:  GI Bleed: 2007  Stomach Ulcer: H pylori 2007, treated with antibiotics  Osteoarthritis  Hyperlipidemia  Herniated Disc  Spinal Stenosis  Diverticulitis  GERD (Gastroesophageal Reflux Disease)  Chronic Lymphocytic Leukemia: followed by oncologist in [place]  S/P Tonsillectomy: childhood  Status Post Arthroscopy: right shoulder  History of Arthroscopy of Knee: bilateral      MEDICATIONS  (STANDING):  aMIOdarone    Tablet   Oral   aMIOdarone    Tablet 200 milliGRAM(s) Oral daily  atorvastatin 10 milliGRAM(s) Oral at bedtime  dronabinol 2.5 milliGRAM(s) Oral two times a day  famotidine    Tablet 20 milliGRAM(s) Oral daily  heparin  Injectable 5000 Unit(s) SubCutaneous every 8 hours  lactobacillus acidophilus 1 Tablet(s) Oral three times a day  polyethylene glycol 3350 17 Gram(s) Oral two times a day  sevelamer carbonate 800 milliGRAM(s) Oral three times a day with meals    MEDICATIONS  (PRN):  acetaminophen   Tablet .. 650 milliGRAM(s) Oral every 6 hours PRN Temp greater or equal to 38C (100.4F), Mild Pain (1 - 3)  albuterol/ipratropium for Nebulization 3 milliLiter(s) Nebulizer every 6 hours PRN Shortness of Breath and/or Wheezing      Allergies    No Known Allergies    Intolerances        Vital Signs Last 24 Hrs  T(C): 36.3 (14 Mar 2020 04:49), Max: 37.2 (13 Mar 2020 13:21)  T(F): 97.4 (14 Mar 2020 04:49), Max: 99 (13 Mar 2020 13:21)  HR: 98 (14 Mar 2020 04:49) (89 - 98)  BP: 122/75 (14 Mar 2020 04:49) (117/67 - 131/56)  BP(mean): --  RR: 16 (14 Mar 2020 04:49) (16 - 17)  SpO2: 98% (14 Mar 2020 04:49) (96% - 98%)    I&O's Summary    13 Mar 2020 07:01  -  14 Mar 2020 07:00  --------------------------------------------------------  IN: 0 mL / OUT: 1375 mL / NET: -1375 mL          PHYSICAL EXAM:  TELE:   Constitutional: NAD, awake   HEENT: Moist Mucous Membranes, Anicteric  Pulmonary: Non-labored, breath sounds diminished bilaterally, No wheezing, rales or rhonchi  Cardiovascular: Regular, S1 and S2, No murmurs, rubs, gallops or clicks  Gastrointestinal: Bowel Sounds present, soft, nontender.   ; Mcnulty catheter in place  Lymph: No peripheral edema. No lymphadenopathy.  Skin: No visible rashes or ulcers.  Psych:  Mood & affect appropriate    LABS: All Labs Reviewed:                        8.2    9.68  )-----------( 322      ( 14 Mar 2020 06:58 )             25.9                         8.3    9.78  )-----------( 344      ( 13 Mar 2020 07:14 )             25.7                         7.9    10.02 )-----------( 312      ( 12 Mar 2020 05:33 )             24.3     14 Mar 2020 06:58    141    |  105    |  39     ----------------------------<  95     4.5     |  29     |  4.80   13 Mar 2020 07:14    140    |  104    |  38     ----------------------------<  108    4.5     |  27     |  4.30   12 Mar 2020 05:33    140    |  104    |  61     ----------------------------<  102    4.9     |  26     |  5.90     Ca    8.3        14 Mar 2020 06:58  Ca    8.2        13 Mar 2020 07:14  Ca    8.1        12 Mar 2020 05:33  Phos  4.6       14 Mar 2020 06:58  Phos  4.0       13 Mar 2020 07:14  Mg     2.5       14 Mar 2020 06:58  Mg     2.3       13 Mar 2020 07:14  Mg     2.7       12 Mar 2020 05:33        EXAM:  CT CHEST                            PROCEDURE DATE:  03/09/2020          INTERPRETATION:  CLINICAL INFORMATION: Left-sided pleural effusion, status post pigtail catheter. For interval follow-up.    COMPARISON: Chest CT 3/1/2020.    PROCEDURE:   CT of the Chest was performed without intravenous contrast.  Sagittal and coronal reformats were performed.      FINDINGS:    LUNGS AND AIRWAYS: Patent central airways.  There is consolidation of the left lower lobe, unchanged. There is now compressive atelectasis posteriorly of the right lung, especially the right lower lobe, new from the previous exam. There is intralobular septal thickening with groundglass attenuation in the aerated portion of the lungs bilaterally which may represent mildpulmonary vascular congestion.    PLEURA: Interval development of a small right pleural effusion. Scattered pleural calcifications are noted posteriorly in the right hemithorax. Again noted is a loculated left pleural collection posterior laterally with a pigtail pleural catheter at the base, below the level of the loculated fluid. The overall appearance and size of this loculated left pleural collection is unchanged.    MEDIASTINUM AND KAELYN: No lymphadenopathy.    VESSELS: The ascending aorta isdilated measuring 4.5 x 4.3 cm at its widest axial dimension. The main pulmonary artery is normal in caliber. There is coronary artery calcification.    HEART: Cardiomegaly. No pericardial effusion.    CHEST WALL AND LOWER NECK: The thyroid gland is within normal limits. There is no supraclavicular or axillary lymphadenopathy.    VISUALIZED UPPER ABDOMEN: The adrenal glands are within normal limits. There is a large calcified gallstone again noted within the gallbladder. The imaged portions of the unenhanced liver, spleen, pancreas and kidneys are within normal limits.    BONES: Multilevel degenerative change of the thoracic spine with osteophytes, degenerative disc disease and facet joint arthropathy.    IMPRESSION:     No significant interval change in size of the left loculated pleural collection. Please note that the left pigtail pleural catheter is located inferior to the level of the loculated fluid.    Interval development of a small right pleural effusion with compressive atelectasis of the adjacent lung parenchyma.    Stable left lower lobe consolidation.        ROSCOE GUERRA M.D. ATTENDING RADIOLOGIST  This document has been electronically signed. Mar  9 2020  4:31PM

## 2020-03-14 NOTE — PROGRESS NOTE ADULT - SUBJECTIVE AND OBJECTIVE BOX
CHIEF COMPLAINT/INTERVAL HISTORY:  Pt. seen and evaluated for GUS.  Pt. is in no distress.  Intermittently confused.  AAOx1 (place) today.  Denies any SOB or pain.     REVIEW OF SYSTEMS:  No fever, CP, SOB, or abdominal pain.     Vital Signs Last 24 Hrs  T(C): 36.3 (14 Mar 2020 04:49), Max: 37.2 (13 Mar 2020 13:21)  T(F): 97.4 (14 Mar 2020 04:49), Max: 99 (13 Mar 2020 13:21)  HR: 98 (14 Mar 2020 04:49) (89 - 98)  BP: 122/75 (14 Mar 2020 04:49) (117/67 - 131/56)  BP(mean): --  RR: 16 (14 Mar 2020 04:49) (16 - 17)  SpO2: 98% (14 Mar 2020 04:49) (96% - 98%)    PHYSICAL EXAM:  GENERAL: NAD  HEENT: EOMI, hearing normal, conjunctiva and sclera clear, clean dressing over previous HD catheter site.   Chest: Diminished BS bilaterally, no wheezing  CV: S1S2, RRR,   GI: soft, +BS, NT/ND  Musculoskeletal: no edema  Psychiatric: Intermittent confusion  Skin: warm and dry    LABS:                        8.2    9.68  )-----------( 322      ( 14 Mar 2020 06:58 )             25.9     03-14    141  |  105  |  39<H>  ----------------------------<  95  4.5   |  29  |  4.80<H>    Ca    8.3<L>      14 Mar 2020 06:58  Phos  4.6     03-14  Mg     2.5     03-14      Assessment and Plan:  -Left empyema:  s/p IR chest tube placement on 2/27.  s/p tPA and dornase tx.  Completed course of IV Zosyn.  s/p removal of pigtail catheter on 3/10.  Maintaining SpO2 upper 90% on room air.  Continue Duoneb Q6h PRN.  Pulmonary f/u  -Afib with RVR:  Now in NSR. Continue Amiodarone 200 mg daily.  No anticoagulation due to history of hematuria and also recent bloody drainage from chest tube.  Cardiology f/u  -GUS 2/2 likely ATN:  Continue to avoid nephrotoxic medications. L. IJ HD catheter removed yesterday.  Will trend renal function.  Nephrology f/u  -Gastric MALToma:  Outpatient follow up.  -Acute encephalopathy:  Uremic encephalopathy.  Pt. with intermittent confusion.  Will continue to monitor.   -Anemia:  stable with no gross bleeding noted.   -VTE ppx:  Heparin 5000 units SQ Q8h

## 2020-03-14 NOTE — PROGRESS NOTE ADULT - ASSESSMENT
1.	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	Loculated pleural effusions, s/p Chest tube  3.	Anemia  4.	Hypotension  5.	h/o Lymphoma    s/p HD thursday. Monitor UO. Dialysis catheter removed. Family to discuss on extent of care. To continue current meds. Avoid nephrotoxic meds as possible. Monitor h/h trend. Transfuse PRBC PRN. Avoid ACEI, ARB, NSAIDs and IV contrast. Will monitor for renal recovery. Overall prognosis poor.

## 2020-03-14 NOTE — CHART NOTE - NSCHARTNOTEFT_GEN_A_CORE
Assessment: Pt seen for nutrition follow up. Chart reviewed, hospital course noted. Renal indices improving. On marinol as appetite stimulant. Per EMR - GOC meeting on Tuesday.    Patient alert, lethargic. Continues with poor appetite/intake. Noted with improved po yesterday % however per RN mostly takes a few spoonfuls and then refuses to eat rest of the meal. No N/V/diarrhea/constipation, having BMs last 3/13.    Factors impacting intake: [ ] none [ ] nausea  [ ] vomiting [ ] diarrhea [ ] constipation  [ ]chewing problems [ ] swallowing issues  [X] other:     Diet Presciption: Diet, Renal Restrictions:   For patients receiving Renal Replacement - No Protein Restr, No Conc K, No Conc Phos, Low Sodium  Supplement Feeding Modality:  Oral  Nepro Cans or Servings Per Day:  2       Frequency:  Two Times a day (03-08-20 @ 14:59)    Intake: variable but typically 25-50% with much encouragement from staff    Current Weight: 192.6 pounds (3/12)  % Weight Change    Pertinent Medications: MEDICATIONS  (STANDING):  aMIOdarone    Tablet   Oral   aMIOdarone    Tablet 200 milliGRAM(s) Oral daily  atorvastatin 10 milliGRAM(s) Oral at bedtime  dronabinol 2.5 milliGRAM(s) Oral two times a day  famotidine    Tablet 20 milliGRAM(s) Oral daily  heparin  Injectable 5000 Unit(s) SubCutaneous every 8 hours  lactobacillus acidophilus 1 Tablet(s) Oral three times a day  polyethylene glycol 3350 17 Gram(s) Oral two times a day  sevelamer carbonate 800 milliGRAM(s) Oral three times a day with meals    MEDICATIONS  (PRN):  acetaminophen   Tablet .. 650 milliGRAM(s) Oral every 6 hours PRN Temp greater or equal to 38C (100.4F), Mild Pain (1 - 3)  albuterol/ipratropium for Nebulization 3 milliLiter(s) Nebulizer every 6 hours PRN Shortness of Breath and/or Wheezing    Pertinent Labs: 03-14 Na141 mmol/L Glu 95 mg/dL K+ 4.5 mmol/L Cr  4.80 mg/dL<H> BUN 39 mg/dL<H> 03-14 Phos 4.6 mg/dL<H> 03-11 Alb 2.1 g/dL<L> 02-26 [id number] 5.6 %     CAPILLARY BLOOD GLUCOSE        Skin: +2 b/l feet edema, stage 1 L buttocks    Estimated Needs:   [X] no change since previous assessment  [ ] recalculated:     Previous Nutrition Diagnosis:   [X] Altered Nutrition Labs    Nutrition Diagnosis is [X] ongoing  [ ] resolved [ ] not applicable     New Nutrition Diagnosis: [X] not applicable       Interventions:   Recommend  [ ] Change Diet To:  [X] Nutrition Supplement: Encourage supplement intake.  [ ] Nutrition Support  [X] Other: Encourage po intake. Provide food preferences. Appetite stimulant as ordered.     Monitoring and Evaluation:   [X] PO intake [ x ] Tolerance to diet prescription [ x ] weights [ x ] labs[ x ] follow up per protocol  [ ] other:

## 2020-03-14 NOTE — PROGRESS NOTE ADULT - SUBJECTIVE AND OBJECTIVE BOX
Patient is a 89y old  Male who presents with a chief complaint of Left Empyema (02 Mar 2020 09:12)  Patient seen in follow up for GUS, ATN.     UO good. Dialysis catheter removed.      PAST MEDICAL HISTORY:  GI Bleed  Stomach Ulcer  Osteoarthritis  Hyperlipidemia  Herniated Disc  Spinal Stenosis  Diverticulitis  GERD (Gastroesophageal Reflux Disease)  Chronic Lymphocytic Leukemia    MEDICATIONS  (STANDING):  aMIOdarone    Tablet   Oral   aMIOdarone    Tablet 200 milliGRAM(s) Oral daily  atorvastatin 10 milliGRAM(s) Oral at bedtime  dronabinol 2.5 milliGRAM(s) Oral two times a day  famotidine    Tablet 20 milliGRAM(s) Oral daily  heparin  Injectable 5000 Unit(s) SubCutaneous every 8 hours  lactobacillus acidophilus 1 Tablet(s) Oral three times a day  polyethylene glycol 3350 17 Gram(s) Oral two times a day  sevelamer carbonate 800 milliGRAM(s) Oral three times a day with meals    MEDICATIONS  (PRN):  acetaminophen   Tablet .. 650 milliGRAM(s) Oral every 6 hours PRN Temp greater or equal to 38C (100.4F), Mild Pain (1 - 3)  albuterol/ipratropium for Nebulization 3 milliLiter(s) Nebulizer every 6 hours PRN Shortness of Breath and/or Wheezing    T(C): 36.3 (03-14-20 @ 13:09), Max: 37.2 (03-13-20 @ 13:21)  HR: 85 (03-14-20 @ 13:09) (85 - 104)  BP: 127/74 (03-14-20 @ 13:09) (107/68 - 148/63)  RR: 18 (03-14-20 @ 13:09)  SpO2: 93% (03-14-20 @ 13:09)  Wt(kg): --  I&O's Detail    13 Mar 2020 07:01  -  14 Mar 2020 07:00  --------------------------------------------------------  IN:  Total IN: 0 mL    OUT:    Indwelling Catheter - Urethral: 1375 mL  Total OUT: 1375 mL    Total NET: -1375 mL          PHYSICAL EXAM:  General: NAD  Respiratory: b/l air entry  Cardiovascular: S1 S2  Gastrointestinal: soft  Extremities:  no edema               LABORATORY:                        8.2    9.68  )-----------( 322      ( 14 Mar 2020 06:58 )             25.9     03-14    141  |  105  |  39<H>  ----------------------------<  95  4.5   |  29  |  4.80<H>    Ca    8.3<L>      14 Mar 2020 06:58  Phos  4.6     03-14  Mg     2.5     03-14      Sodium, Serum: 141 mmol/L (03-14 @ 06:58)  Sodium, Serum: 140 mmol/L (03-13 @ 07:14)    Potassium, Serum: 4.5 mmol/L (03-14 @ 06:58)  Potassium, Serum: 4.5 mmol/L (03-13 @ 07:14)    Hemoglobin: 8.2 g/dL (03-14 @ 06:58)  Hemoglobin: 8.3 g/dL (03-13 @ 07:14)  Hemoglobin: 7.9 g/dL (03-12 @ 05:33)    Creatinine, Serum 4.80 (03-14 @ 06:58)  Creatinine, Serum 4.30 (03-13 @ 07:14)  Creatinine, Serum 5.90 (03-12 @ 05:33)

## 2020-03-14 NOTE — PROGRESS NOTE ADULT - SUBJECTIVE AND OBJECTIVE BOX
Date/Time Patient Seen:  		  Referring MD:   Data Reviewed	       Patient is a 89y old  Male who presents with a chief complaint of Left Empyema (13 Mar 2020 13:52)      Subjective/HPI     PAST MEDICAL & SURGICAL HISTORY:  GI Bleed: 2007  Stomach Ulcer: H pylori 2007, treated with antibiotics  Osteoarthritis  Hyperlipidemia  Herniated Disc  Spinal Stenosis  Diverticulitis  GERD (Gastroesophageal Reflux Disease)  Chronic Lymphocytic Leukemia: followed by oncologist in [place]  S/P Tonsillectomy: childhood  Status Post Arthroscopy: right shoulder  History of Arthroscopy of Knee: bilateral  Osteoarthritis  Stomach Ulcer: H pylori, treated with antibiotics        Medication list         MEDICATIONS  (STANDING):  aMIOdarone    Tablet   Oral   aMIOdarone    Tablet 200 milliGRAM(s) Oral daily  atorvastatin 10 milliGRAM(s) Oral at bedtime  dronabinol 2.5 milliGRAM(s) Oral two times a day  famotidine    Tablet 20 milliGRAM(s) Oral daily  heparin  Injectable 5000 Unit(s) SubCutaneous every 8 hours  lactobacillus acidophilus 1 Tablet(s) Oral three times a day  polyethylene glycol 3350 17 Gram(s) Oral two times a day  sevelamer carbonate 800 milliGRAM(s) Oral three times a day with meals    MEDICATIONS  (PRN):  acetaminophen   Tablet .. 650 milliGRAM(s) Oral every 6 hours PRN Temp greater or equal to 38C (100.4F), Mild Pain (1 - 3)  albuterol/ipratropium for Nebulization 3 milliLiter(s) Nebulizer every 6 hours PRN Shortness of Breath and/or Wheezing         Vitals log        ICU Vital Signs Last 24 Hrs  T(C): 36.3 (14 Mar 2020 04:49), Max: 37.2 (13 Mar 2020 13:21)  T(F): 97.4 (14 Mar 2020 04:49), Max: 99 (13 Mar 2020 13:21)  HR: 98 (14 Mar 2020 04:49) (89 - 98)  BP: 122/75 (14 Mar 2020 04:49) (117/67 - 131/56)  BP(mean): --  ABP: --  ABP(mean): --  RR: 16 (14 Mar 2020 04:49) (16 - 17)  SpO2: 98% (14 Mar 2020 04:49) (96% - 98%)           Input and Output:  I&O's Detail    12 Mar 2020 07:01  -  13 Mar 2020 07:00  --------------------------------------------------------  IN:    Oral Fluid: 480 mL  Total IN: 480 mL    OUT:    Indwelling Catheter - Urethral: 1150 mL  Total OUT: 1150 mL    Total NET: -670 mL      13 Mar 2020 07:01  -  14 Mar 2020 06:20  --------------------------------------------------------  IN:  Total IN: 0 mL    OUT:    Indwelling Catheter - Urethral: 875 mL  Total OUT: 875 mL    Total NET: -875 mL          Lab Data                        8.3    9.78  )-----------( 344      ( 13 Mar 2020 07:14 )             25.7     03-13    140  |  104  |  38<H>  ----------------------------<  108<H>  4.5   |  27  |  4.30<H>    Ca    8.2<L>      13 Mar 2020 07:14  Phos  4.0     03-13  Mg     2.3     03-13              Review of Systems	      Objective     Physical Examination    heart 1s2  lung dec bs  abd soft  head nc  head at      Pertinent Lab findings & Imaging      [place]:  NO   Adequate UO     I&O's Detail    12 Mar 2020 07:01  -  13 Mar 2020 07:00  --------------------------------------------------------  IN:    Oral Fluid: 480 mL  Total IN: 480 mL    OUT:    Indwelling Catheter - Urethral: 1150 mL  Total OUT: 1150 mL    Total NET: -670 mL      13 Mar 2020 07:01  -  14 Mar 2020 06:20  --------------------------------------------------------  IN:  Total IN: 0 mL    OUT:    Indwelling Catheter - Urethral: 875 mL  Total OUT: 875 mL    Total NET: -875 mL               Discussed with:     Cultures:	        Radiology

## 2020-03-15 LAB
ANION GAP SERPL CALC-SCNC: 7 MMOL/L — SIGNIFICANT CHANGE UP (ref 5–17)
BUN SERPL-MCNC: 45 MG/DL — HIGH (ref 7–23)
CALCIUM SERPL-MCNC: 8.5 MG/DL — SIGNIFICANT CHANGE UP (ref 8.5–10.1)
CHLORIDE SERPL-SCNC: 107 MMOL/L — SIGNIFICANT CHANGE UP (ref 96–108)
CO2 SERPL-SCNC: 27 MMOL/L — SIGNIFICANT CHANGE UP (ref 22–31)
CREAT SERPL-MCNC: 5 MG/DL — HIGH (ref 0.5–1.3)
GLUCOSE SERPL-MCNC: 98 MG/DL — SIGNIFICANT CHANGE UP (ref 70–99)
HCT VFR BLD CALC: 25.5 % — LOW (ref 39–50)
HGB BLD-MCNC: 8.1 G/DL — LOW (ref 13–17)
MAGNESIUM SERPL-MCNC: 2.6 MG/DL — SIGNIFICANT CHANGE UP (ref 1.6–2.6)
MCHC RBC-ENTMCNC: 30.3 PG — SIGNIFICANT CHANGE UP (ref 27–34)
MCHC RBC-ENTMCNC: 31.8 GM/DL — LOW (ref 32–36)
MCV RBC AUTO: 95.5 FL — SIGNIFICANT CHANGE UP (ref 80–100)
NRBC # BLD: 0 /100 WBCS — SIGNIFICANT CHANGE UP (ref 0–0)
PHOSPHATE SERPL-MCNC: 5 MG/DL — HIGH (ref 2.5–4.5)
PLATELET # BLD AUTO: 295 K/UL — SIGNIFICANT CHANGE UP (ref 150–400)
POTASSIUM SERPL-MCNC: 4.5 MMOL/L — SIGNIFICANT CHANGE UP (ref 3.5–5.3)
POTASSIUM SERPL-SCNC: 4.5 MMOL/L — SIGNIFICANT CHANGE UP (ref 3.5–5.3)
RBC # BLD: 2.67 M/UL — LOW (ref 4.2–5.8)
RBC # FLD: 13.7 % — SIGNIFICANT CHANGE UP (ref 10.3–14.5)
SODIUM SERPL-SCNC: 141 MMOL/L — SIGNIFICANT CHANGE UP (ref 135–145)
WBC # BLD: 9.76 K/UL — SIGNIFICANT CHANGE UP (ref 3.8–10.5)
WBC # FLD AUTO: 9.76 K/UL — SIGNIFICANT CHANGE UP (ref 3.8–10.5)

## 2020-03-15 PROCEDURE — 99233 SBSQ HOSP IP/OBS HIGH 50: CPT

## 2020-03-15 PROCEDURE — 99232 SBSQ HOSP IP/OBS MODERATE 35: CPT

## 2020-03-15 RX ORDER — SODIUM CHLORIDE 9 MG/ML
1000 INJECTION, SOLUTION INTRAVENOUS
Refills: 0 | Status: DISCONTINUED | OUTPATIENT
Start: 2020-03-15 | End: 2020-03-17

## 2020-03-15 RX ADMIN — Medication 1 TABLET(S): at 21:24

## 2020-03-15 RX ADMIN — Medication 2.5 MILLIGRAM(S): at 17:29

## 2020-03-15 RX ADMIN — POLYETHYLENE GLYCOL 3350 17 GRAM(S): 17 POWDER, FOR SOLUTION ORAL at 05:35

## 2020-03-15 RX ADMIN — Medication 2.5 MILLIGRAM(S): at 06:34

## 2020-03-15 RX ADMIN — AMIODARONE HYDROCHLORIDE 200 MILLIGRAM(S): 400 TABLET ORAL at 05:34

## 2020-03-15 RX ADMIN — Medication 1 TABLET(S): at 13:27

## 2020-03-15 RX ADMIN — Medication 1 TABLET(S): at 05:34

## 2020-03-15 RX ADMIN — ATORVASTATIN CALCIUM 10 MILLIGRAM(S): 80 TABLET, FILM COATED ORAL at 21:25

## 2020-03-15 RX ADMIN — SODIUM CHLORIDE 60 MILLILITER(S): 9 INJECTION, SOLUTION INTRAVENOUS at 13:30

## 2020-03-15 RX ADMIN — HEPARIN SODIUM 5000 UNIT(S): 5000 INJECTION INTRAVENOUS; SUBCUTANEOUS at 05:34

## 2020-03-15 RX ADMIN — HEPARIN SODIUM 5000 UNIT(S): 5000 INJECTION INTRAVENOUS; SUBCUTANEOUS at 13:26

## 2020-03-15 RX ADMIN — SEVELAMER CARBONATE 800 MILLIGRAM(S): 2400 POWDER, FOR SUSPENSION ORAL at 11:54

## 2020-03-15 RX ADMIN — FAMOTIDINE 20 MILLIGRAM(S): 10 INJECTION INTRAVENOUS at 11:54

## 2020-03-15 RX ADMIN — HEPARIN SODIUM 5000 UNIT(S): 5000 INJECTION INTRAVENOUS; SUBCUTANEOUS at 21:24

## 2020-03-15 RX ADMIN — SEVELAMER CARBONATE 800 MILLIGRAM(S): 2400 POWDER, FOR SUSPENSION ORAL at 17:29

## 2020-03-15 NOTE — PROGRESS NOTE ADULT - SUBJECTIVE AND OBJECTIVE BOX
Patient is a 89y old  Male who presents with a chief complaint of Left Empyema (02 Mar 2020 09:12)  Patient seen in follow up for GUS, ATN.     UO good. Very poor PO intake.     PAST MEDICAL HISTORY:  GI Bleed  Stomach Ulcer  Osteoarthritis  Hyperlipidemia  Herniated Disc  Spinal Stenosis  Diverticulitis  GERD (Gastroesophageal Reflux Disease)  Chronic Lymphocytic Leukemia    MEDICATIONS  (STANDING):  aMIOdarone    Tablet   Oral   aMIOdarone    Tablet 200 milliGRAM(s) Oral daily  atorvastatin 10 milliGRAM(s) Oral at bedtime  dronabinol 2.5 milliGRAM(s) Oral two times a day  famotidine    Tablet 20 milliGRAM(s) Oral daily  heparin  Injectable 5000 Unit(s) SubCutaneous every 8 hours  lactobacillus acidophilus 1 Tablet(s) Oral three times a day  polyethylene glycol 3350 17 Gram(s) Oral two times a day  sevelamer carbonate 800 milliGRAM(s) Oral three times a day with meals    MEDICATIONS  (PRN):  acetaminophen   Tablet .. 650 milliGRAM(s) Oral every 6 hours PRN Temp greater or equal to 38C (100.4F), Mild Pain (1 - 3)  albuterol/ipratropium for Nebulization 3 milliLiter(s) Nebulizer every 6 hours PRN Shortness of Breath and/or Wheezing    T(C): 36.6 (03-15-20 @ 04:38), Max: 37.2 (03-13-20 @ 13:21)  HR: 75 (03-15-20 @ 04:38) (75 - 98)  BP: 125/68 (03-15-20 @ 04:38) (117/67 - 136/77)  RR: 19 (03-15-20 @ 04:38)  SpO2: 95% (03-15-20 @ 04:38)  Wt(kg): --  I&O's Detail    14 Mar 2020 07:01  -  15 Mar 2020 07:00  --------------------------------------------------------  IN:  Total IN: 0 mL    OUT:    Indwelling Catheter - Urethral: 700 mL  Total OUT: 700 mL    Total NET: -700 mL            PHYSICAL EXAM:  General: NAD  Respiratory: b/l air entry  Cardiovascular: S1 S2  Gastrointestinal: soft  Extremities:  no edema               LABORATORY:                        8.1    9.76  )-----------( 295      ( 15 Mar 2020 06:26 )             25.5     03-15    141  |  107  |  45<H>  ----------------------------<  98  4.5   |  27  |  5.00<H>    Ca    8.5      15 Mar 2020 06:26  Phos  5.0     03-15  Mg     2.6     03-15      Sodium, Serum: 141 mmol/L (03-15 @ 06:26)  Sodium, Serum: 141 mmol/L (03-14 @ 06:58)    Potassium, Serum: 4.5 mmol/L (03-15 @ 06:26)  Potassium, Serum: 4.5 mmol/L (03-14 @ 06:58)    Hemoglobin: 8.1 g/dL (03-15 @ 06:26)  Hemoglobin: 8.2 g/dL (03-14 @ 06:58)  Hemoglobin: 8.3 g/dL (03-13 @ 07:14)    Creatinine, Serum 5.00 (03-15 @ 06:26)  Creatinine, Serum 4.80 (03-14 @ 06:58)  Creatinine, Serum 4.30 (03-13 @ 07:14)

## 2020-03-15 NOTE — PROGRESS NOTE ADULT - SUBJECTIVE AND OBJECTIVE BOX
INTERVAL HPI/OVERNIGHT EVENTS:    patient seen and examined  poor po intake as patient states he does not care for the food  +bm upon evaluation  denies n/v abd pain   h/h stable  avss     MEDICATIONS  (STANDING):  aMIOdarone    Tablet   Oral   aMIOdarone    Tablet 200 milliGRAM(s) Oral daily  atorvastatin 10 milliGRAM(s) Oral at bedtime  dronabinol 2.5 milliGRAM(s) Oral two times a day  famotidine    Tablet 20 milliGRAM(s) Oral daily  heparin  Injectable 5000 Unit(s) SubCutaneous every 8 hours  lactobacillus acidophilus 1 Tablet(s) Oral three times a day  polyethylene glycol 3350 17 Gram(s) Oral two times a day  sevelamer carbonate 800 milliGRAM(s) Oral three times a day with meals  sodium chloride 0.45%. 1000 milliLiter(s) (60 mL/Hr) IV Continuous <Continuous>    MEDICATIONS  (PRN):  acetaminophen   Tablet .. 650 milliGRAM(s) Oral every 6 hours PRN Temp greater or equal to 38C (100.4F), Mild Pain (1 - 3)  albuterol/ipratropium for Nebulization 3 milliLiter(s) Nebulizer every 6 hours PRN Shortness of Breath and/or Wheezing      Allergies    No Known Allergies    Intolerances        Review of Systems:    General:  No wt loss, fevers, chills, night sweats,fatigue,   Eyes:  Good vision, no reported pain  ENT:  No sore throat, pain, runny nose, dysphagia  CV:  No pain, palpitatioins, hypo/hypertension  Resp:  No dyspnea, cough, tachypnea, wheezing  GI:  No pain, No nausea, No vomiting, No diarrhea, No constipatiion, No weight loss, No fever, No pruritis, No rectal bleeding, No tarry stools, No dysphagia,  :  No pain, bleeding, incontinence, nocturia  Muscle:  No pain, weakness  Neuro:  No weakness, tingling, memory problems  Psych:  No fatigue, insomnia, mood problems, depression  Endocrine:  No polyuria, polydypsia, cold/heat intolerance  Heme:  No petechiae, ecchymosis, easy bruisability  Skin:  No rash, tattoos, scars, edema      Vital Signs Last 24 Hrs  T(C): 36.8 (15 Mar 2020 14:16), Max: 36.9 (14 Mar 2020 20:35)  T(F): 98.2 (15 Mar 2020 14:16), Max: 98.4 (14 Mar 2020 20:35)  HR: 92 (15 Mar 2020 14:16) (75 - 92)  BP: 115/63 (15 Mar 2020 14:16) (115/63 - 136/77)  BP(mean): --  RR: 18 (15 Mar 2020 14:16) (17 - 19)  SpO2: 95% (15 Mar 2020 14:16) (95% - 95%)    PHYSICAL EXAM:    Constitutional: NAD  HEENT: ncat   Gastrointestinal: BS+, soft, NT, mild dt   Extremities: No peripheral edema  Neurological: Awake, alert, responding appropriately         LABS:                        8.1    9.76  )-----------( 295      ( 15 Mar 2020 06:26 )             25.5     03-15    141  |  107  |  45<H>  ----------------------------<  98  4.5   |  27  |  5.00<H>    Ca    8.5      15 Mar 2020 06:26  Phos  5.0     03-15  Mg     2.6     03-15            RADIOLOGY & ADDITIONAL TESTS:

## 2020-03-15 NOTE — PROGRESS NOTE ADULT - SUBJECTIVE AND OBJECTIVE BOX
CHIEF COMPLAINT/INTERVAL HISTORY:  Pt. seen and evaluated for GUS.  Pt. is in no distress.  AAOx2 (person and place).  Denies having any pain or SOB.      REVIEW OF SYSTEMS:  No fever, CP, SOB, or abdominal pain.     Vital Signs Last 24 Hrs  T(C): 36.6 (15 Mar 2020 04:38), Max: 36.9 (14 Mar 2020 20:35)  T(F): 97.9 (15 Mar 2020 04:38), Max: 98.4 (14 Mar 2020 20:35)  HR: 75 (15 Mar 2020 04:38) (75 - 92)  BP: 125/68 (15 Mar 2020 04:38) (125/68 - 136/77)  BP(mean): --  RR: 19 (15 Mar 2020 04:38) (17 - 19)  SpO2: 95% (15 Mar 2020 04:38) (93% - 95%)    PHYSICAL EXAM:  GENERAL: NAD  HEENT: EOMI, hearing normal, conjunctiva and sclera clear  Chest: faint crackles at right base, no wheezing  CV: S1S2, RRR,   GI: soft, +BS, NT/ND  Musculoskeletal: no edema  Psychiatric: affect nL, mood nL  Skin: warm and dry    LABS:                        8.1    9.76  )-----------( 295      ( 15 Mar 2020 06:26 )             25.5     03-15    141  |  107  |  45<H>  ----------------------------<  98  4.5   |  27  |  5.00<H>    Ca    8.5      15 Mar 2020 06:26  Phos  5.0     03-15  Mg     2.6     03-15      Assessment and Plan:  -Left empyema:  s/p IR chest tube placement on 2/27.  s/p tPA and dornase tx.  Completed course of IV Zosyn.  s/p removal of pigtail catheter on 3/10.  Maintaining SpO2 mid to upper 90% on room air.  Continue Duoneb Q6h PRN.  Pulmonary f/u  -Afib with RVR:  Now in NSR. Continue Amiodarone 200 mg daily.  No anticoagulation due to history of hematuria and also recent bloody drainage from previous chest tube.  Cardiology f/u  -GUS 2/2 likely ATN:  Continue to avoid nephrotoxic medications. L. IJ HD catheter removed on 3/13.  Will trend renal function.  Nephrology f/u to determine further need for HD.  -Gastric MALToma:  Outpatient follow up.  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  Pt. with intermittent confusion.  Will continue to monitor.   -Anemia:  stable with no gross bleeding noted.   -VTE ppx:  Heparin 5000 units SQ Q8h

## 2020-03-15 NOTE — PROGRESS NOTE ADULT - SUBJECTIVE AND OBJECTIVE BOX
[facility] -- Warren Posada, Abelardo Amos, Thaddeus Verdugo Savella  Office # [phone]      Follow Up:    afib   Subjective/Observations:   No events overnight resting comfortably in bed.  No complaints of chest pain, dyspnea, or palpitations reported. No signs of orthopnea or PND.      REVIEW OF SYSTEMS: All other review of systems is negative unless indicated above    PAST MEDICAL & SURGICAL HISTORY:  GI Bleed: 2007  Stomach Ulcer: H pylori 2007, treated with antibiotics  Osteoarthritis  Hyperlipidemia  Herniated Disc  Spinal Stenosis  Diverticulitis  GERD (Gastroesophageal Reflux Disease)  Chronic Lymphocytic Leukemia: followed by oncologist in [place]  S/P Tonsillectomy: childhood  Status Post Arthroscopy: right shoulder  History of Arthroscopy of Knee: bilateral      MEDICATIONS  (STANDING):  aMIOdarone    Tablet   Oral   aMIOdarone    Tablet 200 milliGRAM(s) Oral daily  atorvastatin 10 milliGRAM(s) Oral at bedtime  dronabinol 2.5 milliGRAM(s) Oral two times a day  famotidine    Tablet 20 milliGRAM(s) Oral daily  heparin  Injectable 5000 Unit(s) SubCutaneous every 8 hours  lactobacillus acidophilus 1 Tablet(s) Oral three times a day  polyethylene glycol 3350 17 Gram(s) Oral two times a day  sevelamer carbonate 800 milliGRAM(s) Oral three times a day with meals    MEDICATIONS  (PRN):  acetaminophen   Tablet .. 650 milliGRAM(s) Oral every 6 hours PRN Temp greater or equal to 38C (100.4F), Mild Pain (1 - 3)  albuterol/ipratropium for Nebulization 3 milliLiter(s) Nebulizer every 6 hours PRN Shortness of Breath and/or Wheezing      Allergies    No Known Allergies    Intolerances        Vital Signs Last 24 Hrs  T(C): 36.6 (15 Mar 2020 04:38), Max: 36.9 (14 Mar 2020 20:35)  T(F): 97.9 (15 Mar 2020 04:38), Max: 98.4 (14 Mar 2020 20:35)  HR: 75 (15 Mar 2020 04:38) (75 - 92)  BP: 125/68 (15 Mar 2020 04:38) (125/68 - 136/77)  BP(mean): --  RR: 19 (15 Mar 2020 04:38) (17 - 19)  SpO2: 95% (15 Mar 2020 04:38) (93% - 95%)    I&O's Summary    14 Mar 2020 07:01  -  15 Mar 2020 07:00  --------------------------------------------------------  IN: 0 mL / OUT: 700 mL / NET: -700 mL          PHYSICAL EXAM:  TELE:    Constitutional: NAD, awake and alert, well-developed  HEENT: Moist Mucous Membranes, Anicteric  Pulmonary: Non-labored, breath sounds scattered rhonchi   Cardiovascular: Regular, S1 and S2 nl, No murmurs, rubs, gallops or clicks  Gastrointestinal: Bowel Sounds present, soft, nontender.   Lymph: No lymphadenopathy. No peripheral edema.  Skin: No visible rashes or ulcers.  Psych:  Mood & affect appropriate    LABS: All Labs Reviewed:                        8.1    9.76  )-----------( 295      ( 15 Mar 2020 06:26 )             25.5                         8.2    9.68  )-----------( 322      ( 14 Mar 2020 06:58 )             25.9                         8.3    9.78  )-----------( 344      ( 13 Mar 2020 07:14 )             25.7     15 Mar 2020 06:26    141    |  107    |  45     ----------------------------<  98     4.5     |  27     |  5.00   14 Mar 2020 06:58    141    |  105    |  39     ----------------------------<  95     4.5     |  29     |  4.80   13 Mar 2020 07:14    140    |  104    |  38     ----------------------------<  108    4.5     |  27     |  4.30     Ca    8.5        15 Mar 2020 06:26  Ca    8.3        14 Mar 2020 06:58  Ca    8.2        13 Mar 2020 07:14  Phos  5.0       15 Mar 2020 06:26  Phos  4.6       14 Mar 2020 06:58  Phos  4.0       13 Mar 2020 07:14  Mg     2.6       15 Mar 2020 06:26  Mg     2.5       14 Mar 2020 06:58  Mg     2.3       13 Mar 2020 07:14               ECG:  < from: 12 Lead ECG (03.11.20 @ 22:20) >    Ventricular Rate 87 BPM    Atrial Rate 87 BPM    P-R Interval 202 ms    QRS Duration 100 ms    Q-T Interval 368 ms    QTC Calculation(Bezet) 442 ms    P Axis 49 degrees    R Axis -51 degrees    T Axis 51 degrees    Diagnosis Line Normal sinus rhythm with sinus arrhythmia  Left axis deviation  Low voltage QRS  Abnormal ECG  When compared with ECG of 12-MAR-2020 22:20, (Unconfirmed)  No significant change was found    < end of copied text >    Echo:< from: TTE Echo Doppler w/o Cont (02.27.20 @ 11:47) >  OBSERVATIONS:  Technically difficult and limited study  Mitral Valve: normal, trace physiologic MR.  Aortic Valve/Aorta: Sclerotic trileaflet aortic valve with normal opening.  Tricuspid Valve: normal with trace TR.  Pulmonic Valve: Not well-visualized  Left Atrium: normal  RightAtrium: Not well-visualized  Left Ventricle: normal LV size and systolic function, estimated LVEF of 55%.  Right Ventricle: Not well-visualized  Pericardium/Pleura: normal, small anterior pericardial effusion without signs of hemodynamic compromise.  Pulmonary/RV Pressure: estimated PA systolic pressure of 34mmHg. IVC is dilated    Conclusion:   Technically difficult and limited study  Normal left ventricular internal dimensions and systolic function, estimated LVEF of 55%.   Right ventricle is not well-visualized  Sclerotic trileaflet aortic valve, without AI.   Trace physiologic MR and TR.       < end of copied text >      Radiology: [facility] -- Warren Posada, Abelardo Amos, Thaddeus Verdugo Savella  Office # [phone]      Follow Up:    afib   Subjective/Observations:   No events overnight resting comfortably in bed.  No complaints of chest pain, dyspnea, or palpitations reported. No signs of orthopnea or PND.      REVIEW OF SYSTEMS: All other review of systems is negative unless indicated above    PAST MEDICAL & SURGICAL HISTORY:  GI Bleed: 2007  Stomach Ulcer: H pylori 2007, treated with antibiotics  Osteoarthritis  Hyperlipidemia  Herniated Disc  Spinal Stenosis  Diverticulitis  GERD (Gastroesophageal Reflux Disease)  Chronic Lymphocytic Leukemia: followed by oncologist in [place]  S/P Tonsillectomy: childhood  Status Post Arthroscopy: right shoulder  History of Arthroscopy of Knee: bilateral      MEDICATIONS  (STANDING):  aMIOdarone    Tablet   Oral   aMIOdarone    Tablet 200 milliGRAM(s) Oral daily  atorvastatin 10 milliGRAM(s) Oral at bedtime  dronabinol 2.5 milliGRAM(s) Oral two times a day  famotidine    Tablet 20 milliGRAM(s) Oral daily  heparin  Injectable 5000 Unit(s) SubCutaneous every 8 hours  lactobacillus acidophilus 1 Tablet(s) Oral three times a day  polyethylene glycol 3350 17 Gram(s) Oral two times a day  sevelamer carbonate 800 milliGRAM(s) Oral three times a day with meals    MEDICATIONS  (PRN):  acetaminophen   Tablet .. 650 milliGRAM(s) Oral every 6 hours PRN Temp greater or equal to 38C (100.4F), Mild Pain (1 - 3)  albuterol/ipratropium for Nebulization 3 milliLiter(s) Nebulizer every 6 hours PRN Shortness of Breath and/or Wheezing      Allergies    No Known Allergies    Intolerances        Vital Signs Last 24 Hrs  T(C): 36.6 (15 Mar 2020 04:38), Max: 36.9 (14 Mar 2020 20:35)  T(F): 97.9 (15 Mar 2020 04:38), Max: 98.4 (14 Mar 2020 20:35)  HR: 75 (15 Mar 2020 04:38) (75 - 92)  BP: 125/68 (15 Mar 2020 04:38) (125/68 - 136/77)  BP(mean): --  RR: 19 (15 Mar 2020 04:38) (17 - 19)  SpO2: 95% (15 Mar 2020 04:38) (93% - 95%)    I&O's Summary    14 Mar 2020 07:01  -  15 Mar 2020 07:00  --------------------------------------------------------  IN: 0 mL / OUT: 700 mL / NET: -700 mL          PHYSICAL EXAM:  TELE: Sr   Constitutional: NAD, awake   HEENT: Moist Mucous Membranes, Anicteric  Pulmonary: Non-labored, breath sounds scattered rhonchi   Cardiovascular: Regular, S1 and S2 nl, No murmurs, rubs, gallops or clicks  Gastrointestinal: Bowel Sounds present, soft, nontender.   Lymph: No lymphadenopathy. No peripheral edema.  Skin: No visible rashes or ulcers.  Psych:  Mood & affect appropriate    LABS: All Labs Reviewed:                        8.1    9.76  )-----------( 295      ( 15 Mar 2020 06:26 )             25.5                         8.2    9.68  )-----------( 322      ( 14 Mar 2020 06:58 )             25.9                         8.3    9.78  )-----------( 344      ( 13 Mar 2020 07:14 )             25.7     15 Mar 2020 06:26    141    |  107    |  45     ----------------------------<  98     4.5     |  27     |  5.00   14 Mar 2020 06:58    141    |  105    |  39     ----------------------------<  95     4.5     |  29     |  4.80   13 Mar 2020 07:14    140    |  104    |  38     ----------------------------<  108    4.5     |  27     |  4.30     Ca    8.5        15 Mar 2020 06:26  Ca    8.3        14 Mar 2020 06:58  Ca    8.2        13 Mar 2020 07:14  Phos  5.0       15 Mar 2020 06:26  Phos  4.6       14 Mar 2020 06:58  Phos  4.0       13 Mar 2020 07:14  Mg     2.6       15 Mar 2020 06:26  Mg     2.5       14 Mar 2020 06:58  Mg     2.3       13 Mar 2020 07:14               ECG:  < from: 12 Lead ECG (03.11.20 @ 22:20) >    Ventricular Rate 87 BPM    Atrial Rate 87 BPM    P-R Interval 202 ms    QRS Duration 100 ms    Q-T Interval 368 ms    QTC Calculation(Bezet) 442 ms    P Axis 49 degrees    R Axis -51 degrees    T Axis 51 degrees    Diagnosis Line Normal sinus rhythm with sinus arrhythmia  Left axis deviation  Low voltage QRS  Abnormal ECG  When compared with ECG of 12-MAR-2020 22:20, (Unconfirmed)  No significant change was found    < end of copied text >    Echo:< from: TTE Echo Doppler w/o Cont (02.27.20 @ 11:47) >  OBSERVATIONS:  Technically difficult and limited study  Mitral Valve: normal, trace physiologic MR.  Aortic Valve/Aorta: Sclerotic trileaflet aortic valve with normal opening.  Tricuspid Valve: normal with trace TR.  Pulmonic Valve: Not well-visualized  Left Atrium: normal  RightAtrium: Not well-visualized  Left Ventricle: normal LV size and systolic function, estimated LVEF of 55%.  Right Ventricle: Not well-visualized  Pericardium/Pleura: normal, small anterior pericardial effusion without signs of hemodynamic compromise.  Pulmonary/RV Pressure: estimated PA systolic pressure of 34mmHg. IVC is dilated    Conclusion:   Technically difficult and limited study  Normal left ventricular internal dimensions and systolic function, estimated LVEF of 55%.   Right ventricle is not well-visualized  Sclerotic trileaflet aortic valve, without AI.   Trace physiologic MR and TR.       < end of copied text >      Radiology:

## 2020-03-15 NOTE — PROGRESS NOTE ADULT - ASSESSMENT
89 yo M with PMH of HLD and MALToma of the stomach (under surveillance), spinal stenosis with extensive lumbar surgery in 2011 and bilateral LE neuropathy, admitted for L side empyema and R ureteral stone. Patient initially reported anginal symptoms, s/p ICU stay for empyema, now on telemetry floor.    CHEST PAIN  - resolved, no further reports of pain  - EKG has suggested possible inferior wall MI, no wall motion abnormality on ECHO.   - No further ischemic work up inpatient  - Continue with statin    A fib  - Had rapid AF, now in SR no events overnight   - Continue amiodarone 400 TID to 5 g load, than 200 QD  - No AC for now given history of hematuria with anemia and advanced age      GUS  - s/p HD thursday- Dialysis catheter removed. Family to discuss on extent of care- follow up renal recs     Left empyema:      - s/p IR chest tube placement on 2/27.   - s/p tPA and dornase tx.      Anemia  work up treatment as per primary team       - Plan for family meeting on Tuesday 9 am - to discuss GOC  - Monitor and replete lytes, keep K>4, Mg>2.  - All other medical needs as per primary team.  - Other cardiovascular workup will depend on clinical course.  - Will continue to follow.  Peggy Craig FNP-C  Cardiology NP  [id number] [phone]

## 2020-03-15 NOTE — PROGRESS NOTE ADULT - ASSESSMENT
1.	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	Loculated pleural effusions, s/p Chest tube  3.	Anemia  4.	Hypotension  5.	h/o Lymphoma    Check am labs. Gentle IV hydration. Monitor UO. Family to discuss on extent of care. To continue current meds. Avoid nephrotoxic meds as possible.   Monitor h/h trend. Transfuse PRBC PRN. Avoid ACEI, ARB, NSAIDs and IV contrast. Will monitor for renal recovery. Overall prognosis poor.

## 2020-03-15 NOTE — PROGRESS NOTE ADULT - SUBJECTIVE AND OBJECTIVE BOX
Date/Time Patient Seen:  		  Referring MD:   Data Reviewed	       Patient is a 89y old  Male who presents with a chief complaint of Left Empyema (14 Mar 2020 13:19)      Subjective/HPI     PAST MEDICAL & SURGICAL HISTORY:  GI Bleed: 2007  Stomach Ulcer: H pylori 2007, treated with antibiotics  Osteoarthritis  Hyperlipidemia  Herniated Disc  Spinal Stenosis  Diverticulitis  GERD (Gastroesophageal Reflux Disease)  Chronic Lymphocytic Leukemia: followed by oncologist in [place]  S/P Tonsillectomy: childhood  Status Post Arthroscopy: right shoulder  History of Arthroscopy of Knee: bilateral  Osteoarthritis  Stomach Ulcer: H pylori, treated with antibiotics        Medication list         MEDICATIONS  (STANDING):  aMIOdarone    Tablet   Oral   aMIOdarone    Tablet 200 milliGRAM(s) Oral daily  atorvastatin 10 milliGRAM(s) Oral at bedtime  dronabinol 2.5 milliGRAM(s) Oral two times a day  famotidine    Tablet 20 milliGRAM(s) Oral daily  heparin  Injectable 5000 Unit(s) SubCutaneous every 8 hours  lactobacillus acidophilus 1 Tablet(s) Oral three times a day  polyethylene glycol 3350 17 Gram(s) Oral two times a day  sevelamer carbonate 800 milliGRAM(s) Oral three times a day with meals    MEDICATIONS  (PRN):  acetaminophen   Tablet .. 650 milliGRAM(s) Oral every 6 hours PRN Temp greater or equal to 38C (100.4F), Mild Pain (1 - 3)  albuterol/ipratropium for Nebulization 3 milliLiter(s) Nebulizer every 6 hours PRN Shortness of Breath and/or Wheezing         Vitals log        ICU Vital Signs Last 24 Hrs  T(C): 36.6 (15 Mar 2020 04:38), Max: 36.9 (14 Mar 2020 20:35)  T(F): 97.9 (15 Mar 2020 04:38), Max: 98.4 (14 Mar 2020 20:35)  HR: 75 (15 Mar 2020 04:38) (75 - 92)  BP: 125/68 (15 Mar 2020 04:38) (125/68 - 136/77)  BP(mean): --  ABP: --  ABP(mean): --  RR: 19 (15 Mar 2020 04:38) (17 - 19)  SpO2: 95% (15 Mar 2020 04:38) (93% - 95%)           Input and Output:  I&O's Detail    13 Mar 2020 07:01  -  14 Mar 2020 07:00  --------------------------------------------------------  IN:  Total IN: 0 mL    OUT:    Indwelling Catheter - Urethral: 1375 mL  Total OUT: 1375 mL    Total NET: -1375 mL      14 Mar 2020 07:01  -  15 Mar 2020 06:58  --------------------------------------------------------  IN:  Total IN: 0 mL    OUT:    Indwelling Catheter - Urethral: 700 mL  Total OUT: 700 mL    Total NET: -700 mL          Lab Data                        8.1    9.76  )-----------( 295      ( 15 Mar 2020 06:26 )             25.5     03-15    141  |  107  |  45<H>  ----------------------------<  98  4.5   |  27  |  5.00<H>    Ca    8.5      15 Mar 2020 06:26  Phos  4.6     03-14  Mg     2.5     03-14              Review of Systems	      Objective     Physical Examination    heart s1s2  lung dec BS  abd soft  head nc  head at  on room air      Pertinent Lab findings & Imaging      Hamlet:  NO   Adequate UO     I&O's Detail    13 Mar 2020 07:01  -  14 Mar 2020 07:00  --------------------------------------------------------  IN:  Total IN: 0 mL    OUT:    Indwelling Catheter - Urethral: 1375 mL  Total OUT: 1375 mL    Total NET: -1375 mL      14 Mar 2020 07:01  -  15 Mar 2020 06:58  --------------------------------------------------------  IN:  Total IN: 0 mL    OUT:    Indwelling Catheter - Urethral: 700 mL  Total OUT: 700 mL    Total NET: -700 mL               Discussed with:     Cultures:	        Radiology

## 2020-03-15 NOTE — PROGRESS NOTE ADULT - ASSESSMENT
h/o nhl- gastric maltoma  abnormal ct  left empyema, sp drainage  gastric wall thickening  roberto     planning for family meeting on Tuesday to discuss GOC  prior nc ct ap showing gastric wall thickening  up to date w egd surveillance per dtr  gerd precautions  cont pepcid  cont marinol  prn bowel regimen  monitor cbc daily  diet as tolerated  encouragement/assistance at meals  op gi follow up upon dc  further management per primary    Advanced care planning was discussed with patient and family.  Advanced care planning forms were reviewed and discussed.  Risks, benefits and alternatives of gastroenterologic procedures were discussed in detail and all questions were answered.    30 minutes spent.

## 2020-03-16 LAB
ANION GAP SERPL CALC-SCNC: 6 MMOL/L — SIGNIFICANT CHANGE UP (ref 5–17)
BUN SERPL-MCNC: 48 MG/DL — HIGH (ref 7–23)
CALCIUM SERPL-MCNC: 8.1 MG/DL — LOW (ref 8.5–10.1)
CHLORIDE SERPL-SCNC: 107 MMOL/L — SIGNIFICANT CHANGE UP (ref 96–108)
CO2 SERPL-SCNC: 28 MMOL/L — SIGNIFICANT CHANGE UP (ref 22–31)
CREAT SERPL-MCNC: 5 MG/DL — HIGH (ref 0.5–1.3)
GLUCOSE SERPL-MCNC: 85 MG/DL — SIGNIFICANT CHANGE UP (ref 70–99)
HCT VFR BLD CALC: 24.6 % — LOW (ref 39–50)
HGB BLD-MCNC: 7.8 G/DL — LOW (ref 13–17)
MAGNESIUM SERPL-MCNC: 2.5 MG/DL — SIGNIFICANT CHANGE UP (ref 1.6–2.6)
MCHC RBC-ENTMCNC: 30.2 PG — SIGNIFICANT CHANGE UP (ref 27–34)
MCHC RBC-ENTMCNC: 31.7 GM/DL — LOW (ref 32–36)
MCV RBC AUTO: 95.3 FL — SIGNIFICANT CHANGE UP (ref 80–100)
NRBC # BLD: 0 /100 WBCS — SIGNIFICANT CHANGE UP (ref 0–0)
PHOSPHATE SERPL-MCNC: 4.9 MG/DL — HIGH (ref 2.5–4.5)
PLATELET # BLD AUTO: 264 K/UL — SIGNIFICANT CHANGE UP (ref 150–400)
POTASSIUM SERPL-MCNC: 4.4 MMOL/L — SIGNIFICANT CHANGE UP (ref 3.5–5.3)
POTASSIUM SERPL-SCNC: 4.4 MMOL/L — SIGNIFICANT CHANGE UP (ref 3.5–5.3)
RBC # BLD: 2.58 M/UL — LOW (ref 4.2–5.8)
RBC # FLD: 14 % — SIGNIFICANT CHANGE UP (ref 10.3–14.5)
SODIUM SERPL-SCNC: 141 MMOL/L — SIGNIFICANT CHANGE UP (ref 135–145)
WBC # BLD: 7.78 K/UL — SIGNIFICANT CHANGE UP (ref 3.8–10.5)
WBC # FLD AUTO: 7.78 K/UL — SIGNIFICANT CHANGE UP (ref 3.8–10.5)

## 2020-03-16 PROCEDURE — 99232 SBSQ HOSP IP/OBS MODERATE 35: CPT

## 2020-03-16 RX ADMIN — HEPARIN SODIUM 5000 UNIT(S): 5000 INJECTION INTRAVENOUS; SUBCUTANEOUS at 21:23

## 2020-03-16 RX ADMIN — SEVELAMER CARBONATE 800 MILLIGRAM(S): 2400 POWDER, FOR SUSPENSION ORAL at 08:40

## 2020-03-16 RX ADMIN — Medication 2.5 MILLIGRAM(S): at 05:51

## 2020-03-16 RX ADMIN — FAMOTIDINE 20 MILLIGRAM(S): 10 INJECTION INTRAVENOUS at 13:39

## 2020-03-16 RX ADMIN — ATORVASTATIN CALCIUM 10 MILLIGRAM(S): 80 TABLET, FILM COATED ORAL at 21:23

## 2020-03-16 RX ADMIN — AMIODARONE HYDROCHLORIDE 200 MILLIGRAM(S): 400 TABLET ORAL at 05:50

## 2020-03-16 RX ADMIN — SODIUM CHLORIDE 60 MILLILITER(S): 9 INJECTION, SOLUTION INTRAVENOUS at 13:39

## 2020-03-16 RX ADMIN — SODIUM CHLORIDE 60 MILLILITER(S): 9 INJECTION, SOLUTION INTRAVENOUS at 17:36

## 2020-03-16 RX ADMIN — HEPARIN SODIUM 5000 UNIT(S): 5000 INJECTION INTRAVENOUS; SUBCUTANEOUS at 05:51

## 2020-03-16 RX ADMIN — POLYETHYLENE GLYCOL 3350 17 GRAM(S): 17 POWDER, FOR SOLUTION ORAL at 08:40

## 2020-03-16 RX ADMIN — Medication 2.5 MILLIGRAM(S): at 17:36

## 2020-03-16 RX ADMIN — SEVELAMER CARBONATE 800 MILLIGRAM(S): 2400 POWDER, FOR SUSPENSION ORAL at 17:36

## 2020-03-16 RX ADMIN — Medication 1 TABLET(S): at 13:39

## 2020-03-16 RX ADMIN — Medication 1 TABLET(S): at 05:51

## 2020-03-16 RX ADMIN — HEPARIN SODIUM 5000 UNIT(S): 5000 INJECTION INTRAVENOUS; SUBCUTANEOUS at 13:38

## 2020-03-16 RX ADMIN — SEVELAMER CARBONATE 800 MILLIGRAM(S): 2400 POWDER, FOR SUSPENSION ORAL at 13:39

## 2020-03-16 RX ADMIN — Medication 1 TABLET(S): at 21:23

## 2020-03-16 NOTE — PROGRESS NOTE ADULT - ASSESSMENT
Assessment and Plan:    -Left empyema:  s/p IR chest tube placement on 2/27.  s/p tPA and dornase tx.  Completed course of IV Zosyn.  s/p removal of pigtail catheter on 3/10.  Maintaining SpO2 mid to upper 90% on room air.  Continue Duoneb Q6h PRN.  Pulmonary f/u  family meetign for hospice as per dr vazquez    -Afib with RVR:  Now in NSR. Continue Amiodarone 200 mg daily.  No anticoagulation due to history of hematuria and also recent bloody drainage from previous chest tube.  Cardiology f/u    -GUS 2/2 likely ATN:  Continue to avoid nephrotoxic medications. L. IJ HD catheter removed on 3/13.  Will trend renal function.  Nephrology f/u to determine further need for HD.    -Gastric MALToma:  Outpatient follow up.    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  Pt. with intermittent confusion.  Will continue to monitor. NOW RESOLVED    -Anemia:  stable with no gross bleeding noted.     -VTE ppx:  Heparin 5000 units SQ Q8h

## 2020-03-16 NOTE — PROGRESS NOTE ADULT - SUBJECTIVE AND OBJECTIVE BOX
CHARLY LU is a 89yMale , patient examined and chart reviewed.     INTERVAL HPI/ OVERNIGHT EVENTS:   Awake. No distress.  Afebrile.     PAST MEDICAL & SURGICAL HISTORY:  GI Bleed: 2007  Stomach Ulcer: H pylori 2007, treated with antibiotics  Osteoarthritis  Hyperlipidemia  Herniated Disc  Spinal Stenosis  Diverticulitis  GERD (Gastroesophageal Reflux Disease)  Chronic Lymphocytic Leukemia: followed by oncologist in [place]  S/P Tonsillectomy: childhood  Status Post Arthroscopy: right shoulder  History of Arthroscopy of Knee: bilateral    For details regarding the patient's social history, family history, and other miscellaneous elements, please refer the initial infectious diseases consultation and/or the admitting history and physical examination for this admission.    ROS:  CONSTITUTIONAL:  Negative fever or chills  EYES:  Negative  blurry vision or double vision  CARDIOVASCULAR:  Negative for chest pain or palpitations  RESPIRATORY:  Negative for cough, wheezing, or SOB   GASTROINTESTINAL:  Negative for nausea, vomiting, diarrhea, constipation, or abdominal pain  GENITOURINARY:  Negative frequency, urgency or dysuria  NEUROLOGIC:  No headache, confusion, dizziness, lightheadedness  All other systems were reviewed and are negative    Current inpatient medications :  MEDICATIONS  (STANDING):  aMIOdarone    Tablet   Oral   aMIOdarone    Tablet 200 milliGRAM(s) Oral daily  atorvastatin 10 milliGRAM(s) Oral at bedtime  dronabinol 2.5 milliGRAM(s) Oral two times a day  famotidine    Tablet 20 milliGRAM(s) Oral daily  heparin  Injectable 5000 Unit(s) SubCutaneous every 8 hours  lactobacillus acidophilus 1 Tablet(s) Oral three times a day  polyethylene glycol 3350 17 Gram(s) Oral two times a day  sevelamer carbonate 800 milliGRAM(s) Oral three times a day with meals  sodium chloride 0.45%. 1000 milliLiter(s) (60 mL/Hr) IV Continuous <Continuous>    MEDICATIONS  (PRN):  acetaminophen   Tablet .. 650 milliGRAM(s) Oral every 6 hours PRN Temp greater or equal to 38C (100.4F), Mild Pain (1 - 3)  albuterol/ipratropium for Nebulization 3 milliLiter(s) Nebulizer every 6 hours PRN Shortness of Breath and/or Wheezing      Objective:  Vital Signs Last 24 Hrs  T(C): 36.4 (16 Mar 2020 14:04), Max: 37 (15 Mar 2020 20:12)  T(F): 97.6 (16 Mar 2020 14:04), Max: 98.6 (15 Mar 2020 20:12)  HR: 96 (16 Mar 2020 14:04) (85 - 96)  BP: 118/82 (16 Mar 2020 14:04) (118/82 - 125/65)  RR: 18 (16 Mar 2020 14:04) (17 - 18)  SpO2: 98% (16 Mar 2020 14:04) (94% - 98%)    Physical Exam:  General:  no acute distress  Eyes: sclera anicteric, pupils equal and reactive to light  ENMT: buccal mucosa moist, pharynx not injected  Neck: supple, trachea midline  Lungs: decreased no wheeze/rhonchi    Cardiovascular: regular rate and rhythm, S1 S2  Abdomen: soft, nontender, no organomegaly present, bowel sounds normal  Neurological: alert and oriented x 2, Cranial Nerves II-XII grossly intact  Skin: no increased ecchymosis/petechiae/purpura  Lymph Nodes: no palpable cervical/supraclavicular lymph nodes enlargements  Extremities: +edema    LABS:             7.8    7.78  )-----------( 264      ( 16 Mar 2020 08:42 )             24.6   03-16    141  |  107  |  48<H>  ----------------------------<  85  4.4   |  28  |  5.00<H>    Ca    8.1<L>      16 Mar 2020 08:42  Phos  4.9     03-16  Mg     2.5     03-16      MICROBIOLOGY:  Culture - Body Fluid with Gram Stain (02.27.20 @ 21:38)    Gram Stain:   polymorphonuclear leukocytes  No organisms seen  by cytocentrifuge    Specimen Source: .Body Fluid Pleural Fluid    Culture Results:   No growth    Culture - Blood (collected 26 Feb 2020 22:29)  Source: .Blood Blood-Peripheral  Preliminary Report (27 Feb 2020 23:01):    No growth to date.    Culture - Blood (collected 26 Feb 2020 22:29)  Source: .Blood Blood-Peripheral  Preliminary Report (27 Feb 2020 23:01):    No growth to date.    Culture - Urine (collected 26 Feb 2020 11:46)  Source: .Urine Catheterized  Final Report (27 Feb 2020 15:05):    50,000 - 99,000 CFU/mL Coag Negative Staphylococcus    Susceptibilites not performed.    Culture - Blood (collected 25 Feb 2020 22:38)  Source: .Blood Blood-Peripheral  Preliminary Report (26 Feb 2020 23:01):    No growth to date.    Culture - Blood (collected 25 Feb 2020 22:38)  Source: .Blood Blood-Peripheral  Preliminary Report (26 Feb 2020 23:01):    No growth to date.    Legionella pneumophila Antigen, Urine (02.27.20 @ 02:53)    Legionella Antigen, Urine: Negative    Streptococcus Pneumoniae Ag Urine (02.27.20 @ 04:25)    Streptococcus Pneumoniae Ag Urine: Negative    RADIOLOGY & ADDITIONAL STUDIES:  EXAM:  CT RENAL STONE HUNT                          EXAM:  CT CHEST                                  PROCEDURE DATE:  02/25/2020          INTERPRETATION:  CLINICAL HISTORY:  Fever, left back and flank pain. History of gastric lymphoma.    Multiple axial images of the chest, abdomen and pelvis were obtained from the lung apices through symphysis pubis without the administration of oral or intravascular contrast limiting the sensitivity of evaluation. Reformatted coronal and sagittal images are submitted.    COMPARISON: PET/CT evaluation 6/19/2019.    FINDINGS: New moderately sized left pleural effusion is identified with portions that are loculated; air attenuation within the effusion as well as within the left pleural space suggests the presence of left-sided empyema. Consolidation is identified within the lingular segment as well as left lower lobe lung parenchyma; underlying neoplasm cannot be excluded. Hazy opacity within the left upper lobe is likely related to atelectasis. Subpleural opacity within the posterior right lower lobe and right middle lobe likely related to subpleural fibrosis. There is no evidence for right-sided pleural effusion or air.    No abnormally enlarged mediastinal or hilar lymph nodes are noted. There is no evidence for axillary lymphadenopathy. The heart size appears within normal limits. No pericardial effusion is identified. Thoracic aortic caliber demonstrates unremarkable caliber. Coronary arterial calcifications identified.    The central bronchial anatomy appears patent.    No mediastinal shift is noted.    The liver is normal in size. No focal hepatic masses are identified. There is no evidence for intrahepatic or extrahepatic biliary dilatation. A large gallstone is noted. No gallbladder wall thickening or pericholecystic fluid identified.    The spleen, pancreas and adrenal glands are unremarkable.    There is no evidence for hydronephrosis. No right renal calculi are identified. An 8 mm calculus is identified within the left-sided extrarenal pelvis, unchanged. There is no change in a 6 mm calculus identified within the distal right ureter at the level of the UVJ, without evidence for obstructive uropathy. A 1.3 cm right renal cyst is noted. The abdominal aorta is normal in course and caliber. No abnormally enlarged retroperitoneal or pelvic lymphadenopathy is noted.    Gastric wall thickening is identified allowing for nondistention with oral contrast; clinical correlation is suggested in light of the provided clinical history of gastric lymphoma.    Fecal material is scattered throughout the colon. There is no evidence for mechanical bowel obstruction. Colonic diverticulosis is noted. No colonic wall thickening is identified. There is no evidence for acute appendicitis.There is no evidence for free intraperitoneal air or fluid.    The prostate is enlarged and the urinary bladder appear unremarkable.     Postsurgical changes of the lumbar spine noted. Postprocedural changes of the right shoulder noted. Degenerative changes of the spine evident.    IMPRESSION:    1. New moderately sized left pleural effusion is identified with portions that are loculated; air attenuation within the effusion as well as within the left pleural space suggests the presence of left-sided empyema. Consolidation is identified within the lingular segment as well as left lower lobe lung parenchyma; underlying neoplasm cannot be excluded.   2. Gastric wall thickening is identified allowing for nondistention with oral contrast; clinicalcorrelation is suggested in light of the provided clinical history of gastric lymphoma.  3. An 8 mm calculus is identified within the left-sided extrarenal pelvis, unchanged. There is no change in a 6 mm calculus identified within the distal right ureter at the level of the UVJ, without evidence for obstructive uropathy. No evidence for bilateral hydronephrosis.      EXAM:  CT CHEST                            PROCEDURE DATE:  03/01/2020          INTERPRETATION:  Clinical information: Left-sided empyema    Comparison exam dated 2/25/2020    Axial images obtained, coronal and sagittal images computer reformatted    Noncontrast exam limits evaluation of hilar and mediastinal regions.    A left-sided pigtail catheter is present in the left pleural space. Loculated effusion with associated dense consolidation is present, the appearance is slightly more prominentsince the prior exam.    No thoracic aortic aneurysm or pericardial effusion. Cardiomegaly present. Coronary artery calcifications identified. The central airway appears intact. The thyroid gland is not enlarged.    Minimal atelectatic change right lung base. Calcification visible in the right pleura. Peribronchial thickening right infrahilar region. No pneumothorax.    The stomach is filled with food and air. A large gallstone is noted. The adrenal glands are not enlarged. The spleen is not enlarged. No acute appearing osseous abnormalities. Soft tissue anchors right humeral head. Air bubbles visible in the subcutaneous soft tissues left lateral lower thorax.    IMPRESSION: A left-sided pigtail catheter is present in the pleural space. Loculated effusion with associated dense consolidation is present, appearance slightly more prominent when compared to prior exam.        EXAM:  US KIDNEYS AND BLADDER                            PROCEDURE DATE:  03/01/2020          INTERPRETATION:    PROCEDURE INFORMATION:   Exam: US Retroperitoneal Limited, Kidneys   Exam date and time: 3/1/2020 6:31 AM   Age: 89 years old   Clinical indication: Other: Gus     TECHNIQUE:   Imaging protocol: Real-time ultrasound of the retroperitoneum with image   documentation. Examination was focused on the kidneys.     COMPARISON:   No relevant prior studies available.     FINDINGS:   Right kidney: The right kidney measures 11.8 cm in length. Normal parenchymal   echogenicity. No hydronephrosis.   Left kidney: The left kidney measures 11.7 cm in length. Normal parenchymal   echogenicity. Slightly irregular margin. No hydronephrosis.   Prostate: The prostate measures up to 3.7 cm in size.   Bladder: Bladder volume calculated to be 104 mL. No bladder wall thickening   allowing for lack of full distention. No bladder calculus.     IMPRESSION:   No hydronephrosis.        Assessment :   CHARLY LU is a 89y Male PMH CLL, gastric lymphoma ex smoker, hx of asbestos exp presenting to the hospital with cough, fever and left sided pleuritic chest pain. Febrile to 102 found to have left sided empyema. Seen by pulm and CT surgery. Sp IR drainage with pigtail placement 2/2/7/2020. Transferred to ICU sec tpa/dornase therapy through pigtail sec complications of hematuria. Had large clot and nick blood/mixed with urine. Mcnulty irrigation performed complicated with GUS. Worsening GUS- started on HD per renal. Was on TPA/dornase via pigtail. Pigtail removed 3/10/2020. Overall stable.      Plan :  Monitor off antibiotics  Completed course of Zosyn   Trend temps and cbc  Trend renal fx  Need for HD per Renal   Stable from ID standpoint      Continue with present regiment.  Appropriate use of antibiotics and adverse effects reviewed.      I have discussed the above plan of care with patient/ family in detail. They expressed understanding of the  treatment plan . Risks, benefits and alternatives discussed in detail. I have asked if they have any questions or concerns and appropriately addressed them to the best of my ability .    25 minutes were spent in direct patient care reviewing notes, medications ,labs data/ imaging , discussion with multidisciplinary team.    Thank you for allowing me to participate in care of your patient .    Jose Kiran MD  Infectious Disease  [phone]

## 2020-03-16 NOTE — PROGRESS NOTE ADULT - SUBJECTIVE AND OBJECTIVE BOX
INTERVAL HPI/OVERNIGHT EVENTS:  pt seen and examined  denies n/v/abd pain  no overnight events per nursing    MEDICATIONS  (STANDING):  aMIOdarone    Tablet   Oral   aMIOdarone    Tablet 200 milliGRAM(s) Oral daily  atorvastatin 10 milliGRAM(s) Oral at bedtime  dronabinol 2.5 milliGRAM(s) Oral two times a day  famotidine    Tablet 20 milliGRAM(s) Oral daily  heparin  Injectable 5000 Unit(s) SubCutaneous every 8 hours  lactobacillus acidophilus 1 Tablet(s) Oral three times a day  polyethylene glycol 3350 17 Gram(s) Oral two times a day  sevelamer carbonate 800 milliGRAM(s) Oral three times a day with meals  sodium chloride 0.45%. 1000 milliLiter(s) (60 mL/Hr) IV Continuous <Continuous>    MEDICATIONS  (PRN):  acetaminophen   Tablet .. 650 milliGRAM(s) Oral every 6 hours PRN Temp greater or equal to 38C (100.4F), Mild Pain (1 - 3)  albuterol/ipratropium for Nebulization 3 milliLiter(s) Nebulizer every 6 hours PRN Shortness of Breath and/or Wheezing      Allergies    No Known Allergies    Intolerances        Review of Systems:    see above     Vital Signs Last 24 Hrs  T(C): 36.8 (16 Mar 2020 05:16), Max: 37 (15 Mar 2020 20:12)  T(F): 98.3 (16 Mar 2020 05:16), Max: 98.6 (15 Mar 2020 20:12)  HR: 85 (16 Mar 2020 05:16) (85 - 92)  BP: 125/65 (16 Mar 2020 05:16) (115/63 - 125/65)  BP(mean): --  RR: 18 (16 Mar 2020 05:16) (17 - 18)  SpO2: 94% (16 Mar 2020 05:16) (94% - 96%)    PHYSICAL EXAM:  Constitutional: NAD  HEENT: ncat   Gastrointestinal:  soft, NT, mild dt   Extremities: No peripheral edema  Neurological: Awake, alert, responding appropriately         LABS:                        8.1    9.76  )-----------( 295      ( 15 Mar 2020 06:26 )             25.5     03-15    141  |  107  |  45<H>  ----------------------------<  98  4.5   |  27  |  5.00<H>    Ca    8.5      15 Mar 2020 06:26  Phos  5.0     03-15  Mg     2.6     03-15            RADIOLOGY & ADDITIONAL TESTS:

## 2020-03-16 NOTE — PROGRESS NOTE ADULT - SUBJECTIVE AND OBJECTIVE BOX
Patient is a 89y old  Male who presents with a chief complaint of Left Empyema (02 Mar 2020 09:12)  Patient seen in follow up for GUS, ATN.     UO good. Very poor PO intake. On IVF.     PAST MEDICAL HISTORY:  GI Bleed  Stomach Ulcer  Osteoarthritis  Hyperlipidemia  Herniated Disc  Spinal Stenosis  Diverticulitis  GERD (Gastroesophageal Reflux Disease)  Chronic Lymphocytic Leukemia      MEDICATIONS  (STANDING):  aMIOdarone    Tablet   Oral   aMIOdarone    Tablet 200 milliGRAM(s) Oral daily  atorvastatin 10 milliGRAM(s) Oral at bedtime  dronabinol 2.5 milliGRAM(s) Oral two times a day  famotidine    Tablet 20 milliGRAM(s) Oral daily  heparin  Injectable 5000 Unit(s) SubCutaneous every 8 hours  lactobacillus acidophilus 1 Tablet(s) Oral three times a day  polyethylene glycol 3350 17 Gram(s) Oral two times a day  sevelamer carbonate 800 milliGRAM(s) Oral three times a day with meals  sodium chloride 0.45%. 1000 milliLiter(s) (60 mL/Hr) IV Continuous <Continuous>    MEDICATIONS  (PRN):  acetaminophen   Tablet .. 650 milliGRAM(s) Oral every 6 hours PRN Temp greater or equal to 38C (100.4F), Mild Pain (1 - 3)  albuterol/ipratropium for Nebulization 3 milliLiter(s) Nebulizer every 6 hours PRN Shortness of Breath and/or Wheezing    T(C): 36.8 (03-16-20 @ 05:16), Max: 37 (03-15-20 @ 20:12)  HR: 85 (03-16-20 @ 05:16) (75 - 92)  BP: 125/65 (03-16-20 @ 05:16) (115/63 - 136/77)  RR: 18 (03-16-20 @ 05:16)  SpO2: 94% (03-16-20 @ 05:16)  Wt(kg): --  I&O's Detail    15 Mar 2020 07:01  -  16 Mar 2020 07:00  --------------------------------------------------------  IN:    Oral Fluid: 150 mL    sodium chloride 0.45%.: 1320 mL  Total IN: 1470 mL    OUT:    Indwelling Catheter - Urethral: 1300 mL  Total OUT: 1300 mL    Total NET: 170 mL            PHYSICAL EXAM:  General: NAD  Respiratory: b/l air entry  Cardiovascular: S1 S2  Gastrointestinal: soft  Extremities:  no edema               LABORATORY:                        7.8    7.78  )-----------( 264      ( 16 Mar 2020 08:42 )             24.6     03-16    141  |  107  |  48<H>  ----------------------------<  85  4.4   |  28  |  5.00<H>    Ca    8.1<L>      16 Mar 2020 08:42  Phos  4.9     03-16  Mg     2.5     03-16      Sodium, Serum: 141 mmol/L (03-16 @ 08:42)  Sodium, Serum: 141 mmol/L (03-15 @ 06:26)    Potassium, Serum: 4.4 mmol/L (03-16 @ 08:42)  Potassium, Serum: 4.5 mmol/L (03-15 @ 06:26)    Hemoglobin: 7.8 g/dL (03-16 @ 08:42)  Hemoglobin: 8.1 g/dL (03-15 @ 06:26)  Hemoglobin: 8.2 g/dL (03-14 @ 06:58)    Creatinine, Serum 5.00 (03-16 @ 08:42)  Creatinine, Serum 5.00 (03-15 @ 06:26)  Creatinine, Serum 4.80 (03-14 @ 06:58)

## 2020-03-16 NOTE — PROGRESS NOTE ADULT - SUBJECTIVE AND OBJECTIVE BOX
[facility] -- Warren Posada, Abelardo Amos Pannella, Patel, Savella  Office # [phone]      Follow Up:    afib     Subjective/Observations:     No events overnight resting comfortably in bed.  No complaints of chest pain, dyspnea, or palpitations reported. No signs of orthopnea or PND.    REVIEW OF SYSTEMS: All other review of systems is negative unless indicated above    PAST MEDICAL & SURGICAL HISTORY:  GI Bleed: 2007  Stomach Ulcer: H pylori 2007, treated with antibiotics  Osteoarthritis  Hyperlipidemia  Herniated Disc  Spinal Stenosis  Diverticulitis  GERD (Gastroesophageal Reflux Disease)  Chronic Lymphocytic Leukemia: followed by oncologist in [place]  S/P Tonsillectomy: childhood  Status Post Arthroscopy: right shoulder  History of Arthroscopy of Knee: bilateral      MEDICATIONS  (STANDING):  aMIOdarone    Tablet   Oral   aMIOdarone    Tablet 200 milliGRAM(s) Oral daily  atorvastatin 10 milliGRAM(s) Oral at bedtime  dronabinol 2.5 milliGRAM(s) Oral two times a day  famotidine    Tablet 20 milliGRAM(s) Oral daily  heparin  Injectable 5000 Unit(s) SubCutaneous every 8 hours  lactobacillus acidophilus 1 Tablet(s) Oral three times a day  polyethylene glycol 3350 17 Gram(s) Oral two times a day  sevelamer carbonate 800 milliGRAM(s) Oral three times a day with meals  sodium chloride 0.45%. 1000 milliLiter(s) (60 mL/Hr) IV Continuous <Continuous>    MEDICATIONS  (PRN):  acetaminophen   Tablet .. 650 milliGRAM(s) Oral every 6 hours PRN Temp greater or equal to 38C (100.4F), Mild Pain (1 - 3)  albuterol/ipratropium for Nebulization 3 milliLiter(s) Nebulizer every 6 hours PRN Shortness of Breath and/or Wheezing      Allergies    No Known Allergies    Intolerances        Vital Signs Last 24 Hrs  T(C): 36.8 (16 Mar 2020 05:16), Max: 37 (15 Mar 2020 20:12)  T(F): 98.3 (16 Mar 2020 05:16), Max: 98.6 (15 Mar 2020 20:12)  HR: 85 (16 Mar 2020 05:16) (85 - 92)  BP: 125/65 (16 Mar 2020 05:16) (115/63 - 125/65)  BP(mean): --  RR: 18 (16 Mar 2020 05:16) (17 - 18)  SpO2: 94% (16 Mar 2020 05:16) (94% - 96%)    I&O's Summary    15 Mar 2020 07:01  -  16 Mar 2020 07:00  --------------------------------------------------------  IN: 1470 mL / OUT: 1300 mL / NET: 170 mL          PHYSICAL EXAM:  TELE: Sr   Constitutional: NAD, awake and alert, well-developed  HEENT: Moist Mucous Membranes, Anicteric  Pulmonary: Non-labored, breath sounds are clear bilaterally, No wheezing, crackles or rhonchi  Cardiovascular: Regular, S1 and S2 nl, No murmurs, rubs, gallops or clicks  Gastrointestinal: Bowel Sounds present, soft, nontender.   Lymph: + edema bilateral LE   Skin: No visible rashes or ulcers.  Psych:  Mood & affect appropriate    LABS: All Labs Reviewed:                        7.8    7.78  )-----------( 264      ( 16 Mar 2020 08:42 )             24.6                         8.1    9.76  )-----------( 295      ( 15 Mar 2020 06:26 )             25.5                         8.2    9.68  )-----------( 322      ( 14 Mar 2020 06:58 )             25.9     16 Mar 2020 08:42    141    |  107    |  48     ----------------------------<  85     4.4     |  28     |  5.00   15 Mar 2020 06:26    141    |  107    |  45     ----------------------------<  98     4.5     |  27     |  5.00   14 Mar 2020 06:58    141    |  105    |  39     ----------------------------<  95     4.5     |  29     |  4.80     Ca    8.1        16 Mar 2020 08:42  Ca    8.5        15 Mar 2020 06:26  Ca    8.3        14 Mar 2020 06:58  Phos  4.9       16 Mar 2020 08:42  Phos  5.0       15 Mar 2020 06:26  Phos  4.6       14 Mar 2020 06:58  Mg     2.5       16 Mar 2020 08:42  Mg     2.6       15 Mar 2020 06:26  Mg     2.5       14 Mar 2020 06:58               ECG:  < from: 12 Lead ECG (03.11.20 @ 22:20) >  Ventricular Rate 87 BPM    Atrial Rate 87 BPM    P-R Interval 202 ms    QRS Duration 100 ms    Q-T Interval 368 ms    QTC Calculation(Bezet) 442 ms    P Axis 49 degrees    R Axis -51 degrees    T Axis 51 degrees    Diagnosis Line Normal sinus rhythm with sinus arrhythmia  Left axis deviation  Low voltage QRS  Abnormal ECG  When compared with ECG of 12-MAR-2020 22:20, (Unconfirmed)  No significant change was found    < end of copied text >      Echo:  < from: TTE Echo Doppler w/o Cont (02.27.20 @ 11:47) >  OBSERVATIONS:  Technically difficult and limited study  Mitral Valve: normal, trace physiologic MR.  Aortic Valve/Aorta: Sclerotic trileaflet aortic valve with normal opening.  Tricuspid Valve: normal with trace TR.  Pulmonic Valve: Not well-visualized  Left Atrium: normal  RightAtrium: Not well-visualized  Left Ventricle: normal LV size and systolic function, estimated LVEF of 55%.  Right Ventricle: Not well-visualized  Pericardium/Pleura: normal, small anterior pericardial effusion without signs of hemodynamic compromise.  Pulmonary/RV Pressure: estimated PA systolic pressure of 34mmHg. IVC is dilated    Conclusion:   Technically difficult and limited study  Normal left ventricular internal dimensions and systolic function, estimated LVEF of 55%.   Right ventricle is not well-visualized  Sclerotic trileaflet aortic valve, without AI.   Trace physiologic MR and TR.       < end of copied text >      Radiology:

## 2020-03-16 NOTE — PROGRESS NOTE ADULT - ASSESSMENT
1.	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	Loculated pleural effusions, s/p Chest tube  3.	Anemia  4.	Hypotension  5.	h/o Lymphoma    Stable creatinine. On IVF. Monitor UO. No indication for HD at this time. Family to discuss on extent of care. To continue current meds. Avoid nephrotoxic meds as possible.   Monitor h/h trend. Transfuse PRBC PRN. Avoid ACEI, ARB, NSAIDs and IV contrast. Will monitor for renal recovery. Overall prognosis poor.

## 2020-03-16 NOTE — PROGRESS NOTE ADULT - ASSESSMENT
89 yo M with PMH of HLD and MALToma of the stomach (under surveillance), spinal stenosis with extensive lumbar surgery in 2011 and bilateral LE neuropathy, admitted for L side empyema and R ureteral stone. Patient initially reported anginal symptoms, s/p ICU stay for empyema, now on telemetry floor.    CHEST PAIN  - resolved, no further reports of pain  - EKG has suggested possible inferior wall MI, no wall motion abnormality on ECHO.   - No further ischemic work up inpatient  - Continue with statin    A fib  - Had rapid AF, now in SR no events overnight - can discontinue tele monitoring   - Continue amiodarone 400 TID to 5 g load, than 200 QD  - No AC for now given history of hematuria with anemia and advanced age      GUS  - s/p HD thursday- Dialysis catheter removed. Family to discuss on extent of care- follow up renal recs     Left empyema:      - s/p IR chest tube placement on 2/27.   - s/p tPA and dornase tx.      Anemia  work up treatment as per primary team , hgb 7.8 today       - Plan for family meeting on Tuesday 9 am - to discuss GOC  - Monitor and replete lytes, keep K>4, Mg>2.  - All other medical needs as per primary team.  - Other cardiovascular workup will depend on clinical course.  - Will continue to follow.  Peggy Craig FNP-C  Cardiology NP  [phone]

## 2020-03-16 NOTE — PROGRESS NOTE ADULT - SUBJECTIVE AND OBJECTIVE BOX
Patient is a 89y old  Male who presents with a chief complaint of Left Empyema (16 Mar 2020 11:18)      SUBJECTIVE / OVERNIGHT EVENTS:pt seen and exmained. bhavana. jair3. will lala celestin        Vital Signs Last 24 Hrs  T(C): 36.4 (16 Mar 2020 14:04), Max: 37 (15 Mar 2020 20:12)  T(F): 97.6 (16 Mar 2020 14:04), Max: 98.6 (15 Mar 2020 20:12)  HR: 96 (16 Mar 2020 14:04) (85 - 96)  BP: 118/82 (16 Mar 2020 14:04) (118/82 - 125/65)  BP(mean): --  RR: 18 (16 Mar 2020 14:04) (17 - 18)  SpO2: 98% (16 Mar 2020 14:04) (94% - 98%)  I&O's Summary    15 Mar 2020 07:01  -  16 Mar 2020 07:00  --------------------------------------------------------  IN: 1470 mL / OUT: 1300 mL / NET: 170 mL        PHYSICAL EXAM:  GENERAL: NAD, Comfortable  CHEST/LUNG: Clear to auscultation bilaterally; No wheeze  HEART: Regular rate and rhythm; No murmurs, rubs, or gallops  Neuro: AAO x 3, no focal deficit, 5/5 b/l extremities  EXTREMITIES:  2+ Peripheral Pulses, No clubbing, cyanosis, or edema  SKIN: No rashes or lesions    LABS:                        7.8    7.78  )-----------( 264      ( 16 Mar 2020 08:42 )             24.6     03-16    141  |  107  |  48<H>  ----------------------------<  85  4.4   |  28  |  5.00<H>    Ca    8.1<L>      16 Mar 2020 08:42  Phos  4.9     03-16  Mg     2.5     03-16        CAPILLARY BLOOD GLUCOSE                RADIOLOGY & ADDITIONAL TESTS:    Imaging Personally Reviewed:  [x] YES  [ ] NO    Consultant(s) Notes Reviewed:  [x] YES  [ ] NO      MEDICATIONS  (STANDING):  aMIOdarone    Tablet   Oral   aMIOdarone    Tablet 200 milliGRAM(s) Oral daily  atorvastatin 10 milliGRAM(s) Oral at bedtime  dronabinol 2.5 milliGRAM(s) Oral two times a day  famotidine    Tablet 20 milliGRAM(s) Oral daily  heparin  Injectable 5000 Unit(s) SubCutaneous every 8 hours  lactobacillus acidophilus 1 Tablet(s) Oral three times a day  polyethylene glycol 3350 17 Gram(s) Oral two times a day  sevelamer carbonate 800 milliGRAM(s) Oral three times a day with meals  sodium chloride 0.45%. 1000 milliLiter(s) (60 mL/Hr) IV Continuous <Continuous>    MEDICATIONS  (PRN):  acetaminophen   Tablet .. 650 milliGRAM(s) Oral every 6 hours PRN Temp greater or equal to 38C (100.4F), Mild Pain (1 - 3)  albuterol/ipratropium for Nebulization 3 milliLiter(s) Nebulizer every 6 hours PRN Shortness of Breath and/or Wheezing      Care Discussed with Consultants/Other Providers [x] YES  [ ] NO    HEALTH ISSUES - PROBLEM Dx:  Hypotension: Hypotension  Delirium  GUS (acute kidney injury): GUS (acute kidney injury)  Ureteral stone: Ureteral stone  Parapneumonic effusion: Parapneumonic effusion  GERD (Gastroesophageal Reflux Disease): GERD (Gastroesophageal Reflux Disease)  Spinal Stenosis: Spinal Stenosis  Hyperlipidemia: Hyperlipidemia  Osteoarthritis: Osteoarthritis  Chronic Lymphocytic Leukemia: Chronic Lymphocytic Leukemia  Preventive measure: Preventive measure  Gastric wall thickening: Gastric wall thickening  Empyema lung: Empyema lung

## 2020-03-16 NOTE — PROGRESS NOTE ADULT - SUBJECTIVE AND OBJECTIVE BOX
Date/Time Patient Seen:  		  Referring MD:   Data Reviewed	       Patient is a 89y old  Male who presents with a chief complaint of Left Empyema (16 Mar 2020 08:05)      Subjective/HPI     PAST MEDICAL & SURGICAL HISTORY:  GI Bleed: 2007  Stomach Ulcer: H pylori 2007, treated with antibiotics  Osteoarthritis  Hyperlipidemia  Herniated Disc  Spinal Stenosis  Diverticulitis  GERD (Gastroesophageal Reflux Disease)  Chronic Lymphocytic Leukemia: followed by oncologist in [place]  S/P Tonsillectomy: childhood  Status Post Arthroscopy: right shoulder  History of Arthroscopy of Knee: bilateral  Osteoarthritis  Stomach Ulcer: H pylori, treated with antibiotics        Medication list         MEDICATIONS  (STANDING):  aMIOdarone    Tablet   Oral   aMIOdarone    Tablet 200 milliGRAM(s) Oral daily  atorvastatin 10 milliGRAM(s) Oral at bedtime  dronabinol 2.5 milliGRAM(s) Oral two times a day  famotidine    Tablet 20 milliGRAM(s) Oral daily  heparin  Injectable 5000 Unit(s) SubCutaneous every 8 hours  lactobacillus acidophilus 1 Tablet(s) Oral three times a day  polyethylene glycol 3350 17 Gram(s) Oral two times a day  sevelamer carbonate 800 milliGRAM(s) Oral three times a day with meals  sodium chloride 0.45%. 1000 milliLiter(s) (60 mL/Hr) IV Continuous <Continuous>    MEDICATIONS  (PRN):  acetaminophen   Tablet .. 650 milliGRAM(s) Oral every 6 hours PRN Temp greater or equal to 38C (100.4F), Mild Pain (1 - 3)  albuterol/ipratropium for Nebulization 3 milliLiter(s) Nebulizer every 6 hours PRN Shortness of Breath and/or Wheezing         Vitals log        ICU Vital Signs Last 24 Hrs  T(C): 36.8 (16 Mar 2020 05:16), Max: 37 (15 Mar 2020 20:12)  T(F): 98.3 (16 Mar 2020 05:16), Max: 98.6 (15 Mar 2020 20:12)  HR: 85 (16 Mar 2020 05:16) (85 - 92)  BP: 125/65 (16 Mar 2020 05:16) (115/63 - 125/65)  BP(mean): --  ABP: --  ABP(mean): --  RR: 18 (16 Mar 2020 05:16) (17 - 18)  SpO2: 94% (16 Mar 2020 05:16) (94% - 96%)           Input and Output:  I&O's Detail    15 Mar 2020 07:01  -  16 Mar 2020 07:00  --------------------------------------------------------  IN:    Oral Fluid: 150 mL    sodium chloride 0.45%.: 1320 mL  Total IN: 1470 mL    OUT:    Indwelling Catheter - Urethral: 1300 mL  Total OUT: 1300 mL    Total NET: 170 mL          Lab Data                        8.1    9.76  )-----------( 295      ( 15 Mar 2020 06:26 )             25.5     03-15    141  |  107  |  45<H>  ----------------------------<  98  4.5   |  27  |  5.00<H>    Ca    8.5      15 Mar 2020 06:26  Phos  5.0     03-15  Mg     2.6     03-15              Review of Systems	      Objective     Physical Examination    heart s1s2  lung dec BS  abd soft  head nc  head at      Pertinent Lab findings & Imaging      [place]:  NO   Adequate UO     I&O's Detail    15 Mar 2020 07:01  -  16 Mar 2020 07:00  --------------------------------------------------------  IN:    Oral Fluid: 150 mL    sodium chloride 0.45%.: 1320 mL  Total IN: 1470 mL    OUT:    Indwelling Catheter - Urethral: 1300 mL  Total OUT: 1300 mL    Total NET: 170 mL               Discussed with:     Cultures:	        Radiology

## 2020-03-16 NOTE — PROGRESS NOTE ADULT - ASSESSMENT
h/o nhl- gastric maltoma  abnormal ct  left empyema, sp drainage  gastric wall thickening  roberto       prior nc ct ap showing gastric wall thickening  up to date w egd surveillance per dtr  gerd precautions  cont pepcid  cont marinol  prn bowel regimen  monitor cbc daily  diet as tolerated  encouragement/assistance at meals  further management per primary; goc meeting planned for tomorrow    Advanced care planning was discussed with patient and family.  Advanced care planning forms were reviewed and discussed.  Risks, benefits and alternatives of gastroenterologic procedures were discussed in detail and all questions were answered.    30 minutes spent.

## 2020-03-17 ENCOUNTER — TRANSCRIPTION ENCOUNTER (OUTPATIENT)
Age: 85
End: 2020-03-17

## 2020-03-17 VITALS
DIASTOLIC BLOOD PRESSURE: 65 MMHG | TEMPERATURE: 97 F | HEART RATE: 80 BPM | RESPIRATION RATE: 18 BRPM | OXYGEN SATURATION: 95 % | SYSTOLIC BLOOD PRESSURE: 119 MMHG

## 2020-03-17 LAB
ANION GAP SERPL CALC-SCNC: 9 MMOL/L — SIGNIFICANT CHANGE UP (ref 5–17)
BASOPHILS # BLD AUTO: 0.02 K/UL — SIGNIFICANT CHANGE UP (ref 0–0.2)
BASOPHILS NFR BLD AUTO: 0.3 % — SIGNIFICANT CHANGE UP (ref 0–2)
BUN SERPL-MCNC: 49 MG/DL — HIGH (ref 7–23)
CALCIUM SERPL-MCNC: 8.2 MG/DL — LOW (ref 8.5–10.1)
CHLORIDE SERPL-SCNC: 104 MMOL/L — SIGNIFICANT CHANGE UP (ref 96–108)
CO2 SERPL-SCNC: 27 MMOL/L — SIGNIFICANT CHANGE UP (ref 22–31)
CREAT SERPL-MCNC: 4.8 MG/DL — HIGH (ref 0.5–1.3)
EOSINOPHIL # BLD AUTO: 0.2 K/UL — SIGNIFICANT CHANGE UP (ref 0–0.5)
EOSINOPHIL NFR BLD AUTO: 2.7 % — SIGNIFICANT CHANGE UP (ref 0–6)
GLUCOSE SERPL-MCNC: 81 MG/DL — SIGNIFICANT CHANGE UP (ref 70–99)
HCT VFR BLD CALC: 26 % — LOW (ref 39–50)
HGB BLD-MCNC: 8.2 G/DL — LOW (ref 13–17)
IMM GRANULOCYTES NFR BLD AUTO: 0.7 % — SIGNIFICANT CHANGE UP (ref 0–1.5)
LYMPHOCYTES # BLD AUTO: 1.38 K/UL — SIGNIFICANT CHANGE UP (ref 1–3.3)
LYMPHOCYTES # BLD AUTO: 18.5 % — SIGNIFICANT CHANGE UP (ref 13–44)
MCHC RBC-ENTMCNC: 30.1 PG — SIGNIFICANT CHANGE UP (ref 27–34)
MCHC RBC-ENTMCNC: 31.5 GM/DL — LOW (ref 32–36)
MCV RBC AUTO: 95.6 FL — SIGNIFICANT CHANGE UP (ref 80–100)
MONOCYTES # BLD AUTO: 0.56 K/UL — SIGNIFICANT CHANGE UP (ref 0–0.9)
MONOCYTES NFR BLD AUTO: 7.5 % — SIGNIFICANT CHANGE UP (ref 2–14)
NEUTROPHILS # BLD AUTO: 5.26 K/UL — SIGNIFICANT CHANGE UP (ref 1.8–7.4)
NEUTROPHILS NFR BLD AUTO: 70.3 % — SIGNIFICANT CHANGE UP (ref 43–77)
NRBC # BLD: 0 /100 WBCS — SIGNIFICANT CHANGE UP (ref 0–0)
PLATELET # BLD AUTO: 264 K/UL — SIGNIFICANT CHANGE UP (ref 150–400)
POTASSIUM SERPL-MCNC: 4.3 MMOL/L — SIGNIFICANT CHANGE UP (ref 3.5–5.3)
POTASSIUM SERPL-SCNC: 4.3 MMOL/L — SIGNIFICANT CHANGE UP (ref 3.5–5.3)
RBC # BLD: 2.72 M/UL — LOW (ref 4.2–5.8)
RBC # FLD: 13.8 % — SIGNIFICANT CHANGE UP (ref 10.3–14.5)
SODIUM SERPL-SCNC: 140 MMOL/L — SIGNIFICANT CHANGE UP (ref 135–145)
WBC # BLD: 7.47 K/UL — SIGNIFICANT CHANGE UP (ref 3.8–10.5)
WBC # FLD AUTO: 7.47 K/UL — SIGNIFICANT CHANGE UP (ref 3.8–10.5)

## 2020-03-17 PROCEDURE — 84100 ASSAY OF PHOSPHORUS: CPT

## 2020-03-17 PROCEDURE — 86704 HEP B CORE ANTIBODY TOTAL: CPT

## 2020-03-17 PROCEDURE — 87075 CULTR BACTERIA EXCEPT BLOOD: CPT

## 2020-03-17 PROCEDURE — 80202 ASSAY OF VANCOMYCIN: CPT

## 2020-03-17 PROCEDURE — 99285 EMERGENCY DEPT VISIT HI MDM: CPT

## 2020-03-17 PROCEDURE — 99221 1ST HOSP IP/OBS SF/LOW 40: CPT

## 2020-03-17 PROCEDURE — 80048 BASIC METABOLIC PNL TOTAL CA: CPT

## 2020-03-17 PROCEDURE — 82607 VITAMIN B-12: CPT

## 2020-03-17 PROCEDURE — 85610 PROTHROMBIN TIME: CPT

## 2020-03-17 PROCEDURE — 81001 URINALYSIS AUTO W/SCOPE: CPT

## 2020-03-17 PROCEDURE — 86160 COMPLEMENT ANTIGEN: CPT

## 2020-03-17 PROCEDURE — P9047: CPT

## 2020-03-17 PROCEDURE — 84436 ASSAY OF TOTAL THYROXINE: CPT

## 2020-03-17 PROCEDURE — 83036 HEMOGLOBIN GLYCOSYLATED A1C: CPT

## 2020-03-17 PROCEDURE — 92526 ORAL FUNCTION THERAPY: CPT

## 2020-03-17 PROCEDURE — 86901 BLOOD TYPING SEROLOGIC RH(D): CPT

## 2020-03-17 PROCEDURE — 99231 SBSQ HOSP IP/OBS SF/LOW 25: CPT

## 2020-03-17 PROCEDURE — 82728 ASSAY OF FERRITIN: CPT

## 2020-03-17 PROCEDURE — 82784 ASSAY IGA/IGD/IGG/IGM EACH: CPT

## 2020-03-17 PROCEDURE — 99053 MED SERV 10PM-8AM 24 HR FAC: CPT

## 2020-03-17 PROCEDURE — 87206 SMEAR FLUORESCENT/ACID STAI: CPT

## 2020-03-17 PROCEDURE — 94640 AIRWAY INHALATION TREATMENT: CPT

## 2020-03-17 PROCEDURE — 82962 GLUCOSE BLOOD TEST: CPT

## 2020-03-17 PROCEDURE — 87102 FUNGUS ISOLATION CULTURE: CPT

## 2020-03-17 PROCEDURE — 87015 SPECIMEN INFECT AGNT CONCNTJ: CPT

## 2020-03-17 PROCEDURE — 87116 MYCOBACTERIA CULTURE: CPT

## 2020-03-17 PROCEDURE — 85730 THROMBOPLASTIN TIME PARTIAL: CPT

## 2020-03-17 PROCEDURE — 84133 ASSAY OF URINE POTASSIUM: CPT

## 2020-03-17 PROCEDURE — 82787 IGG 1 2 3 OR 4 EACH: CPT

## 2020-03-17 PROCEDURE — 86803 HEPATITIS C AB TEST: CPT

## 2020-03-17 PROCEDURE — 82570 ASSAY OF URINE CREATININE: CPT

## 2020-03-17 PROCEDURE — 83540 ASSAY OF IRON: CPT

## 2020-03-17 PROCEDURE — 83735 ASSAY OF MAGNESIUM: CPT

## 2020-03-17 PROCEDURE — C1729: CPT

## 2020-03-17 PROCEDURE — 97162 PT EVAL MOD COMPLEX 30 MIN: CPT

## 2020-03-17 PROCEDURE — 86706 HEP B SURFACE ANTIBODY: CPT

## 2020-03-17 PROCEDURE — 82746 ASSAY OF FOLIC ACID SERUM: CPT

## 2020-03-17 PROCEDURE — 80053 COMPREHEN METABOLIC PANEL: CPT

## 2020-03-17 PROCEDURE — 76770 US EXAM ABDO BACK WALL COMP: CPT

## 2020-03-17 PROCEDURE — 88108 CYTOPATH CONCENTRATE TECH: CPT

## 2020-03-17 PROCEDURE — 97116 GAIT TRAINING THERAPY: CPT

## 2020-03-17 PROCEDURE — 82042 OTHER SOURCE ALBUMIN QUAN EA: CPT

## 2020-03-17 PROCEDURE — 83880 ASSAY OF NATRIURETIC PEPTIDE: CPT

## 2020-03-17 PROCEDURE — 84300 ASSAY OF URINE SODIUM: CPT

## 2020-03-17 PROCEDURE — 87449 NOS EACH ORGANISM AG IA: CPT

## 2020-03-17 PROCEDURE — 82945 GLUCOSE OTHER FLUID: CPT

## 2020-03-17 PROCEDURE — 99239 HOSP IP/OBS DSCHRG MGMT >30: CPT

## 2020-03-17 PROCEDURE — 82550 ASSAY OF CK (CPK): CPT

## 2020-03-17 PROCEDURE — 87070 CULTURE OTHR SPECIMN AEROBIC: CPT

## 2020-03-17 PROCEDURE — 92610 EVALUATE SWALLOWING FUNCTION: CPT

## 2020-03-17 PROCEDURE — 71250 CT THORAX DX C-: CPT

## 2020-03-17 PROCEDURE — 86900 BLOOD TYPING SEROLOGIC ABO: CPT

## 2020-03-17 PROCEDURE — 87340 HEPATITIS B SURFACE AG IA: CPT

## 2020-03-17 PROCEDURE — 86060 ANTISTREPTOLYSIN O TITER: CPT

## 2020-03-17 PROCEDURE — 94760 N-INVAS EAR/PLS OXIMETRY 1: CPT

## 2020-03-17 PROCEDURE — 76942 ECHO GUIDE FOR BIOPSY: CPT

## 2020-03-17 PROCEDURE — 76000 FLUOROSCOPY <1 HR PHYS/QHP: CPT

## 2020-03-17 PROCEDURE — 94770: CPT

## 2020-03-17 PROCEDURE — 86140 C-REACTIVE PROTEIN: CPT

## 2020-03-17 PROCEDURE — 84157 ASSAY OF PROTEIN OTHER: CPT

## 2020-03-17 PROCEDURE — C1769: CPT

## 2020-03-17 PROCEDURE — 80076 HEPATIC FUNCTION PANEL: CPT

## 2020-03-17 PROCEDURE — 97530 THERAPEUTIC ACTIVITIES: CPT

## 2020-03-17 PROCEDURE — 87040 BLOOD CULTURE FOR BACTERIA: CPT

## 2020-03-17 PROCEDURE — 83986 ASSAY PH BODY FLUID NOS: CPT

## 2020-03-17 PROCEDURE — 83615 LACTATE (LD) (LDH) ENZYME: CPT

## 2020-03-17 PROCEDURE — 84443 ASSAY THYROID STIM HORMONE: CPT

## 2020-03-17 PROCEDURE — 87086 URINE CULTURE/COLONY COUNT: CPT

## 2020-03-17 PROCEDURE — 88305 TISSUE EXAM BY PATHOLOGIST: CPT

## 2020-03-17 PROCEDURE — 32557 INSERT CATH PLEURA W/ IMAGE: CPT

## 2020-03-17 PROCEDURE — 97535 SELF CARE MNGMENT TRAINING: CPT

## 2020-03-17 PROCEDURE — 83550 IRON BINDING TEST: CPT

## 2020-03-17 PROCEDURE — 86850 RBC ANTIBODY SCREEN: CPT

## 2020-03-17 PROCEDURE — 89051 BODY FLUID CELL COUNT: CPT

## 2020-03-17 PROCEDURE — 93005 ELECTROCARDIOGRAM TRACING: CPT

## 2020-03-17 PROCEDURE — 85027 COMPLETE CBC AUTOMATED: CPT

## 2020-03-17 PROCEDURE — 87899 AGENT NOS ASSAY W/OPTIC: CPT

## 2020-03-17 PROCEDURE — C1894: CPT

## 2020-03-17 PROCEDURE — 99261: CPT

## 2020-03-17 PROCEDURE — 97166 OT EVAL MOD COMPLEX 45 MIN: CPT

## 2020-03-17 PROCEDURE — 97110 THERAPEUTIC EXERCISES: CPT

## 2020-03-17 PROCEDURE — 36415 COLL VENOUS BLD VENIPUNCTURE: CPT

## 2020-03-17 PROCEDURE — 84156 ASSAY OF PROTEIN URINE: CPT

## 2020-03-17 PROCEDURE — 87205 SMEAR GRAM STAIN: CPT

## 2020-03-17 PROCEDURE — 85652 RBC SED RATE AUTOMATED: CPT

## 2020-03-17 PROCEDURE — 71045 X-RAY EXAM CHEST 1 VIEW: CPT

## 2020-03-17 RX ORDER — DRONABINOL 2.5 MG
1 CAPSULE ORAL
Qty: 0 | Refills: 0 | DISCHARGE
Start: 2020-03-17

## 2020-03-17 RX ORDER — AMIODARONE HYDROCHLORIDE 400 MG/1
0 TABLET ORAL
Qty: 0 | Refills: 0 | DISCHARGE
Start: 2020-03-17

## 2020-03-17 RX ORDER — SEVELAMER CARBONATE 2400 MG/1
1 POWDER, FOR SUSPENSION ORAL
Qty: 0 | Refills: 0 | DISCHARGE
Start: 2020-03-17

## 2020-03-17 RX ADMIN — Medication 2.5 MILLIGRAM(S): at 05:08

## 2020-03-17 RX ADMIN — SEVELAMER CARBONATE 800 MILLIGRAM(S): 2400 POWDER, FOR SUSPENSION ORAL at 09:09

## 2020-03-17 RX ADMIN — Medication 1 TABLET(S): at 05:08

## 2020-03-17 RX ADMIN — FAMOTIDINE 20 MILLIGRAM(S): 10 INJECTION INTRAVENOUS at 12:34

## 2020-03-17 RX ADMIN — Medication 1 TABLET(S): at 13:17

## 2020-03-17 RX ADMIN — HEPARIN SODIUM 5000 UNIT(S): 5000 INJECTION INTRAVENOUS; SUBCUTANEOUS at 05:07

## 2020-03-17 RX ADMIN — SEVELAMER CARBONATE 800 MILLIGRAM(S): 2400 POWDER, FOR SUSPENSION ORAL at 12:34

## 2020-03-17 RX ADMIN — SODIUM CHLORIDE 60 MILLILITER(S): 9 INJECTION, SOLUTION INTRAVENOUS at 05:08

## 2020-03-17 RX ADMIN — HEPARIN SODIUM 5000 UNIT(S): 5000 INJECTION INTRAVENOUS; SUBCUTANEOUS at 13:17

## 2020-03-17 RX ADMIN — AMIODARONE HYDROCHLORIDE 200 MILLIGRAM(S): 400 TABLET ORAL at 05:08

## 2020-03-17 NOTE — DISCHARGE NOTE NURSING/CASE MANAGEMENT/SOCIAL WORK - NSDCPEWEB_GEN_ALL_CORE
[facility] website --- [email or web address] website --- [email or web address]

## 2020-03-17 NOTE — PROGRESS NOTE ADULT - PROBLEM SELECTOR PLAN 1
560 cc noted from CT left side - on ABX for poss PNA and Parapneumonic eff  s/p tPA and DNAse - lytic infusion - improved output from complex effusion  GUS - may need HD - I and O noted, and Cr is rising - Renal is following -   supportive measures  prognosis guarded  out of bed as tolerated  replete lytes  monitor VS and HD  Cardio follow up noted this am -
AF - delirium -   midodrine - hypotension noted -   CT output noted  complex eff - s/p tPA and DNAse - completed Zosyn, conservative management - path neg, micro reviewed - ID follow up noted  supportive medical regimen -   GUS - HD as per renal - family does not want HD dependency -   frail and weak elderly with episodes of Delirium and multiple comorbidities, pt is now - DNR DNI  I anni  nebs prn  oral hygiene  skin care  serial labs  serial PE  will follow.
CT sx eval  IV ABX
CT sx eval with Dr. LAYNE  IV ABX  S/P chest tube
CT sx eval with Dr. LAYNE  transferred to ICU for tPA and DNAse - for complex eff - parapneumonic eff  Refusing VATS  IV ABX  S/P chest tube
CT sx eval with Dr. LAYNE  transferred to ICU for tPA and DNAse - for complex eff - parapneumonic eff  Refusing VATS  IV ABX  S/P thoracentesis with pigtail catheter
CT sx eval with Dr. LAYNE appreciated  transferred to ICU for tPA and DNAse - for complex eff - parapneumonic eff  Refusing VATS  IV ABX per iD  S/P thoracentesis with pigtail catheter
CT sx eval with Dr. LAYNE appreciated  transferred to ICU for tPA and DNAse - for complex effusion - parapneumonic effusion  Refusing VATS  IV ABX per ID  S/P thoracentesis with pigtail catheter
CT sx eval with Dr. LAYNE appreciated  transferred to ICU for tPA and DNAse - for complex effusion / parapneumonic effusion  Refusing VATS  IV ABX per ID  S/P thoracentesis with pigtail catheter
CT sx eval with Dr. LAYNE appreciated  transferred to ICU for tPA and DNAse - for complex effusion / parapneumonic effusion  Refusing VATS  IV ABX per ID  S/P thoracentesis with pigtail catheter
additional tPA and DNAse given  CTS follow up noted  ABX regimen - Zosyn  supportive measures  I and O  monitor CT output - medical management of complex eff  HD - as per renal - GUS - I and O  cont ICU care - remains acutely ill - delirium - complex eff - pna - heart disease -   re - orientation - family support -   serial labs - replete lytes  advanced age - multiple comorbidities - prognosis remains guarded - family aware - son is a Pediatric Palliative MD
completed ABX for pna - parapneumonic eff  s.p. Multiple doses of tPA and DNAse for complex eff   thoracic and ID and renal and heme and GI and cardio follow up noted  celestin cath in, I and O, need for HD - assessed daily - monitor renal indices  cvs rx regimen and BP control - AF control - rate - control -   replete lytes  monitor VS and HD and Sat  on room air  will need MICHAEL  prognosis guarded  pt is DNR
complex eff - tPA and DNAse - completed Zosyn, conservative management - path neg, micro reviewed - ID follow up noted  supportive medical regimen -   GUS - HD as per renal - family does not want HD dependency -   frail and weak elderly with episodes of Delirium and multiple comorbidities, pt is now - DNR DNI  I anni  nebs prn  oral hygiene  skin care  serial labs  serial PE  will follow
empyema eval and management in progress - on ABX -   ID and Thoracic Sx and GI and Heme eval noted  pt with multiple comorbidities  poss Thoracentesis vs Pigtail this am  - if INR is less than 2   work up in progress  reviewed CT chest this am with the Son - he is an MD  will check TTE - may need Cardio eval - [place] if planned for Operative Intervention  cont tele monitoring  overnight events noted - on o2 support as needed - keep sat > 90 pct  NEBS prn  out of bed  Nutrition  monitor VS and HD and Sat - assist with ADL - supportive medical regimen  prognosis guarded - advanced age and multiple comorbidities - discussed with SON
gus, parapneumonic eff, complex eff, poss pna, atelectasis, obesity, chronic back pain, OP, OA, HFpEF,   renal eval noted, s/p IVF, I and O, serial renal indices, etiol of GUS - unclear -  eval noted,   on emp ABX for poss pleural space infection - no pus on drainage - doubt empyema - cx neg so far, ID following, path pending  supportive medical regimen in ICU  s/p tPA and DNAse in ICU - output increased significantly from left chest tube  overall prognosis remains guarded - advanced age and serious illness -   monitor pulse ox and cardiac monitoring  serial labs and serial clinical exam  discussed with SON - who is a physician   will follow  am Cr - rising -
left CT in place, celestin cath in place, on and off o2 support  medical and surgical follow up notes reviewed  I and O noted, may need HD on this admission  on Zosyn for pleural space infection and PNA  nebs prn  I anni  HOB elev  assist with ADL   and renal follow up  cvs rx regimen optimization  replete lytes  ICU management in progress   path from pleural space NEG  s/p tPA and DNAse yesterday, cont to have volume output - from CT  ct scan repeat reviewed -   prognosis remains guarded - family aware - son is a Physician  pt is full code
left chest tube drainage noted, s/p one dose of tPA and DNAse - put out 1200 cc in the chest tube drain,   GUS - on IVF - eval for interstitial nephritis, ATN, will check Andrea Stain  I and O  supportive ICU care and management  NEBS prn to help mobilize secretions  would consider repeat imaging on the chest - US vs CXR -   I anni  dvt p  pain assessment  serial clinical exam  consider Nephrology consultation  prognosis guarded  on emp ABX - Zosyn - may need to adjust dose and ABX in light of GUS
on IVF - monitor for vol overload - and Pulm edema -   started on Marinol for appetite stim.    spoke with Son - discussed GOC - may need to postpone in person meeting - available via telephone -   seen and examined  vs and meds reviewed  labs reviewed  complex eff - s/p tPA and DNAse - completed Zosyn, conservative management - path neg, micro reviewed - ID follow up noted  supportive medical regimen -   GUS - HD as per renal - family does not want HD dependency -   frail and weak elderly with episodes of Delirium and multiple comorbidities, pt is now - DNR DNI  I anni  nebs prn  oral hygiene  skin care  serial labs  serial PE.
overnight delirium - got Haldol  s/p additional tPA and DNAse -   CT output noted  complex eff - tPA and DNAse - completed Zosyn, conservative management - path neg, micro reviewed - ID follow up noted  supportive medical regimen -   GUS - HD as per renal - family does not want HD dependency -   frail and weak elderly with episodes of Delirium and multiple comorbidities, pt is now - DNR DNI  I anni  nebs prn  oral hygiene  skin care  serial labs  serial PE  will follow.
overnight events noted  declining  AF - delirium -   midodrine - hypotension noted -   CT output noted  complex eff - s/p tPA and DNAse - completed Zosyn, conservative management - path neg, micro reviewed - ID follow up noted  supportive medical regimen -   GUS - HD as per renal - family does not want HD dependency -   frail and weak elderly with episodes of Delirium and multiple comorbidities, pt is now - DNR DNI  I anni  nebs prn  oral hygiene  skin care  serial labs  serial PE  will follow.
overnight events noted -   AF - delirium -   midodrine - hypotension noted -   CT output noted  complex eff - s/p tPA and DNAse - completed Zosyn, conservative management - path neg, micro reviewed - ID follow up noted  supportive medical regimen -   GUS - HD as per renal - family does not want HD dependency -   frail and weak elderly with episodes of Delirium and multiple comorbidities, pt is now - DNR DNI  I anni  nebs prn  oral hygiene  skin care  serial labs  serial PE  will follow.
parapneumonic eff  pH noted  no pus as per IR  pigtail in  140 cc drained  will  need to discuss with Thoracic - VATS vs tPA and DNAse via chest tube  cont abx  cont supportive measures  looks and feels better  discussed with son  will follow
s/p HD on 3/12  seen and examined  vs and meds reviewed  labs reviewed  complex eff - s/p tPA and DNAse - completed Zosyn, conservative management - path neg, micro reviewed - ID follow up noted  supportive medical regimen -   GUS - HD as per renal - family does not want HD dependency -   frail and weak elderly with episodes of Delirium and multiple comorbidities, pt is now - DNR DNI  I anni  nebs prn  oral hygiene  skin care  serial labs  serial PE
seen and examined  started on Marinol for appetite stim.    spoke with Son - family meeting - Tuesday 9 am - to discuss GOC  seen and examined  vs and meds reviewed  labs reviewed  complex eff - s/p tPA and DNAse - completed Zosyn, conservative management - path neg, micro reviewed - ID follow up noted  supportive medical regimen -   GUS - HD as per renal - family does not want HD dependency -   frail and weak elderly with episodes of Delirium and multiple comorbidities, pt is now - DNR DNI  I anni  nebs prn  oral hygiene  skin care  serial labs  serial PE.
seen and examined - vs and meds reviewed - s.p. HD cath placed -   HD as per Renal  on Midodrine for BP support and HD support -   on ABX for poss PNA and Parapneumonic eff  s/p tPA and DNAse - lytic infusion - improved output from complex effusion  155 cc drained out of CHEST TUBE left -
seen and examined, vs and meds reviewed, labs reviewed, remains on IVF, Mcnulty cath in place,   planned for Family - tele conference with Son and Daughter - at 10 am today to discuss GOC  complex eff - s/p tPA and DNAse - completed Zosyn, conservative management - path neg, micro reviewed - ID follow up noted  supportive medical regimen -   GUS - HD as per renal - family does not want HD dependency -   frail and weak elderly with episodes of Delirium and multiple comorbidities, pt is now - DNR DNI  I anni  nebs prn  oral hygiene  skin care  serial labs  serial PE.
spoke with Son - family meeting - Tuesday 9 am - to discuss GOC  seen and examined  vs and meds reviewed  labs reviewed  complex eff - s/p tPA and DNAse - completed Zosyn, conservative management - path neg, micro reviewed - ID follow up noted  supportive medical regimen -   GUS - HD as per renal - family does not want HD dependency -   frail and weak elderly with episodes of Delirium and multiple comorbidities, pt is now - DNR DNI  I anni  nebs prn  oral hygiene  skin care  serial labs  serial PE.
transferred to ICU for tPA and DNAse - for complex eff - parapneumonic eff  IR procedure note reviewed  cont ABX  labs and cx and micro and path noted  discussed plan of care with daughter  cont supportive measures  monitor for volume overload -   wbc normalized  overall better  I anni   cough rx regimen  will follow
-Cont left pigtail  -Would consider bedside tPa to remove loculations  -D/w family all options, they prefer to be conservative

## 2020-03-17 NOTE — PROGRESS NOTE ADULT - SUBJECTIVE AND OBJECTIVE BOX
INTERVAL HPI/OVERNIGHT EVENTS:  pt seen and examined  denies n/v/abd pain  inc bms on bowel regimen per nursing, non bloody    MEDICATIONS  (STANDING):  aMIOdarone    Tablet   Oral   aMIOdarone    Tablet 200 milliGRAM(s) Oral daily  atorvastatin 10 milliGRAM(s) Oral at bedtime  dronabinol 2.5 milliGRAM(s) Oral two times a day  famotidine    Tablet 20 milliGRAM(s) Oral daily  heparin  Injectable 5000 Unit(s) SubCutaneous every 8 hours  lactobacillus acidophilus 1 Tablet(s) Oral three times a day  polyethylene glycol 3350 17 Gram(s) Oral two times a day  sevelamer carbonate 800 milliGRAM(s) Oral three times a day with meals  sodium chloride 0.45%. 1000 milliLiter(s) (60 mL/Hr) IV Continuous <Continuous>    MEDICATIONS  (PRN):  acetaminophen   Tablet .. 650 milliGRAM(s) Oral every 6 hours PRN Temp greater or equal to 38C (100.4F), Mild Pain (1 - 3)  albuterol/ipratropium for Nebulization 3 milliLiter(s) Nebulizer every 6 hours PRN Shortness of Breath and/or Wheezing      Allergies    No Known Allergies    Intolerances        Review of Systems:    see above       Vital Signs Last 24 Hrs  T(C): 36.4 (17 Mar 2020 04:38), Max: 36.5 (16 Mar 2020 21:00)  T(F): 97.5 (17 Mar 2020 04:38), Max: 97.7 (16 Mar 2020 21:00)  HR: 83 (17 Mar 2020 04:38) (83 - 96)  BP: 129/74 (17 Mar 2020 04:38) (104/57 - 129/74)  BP(mean): --  RR: 17 (17 Mar 2020 04:38) (17 - 18)  SpO2: 95% (17 Mar 2020 04:38) (95% - 98%)    PHYSICAL EXAM:  Constitutional: NAD  HEENT: ncat   Gastrointestinal:  soft, NT, mild dt   Extremities: No peripheral edema  Neurological: Awake, alert, responds appropriately periods of confusion      LABS:                        7.8    7.78  )-----------( 264      ( 16 Mar 2020 08:42 )             24.6     03-16    141  |  107  |  48<H>  ----------------------------<  85  4.4   |  28  |  5.00<H>    Ca    8.1<L>      16 Mar 2020 08:42  Phos  4.9     03-16  Mg     2.5     03-16            RADIOLOGY & ADDITIONAL TESTS:

## 2020-03-17 NOTE — PROGRESS NOTE ADULT - SUBJECTIVE AND OBJECTIVE BOX
Date/Time Patient Seen:  		  Referring MD:   Data Reviewed	       Patient is a 89y old  Male who presents with a chief complaint of Left Empyema (16 Mar 2020 15:15)      Subjective/HPI     PAST MEDICAL & SURGICAL HISTORY:  GI Bleed: 2007  Stomach Ulcer: H pylori 2007, treated with antibiotics  Osteoarthritis  Hyperlipidemia  Herniated Disc  Spinal Stenosis  Diverticulitis  GERD (Gastroesophageal Reflux Disease)  Chronic Lymphocytic Leukemia: followed by oncologist in [place]  S/P Tonsillectomy: childhood  Status Post Arthroscopy: right shoulder  History of Arthroscopy of Knee: bilateral  Osteoarthritis  Stomach Ulcer: H pylori, treated with antibiotics        Medication list         MEDICATIONS  (STANDING):  aMIOdarone    Tablet   Oral   aMIOdarone    Tablet 200 milliGRAM(s) Oral daily  atorvastatin 10 milliGRAM(s) Oral at bedtime  dronabinol 2.5 milliGRAM(s) Oral two times a day  famotidine    Tablet 20 milliGRAM(s) Oral daily  heparin  Injectable 5000 Unit(s) SubCutaneous every 8 hours  lactobacillus acidophilus 1 Tablet(s) Oral three times a day  polyethylene glycol 3350 17 Gram(s) Oral two times a day  sevelamer carbonate 800 milliGRAM(s) Oral three times a day with meals  sodium chloride 0.45%. 1000 milliLiter(s) (60 mL/Hr) IV Continuous <Continuous>    MEDICATIONS  (PRN):  acetaminophen   Tablet .. 650 milliGRAM(s) Oral every 6 hours PRN Temp greater or equal to 38C (100.4F), Mild Pain (1 - 3)  albuterol/ipratropium for Nebulization 3 milliLiter(s) Nebulizer every 6 hours PRN Shortness of Breath and/or Wheezing         Vitals log        ICU Vital Signs Last 24 Hrs  T(C): 36.4 (17 Mar 2020 04:38), Max: 36.5 (16 Mar 2020 21:00)  T(F): 97.5 (17 Mar 2020 04:38), Max: 97.7 (16 Mar 2020 21:00)  HR: 83 (17 Mar 2020 04:38) (83 - 96)  BP: 129/74 (17 Mar 2020 04:38) (104/57 - 129/74)  BP(mean): --  ABP: --  ABP(mean): --  RR: 17 (17 Mar 2020 04:38) (17 - 18)  SpO2: 95% (17 Mar 2020 04:38) (95% - 98%)           Input and Output:  I&O's Detail    15 Mar 2020 07:01  -  16 Mar 2020 07:00  --------------------------------------------------------  IN:    Oral Fluid: 150 mL    sodium chloride 0.45%.: 1320 mL  Total IN: 1470 mL    OUT:    Indwelling Catheter - Urethral: 1300 mL  Total OUT: 1300 mL    Total NET: 170 mL      16 Mar 2020 07:01  -  17 Mar 2020 06:22  --------------------------------------------------------  IN:    Oral Fluid: 480 mL    sodium chloride 0.45%.: 1320 mL  Total IN: 1800 mL    OUT:    Voided: 50 mL  Total OUT: 50 mL    Total NET: 1750 mL          Lab Data                        7.8    7.78  )-----------( 264      ( 16 Mar 2020 08:42 )             24.6     03-16    141  |  107  |  48<H>  ----------------------------<  85  4.4   |  28  |  5.00<H>    Ca    8.1<L>      16 Mar 2020 08:42  Phos  4.9     03-16  Mg     2.5     03-16              Review of Systems	      Objective     Physical Examination    heart s1s2  lung dec BS  abd soft  on room air  head nc  head at      Pertinent Lab findings & Imaging      Ham[place]:  NO   Adequate UO     I&O's Detail    15 Mar 2020 07:01  -  16 Mar 2020 07:00  --------------------------------------------------------  IN:    Oral Fluid: 150 mL    sodium chloride 0.45%.: 1320 mL  Total IN: 1470 mL    OUT:    Indwelling Catheter - Urethral: 1300 mL  Total OUT: 1300 mL    Total NET: 170 mL      16 Mar 2020 07:01  -  17 Mar 2020 06:22  --------------------------------------------------------  IN:    Oral Fluid: 480 mL    sodium chloride 0.45%.: 1320 mL  Total IN: 1800 mL    OUT:    Voided: 50 mL  Total OUT: 50 mL    Total NET: 1750 mL               Discussed with:     Cultures:	        Radiology

## 2020-03-17 NOTE — DISCHARGE NOTE NURSING/CASE MANAGEMENT/SOCIAL WORK - NSDCPEEMAIL_GEN_ALL_CORE
[facility] email [email or web address]

## 2020-03-17 NOTE — PROGRESS NOTE ADULT - SUBJECTIVE AND OBJECTIVE BOX
CHARLY LU  89y  Male    Patient is a 89y old  Male who presents with a chief complaint of Left Empyema (17 Mar 2020 12:50)    out of bed to chair  sabi louise.   spoke to patient's son over the telephone.      PAST MEDICAL & SURGICAL HISTORY:  GI Bleed: 2007  Stomach Ulcer: H pylori 2007, treated with antibiotics  Osteoarthritis  Hyperlipidemia  Herniated Disc  Spinal Stenosis  Diverticulitis  GERD (Gastroesophageal Reflux Disease)  Chronic Lymphocytic Leukemia: followed by oncologist in [place]  S/P Tonsillectomy: childhood  Status Post Arthroscopy: right shoulder  History of Arthroscopy of Knee: bilateral          PHYSICAL EXAM:    T(C): 36.2 (03-17-20 @ 13:52), Max: 36.5 (03-16-20 @ 21:00)  HR: 80 (03-17-20 @ 13:52) (80 - 96)  BP: 119/65 (03-17-20 @ 13:52) (104/57 - 129/74)  RR: 18 (03-17-20 @ 13:52) (17 - 18)  SpO2: 95% (03-17-20 @ 13:52) (95% - 99%)  Wt(kg): --    I&O's Detail    16 Mar 2020 07:01  -  17 Mar 2020 07:00  --------------------------------------------------------  IN:    Oral Fluid: 480 mL    sodium chloride 0.45%.: 1440 mL  Total IN: 1920 mL    OUT:    Voided: 50 mL  Total OUT: 50 mL    Total NET: 1870 mL      17 Mar 2020 07:01  -  17 Mar 2020 13:56  --------------------------------------------------------  IN:    Oral Fluid: 80 mL  Total IN: 80 mL    OUT:    Voided: 200 mL  Total OUT: 200 mL    Total NET: -120 mL          Respiratory: clear anteriorly, decreased BS at bases  Cardiovascular: S1 S2  Gastrointestinal: soft NT ND +BS  Extremities: no edema   Neuro: Awake and alert    MEDICATIONS  (STANDING):  aMIOdarone    Tablet   Oral   aMIOdarone    Tablet 200 milliGRAM(s) Oral daily  atorvastatin 10 milliGRAM(s) Oral at bedtime  dronabinol 2.5 milliGRAM(s) Oral two times a day  famotidine    Tablet 20 milliGRAM(s) Oral daily  heparin  Injectable 5000 Unit(s) SubCutaneous every 8 hours  lactobacillus acidophilus 1 Tablet(s) Oral three times a day  sevelamer carbonate 800 milliGRAM(s) Oral three times a day with meals  sodium chloride 0.45%. 1000 milliLiter(s) (60 mL/Hr) IV Continuous <Continuous>    MEDICATIONS  (PRN):  acetaminophen   Tablet .. 650 milliGRAM(s) Oral every 6 hours PRN Temp greater or equal to 38C (100.4F), Mild Pain (1 - 3)  albuterol/ipratropium for Nebulization 3 milliLiter(s) Nebulizer every 6 hours PRN Shortness of Breath and/or Wheezing                            8.2    7.47  )-----------( 264      ( 17 Mar 2020 09:01 )             26.0       03-17    140  |  104  |  49<H>  ----------------------------<  81  4.3   |  27  |  4.80<H>    Ca    8.2<L>      17 Mar 2020 11:11  Phos  4.9     03-16  Mg     2.5     03-16        Creatinine Trend: Creatinine Trend: 4.80<--, 5.00<--, 5.00<--, 4.80<--, 4.30<--, 5.90<--

## 2020-03-17 NOTE — DISCHARGE NOTE NURSING/CASE MANAGEMENT/SOCIAL WORK - PATIENT PORTAL LINK FT
You can access the FollowMyHealth Patient Portal offered by [facility] by registering at the following website: [email or web address]. By joining [employer]’s FollowMyHealth portal, you will also be able to view your health information using other applications (apps) compatible with our system.

## 2020-03-17 NOTE — PROGRESS NOTE ADULT - PROBLEM SELECTOR PROBLEM 1
Empyema lung
Parapneumonic effusion
Empyema lung

## 2020-03-17 NOTE — PROGRESS NOTE ADULT - REASON FOR ADMISSION
Left Empyema

## 2020-03-17 NOTE — PROGRESS NOTE ADULT - SUBJECTIVE AND OBJECTIVE BOX
[facility] -- Warren Posada, Abelardo Amos Pannella, Patel, Savella  Office # [phone]      Follow Up:    Afib   Subjective/Observations:   No events overnight resting comfortably in bed.  No complaints of chest pain, dyspnea, or palpitations reported. No signs of orthopnea or PND.     REVIEW OF SYSTEMS: All other review of systems is negative unless indicated above    PAST MEDICAL & SURGICAL HISTORY:  GI Bleed: 2007  Stomach Ulcer: H pylori 2007, treated with antibiotics  Osteoarthritis  Hyperlipidemia  Herniated Disc  Spinal Stenosis  Diverticulitis  GERD (Gastroesophageal Reflux Disease)  Chronic Lymphocytic Leukemia: followed by oncologist in [place]  S/P Tonsillectomy: childhood  Status Post Arthroscopy: right shoulder  History of Arthroscopy of Knee: bilateral      MEDICATIONS  (STANDING):  aMIOdarone    Tablet   Oral   aMIOdarone    Tablet 200 milliGRAM(s) Oral daily  atorvastatin 10 milliGRAM(s) Oral at bedtime  dronabinol 2.5 milliGRAM(s) Oral two times a day  famotidine    Tablet 20 milliGRAM(s) Oral daily  heparin  Injectable 5000 Unit(s) SubCutaneous every 8 hours  lactobacillus acidophilus 1 Tablet(s) Oral three times a day  sevelamer carbonate 800 milliGRAM(s) Oral three times a day with meals  sodium chloride 0.45%. 1000 milliLiter(s) (60 mL/Hr) IV Continuous <Continuous>    MEDICATIONS  (PRN):  acetaminophen   Tablet .. 650 milliGRAM(s) Oral every 6 hours PRN Temp greater or equal to 38C (100.4F), Mild Pain (1 - 3)  albuterol/ipratropium for Nebulization 3 milliLiter(s) Nebulizer every 6 hours PRN Shortness of Breath and/or Wheezing      Allergies    No Known Allergies    Intolerances        Vital Signs Last 24 Hrs  T(C): 36.4 (17 Mar 2020 04:38), Max: 36.5 (16 Mar 2020 21:00)  T(F): 97.5 (17 Mar 2020 04:38), Max: 97.7 (16 Mar 2020 21:00)  HR: 83 (17 Mar 2020 04:38) (83 - 96)  BP: 125/71 (17 Mar 2020 10:40) (104/57 - 129/74)  BP(mean): --  RR: 17 (17 Mar 2020 04:38) (17 - 18)  SpO2: 99% (17 Mar 2020 10:40) (95% - 99%)    I&O's Summary    16 Mar 2020 07:01  -  17 Mar 2020 07:00  --------------------------------------------------------  IN: 1920 mL / OUT: 50 mL / NET: 1870 mL    17 Mar 2020 07:01  -  17 Mar 2020 12:53  --------------------------------------------------------  IN: 80 mL / OUT: 200 mL / NET: -120 mL          PHYSICAL EXAM:  TELE:   Constitutional: NAD, awake and alert, well-developed  HEENT: Moist Mucous Membranes, Anicteric  Pulmonary: Non-labored, breath sounds are clear bilaterally, No wheezing, crackles or rhonchi  Cardiovascular: Regular, S1 and S2 nl, No murmurs, rubs, gallops or clicks  Gastrointestinal: Bowel Sounds present, soft, nontender.   Lymph: No lymphadenopathy. No peripheral edema.  Skin: No visible rashes or ulcers.  Psych:  Mood & affect appropriate    LABS: All Labs Reviewed:                        8.2    7.47  )-----------( 264      ( 17 Mar 2020 09:01 )             26.0                         7.8    7.78  )-----------( 264      ( 16 Mar 2020 08:42 )             24.6                         8.1    9.76  )-----------( 295      ( 15 Mar 2020 06:26 )             25.5     17 Mar 2020 11:11    140    |  104    |  49     ----------------------------<  81     4.3     |  27     |  4.80   16 Mar 2020 08:42    141    |  107    |  48     ----------------------------<  85     4.4     |  28     |  5.00   15 Mar 2020 06:26    141    |  107    |  45     ----------------------------<  98     4.5     |  27     |  5.00     Ca    8.2        17 Mar 2020 11:11  Ca    8.1        16 Mar 2020 08:42  Ca    8.5        15 Mar 2020 06:26  Phos  4.9       16 Mar 2020 08:42  Phos  5.0       15 Mar 2020 06:26  Mg     2.5       16 Mar 2020 08:42  Mg     2.6       15 Mar 2020 06:26               ECG:    Echo:    Radiology: [facility] -- Warren Posada, Abelardo Amos Pannella, Patel, Savella  Office # [phone]      Follow Up:    Afib   Subjective/Observations:   No events overnight resting comfortably in bed.  No complaints of chest pain, dyspnea, or palpitations reported. No signs of orthopnea or PND.     REVIEW OF SYSTEMS: All other review of systems is negative unless indicated above    PAST MEDICAL & SURGICAL HISTORY:  GI Bleed: 2007  Stomach Ulcer: H pylori 2007, treated with antibiotics  Osteoarthritis  Hyperlipidemia  Herniated Disc  Spinal Stenosis  Diverticulitis  GERD (Gastroesophageal Reflux Disease)  Chronic Lymphocytic Leukemia: followed by oncologist in [place]  S/P Tonsillectomy: childhood  Status Post Arthroscopy: right shoulder  History of Arthroscopy of Knee: bilateral      MEDICATIONS  (STANDING):  aMIOdarone    Tablet   Oral   aMIOdarone    Tablet 200 milliGRAM(s) Oral daily  atorvastatin 10 milliGRAM(s) Oral at bedtime  dronabinol 2.5 milliGRAM(s) Oral two times a day  famotidine    Tablet 20 milliGRAM(s) Oral daily  heparin  Injectable 5000 Unit(s) SubCutaneous every 8 hours  lactobacillus acidophilus 1 Tablet(s) Oral three times a day  sevelamer carbonate 800 milliGRAM(s) Oral three times a day with meals  sodium chloride 0.45%. 1000 milliLiter(s) (60 mL/Hr) IV Continuous <Continuous>    MEDICATIONS  (PRN):  acetaminophen   Tablet .. 650 milliGRAM(s) Oral every 6 hours PRN Temp greater or equal to 38C (100.4F), Mild Pain (1 - 3)  albuterol/ipratropium for Nebulization 3 milliLiter(s) Nebulizer every 6 hours PRN Shortness of Breath and/or Wheezing      Allergies    No Known Allergies    Intolerances        Vital Signs Last 24 Hrs  T(C): 36.4 (17 Mar 2020 04:38), Max: 36.5 (16 Mar 2020 21:00)  T(F): 97.5 (17 Mar 2020 04:38), Max: 97.7 (16 Mar 2020 21:00)  HR: 83 (17 Mar 2020 04:38) (83 - 96)  BP: 125/71 (17 Mar 2020 10:40) (104/57 - 129/74)  BP(mean): --  RR: 17 (17 Mar 2020 04:38) (17 - 18)  SpO2: 99% (17 Mar 2020 10:40) (95% - 99%)    I&O's Summary    16 Mar 2020 07:01  -  17 Mar 2020 07:00  --------------------------------------------------------  IN: 1920 mL / OUT: 50 mL / NET: 1870 mL    17 Mar 2020 07:01  -  17 Mar 2020 12:53  --------------------------------------------------------  IN: 80 mL / OUT: 200 mL / NET: -120 mL          PHYSICAL EXAM:  TELE:   Constitutional: NAD, awake and alert, well-developed  HEENT: Moist Mucous Membranes, Anicteric  Pulmonary:  Non-labored, breath sounds bilateral rhonchi throughout , No wheezing, crackles .   Cardiovascular: Regular, S1 and S2 nl, No murmurs, rubs, gallops or clicks  Gastrointestinal: Bowel Sounds present, soft, nontender.   Lymph: No lymphadenopathy. No peripheral edema.  Skin: No visible rashes or ulcers.  Psych:  Mood & affect appropriate    LABS: All Labs Reviewed:                        8.2    7.47  )-----------( 264      ( 17 Mar 2020 09:01 )             26.0                         7.8    7.78  )-----------( 264      ( 16 Mar 2020 08:42 )             24.6                         8.1    9.76  )-----------( 295      ( 15 Mar 2020 06:26 )             25.5     17 Mar 2020 11:11    140    |  104    |  49     ----------------------------<  81     4.3     |  27     |  4.80   16 Mar 2020 08:42    141    |  107    |  48     ----------------------------<  85     4.4     |  28     |  5.00   15 Mar 2020 06:26    141    |  107    |  45     ----------------------------<  98     4.5     |  27     |  5.00     Ca    8.2        17 Mar 2020 11:11  Ca    8.1        16 Mar 2020 08:42  Ca    8.5        15 Mar 2020 06:26  Phos  4.9       16 Mar 2020 08:42  Phos  5.0       15 Mar 2020 06:26  Mg     2.5       16 Mar 2020 08:42  Mg     2.6       15 Mar 2020 06:26        ECG:  < from: 12 Lead ECG (03.11.20 @ 22:20) >  Ventricular Rate 87 BPM    Atrial Rate 87 BPM    P-R Interval 202 ms    QRS Duration 100 ms    Q-T Interval 368 ms    QTC Calculation(Bezet) 442 ms    P Axis 49 degrees    R Axis -51 degrees    T Axis 51 degrees    Diagnosis Line Normal sinus rhythm with sinus arrhythmia  Left axis deviation  Low voltage QRS  Abnormal ECG  When compared with ECG of 12-MAR-2020 22:20, (Unconfirmed)  No significant change was found    < end of copied text >      Echo:  < from: TTE Echo Doppler w/o Cont (02.27.20 @ 11:47) >  OBSERVATIONS:  Technically difficult and limited study  Mitral Valve: normal, trace physiologic MR.  Aortic Valve/Aorta: Sclerotic trileaflet aortic valve with normal opening.  Tricuspid Valve: normal with trace TR.  Pulmonic Valve: Not well-visualized  Left Atrium: normal  RightAtrium: Not well-visualized  Left Ventricle: normal LV size and systolic function, estimated LVEF of 55%.  Right Ventricle: Not well-visualized  Pericardium/Pleura: normal, small anterior pericardial effusion without signs of hemodynamic compromise.  Pulmonary/RV Pressure: estimated PA systolic pressure of 34mmHg. IVC is dilated    Conclusion:   Technically difficult and limited study  Normal left ventricular internal dimensions and systolic function, estimated LVEF of 55%.   Right ventricle is not well-visualized  Sclerotic trileaflet aortic valve, without AI.   Trace physiologic MR and TR.       < end of copied text >      Radiology:

## 2020-03-17 NOTE — PROGRESS NOTE ADULT - ASSESSMENT
h/o nhl- gastric maltoma  abnormal ct  left empyema, sp drainage  gastric wall thickening  roberto       f/u am labs  prior nc ct ap showing gastric wall thickening  up to date w egd surveillance per dtr  gerd precautions  cont pepcid  cont marinol  dc miralax  monitor cbc daily  diet as tolerated  encouragement/assistance at meals  further management per primary; goc discussions ongoing    Advanced care planning was discussed with patient and family.  Advanced care planning forms were reviewed and discussed.  Risks, benefits and alternatives of gastroenterologic procedures were discussed in detail and all questions were answered.    30 minutes spent.

## 2020-03-17 NOTE — PROGRESS NOTE ADULT - ATTENDING COMMENTS
Note written by attending, see above.  Time spent: 40min. More than 50% of the visit was spent counseling the patient on his condition - empyema, afib with RVR, GUS.
Note written by attending, see above.  Time spent: 45min. More than 50% of the visit was spent counseling the patient on his condition - empyema, afib with RVR, GUS.
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  Hospital course complicated by GUS from [facility], encephalopathy and delirium.    --uremic encephalopathy and delirium, overall improving  continue reorientation measures  --hypotension improved, decrease midodine  continue statin for hyperlipidemia  --L empyema with incomplete drainage  continue tpa and dornase for additional 2 treatments  plan for reimaging of chest within next 2 days to reassess, may then consider clamping tube and monitor for any decompensation  ?empyema v malignancy.    pt is poor surgical candidate, per CTS no plan for surgery at this time  --GUS from [facility], persists but UOP increasing  HD today  continue phoslo  coude catheter in place  --tolerating PO diet  --possible empyema, has completed course of zosyn  --plan discussed with son and daughter
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  Hospital course complicated by GUS from [facility], encephalopathy and delirium.    --uremic encephalopathy and delirium, improved today  continue reorientation measures  --hypotension, continue midodine  continue statin for hyperlipidemia  --L empyema with incomplete drainage  continue tPA and dornase for addition 2 treatments  plan for repeat CT chest in a few days  pt is poor surgical candidate, per CTS no plan for surgery at this time  tolerating NC  --GUS from [facility], persists but UOP increasing  no plan for HD today  continue phoslo  --hematuria resolved, coude catheter in place  --tolerating PO diet  --empyema of unclear etology, has completed course of zosyn  --plan discussed with son, requests to change hospitalist service
89M h/o gastric maltoma (not on treatment), CLL (not on treatment), spinal stenosis p/w loculated L pleural effusion concerning for empyema, s/p IR-guided left chest tube on 2/27, and tPA-dornase treatment but with incomplete drainage with completed course of Zosyn and hospital course c/b ARF 2/2 [facility] now on HD, encephalopathy and delirium, and AFib with RVR now back in sinus rhythm.    Neuro: remains with episodes of delirium, required haldol 2.5mg x2 overnight, this AM calmer; likely encephalopathy and delirium in setting of uremia; continue frequent reorientation measures  CV: episode of AFib with RVR overnight s/p dilt 10mg x1 and metoprolol 5mg ivp x1 overnight, now back in sinus rhythm, had episode of hypotension though this likely 2/2 poor fluid flow given rapid heart rate and has since improved with return of NSR; will start amio load to keep sinus, no AC at this time due to bleeding from both celestin and chest tube, can decrease midodine to 5mg tid, continue statin for hyperlipidemia  Pulm: L effusion/empyema with incomplete drainage s/p IR placed chest tube; will repeat CT chest today, will hold off on additional tpa and dornase treatments at this time; given cell counts from initial fluid sample unclear if true empyema, one negative cytology not sufficient to r/o malignancy and would be poor candidate for tissue sampling via open lung biopsy vs IR though can d/w IR if this is in the family wishes  Renal: ARF from [facility] s/p second HD session yesterday; no urgent indications for HD at present, UOP increasing w/ ~1.5L UOP in previous 24hrs; continue phoslo; coude catheter in replaced o/n after pt self-dc'ed  GI: tolerating PO diet though taking very little, will continue to encourage PO intake, nepro supplements with meals  ID: possible L empyema, has completed course of zosyn, hypotension likely related to rapid AFib and not consequence of partially treated infection, will continue to monitor off abx at this time  Heme/Onc: possible malignancy as cause of effusion, though at this time no cytology/tissue sample to confirm; can d/w IR regarding lung biopsy though pt likely poor candidate for chemo should it come back positive, will d/w family for further clarification of GOC    -DNR/DNI; to remain in ICU at this time
Chart reviewed    Patient seen and examined    Agree with plan as outlined above
The patient was personally seen and examined, in addition to being examined and evaluated by NP.  All elements of the note were edited where appropriate.
89M h/o gastric maltoma (not on treatment), CLL (not on treatment), spinal stenosis p/w loculated L pleural effusion concerning for empyema, s/p IR-guided left chest tube on 2/27, and tPA-dornase treatment but with incomplete drainage with completed course of Zosyn and hospital course c/b ARF 2/2 [facility] now on HD, encephalopathy and delirium, and AFib with RVR now back in sinus rhythm.    Neuro: had been given seroquel 12.5mg overnight and somnalent in AM, waking up later in day, will dc further seroquel and melatonin for now until mental status improves, likely element of uremia contributing to overall mental status   CV: remains in sinus rhythm at this time; continue amio load, no AC at this time due to bleeding from both celestin and chest tube, BP remains stable on midodine 5mg tid, can likely dc today; continue statin for hyperlipidemia  Pulm: L effusion/empyema with incomplete drainage s/p IR placed chest tube on CT shown now not to be within loculated  effusion - CT placed to water seal in AM, will remove today and monitor respiratory status, no plans to replace unless pt becomes sob/dyspnic; unclear utility of IR lung biopsy as would be blind sampling with likely risk > benefit  Renal: ARF from [facility] s/p second HD session on Sunday; continues to make urine though drop from ~1.5L to ~900cc noted with increase sCr and K+, likely all [facility] related and not to require long-term HD, will d/w renal whether pt needs additional session, though no urgent indications for HD at present   GI: tolerating PO diet though taking very little, will continue to encourage PO intake, nepro supplements with meals  ID: possible L empyema, has completed course of zosyn, will continue to monitor off abx at this time  Heme/Onc: possible malignancy as cause of effusion, though at this time no cytology/tissue sample to confirm though given co-morbities and overall prognosis, unclear it much benefit from compared to risk of biopsy, will d/w family for further clarification of GOC    -DNR/DNI; to remain in ICU at this time.
Chart reviewed    Patient seen and examined     Agree with plan as outlined above
I saw and examined the patient personally. Spoke with above provider regarding this case. I reviewed the above findings completely.  I agree with the above history, physical, and plan which I have edited where appropriate.
I saw and examined the patient personally. Spoke with above provider regarding this case. I reviewed the above findings completely.  I agree with the above history, physical, and plan which I have edited where appropriate.
Pt. seen and evaluated for GUS and atrial fibrillation.  Pt. is in no distress.  Reports feeling well.  Denies having any SOB.  Comfortable respiratory status on room air.  Anemia remains stable.  s/p HD session yesterday.  Agree with physical examination and plan as above.
I saw and examined the patient personally. Spoke with above provider regarding this case. I reviewed the above findings completely.  I agree with the above history, physical, and plan which I have edited where appropriate.
I saw and examined the patient personally. Spoke with above provider regarding this case. I reviewed the above findings completely.  I agree with the above history, physical, and plan which I have edited where appropriate.
Pt. seen and evaluated for empyema, Atrial fibrillation, and GUS.  Pt. scheduled for another HD session today.  Pt. denies any CP, SOB, or abdominal pain.  Physical examination and plan as above.
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  Hospital course complicated by GUS from [facility], encephalopathy and delirium.    --likely has uremic encephalopathy and delirium  off precedex this am and will d/c seroquel  continue reorientation measures  --shock requiring low dose levophed  increase midodrine to 15 q8h and weaned off levo this afternoon  continue statin for hyperlipidemia  does not appear septic but unclear cause  --L empyema with incomplete drainage  continue tPA and dornase for addition 2 treatments  pt is poor surgical candidate, per CTS no plan for surgery at this time  tolerating NC  --GUS from [facility], persists  no plan for HD today  continue phoslo  --hematuria resolved, coude catheter in place  --tolerating PO diet  --empyema of unclear etology, complete zosyn today  --extensive discussion with son Sohail (physician) and daughter Esperanza.  Both are HCPs.  We discussed in detail the prognosis for the GUS, the pleural effusion, and the delirium.  We discussed the possibility that the pleural effusion may be malignant rather than infectious.  Also discussed the quality of life that would be acceptable for pt.  They all agree he will be DNR, DNI.  They do not think they would peruse permanent HD, but will make that decision when necessary.    --pt critically ill.  CC time 90min
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  Hospital course complicated by GUS from [facility].    --pain currently reasonably controlled with dilaudid  --hemodynamically stable  continue statin for hyperlipidemia  --L empyema with incomplete drainage  discussed with thoracic surgery Dr. Steven  pt is a poor surgical candidate for will continue conservative management today  continue tPA and dornase instillation into pleural space, premedicate with dilaudid 1mg IV  tolerating NC  --GUS from [facility], persists  no indication for HD today but may need tomorrow   start phoslo  --hematuria, continue coude catheter with flushing as needed  --tolerating PO diet  --empyema of unclear etology, continue zosyn  --plan discussed with pt and daughter, also with son Sohail by phone
89M h/o gastric maltoma (not on treatment), CLL (not on treatment), spinal stenosis p/w loculated L pleural effusion concerning for empyema, s/p IR-guided left chest tube on 2/27, and tPA-dornase treatment but with incomplete drainage with completed course of Zosyn and hospital course c/b ARF 2/2 [facility] now on HD, encephalopathy and delirium, and AFib with RVR now back in sinus rhythm with much improved mental status.    Neuro: mental status much improved today, pt awake and alert, conversing normally; will avoid additional seroquel, melatonin dosing   CV: remains in sinus rhythm at this time; continue amio load then 200mg daily, no AC at this time due to bleeding from both celestin and chest tube, can dc midodrine   Pulm: s/p removal of L chest tube for L effusion/empyema yesterday without issue, pt with no resp complaints and sat 100% on room air; unclear utility of IR lung biopsy as would be blind sampling with likely risk > benefit  Renal: ARF from [facility] s/p second HD session on Sunday; continues to make urine though drop from ~1000cc/day noted with increase sCr and stable K+, likely all [facility] related and not to require long-term HD, with sCr plateau in near future; no plans for HD today, if no HD tomorrow will likely dc shiley catheter    GI: PO intake much improved today; continue nepro supplements with meals  ID: possible L empyema, has completed course of zosyn, will continue to monitor off abx at this time  Heme/Onc: possible malignancy as cause of effusion, though at this time no cytology/tissue sample to confirm though given co-morbities and overall prognosis, d/w son ([phone]) in detail today    -DNR/DNI; stable for transfer to floor with remote tele
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  Hospital course complicated by GUS from [facility].    --pain currently controlled with dilaudid and tylenol  --hemodynamically stable  continue statin for hyperlipidemia  --L empyema with incomplete drainage  continue tPA and dornase protocol for 6 doses  repeat CXR today  pt is poor surgical candidate, per CTS no plan for surgery at this time  tolerating NC  --GUS from [facility], persists  trial of lasix today  no indication for HD right now  continue phoslo  --hematuria, continue coude catheter with flushing as needed  --tolerating PO diet  --empyema of unclear etology, continue zosyn  --plan discussed with pt
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  Hospital course complicated by GUS from [facility], encephalopathy and delirium.    --likely has uremic encephalopathy and delirium  continue reorientation measures  --hypotension today  unresponsive to IVF and midodrine  requiring low dose levophed  continue statin for hyperlipidemia  --L empyema with incomplete drainage  s/p 6dose tPA and dornase protocol   discussed with CT surgery, will continue additional doses of tPA and dornase given the improvement with last 2 doses  pt is poor surgical candidate, per CTS no plan for surgery at this time  tolerating NC  --GUS from [facility], persists  no plan for HD today  continue phoslo  --hematuria resolved, coude catheter in place  --tolerating PO diet  --empyema of unclear etology, continue zosyn for 10day course  --plan discussed with daughter
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  Hospital course complicated by GUS from [facility].    --likely had uremic encephalopathy and delirium  continue reorientation measures  --hemodynamically stable  continue statin for hyperlipidemia  --L empyema with incomplete drainage  s/p continue tPA and dornase protocol   large amount of drainage yesterday after doses 5 and 6 and CXR improved  will discuss with CTS utility of continuing tPA and dornase  pt is poor surgical candidate, per CTS no plan for surgery at this time  tolerating NC  --GUS from [facility], persists  plan for HD today  [facility] HD catheter placed  continue phoslo  --hematuria resolved, coude catheter in place  --tolerating PO diet  --empyema of unclear etology, continue zosyn for 10day course  --plan discussed with daughter
family requesting different physician per intensivist

## 2020-03-17 NOTE — PROGRESS NOTE ADULT - SUBJECTIVE AND OBJECTIVE BOX
CHARLY LU is a 89yMale , patient examined and chart reviewed.     INTERVAL HPI/ OVERNIGHT EVENTS:   Awake. No distress.  Afebrile. Weak     PAST MEDICAL & SURGICAL HISTORY:  GI Bleed: 2007  Stomach Ulcer: H pylori 2007, treated with antibiotics  Osteoarthritis  Hyperlipidemia  Herniated Disc  Spinal Stenosis  Diverticulitis  GERD (Gastroesophageal Reflux Disease)  Chronic Lymphocytic Leukemia: followed by oncologist in [place]  S/P Tonsillectomy: childhood  Status Post Arthroscopy: right shoulder  History of Arthroscopy of Knee: bilateral    For details regarding the patient's social history, family history, and other miscellaneous elements, please refer the initial infectious diseases consultation and/or the admitting history and physical examination for this admission.    ROS:  CONSTITUTIONAL:  Negative fever or chills  EYES:  Negative  blurry vision or double vision  CARDIOVASCULAR:  Negative for chest pain or palpitations  RESPIRATORY:  Negative for cough, wheezing, or SOB   GASTROINTESTINAL:  Negative for nausea, vomiting, diarrhea, constipation, or abdominal pain  GENITOURINARY:  Negative frequency, urgency or dysuria  NEUROLOGIC:  No headache, confusion, dizziness, lightheadedness  All other systems were reviewed and are negative    Current inpatient medications :  MEDICATIONS  (STANDING):  aMIOdarone    Tablet   Oral   aMIOdarone    Tablet 200 milliGRAM(s) Oral daily  atorvastatin 10 milliGRAM(s) Oral at bedtime  dronabinol 2.5 milliGRAM(s) Oral two times a day  famotidine    Tablet 20 milliGRAM(s) Oral daily  heparin  Injectable 5000 Unit(s) SubCutaneous every 8 hours  lactobacillus acidophilus 1 Tablet(s) Oral three times a day  sevelamer carbonate 800 milliGRAM(s) Oral three times a day with meals  sodium chloride 0.45%. 1000 milliLiter(s) (60 mL/Hr) IV Continuous <Continuous>    MEDICATIONS  (PRN):  acetaminophen   Tablet .. 650 milliGRAM(s) Oral every 6 hours PRN Temp greater or equal to 38C (100.4F), Mild Pain (1 - 3)  albuterol/ipratropium for Nebulization 3 milliLiter(s) Nebulizer every 6 hours PRN Shortness of Breath and/or Wheezing      Objective:  Vital Signs Last 24 Hrs  T(C): 36.2 (17 Mar 2020 13:52), Max: 36.5 (16 Mar 2020 21:00)  T(F): 97.2 (17 Mar 2020 13:52), Max: 97.7 (16 Mar 2020 21:00)  HR: 80 (17 Mar 2020 13:52) (80 - 93)  BP: 119/65 (17 Mar 2020 13:52) (104/57 - 129/74)  RR: 18 (17 Mar 2020 13:52) (17 - 18)  SpO2: 95% (17 Mar 2020 13:52) (95% - 99%)    Physical Exam:  General:  no acute distress  Eyes: sclera anicteric, pupils equal and reactive to light  ENMT: buccal mucosa moist, pharynx not injected  Neck: supple, trachea midline  Lungs: decreased no wheeze/rhonchi    Cardiovascular: regular rate and rhythm, S1 S2  Abdomen: soft, nontender, no organomegaly present, bowel sounds normal  Neurological: alert and oriented x 2, Cranial Nerves II-XII grossly intact  Skin: no increased ecchymosis/petechiae/purpura  Lymph Nodes: no palpable cervical/supraclavicular lymph nodes enlargements  Extremities: +edema    LABS:                        8.2    7.47  )-----------( 264      ( 17 Mar 2020 09:01 )             26.0   03-17    140  |  104  |  49<H>  ----------------------------<  81  4.3   |  27  |  4.80<H>    Ca    8.2<L>      17 Mar 2020 11:11  Phos  4.9     03-16  Mg     2.5     03-16      MICROBIOLOGY:  Culture - Body Fluid with Gram Stain (02.27.20 @ 21:38)    Gram Stain:   polymorphonuclear leukocytes  No organisms seen  by cytocentrifuge    Specimen Source: .Body Fluid Pleural Fluid    Culture Results:   No growth    Culture - Blood (collected 26 Feb 2020 22:29)  Source: .Blood Blood-Peripheral  Preliminary Report (27 Feb 2020 23:01):    No growth to date.    Culture - Blood (collected 26 Feb 2020 22:29)  Source: .Blood Blood-Peripheral  Preliminary Report (27 Feb 2020 23:01):    No growth to date.    Culture - Urine (collected 26 Feb 2020 11:46)  Source: .Urine Catheterized  Final Report (27 Feb 2020 15:05):    50,000 - 99,000 CFU/mL Coag Negative Staphylococcus    Susceptibilites not performed.    Culture - Blood (collected 25 Feb 2020 22:38)  Source: .Blood Blood-Peripheral  Preliminary Report (26 Feb 2020 23:01):    No growth to date.    Culture - Blood (collected 25 Feb 2020 22:38)  Source: .Blood Blood-Peripheral  Preliminary Report (26 Feb 2020 23:01):    No growth to date.    Legionella pneumophila Antigen, Urine (02.27.20 @ 02:53)    Legionella Antigen, Urine: Negative    Streptococcus Pneumoniae Ag Urine (02.27.20 @ 04:25)    Streptococcus Pneumoniae Ag Urine: Negative    RADIOLOGY & ADDITIONAL STUDIES:  EXAM:  CT RENAL STONE HUNT                          EXAM:  CT CHEST                                  PROCEDURE DATE:  02/25/2020          INTERPRETATION:  CLINICAL HISTORY:  Fever, left back and flank pain. History of gastric lymphoma.    Multiple axial images of the chest, abdomen and pelvis were obtained from the lung apices through symphysis pubis without the administration of oral or intravascular contrast limiting the sensitivity of evaluation. Reformatted coronal and sagittal images are submitted.    COMPARISON: PET/CT evaluation 6/19/2019.    FINDINGS: New moderately sized left pleural effusion is identified with portions that are loculated; air attenuation within the effusion as well as within the left pleural space suggests the presence of left-sided empyema. Consolidation is identified within the lingular segment as well as left lower lobe lung parenchyma; underlying neoplasm cannot be excluded. Hazy opacity within the left upper lobe is likely related to atelectasis. Subpleural opacity within the posterior right lower lobe and right middle lobe likely related to subpleural fibrosis. There is no evidence for right-sided pleural effusion or air.    No abnormally enlarged mediastinal or hilar lymph nodes are noted. There is no evidence for axillary lymphadenopathy. The heart size appears within normal limits. No pericardial effusion is identified. Thoracic aortic caliber demonstrates unremarkable caliber. Coronary arterial calcifications identified.    The central bronchial anatomy appears patent.    No mediastinal shift is noted.    The liver is normal in size. No focal hepatic masses are identified. There is no evidence for intrahepatic or extrahepatic biliary dilatation. A large gallstone is noted. No gallbladder wall thickening or pericholecystic fluid identified.    The spleen, pancreas and adrenal glands are unremarkable.    There is no evidence for hydronephrosis. No right renal calculi are identified. An 8 mm calculus is identified within the left-sided extrarenal pelvis, unchanged. There is no change in a 6 mm calculus identified within the distal right ureter at the level of the UVJ, without evidence for obstructive uropathy. A 1.3 cm right renal cyst is noted. The abdominal aorta is normal in course and caliber. No abnormally enlarged retroperitoneal or pelvic lymphadenopathy is noted.    Gastric wall thickening is identified allowing for nondistention with oral contrast; clinical correlation is suggested in light of the provided clinical history of gastric lymphoma.    Fecal material is scattered throughout the colon. There is no evidence for mechanical bowel obstruction. Colonic diverticulosis is noted. No colonic wall thickening is identified. There is no evidence for acute appendicitis.There is no evidence for free intraperitoneal air or fluid.    The prostate is enlarged and the urinary bladder appear unremarkable.     Postsurgical changes of the lumbar spine noted. Postprocedural changes of the right shoulder noted. Degenerative changes of the spine evident.    IMPRESSION:    1. New moderately sized left pleural effusion is identified with portions that are loculated; air attenuation within the effusion as well as within the left pleural space suggests the presence of left-sided empyema. Consolidation is identified within the lingular segment as well as left lower lobe lung parenchyma; underlying neoplasm cannot be excluded.   2. Gastric wall thickening is identified allowing for nondistention with oral contrast; clinicalcorrelation is suggested in light of the provided clinical history of gastric lymphoma.  3. An 8 mm calculus is identified within the left-sided extrarenal pelvis, unchanged. There is no change in a 6 mm calculus identified within the distal right ureter at the level of the UVJ, without evidence for obstructive uropathy. No evidence for bilateral hydronephrosis.      EXAM:  CT CHEST                            PROCEDURE DATE:  03/01/2020          INTERPRETATION:  Clinical information: Left-sided empyema    Comparison exam dated 2/25/2020    Axial images obtained, coronal and sagittal images computer reformatted    Noncontrast exam limits evaluation of hilar and mediastinal regions.    A left-sided pigtail catheter is present in the left pleural space. Loculated effusion with associated dense consolidation is present, the appearance is slightly more prominentsince the prior exam.    No thoracic aortic aneurysm or pericardial effusion. Cardiomegaly present. Coronary artery calcifications identified. The central airway appears intact. The thyroid gland is not enlarged.    Minimal atelectatic change right lung base. Calcification visible in the right pleura. Peribronchial thickening right infrahilar region. No pneumothorax.    The stomach is filled with food and air. A large gallstone is noted. The adrenal glands are not enlarged. The spleen is not enlarged. No acute appearing osseous abnormalities. Soft tissue anchors right humeral head. Air bubbles visible in the subcutaneous soft tissues left lateral lower thorax.    IMPRESSION: A left-sided pigtail catheter is present in the pleural space. Loculated effusion with associated dense consolidation is present, appearance slightly more prominent when compared to prior exam.        EXAM:  US KIDNEYS AND BLADDER                            PROCEDURE DATE:  03/01/2020          INTERPRETATION:    PROCEDURE INFORMATION:   Exam: US Retroperitoneal Limited, Kidneys   Exam date and time: 3/1/2020 6:31 AM   Age: 89 years old   Clinical indication: Other: Gus     TECHNIQUE:   Imaging protocol: Real-time ultrasound of the retroperitoneum with image   documentation. Examination was focused on the kidneys.     COMPARISON:   No relevant prior studies available.     FINDINGS:   Right kidney: The right kidney measures 11.8 cm in length. Normal parenchymal   echogenicity. No hydronephrosis.   Left kidney: The left kidney measures 11.7 cm in length. Normal parenchymal   echogenicity. Slightly irregular margin. No hydronephrosis.   Prostate: The prostate measures up to 3.7 cm in size.   Bladder: Bladder volume calculated to be 104 mL. No bladder wall thickening   allowing for lack of full distention. No bladder calculus.     IMPRESSION:   No hydronephrosis.        Assessment :   CHARLY LU is a 89y Male PMH CLL, gastric lymphoma ex smoker, hx of asbestos exp presenting to the hospital with cough, fever and left sided pleuritic chest pain. Febrile to 102 found to have left sided empyema. Seen by pulm and CT surgery. Sp IR drainage with pigtail placement 2/2/7/2020. Transferred to ICU sec tpa/dornase therapy through pigtail sec complications of hematuria. Had large clot and nick blood/mixed with urine. Mcnulty irrigation performed complicated with GUS. Worsening GUS- started on HD per renal. Was on TPA/dornase via pigtail. Pigtail removed 3/10/2020. Renal indices stable. No further HD per renal and to be monitored closely. Overall stable.      Plan :  Monitor off antibiotics  Completed course of Zosyn   Trend temps and cbc  Trend renal fx  Fu Renal   Stable from ID standpoint      Continue with present regiment.  Appropriate use of antibiotics and adverse effects reviewed.      I have discussed the above plan of care with patient/ family in detail. They expressed understanding of the  treatment plan . Risks, benefits and alternatives discussed in detail. I have asked if they have any questions or concerns and appropriately addressed them to the best of my ability .    25 minutes were spent in direct patient care reviewing notes, medications ,labs data/ imaging , discussion with multidisciplinary team.    Thank you for allowing me to participate in care of your patient .    Jose Kiran MD  Infectious Disease  [phone]

## 2020-03-17 NOTE — PROGRESS NOTE ADULT - ASSESSMENT
89 white male with a history of GERD, HLD, now with empyema on the left S/P IR drainage and is on alteplase.   Developed GUS and had two dialysis treatments. Now the urine out put is good. Renal indices are stable.   spoke to patient's son and PMD at the hospital.   Patient can be discharged to [facility] and can be monitored closely.

## 2020-03-17 NOTE — PROGRESS NOTE ADULT - ASSESSMENT
89 yo M with PMH of HLD and MALToma of the stomach (under surveillance), spinal stenosis with extensive lumbar surgery in 2011 and bilateral LE neuropathy, admitted for L side empyema and R ureteral stone. Patient initially reported anginal symptoms, s/p ICU stay for empyema, now on telemetry floor.    CHEST PAIN  - resolved, no further reports of pain  - EKG has suggested possible inferior wall MI, no wall motion abnormality on ECHO.   - No further ischemic work up inpatient  - Continue with statin    A fib  - rate controlled no further Rafib on tele can D/C tele  - Continue amiodarone 400 TID to 5 g load, than 200 QD  - No AC for now given history of hematuria with anemia and advanced age      GUS  - s/p HD thursday- Dialysis catheter removed. Family to discuss on extent of care- follow up renal recs     Left empyema:      - s/p IR chest tube placement on 2/27.   - s/p tPA and dornase tx.      Anemia  work up treatment as per primary team , hgb 8.2  today       - Plan for family meeting on Tuesday 9 am - to discuss GOC  - Monitor and replete lytes, keep K>4, Mg>2.  - All other medical needs as per primary team.  - Other cardiovascular workup will depend on clinical course.  - Will continue to follow.  Arnoldo Rosales [facility]  Cardiology   Spectra #[phone] 89 yo M with PMH of HLD and MALToma of the stomach (under surveillance), spinal stenosis with extensive lumbar surgery in 2011 and bilateral LE neuropathy, admitted for L side empyema and R ureteral stone. Patient initially reported anginal symptoms, s/p ICU stay for empyema, now on telemetry floor.    CHEST PAIN  - resolved, no further reports of pain  - EKG has suggested possible inferior wall MI, no wall motion abnormality on ECHO.   - No further ischemic work up inpatient  - Continue with statin    A fib  - now in sr  - Continue amiodarone 400 TID to 5 g load, than 200 QD  - No AC for now given history of hematuria with anemia and advanced age   - dc tele     GUS  - s/p HD thursday- Dialysis catheter removed. Family to discuss on extent of care- follow up renal recs     Left empyema:      - s/p IR chest tube placement on 2/27.   - s/p tPA and dornase tx.      Anemia  work up treatment as per primary team , hgb 8.2  today       - Plan for family meeting   to discuss GOC  - Monitor and replete lytes, keep K>4, Mg>2.  - All other medical needs as per primary team.  - Other cardiovascular workup will depend on clinical course.  - Will continue to follow.  Arnoldo Rosales [facility]  Cardiology   Spectra #[phone]

## 2020-03-28 LAB
CULTURE RESULTS: SIGNIFICANT CHANGE UP
SPECIMEN SOURCE: SIGNIFICANT CHANGE UP

## 2020-04-09 ENCOUNTER — APPOINTMENT (OUTPATIENT)
Dept: CARDIOLOGY | Facility: CLINIC | Age: 85
End: 2020-04-09

## 2020-04-09 VITALS — HEIGHT: 71 IN | BODY MASS INDEX: 23.8 KG/M2 | WEIGHT: 170 LBS

## 2020-04-09 NOTE — PHYSICAL EXAM
[General Appearance - Well Developed] : well developed [Normal Appearance] : normal appearance [Well Groomed] : well groomed [General Appearance - Well Nourished] : well nourished [No Deformities] : no deformities [General Appearance - In No Acute Distress] : no acute distress [Normal Conjunctiva] : the conjunctiva exhibited no abnormalities [Normal Oral Mucosa] : normal oral mucosa [No Oral Pallor] : no oral pallor [No Oral Cyanosis] : no oral cyanosis [Exaggerated Use Of Accessory Muscles For Inspiration] : no accessory muscle use [Heart Rate And Rhythm] : heart rate and rhythm were normal [Abdomen Soft] : soft [FreeTextEntry1] : spinal stenosis  [Nail Clubbing] : no clubbing of the fingernails [Cyanosis, Localized] : no localized cyanosis [Petechial Hemorrhages (___cm)] : no petechial hemorrhages [] : no ischemic changes [Skin Color & Pigmentation] : normal skin color and pigmentation [Oriented To Time, Place, And Person] : oriented to person, place, and time

## 2020-04-09 NOTE — DISCUSSION/SUMMARY
[Hypertension] : hypertension [Patient] : the patient [FreeTextEntry1] : Patient will continue suspension of amiodarone. Patient will be encouraged to drink fluids. Discussed case with visiting nurse. Will followup daily blood pressures. Will request hospice consultation. We'll followup evaluation and coordination a visiting nurse in 5 days.

## 2020-04-09 NOTE — HISTORY OF PRESENT ILLNESS
[Home] : at home, [unfilled] , at the time of the visit. [Medical Office: ([facility])___] : at ~his/her~ medical office located in [place] [Family Member] : family member [Other:____] : [unfilled] [Patient] : the patient [Self] : self [FreeTextEntry1] : initiated 1:45pm\par 89-year-old male recently discharged from [facility] after a hospitalization at [facility]. Patient had a complicated course including drainage of an empyema acute renal insufficiency.Patient experienced paroxysmal A. show fibrillation new onset while in [place] and was begun on amiodarone after loading but discontinued.\par Patient was seen on telemetry health in the presence of his daughter and nursing support. Patient denies chest pain or shortness of breath but is somewhat lethargic. Labs are reviewed from recent venipuncture [FreeTextEntry2] : Fili Winters [FreeTextEntry4] : MAYRA Sandhu

## 2020-04-13 ENCOUNTER — APPOINTMENT (OUTPATIENT)
Dept: CARDIOLOGY | Facility: CLINIC | Age: 85
End: 2020-04-13
Payer: MEDICARE

## 2020-04-13 VITALS — DIASTOLIC BLOOD PRESSURE: 58 MMHG | HEIGHT: 71 IN | SYSTOLIC BLOOD PRESSURE: 80 MMHG

## 2020-04-13 PROCEDURE — 99214 OFFICE O/P EST MOD 30 MIN: CPT | Mod: 95

## 2020-04-13 RX ORDER — CHLORHEXIDINE GLUCONATE 4 %
325 (65 FE) LIQUID (ML) TOPICAL DAILY
Refills: 0 | Status: DISCONTINUED | COMMUNITY
End: 2020-04-13

## 2020-04-13 RX ORDER — DRONABINOL 2.5 MG/1
2.5 CAPSULE ORAL TWICE DAILY
Refills: 0 | Status: DISCONTINUED | COMMUNITY
End: 2020-04-13

## 2020-04-13 NOTE — DISCUSSION/SUMMARY
[FreeTextEntry1] : Pt will continue current medications. Pt will engage hospice. Pt's family will continue to nourish patient as tolerated. Hospice referral was made.

## 2020-04-13 NOTE — PHYSICAL EXAM
[General Appearance - Well Developed] : well developed [] : no respiratory distress [Edema] : no peripheral edema present [FreeTextEntry1] : lethargic [Nail Clubbing] : no clubbing of the fingernails [Cyanosis, Localized] : no localized cyanosis

## 2020-04-18 LAB
CULTURE RESULTS: SIGNIFICANT CHANGE UP
SPECIMEN SOURCE: SIGNIFICANT CHANGE UP

## 2020-04-20 ENCOUNTER — APPOINTMENT (OUTPATIENT)
Dept: CARDIOLOGY | Facility: CLINIC | Age: 85
End: 2020-04-20
Payer: MEDICARE

## 2020-04-20 VITALS
DIASTOLIC BLOOD PRESSURE: 60 MMHG | SYSTOLIC BLOOD PRESSURE: 98 MMHG | HEIGHT: 71 IN | BODY MASS INDEX: 23.8 KG/M2 | WEIGHT: 170 LBS

## 2020-04-20 PROCEDURE — 99213 OFFICE O/P EST LOW 20 MIN: CPT | Mod: 95,GV

## 2020-04-20 RX ORDER — CALCIUM ACETATE 667 MG/1
667 TABLET ORAL DAILY
Refills: 0 | Status: DISCONTINUED | COMMUNITY
End: 2020-04-20

## 2020-04-20 RX ORDER — FOLIC ACID/VIT B COMPLEX AND C 0.8 MG
TABLET ORAL DAILY
Refills: 0 | Status: DISCONTINUED | COMMUNITY
End: 2020-04-20

## 2020-04-20 NOTE — PHYSICAL EXAM
[General Appearance - Well Developed] : well developed [No Oral Cyanosis] : no oral cyanosis [No Oral Pallor] : no oral pallor [Normal Jugular Venous A Waves Present] : normal jugular venous A waves present [] : no respiratory distress [Arterial Pulses Normal] : the arterial pulses were normal [Edema] : no peripheral edema present [Abdomen Soft] : soft [FreeTextEntry1] : lethargic [Cyanosis, Localized] : no localized cyanosis [Skin Color & Pigmentation] : normal skin color and pigmentation [Oriented To Time, Place, And Person] : oriented to person, place, and time

## 2020-04-20 NOTE — DISCUSSION/SUMMARY
[FreeTextEntry1] : Pt will continue hospice care. Pt and children understand the plan of care and agree with the withdrawal of medications unnecessary at this time.

## 2020-04-20 NOTE — HISTORY OF PRESENT ILLNESS
[Home] : at home, [unfilled] , at the time of the visit. [Medical Office: ([facility])___] : at the medical office located in  [Patient] : the patient [Self] : self [FreeTextEntry1] : Initiated at 1:27pm\par Pt presents with his daughter and son on telemedicine. Pt was recently enrolled in Home hospice and is relatively comfortable except reports arthritic pain at times. Medications were reviewed with daughter and son (an MD). Pt has refused HD and medications were adjusted.  [FreeTextEntry2] : Fili Winters  [FreeTextEntry4] : Idania Blank MA

## 2020-05-18 ENCOUNTER — APPOINTMENT (OUTPATIENT)
Dept: CARDIOLOGY | Facility: CLINIC | Age: 85
End: 2020-05-18
Payer: MEDICARE

## 2020-05-18 DIAGNOSIS — R20.2 PARESTHESIA OF SKIN: ICD-10-CM

## 2020-05-18 PROCEDURE — 99214 OFFICE O/P EST MOD 30 MIN: CPT | Mod: 95

## 2020-05-18 NOTE — HISTORY OF PRESENT ILLNESS
[Medical Office: ([facility])___] : at the medical office located in  [Home] : at home, [unfilled] , at the time of the visit. [Patient] : the patient [Self] : self [Family Member] : family member [FreeTextEntry2] : harmony martines [FreeTextEntry4] : kristin stone ma [FreeTextEntry1] : initiated at 2:57pm

## 2020-05-18 NOTE — PHYSICAL EXAM
[General Appearance - Well Developed] : well developed [Normal Appearance] : normal appearance [Well Groomed] : well groomed [General Appearance - Well Nourished] : well nourished [No Deformities] : no deformities [General Appearance - In No Acute Distress] : no acute distress [Normal Conjunctiva] : the conjunctiva exhibited no abnormalities [Eyelids - No Xanthelasma] : the eyelids demonstrated no xanthelasmas [Normal Oral Mucosa] : normal oral mucosa [No Oral Pallor] : no oral pallor [No Oral Cyanosis] : no oral cyanosis [Normal Jugular Venous A Waves Present] : normal jugular venous A waves present [Normal Jugular Venous V Waves Present] : normal jugular venous V waves present [No Jugular Venous Marion A Waves] : no jugular venous marion A waves [Respiration, Rhythm And Depth] : normal respiratory rhythm and effort [Exaggerated Use Of Accessory Muscles For Inspiration] : no accessory muscle use [Auscultation Breath Sounds / Voice Sounds] : lungs were clear to auscultation bilaterally [Heart Rate And Rhythm] : heart rate and rhythm were normal [Heart Sounds] : normal S1 and S2 [Arterial Pulses Normal] : the arterial pulses were normal [Edema] : no peripheral edema present [Abdomen Soft] : soft [Abdomen Tenderness] : non-tender [Abdomen Mass (___ Cm)] : no abdominal mass palpated [Abnormal Walk] : normal gait [Gait - Sufficient For Exercise Testing] : the gait was sufficient for exercise testing [FreeTextEntry1] : lethargic [Nail Clubbing] : no clubbing of the fingernails [Cyanosis, Localized] : no localized cyanosis [Petechial Hemorrhages (___cm)] : no petechial hemorrhages [] : no ischemic changes [Skin Color & Pigmentation] : normal skin color and pigmentation [Oriented To Time, Place, And Person] : oriented to person, place, and time

## 2020-05-23 DIAGNOSIS — Z00.00 ENCOUNTER FOR GENERAL ADULT MEDICAL EXAMINATION W/OUT ABNORMAL FINDINGS: ICD-10-CM

## 2020-05-29 ENCOUNTER — LABORATORY RESULT (OUTPATIENT)
Age: 85
End: 2020-05-29

## 2020-05-30 LAB
25(OH)D3 SERPL-MCNC: 12.5 NG/ML
ALBUMIN SERPL ELPH-MCNC: 3.4 G/DL
ALP BLD-CCNC: 62 U/L
ALT SERPL-CCNC: 11 U/L
ANION GAP SERPL CALC-SCNC: 13 MMOL/L
AST SERPL-CCNC: 10 U/L
BASOPHILS # BLD AUTO: 0.03 K/UL
BASOPHILS NFR BLD AUTO: 0.5 %
BILIRUB SERPL-MCNC: 0.3 MG/DL
BUN SERPL-MCNC: 36 MG/DL
CALCIUM SERPL-MCNC: 8.5 MG/DL
CHLORIDE SERPL-SCNC: 104 MMOL/L
CHOLEST SERPL-MCNC: 139 MG/DL
CHOLEST/HDLC SERPL: 5 RATIO
CO2 SERPL-SCNC: 21 MMOL/L
CREAT SERPL-MCNC: 2.71 MG/DL
EOSINOPHIL # BLD AUTO: 0.2 K/UL
EOSINOPHIL NFR BLD AUTO: 3.7 %
ESTIMATED AVERAGE GLUCOSE: 94 MG/DL
FOLATE SERPL-MCNC: 12.4 NG/ML
GLUCOSE SERPL-MCNC: 99 MG/DL
HBA1C MFR BLD HPLC: 4.9 %
HCT VFR BLD CALC: 26 %
HDLC SERPL-MCNC: 28 MG/DL
HGB BLD-MCNC: 7.8 G/DL
IMM GRANULOCYTES NFR BLD AUTO: 0.4 %
LDLC SERPL CALC-MCNC: 86 MG/DL
LYMPHOCYTES # BLD AUTO: 2.03 K/UL
LYMPHOCYTES NFR BLD AUTO: 37.1 %
MAGNESIUM SERPL-MCNC: 2 MG/DL
MAN DIFF?: NORMAL
MCHC RBC-ENTMCNC: 29.5 PG
MCHC RBC-ENTMCNC: 30 GM/DL
MCV RBC AUTO: 98.5 FL
MONOCYTES # BLD AUTO: 0.6 K/UL
MONOCYTES NFR BLD AUTO: 11 %
NEUTROPHILS # BLD AUTO: 2.59 K/UL
NEUTROPHILS NFR BLD AUTO: 47.3 %
PLATELET # BLD AUTO: 171 K/UL
POTASSIUM SERPL-SCNC: 5 MMOL/L
PROT SERPL-MCNC: 5.5 G/DL
RBC # BLD: 2.64 M/UL
RBC # FLD: 20 %
SARS-COV-2 IGG SERPL IA-ACNC: 5.52 INDEX
SARS-COV-2 IGG SERPL QL IA: POSITIVE
SODIUM SERPL-SCNC: 138 MMOL/L
TRIGL SERPL-MCNC: 126 MG/DL
TSH SERPL-ACNC: 6.47 UIU/ML
VIT B12 SERPL-MCNC: 708 PG/ML
WBC # FLD AUTO: 5.47 K/UL

## 2020-07-13 ENCOUNTER — EMERGENCY (EMERGENCY)
Facility: HOSPITAL | Age: 85
LOS: 1 days | Discharge: HOME | End: 2020-07-13
Attending: EMERGENCY MEDICINE | Admitting: EMERGENCY MEDICINE
Payer: MEDICARE

## 2020-07-13 VITALS
SYSTOLIC BLOOD PRESSURE: 103 MMHG | HEART RATE: 76 BPM | OXYGEN SATURATION: 97 % | TEMPERATURE: 98 F | DIASTOLIC BLOOD PRESSURE: 65 MMHG | RESPIRATION RATE: 16 BRPM

## 2020-07-13 VITALS
RESPIRATION RATE: 18 BRPM | HEART RATE: 81 BPM | SYSTOLIC BLOOD PRESSURE: 99 MMHG | DIASTOLIC BLOOD PRESSURE: 61 MMHG | OXYGEN SATURATION: 97 %

## 2020-07-13 PROCEDURE — 70450 CT HEAD/BRAIN W/O DYE: CPT

## 2020-07-13 PROCEDURE — 70450 CT HEAD/BRAIN W/O DYE: CPT | Mod: 26

## 2020-07-13 PROCEDURE — 99283 EMERGENCY DEPT VISIT LOW MDM: CPT

## 2020-07-13 PROCEDURE — 99284 EMERGENCY DEPT VISIT MOD MDM: CPT | Mod: 25

## 2020-07-13 NOTE — ED PROVIDER NOTE - OBJECTIVE STATEMENT
89yo male bib ems from home hospice with head injury. pt states eh got up to go to the bathroom and fell and hit his head, no LOC< pt has abrasion to head, no neck or back pain, no other complaints

## 2020-07-13 NOTE — ED ADULT NURSE NOTE - PMH
Chronic Lymphocytic Leukemia  followed by oncologist in [place]  Diverticulitis    GERD (Gastroesophageal Reflux Disease)    GI Bleed  2007  Herniated Disc    Hyperlipidemia    Osteoarthritis    Spinal Stenosis    Stomach Ulcer  H pylori 2007, treated with antibiotics Chronic Lymphocytic Leukemia  followed by oncologist in [place]  Dementia    Diverticulitis    GERD (Gastroesophageal Reflux Disease)    GI Bleed  2007  Herniated Disc    Hyperlipidemia    Osteoarthritis    Spinal Stenosis    Stomach Ulcer  H pylori 2007, treated with antibiotics

## 2020-07-13 NOTE — ED PROVIDER NOTE - PATIENT PORTAL LINK FT
You can access the FollowMyHealth Patient Portal offered by [facility] by registering at the following website: [email or web address]. By joining [employer]’s FollowMyHealth portal, you will also be able to view your health information using other applications (apps) compatible with our system.

## 2020-07-13 NOTE — ED ADULT NURSE NOTE - OBJECTIVE STATEMENT
[facility] as reported fell from bed and hit the corner of the chair sustained a laceration to back of head with controlled bleeding no LOC denies any pain was seen and evaluated by MD with order for CT scan and was brought to CT scan via stretcher.

## 2020-07-13 NOTE — ED ADULT NURSE REASSESSMENT NOTE - NS ED NURSE REASSESS COMMENT FT1
Called [facility]-daughter asked if someone can  the patient she said she wont have the capacity to transfer him; called [facility] and advised that patient is going back home. Transport arranged arrival in 60-90 mins. Patient asleep at this time.
Called daughter Esperanza to advise about result of CT scan and that patient is going home ; transport will be arranged 60-90 minutes from now.
Patient brought back from CT scan awaiting result.
Patient brought down for CT scan via stretcher
Patient asleep in bed waiting for transport back to facility.

## 2020-07-13 NOTE — ED ADULT NURSE NOTE - NSIMPLEMENTINTERV_GEN_ALL_ED
Implemented All Universal Safety Interventions:  [place] to call system. Call bell, personal items and telephone within reach. Instruct patient to call for assistance. Room bathroom lighting operational. Non-slip footwear when patient is off stretcher. Physically safe environment: no spills, clutter or unnecessary equipment. Stretcher in lowest position, wheels locked, appropriate side rails in place.

## 2020-07-13 NOTE — ED ADULT TRIAGE NOTE - CHIEF COMPLAINT QUOTE
89 yr old male c/o fall. Laceration to the posterior head. Denies LOC. Arrived via ems. Hospice services in use. Not on anticoagulants

## 2020-09-08 ENCOUNTER — RX RENEWAL (OUTPATIENT)
Age: 85
End: 2020-09-08

## 2020-10-14 PROBLEM — F03.90 UNSPECIFIED DEMENTIA WITHOUT BEHAVIORAL DISTURBANCE: Chronic | Status: ACTIVE | Noted: 2020-07-13

## 2020-11-02 ENCOUNTER — APPOINTMENT (OUTPATIENT)
Dept: CARDIOLOGY | Facility: CLINIC | Age: 85
End: 2020-11-02

## 2021-01-29 NOTE — DISCHARGE NOTE NURSING/CASE MANAGEMENT/SOCIAL WORK - NSDPFAC_GEN_ALL_CORE
Pulmonary/Critical Care Follow Up Note    HPI:   Mickey Nobles is a 80year old female with Patient presents with:  Seizure Disorder  Difficulty Breathing      PCP Ho Garcia MD  Admission Attending Denise [facility] MD    Hospital Day #12    No distres Base) MCG/ACT inhaler 2 puff, 2 puff, Inhalation, QID  •  dexamethasone (DECADRON) tab 6 mg, 6 mg, Oral, Daily  •  sodium chloride 0.9% IV bolus 500 mL, 500 mL, Intravenous, PRN  •  Enoxaparin Sodium (LOVENOX) 40 MG/0.4ML injection 40 mg, 40 mg, Subcutaneo Value Date    WBC 15.9 01/29/2021    HGB 13.7 01/29/2021    HCT 42.3 01/29/2021    .0 01/29/2021           ASSESSMENT/PLAN:     Syncope  Billed as Sz initially  H/o Sz d/o   Plan        Tele                Sz precautions                [place]     [place]

## 2021-06-02 NOTE — ED PROVIDER NOTE - DURATION
This is a Urgent message from Gayla Menchaca at [facility]    She is calling to have the therapy changed from Physical Therapy to Occupational Therapy. Original order date 5/21 #[id number]    Therapist would like this done as soon as possible. today

## 2021-07-01 DIAGNOSIS — E78.00 PURE HYPERCHOLESTEROLEMIA, UNSPECIFIED: ICD-10-CM

## 2021-07-01 DIAGNOSIS — C85.90 NON-HODGKIN LYMPHOMA, UNSPECIFIED, UNSPECIFIED SITE: ICD-10-CM

## 2021-07-01 DIAGNOSIS — M48.00 SPINAL STENOSIS, SITE UNSPECIFIED: ICD-10-CM

## 2021-07-01 RX ORDER — ERGOCALCIFEROL 1.25 MG/1
1.25 MG CAPSULE, LIQUID FILLED ORAL
Qty: 12 | Refills: 2 | Status: ACTIVE | COMMUNITY
Start: 2020-06-01 | End: 1900-01-01

## 2021-07-01 RX ORDER — SENNOSIDES 8.6 MG TABLETS 8.6 MG/1
8.6 TABLET ORAL TWICE DAILY
Qty: 360 | Refills: 0 | Status: ACTIVE | COMMUNITY
Start: 2021-07-01 | End: 1900-01-01

## 2021-09-30 ENCOUNTER — APPOINTMENT (OUTPATIENT)
Dept: CARDIOLOGY | Facility: CLINIC | Age: 86
End: 2021-09-30
Payer: MEDICARE

## 2021-09-30 PROCEDURE — 99213 OFFICE O/P EST LOW 20 MIN: CPT | Mod: 95

## 2021-09-30 RX ORDER — OMEPRAZOLE 40 MG/1
40 CAPSULE, DELAYED RELEASE ORAL
Qty: 90 | Refills: 1 | Status: DISCONTINUED | COMMUNITY
End: 2021-09-30

## 2021-09-30 NOTE — PHYSICAL EXAM
[Frail] : frail [Normal Conjunctiva] : normal conjunctiva [Normal Venous Pressure] : normal venous pressure [Good Air Entry] : good air entry [No Respiratory Distress] : no respiratory distress  [No Edema] : no edema [No Rash] : no rash [Normal Speech] : normal speech [Cognitive Impairment] : cognitive impairment [de-identified] : lethargic

## 2021-09-30 NOTE — DISCUSSION/SUMMARY
[Patient] : the patient [FreeTextEntry4] : lethargy [FreeTextEntry1] : Haldol will be renewed. Hospice care will possibly reinstituted. Dtr [facility] will initiate and if difficulty we will refer.

## 2021-09-30 NOTE — HISTORY OF PRESENT ILLNESS
[Home] : at home, [unfilled] , at the time of the visit. [Medical Office: ([facility])___] : at the medical office located in  [Family Member] : family member [Verbal consent obtained from patient] : the patient, [unfilled] [FreeTextEntry1] : Started at 5:30pm 89 yo patient in the presence of his daughter and aide with his son on conference call from [facility]. Pt is lethargic but responsive. He states he is feeling comfortable without pain or discomfort. Discussed evening sedation with the patient and prn haldol. Pt has experienced a decline lately and daughter and son may have to reinstitute hospice care. Pt is eating minimally and taking minimal fluids. BMs are often loose.  [FreeTextEntry3] : Esperanza Hairston- Daughter

## 2021-10-02 ENCOUNTER — INPATIENT (INPATIENT)
Facility: HOSPITAL | Age: 86
LOS: 0 days | Discharge: ANOTHER HOSPITAL | DRG: 872 | End: 2021-10-03
Attending: STUDENT IN AN ORGANIZED HEALTH CARE EDUCATION/TRAINING PROGRAM | Admitting: STUDENT IN AN ORGANIZED HEALTH CARE EDUCATION/TRAINING PROGRAM
Payer: MEDICARE

## 2021-10-02 VITALS
DIASTOLIC BLOOD PRESSURE: 76 MMHG | WEIGHT: 160.06 LBS | HEART RATE: 134 BPM | TEMPERATURE: 102 F | SYSTOLIC BLOOD PRESSURE: 112 MMHG | OXYGEN SATURATION: 97 % | RESPIRATION RATE: 28 BRPM | HEIGHT: 70 IN

## 2021-10-02 DIAGNOSIS — A41.9 SEPSIS, UNSPECIFIED ORGANISM: ICD-10-CM

## 2021-10-02 LAB
ALBUMIN SERPL ELPH-MCNC: 3.2 G/DL — LOW (ref 3.3–5)
ALP SERPL-CCNC: 87 U/L — SIGNIFICANT CHANGE UP (ref 30–120)
ALT FLD-CCNC: <10 U/L DA — LOW (ref 10–60)
ANION GAP SERPL CALC-SCNC: 10 MMOL/L — SIGNIFICANT CHANGE UP (ref 5–17)
APPEARANCE UR: CLEAR — SIGNIFICANT CHANGE UP
APTT BLD: 29.3 SEC — SIGNIFICANT CHANGE UP (ref 27.5–35.5)
AST SERPL-CCNC: 12 U/L — SIGNIFICANT CHANGE UP (ref 10–40)
BASOPHILS # BLD AUTO: 0.02 K/UL — SIGNIFICANT CHANGE UP (ref 0–0.2)
BASOPHILS NFR BLD AUTO: 0.2 % — SIGNIFICANT CHANGE UP (ref 0–2)
BILIRUB SERPL-MCNC: 1.1 MG/DL — SIGNIFICANT CHANGE UP (ref 0.2–1.2)
BILIRUB UR-MCNC: NEGATIVE — SIGNIFICANT CHANGE UP
BUN SERPL-MCNC: 30 MG/DL — HIGH (ref 7–23)
CALCIUM SERPL-MCNC: 8.7 MG/DL — SIGNIFICANT CHANGE UP (ref 8.4–10.5)
CHLORIDE SERPL-SCNC: 100 MMOL/L — SIGNIFICANT CHANGE UP (ref 96–108)
CO2 SERPL-SCNC: 26 MMOL/L — SIGNIFICANT CHANGE UP (ref 22–31)
COLOR SPEC: YELLOW — SIGNIFICANT CHANGE UP
CREAT SERPL-MCNC: 1.83 MG/DL — HIGH (ref 0.5–1.3)
DIFF PNL FLD: NEGATIVE — SIGNIFICANT CHANGE UP
EOSINOPHIL # BLD AUTO: 0.04 K/UL — SIGNIFICANT CHANGE UP (ref 0–0.5)
EOSINOPHIL NFR BLD AUTO: 0.5 % — SIGNIFICANT CHANGE UP (ref 0–6)
GLUCOSE SERPL-MCNC: 193 MG/DL — HIGH (ref 70–99)
GLUCOSE UR QL: NEGATIVE MG/DL — SIGNIFICANT CHANGE UP
HCT VFR BLD CALC: 33.5 % — LOW (ref 39–50)
HGB BLD-MCNC: 11 G/DL — LOW (ref 13–17)
IMM GRANULOCYTES NFR BLD AUTO: 0.4 % — SIGNIFICANT CHANGE UP (ref 0–1.5)
INR BLD: 1.38 RATIO — HIGH (ref 0.88–1.16)
KETONES UR-MCNC: NEGATIVE — SIGNIFICANT CHANGE UP
LACTATE SERPL-SCNC: 2 MMOL/L — SIGNIFICANT CHANGE UP (ref 0.7–2)
LEUKOCYTE ESTERASE UR-ACNC: ABNORMAL
LIDOCAIN IGE QN: 56 U/L — LOW (ref 73–393)
LYMPHOCYTES # BLD AUTO: 0.97 K/UL — LOW (ref 1–3.3)
LYMPHOCYTES # BLD AUTO: 11.8 % — LOW (ref 13–44)
MAGNESIUM SERPL-MCNC: 3.4 MG/DL — HIGH (ref 1.6–2.6)
MCHC RBC-ENTMCNC: 31.5 PG — SIGNIFICANT CHANGE UP (ref 27–34)
MCHC RBC-ENTMCNC: 32.8 GM/DL — SIGNIFICANT CHANGE UP (ref 32–36)
MCV RBC AUTO: 96 FL — SIGNIFICANT CHANGE UP (ref 80–100)
MONOCYTES # BLD AUTO: 0.8 K/UL — SIGNIFICANT CHANGE UP (ref 0–0.9)
MONOCYTES NFR BLD AUTO: 9.7 % — SIGNIFICANT CHANGE UP (ref 2–14)
NEUTROPHILS # BLD AUTO: 6.36 K/UL — SIGNIFICANT CHANGE UP (ref 1.8–7.4)
NEUTROPHILS NFR BLD AUTO: 77.4 % — HIGH (ref 43–77)
NITRITE UR-MCNC: NEGATIVE — SIGNIFICANT CHANGE UP
NRBC # BLD: 0 /100 WBCS — SIGNIFICANT CHANGE UP (ref 0–0)
PH UR: 5 — SIGNIFICANT CHANGE UP (ref 5–8)
PHOSPHATE SERPL-MCNC: 3.3 MG/DL — SIGNIFICANT CHANGE UP (ref 2.5–4.5)
PLATELET # BLD AUTO: 86 K/UL — LOW (ref 150–400)
POTASSIUM SERPL-MCNC: 4.4 MMOL/L — SIGNIFICANT CHANGE UP (ref 3.5–5.3)
POTASSIUM SERPL-SCNC: 4.4 MMOL/L — SIGNIFICANT CHANGE UP (ref 3.5–5.3)
PROT SERPL-MCNC: 6 G/DL — SIGNIFICANT CHANGE UP (ref 6–8.3)
PROT UR-MCNC: 15 MG/DL
PROTHROM AB SERPL-ACNC: 16.5 SEC — HIGH (ref 10.6–13.6)
RAPID RVP RESULT: SIGNIFICANT CHANGE UP
RBC # BLD: 3.49 M/UL — LOW (ref 4.2–5.8)
RBC # FLD: 12.7 % — SIGNIFICANT CHANGE UP (ref 10.3–14.5)
SARS-COV-2 RNA SPEC QL NAA+PROBE: SIGNIFICANT CHANGE UP
SODIUM SERPL-SCNC: 136 MMOL/L — SIGNIFICANT CHANGE UP (ref 135–145)
SP GR SPEC: 1.02 — SIGNIFICANT CHANGE UP (ref 1.01–1.02)
UROBILINOGEN FLD QL: NEGATIVE MG/DL — SIGNIFICANT CHANGE UP
WBC # BLD: 8.22 K/UL — SIGNIFICANT CHANGE UP (ref 3.8–10.5)
WBC # FLD AUTO: 8.22 K/UL — SIGNIFICANT CHANGE UP (ref 3.8–10.5)

## 2021-10-02 PROCEDURE — 71250 CT THORAX DX C-: CPT | Mod: 26,MA

## 2021-10-02 PROCEDURE — 99223 1ST HOSP IP/OBS HIGH 75: CPT

## 2021-10-02 PROCEDURE — 99291 CRITICAL CARE FIRST HOUR: CPT | Mod: CS

## 2021-10-02 PROCEDURE — 93010 ELECTROCARDIOGRAM REPORT: CPT | Mod: 76

## 2021-10-02 PROCEDURE — 70450 CT HEAD/BRAIN W/O DYE: CPT | Mod: 26,MA

## 2021-10-02 PROCEDURE — 71045 X-RAY EXAM CHEST 1 VIEW: CPT | Mod: 26

## 2021-10-02 PROCEDURE — 76705 ECHO EXAM OF ABDOMEN: CPT | Mod: 26,RT

## 2021-10-02 PROCEDURE — 74176 CT ABD & PELVIS W/O CONTRAST: CPT | Mod: 26,MA

## 2021-10-02 RX ORDER — LANOLIN ALCOHOL/MO/W.PET/CERES
3 CREAM (GRAM) TOPICAL AT BEDTIME
Refills: 0 | Status: DISCONTINUED | OUTPATIENT
Start: 2021-10-02 | End: 2021-10-03

## 2021-10-02 RX ORDER — RISPERIDONE 4 MG/1
2 TABLET ORAL AT BEDTIME
Refills: 0 | Status: DISCONTINUED | OUTPATIENT
Start: 2021-10-02 | End: 2021-10-03

## 2021-10-02 RX ORDER — ATORVASTATIN CALCIUM 80 MG/1
1 TABLET, FILM COATED ORAL
Qty: 0 | Refills: 0 | DISCHARGE

## 2021-10-02 RX ORDER — HEPARIN SODIUM 5000 [USP'U]/ML
5000 INJECTION INTRAVENOUS; SUBCUTANEOUS EVERY 12 HOURS
Refills: 0 | Status: DISCONTINUED | OUTPATIENT
Start: 2021-10-02 | End: 2021-10-03

## 2021-10-02 RX ORDER — PIPERACILLIN AND TAZOBACTAM 4; .5 G/20ML; G/20ML
3.38 INJECTION, POWDER, LYOPHILIZED, FOR SOLUTION INTRAVENOUS ONCE
Refills: 0 | Status: COMPLETED | OUTPATIENT
Start: 2021-10-02 | End: 2021-10-02

## 2021-10-02 RX ORDER — SODIUM CHLORIDE 9 MG/ML
2000 INJECTION INTRAMUSCULAR; INTRAVENOUS; SUBCUTANEOUS ONCE
Refills: 0 | Status: COMPLETED | OUTPATIENT
Start: 2021-10-02 | End: 2021-10-02

## 2021-10-02 RX ORDER — PANTOPRAZOLE SODIUM 20 MG/1
1 TABLET, DELAYED RELEASE ORAL
Qty: 0 | Refills: 0 | DISCHARGE

## 2021-10-02 RX ORDER — ACETAMINOPHEN 500 MG
650 TABLET ORAL ONCE
Refills: 0 | Status: COMPLETED | OUTPATIENT
Start: 2021-10-02 | End: 2021-10-02

## 2021-10-02 RX ORDER — ACETAMINOPHEN 500 MG
650 TABLET ORAL EVERY 6 HOURS
Refills: 0 | Status: DISCONTINUED | OUTPATIENT
Start: 2021-10-02 | End: 2021-10-03

## 2021-10-02 RX ORDER — PANTOPRAZOLE SODIUM 20 MG/1
40 TABLET, DELAYED RELEASE ORAL
Refills: 0 | Status: DISCONTINUED | OUTPATIENT
Start: 2021-10-02 | End: 2021-10-03

## 2021-10-02 RX ORDER — SODIUM CHLORIDE 9 MG/ML
1000 INJECTION INTRAMUSCULAR; INTRAVENOUS; SUBCUTANEOUS
Refills: 0 | Status: DISCONTINUED | OUTPATIENT
Start: 2021-10-02 | End: 2021-10-03

## 2021-10-02 RX ORDER — PIPERACILLIN AND TAZOBACTAM 4; .5 G/20ML; G/20ML
3.38 INJECTION, POWDER, LYOPHILIZED, FOR SOLUTION INTRAVENOUS EVERY 8 HOURS
Refills: 0 | Status: DISCONTINUED | OUTPATIENT
Start: 2021-10-02 | End: 2021-10-03

## 2021-10-02 RX ORDER — CEFTRIAXONE 500 MG/1
1000 INJECTION, POWDER, FOR SOLUTION INTRAMUSCULAR; INTRAVENOUS ONCE
Refills: 0 | Status: COMPLETED | OUTPATIENT
Start: 2021-10-02 | End: 2021-10-02

## 2021-10-02 RX ORDER — ONDANSETRON 8 MG/1
4 TABLET, FILM COATED ORAL EVERY 8 HOURS
Refills: 0 | Status: DISCONTINUED | OUTPATIENT
Start: 2021-10-02 | End: 2021-10-03

## 2021-10-02 RX ORDER — HALOPERIDOL DECANOATE 100 MG/ML
2 INJECTION INTRAMUSCULAR AT BEDTIME
Refills: 0 | Status: DISCONTINUED | OUTPATIENT
Start: 2021-10-02 | End: 2021-10-03

## 2021-10-02 RX ORDER — SODIUM CHLORIDE 9 MG/ML
1000 INJECTION INTRAMUSCULAR; INTRAVENOUS; SUBCUTANEOUS ONCE
Refills: 0 | Status: COMPLETED | OUTPATIENT
Start: 2021-10-02 | End: 2021-10-02

## 2021-10-02 RX ADMIN — RISPERIDONE 2 MILLIGRAM(S): 4 TABLET ORAL at 22:43

## 2021-10-02 RX ADMIN — CEFTRIAXONE 1000 MILLIGRAM(S): 500 INJECTION, POWDER, FOR SOLUTION INTRAMUSCULAR; INTRAVENOUS at 14:35

## 2021-10-02 RX ADMIN — SODIUM CHLORIDE 2000 MILLILITER(S): 9 INJECTION INTRAMUSCULAR; INTRAVENOUS; SUBCUTANEOUS at 17:00

## 2021-10-02 RX ADMIN — PIPERACILLIN AND TAZOBACTAM 200 GRAM(S): 4; .5 INJECTION, POWDER, LYOPHILIZED, FOR SOLUTION INTRAVENOUS at 16:12

## 2021-10-02 RX ADMIN — SODIUM CHLORIDE 2000 MILLILITER(S): 9 INJECTION INTRAMUSCULAR; INTRAVENOUS; SUBCUTANEOUS at 13:40

## 2021-10-02 RX ADMIN — SODIUM CHLORIDE 1000 MILLILITER(S): 9 INJECTION INTRAMUSCULAR; INTRAVENOUS; SUBCUTANEOUS at 18:14

## 2021-10-02 RX ADMIN — SODIUM CHLORIDE 1000 MILLILITER(S): 9 INJECTION INTRAMUSCULAR; INTRAVENOUS; SUBCUTANEOUS at 20:00

## 2021-10-02 RX ADMIN — Medication 650 MILLIGRAM(S): at 13:35

## 2021-10-02 RX ADMIN — PIPERACILLIN AND TAZOBACTAM 25 GRAM(S): 4; .5 INJECTION, POWDER, LYOPHILIZED, FOR SOLUTION INTRAVENOUS at 23:12

## 2021-10-02 RX ADMIN — Medication 650 MILLIGRAM(S): at 14:15

## 2021-10-02 RX ADMIN — PIPERACILLIN AND TAZOBACTAM 3.38 GRAM(S): 4; .5 INJECTION, POWDER, LYOPHILIZED, FOR SOLUTION INTRAVENOUS at 16:44

## 2021-10-02 RX ADMIN — CEFTRIAXONE 100 MILLIGRAM(S): 500 INJECTION, POWDER, FOR SOLUTION INTRAMUSCULAR; INTRAVENOUS at 14:05

## 2021-10-02 NOTE — H&P ADULT - ASSESSMENT
89M with dementia, hx of CLL, HLD, osteoarthritis who presents with an acute episode of rigors and unresponsiveness today.  Found to be febrile and hypotensive with rigors.  Potential source is cholecystitis.  Meets sepsis criteria with fever + tachycardia + potential source (GI).        Sepsis 2/2 possibly cholecystitis  - will be admitted to SPCU 2/2 fluctuating BP   - discussed with family regarding the possible need of central lines and pressors if patient's MAP is unable to be maintained on fluids -> family would like to be called prior to a central line and initiation of pressors  - still DNR/DNI  - will continue with Zosyn empirically  - ID consulted (Dr. Kiran)  - Surg consulted (Dr. Uribe) -> obtain HIDA  89M with dementia, hx of CLL, HLD, osteoarthritis who presents with an acute episode of rigors and unresponsiveness today.  Found to be febrile and hypotensive with rigors.  Potential source is cholecystitis.  Meets sepsis criteria with fever + tachycardia + potential source (GI).        Sepsis 2/2 possibly cholecystitis  - will be admitted to SPCU 2/2 fluctuating BP   - continue with IV hydration  - discussed with family regarding the possible need of central lines and pressors if patient's MAP is unable to be maintained on fluids -> family would like to be called prior to a central line and initiation of pressors  - still DNR/DNI  - will continue with Zosyn empirically  - f/u blood cultures  - ID consulted (Dr. Kiran)  - Surg consulted (Dr. Uribe) -> obtain HIDA   - Neuro consulted (Dr. Blair) -> seizure like activity 2/2 transient cerebral hypoperfusion 2/2 hypotension, no MRI or EEG to be done  - Cardiology consulted (Dr. Curiel)    Afib - had afib on initial EKG but returned to sinus when not tachycardic  - would hold off AC for possible future cholecystectomy and hx of hematuria   - was on amiodarone in the past, consider restarting  - cardiology to follow    GUS - improved from previous values from last year  - monitor for now  - will be hydrated, repeat BMP    Dementia  - continue with risperdal at night  - has haldol PRN at night (may need IM if unwilling to take PO)  - did require precedex at [facility], monitor for now    Preventive measures  - heparin 5000units q12h -> will not be aggressive with AC in case patient needs a cholecystectomy  - DNR/DNI        89M with dementia, hx of CLL, HLD, osteoarthritis who presents with an acute episode of rigors and unresponsiveness today.  Found to be febrile and hypotensive with rigors.  Potential source is cholecystitis.  Meets sepsis criteria with fever + tachycardia + potential source (GI).        Sepsis 2/2 possibly cholecystitis  - will be admitted to SPCU 2/2 fluctuating BP   - continue with IV hydration  - discussed with family regarding the possible need of central lines and pressors if patient's MAP is unable to be maintained on fluids -> family would like to be called prior to a central line and initiation of pressors  - still DNR/DNI  - will continue with Zosyn empirically  - f/u blood cultures  - ID consulted (Dr. Kiran)  - Surg consulted (Dr. Uribe) -> obtain HIDA   - Neuro consulted (Dr. Blair) -> seizure like activity 2/2 transient cerebral hypoperfusion 2/2 hypotension, no MRI or EEG to be done  - Cardiology consulted (Dr. Curiel)    Afib - had afib on initial EKG but returned to sinus when not tachycardic  - would hold off AC for possible future cholecystectomy and hx of hematuria   - was on amiodarone in the past, consider restarting  - cardiology to follow    GUS - improved from previous values from last year  - monitor for now  - will be hydrated, repeat BMP    Dementia  - continue with risperdal at night  - has haldol PRN at night (may need IM if unwilling to take PO)  - did require precedex at [facility], monitor for now    Preventive measures  - heparin 5000units q12h -> will not be aggressive with AC in case patient needs a cholecystectomy  - DNR/DNI    Time spent coordinating care with above consultants and patient/family counseling and education

## 2021-10-02 NOTE — ED PROVIDER NOTE - CLINICAL SUMMARY MEDICAL DECISION MAKING FREE TEXT BOX
pt presenting with s/s concerning for sepsis will obtain screening septic work up, pan scan, hydrate, begin abx, and review prior chart. Will monitor,

## 2021-10-02 NOTE — CONSULT NOTE ADULT - SUBJECTIVE AND OBJECTIVE BOX
Patient is a 89y old  Male who presents with a chief complaint of     HPI: 89 y/o male with a PMHx of GERD, anxiety, kidney failure presents to the ED BIB EMS from home c/o possible seizure.  Pt has no Hx of seizure. Police described pt as hypotensive.  reports family states pt started shaking, which lasted 5 seconds while eating pancakes. Pt was described as postictal for less than a minute. Family reports pt sleeps 20 hours in a day. Has Hx of afib. Blood sugar 180. Pt has a temp of 101.8. Pt was admitted to [facility] feb 26 to march 17 with left sided empyema and left ureteral stone. On 2/27 left pigtail was placed for left empyema, Pt was treated with course of vancomycin and zosyn. On 2/29 he was transferred to ICU to tPA/ chest tube to increased output. Course complicated by acute renal failure likely d/t vancomycin. Mcnulty was placed. but pt's creatinine and urinary output continued to decline and he had subsequent icu delirium. Discussion was made to start dialysis and pt did 2 sessions of this. Pt was made DNR/DNI due to poor prognosis. It also appears he developed nuanced onset afib and was treated with amiodarone. Pigtail was removed and ct showed to not change. Pt was transferred to rehab.  No facial asymmetry  No lateralized weakness.  CT Head - No acute pathology.  Cr - 1.83    draft only    PAST MEDICAL & SURGICAL HISTORY:    Chronic Lymphocytic Leukemia - followed by oncologist in [place]    GERD (Gastroesophageal Reflux Disease)    Diverticulitis    Spinal Stenosis    Herniated Disc    Hyperlipidemia    Osteoarthritis    Stomach Ulcer  H pylori 2007, treated with antibiotics    GI Bleed  2007    Dementia    History of Arthroscopy of Knee  bilateral    Status Post Arthroscopy  right shoulder    S/P Tonsillectomy  childhood      Home Medications:     * Patient Currently Takes Medications as of 13-Jul-2020 06:02 documented in Structured Notes    · 	sevelamer carbonate 800 mg oral tablet: 1 tab(s) orally 3 times a day (with meals)  · 	dronabinol 2.5 mg oral capsule: 1 cap(s) orally 2 times a day  · 	amiodarone 200 mg oral tablet:  orally   · 	atorvastatin 10 mg oral tablet: 1 tab(s) orally once a day          · 	pantoprazole 40 mg oral delayed release tablet: 1 tab(s) orally once a day    Allergies    vancomycin (Nephrotoxicity)      SOCIAL HISTORY:    No h/o Smoking.   No h/o alcohol use.    FAMILY HISTORY: No pertinent family history in first degree relatives    REVIEW OF SYSTEMS: UTO  PHYSICAL EXAM:  Vital Signs Last 24 Hrs  T(F): 100.2 (10-02-21 @ 15:30)  HR: 87 (10-02-21 @ 18:15)  BP: 88/53 (10-02-21 @ 18:15)  RR: 16 (10-02-21 @ 18:15)    GENERAL: NAD, well-groomed, well-developed  HEAD:  Atraumatic, Normocephalic  EYES: EOMI, PERRLA, conjunctiva and sclera clear  NECK: Supple, No JVD, thyroid non-palpable    On Neurological Examination:    Mental Status - Pt is alert, awake, oriented X3. Higher functions are intact. Pt. does have mild poor cognition. Follows commands well and able to answer questions appropriately.    Speech -  Normal. Slurred. Pt has no aphasia.    Cranial Nerves - Pupils 3 mm equal and reactive to light, extraocular eye movements intact. Pt has no visual field deficit.  Pt has no right left facial asymmetry. Tongue - is in midline.    Motor Exam - 4 plus/5 all over, No drift. No shaking or tremors.  Muscle tone - is normal all over. Moves all extremities equally. No asymmetry is seen.      Sensory Exam - Pin prick, temperature, joint position and vibration are intact on either side. Pt withdraws all extremities equally on stimulation. No asymmetry seen. No complaints of tingling, numbness.    Gait - Able to stand and walk unassisted. Pt is able to stand up with holding my hands and is able to walk for few feet around the bed. Not falling to either side.    Deep tendon Reflexes - 2 plus all over.    Coordination - Fine finger movements are normal on both sides. Finger to nose is also normal on both sides.       Romberg - Negative.    Neck Supple -  Yes.    LABS:                        11.0   8.22  )-----------( 86       ( 02 Oct 2021 14:06 )             33.5     10-02    136  |  100  |  30<H>  ----------------------------<  193<H>  4.4   |  26  |  1.83<H>    Ca    8.7      02 Oct 2021 14:06  Phos  3.3     10-02  Mg     3.4     10-02    TPro  6.0  /  Alb  3.2<L>  /  TBili  1.1  /  DBili  x   /  AST  12  /  ALT  <10<L>  /  AlkPhos  87  10-02    PT/INR - ( 02 Oct 2021 14:06 )   PT: 16.5 sec;   INR: 1.38 ratio      PTT - ( 02 Oct 2021 14:06 )  PTT:29.3 sec    RADIOLOGY & ADDITIONAL STUDIES:    rad< from: CT Head No Cont (10.02.21 @ 14:51) >    Impression: No evidence of acute hemorrhage mass or mass effect.    < end of copied text >    < from: CT Chest No Cont (10.02.21 @ 14:55) >    IMPRESSION:    Cholelithiasis with gallbladder wall thickening and pericholecystic edema, which extends to the right paracolic gutter. Findings are suspicious for cholecystitis. No colonic wall thickening to suggest diverticulitis. Correlate with patient's symptomatology and right upper quadrant ultrasound.    Incidental findings as above.    < end of copied text >     Patient is a 89y old  Male who presents with a chief complaint of shaking    HPI: 89 y/o male with a PMHx of GERD, anxiety, kidney failure presents to the ED BIB EMS from home c/o possible seizure. Pt has no Hx of seizure. Police described pt as hypotensive.  reports family states pt started shaking, which lasted 5 seconds while eating pancakes. Pt was described as postictal for less than a minute. Family reports pt sleeps 20 hours in a day. Has Hx of afib. Blood sugar 180. Pt has a temp of 101.8. Pt was admitted to [facility] feb 26 to march 17 with left sided empyema and left ureteral stone. On 2/27 left pigtail was placed for left empyema, Pt was treated with course of vancomycin and zosyn. On 2/29 he was transferred to ICU to tPA/ chest tube to increased output. Course complicated by acute renal failure likely d/t vancomycin. Mcnulty was placed. but pt's creatinine and urinary output continued to decline and he had subsequent icu delirium. Discussion was made to start dialysis and pt did 2 sessions of this. Pt was made DNR/DNI due to poor prognosis. It also appears he developed nuanced onset afib and was treated with amiodarone. Pigtail was removed and ct showed to not change. Pt was transferred to rehab.  No facial asymmetry  No lateralized weakness.  CT Head - No acute pathology.  Cr - 1.83  Daughter is at bedside  -who says that EMS took his BP was 70/40      PAST MEDICAL & SURGICAL HISTORY:    Chronic Lymphocytic Leukemia - followed by oncologist in [place]    GERD (Gastroesophageal Reflux Disease)    Diverticulitis    Spinal Stenosis    Herniated Disc    Hyperlipidemia    Osteoarthritis    Stomach Ulcer  H pylori 2007, treated with antibiotics    GI Bleed  2007    Dementia    History of Arthroscopy of Knee  bilateral    Status Post Arthroscopy  right shoulder    S/P Tonsillectomy  childhood      Home Medications:     * Patient Currently Takes Medications as of 13-Jul-2020 06:02 documented in Structured Notes    · 	sevelamer carbonate 800 mg oral tablet: 1 tab(s) orally 3 times a day (with meals)  · 	dronabinol 2.5 mg oral capsule: 1 cap(s) orally 2 times a day  · 	amiodarone 200 mg oral tablet:  orally   · 	atorvastatin 10 mg oral tablet: 1 tab(s) orally once a day          · 	pantoprazole 40 mg oral delayed release tablet: 1 tab(s) orally once a day    Allergies    vancomycin (Nephrotoxicity)      SOCIAL HISTORY:    No h/o Smoking.   No h/o alcohol use.    FAMILY HISTORY: No pertinent family history in first degree relatives    REVIEW OF SYSTEMS: UTO  PHYSICAL EXAM:  Vital Signs Last 24 Hrs  T(F): 100.2 (10-02-21 @ 15:30)  HR: 87 (10-02-21 @ 18:15)  BP: 88/53 (10-02-21 @ 18:15)  RR: 16 (10-02-21 @ 18:15)    GENERAL: NAD, well-groomed, well-developed  HEAD:  Atraumatic, Normocephalic  EYES: EOMI, PERRLA, conjunctiva and sclera clear  NECK: Supple, No JVD, thyroid non-palpable    On Neurological Examination:    Mental Status - Pt is asleep. Arousable. Recognize daughter at bedside. Follows simple commands. Able to say his name.    Speech -  Able to speak. No aphasia.    Cranial Nerves - Pupils 1.5 mm equal and reactive to light, extraocular eye movements intact. No right left facial asymmetry. Tongue - is in midline.    Motor Exam -Moves all extremities equally.      Sensory Exam - Pt withdraws all extremities equally on stimulation.     Gait - Couldn't be tested.     Deep tendon Reflexes - 2 plus all over.    Neck Supple -  Yes.    LABS:                        11.0   8.22  )-----------( 86       ( 02 Oct 2021 14:06 )             33.5     10-02    136  |  100  |  30<H>  ----------------------------<  193<H>  4.4   |  26  |  1.83<H>    Ca    8.7      02 Oct 2021 14:06  Phos  3.3     10-02  Mg     3.4     10-02    TPro  6.0  /  Alb  3.2<L>  /  TBili  1.1  /  DBili  x   /  AST  12  /  ALT  <10<L>  /  AlkPhos  87  10-02    PT/INR - ( 02 Oct 2021 14:06 )   PT: 16.5 sec;   INR: 1.38 ratio      PTT - ( 02 Oct 2021 14:06 )  PTT:29.3 sec    RADIOLOGY & ADDITIONAL STUDIES:    rad< from: CT Head No Cont (10.02.21 @ 14:51) >    Impression: No evidence of acute hemorrhage mass or mass effect.    < end of copied text >    < from: CT Chest No Cont (10.02.21 @ 14:55) >    IMPRESSION:    Cholelithiasis with gallbladder wall thickening and pericholecystic edema, which extends to the right paracolic gutter. Findings are suspicious for cholecystitis. No colonic wall thickening to suggest diverticulitis. Correlate with patient's symptomatology and right upper quadrant ultrasound.    Incidental findings as above.    < end of copied text >     Patient is a 89y old  Male who presents with a chief complaint of shaking    HPI: 89 y/o male with a PMHx of GERD, anxiety, kidney failure presents to the ED BIB EMS from home c/o possible seizure. Pt has no Hx of seizure. Police described pt as hypotensive.  reports family states pt started shaking, which lasted 5 seconds while eating pancakes. Pt was described as postictal for less than a minute. Family reports pt sleeps 20 hours in a day. Has Hx of afib. Blood sugar 180. Pt has a temp of 101.8. Pt was admitted to [facility] feb 26 to march 17 with left sided empyema and left ureteral stone. On 2/27 left pigtail was placed for left empyema, Pt was treated with course of vancomycin and zosyn. On 2/29 he was transferred to ICU to tPA/ chest tube to increased output. Course complicated by acute renal failure likely d/t vancomycin. Mcnulty was placed. but pt's creatinine and urinary output continued to decline and he had subsequent icu delirium. Discussion was made to start dialysis and pt did 2 sessions of this. Pt was made DNR/DNI due to poor prognosis. It also appears he developed nuanced onset afib and was treated with amiodarone. Pigtail was removed and ct showed to not change. Pt was transferred to rehab.  No facial asymmetry  No lateralized weakness.  CT Head - No acute pathology.  Cr - 1.83  Daughter is at bedside  - who says that EMS took his BP was - 70/40      PAST MEDICAL & SURGICAL HISTORY:    Chronic Lymphocytic Leukemia - followed by oncologist in [place]    GERD (Gastroesophageal Reflux Disease)    Diverticulitis    Spinal Stenosis    Herniated Disc    Hyperlipidemia    Osteoarthritis    Stomach Ulcer  H pylori 2007, treated with antibiotics    GI Bleed  2007    Dementia    History of Arthroscopy of Knee  bilateral    Status Post Arthroscopy  right shoulder    S/P Tonsillectomy  childhood      Home Medications:     * Patient Currently Takes Medications as of 13-Jul-2020 06:02 documented in Structured Notes    · 	sevelamer carbonate 800 mg oral tablet: 1 tab(s) orally 3 times a day (with meals)  · 	dronabinol 2.5 mg oral capsule: 1 cap(s) orally 2 times a day  · 	amiodarone 200 mg oral tablet:  orally   · 	atorvastatin 10 mg oral tablet: 1 tab(s) orally once a day          · 	pantoprazole 40 mg oral delayed release tablet: 1 tab(s) orally once a day    Allergies    vancomycin (Nephrotoxicity)      SOCIAL HISTORY:    No h/o Smoking.   No h/o alcohol use.    FAMILY HISTORY: No pertinent family history in first degree relatives    REVIEW OF SYSTEMS: UTO  PHYSICAL EXAM:  Vital Signs Last 24 Hrs  T(F): 100.2 (10-02-21 @ 15:30)  HR: 87 (10-02-21 @ 18:15)  BP: 88/53 (10-02-21 @ 18:15)  RR: 16 (10-02-21 @ 18:15)    GENERAL: NAD, well-groomed, well-developed  HEAD:  Atraumatic, Normocephalic  EYES: EOMI, PERRLA, conjunctiva and sclera clear  NECK: Supple, No JVD, thyroid non-palpable    On Neurological Examination:    Mental Status - Pt is asleep. Arousable. Recognize daughter at bedside. Follows simple commands. Able to say his name.    Speech -  Able to speak. No aphasia.    Cranial Nerves - Pupils 1.5 mm equal and reactive to light, extraocular eye movements intact. No right left facial asymmetry. Tongue - is in midline.    Motor Exam -Moves all extremities equally.      Sensory Exam - Pt withdraws all extremities equally on stimulation.     Gait - Couldn't be tested.     Deep tendon Reflexes - 2 plus all over.    Neck Supple -  Yes.    LABS:                        11.0   8.22  )-----------( 86       ( 02 Oct 2021 14:06 )             33.5     10-02    136  |  100  |  30<H>  ----------------------------<  193<H>  4.4   |  26  |  1.83<H>    Ca    8.7      02 Oct 2021 14:06  Phos  3.3     10-02  Mg     3.4     10-02    TPro  6.0  /  Alb  3.2<L>  /  TBili  1.1  /  DBili  x   /  AST  12  /  ALT  <10<L>  /  AlkPhos  87  10-02    PT/INR - ( 02 Oct 2021 14:06 )   PT: 16.5 sec;   INR: 1.38 ratio      PTT - ( 02 Oct 2021 14:06 )  PTT:29.3 sec    RADIOLOGY & ADDITIONAL STUDIES:    rad< from: CT Head No Cont (10.02.21 @ 14:51) >    Impression: No evidence of acute hemorrhage mass or mass effect.    < end of copied text >    < from: CT Chest No Cont (10.02.21 @ 14:55) >    IMPRESSION:    Cholelithiasis with gallbladder wall thickening and pericholecystic edema, which extends to the right paracolic gutter. Findings are suspicious for cholecystitis. No colonic wall thickening to suggest diverticulitis. Correlate with patient's symptomatology and right upper quadrant ultrasound.    Incidental findings as above.    < end of copied text >

## 2021-10-02 NOTE — ED ADULT NURSE NOTE - OBJECTIVE STATEMENT
Patient brought to ED via EMS from home after an episode of shaking while sitting at the table at home for lunch. The shaking lasted about 5 seconds with uncertain LOC. Patient presents drowsy, arouses to verbal stimuli and is alert to person and place but not time or circumstance. Patient denies pain but states he doesn't feel well, unable to clarify, has mild dementia and is poor historian. Patient lives in his own home with 24/7 Home Health Aides. Daughter lives nearby and states the Aide called EMS. Patient upon arrival to ED is noted to be febrile at 101.8 rectally.

## 2021-10-02 NOTE — H&P ADULT - NSICDXPASTMEDICALHX_GEN_ALL_CORE_FT
PAST MEDICAL HISTORY:  Chronic Lymphocytic Leukemia followed by oncologist in [place]    Dementia     Diverticulitis     GERD (Gastroesophageal Reflux Disease)     GI Bleed 2007    Herniated Disc     Hyperlipidemia     Osteoarthritis     Spinal Stenosis     Stomach Ulcer H pylori 2007, treated with antibiotics

## 2021-10-02 NOTE — H&P ADULT - NSHPSOCIALHISTORY_GEN_ALL_CORE
- former smoker (quit >50 years ago, was possibly 1-1.5 ppd)  - very light etoh use   - no illegal drug use  - has a HHA 24/7

## 2021-10-02 NOTE — ED PROVIDER NOTE - OBJECTIVE STATEMENT
89 y/o male with a PMHx of GERD, anxiety, kidney failure presents to the ED BIB EMS from home c/o possible seizure.  Pt has no Hx of seizure. Police described pt as hypotensive.  reports family states pt started shaking, which lasted 5 seconds while eating pancakes. Pt was described as postictal for less than a minute. Nurse reports pt's mother has Hx of afib. Blood sugar 180. Pt has a temp of 101.8. [facility] 17 with left sided empyema and left ureteral stone. One 2/27 left pigtail was placed for left empyema, Pt was treat with course of vancomycin and zosyn. On 2/29 he was transferred to ICU to tPA/ chest tube to increased output. course complicated by acute renal failure likely d/t vancomycin. Mcnulty was placed. but pt's creatinine and urinary output continued to decline and he had subsequent icu delirium. Discussion was made to start dialysis and pt did 2 sessions of this. Pt was made DNR/DNI due to poor prognosis. It also appears he developed nuanced onset afib and was treated with amiodarone. Pigtail was removed and ct showed to not change. Pt was transferred to rehab. 89 y/o male with a PMHx of GERD, anxiety, kidney failure presents to the ED BIB EMS from home c/o possible seizure.  Pt has no Hx of seizure. Police described pt as hypotensive.  reports family states pt started shaking, which lasted 5 seconds while eating pancakes. Pt was described as postictal for less than a minute. Family reports pt sleeps 20 hours in a day. Nurse reports pt's mother has Hx of afib. Blood sugar 180. Pt has a temp of 101.8. Pt was admitted to [place] feb 26 to march 17 with left sided empyema and left ureteral stone. One 2/27 left pigtail was placed for left empyema, Pt was treat with course of vancomycin and zosyn. On 2/29 he was transferred to ICU to tPA/ chest tube to increased output. course complicated by acute renal failure likely d/t vancomycin. Mcnulty was placed. but pt's creatinine and urinary output continued to decline and he had subsequent icu delirium. Discussion was made to start dialysis and pt did 2 sessions of this. Pt was made DNR/DNI due to poor prognosis. It also appears he developed nuanced onset afib and was treated with amiodarone. Pigtail was removed and ct showed to not change. Pt was transferred to rehab.

## 2021-10-02 NOTE — H&P ADULT - HISTORY OF PRESENT ILLNESS
89M with dementia, hx of CLL, HLD, osteoarthritis who presents with an acute episode of rigors and unresponsiveness today.  As per daughter, patient has been "looking different" for the past week, more lethargic and less responsive (though normally he is asleep for 20+ hrs in a day).  Patient had difficulty eating today and had be assisted.  Then during breakfast, as per the daughter who was there, patient suddenly had 5 seconds of generalized shaking and then became unresponsive for about a minute. Unclear if he was confused after regaining consciousness since patient does not talk much normally and has baseline dementia.  Patient was brought here for further evaluation.  Aside from looking more fatigued, patient has had a couple episodes of diarrhea recently and a chronic cough.  No known fevers at home.  No other known complaints.  When asked, patient said he feels "ok" but when asked if he had pain he said "yes" and asked where he said "yes."      In the ED patient's triage vitals were /76 (though dropped to 71/48)   RR 28, 97% T101.8F.  Initial EKG showed afib with .  Labs showed slight anemia (hgb 11), GUS BUN/Cr 30/1.83.  Lactate 2.  ED gave him 3L of NS.  HCT was showed no acute findings.  CT C/A/P showed cholelithiasis with gallbladder wall thickening and pericholecystic edema, findings suspicious for cholecystitis.  Surgery was consulted and advised no surgery at the moment and to pursue a HIDA scan.  Started on Zosyn empirically by the ED.      Of note, patient had a prolonged course @ [facility]"" last year from 2/2020-3/2020.  Patient was found to have a left sided empyema and right ureteral stone.  Patient had a pigtail for the left empyema but eventually needed a chest tube.  Patient's hospital course was complicated by GUS of which he required HD.  Patient also became agitated and altered (possibly 2/2 uremia) requiring precedex and haldol.  Also patient developed rapid afib and was loaded with amiodarone.  Patient remained in NSR for the rest of the hospitalization.  GOC was discussed and family decided on conservative management and patient was eventually made DNR/DNI. ***Unable to obtain any HPI or ROS from patient 2/2 patient's mental status and dementia.  Collateral information from daughter/son and from notes.***    89M with dementia, hx of CLL, HLD, osteoarthritis who presents with an acute episode of rigors and unresponsiveness today.  As per daughter, patient has been "looking different" for the past week, more lethargic and less responsive (though normally he is asleep for 20+ hrs in a day).  Patient had difficulty eating today and had be assisted.  Then during breakfast, as per the daughter who was there, patient suddenly had 5 seconds of generalized shaking and then became unresponsive for about a minute. Unclear if he was confused after regaining consciousness since patient does not talk much normally and has baseline dementia.  Patient was brought here for further evaluation.  Aside from looking more fatigued, patient has had a couple episodes of diarrhea recently and a chronic cough.  No known fevers at home.  No other known complaints.  When asked, patient said he feels "ok" but when asked if he had pain he said "yes" and asked where he said "yes."      In the ED patient's triage vitals were /76 (though dropped to 71/48)   RR 28, 97% T101.8F.  Initial EKG showed afib with .  Labs showed slight anemia (hgb 11), GUS BUN/Cr 30/1.83.  Lactate 2.  ED gave him 3L of NS.  HCT was showed no acute findings.  CT C/A/P showed cholelithiasis with gallbladder wall thickening and pericholecystic edema, findings suspicious for cholecystitis.  Surgery was consulted and advised no surgery at the moment and to pursue a HIDA scan.  Started on Zosyn empirically by the ED.      Of note, patient had a prolonged course @ [facility] last year from 2/2020-3/2020.  Patient was found to have a left sided empyema and right ureteral stone.  Patient had a pigtail for the left empyema but eventually needed a chest tube.  Patient's hospital course was complicated by GUS of which he required HD.  Patient also became agitated and altered (possibly 2/2 uremia) requiring precedex and haldol.  Also patient developed rapid afib and was loaded with amiodarone.  Patient remained in NSR for the rest of the hospitalization.  GOC was discussed and family decided on conservative management and patient was eventually made DNR/DNI.  Patient was reportedly in hospice care but was eventually dc'ed from hospice in June 2021 because patient was improving.

## 2021-10-02 NOTE — CONSULT NOTE ADULT - ASSESSMENT
89 y/o male with a PMHx of GERD, anxiety, CLL, kidney failure   AMS/Shaking/seizure like activity  Has h/o seizures -   CT Abd  - Acute Cholecystitis.  Cr - 1.83  No signs of CVA  CT Head - No acute pathology    Would Follow. 89 y/o male with a PMHx of GERD, anxiety, CLL, kidney failure   AMS/Shaking/seizure like activity  No h/o seizures   CT Abd  - Acute Cholecystitis.  Cr - 1.83  No signs of CVA  CT Head - No acute pathology  BP was low at 70/40  Seizure like activity was provoked sec to Transient Cerebral Hypoperfusion sec to hypotension.  No indications of anti seizure meds.  With daughter's agreement No further w/up like MRI or EEG will be done.  Rec IV Fluids  Antibiotics.  Advanced directives d/w daughter at bedside. At this point pt is full code. (Pt was in hospice for 14 month, was taken off from hospice service in June 2021)  Would Follow.

## 2021-10-02 NOTE — ED PROVIDER NOTE - CRITICAL CARE ATTENDING CONTRIBUTION TO CARE
pt presenting with s/s concerning for sepsis. Screening septic work up initiated, previous chart reviewed. IVF and abx ordered. Although initially reported as confused pt is at baseline mental status. BP improving with hydration with MAPS above 60 although pt remains hypotensive but family reports that pt hypotensive at baseline. CT scan results noted spoke with Dr. Uribe recommends conservative management at this time and admission to  no need for transfer to [facility] at this time. Family (both daughter and son updated) in agreement with conservative management at this time hydration and abx. Spoke with Dr. Maxwell who will come to see the pt. Spoke with cardiology who will come to see the pt.

## 2021-10-02 NOTE — PROGRESS NOTE ADULT - ASSESSMENT
89 year old male PMHx Dementia, CLL, OA, HLD.  Recent Protracted hospitalization at [facility] 2/26/2021 - 3/ L Empyema and R Ureteral Stone, Course complicated by GUS requiring HD, AF w / RVR and Agitation.   Admitted 10/2/2021 with worsening lethargy and rigors.  Found to be febrile 101.8'F.     89 year old male PMHx Dementia, CLL, OA, HLD.  Recent Protracted hospitalization at [facility] 2/26/2021 - 3/ L Empyema and R Ureteral Stone, Course complicated by GUS requiring HD, AF w / RVR and Agitation.   Admitted 10/2/2021 with worsening lethargy and rigors.  Found to be febrile 101.8'F.      Likely Billary Sepsis       Admit to ICU   Base line MS cont Risperdal and Haldol PRN  Unlabored on RA  SBP softer side but MAP > 65   Continue to monitor   S/P 30cc/kg IVF challenge   May require Pressors   Watch UOP and Lytes   Keep NPO will need HIDA - Sx consulted   Zosyn - F/U cultures   UA unremarkable / CT Chest w/o consolidation   DVt PPX w/ Heparin   Case D/W Admitting Dr Deng  Patient is DNR / DNI            89 year old male PMHx Dementia, CLL, OA, HLD.  Recent Protracted hospitalization at [facility] 2/26/2021 - 3/ L Empyema and R Ureteral Stone, Course complicated by GUS requiring HD, AF w / RVR and Agitation.   Admitted 10/2/2021 with worsening lethargy and rigors.  Found to be febrile 101.8'F.      Likely Billary Sepsis       Admit to ICU   Base line MS cont Risperdal and Haldol PRN  Unlabored on RA  SBP softer side but MAP > 65   Continue to monitor   S/P 30cc/kg IVF challenge   May require Pressors   Watch UOP and Lytes   Keep NPO will need HIDA - Sx consulted   Cont Zosyn - F/U cultures   UA unremarkable / CT Chest w/o consolidation   Lactate is normal, No WBC elevation   Watch Fever curve   DVt PPX w/ Heparin   Case D/W Admitting Dr Deng  Patient is DNR / DNI

## 2021-10-02 NOTE — ED ADULT NURSE NOTE - ISOLATION AIRBORNE CONTACT OTHER
Problem: At Risk for Falls  Goal: # Patient does not fall  Outcome: Outcome Met, Continue evaluating goal progress toward completion  Patient remains free of falls. Bed in the lowest position with alarm on. Call light within reach. Will continue to monitor       r/o Covid

## 2021-10-02 NOTE — H&P ADULT - NSICDXPASTSURGICALHX_GEN_ALL_CORE_FT
PAST SURGICAL HISTORY:  History of Arthroscopy of Knee bilateral    S/P Tonsillectomy childhood    Status Post Arthroscopy right shoulder

## 2021-10-02 NOTE — ED PROVIDER NOTE - NEUROLOGICAL, MLM
alert will open eyes to verbal stimuli confused to year and place but oriented to person, no focal deficits, no motor or sensory deficits.

## 2021-10-02 NOTE — H&P ADULT - NSHPLABSRESULTS_GEN_ALL_CORE
LABS:                        11.0<L>  8.22  )-----------( 86<L>    ( 02 Oct 2021 14:06 )             33.5<L>    136    |  100    |  30<H>  ----------------------------<  193<H>    02 Oct 2021 14:06  4.4     |  26     |  1.83<H>    Ca 8.7           02 Oct 2021 14:06    Phos  3.3       02 Oct 2021 14:06    Mg 3.4<H>     02 Oct 2021 14:06    TPro  6.0    /  Alb  3.2<L>  /  TBili  1.1    /  DBili  x      /  AST  12     /  ALT  <10<L>  /  AlkPhos  87     02 Oct 2021 14:06    PT/INR - ( 02 Oct 2021 14:06 )   PT: 16.5<H>;   INR: 1.38<H>         PTT - ( 02 Oct 2021 14:06 )  PTT:29.3     Urinalysis Basic - ( 02 Oct 2021 19:04 )    Color: Yellow / Appearance: Clear / S.020 / pH: x  Gluc: x / Ketone: Negative  / Bili: Negative / Urobili: Negative mg/dL   Blood: x / Protein: 15 mg/dL / Nitrite: Negative   Leuk Esterase: Trace / RBC: 0-2 /HPF / WBC 0-2   Sq Epi: x / Non Sq Epi: x / Bacteria: x    Lactate Trend  10-02 @ 14:06 Lactate:2.0     EKG:  initial EKG was afib with  but repeat EKG showed NSR with 1st degree AV block with PA 226ms with inferior q waves  Radiology:  < from: CT Abdomen and Pelvis No Cont (10.02.21 @ 14:55) >    IMPRESSION:  Cholelithiasis with gallbladder wall thickening and pericholecystic edema, which extends to the rightparacolic gutter. Findings are suspicious for cholecystitis. No colonic wall thickening to suggest diverticulitis. Correlate with patient's symptomatology and right upper quadrant ultrasound.    < end of copied text >    < from: CT Head No Cont (10.02.21 @ 14:51) >    Impression: No evidence of acute hemorrhage mass or mass effect.    < end of copied text >

## 2021-10-02 NOTE — H&P ADULT - NSHPPHYSICALEXAM_GEN_ALL_CORE
PHYSICAL EXAM:  Vital Signs Last 24 Hrs  T(C): 37.6 (02 Oct 2021 19:00), Max: 38.8 (02 Oct 2021 13:32)  T(F): 99.6 (02 Oct 2021 19:00), Max: 101.8 (02 Oct 2021 13:32)  HR: 83 (02 Oct 2021 20:05) (82 - 134)  BP: 106/58 (02 Oct 2021 20:05) (71/48 - 113/77)  BP(mean): --  RR: 14 (02 Oct 2021 20:05) (14 - 28)  SpO2: 96% (02 Oct 2021 20:05) (96% - 99%)    GENERAL:     elderly lethargic appearing male in NAD  HEAD:     atraumatic  EYES:     EOMI, conjunctiva and sclera clear  NECK:     supple  RESPIRATORY:     diminished with slight dry crackles bilaterally  CARDIOVASCULAR:     regular rate and rhythm, no murmurs or rubs or gallops, 2+ peripheral pulses  GASTROINTESTINAL:     soft, tender to palpation in RUQ but no rebound or guarding, nondistended, no hepatosplenomegaly palpated, bowel sounds present  EXTREMITIES:     cool extremities but no clubbing or cyanosis or edema  MUSCULOSKELETAL:     no joint pain or swelling or deformities  NERVOUS SYSTEM:     moves all 4 exts  SKIN:     no gross rashes or lesions  PSYCH:     AOx1 (name only), responds to questions occasionally

## 2021-10-03 ENCOUNTER — INPATIENT (INPATIENT)
Facility: HOSPITAL | Age: 86
LOS: 5 days | Discharge: HOME | DRG: 871 | End: 2021-10-09
Attending: INTERNAL MEDICINE | Admitting: INTERNAL MEDICINE
Payer: MEDICARE

## 2021-10-03 ENCOUNTER — TRANSCRIPTION ENCOUNTER (OUTPATIENT)
Age: 86
End: 2021-10-03

## 2021-10-03 VITALS — HEIGHT: 70 IN | WEIGHT: 176.59 LBS

## 2021-10-03 VITALS
HEART RATE: 73 BPM | DIASTOLIC BLOOD PRESSURE: 51 MMHG | SYSTOLIC BLOOD PRESSURE: 84 MMHG | OXYGEN SATURATION: 100 % | RESPIRATION RATE: 18 BRPM

## 2021-10-03 DIAGNOSIS — K81.0 ACUTE CHOLECYSTITIS: ICD-10-CM

## 2021-10-03 DIAGNOSIS — A41.9 SEPSIS, UNSPECIFIED ORGANISM: ICD-10-CM

## 2021-10-03 DIAGNOSIS — I48.91 UNSPECIFIED ATRIAL FIBRILLATION: ICD-10-CM

## 2021-10-03 LAB
ALBUMIN SERPL ELPH-MCNC: 2.3 G/DL — LOW (ref 3.3–5)
ALP SERPL-CCNC: 37 U/L — SIGNIFICANT CHANGE UP (ref 30–120)
ALT FLD-CCNC: <10 U/L DA — LOW (ref 10–60)
ANION GAP SERPL CALC-SCNC: 6 MMOL/L — SIGNIFICANT CHANGE UP (ref 5–17)
AST SERPL-CCNC: 12 U/L — SIGNIFICANT CHANGE UP (ref 10–40)
BASE EXCESS BLDV CALC-SCNC: 0.3 MMOL/L — SIGNIFICANT CHANGE UP (ref -2–3)
BASOPHILS # BLD AUTO: 0.02 K/UL — SIGNIFICANT CHANGE UP (ref 0–0.2)
BASOPHILS NFR BLD AUTO: 0.3 % — SIGNIFICANT CHANGE UP (ref 0–2)
BILIRUB SERPL-MCNC: 0.7 MG/DL — SIGNIFICANT CHANGE UP (ref 0.2–1.2)
BUN SERPL-MCNC: 24 MG/DL — HIGH (ref 7–23)
CALCIUM SERPL-MCNC: 8 MG/DL — LOW (ref 8.4–10.5)
CHLORIDE SERPL-SCNC: 107 MMOL/L — SIGNIFICANT CHANGE UP (ref 96–108)
CO2 SERPL-SCNC: 24 MMOL/L — SIGNIFICANT CHANGE UP (ref 22–31)
COVID-19 SPIKE DOMAIN AB INTERP: POSITIVE
COVID-19 SPIKE DOMAIN ANTIBODY RESULT: >250 U/ML — HIGH
CREAT SERPL-MCNC: 1.36 MG/DL — HIGH (ref 0.5–1.3)
CULTURE RESULTS: NO GROWTH — SIGNIFICANT CHANGE UP
EOSINOPHIL # BLD AUTO: 0.06 K/UL — SIGNIFICANT CHANGE UP (ref 0–0.5)
EOSINOPHIL NFR BLD AUTO: 0.9 % — SIGNIFICANT CHANGE UP (ref 0–6)
GLUCOSE SERPL-MCNC: 84 MG/DL — SIGNIFICANT CHANGE UP (ref 70–99)
HCO3 BLDV-SCNC: 26 MMOL/L — SIGNIFICANT CHANGE UP (ref 22–29)
HCT VFR BLD CALC: 27.8 % — LOW (ref 39–50)
HGB BLD-MCNC: 9.1 G/DL — LOW (ref 13–17)
HOROWITZ INDEX BLDV+IHG-RTO: 21 — SIGNIFICANT CHANGE UP
IMM GRANULOCYTES NFR BLD AUTO: 0.3 % — SIGNIFICANT CHANGE UP (ref 0–1.5)
LYMPHOCYTES # BLD AUTO: 1.71 K/UL — SIGNIFICANT CHANGE UP (ref 1–3.3)
LYMPHOCYTES # BLD AUTO: 25 % — SIGNIFICANT CHANGE UP (ref 13–44)
MAGNESIUM SERPL-MCNC: 1.8 MG/DL — SIGNIFICANT CHANGE UP (ref 1.6–2.6)
MCHC RBC-ENTMCNC: 31.8 PG — SIGNIFICANT CHANGE UP (ref 27–34)
MCHC RBC-ENTMCNC: 32.7 GM/DL — SIGNIFICANT CHANGE UP (ref 32–36)
MCV RBC AUTO: 97.2 FL — SIGNIFICANT CHANGE UP (ref 80–100)
MONOCYTES # BLD AUTO: 0.65 K/UL — SIGNIFICANT CHANGE UP (ref 0–0.9)
MONOCYTES NFR BLD AUTO: 9.5 % — SIGNIFICANT CHANGE UP (ref 2–14)
NEUTROPHILS # BLD AUTO: 4.37 K/UL — SIGNIFICANT CHANGE UP (ref 1.8–7.4)
NEUTROPHILS NFR BLD AUTO: 64 % — SIGNIFICANT CHANGE UP (ref 43–77)
NRBC # BLD: 0 /100 WBCS — SIGNIFICANT CHANGE UP (ref 0–0)
PCO2 BLDV: 47 MMHG — SIGNIFICANT CHANGE UP (ref 42–55)
PH BLDV: 7.35 — SIGNIFICANT CHANGE UP (ref 7.32–7.43)
PHOSPHATE SERPL-MCNC: 3 MG/DL — SIGNIFICANT CHANGE UP (ref 2.5–4.5)
PLATELET # BLD AUTO: 81 K/UL — LOW (ref 150–400)
PO2 BLDV: 53 MMHG — HIGH (ref 25–45)
POTASSIUM SERPL-MCNC: 3.7 MMOL/L — SIGNIFICANT CHANGE UP (ref 3.5–5.3)
POTASSIUM SERPL-SCNC: 3.7 MMOL/L — SIGNIFICANT CHANGE UP (ref 3.5–5.3)
PROT SERPL-MCNC: 4.7 G/DL — LOW (ref 6–8.3)
RBC # BLD: 2.86 M/UL — LOW (ref 4.2–5.8)
RBC # FLD: 12.9 % — SIGNIFICANT CHANGE UP (ref 10.3–14.5)
SAO2 % BLDV: 95.1 % — HIGH (ref 67–88)
SARS-COV-2 IGG+IGM SERPL QL IA: >250 U/ML — HIGH
SARS-COV-2 IGG+IGM SERPL QL IA: POSITIVE
SODIUM SERPL-SCNC: 137 MMOL/L — SIGNIFICANT CHANGE UP (ref 135–145)
SPECIMEN SOURCE: SIGNIFICANT CHANGE UP
WBC # BLD: 6.83 K/UL — SIGNIFICANT CHANGE UP (ref 3.8–10.5)
WBC # FLD AUTO: 6.83 K/UL — SIGNIFICANT CHANGE UP (ref 3.8–10.5)

## 2021-10-03 PROCEDURE — 99291 CRITICAL CARE FIRST HOUR: CPT

## 2021-10-03 PROCEDURE — 99223 1ST HOSP IP/OBS HIGH 75: CPT

## 2021-10-03 PROCEDURE — 99223 1ST HOSP IP/OBS HIGH 75: CPT | Mod: GC

## 2021-10-03 RX ORDER — PANTOPRAZOLE SODIUM 20 MG/1
1 TABLET, DELAYED RELEASE ORAL
Qty: 0 | Refills: 0 | DISCHARGE

## 2021-10-03 RX ORDER — PIPERACILLIN AND TAZOBACTAM 4; .5 G/20ML; G/20ML
3.38 INJECTION, POWDER, LYOPHILIZED, FOR SOLUTION INTRAVENOUS ONCE
Refills: 0 | Status: COMPLETED | OUTPATIENT
Start: 2021-10-03 | End: 2021-10-03

## 2021-10-03 RX ORDER — PIPERACILLIN AND TAZOBACTAM 4; .5 G/20ML; G/20ML
3.38 INJECTION, POWDER, LYOPHILIZED, FOR SOLUTION INTRAVENOUS EVERY 6 HOURS
Refills: 0 | Status: DISCONTINUED | OUTPATIENT
Start: 2021-10-04 | End: 2021-10-03

## 2021-10-03 RX ORDER — ACETAMINOPHEN 500 MG
2 TABLET ORAL
Qty: 0 | Refills: 0 | DISCHARGE
Start: 2021-10-03

## 2021-10-03 RX ORDER — HEPARIN SODIUM 5000 [USP'U]/ML
5000 INJECTION INTRAVENOUS; SUBCUTANEOUS
Qty: 0 | Refills: 0 | DISCHARGE
Start: 2021-10-03

## 2021-10-03 RX ORDER — SODIUM CHLORIDE 9 MG/ML
100 INJECTION INTRAMUSCULAR; INTRAVENOUS; SUBCUTANEOUS
Qty: 0 | Refills: 0 | DISCHARGE
Start: 2021-10-03

## 2021-10-03 RX ORDER — RISPERIDONE 4 MG/1
1 TABLET ORAL
Qty: 0 | Refills: 0 | DISCHARGE

## 2021-10-03 RX ORDER — PIPERACILLIN AND TAZOBACTAM 4; .5 G/20ML; G/20ML
3.38 INJECTION, POWDER, LYOPHILIZED, FOR SOLUTION INTRAVENOUS EVERY 8 HOURS
Refills: 0 | Status: DISCONTINUED | OUTPATIENT
Start: 2021-10-04 | End: 2021-10-07

## 2021-10-03 RX ORDER — HALOPERIDOL DECANOATE 100 MG/ML
1 INJECTION INTRAMUSCULAR
Qty: 0 | Refills: 0 | DISCHARGE

## 2021-10-03 RX ORDER — SODIUM CHLORIDE 9 MG/ML
500 INJECTION, SOLUTION INTRAVENOUS ONCE
Refills: 0 | Status: COMPLETED | OUTPATIENT
Start: 2021-10-03 | End: 2021-10-03

## 2021-10-03 RX ORDER — LANOLIN ALCOHOL/MO/W.PET/CERES
1 CREAM (GRAM) TOPICAL
Qty: 0 | Refills: 0 | DISCHARGE
Start: 2021-10-03

## 2021-10-03 RX ORDER — MIDODRINE HYDROCHLORIDE 2.5 MG/1
5 TABLET ORAL EVERY 8 HOURS
Refills: 0 | Status: DISCONTINUED | OUTPATIENT
Start: 2021-10-03 | End: 2021-10-03

## 2021-10-03 RX ORDER — PIPERACILLIN AND TAZOBACTAM 4; .5 G/20ML; G/20ML
3.38 INJECTION, POWDER, LYOPHILIZED, FOR SOLUTION INTRAVENOUS
Qty: 0 | Refills: 0 | DISCHARGE
Start: 2021-10-03

## 2021-10-03 RX ORDER — MIDODRINE HYDROCHLORIDE 2.5 MG/1
1 TABLET ORAL
Qty: 0 | Refills: 0 | DISCHARGE
Start: 2021-10-03

## 2021-10-03 RX ORDER — RISPERIDONE 4 MG/1
2 TABLET ORAL AT BEDTIME
Refills: 0 | Status: DISCONTINUED | OUTPATIENT
Start: 2021-10-03 | End: 2021-10-09

## 2021-10-03 RX ORDER — SENNA PLUS 8.6 MG/1
2 TABLET ORAL
Qty: 0 | Refills: 0 | DISCHARGE

## 2021-10-03 RX ORDER — PANTOPRAZOLE SODIUM 20 MG/1
40 TABLET, DELAYED RELEASE ORAL DAILY
Refills: 0 | Status: DISCONTINUED | OUTPATIENT
Start: 2021-10-03 | End: 2021-10-09

## 2021-10-03 RX ADMIN — PIPERACILLIN AND TAZOBACTAM 200 GRAM(S): 4; .5 INJECTION, POWDER, LYOPHILIZED, FOR SOLUTION INTRAVENOUS at 23:03

## 2021-10-03 RX ADMIN — RISPERIDONE 2 MILLIGRAM(S): 4 TABLET ORAL at 23:01

## 2021-10-03 RX ADMIN — MIDODRINE HYDROCHLORIDE 5 MILLIGRAM(S): 2.5 TABLET ORAL at 12:03

## 2021-10-03 RX ADMIN — SODIUM CHLORIDE 500 MILLILITER(S): 9 INJECTION, SOLUTION INTRAVENOUS at 03:12

## 2021-10-03 RX ADMIN — HEPARIN SODIUM 5000 UNIT(S): 5000 INJECTION INTRAVENOUS; SUBCUTANEOUS at 05:22

## 2021-10-03 RX ADMIN — SODIUM CHLORIDE 1000 MILLILITER(S): 9 INJECTION, SOLUTION INTRAVENOUS at 05:22

## 2021-10-03 RX ADMIN — HEPARIN SODIUM 5000 UNIT(S): 5000 INJECTION INTRAVENOUS; SUBCUTANEOUS at 18:21

## 2021-10-03 RX ADMIN — SODIUM CHLORIDE 3000 MILLILITER(S): 9 INJECTION, SOLUTION INTRAVENOUS at 13:07

## 2021-10-03 RX ADMIN — PIPERACILLIN AND TAZOBACTAM 25 GRAM(S): 4; .5 INJECTION, POWDER, LYOPHILIZED, FOR SOLUTION INTRAVENOUS at 16:03

## 2021-10-03 RX ADMIN — PIPERACILLIN AND TAZOBACTAM 25 GRAM(S): 4; .5 INJECTION, POWDER, LYOPHILIZED, FOR SOLUTION INTRAVENOUS at 08:38

## 2021-10-03 RX ADMIN — SODIUM CHLORIDE 100 MILLILITER(S): 9 INJECTION INTRAMUSCULAR; INTRAVENOUS; SUBCUTANEOUS at 06:06

## 2021-10-03 RX ADMIN — PANTOPRAZOLE SODIUM 40 MILLIGRAM(S): 20 TABLET, DELAYED RELEASE ORAL at 05:22

## 2021-10-03 NOTE — PROVIDER CONTACT NOTE (EICU) - SITUATION
eICU provider following up on possible need for IV pressors for BP support
bedside hospitalist Dr. Kruse contacted eICU via [employer] system.

## 2021-10-03 NOTE — CONSULT NOTE ADULT - SUBJECTIVE AND OBJECTIVE BOX
HPI:  89M with dementia, hx of CLL, HLD, osteoarthritis admitted with rigors and unresponsiveness found to be hypotensive and febrile Tmax 101 in ED. Found to have Acute cholecystitis. Pt is a poor historian.  Seen by surgery. Remains hypotensive.    Infectious Disease consult was called to evaluate pt and for antibiotic management.    Past Medical & Surgical Hx:  PAST MEDICAL & SURGICAL HISTORY:  Chronic Lymphocytic Leukemia  followed by oncologist in [place]  GERD (Gastroesophageal Reflux Disease)  Diverticulitis  Spinal Stenosis  Herniated Disc  Hyperlipidemia  Osteoarthritis  Stomach Ulcer  H pylori 2007, treated with antibiotics  GI Bleed  2007  Dementia  History of Arthroscopy of Knee  bilateral  Status Post Arthroscopy  right shoulder  S/P Tonsillectomy  childhood      Social History--  EtOH: denies   Tobacco: denies   Drug Use: denies     FAMILY HISTORY:  Noncontributory    Allergies  vancomycin (Nephrotoxicity)    Intolerances  NONE    Home Medications:  Haldol 2 mg oral tablet: 1 tab(s) orally , As Needed (03 Oct 2021 20:51)  pantoprazole 40 mg oral delayed release tablet: 1 tab(s) orally 2 times a day (03 Oct 2021 20:51)  risperiDONE 2 mg oral tablet: 1 tab(s) orally once a day (at bedtime) (03 Oct 2021 20:51)  senna oral tablet: 1-2 tab(s) orally once a day (03 Oct 2021 20:51)      Current Inpatient Medications :    ANTIBIOTICS:   piperacillin/tazobactam IVPB.. 3.375 Gram(s) IV Intermittent every 8 hours    MEDICATIONS  (STANDING):  heparin   Injectable 5000 Unit(s) SubCutaneous every 12 hours  midodrine 5 milliGRAM(s) Oral every 8 hours  pantoprazole    Tablet 40 milliGRAM(s) Oral before breakfast  risperiDONE   Tablet 2 milliGRAM(s) Oral at bedtime  sodium chloride 0.9%. 1000 milliLiter(s) (100 mL/Hr) IV Continuous <Continuous>    MEDICATIONS  (PRN):  acetaminophen   Tablet .. 650 milliGRAM(s) Oral every 6 hours PRN Temp greater or equal to 38C (100.4F), Mild Pain (1 - 3)  aluminum hydroxide/magnesium hydroxide/simethicone Suspension 30 milliLiter(s) Oral every 4 hours PRN Dyspepsia  haloperidol     Tablet 2 milliGRAM(s) Oral at bedtime PRN agitation  melatonin 3 milliGRAM(s) Oral at bedtime PRN Insomnia  ondansetron Injectable 4 milliGRAM(s) IV Push every 8 hours PRN Nausea and/or Vomiting      ROS:  Unable to obtain due to : Dementia    Physical Exam:  Vital Signs Last 24 Hrs  T(C): 36.8 (03 Oct 2021 16:00), Max: 37.1 (02 Oct 2021 21:22)  T(F): 98.3 (03 Oct 2021 16:00), Max: 98.8 (02 Oct 2021 21:22)  HR: 73 (03 Oct 2021 19:31) (64 - 87)  BP: 84/51 (03 Oct 2021 19:31) (79/43 - 108/46)  BP(mean): 63 (03 Oct 2021 19:31) (47 - 89)  RR: 18 (03 Oct 2021 19:31) (12 - 21)  SpO2: 100% (03 Oct 2021 19:31) (96% - 100%)      General: no acute distress Lethargic  Neck: supple, trachea midline  Lungs: clear, no wheeze/rhonchi  Cardiovascular: regular rate and rhythm, S1 S2  Abdomen: soft, Mild RUQ tender, ND, bowel sounds normal  Neurological: Dementia  Skin: no rash  Extremities: no edema    Labs:                         9.1    6.83  )-----------( 81       ( 03 Oct 2021 07:05 )             27.8   10-03    137  |  107  |  24<H>  ----------------------------<  84  3.7   |  24  |  1.36<H>    Ca    8.0<L>      03 Oct 2021 07:05  Phos  3.0     10-03  Mg     1.8     10-03    TPro  4.7<L>  /  Alb  2.3<L>  /  TBili  0.7  /  DBili  x   /  AST  12  /  ALT  <10<L>  /  AlkPhos  37  10-03      RECENT CULTURES:    Culture - Urine (collected 02 Oct 2021 22:49)  Source: Clean Catch Clean Catch (Midstream)  Final Report (03 Oct 2021 20:27):    No growth    RADIOLOGY & ADDITIONAL STUDIES:  EXAM:  CT ABDOMEN AND PELVIS                          EXAM:  CT CHEST                                  PROCEDURE DATE:  10/02/2021          INTERPRETATION:  CLINICAL INFORMATION: Fever, history of left sided empyema    COMPARISON: CT chest 3/9/2020, pet/CT 6/19/2019.    CONTRAST/COMPLICATIONS:  IV Contrast: NONE  Oral Contrast: NONE  Complications: None reported at time of study completion    PROCEDURE:  CT of the Chest, Abdomen and Pelvis was performed.  Sagittal and coronal reformats were performed.    FINDINGS: Evaluation of the solid organs and vasculature is limited in the absence of intravenous contrast.  CHEST:  LUNGS AND LARGE AIRWAYS: Patent central airways. No pulmonary nodules.  PLEURA: No pleural effusion. Right pleural calcifications.  VESSELS: Aortic, aortic valvular and coronary artery calcifications.  HEART: Heart size is normal. No pericardial effusion.  MEDIASTINUM AND KAELYN: No lymphadenopathy.  CHEST WALL AND LOWER NECK: Within normal limits.    ABDOMEN AND PELVIS:  LIVER: Within normal limits.  BILE DUCTS: Normal caliber.  GALLBLADDER: Large gallbladder stone again noted. There is suggestion of gallbladder wall thickening, and pericholecystic fluid tracking inferiorly.  SPLEEN: Within normal limits.  PANCREAS: Within normal limits.  ADRENALS: Within normal limits.  KIDNEYS/URETERS: Nonobstructing 4 mm left renal calculus. Small right renal cyst. No hydronephrosis.    BLADDER: Within normal limits.  REPRODUCTIVE ORGANS: Prostate is enlarged.    BOWEL: Colonic,including right-sided, diverticulosis. Trace free fluid in the right paracolic gutter adjacent to diverticulosis may be due to gallbladder inflammation. No colonic wall to suggest acute diverticulitis. No bowel obstruction. Appendix is normal.  PERITONEUM: Trace pelvic free fluid. No pneumoperitoneum  VESSELS: Atherosclerotic changes.  RETROPERITONEUM/LYMPH NODES: No lymphadenopathy.  ABDOMINAL WALL: Within normal limits.  BONES: Diffuse degenerative changes. Status post posterior lumbar laminectomies. Mixed lucent and sclerotic lesion in the right mid humeral shaft is of unknown etiology, but unchanged since PET of 6/19/2019.    IMPRESSION:  Cholelithiasis with gallbladder wall thickening and pericholecystic edema, which extends to the rightparacolic gutter. Findings are suspicious for cholecystitis. No colonic wall thickening to suggest diverticulitis. Correlate with patient's symptomatology and right upper quadrant ultrasound.      Assessment :   89M with dementia, hx of CLL, HLD, osteoarthritis admitted with sepsis with shock sec Acute cholecystitis Remains hypotensive.  Surgery eval noted  For transfer to [facility] ICU     Plan :   Cont Zosyn  For transfer to [facility] ICU  Trend temps and cbc  Fu cultures  May need pressors  HIDA tmrw if + percutaneous jackelin tube    Continue with present regiment .  Approptiate use of antibiotics and adverse effects reviewed.    > 45 minutes spent in direct patient care reviewing  the notes, lab data/ imaging , discussion with multidisciplinary team. All questions were addressed and answered to the best of my capacity .    Thank you for allowing me to participate in the care of your patient .      Jose Kiran MD  Infectious Disease  [phone]

## 2021-10-03 NOTE — PROVIDER CONTACT NOTE (EICU) - ASSESSMENT
Patient mentating well with low-normal BP after fluid resuscitation.  Cr improved with fluid resuscitation
SBP remains mid 80s to mid 90s. No change in mental status per bedside RN. Patient remains conversant and able to make needs known. Comfortable without complaints at this time.
Patient more lethargic than earlier, appears comfortable. Daughter at bedside along with RN. 500 cc IV fluid bolus of LR ordered to be given over 10 minutes. Repeat BP 88/53, patient had awoken briefly and spoke to daughter then fell asleep. Hospitalist Dr. Kruse updated, who stated he had spoken to patient's son who is a physician who stated that patient's usual blood pressure has been in 90s since last hospitalization. Dr. Kruse had also spoken with surgeon Dr. Uribe who was in agreement with possible transfer to hospital with IR capabilities for perc jackelin as well as with intensivist Dr. Maxwell to obtain improved intravenous access for pressors if needed as well as possible transfer, with [facility] as preferred facility if bed available as IR capabilities on weekend as well as family preference. Dr. Maxwell recommending neosynephrine as pressor if patient to need and will order if BP drops again. Transfer center number provided to Dr. Kruse, will continue to assist with patient management and transfer as needed. Dr. Kruse updated daughter at bedside who is in agreement with plan.

## 2021-10-03 NOTE — CONSULT NOTE ADULT - ASSESSMENT
Malaise and failure to thrive clinical picture.  +/- abdominal pain and tenderness.  Sono and CT concerning for cholecystitis.  However, with present clinical picture, unclear how much is acute versus chronic.  BP remains low despite adequate fluid resuscitation.  Now eICU wishes to start a pressor agent.  Given limitations of this Hospital in terms of ICU supportive care and the possibility of IR drainage (preferable to surgery given his age and comorbid conditions) a transfer to higher level of care seems appropriate.  Plan:  -Transfer to [place] ICU, as institution of family's preference  -Ongoing supportive care  -HIDA scan in AM  -IR cholecystostomy if and as indicated  Patient's son as HCP and physician wishes to proceed with aggressive care if indicated, but not heroic measures and is open to transition to consideration of Palliative Care if reasonable measures unsuccessful.  I concur.

## 2021-10-03 NOTE — CONSULT NOTE ADULT - SUBJECTIVE AND OBJECTIVE BOX
HPI:  ***Unable to obtain any HPI or ROS from patient 2/2 patient's mental status and dementia.  Collateral information from daughter/son and from notes.***    89M with dementia, hx of CLL, HLD, osteoarthritis who presents with an acute episode of rigors and unresponsiveness today.  As per daughter, patient has been "looking different" for the past week, more lethargic and less responsive (though normally he is asleep for 20+ hrs in a day).  Patient had difficulty eating today and had be assisted.  Then during breakfast, as per the daughter who was there, patient suddenly had 5 seconds of generalized shaking and then became unresponsive for about a minute. Unclear if he was confused after regaining consciousness since patient does not talk much normally and has baseline dementia.  Patient was brought here for further evaluation.  Aside from looking more fatigued, patient has had a couple episodes of diarrhea recently and a chronic cough.  No known fevers at home.  No other known complaints.  When asked, patient said he feels "ok" but when asked if he had pain he said "yes" and asked where he said "yes."      In the ED patient's triage vitals were /76 (though dropped to 71/48)   RR 28, 97% T101.8F.  Initial EKG showed afib with .  Labs showed slight anemia (hgb 11), GUS BUN/Cr 30/1.83.  Lactate 2.  ED gave him 3L of NS.  HCT was showed no acute findings.  CT C/A/P showed cholelithiasis with gallbladder wall thickening and pericholecystic edema, findings suspicious for cholecystitis.  Surgery was consulted and advised no surgery at the moment and to pursue a HIDA scan.  Started on Zosyn empirically by the ED.      Of note, patient had a prolonged course @ [facility] last year from 2020-3/2020.  Patient was found to have a left sided empyema and right ureteral stone.  Patient had a pigtail for the left empyema but eventually needed a chest tube.  Patient's hospital course was complicated by GUS of which he required HD.  Patient also became agitated and altered (possibly 2/2 uremia) requiring precedex and haldol.  Also patient developed rapid afib and was loaded with amiodarone.  Patient remained in [facility]R for the rest of the hospitalization.  GOC was discussed and family decided on conservative management and patient was eventually made DNR/DNI.  Patient was reportedly in hospice care but was eventually dc'ed from hospice in 2021 because patient was improving.   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  History of Arthroscopy of Knee  bilateral  Status Post Arthroscopy  right shoulder  S/P Tonsillectomy  childhood    FAMILY HISTORY:  FH: Alzheimer&#x27;s disease (Mother)    ALLERGIES:  vancomycin (Nephrotoxicity)    Home Medications:  Haldol 2 mg oral tablet: 1 tab(s) orally, As Needed (rarely) (03 Oct 2021 21:33)  pantoprazole 40 mg oral delayed release tablet: 1 tab(s) orally 2 times a day (03 Oct 2021 21:33)  risperiDONE 2 mg oral tablet: 1 tab(s) orally once a day (at bedtime) (03 Oct 2021 21:33)  senna oral tablet: 1-2 tab(s) orally once a day (03 Oct 2021 21:33)    REVIEW OF SYSTEMS  Unable to reliably participate in full ROS secondary to mental status    Vital Signs Last 24 Hrs  T(C): 36.6 (03 Oct 2021 21:00), Max: 36.8 (03 Oct 2021 04:00)  T(F): 97.9 (03 Oct 2021 21:00), Max: 98.3 (03 Oct 2021 04:00)  HR: 69 (03 Oct 2021 21:00) (64 - 87)  BP: 101/57 (03 Oct 2021 21:00) (79/43 - 108/46)  BP(mean): 75 (03 Oct 2021 21:00) (47 - 89)  RR: 14 (03 Oct 2021 21:00) (12 - 21)  SpO2: 97% (03 Oct 2021 21:00) (96% - 100%)    I&O's Detail    03 Oct 2021 07:01  -  03 Oct 2021 21:42  --------------------------------------------------------  IN:  Total IN: 0 mL    OUT:    Voided (mL): 175 mL  Total OUT: 175 mL    Total NET: -175 mL    PHYSICAL EXAM:  Constitutional: Appears elderly and frail  Eyes: PERRL  ENMT: Moist mucosal membranes  Neck: Stiff, but non rigid  Back: Well healed surgical scar, no CVAT  Respiratory: Equal expansion bilaterally  Cardiovascular: Pulse regular in rate and rhythm   Gastrointestinal: Soft, +/- tenderness to deep palpation of RUQ, no nick Burr's sign  Extremities: Grossly symmetric with BLE in supportive boots/ Z-Flex  Neurological: Oriented only to self  Skin: No visible lesions      LABS:                        9.1    6.83  )-----------( 81       ( 03 Oct 2021 07:05 )             27.8     PT/INR - ( 02 Oct 2021 14:06 )   PT: 16.5 sec;   INR: 1.38 ratio    PTT - ( 02 Oct 2021 14:06 )  PTT:29.3 sec    LIVER FUNCTIONS - ( 03 Oct 2021 07:05 )  Alb: 2.3 g/dL / Pro: 4.7 g/dL / ALK PHOS: 37 U/L / ALT: <10 U/L DA / AST: 12 U/L / GGT: x           Urinalysis Basic - ( 02 Oct 2021 19:04 )  Color: Yellow / Appearance: Clear / S.020 / pH: x  Gluc: x / Ketone: Negative  / Bili: Negative / Urobili: Negative mg/dL   Blood: x / Protein: 15 mg/dL / Nitrite: Negative   Leuk Esterase: Trace / RBC: 0-2 /HPF / WBC 0-2   Sq Epi: x / Non Sq Epi: x / Bacteria: x      RADIOLOGY/IMAGING:    EXAM:  US ABDOMEN RT UPR QUADRANT                        PROCEDURE DATE:  10/02/2021    INTERPRETATION:  CLINICAL INFORMATION: Sepsis.  COMPARISON: Abdominal CT dated 10/02/2021  TECHNIQUE: Sonography of the right upper quadrant.    FINDINGS:  Liver: Within normal limits.  Bile ducts: Normal caliber. Common bile duct measures 4 mm.  Gallbladder: There is 2.5 cm gallstones with associated gallbladder wall edema measuring up to 8 mm. There is no sonographic Burr sign.  Pancreas: Poorly visualized.  Right kidney: 9.9 cm. No hydronephrosis.  Ascites: None.  IVC: Visualized portions are within normal limits.    IMPRESSION:  Cholelithiasis with associated gallbladder wall edema which can be seen in acute cholecystitis. There is no sonographic Burr sign. Consider further evaluation with HIDA scan to assess for acute cholecystitis.  --- End of Report ---  MARY EMERSON MD; Attending Radiologist  This document has been electronically signed. Oct  2 54320:48PM      EXAM:  CT ABDOMEN AND PELVIS                        EXAM:  CT CHEST                        PROCEDURE DATE:  10/02/2021    INTERPRETATION:  CLINICAL INFORMATION: Fever, history of left sided empyema  COMPARISON: CT chest 3/9/2020, pet/CT 2019.  CONTRAST/COMPLICATIONS:  IV Contrast: NONE  Oral Contrast: NONE  Complications: None reported at time of study completion  PROCEDURE:  CT of the Chest, Abdomen and Pelvis was performed.  Sagittal and coronal reformats were performed.    FINDINGS: Evaluation of the solid organs and vasculature is limited in the absence of intravenous contrast.  CHEST:  LUNGS AND LARGE AIRWAYS: Patent central airways. No pulmonary nodules.  PLEURA: No pleural effusion. Right pleural calcifications.  VESSELS: Aortic, aortic valvular and coronary artery calcifications.  HEART: Heart size is normal. No pericardial effusion.  MEDIASTINUM AND KAELYN: No lymphadenopathy.  CHEST WALL AND LOWER NECK: Within normal limits.  ABDOMEN AND PELVIS:  LIVER: Within normal limits.  BILE DUCTS: Normal caliber.  GALLBLADDER: Large gallbladder stone again noted. There is suggestion of gallbladder wall thickening, and pericholecystic fluid tracking inferiorly.  SPLEEN: Within normal limits.  PANCREAS: Within normal limits.  ADRENALS: Within normal limits.  KIDNEYS/URETERS: Nonobstructing 4 mm left renal calculus. Small right renal cyst. No hydronephrosis.  BLADDER: Within normal limits.  REPRODUCTIVE ORGANS: Prostate is enlarged.  BOWEL: Colonic,including right-sided, diverticulosis. Trace free fluid in the right paracolic gutter adjacent to diverticulosis may be due to gallbladder inflammation. No colonic wall to suggest acute diverticulitis. No bowel obstruction. Appendix is normal.  PERITONEUM: Trace pelvic free fluid. No pneumoperitoneum  VESSELS: Atherosclerotic changes.  RETROPERITONEUM/LYMPH NODES: No lymphadenopathy.  ABDOMINAL WALL: Within normal limits.  BONES: Diffuse degenerative changes. Status post posterior lumbar laminectomies. Mixed lucent and sclerotic lesion in the right mid humeral shaft is of unknown etiology, but unchanged since PET of 2019.    IMPRESSION:  Cholelithiasis with gallbladder wall thickening and pericholecystic edema, which extends to the rightparacolic gutter. Findings are suspicious for cholecystitis. No colonic wall thickening to suggest diverticulitis. Correlate with patient's symptomatology and right upper quadrant ultrasound.  Incidental findings as above.  --- End of Report ---  MATEO NEVILLE MD; Attending Radiologist  This document has been electronically signed. Oct  2 2021  3:41PM

## 2021-10-03 NOTE — CONSULT NOTE ADULT - PROBLEM SELECTOR RECOMMENDATION 9
Transferred from [place], care as per [facility] ICU  Supportive care  HIDA in AM  Possible IR cholecystostomy if indicated    Patient's son as HCP and physician wishes to proceed with aggressive care if indicated, but not heroic measures and is open to transition to consideration of Palliative Care if reasonable measures unsuccessful.

## 2021-10-03 NOTE — PATIENT PROFILE ADULT - NSPROGENSOURCEINFO_GEN_A_NUR
[facility] ENCOUNTER          Pt Name: Sabrina Banks  MRN: [id number]  [place] 1976  Date of evaluation: 3/9/2021  Treating Resident Physician: Chris Bertrand MD  Supervising Physician: Dr. Ezequiel Gamez       Chief Complaint   Patient presents with    Dizziness     History obtained from the patient. Opening Statement:    40-year-old male with a past history of left eye blindness, hypertension, GERD, and tobacco use disorder we are seeing for dizziness and chest pain. HISTORY OF PRESENT ILLNESS    HPI  Sabrina Banks is a 40 y.o. male who presents to the emergency department for evaluation of left-sided chest pain and dizziness. The patient has no other acute complaints at this time. Stated the dizziness started about 6 PM last night while at work. No current dizziness, but stated that he gets dizzy when he stands up. Says the first time he had the symptoms for about a year. Last time he had the symptoms he was hospitalized for volume depletion. Describes symptoms as lightheadedness rather than vertigo. Denied hearing loss, fever, ear fullness, recent fall, recent URI symptoms, and recent nausea. Stated that it \"felt like he was in a tunnel\" when he was dizzy. Stated that he had brief sharp chest pain last night. The patient stated that he had a virtual visit with his PCP this morning who told him to come to the ED. REVIEW OF SYSTEMS   Review of Systems   Constitutional: Negative for chills and fever. HENT: Negative for ear pain, hearing loss and rhinorrhea. Eyes: Negative for visual disturbance. Respiratory: Negative for cough, chest tightness and shortness of breath. Cardiovascular: Negative for chest pain and palpitations. Gastrointestinal: Negative for abdominal pain, diarrhea and nausea. Genitourinary: Negative for difficulty urinating, dysuria and flank pain.          PAST MEDICAL AND SURGICAL HISTORY     Past Medical History:   Diagnosis Date    Blind     left eye    Essential hypertension 6/21/2017    Gastroesophageal reflux disease without esophagitis 1/29/2018    Gastroesophageal reflux disease without esophagitis 1/29/2018    Hypertension     10/7/2015    Tobacco use disorder 1/23/2018     Past Surgical History:   Procedure Laterality Date    EYE SURGERY      Glass eye placement, left eye. MEDICATIONS   No current facility-administered medications for this encounter. Current Outpatient Medications:     amLODIPine (NORVASC) 10 MG tablet, Take 1 tablet by mouth daily, Disp: 90 tablet, Rfl: 3    hydroCHLOROthiazide (HYDRODIURIL) 12.5 MG tablet, Take 1 tablet by mouth daily, Disp: 90 tablet, Rfl: 3    omeprazole (PRILOSEC) 10 MG delayed release capsule, Take 10 mg by mouth daily, Disp: , Rfl:     Multiple Vitamin (MULTI VITAMIN DAILY PO), Take by mouth, Disp: , Rfl:     ondansetron (ZOFRAN ODT) 4 MG disintegrating tablet, Take 1 tablet by mouth every 8 hours as needed for Nausea, Disp: 20 tablet, Rfl: 0    acetaminophen (TYLENOL) 500 MG tablet, Take 1,000 mg by mouth every 6 hours as needed for Pain, Disp: , Rfl:     azelastine (ASTELIN) 0.1 % nasal spray, 1 spray by Nasal route 2 times daily Use in each nostril as directed, Disp: 1 Bottle, Rfl: 0    Elastic Bandages & Supports (JOBST KNEE HIGH COMPRESSION SM) MISC, 1 each by Does not apply route daily as needed (edema), Disp: 1 each, Rfl: 0    tolnaftate (TINACTIN) 1 % powder, Apply topically 2 times daily. , Disp: 108 g, Rfl: 2    aspirin (ASPIRIN CHILDRENS) 81 MG chewable tablet, Take 1 tablet by mouth daily, Disp: 30 tablet, Rfl: 3      SOCIAL HISTORY     Social History     Social History Narrative    Not on file     Social History     Tobacco Use    Smoking status: Current Every Day Smoker     Packs/day: 0.50     Years: 22.00     Pack years: 11.00     Types: Cigarettes    Smokeless tobacco: Never Used Substance Use Topics    Alcohol use: Yes     Alcohol/week: 24.0 standard drinks     Types: 24 Cans of beer per week     Comment: socially    Drug use: No         ALLERGIES   No Known Allergies      FAMILY HISTORY     Family History   Problem Relation Age of Onset    Heart Disease Father     High Blood Pressure Father     High Blood Pressure Mother          PREVIOUS RECORDS   Previous records reviewed:     Recent ED encounter on 6/10/2020 for nausea. Recent ED encounter and 5/26/2020 for blurred vision. Recent ED encounter 5/13/2020 for acute frontal sinusitis. PHYSICAL EXAM     ED Triage Vitals [03/09/21 1049]   BP Temp Temp Source Pulse Resp SpO2 Height Weight   139/84 97.8 °F (36.6 °C) Oral 88 16 98 % 5' 9\" (1.753 m) 180 lb (81.6 kg)     Initial vital signs and nursing assessment reviewed and normal except mild hypertension. Body mass index is 26.58 kg/m². Pulsoximetry is normal per my interpretation. Additional Vital Signs:  Vitals:    03/09/21 1049   BP: 139/84   Pulse: 88   Resp: 16   Temp: 97.8 °F (36.6 °C)   SpO2: 98%       Physical Exam  Vitals signs reviewed. Constitutional:       Appearance: Normal appearance. HENT:      Head: Normocephalic and atraumatic. Mouth/Throat:      Mouth: Mucous membranes are moist.   Cardiovascular:      Rate and Rhythm: Normal rate and regular rhythm. Pulses: Normal pulses. Heart sounds: No murmur. No gallop. Pulmonary:      Effort: Pulmonary effort is normal. No respiratory distress. Breath sounds: Normal breath sounds. No wheezing. Abdominal:      General: Abdomen is flat. There is no distension. Palpations: Abdomen is soft. Tenderness: There is no abdominal tenderness. Musculoskeletal: Normal range of motion. Skin:     General: Skin is warm. Capillary Refill: Capillary refill takes less than 2 seconds. Neurological:      Mental Status: He is alert. Left eye is glass eye.       MEDICAL DECISION MAKING   Initial Assessment:   1. Dizziness, unspecified. Lightheaded rather than vertigo symptoms. Orthostatic vitals negative. Evaluate for intermittent arrhythmia. 2. Left-sided chest pain, unspecified. Cause unclear, but benign work-up including troponin, chest x-ray, and EKG. Plan:    Close PCP follow-up.  Obtain tilt table test as an outpatient.  Obtain outpatient heart monitor evaluation.  Medically stable for discharge pending attending approval.        ED RESULTS   Laboratory results:  Labs Reviewed   CBC WITH AUTO DIFFERENTIAL - Abnormal; Notable for the following components:       Result Value    MCV 95.4 (*)     MPV 8.4 (*)     All other components within normal limits   COMPREHENSIVE METABOLIC PANEL W/ REFLEX TO MG FOR LOW K   TROPONIN   ANION GAP   GLOMERULAR FILTRATION RATE, ESTIMATED   OSMOLALITY       Radiologic studies results:  XR CHEST (2 VW)   Final Result      No acute intrathoracic process. **This report has been created using voice recognition software. It may contain minor errors which are inherent in voice recognition technology. **      Final report electronically signed by Dr. Odell Barr on 3/9/2021 11:25 AM          ED Medications administered this visit: Medications - No data to display      ED COURSE     ED Course as of Mar 09 1141   Tue Mar 09, 2021   1137 Troponin T in the normal range. CMP unremarkable. CBC essentially unremarkable except for slightly elevated MCV and slightly decreased MPV. Twelve-lead EKG benign. 2 view chest x-ray shows no acute intrathoracic process. [NQ]      ED Course User Index  [NQ] Paul Arriaga MD     Strict return precautions and follow up instructions were discussed with the patient prior to discharge, with which the patient agrees.       MEDICATION CHANGES     New Prescriptions    No medications on file         FINAL DISPOSITION     Final diagnoses:   None     Condition: condition: stable  Dispo: Discharge to home      This transcription was electronically signed. Parts of this transcriptions may have been dictated by use of voice recognition software and electronically transcribed, and parts may have been transcribed with the assistance of an ED scribe. The transcription may contain errors not detected in proofreading. Please refer to my supervising physician's documentation if my documentation differs.     Electronically Signed: Min Gonzáles MD, MPH, [facility] 03/09/21, 11:32 AM  Supervised by Dr. Kenton Chamorro MD  Resident  03/09/21 0135 health record

## 2021-10-03 NOTE — CONSULT NOTE ADULT - SUBJECTIVE AND OBJECTIVE BOX
HPI:  ***Unable to obtain any HPI or ROS from patient 2/2 patient's mental status and dementia.  Collateral information from daughter/son and from notes.***    89M with dementia, hx of CLL, HLD, osteoarthritis who presents with an acute episode of rigors and unresponsiveness today.  As per daughter, patient has been "looking different" for the past week, more lethargic and less responsive (though normally he is asleep for 20+ hrs in a day).  Patient had difficulty eating today and had be assisted.  Then during breakfast, as per the daughter who was there, patient suddenly had 5 seconds of generalized shaking and then became unresponsive for about a minute. Unclear if he was confused after regaining consciousness since patient does not talk much normally and has baseline dementia.  Patient was brought here for further evaluation.  Aside from looking more fatigued, patient has had a couple episodes of diarrhea recently and a chronic cough.  No known fevers at home.  No other known complaints.  When asked, patient said he feels "ok" but when asked if he had pain he said "yes" and asked where he said "yes."      In the ED patient's triage vitals were /76 (though dropped to 71/48)   RR 28, 97% T101.8F.  Initial EKG showed afib with .  Labs showed slight anemia (hgb 11), GUS BUN/Cr 30/1.83.  Lactate 2.  ED gave him 3L of NS.  HCT was showed no acute findings.  CT C/A/P showed cholelithiasis with gallbladder wall thickening and pericholecystic edema, findings suspicious for cholecystitis.  Surgery was consulted and advised no surgery at the moment and to pursue a HIDA scan.  Started on Zosyn empirically by the ED.      Of note, patient had a prolonged course @ [facility] last year from 2020-3/2020.  Patient was found to have a left sided empyema and right ureteral stone.  Patient had a pigtail for the left empyema but eventually needed a chest tube.  Patient's hospital course was complicated by GUS of which he required HD.  Patient also became agitated and altered (possibly 2/2 uremia) requiring precedex and haldol.  Also patient developed rapid afib and was loaded with amiodarone.  Patient remained in [facility] for the rest of the hospitalization.  GOC was discussed and family decided on conservative management and patient was eventually made DNR/DNI.  Patient was reportedly in hospice care but was eventually dc'ed from hospice in 2021 because patient was improving.   (02 Oct 2021 20:02)      PAST MEDICAL & SURGICAL HISTORY:  Chronic Lymphocytic Leukemia, followed by oncologist in [place]    GERD (Gastroesophageal Reflux Disease)    Diverticulitis    Spinal Stenosis    Herniated Disc    Hyperlipidemia    Osteoarthritis    Stomach Ulcer  H pylori , treated with antibiotics    GI Bleed      Dementia    History of Arthroscopy of Knee  bilateral    Status Post Arthroscopy  right shoulder    S/P Tonsillectomy  childhood      REVIEW OF SYSTEMS  Patient unable to reliably participate in full ROS secondary to mental status      MEDICATIONS  (STANDING):  heparin   Injectable 5000 Unit(s) SubCutaneous every 12 hours  midodrine 5 milliGRAM(s) Oral every 8 hours  pantoprazole    Tablet 40 milliGRAM(s) Oral before breakfast  piperacillin/tazobactam IVPB.. 3.375 Gram(s) IV Intermittent every 8 hours  risperiDONE   Tablet 2 milliGRAM(s) Oral at bedtime  sodium chloride 0.9%. 1000 milliLiter(s) (100 mL/Hr) IV Continuous <Continuous>      MEDICATIONS  (PRN):  acetaminophen   Tablet .. 650 milliGRAM(s) Oral every 6 hours PRN Temp greater or equal to 38C (100.4F), Mild Pain (1 - 3)  aluminum hydroxide/magnesium hydroxide/simethicone Suspension 30 milliLiter(s) Oral every 4 hours PRN Dyspepsia  haloperidol     Tablet 2 milliGRAM(s) Oral at bedtime PRN agitation  melatonin 3 milliGRAM(s) Oral at bedtime PRN Insomnia  ondansetron Injectable 4 milliGRAM(s) IV Push every 8 hours PRN Nausea and/or Vomiting      Allergies    vancomycin (Nephrotoxicity)      SOCIAL HISTORY: No recent tobacco or EtOH and no history of illicit drug use      FAMILY HISTORY:  No pertinent family history in first degree relatives      Vital Signs Last 24 Hrs  T(C): 36.7 (03 Oct 2021 12:00), Max: 37.9 (02 Oct 2021 15:30)  T(F): 98.1 (03 Oct 2021 12:00), Max: 100.2 (02 Oct 2021 15:30)  HR: 76 (03 Oct 2021 14:00) (64 - 88)  BP: 92/47 (03 Oct 2021 14:00) (79/43 - 108/46)  BP(mean): 63 (03 Oct 2021 14:00) (47 - 75)  RR: 18 (03 Oct 2021 14:00) (12 - 20)  SpO2: 100% (03 Oct 2021 14:00) (96% - 100%)      PHYSICAL EXAM:  Constitutional: Appears elderly and frail    Eyes: PERRL    ENMT: Moist mucosal membranes    Neck: Stiff, but non rigig    Breasts: No visible lesions or palpable masses    Back: Well healed surgical scar, no CVAT    Respiratory: Equal expansion bilaterally    Cardiovascular: Pulse regular in rate and rhythm     Gastrointestinal: Soft, +/- tenderness to deep palpation of RUQ, no nick Burr's sign    Genitourinary: Normal genitalia    Rectal: Deferred    Extremities: Grossly symmetric with BLE in supportive boots/ Z-Flex    Vascular: Brisk capillary refill    Neurological: Oriented only to self    Skin: No visible lesions    Lymph Nodes: No cervical, supraclavicular, axillary or inguinal adenopathy    Musculoskeletal: No casts, splints or braces    Psychiatric: Anxious, but appropriately so      LABS:                        9.1    6.83  )-----------( 81       ( 03 Oct 2021 07:05 )             27.8     10-03    137  |  107  |  24<H>  ----------------------------<  84  3.7   |  24  |  1.36<H>    Ca    8.0<L>      03 Oct 2021 07:05  Phos  3.0     10-03  Mg     1.8     10-03    TPro  4.7<L>  /  Alb  2.3<L>  /  TBili  0.7  /  DBili  x   /  AST  12  /  ALT  <10<L>  /  AlkPhos  37  10-03    PT/INR - ( 02 Oct 2021 14:06 )   PT: 16.5 sec;   INR: 1.38 ratio    PTT - ( 02 Oct 2021 14:06 )  PTT:29.3 sec    Urinalysis Basic - ( 02 Oct 2021 19:04 )  Color: Yellow / Appearance: Clear / S.020 / pH: x  Gluc: x / Ketone: Negative  / Bili: Negative / Urobili: Negative mg/dL   Blood: x / Protein: 15 mg/dL / Nitrite: Negative   Leuk Esterase: Trace / RBC: 0-2 /HPF / WBC 0-2   Sq Epi: x / Non Sq Epi: x / Bacteria: x      RADIOLOGY & ADDITIONAL STUDIES:    EXAM:  US ABDOMEN RT UPR QUADRANT                          PROCEDURE DATE:  10/02/2021      INTERPRETATION:  CLINICAL INFORMATION: Sepsis.    COMPARISON: Abdominal CT dated 10/02/2021    TECHNIQUE: Sonography of the right upper quadrant.    FINDINGS:    Liver: Within normal limits.  Bile ducts: Normal caliber. Common bile duct measures 4 mm.  Gallbladder: There is 2.5 cm gallstones with associated gallbladder wall edema measuring up to 8 mm. There is no sonographic Burr sign.  Pancreas: Poorly visualized.  Right kidney: 9.9 cm. No hydronephrosis.  Ascites: None.  IVC: Visualized portions are within normal limits.    IMPRESSION:    Cholelithiasis with associated gallbladder wall edema which can be seen in acute cholecystitis. There is no sonographic Burr sign. Consider further evaluation with HIDA scan to assess for acute cholecystitis.    --- End of Report ---    MARY EMERSON MD; Attending Radiologist  This document has been electronically signed. Oct  2 36247:[id number]      EXAM:  CT ABDOMEN AND PELVIS                          EXAM:  CT CHEST                          PROCEDURE DATE:  10/02/2021      INTERPRETATION:  CLINICAL INFORMATION: Fever, history of left sided empyema    COMPARISON: CT chest 3/9/2020, pet/CT 2019.    CONTRAST/COMPLICATIONS:  IV Contrast: NONE  Oral Contrast: NONE  Complications: None reported at time of study completion    PROCEDURE:  CT of the Chest, Abdomen and Pelvis was performed.  Sagittal and coronal reformats were performed.    FINDINGS: Evaluation of the solid organs and vasculature is limited in the absence of intravenous contrast.  CHEST:  LUNGS AND LARGE AIRWAYS: Patent central airways. No pulmonary nodules.  PLEURA: No pleural effusion. Right pleural calcifications.  VESSELS: Aortic, aortic valvular and coronary artery calcifications.  HEART: Heart size is normal. No pericardial effusion.  MEDIASTINUM AND KAELYN: No lymphadenopathy.  CHEST WALL AND LOWER NECK: Within normal limits.    ABDOMEN AND PELVIS:  LIVER: Within normal limits.  BILE DUCTS: Normal caliber.  GALLBLADDER: Large gallbladder stone again noted. There is suggestion of gallbladder wall thickening, and pericholecystic fluid tracking inferiorly.  SPLEEN: Within normal limits.  PANCREAS: Within normal limits.  ADRENALS: Within normal limits.  KIDNEYS/URETERS: Nonobstructing 4 mm left renal calculus. Small right renal cyst. No hydronephrosis.  BLADDER: Within normal limits.  REPRODUCTIVE ORGANS: Prostate is enlarged.  BOWEL: Colonic,including right-sided, diverticulosis. Trace free fluid in the right paracolic gutter adjacent to diverticulosis may be due to gallbladder inflammation. No colonic wall to suggest acute diverticulitis. No bowel obstruction. Appendix is normal.  PERITONEUM: Trace pelvic free fluid. No pneumoperitoneum  VESSELS: Atherosclerotic changes.  RETROPERITONEUM/LYMPH NODES: No lymphadenopathy.  ABDOMINAL WALL: Within normal limits.  BONES: Diffuse degenerative changes. Status post posterior lumbar laminectomies. Mixed lucent and sclerotic lesion in the right mid humeral shaft is of unknown etiology, but unchanged since PET of 2019.    IMPRESSION:  Cholelithiasis with gallbladder wall thickening and pericholecystic edema, which extends to the rightparacolic gutter. Findings are suspicious for cholecystitis. No colonic wall thickening to suggest diverticulitis. Correlate with patient's symptomatology and right upper quadrant ultrasound.    Incidental findings as above.    --- End of Report ---    MATEO NEVILLE MD; Attending Radiologist  This document has been electronically signed. Oct  2 2021  3:41PM

## 2021-10-03 NOTE — CONSULT NOTE ADULT - PROBLEM SELECTOR RECOMMENDATION 2
Suspected biliary source of sepsis -- management deferred to medical/surgical services; BP is low but no need for pressors at this time; continue SPCU care.

## 2021-10-03 NOTE — CONSULT NOTE ADULT - SUBJECTIVE AND OBJECTIVE BOX
CHIEF COMPLAINT: Fatigue      HPI:  89 year old man with a history of CLL, dementia, PAF, HLD, OA, GI bleed/gastric ulcer, spinal stenosis, spine surgery, hospitalization in 3/2020 with empyema who presented to the ER on 10/2 with complaints of rigors and unresponsiveness; increased lethargy x 1 week.  Patient is a poor informant and cannot provide much input.  He was found to be febrile, hypotensive, and tachycardic (AF w/ RVR) upon arrival with a suspected diagnosis of sepsis related to acute cholecystitis.   He denies current cardiac symptoms -- specifically no palpitations, angina, dyspnea.    PAST MEDICAL & SURGICAL HISTORY:  Chronic Lymphocytic Leukemia  GERD (Gastroesophageal Reflux Disease)  Diverticulitis  AF (documented during last year's [facility]ization  Spinal Stenosis  Herniated Disc  Hyperlipidemia  Osteoarthritis  Stomach Ulcer H pylori 2007, treated with antibiotics  GI Bleed 2007  Dementia  History of Arthroscopy of Knee bilateral  Status Post Arthroscopy  right shoulder  S/P Tonsillectomy childhood    SOCIAL HISTORY:   Alcohol: Denied  Smoking: Nonsmoker    FAMILY HISTORY: Unable to obtain from patient due to his condition    MEDICATIONS  (STANDING):  heparin   Injectable 5000 Unit(s) SubCutaneous every 12 hours  midodrine 5 milliGRAM(s) Oral every 8 hours  pantoprazole    Tablet 40 milliGRAM(s) Oral before breakfast  piperacillin/tazobactam IVPB.. 3.375 Gram(s) IV Intermittent every 8 hours  risperiDONE   Tablet 2 milliGRAM(s) Oral at bedtime  sodium chloride 0.9%. 1000 milliLiter(s) (100 mL/Hr) IV Continuous <Continuous>    MEDICATIONS  (PRN):  acetaminophen   Tablet .. 650 milliGRAM(s) Oral every 6 hours PRN Temp greater or equal to 38C (100.4F), Mild Pain (1 - 3)  aluminum hydroxide/magnesium hydroxide/simethicone Suspension 30 milliLiter(s) Oral every 4 hours PRN Dyspepsia  haloperidol     Tablet 2 milliGRAM(s) Oral at bedtime PRN agitation  melatonin 3 milliGRAM(s) Oral at bedtime PRN Insomnia  ondansetron Injectable 4 milliGRAM(s) IV Push every 8 hours PRN Nausea and/or Vomiting    Allergies:  vancomycin (Nephrotoxicity)    REVIEW OF SYSTEMS:  Unable to obtain from patient due to his condition    VITAL SIGNS:   Vital Signs Last 24 Hrs  T(C): 36.8 (03 Oct 2021 04:00), Max: 38.8 (02 Oct 2021 13:32)  T(F): 98.3 (03 Oct 2021 04:00), Max: 101.8 (02 Oct 2021 13:32)  HR: 68 (03 Oct 2021 06:38) (64 - 134)  BP: 93/48 (03 Oct 2021 06:38) (71/48 - 113/77)  BP(mean): 62 (03 Oct 2021 06:38) (56 - 75)  RR: 15 (03 Oct 2021 06:38) (12 - 28)  SpO2: 100% (03 Oct 2021 06:38) (96% - 100%)    PHYSICAL EXAM:  Constitutional: NAD, appears stated age and drowsy  HEENT:  No oral cyanosis.  Pulmonary: Non-labored, breath sounds are clear bilaterally  Cardiovascular: S1 and S2, regular rate and rhythm  Gastrointestinal: Bowel Sounds present, abdomen is soft, + RUQ tenderness, no guarding.   Lymph: No peripheral edema. No cervical lymphadenopathy.  Neurological: Moves all extremities, confused  Skin: Warm, dry  Psych:  Mood & affect appropriate    LABS:                     11.0   8.22  )-----------( 86       ( 02 Oct 2021 14:06 )             33.5     136    |  100    |  30     ----------------------------<  193    4.4     |  26     |  1.83     Ca    8.7        02 Oct 2021 14:06  Phos  3.3       02 Oct 2021 14:06  Mg     3.4       02 Oct 2021 14:06    TPro  6.0    /  Alb  3.2    /  TBili  1.1    /  DBili  x      /  AST  12     /  ALT  <10    /  AlkPhos  87     02 Oct 2021 14:06    PT/INR - ( 02 Oct 2021 14:06 )   PT: 16.5 sec;   INR: 1.38 ratio    PTT - ( 02 Oct 2021 14:06 )  PTT:29.3 sec    Tele:  SR, PAF    TTE Echo Doppler w/o Cont (02.27.20 @ 11:47):  Technically difficult and limited study  Normal left ventricular internal dimensions and systolic function, estimated LVEF of 55%.   Right ventricle is not well-visualized  Sclerotic trileaflet aortic valve, without AI.   Trace physiologic MR and TR.       ECG 10/2 @ 19:59:  SR, 1st degree AV block, anteroseptal infarct, inferior infarct

## 2021-10-03 NOTE — H&P ADULT - NSHPSOCIALHISTORY_GEN_ALL_CORE
former smoker (quit >50 years ago, was possibly 1-1.5 ppd)  alcohol : very light etoh use   no illegal drug use  has a A 24/7  Full code

## 2021-10-03 NOTE — H&P ADULT - NSICDXFAMILYHX_GEN_ALL_CORE_FT
FAMILY HISTORY:  Mother  Still living? Unknown  FH: Alzheimer's disease, Age at diagnosis: Age Unknown

## 2021-10-03 NOTE — PROVIDER CONTACT NOTE (EICU) - RECOMMENDATIONS
Will continue to closely monitor and initiate levophed if deterioration in clinical condition  Can continue midodrine as ordered by Dr. Maxwell
-Continue close BP monitoring  -Pressor support if BP decreases again with neosynephrine to be ordered by Dr. Maxwell if needed   -Dr. Kruse initiated transfer with transfer center, Dr. Maxwell and Jose J to assist
-Continue maintenance IV fluids   -Monitor BP closely   -Will hold pressors at this time, but low threshold to start levophed   -Bedside team encouraged to contact eICU for any changes in patient's condition   -eICU provider to reassess patient within 2 hours if not contacted by bedside team sooner

## 2021-10-03 NOTE — PROGRESS NOTE ADULT - ASSESSMENT
89 y/o male with a PMHx of GERD, anxiety, CLL, h/o kidney failure   AMS/Shaking/seizure like activity  No h/o seizures   CT Abd  - Acute Cholecystitis.  Cr - 1.83, now 1.36  No signs of CVA  CT Head - No acute pathology  BP was low at 70/40  Temp was 101.8 degree F  Was it rigors sec to fever OR was Iit Seizure like activity - provoked sec to Transient Cerebral Hypoperfusion sec to hypotension.  No indications of anti seizure meds.  With daughter's agreement No further w/up like MRI or EEG will be done.  Rec IV Fluids  Antibiotics.  GI management.  Advanced directives d/w daughter at bedside. At this point pt is full code. (Pt was in hospice for 14 month, was taken off from hospice service in June 2021)  Would Follow.

## 2021-10-03 NOTE — H&P ADULT - ASSESSMENT
89 y/o M with PMHx of dementia, paroxysmal atrial fibrillation (not on AC), hx of CLL, HLD, osteoarthritis presented to [facility] ED with an acute episode of rigors and unresponsiveness admitted with severe sepsis/shock secondary to cholecystitis. 89 y/o M with PMHx of dementia, paroxysmal atrial fibrillation (not on AC), hx of CLL, HLD, osteoarthritis presented to  ED with an acute episode of rigors and unresponsiveness admitted with severe sepsis/shock secondary to cholecystitis.    Sepsis 2/2 possibly acute cholecystitis  - Admitted to ICU   - Stared on Zosyn empirically in , will continue   - US RUQ: Cholelithiasis with associated gallbladder wall edema which can be seen in acute cholecystitis.  - No evidence of leukocytosis or transaminitis   - F/up HIDA scan  - NPO   - F/u blood cultures  - ID consulted (Dr. Kiran)  - Surgery following (Dr. Uribe)    Afib   - EKG at  on 10/2 showed atrial fibrillation and subsequent EKG was SR with 1st deg AV block  - would hold off AC for possible future cholecystectomy and hx of hematuria   - was on amiodarone in the past  - Monitor     GUS   - Creatinine baseline 1.83, per chart review   - On admission 1.36   - IV fluids for gentle hydration  - monitor serum creatinine & urine output  - avoid nephrotoxic medications    # Normocytic  Anemia, likely anemia of chronic disease/anemia  - H/H: 9.1/27.8 on admission, baseline hemoglobin between 7.5 to 8.8  - Currently hemodynamically stable, monitor for signs and symptoms of active bleeding  - F/u AM CBC    Dementia  - continue with Risperdal and haldol PRN at night   - Fall precautions     Preventive measures  DVT ppx: hold AC for possible OR and history of hematuria, SCD's   Full code   Aspiration and fall precautions  89 y/o M with PMHx of dementia, paroxysmal atrial fibrillation (not on AC), hx of CLL, HLD, osteoarthritis presented to  ED with an acute episode of rigors and unresponsiveness admitted with severe sepsis/shock secondary to acute cholecystitis.    Sepsis likely secondary to acute cholecystitis  - Admitted to ICU   - Stared on Zosyn empirically in , will continue   - US RUQ: Cholelithiasis with associated gallbladder wall edema which can be seen in acute cholecystitis.  - No evidence of leukocytosis or transaminitis  - severe sepsis POA responded to fluids (per family patient runs low BP)  - monitor for shock state necessitating IV vasopressors  - will hold off pressors for now - continue with midodrine  - F/up HIDA scan  - NPO   - F/u blood cultures  - ID consulted (Dr. Kiran)  - Surgery following (Dr. Uribe)    Paroxysmal Atrial Fibrillation  - EKG at  on 10/2 showed atrial fibrillation and subsequent EKG was SR with 1st deg AV block  - would hold off AC for possible future cholecystectomy and hx of hematuria - also given dementia  - was on amiodarone in the past  - continue cardiac monitoring - consider IV amiodarone if recurrent AF    GUS   - Creatinine 1.83 on admission to   - improving renal indices  - IV fluids for gentle hydration  - monitor serum creatinine & urine output  - avoid nephrotoxic medications    Normocytic Anemia, likely anemia of chronic disease  - H/H: 9.1/27.8 on admission, baseline hemoglobin between 7.5 to 8.8  - Currently hemodynamically stable, monitor for signs and symptoms of active bleeding  - F/u AM CBC    Dementia  - continue with Risperdal and haldol PRN at night   - Fall precautions     Preventive measures  DVT ppx: hold AC for possible OR and history of hematuria, SCD's   Full code/Inpatient   Aspiration and fall precautions

## 2021-10-03 NOTE — PROGRESS NOTE ADULT - SUBJECTIVE AND OBJECTIVE BOX
Patient is a 89y old  Male who presents with a chief complaint of rigors, fever, possibly sepsis (02 Oct 2021 20:02)      BRIEF HOSPITAL COURSE: 89 year old male PMHx Dementia, CLL, OA, HLD.  Recent Protracted hospitalization at [facility] 2021 - 3/ L Empyema and R Ureteral Stone, Course complicated by GUS requiring HD, AF w / RVR and Agitation.   Admitted 10/2/2021 with worsening lethargy and rigors.  Found to be febrile 101.8'F.    Events last 24 hours: CT C/A/P in ED revealing Gallstone with GB wall thickening and with adjacent pericholecystic edema.  SBP Soft in ED but maintaining MAPS > 65    PAST MEDICAL & SURGICAL HISTORY:  Chronic Lymphocytic Leukemia  followed by oncologist in [place]  GERD (Gastroesophageal Reflux Disease)  Diverticulitis  Spinal Stenosis  Herniated Disc  Hyperlipidemia  Osteoarthritis  Stomach Ulcer  H pylori , treated with antibiotics  GI Bleed    Dementia  History of Arthroscopy of Knee  bilateral  Status Post Arthroscopy  right shoulder  S/P Tonsillectomy  childhood        Review of Systems: Unable to access / Demented on exam       Medications:  piperacillin/tazobactam IVPB.. 3.375 Gram(s) IV Intermittent every 8 hours  acetaminophen   Tablet .. 650 milliGRAM(s) Oral every 6 hours PRN  haloperidol     Tablet 2 milliGRAM(s) Oral at bedtime PRN  melatonin 3 milliGRAM(s) Oral at bedtime PRN  ondansetron Injectable 4 milliGRAM(s) IV Push every 8 hours PRN  risperiDONE   Tablet 2 milliGRAM(s) Oral at bedtime  heparin   Injectable 5000 Unit(s) SubCutaneous every 12 hours  aluminum hydroxide/magnesium hydroxide/simethicone Suspension 30 milliLiter(s) Oral every 4 hours PRN  pantoprazole    Tablet 40 milliGRAM(s) Oral before breakfast  sodium chloride 0.9%. 1000 milliLiter(s) IV Continuous <Continuous>          ICU Vital Signs Last 24 Hrs  T(C): 37.1 (02 Oct 2021 21:22), Max: 38.8 (02 Oct 2021 13:32)  T(F): 98.8 (02 Oct 2021 21:22), Max: 101.8 (02 Oct 2021 13:32)  HR: 82 (02 Oct 2021 21:22) (82 - 134)  BP: 98/53 (02 Oct 2021 21:22) (71/48 - 113/77)  RR: 15 (02 Oct 2021 21:22) (14 - 28)  SpO2: 96% (02 Oct 2021 21:22) (96% - 99%)          I&O's Detail    02 Oct 2021 07:01  -  02 Oct 2021 22:22  --------------------------------------------------------  IN:    IV PiggyBack: 150 mL    Sodium Chloride 0.9% Bolus: 2000 mL  Total IN: 2150 mL    OUT:    Voided (mL): 300 mL  Total OUT: 300 mL    Total NET: 1850 mL            LABS:                        11.0   8.22  )-----------( 86       ( 02 Oct 2021 14:06 )             33.5     10-02    136  |  100  |  30<H>  ----------------------------<  193<H>  4.4   |  26  |  1.83<H>    Ca    8.7      02 Oct 2021 14:06  Phos  3.3     10-02  Mg     3.4     10-02    TPro  6.0  /  Alb  3.2<L>  /  TBili  1.1  /  DBili  x   /  AST  12  /  ALT  <10<L>  /  AlkPhos  87  10-02          CAPILLARY BLOOD GLUCOSE        PT/INR - ( 02 Oct 2021 14:06 )   PT: 16.5 sec;   INR: 1.38 ratio         PTT - ( 02 Oct 2021 14:06 )  PTT:29.3 sec  Urinalysis Basic - ( 02 Oct 2021 19:04 )    Color: Yellow / Appearance: Clear / S.020 / pH: x  Gluc: x / Ketone: Negative  / Bili: Negative / Urobili: Negative mg/dL   Blood: x / Protein: 15 mg/dL / Nitrite: Negative   Leuk Esterase: Trace / RBC: 0-2 /HPF / WBC 0-2   Sq Epi: x / Non Sq Epi: x / Bacteria: x      CULTURES:  Rapid RVP Result: NotDetec (10-02 @ 13:38)            Physical Examination:    General:  Lethargic but aroused with  verbal stim, AnoX1 interactive, nonfocal    HEENT: Pupils equal, reactive to light.  Symmetric. No JVD.    PULM:  Decreased BS to auscultation bilaterally, No W/R/R,  no significant sputum production    CVS: Regular rate and rhythm, no murmurs, rubs, or gallops    ABD: Soft, nondistended, Mildly tender RUQ, normoactive bowel sounds, no masses    EXT: Trace LE  edema, nontender    SKIN: Warm and well perfused, no rashes noted.        RADIOLOGY:   < from: CT Abdomen and Pelvis No Cont (10.02.21 @ 14:55) >    EXAM:  CT ABDOMEN AND PELVIS                          EXAM:  CT CHEST                                  PROCEDURE DATE:  10/02/2021          INTERPRETATION:  CLINICAL INFORMATION: Fever, history of left sided empyema    COMPARISON: CT chest 3/9/2020, pet/CT 2019.    CONTRAST/COMPLICATIONS:  IV Contrast: NONE  Oral Contrast: NONE  Complications: None reported at time of study completion    PROCEDURE:  CT of the Chest, Abdomen and Pelvis was performed.  Sagittal and coronal reformats were performed.    FINDINGS: Evaluation of the solid organs and vasculature is limited in the absence of intravenous contrast.  CHEST:  LUNGS AND LARGE AIRWAYS: Patent central airways. No pulmonary nodules.  PLEURA: No pleural effusion. Right pleural calcifications.  VESSELS: Aortic, aortic valvular and coronary artery calcifications.  HEART: Heart size is normal. No pericardial effusion.  MEDIASTINUM AND KAELYN: No lymphadenopathy.  CHEST WALL AND LOWER NECK: Within normal limits.    ABDOMEN AND PELVIS:  LIVER: Within normal limits.  BILE DUCTS: Normal caliber.  GALLBLADDER: Large gallbladder stone again noted. There is suggestion of gallbladder wall thickening, and pericholecystic fluid tracking inferiorly.  SPLEEN: Within normal limits.  PANCREAS: Within normal limits.  ADRENALS: Within normal limits.  KIDNEYS/URETERS: Nonobstructing 4 mm left renal calculus. Small right renal cyst. No hydronephrosis.    BLADDER: Within normal limits.  REPRODUCTIVE ORGANS: Prostate is enlarged.    BOWEL: Colonic,including right-sided, diverticulosis. Trace free fluid in the right paracolic gutter adjacent to diverticulosis may be due to gallbladder inflammation. No colonic wall to suggest acute diverticulitis. No bowel obstruction. Appendix is normal.  PERITONEUM: Trace pelvic free fluid. No pneumoperitoneum  VESSELS: Atherosclerotic changes.  RETROPERITONEUM/LYMPH NODES: No lymphadenopathy.  ABDOMINAL WALL: Within normal limits.  BONES: Diffuse degenerative changes. Status post posterior lumbar laminectomies. Mixed lucent and sclerotic lesion in the right mid humeral shaft is of unknown etiology, but unchanged since PET of 2019.    IMPRESSION:  Cholelithiasis with gallbladder wall thickening and pericholecystic edema, which extends to the rightparacolic gutter. Findings are suspicious for cholecystitis. No colonic wall thickening to suggest diverticulitis. Correlate with patient's symptomatology and right upper quadrant ultrasound.    < end of copied text >    < from: US Abdomen Upper Quadrant Right (10.02.21 @ 18:55) >  EXAM:  US ABDOMEN RT UPR QUADRANT                                  PROCEDURE DATE:  10/02/2021          INTERPRETATION:  CLINICAL INFORMATION: Sepsis.    COMPARISON: Abdominal CT dated 10/02/2021    TECHNIQUE: Sonography of the right upper quadrant.    FINDINGS:    Liver: Within normal limits.  Bile ducts: Normal caliber. Common bile duct measures 4 mm.  Gallbladder: There is 2.5 cm gallstones with associated gallbladder wall edema measuring up to 8 mm. There is no sonographic Burr sign.  Pancreas: Poorly visualized.  Right kidney: 9.9 cm. No hydronephrosis.  Ascites: None.  IVC: Visualized portions are within normal limits.    IMPRESSION:    Cholelithiasis with associated gallbladder wall edema which can be seen in acute cholecystitis. There is no sonographic Burr sign. Consider further evaluation with HIDA scan to assess for acute cholecystitis.      < end of copied text >    CRITICAL CARE TIME SPENT: 40 minutes  (Assessing presenting problems of acute illness, which pose high probability of life threatening deterioration or end organ damage/dysfunction, as well as medical decision making including initiating plan of care, reviewing data, reviewing radiologic exams, discussing with multidisciplinary team,  discussing goals of care with patient/family, and writing this note.  Non-inclusive of procedures performed)  
Patient is a 89y old  Male who presents with a chief complaint of rigors, fever, possibly sepsis (03 Oct 2021 06:55)      INTERVAL HPI/OVERNIGHT EVENTS: Patient seen and examined at bedside. No overnight events.      MEDICATIONS  (STANDING):  heparin   Injectable 5000 Unit(s) SubCutaneous every 12 hours  midodrine 5 milliGRAM(s) Oral every 8 hours  pantoprazole    Tablet 40 milliGRAM(s) Oral before breakfast  piperacillin/tazobactam IVPB.. 3.375 Gram(s) IV Intermittent every 8 hours  risperiDONE   Tablet 2 milliGRAM(s) Oral at bedtime  sodium chloride 0.9%. 1000 milliLiter(s) (100 mL/Hr) IV Continuous <Continuous>    MEDICATIONS  (PRN):  acetaminophen   Tablet .. 650 milliGRAM(s) Oral every 6 hours PRN Temp greater or equal to 38C (100.4F), Mild Pain (1 - 3)  aluminum hydroxide/magnesium hydroxide/simethicone Suspension 30 milliLiter(s) Oral every 4 hours PRN Dyspepsia  haloperidol     Tablet 2 milliGRAM(s) Oral at bedtime PRN agitation  melatonin 3 milliGRAM(s) Oral at bedtime PRN Insomnia  ondansetron Injectable 4 milliGRAM(s) IV Push every 8 hours PRN Nausea and/or Vomiting      Allergies    vancomycin (Nephrotoxicity)    Intolerances        REVIEW OF SYSTEMS:  Unable to obtain meaningful ROS due to mental status      Vital Signs Last 24 Hrs  T(C): 36.6 (03 Oct 2021 08:00), Max: 38.8 (02 Oct 2021 13:32)  T(F): 97.9 (03 Oct 2021 08:00), Max: 101.8 (02 Oct 2021 13:32)  HR: 73 (03 Oct 2021 08:00) (64 - 134)  BP: 80/46 (03 Oct 2021 08:00) (71/48 - 113/77)  BP(mean): 59 (03 Oct 2021 08:00) (56 - 75)  RR: 16 (03 Oct 2021 08:00) (12 - 28)  SpO2: 96% (03 Oct 2021 08:00) (96% - 100%)    PHYSICAL EXAM:  GENERAL: NAD, lethargic  HEENT:  EOMI, moist mucous membranes  CHEST/LUNG:  diminshed bs at bases, feint crackles   HEART:  RRR, S1, S2  ABDOMEN:  very mild tenderness to deep palpation of RUQ, BS+, soft, nontender  EXTREMITIES: able to raise extremities for a few seconds, no edema, cyanosis, or calf tenderness  NERVOUS SYSTEM: AA&Ox1-2, responds to pain and some questions    LABS:                        9.1    6.83  )-----------( 81       ( 03 Oct 2021 07:05 )             27.8     CBC Full  -  ( 03 Oct 2021 07:05 )  WBC Count : 6.83 K/uL  Hemoglobin : 9.1 g/dL  Hematocrit : 27.8 %  Platelet Count - Automated : 81 K/uL  Mean Cell Volume : 97.2 fl  Mean Cell Hemoglobin : 31.8 pg  Mean Cell Hemoglobin Concentration : 32.7 gm/dL  Auto Neutrophil # : 4.37 K/uL  Auto Lymphocyte # : 1.71 K/uL  Auto Monocyte # : 0.65 K/uL  Auto Eosinophil # : 0.06 K/uL  Auto Basophil # : 0.02 K/uL  Auto Neutrophil % : 64.0 %  Auto Lymphocyte % : 25.0 %  Auto Monocyte % : 9.5 %  Auto Eosinophil % : 0.9 %  Auto Basophil % : 0.3 %    03 Oct 2021 07:05    137    |  107    |  24     ----------------------------<  84     3.7     |  24     |  1.36     Ca    8.0        03 Oct 2021 07:05  Phos  3.0       03 Oct 2021 07:05  Mg     1.8       03 Oct 2021 07:05    TPro  4.7    /  Alb  2.3    /  TBili  0.7    /  DBili  x      /  AST  12     /  ALT  <10    /  AlkPhos  37     03 Oct 2021 07:05    PT/INR - ( 02 Oct 2021 14:06 )   PT: 16.5 sec;   INR: 1.38 ratio         PTT - ( 02 Oct 2021 14:06 )  PTT:29.3 sec  Urinalysis Basic - ( 02 Oct 2021 19:04 )    Color: Yellow / Appearance: Clear / S.020 / pH: x  Gluc: x / Ketone: Negative  / Bili: Negative / Urobili: Negative mg/dL   Blood: x / Protein: 15 mg/dL / Nitrite: Negative   Leuk Esterase: Trace / RBC: 0-2 /HPF / WBC 0-2   Sq Epi: x / Non Sq Epi: x / Bacteria: x      CAPILLARY BLOOD GLUCOSE              RADIOLOGY & ADDITIONAL TESTS: < from: US Abdomen Upper Quadrant Right (10.02.21 @ 18:55) >  EXAM:  US ABDOMEN RT UPR QUADRANT                                  PROCEDURE DATE:  10/02/2021          INTERPRETATION:  CLINICAL INFORMATION: Sepsis.    COMPARISON: Abdominal CT dated 10/02/2021    TECHNIQUE: Sonography of the right upper quadrant.    FINDINGS:    Liver: Within normal limits.  Bile ducts: Normal caliber. Common bile duct measures 4 mm.  Gallbladder: There is 2.5 cm gallstones with associated gallbladder wall edema measuring up to 8 mm. There is no sonographic Burr sign.  Pancreas: Poorly visualized.  Right kidney: 9.9 cm. No hydronephrosis.  Ascites: None.  IVC: Visualized portions are within normal limits.    IMPRESSION:    Cholelithiasis with associated gallbladder wall edema which can be seen in acute cholecystitis. There is no sonographic Burr sign. Consider further evaluation with HIDA scan to assess for acute cholecystitis.    --- End of Report ---              MARY EMERSON MD; Attending Radiologist  This document has been electronically signed. Oct  2 62383:[id number]    < end of copied text >    < from: CT Abdomen and Pelvis No Cont (10.02.21 @ 14:55) >  EXAM:  CT ABDOMEN AND PELVIS                          EXAM:  CT CHEST                                  PROCEDURE DATE:  10/02/2021          INTERPRETATION:  CLINICAL INFORMATION: Fever, history of left sided empyema    COMPARISON: CT chest 3/9/2020, pet/CT 2019.    CONTRAST/COMPLICATIONS:  IV Contrast: NONE  Oral Contrast: NONE  Complications: None reported at time of study completion    PROCEDURE:  CT of the Chest, Abdomen and Pelvis was performed.  Sagittal and coronal reformats were performed.    FINDINGS: Evaluation of the solid organs and vasculature is limited in the absence of intravenous contrast.  CHEST:  LUNGS AND LARGE AIRWAYS: Patent central airways. No pulmonary nodules.  PLEURA: No pleural effusion. Right pleural calcifications.  VESSELS: Aortic, aortic valvular and coronary artery calcifications.  HEART: Heart size is normal. No pericardial effusion.  MEDIASTINUM AND KAELYN: No lymphadenopathy.  CHEST WALL AND LOWER NECK: Within normal limits.    ABDOMEN AND PELVIS:  LIVER: Within normal limits.  BILE DUCTS: Normal caliber.  GALLBLADDER: Large gallbladder stone again noted. There is suggestion of gallbladder wall thickening, and pericholecystic fluid tracking inferiorly.  SPLEEN: Within normal limits.  PANCREAS: Within normal limits.  ADRENALS: Within normal limits.  KIDNEYS/URETERS: Nonobstructing 4 mm left renal calculus. Small right renal cyst. No hydronephrosis.    BLADDER: Within normal limits.  REPRODUCTIVE ORGANS: Prostate is enlarged.    BOWEL: Colonic,including right-sided, diverticulosis. Trace free fluid in the right paracolic gutter adjacent to diverticulosis may be due to gallbladder inflammation. No colonic wall to suggest acute diverticulitis. No bowel obstruction. Appendix is normal.  PERITONEUM: Trace pelvic free fluid. No pneumoperitoneum  VESSELS: Atherosclerotic changes.  RETROPERITONEUM/LYMPH NODES: No lymphadenopathy.  ABDOMINAL WALL: Within normal limits.  BONES: Diffuse degenerative changes. Status post posterior lumbar laminectomies. Mixed lucent and sclerotic lesion in the right mid humeral shaft is of unknown etiology, but unchanged since PET of 2019.    IMPRESSION:  Cholelithiasis with gallbladder wall thickening and pericholecystic edema, which extends to the rightparacolic gutter. Findings are suspicious for cholecystitis. No colonic wall thickening to suggest diverticulitis. Correlate with patient's symptomatology and right upper quadrant ultrasound.    Incidental findings as above.    --- End of Report ---          < from: CT Head No Cont (10.02.21 @ 14:51) >  EXAM:  CT BRAIN                                  PROCEDURE DATE:  10/02/2021          INTERPRETATION:  Clinical indication: Altered mental status.    Multiple axial sections were performed from base of skull to vertex without contrast enhancement. Coronal and sagittal constructions were performed.    This exam is compared with prior noncontrast head CT performed on 2021    Parenchyma finding loss and chronic microvascular changes are identified.    There is no acute hemorrhage mass or mass effect seen.    Evaluation of the osseous structures with appropriate window appears unremarkable.    Left sphenoid sinus air-fluid level is identified. Minimal mucosal thickening is seen on the anterior right ethmoid sinus.    Both mastoid and middle ear regions appear clear.    Impression: No evidence of acute hemorrhage mass or mass effect.    --- End of Report ---              SOTO VUONG MD; Attending Radiologist  This document has been electronically signed. Oct  2 2021  3:11PM    < end of copied text >      MATEO NEVILLE MD; Attending Radiologist  This document has been electronically signed. Oct  2 2021  3:41PM    < end of copied text >        Consultant(s) Notes Reviewed:  [x] YES  [ ] NO    Care Discussed with [x] Consultants  [x] Patient  [ ] Family  [ ]      [ x] Other; RN  DVT ppx  
Neurology Follow up note    CHARLY LU 89y Male    HPI: 89 y/o male with a PMHx of GERD, anxiety, kidney failure presents to the ED BIB EMS from home c/o possible seizure. Pt has no Hx of seizure. Police described pt as hypotensive.  reports family states pt started shaking, which lasted 5 seconds while eating pancakes. Pt was described as postictal for less than a minute. Family reports pt sleeps 20 hours in a day. Has Hx of afib. Blood sugar 180. Pt has a temp of 101.8. Pt was admitted to [facility]  to  with left sided empyema and left ureteral stone. On  left pigtail was placed for left empyema, Pt was treated with course of vancomycin and zosyn. On  he was transferred to ICU to tPA/ chest tube to increased output. Course complicated by acute renal failure likely d/t vancomycin. Mcnulty was placed. but pt's creatinine and urinary output continued to decline and he had subsequent icu delirium. Discussion was made to start dialysis and pt did 2 sessions of this. Pt was made DNR/DNI due to poor prognosis. It also appears he developed nuanced onset afib and was treated with amiodarone. Pigtail was removed and ct showed to not change. Pt was transferred to rehab.  No facial asymmetry  No lateralized weakness.  CT Head - No acute pathology.  Cr - 1.83  Daughter is at bedside  - who says that EMS took his BP was - 70/40  Temp was 101.8 degree F.    Interval History -    Patient is seen, chart was reviewed and case was discussed with the treatment team.  Pt is not in any distress.   More awake.  No shaking or tremors.  Poor cognition.    Vital Signs Last 24 Hrs  T(C): 36.7 (03 Oct 2021 12:00), Max: 37.6 (02 Oct 2021 19:00)  T(F): 98.1 (03 Oct 2021 12:00), Max: 99.6 (02 Oct 2021 19:00)  HR: 84 (03 Oct 2021 16:00) (64 - 88)  BP: 85/52 (03 Oct 2021 16:00) (79/43 - 108/46)  BP(mean): 61 (03 Oct 2021 16:00) (47 - 75)  RR: 17 (03 Oct 2021 16:00) (12 - 20)  SpO2: 99% (03 Oct 2021 16:00) (96% - 100%)    MEDICATIONS    acetaminophen   Tablet .. 650 milliGRAM(s) Oral every 6 hours PRN  aluminum hydroxide/magnesium hydroxide/simethicone Suspension 30 milliLiter(s) Oral every 4 hours PRN  haloperidol     Tablet 2 milliGRAM(s) Oral at bedtime PRN  heparin   Injectable 5000 Unit(s) SubCutaneous every 12 hours  melatonin 3 milliGRAM(s) Oral at bedtime PRN  midodrine 5 milliGRAM(s) Oral every 8 hours  ondansetron Injectable 4 milliGRAM(s) IV Push every 8 hours PRN  pantoprazole    Tablet 40 milliGRAM(s) Oral before breakfast  piperacillin/tazobactam IVPB.. 3.375 Gram(s) IV Intermittent every 8 hours  risperiDONE   Tablet 2 milliGRAM(s) Oral at bedtime  sodium chloride 0.9%. 1000 milliLiter(s) IV Continuous <Continuous>    Allergies    vancomycin (Nephrotoxicity)      On Neurological Examination:    Mental Status - Pt is awake. Follows simple commands. Able to say his name.    Speech -  Able to speak. No aphasia.    Cranial Nerves - Pupils 1.5 mm equal and reactive to light, extraocular eye movements intact. No right left facial asymmetry. Tongue - is in midline.    Motor Exam -Moves all extremities equally.      Sensory Exam - Pt withdraws all extremities equally on stimulation.     Gait - Couldn't be tested.     Deep tendon Reflexes - 2 plus all over.    Neck Supple -  Yes.    LABS:    CBC Full  -  ( 03 Oct 2021 07:05 )  WBC Count : 6.83 K/uL  RBC Count : 2.86 M/uL  Hemoglobin : 9.1 g/dL  Hematocrit : 27.8 %  Platelet Count - Automated : 81 K/uL  Mean Cell Volume : 97.2 fl  Mean Cell Hemoglobin : 31.8 pg  Mean Cell Hemoglobin Concentration : 32.7 gm/dL  Auto Neutrophil # : 4.37 K/uL  Auto Lymphocyte # : 1.71 K/uL  Auto Monocyte # : 0.65 K/uL  Auto Eosinophil # : 0.06 K/uL  Auto Basophil # : 0.02 K/uL  Auto Neutrophil % : 64.0 %  Auto Lymphocyte % : 25.0 %  Auto Monocyte % : 9.5 %  Auto Eosinophil % : 0.9 %  Auto Basophil % : 0.3 %    Urinalysis Basic - ( 02 Oct 2021 19:04 )    Color: Yellow / Appearance: Clear / S.020 / pH: x  Gluc: x / Ketone: Negative  / Bili: Negative / Urobili: Negative mg/dL   Blood: x / Protein: 15 mg/dL / Nitrite: Negative   Leuk Esterase: Trace / RBC: 0-2 /HPF / WBC 0-2   Sq Epi: x / Non Sq Epi: x / Bacteria: x    10-03    137  |  107  |  24<H>  ----------------------------<  84  3.7   |  24  |  1.36<H>    Ca    8.0<L>      03 Oct 2021 07:05  Phos  3.0     10-  Mg     1.8     10-    TPro  4.7<L>  /  Alb  2.3<L>  /  TBili  0.7  /  DBili  x   /  AST  12  /  ALT  <10<L>  /  AlkPhos  37  10-03    RADIOLOGY & ADDITIONAL STUDIES:    rad< from: CT Head No Cont (10.02.21 @ 14:51) >    Impression: No evidence of acute hemorrhage mass or mass effect.    < end of copied text >    < from: CT Chest No Cont (10.02.21 @ 14:55) >    IMPRESSION:    Cholelithiasis with gallbladder wall thickening and pericholecystic edema, which extends to the right paracolic gutter. Findings are suspicious for cholecystitis. No colonic wall thickening to suggest diverticulitis. Correlate with patient's symptomatology and right upper quadrant ultrasound.    Incidental findings as above.    < end of copied text >

## 2021-10-03 NOTE — CONSULT NOTE ADULT - ASSESSMENT
89M with dementia, hx of CLL, HLD, osteoarthritis who presents with an acute episode of rigors and unresponsiveness today, transferred from [place] for [place] ICU care, malaise and failure to thrive clinical picture, sono and CT concerning for cholecystitis - acute vs chronic

## 2021-10-03 NOTE — PROVIDER CONTACT NOTE (EICU) - BACKGROUND
Patient with BP 97/47 at noon. BP 83/37 at 1 pm with repeat SBP 75.
89 year old male PMHx Dementia, CLL, OA, HLD.  Recent Protracted hospitalization at [facility] 2/26/2021 - 3/ L Empyema and R Ureteral Stone, Course complicated by GUS requiring HD, AF w / RVR and Agitation.   Admitted 10/2/2021 with worsening lethargy and rigors.  Found to be febrile 101.8'F, concern for cholecystitis, awaiting HIDA scan. Patient with low BPs since admission, fluid responsive initially, has received total of 4 L IV fluid bolus, now with BP 90s/50s, however was transiently in 80s an hour ago. Dr. Kruse asking about utility of pressors for patient. Son who is a physician in agreement with IV pressor support, patient DNR/DNI. Patient able to have limited conversation (due to dementia) at this time per bedside team.

## 2021-10-03 NOTE — PROGRESS NOTE ADULT - TIME BILLING
The total amount of time listed was spent reviewing the hospital notes, laboratory values, imaging findings, assessing/counseling the patient, discussing with consultant physicians and nursing staff.

## 2021-10-03 NOTE — H&P ADULT - HISTORY OF PRESENT ILLNESS
89 y/o M with PMHx of dementia, paroxsymal atrial fibrillation (not on AC), hx of CLL, HLD, osteoarthritis presented to [facility] ED with an acute episode of rigors and unresponsiveness. Was found to have acute cholecystitis on imaging. Was in shock state and maintained on IV fluids, zosyn and midodrine with possible plan for IV vasopressors. Was seen by neurology Dr. Blair for seizure like activity - possibly rigors in setting of fever. Surgery Dr. Uribe was also following for cholecystitis and felt as though [facility] would be a better option given capability for IR intervention and a formal ICU. Will be getting a HIDA scan while here. Of note patient was hospice, but changed to full code and now DNR/DNI but with other aggressive management (i.e pressors). 89 y/o M with PMHx of dementia, paroxsymal atrial fibrillation (not on AC), hx of CLL, HLD, osteoarthritis presented to  ED with an acute episode of rigors and unresponsiveness. He had a Tmax of 101.8 in the ED. Was found to have acute cholecystitis on imaging. Was in shock state and maintained on IV fluids, zosyn and midodrine with possible plan for IV vasopressors. Was seen by neurology Dr. Blair for seizure like activity - possibly rigors in setting of fever. Surgery Dr. Uribe was also following for cholecystitis and felt as though [facility] would be a better option given capability for IR intervention and a formal ICU. Will be getting a HIDA scan while here. Of note patient was under hospice care in the past, but now changed to full code and with aggressive management. Discussed with daughter (Esperanza) and son (Sohail) over telephone - they state that he is NOT DNR/DNI.    Vital Signs ([facility] ICU)  T(C Max: 98.3 HR: 69 BP: 101/57 RR: 14 SpO2: 97%  EKG at  on 10/2 showed atrial fibrillation and subsequent EKG was SR with 1st deg AV block  CT CAP: Cholelithiasis with gallbladder wall thickening and pericholecystic edema, which extends to the right paracolic gutter. Findings are suspicious for cholecystitis. No colonic wall thickening to suggest diverticulitis. Correlate with patient's symptomatology and right upper quadrant ultrasound.  US RUQ: Cholelithiasis with associated gallbladder wall edema which can be seen in acute cholecystitis. There is no sonographic Burr sign. Consider further evaluation with HIDA scan to assess for acute cholecystitis.

## 2021-10-03 NOTE — DIETITIAN INITIAL EVALUATION ADULT. - OTHER INFO
Per H&P, Pt is a 89 y/o M with dementia, hx of CLL, HLD, osteoarthritis who presents with an acute episode of rigors and unresponsiveness today.  As per daughter, patient has been "looking different" for the past week, more lethargic and less responsive (though normally he is asleep for 20+ hrs in a day).  Patient had difficulty eating today and had be assisted.  Then during breakfast, as per the daughter who was there, patient suddenly had 5 seconds of generalized shaking and then became unresponsive for about a minute. Unclear if he was confused after regaining consciousness since patient does not talk much normally and has baseline dementia.  Patient was brought here for further evaluation.  Aside from looking more fatigued, patient has had a couple episodes of diarrhea recently and a chronic cough.  No known fevers at home.  No other known complaints. Of note, patient had a prolonged course @ [facility] last year from 2/2020-3/2020.  Patient was found to have a left sided empyema and right ureteral stone.  Patient had a pigtail for the left empyema but eventually needed a chest tube.  Patient's hospital course was complicated by GUS of which he required HD. Pt was reportedly in hospice care but was eventually dc'ed from hospice in June 2021 because patient was improving.      Pt seen for nutrition assessment secondary to [facility] length of stay policy. Pt visited while asleep in bed. Unable to obtain subject hx at this time 2/2 dementia and mental status. Pt currently NPO. Per MD note "Cholelithiasis with gallbladder wall thickening and pericholecystic edema, which extends to the rightparacolic gutter. Findings are suspicious for cholecystitis. No colonic wall thickening to suggest diverticulitis. Correlate with patient's symptomatology and right upper quadrant ultrasound." No food allergies per chart review. No bowel movements documented since pt was admitted. No N/V/D/C per chart review. UBW unknown. # on admission. L/R foot (+1) edema noted. Skin: wound; L/R legs abrasion, pressure injury; L/R heels stage I. Pt previously receiving mechanical soft diet this morning (10/3). Diet discontinued due to possible procedure today. Recommend SLP bedside evaluation due to pt's advanced age. Pt receiving NaCl 100 ml/hr. RD to follow-up for interview from pt's family as feasible to obtain additional nutrition related hx. RD will continue to follow-up and monitor pt's nutritional status.

## 2021-10-03 NOTE — H&P ADULT - NSICDXPASTMEDICALHX_GEN_ALL_CORE_FT
PAST MEDICAL HISTORY:  Chronic Lymphocytic Leukemia followed by oncologist in [place]    Dementia     Diverticulitis     GERD (Gastroesophageal Reflux Disease)     GI Bleed 2007    Herniated Disc     Hyperlipidemia     Osteoarthritis     Spinal Stenosis     Stomach Ulcer H pylori 2007, treated with antibiotics     PAST MEDICAL HISTORY:  Chronic Lymphocytic Leukemia followed by oncologist in [place]    Dementia     Diverticulitis     GERD (Gastroesophageal Reflux Disease)     GI Bleed 2007    Herniated Disc     Hyperlipidemia     MALToma of stomach - follows with Dr. Ayala    Osteoarthritis     Spinal Stenosis     Stomach Ulcer H pylori 2007, treated with antibiotics

## 2021-10-03 NOTE — DISCHARGE NOTE PROVIDER - HOSPITAL COURSE
FROM ADMISSION H+P:   HPI:  ***Unable to obtain any HPI or ROS from patient 2/2 patient's mental status and dementia.  Collateral information from daughter/son and from notes.***    89M with dementia, hx of CLL, HLD, osteoarthritis who presents with an acute episode of rigors and unresponsiveness today.  As per daughter, patient has been "looking different" for the past week, more lethargic and less responsive (though normally he is asleep for 20+ hrs in a day).  Patient had difficulty eating today and had be assisted.  Then during breakfast, as per the daughter who was there, patient suddenly had 5 seconds of generalized shaking and then became unresponsive for about a minute. Unclear if he was confused after regaining consciousness since patient does not talk much normally and has baseline dementia.  Patient was brought here for further evaluation.  Aside from looking more fatigued, patient has had a couple episodes of diarrhea recently and a chronic cough.  No known fevers at home.  No other known complaints.  When asked, patient said he feels "ok" but when asked if he had pain he said "yes" and asked where he said "yes."      In the ED patient's triage vitals were /76 (though dropped to 71/48)   RR 28, 97% T101.8F.  Initial EKG showed afib with .  Labs showed slight anemia (hgb 11), GUS BUN/Cr 30/1.83.  Lactate 2.  ED gave him 3L of NS.  HCT was showed no acute findings.  CT C/A/P showed cholelithiasis with gallbladder wall thickening and pericholecystic edema, findings suspicious for cholecystitis.  Surgery was consulted and advised no surgery at the moment and to pursue a HIDA scan.  Started on Zosyn empirically by the ED.      Of note, patient had a prolonged course @ [facility] last year from 2/2020-3/2020.  Patient was found to have a left sided empyema and right ureteral stone.  Patient had a pigtail for the left empyema but eventually needed a chest tube.  Patient's hospital course was complicated by GUS of which he required HD.  Patient also became agitated and altered (possibly 2/2 uremia) requiring precedex and haldol.  Also patient developed rapid afib and was loaded with amiodarone.  Patient remained in NSR for the rest of the hospitalization.  GOC was discussed and family decided on conservative management and patient was eventually made DNR/DNI.  Patient was reportedly in hospice care but was eventually dc'ed from hospice in June 2021 because patient was improving.       ---  HOSPITAL COURSE:   Sepsis 2/2 possibly cholecystitis  - continue with IV hydration  - discussed with family regarding the possible need of central lines and pressors if patient's MAP is unable to be maintained on fluids ->agree to trial of pressors   - still DNR/DNI  - will continue with Zosyn empirically  - f/u blood cultures  - ID consulted (Dr. Kiran)  - Surg consulted (Dr. Uribe) -> obtain HIDA and possible IR guided per if necessary   - Neuro consulted (Dr. Blair) -> seizure like activity 2/2 transient cerebral hypoperfusion 2/2 hypotension, no MRI or EEG to be done  - Cardiology consulted (Dr. Curiel). Paroxysmal -- currently in sinus rhythm; given hypotension, I recommend amiodarone if AF recurs; poor candidate for anticoagulation at this time.  -     Afib - had afib on initial EKG but returned to sinus when not tachycardic  - would hold off AC for possible future cholecystectomy and hx of hematuria   - was on amiodarone in the past, consider restarting  - cardiology following, [facility] group notified at [place]      GUS - improved from previous values from last year  - monitor for now  - will be hydrated, repeat BMP    Dementia  - continue with risperdal at night  - has haldol PRN at night (may need IM if unwilling to take PO)  - did require precedex at [facility], monitor for now    Preventive measures  - heparin 5000units q12h -> will not be aggressive with AC in case patient needs a cholecystectomy  - DNR/DNI    ---  TIME SPENT:  I, the attending physician, was physically present for the key portions of the evaluation and management (E/M) service provided. The total amount of time spent reviewing the hospital notes, laboratory values, imaging findings, assessing/counseling the patient, discussing with consultant physicians, social work, nursing staff was 54 minutes

## 2021-10-03 NOTE — CONSULT NOTE ADULT - SUBJECTIVE AND OBJECTIVE BOX
Date/Time Patient Seen:  		  Referring MD:   Data Reviewed	       Patient is a 89y old  Male who presents with a chief complaint of rigors, fever, possibly sepsis (02 Oct 2021 22:22)      Subjective/HPI   h and p reviewed  er provider note reviewed  old records reviewed  overnight events noted  ct imaging reviewed  vs noted  labs reviewed    poor historian    Reason for Admission: rigors, fever, possibly sepsis  History of Present Illness:   ***Unable to obtain any HPI or ROS from patient 2/2 patient's mental status and dementia.  Collateral information from daughter/son and from notes.***    89M with dementia, hx of CLL, HLD, osteoarthritis who presents with an acute episode of rigors and unresponsiveness today.  As per daughter, patient has been "looking different" for the past week, more lethargic and less responsive (though normally he is asleep for 20+ hrs in a day).  Patient had difficulty eating today and had be assisted.  Then during breakfast, as per the daughter who was there, patient suddenly had 5 seconds of generalized shaking and then became unresponsive for about a minute. Unclear if he was confused after regaining consciousness since patient does not talk much normally and has baseline dementia.  Patient was brought here for further evaluation.  Aside from looking more fatigued, patient has had a couple episodes of diarrhea recently and a chronic cough.  No known fevers at home.  No other known complaints.  When asked, patient said he feels "ok" but when asked if he had pain he said "yes" and asked where he said "yes."      In the ED patient's triage vitals were /76 (though dropped to 71/48)   RR 28, 97% T101.8F.  Initial EKG showed afib with .  Labs showed slight anemia (hgb 11), GUS BUN/Cr 30/1.83.  Lactate 2.  ED gave him 3L of NS.  HCT was showed no acute findings.  CT C/A/P showed cholelithiasis with gallbladder wall thickening and pericholecystic edema, findings suspicious for cholecystitis.  Surgery was consulted and advised no surgery at the moment and to pursue a HIDA scan.  Started on Zosyn empirically by the ED.      Of note, patient had a prolonged course @ [facility] last year from 2/2020-3/2020.  Patient was found to have a left sided empyema and right ureteral stone.  Patient had a pigtail for the left empyema but eventually needed a chest tube.  Patient's hospital course was complicated by GUS of which he required HD.  Patient also became agitated and altered (possibly 2/2 uremia) requiring precedex and haldol.  Also patient developed rapid afib and was loaded with amiodarone.  Patient remained in NSR for the rest of the hospitalization.  GOC was discussed and family decided on conservative management and patient was eventually made DNR/DNI.  Patient was reportedly in hospice care but was eventually dc'ed from hospice in June 2021 because patient was improving.    PAST MEDICAL & SURGICAL HISTORY:  Chronic Lymphocytic Leukemia  followed by oncologist in [place]    GERD (Gastroesophageal Reflux Disease)    Diverticulitis    Spinal Stenosis    Herniated Disc    Hyperlipidemia    Osteoarthritis    Stomach Ulcer  H pylori 2007, treated with antibiotics    GI Bleed  2007    Dementia    Stomach Ulcer  H pylori, treated with antibiotics    Osteoarthritis    History of Arthroscopy of Knee  bilateral    Status Post Arthroscopy  right shoulder    S/P Tonsillectomy  childhood    FAMILY HISTORY:  No pertinent family history in first degree relatives. No pertinent family history of: cancer, diabetes and stroke.     Social History:  Social History (marital status, living situation, occupation, tobacco use, alcohol and drug use, and sexual history): - former smoker (quit >50 years ago, was possibly 1-1.5 ppd)  - very light etoh use   - no illegal drug use  - has a [facility] 24/7     Tobacco Screening:  · Core Measure Site	No  · Has the patient used tobacco in the past 30 days?	No    Risk Assessment:    Present on Admission:  Deep Venous Thrombosis	no  Pulmonary Embolus	no     Heart Failure:  Does this patient have a history of or has been diagnosed with heart failure? unknown.      Medication list         MEDICATIONS  (STANDING):  heparin   Injectable 5000 Unit(s) SubCutaneous every 12 hours  pantoprazole    Tablet 40 milliGRAM(s) Oral before breakfast  piperacillin/tazobactam IVPB.. 3.375 Gram(s) IV Intermittent every 8 hours  risperiDONE   Tablet 2 milliGRAM(s) Oral at bedtime  sodium chloride 0.9%. 1000 milliLiter(s) (100 mL/Hr) IV Continuous <Continuous>    MEDICATIONS  (PRN):  acetaminophen   Tablet .. 650 milliGRAM(s) Oral every 6 hours PRN Temp greater or equal to 38C (100.4F), Mild Pain (1 - 3)  aluminum hydroxide/magnesium hydroxide/simethicone Suspension 30 milliLiter(s) Oral every 4 hours PRN Dyspepsia  haloperidol     Tablet 2 milliGRAM(s) Oral at bedtime PRN agitation  melatonin 3 milliGRAM(s) Oral at bedtime PRN Insomnia  ondansetron Injectable 4 milliGRAM(s) IV Push every 8 hours PRN Nausea and/or Vomiting         Vitals log        ICU Vital Signs Last 24 Hrs  T(C): 36.8 (03 Oct 2021 04:00), Max: 38.8 (02 Oct 2021 13:32)  T(F): 98.3 (03 Oct 2021 04:00), Max: 101.8 (02 Oct 2021 13:32)  HR: 68 (03 Oct 2021 06:38) (64 - 134)  BP: 93/48 (03 Oct 2021 06:38) (71/48 - 113/77)  BP(mean): 62 (03 Oct 2021 06:38) (56 - 75)  ABP: --  ABP(mean): --  RR: 15 (03 Oct 2021 06:38) (12 - 28)  SpO2: 100% (03 Oct 2021 06:38) (96% - 100%)           Input and Output:  I&O's Detail    02 Oct 2021 07:01  -  03 Oct 2021 06:48  --------------------------------------------------------  IN:    IV PiggyBack: 250 mL    Lactated Ringers Bolus: 1000 mL    sodium chloride 0.9%: 1200 mL    Sodium Chloride 0.9% Bolus: 2000 mL  Total IN: 4450 mL    OUT:    Indwelling Catheter - Urethral (mL): 275 mL    Voided (mL): 300 mL  Total OUT: 575 mL    Total NET: 3875 mL          Lab Data                        11.0   8.22  )-----------( 86       ( 02 Oct 2021 14:06 )             33.5     10-02    136  |  100  |  30<H>  ----------------------------<  193<H>  4.4   |  26  |  1.83<H>    Ca    8.7      02 Oct 2021 14:06  Phos  3.3     10-02  Mg     3.4     10-02    TPro  6.0  /  Alb  3.2<L>  /  TBili  1.1  /  DBili  x   /  AST  12  /  ALT  <10<L>  /  AlkPhos  87  10-02            Review of Systems	  fever  rigors      Objective     Physical Examination    heart s1s2  lung dec BS      Pertinent Lab findings & Imaging      Mcnulty:  NO   Adequate UO     I&O's Detail    02 Oct 2021 07:01  -  03 Oct 2021 06:48  --------------------------------------------------------  IN:    IV PiggyBack: 250 mL    Lactated Ringers Bolus: 1000 mL    sodium chloride 0.9%: 1200 mL    Sodium Chloride 0.9% Bolus: 2000 mL  Total IN: 4450 mL    OUT:    Indwelling Catheter - Urethral (mL): 275 mL    Voided (mL): 300 mL  Total OUT: 575 mL    Total NET: 3875 mL               Discussed with:     Cultures:	        Radiology      EXAM:  CT ABDOMEN AND PELVIS                          EXAM:  CT CHEST                                  PROCEDURE DATE:  10/02/2021          INTERPRETATION:  CLINICAL INFORMATION: Fever, history of left sided empyema    COMPARISON: CT chest 3/9/2020, pet/CT 6/19/2019.    CONTRAST/COMPLICATIONS:  IV Contrast: NONE  Oral Contrast: NONE  Complications: None reported at time of study completion    PROCEDURE:  CT of the Chest, Abdomen and Pelvis was performed.  Sagittal and coronal reformats were performed.    FINDINGS: Evaluation of the solid organs and vasculature is limited in the absence of intravenous contrast.  CHEST:  LUNGS AND LARGE AIRWAYS: Patent central airways. No pulmonary nodules.  PLEURA: No pleural effusion. Right pleural calcifications.  VESSELS: Aortic, aortic valvular and coronary artery calcifications.  HEART: Heart size is normal. No pericardial effusion.  MEDIASTINUM AND KAELYN: No lymphadenopathy.  CHEST WALL AND LOWER NECK: Within normal limits.    ABDOMEN AND PELVIS:  LIVER: Within normal limits.  BILE DUCTS: Normal caliber.  GALLBLADDER: Large gallbladder stone again noted. There is suggestion of gallbladder wall thickening, and pericholecystic fluid tracking inferiorly.  SPLEEN: Within normal limits.  PANCREAS: Within normal limits.  ADRENALS: Within normal limits.  KIDNEYS/URETERS: Nonobstructing 4 mm left renal calculus. Small right renal cyst. No hydronephrosis.    BLADDER: Within normal limits.  REPRODUCTIVE ORGANS: Prostate is enlarged.    BOWEL: Colonic, including right-sided, diverticulosis. Trace free fluid in the right paracolic gutter adjacent to diverticulosis may be due to gallbladder inflammation. No colonic wall to suggest acute diverticulitis. No bowel obstruction. Appendix is normal.  PERITONEUM: Trace pelvic free fluid. No pneumoperitoneum  VESSELS: Atherosclerotic changes.  RETROPERITONEUM/LYMPH NODES: No lymphadenopathy.  ABDOMINAL WALL: Within normal limits.  BONES: Diffuse degenerative changes. Status post posterior lumbar laminectomies. Mixed lucent and sclerotic lesion in the right mid humeral shaft is of unknown etiology, but unchanged since PET of 6/19/2019.    IMPRESSION:  Cholelithiasis with gallbladder wall thickening and pericholecystic edema, which extends to the right paracolic gutter. Findings are suspicious for cholecystitis. No colonic wall thickening to suggest diverticulitis. Correlate with patient's symptomatology and right upper quadrant ultrasound.    Incidental findings as above.    --- End of Report ---              MATEO NEVILLE MD; Attending Radiologist  This document has been electronically signed. Oct  2 2021  3:41PM

## 2021-10-03 NOTE — PROGRESS NOTE ADULT - ASSESSMENT
89M with dementia, hx of CLL, HLD, osteoarthritis who presents with an acute episode of rigors and unresponsiveness today.  Found to be febrile and hypotensive with rigors.  Potential source is cholecystitis.  Meets sepsis criteria with fever + tachycardia + potential source (GI).        Sepsis 2/2 possibly cholecystitis  - continue with IV hydration  - discussed with family regarding the possible need of central lines and pressors if patient's MAP is unable to be maintained on fluids -> family would like to be called prior to a central line and initiation of pressors  - still DNR/DNI  - will continue with Zosyn empirically  - f/u blood cultures  - ID consulted (Dr. Kiran)  - Surg consulted (Dr. Uribe) -> obtain HIDA   - Neuro consulted (Dr. Blair) -> seizure like activity 2/2 transient cerebral hypoperfusion 2/2 hypotension, no MRI or EEG to be done  - Cardiology consulted (Dr. Curiel). Paroxysmal -- currently in sinus rhythm; given hypotension, I recommend amiodarone if AF recurs; poor candidate for anticoagulation at this time.    Afib - had afib on initial EKG but returned to sinus when not tachycardic  - would hold off AC for possible future cholecystectomy and hx of hematuria   - was on amiodarone in the past, consider restarting  - cardiology following     GUS - improved from previous values from last year  - monitor for now  - will be hydrated, repeat BMP    Dementia  - continue with risperdal at night  - has haldol PRN at night (may need IM if unwilling to take PO)  - did require precedex at [facility], monitor for now    Preventive measures  - heparin 5000units q12h -> will not be aggressive with AC in case patient needs a cholecystectomy  - DNR/DNI    Case discussed with son in detail. In agreement with plan of care

## 2021-10-03 NOTE — DISCHARGE NOTE PROVIDER - NSDCMRMEDTOKEN_GEN_ALL_CORE_FT
acetaminophen 325 mg oral tablet: 2 tab(s) orally every 6 hours, As needed, Temp greater or equal to 38C (100.4F), Mild Pain (1 - 3)  aluminum hydroxide-magnesium hydroxide 200 mg-200 mg/5 mL oral suspension: 30 milliliter(s) orally every 4 hours, As needed, Dyspepsia  Haldol 2 mg oral tablet: 1 tab(s) orally , As Needed  heparin: 5000 milligram(s) subcutaneous 2 times a day  melatonin 3 mg oral tablet: 1 tab(s) orally once a day (at bedtime), As needed, Insomnia  midodrine 5 mg oral tablet: 1 tab(s) orally every 8 hours  pantoprazole 40 mg oral delayed release tablet: 1 tab(s) orally 2 times a day  piperacillin-tazobactam: 3.375 milligram(s)  every 8 hours  risperiDONE 2 mg oral tablet: 1 tab(s) orally once a day (at bedtime)  senna oral tablet: 1-2 tab(s) orally once a day  sodium chloride 0.9% injectable solution: 100 milliliter(s) injectable

## 2021-10-03 NOTE — CONSULT NOTE ADULT - PROBLEM SELECTOR RECOMMENDATION 9
Paroxysmal -- currently in sinus rhythm; given hypotension, I recommend amiodarone if AF recurs; poor candidate for anticoagulation at this time.

## 2021-10-03 NOTE — H&P ADULT - ATTENDING COMMENTS
89 y/o M with PMHx of dementia, paroxysmal atrial fibrillation (not on AC), hx of CLL, HLD, osteoarthritis presented to  ED with an acute episode of rigors and unresponsiveness admitted with severe sepsis/shock secondary to acute cholecystitis. Admitted to ICU. Spoke to family at length - they assert that patient is FULL CODE. Wants to monitor off of pressors if possible - currently MAP is acceptable. Amenable to IV vasopressors if needed. Surgery recs appreciated. For HIDA scan in AM. Plan for IR cholecystotomy is HIDA is positive. Discussed plan with son (Sohail) and daughter (Esperanza).    Agree with H&P as outlined above, edited where appropriate.

## 2021-10-03 NOTE — CONSULT NOTE ADULT - TIME BILLING
Discussed with patient as able, son in detail by phone, Hospitalist attending, ICU attending and today's RN.

## 2021-10-03 NOTE — CONSULT NOTE ADULT - SUBJECTIVE AND OBJECTIVE BOX
REASON FOR CONSULT: Severe sepsis     CONSULT REQUESTED BY:     HPI 89 y/o M with PMHx of dementia, paroxsymal atrial fibrillation (not on AC), hx of CLL, HLD, osteoarthritis, adm [facility] 2021 for left empyema and ureteral stone complicated with GUS presented to  ED 10/2 with an acute episode of rigors, AMS, and Tmax of 101.8 in the ED. Was found to have acute cholecystitis on US and adm to [facility].  Pt became hypotensive, tachycardic, and fever today. Given IV fluids with limited response and possible plan for IV vasopressors. Course also complicated by brief afib w/ RVR. Now in NSR.  Surgery Dr. Uribe was consulted.  HIDA scan planned and possible jackelin tube.  Pt transferred to  hospital for further management. Pt was DNR/DNI and now rescinded.     Events: Pt now in  ICU. HD stable and not on pressors. Offers no complaints.           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    History of Arthroscopy of Knee  bilateral    Status Post Arthroscopy  right shoulder    S/P Tonsillectomy  childhood      Allergies    vancomycin (Nephrotoxicity)    Intolerances      FAMILY HISTORY:  FH: Alzheimer&#x27;s disease (Mother)        Review of Systems:  CONSTITUTIONAL: + fever,+ chills   EYES: No eye pain, visual disturbances, or discharge  ENMT:  No difficulty hearing, tinnitus, vertigo; No sinus or throat pain  NECK: No pain or stiffness  RESPIRATORY: No cough, wheezing, chills or hemoptysis; No shortness of breath  CARDIOVASCULAR: No chest pain, palpitations, dizziness, or leg swelling  GASTROINTESTINAL: No abdominal or epigastric pain. No nausea, vomiting, or hematemesis; No diarrhea or constipation. No melena or hematochezia.  GENITOURINARY: No dysuria, frequency, hematuria, or incontinence  NEUROLOGICAL: No headaches, memory loss, loss of strength, numbness, or tremors  SKIN: No itching, burning, rashes, or lesions   MUSCULOSKELETAL: No joint pain or swelling; No muscle, back, or extremity pain  PSYCHIATRIC: No depression, anxiety, mood swings, or difficulty sleeping      Medications:        risperiDONE   Tablet 2 milliGRAM(s) Oral at bedtime        pantoprazole  Injectable 40 milliGRAM(s) IV Push daily                      ICU Vital Signs Last 24 Hrs  T(C): 36.6 (03 Oct 2021 21:00), Max: 36.8 (03 Oct 2021 04:00)  T(F): 97.9 (03 Oct 2021 21:00), Max: 98.3 (03 Oct 2021 04:00)  HR: 87 (03 Oct 2021 23:00) (64 - 87)  BP: 86/56 (03 Oct 2021 23:00) (79/43 - 115/58)  BP(mean): 67 (03 Oct 2021 23:00) (47 - 89)  ABP: --  ABP(mean): --  RR: 20 (03 Oct 2021 23:00) (12 - 21)  SpO2: 98% (03 Oct 2021 23:00) (96% - 100%)    Vital Signs Last 24 Hrs  T(C): 36.6 (03 Oct 2021 21:00), Max: 36.8 (03 Oct 2021 04:00)  T(F): 97.9 (03 Oct 2021 21:00), Max: 98.3 (03 Oct 2021 04:00)  HR: 87 (03 Oct 2021 23:00) (64 - 87)  BP: 86/56 (03 Oct 2021 23:00) (79/43 - 115/58)  BP(mean): 67 (03 Oct 2021 23:00) (47 - 89)  RR: 20 (03 Oct 2021 23:00) (12 - 21)  SpO2: 98% (03 Oct 2021 23:00) (96% - 100%)        I&O's Detail    03 Oct 2021 07:01  -  03 Oct 2021 23:31  --------------------------------------------------------  IN:  Total IN: 0 mL    OUT:    Voided (mL): 215 mL  Total OUT: 215 mL    Total NET: -215 mL            LABS:                        9.1    6.83  )-----------( 81       ( 03 Oct 2021 07:05 )             27.8     10    137  |  107  |  24<H>  ----------------------------<  84  3.7   |  24  |  1.36<H>    Ca    8.0<L>      03 Oct 2021 07:05  Phos  3.0     10-03  Mg     1.8     10-03    TPro  4.7<L>  /  Alb  2.3<L>  /  TBili  0.7  /  DBili  x   /  AST  12  /  ALT  <10<L>  /  AlkPhos  37  10-03          CAPILLARY BLOOD GLUCOSE        PT/INR - ( 02 Oct 2021 14:06 )   PT: 16.5 sec;   INR: 1.38 ratio         PTT - ( 02 Oct 2021 14:06 )  PTT:29.3 sec  Urinalysis Basic - ( 02 Oct 2021 19:04 )    Color: Yellow / Appearance: Clear / S.020 / pH: x  Gluc: x / Ketone: Negative  / Bili: Negative / Urobili: Negative mg/dL   Blood: x / Protein: 15 mg/dL / Nitrite: Negative   Leuk Esterase: Trace / RBC: 0-2 /HPF / WBC 0-2   Sq Epi: x / Non Sq Epi: x / Bacteria: x      CULTURES:  Culture Results:   No growth to date. (10-02 @ 22:50)  Culture Results:   No growth to date. (10-02 @ 22:50)  Culture Results:   No growth (10-02 @ 22:49)      Physical Examination:    General:.  Alert, oriented, interactive, nonfocal. NAD    HEENT: Pupils equal, reactive to light.  Symmetric.    PULM: CTA b/l     CVS: Regular rate and rhythm, no murmurs, rubs, or gallops    ABD: Soft, nondistended, nontender, normoactive bowel sounds, no masses    EXT: No edema, nontender    SKIN: Warm and well perfused, no rashes noted.    RADIOLOGY:     EXAM:  US ABDOMEN RT UPR QUADRANT                                  PROCEDURE DATE:  10/02/2021          INTERPRETATION:  CLINICAL INFORMATION: Sepsis.    COMPARISON: Abdominal CT dated 10/02/2021    TECHNIQUE: Sonography of the right upper quadrant.    FINDINGS:    Liver: Within normal limits.  Bile ducts: Normal caliber. Common bile duct measures 4 mm.  Gallbladder: There is 2.5 cm gallstones with associated gallbladder wall edema measuring up to 8 mm. There is no sonographic Burr sign.  Pancreas: Poorly visualized.  Right kidney: 9.9 cm. No hydronephrosis.  Ascites: None.  IVC: Visualized portions are within normal limits.    IMPRESSION:    Cholelithiasis with associated gallbladder wall edema which can be seen in acute cholecystitis. There is no sonographic Burr sign. Consider further evaluation with HIDA scan to assess for acute cholecystitis.    --- End of Report ---      EXAM:  CT ABDOMEN AND PELVIS                          EXAM:  CT CHEST                                  PROCEDURE DATE:  10/02/2021          INTERPRETATION:  CLINICAL INFORMATION: Fever, history of left sided empyema    COMPARISON: CT chest 3/9/2020, pet/CT 2019.    CONTRAST/COMPLICATIONS:  IV Contrast: NONE  Oral Contrast: NONE  Complications: None reported at time of study completion    PROCEDURE:  CT of the Chest, Abdomen and Pelvis was performed.  Sagittal and coronal reformats were performed.    FINDINGS: Evaluation of the solid organs and vasculature is limited in the absence of intravenous contrast.  CHEST:  LUNGS AND LARGE AIRWAYS: Patent central airways. No pulmonary nodules.  PLEURA: No pleural effusion. Right pleural calcifications.  VESSELS: Aortic, aortic valvular and coronary artery calcifications.  HEART: Heart size is normal. No pericardial effusion.  MEDIASTINUM AND KAELYN: No lymphadenopathy.  CHEST WALL AND LOWER NECK: Within normal limits.    ABDOMEN AND PELVIS:  LIVER: Within normal limits.  BILE DUCTS: Normal caliber.  GALLBLADDER: Large gallbladder stone again noted. There is suggestion of gallbladder wall thickening, and pericholecystic fluid tracking inferiorly.  SPLEEN: Within normal limits.  PANCREAS: Within normal limits.  ADRENALS: Within normal limits.  KIDNEYS/URETERS: Nonobstructing 4 mm left renal calculus. Small right renal cyst. No hydronephrosis.    BLADDER: Within normal limits.  REPRODUCTIVE ORGANS: Prostate is enlarged.    BOWEL: Colonic,including right-sided, diverticulosis. Trace free fluid in the right paracolic gutter adjacent to diverticulosis may be due to gallbladder inflammation. No colonic wall to suggest acute diverticulitis. No bowel obstruction. Appendix is normal.  PERITONEUM: Trace pelvic free fluid. No pneumoperitoneum  VESSELS: Atherosclerotic changes.  RETROPERITONEUM/LYMPH NODES: No lymphadenopathy.  ABDOMINAL WALL: Within normal limits.  BONES: Diffuse degenerative changes. Status post posterior lumbar laminectomies. Mixed lucent and sclerotic lesion in the right mid humeral shaft is of unknown etiology, but unchanged since PET of 2019.    IMPRESSION:  Cholelithiasis with gallbladder wall thickening and pericholecystic edema, which extends to the rightparacolic gutter. Findings are suspicious for cholecystitis. No colonic wall thickening to suggest diverticulitis. Correlate with patient's symptomatology and right upper quadrant ultrasound.    Incidental findings as above.    --- End of Report ---    A/P 89 y/o M with PMHx of dementia, paroxsymal atrial fibrillation (not on AC), hx of CLL, HLD, osteoarthritis transfer from [facility] with severe sepsis 2/2 possible cholecystitis.     1-Biliary sepsis   2-r/o Cholecystitis   3-GUS  4-PAF     Neuro   -pt awake and alert  -AMS likely 2/2 sepsis  -hx Demential. Cont Risperdal and haldol prn   -DVt ppx, SCD's     CV  -shock resolved to IVF resucitation. Hold vasopressors for now   -PAF, now SR   -consider amio load if recurrent   -Cardio following     Pulm   -tolerating RA    GI  -r/o cholecysititis. HIDA ordered for tomorrow. Will notify IR for possible jackelin tube   -keep NPO   -PPI for GI ppx   -Surg Dr Uribe following     Renal   -GUS, baseline Cr 1.83 from chart   -will cont IVF hydration   -monitor I/O's   -avoid nephrotoxic agents     ID   -will cont zosyn as started in [place]   -f/u Bcx's   -ID consult Dr Kiran called     Discussed w/ EICU     (Reviewing data, imaging, discussing with multidisciplinary team, non inclusive of procedures, discussing goals of care with patient/family)

## 2021-10-03 NOTE — DIETITIAN INITIAL EVALUATION ADULT. - PERTINENT MEDS FT
MEDICATIONS  (STANDING):  heparin   Injectable 5000 Unit(s) SubCutaneous every 12 hours  midodrine 5 milliGRAM(s) Oral every 8 hours  pantoprazole    Tablet 40 milliGRAM(s) Oral before breakfast  piperacillin/tazobactam IVPB.. 3.375 Gram(s) IV Intermittent every 8 hours  risperiDONE   Tablet 2 milliGRAM(s) Oral at bedtime  sodium chloride 0.9%. 1000 milliLiter(s) (100 mL/Hr) IV Continuous <Continuous>    MEDICATIONS  (PRN):  acetaminophen   Tablet .. 650 milliGRAM(s) Oral every 6 hours PRN Temp greater or equal to 38C (100.4F), Mild Pain (1 - 3)  aluminum hydroxide/magnesium hydroxide/simethicone Suspension 30 milliLiter(s) Oral every 4 hours PRN Dyspepsia  haloperidol     Tablet 2 milliGRAM(s) Oral at bedtime PRN agitation  melatonin 3 milliGRAM(s) Oral at bedtime PRN Insomnia  ondansetron Injectable 4 milliGRAM(s) IV Push every 8 hours PRN Nausea and/or Vomiting

## 2021-10-03 NOTE — CONSULT NOTE ADULT - REASON FOR ADMISSION
rigors, fever, possibly sepsis
AMS/Shaking  Seizure?
rigors, fever, possibly sepsis
rigors, fever, possibly sepsis

## 2021-10-03 NOTE — H&P ADULT - NSHPPHYSICALEXAM_GEN_ALL_CORE
T(C): 36.6 (10-03-21 @ 21:00), Max: 36.8 (10-03-21 @ 04:00)  HR: 69 (10-03-21 @ 21:00) (64 - 87)  BP: 101/57 (10-03-21 @ 21:00) (79/43 - 108/46)  RR: 14 (10-03-21 @ 21:00) (12 - 21)  SpO2: 97% (10-03-21 @ 21:00) (96% - 100%)    GENERAL: no acute distress  EYES: Left eye noticed mild discharge, sclera clear, no exudates  ENMT: oropharynx clear without erythema, no exudates, moist mucous membranes  NECK: supple, soft, no thyromegaly noted  LUNGS: decreased air entry bilaterally and rales   HEART: irregular rhythm and rate   GASTROINTESTINAL: abdomen is soft, nontender, nondistended, normoactive bowel sounds, no palpable masses  MUSCULOSKELETAL: no clubbing or cyanosis, no obvious deformity  NEUROLOGIC: awake, alert, oriented x 1 (person) ,no obvious sensory deficits  PSYCHIATRIC: patient with history of dementia   HEME/LYMPH: no obvious ecchymosis or petechiae T(C): 36.6 (10-03-21 @ 21:00), Max: 36.8 (10-03-21 @ 04:00)  HR: 69 (10-03-21 @ 21:00) (64 - 87)  BP: 101/57 (10-03-21 @ 21:00) (79/43 - 108/46)  RR: 14 (10-03-21 @ 21:00) (12 - 21)  SpO2: 97% (10-03-21 @ 21:00) (96% - 100%)    GENERAL: no acute distress  EYES: Left eye noticed mild discharge, sclera clear, no exudates  ENMT: oropharynx clear without erythema, no exudates, moist mucous membranes  NECK: supple, soft, no thyromegaly noted  LUNGS: decreased air entry bilaterally and rales   HEART: irregular, S1S2  GASTROINTESTINAL: abdomen is soft, nontender, nondistended, normoactive bowel sounds, no palpable masses  MUSCULOSKELETAL: no clubbing or cyanosis, no obvious deformity  NEUROLOGIC: awake, alert, oriented x 1 (person) ,no obvious sensory deficits  PSYCHIATRIC: patient with history of dementia   HEME/LYMPH: no obvious ecchymosis or petechiae

## 2021-10-03 NOTE — CONSULT NOTE ADULT - ASSESSMENT
89 year old male PMHx Dementia, CLL, OA, HLD.  Recent Protracted hospitalization at [facility] 2/26/2021 - 3/ L Empyema and R Ureteral Stone, Course complicated by GUS requiring HD, AF w / RVR and Agitation.   Admitted 10/2/2021 with worsening lethargy and rigors.  Found to be febrile 101.8'F.    eval for acute jackelin - Surgery - GI eval  serial labs  I and O  monitor VS and HD and Sat  ex smoker - may need PRN bronchodilators  assist with needs and ADL - Dementia known  emp ABX - cx in progress - CT imaging reviewed  CKD - serial renal indices  old records reviewed - [facility] records reviewed  GO documented - DNR  prognosis guarded  w/u and management in progress

## 2021-10-04 LAB
ALBUMIN SERPL ELPH-MCNC: 2.1 G/DL — LOW (ref 3.3–5)
ALBUMIN SERPL ELPH-MCNC: 2.3 G/DL — LOW (ref 3.3–5)
ALP SERPL-CCNC: 34 U/L — LOW (ref 40–120)
ALP SERPL-CCNC: 42 U/L — SIGNIFICANT CHANGE UP (ref 40–120)
ALT FLD-CCNC: 8 U/L — LOW (ref 12–78)
ALT FLD-CCNC: 9 U/L — LOW (ref 12–78)
ANION GAP SERPL CALC-SCNC: 5 MMOL/L — SIGNIFICANT CHANGE UP (ref 5–17)
ANION GAP SERPL CALC-SCNC: 8 MMOL/L — SIGNIFICANT CHANGE UP (ref 5–17)
AST SERPL-CCNC: 10 U/L — LOW (ref 15–37)
AST SERPL-CCNC: 16 U/L — SIGNIFICANT CHANGE UP (ref 15–37)
BASOPHILS # BLD AUTO: 0.02 K/UL — SIGNIFICANT CHANGE UP (ref 0–0.2)
BASOPHILS NFR BLD AUTO: 0.4 % — SIGNIFICANT CHANGE UP (ref 0–2)
BILIRUB DIRECT SERPL-MCNC: 0.2 MG/DL — SIGNIFICANT CHANGE UP (ref 0.05–0.2)
BILIRUB INDIRECT FLD-MCNC: 0.5 MG/DL — SIGNIFICANT CHANGE UP (ref 0.2–1)
BILIRUB SERPL-MCNC: 0.5 MG/DL — SIGNIFICANT CHANGE UP (ref 0.2–1.2)
BILIRUB SERPL-MCNC: 0.7 MG/DL — SIGNIFICANT CHANGE UP (ref 0.2–1.2)
BUN SERPL-MCNC: 17 MG/DL — SIGNIFICANT CHANGE UP (ref 7–23)
BUN SERPL-MCNC: 17 MG/DL — SIGNIFICANT CHANGE UP (ref 7–23)
CALCIUM SERPL-MCNC: 7.7 MG/DL — LOW (ref 8.5–10.1)
CALCIUM SERPL-MCNC: 8.1 MG/DL — LOW (ref 8.5–10.1)
CHLORIDE SERPL-SCNC: 111 MMOL/L — HIGH (ref 96–108)
CHLORIDE SERPL-SCNC: 112 MMOL/L — HIGH (ref 96–108)
CO2 SERPL-SCNC: 24 MMOL/L — SIGNIFICANT CHANGE UP (ref 22–31)
CO2 SERPL-SCNC: 26 MMOL/L — SIGNIFICANT CHANGE UP (ref 22–31)
CREAT SERPL-MCNC: 1.2 MG/DL — SIGNIFICANT CHANGE UP (ref 0.5–1.3)
CREAT SERPL-MCNC: 1.2 MG/DL — SIGNIFICANT CHANGE UP (ref 0.5–1.3)
EOSINOPHIL # BLD AUTO: 0.2 K/UL — SIGNIFICANT CHANGE UP (ref 0–0.5)
EOSINOPHIL NFR BLD AUTO: 3.8 % — SIGNIFICANT CHANGE UP (ref 0–6)
GLUCOSE SERPL-MCNC: 80 MG/DL — SIGNIFICANT CHANGE UP (ref 70–99)
GLUCOSE SERPL-MCNC: 96 MG/DL — SIGNIFICANT CHANGE UP (ref 70–99)
HCT VFR BLD CALC: 29 % — LOW (ref 39–50)
HCT VFR BLD CALC: 30.7 % — LOW (ref 39–50)
HGB BLD-MCNC: 9.4 G/DL — LOW (ref 13–17)
HGB BLD-MCNC: 9.8 G/DL — LOW (ref 13–17)
IMM GRANULOCYTES NFR BLD AUTO: 0.4 % — SIGNIFICANT CHANGE UP (ref 0–1.5)
LACTATE SERPL-SCNC: 1 MMOL/L — SIGNIFICANT CHANGE UP (ref 0.7–2)
LYMPHOCYTES # BLD AUTO: 1.27 K/UL — SIGNIFICANT CHANGE UP (ref 1–3.3)
LYMPHOCYTES # BLD AUTO: 24.3 % — SIGNIFICANT CHANGE UP (ref 13–44)
MAGNESIUM SERPL-MCNC: 1.8 MG/DL — SIGNIFICANT CHANGE UP (ref 1.6–2.6)
MCHC RBC-ENTMCNC: 30.9 PG — SIGNIFICANT CHANGE UP (ref 27–34)
MCHC RBC-ENTMCNC: 31.4 PG — SIGNIFICANT CHANGE UP (ref 27–34)
MCHC RBC-ENTMCNC: 31.9 GM/DL — LOW (ref 32–36)
MCHC RBC-ENTMCNC: 32.4 GM/DL — SIGNIFICANT CHANGE UP (ref 32–36)
MCV RBC AUTO: 96.8 FL — SIGNIFICANT CHANGE UP (ref 80–100)
MCV RBC AUTO: 97 FL — SIGNIFICANT CHANGE UP (ref 80–100)
MONOCYTES # BLD AUTO: 0.41 K/UL — SIGNIFICANT CHANGE UP (ref 0–0.9)
MONOCYTES NFR BLD AUTO: 7.8 % — SIGNIFICANT CHANGE UP (ref 2–14)
NEUTROPHILS # BLD AUTO: 3.31 K/UL — SIGNIFICANT CHANGE UP (ref 1.8–7.4)
NEUTROPHILS NFR BLD AUTO: 63.3 % — SIGNIFICANT CHANGE UP (ref 43–77)
NRBC # BLD: 0 /100 WBCS — SIGNIFICANT CHANGE UP (ref 0–0)
NRBC # BLD: 0 /100 WBCS — SIGNIFICANT CHANGE UP (ref 0–0)
PHOSPHATE SERPL-MCNC: 2.6 MG/DL — SIGNIFICANT CHANGE UP (ref 2.5–4.5)
PLATELET # BLD AUTO: 100 K/UL — LOW (ref 150–400)
PLATELET # BLD AUTO: 106 K/UL — LOW (ref 150–400)
POTASSIUM SERPL-MCNC: 3.7 MMOL/L — SIGNIFICANT CHANGE UP (ref 3.5–5.3)
POTASSIUM SERPL-MCNC: 3.8 MMOL/L — SIGNIFICANT CHANGE UP (ref 3.5–5.3)
POTASSIUM SERPL-SCNC: 3.7 MMOL/L — SIGNIFICANT CHANGE UP (ref 3.5–5.3)
POTASSIUM SERPL-SCNC: 3.8 MMOL/L — SIGNIFICANT CHANGE UP (ref 3.5–5.3)
PROT SERPL-MCNC: 5.1 G/DL — LOW (ref 6–8.3)
PROT SERPL-MCNC: 5.3 G/DL — LOW (ref 6–8.3)
RBC # BLD: 2.99 M/UL — LOW (ref 4.2–5.8)
RBC # BLD: 3.17 M/UL — LOW (ref 4.2–5.8)
RBC # FLD: 12.8 % — SIGNIFICANT CHANGE UP (ref 10.3–14.5)
RBC # FLD: 13 % — SIGNIFICANT CHANGE UP (ref 10.3–14.5)
SODIUM SERPL-SCNC: 143 MMOL/L — SIGNIFICANT CHANGE UP (ref 135–145)
SODIUM SERPL-SCNC: 143 MMOL/L — SIGNIFICANT CHANGE UP (ref 135–145)
WBC # BLD: 5.23 K/UL — SIGNIFICANT CHANGE UP (ref 3.8–10.5)
WBC # BLD: 5.35 K/UL — SIGNIFICANT CHANGE UP (ref 3.8–10.5)
WBC # FLD AUTO: 5.23 K/UL — SIGNIFICANT CHANGE UP (ref 3.8–10.5)
WBC # FLD AUTO: 5.35 K/UL — SIGNIFICANT CHANGE UP (ref 3.8–10.5)

## 2021-10-04 PROCEDURE — 78226 HEPATOBILIARY SYSTEM IMAGING: CPT | Mod: 26

## 2021-10-04 PROCEDURE — 99233 SBSQ HOSP IP/OBS HIGH 50: CPT

## 2021-10-04 PROCEDURE — 99291 CRITICAL CARE FIRST HOUR: CPT

## 2021-10-04 RX ORDER — SODIUM CHLORIDE 9 MG/ML
1000 INJECTION, SOLUTION INTRAVENOUS
Refills: 0 | Status: DISCONTINUED | OUTPATIENT
Start: 2021-10-04 | End: 2021-10-07

## 2021-10-04 RX ORDER — SODIUM BICARBONATE 1 MEQ/ML
100 SYRINGE (ML) INTRAVENOUS ONCE
Refills: 0 | Status: DISCONTINUED | OUTPATIENT
Start: 2021-10-04 | End: 2021-10-04

## 2021-10-04 RX ORDER — METOPROLOL TARTRATE 50 MG
2.5 TABLET ORAL EVERY 6 HOURS
Refills: 0 | Status: DISCONTINUED | OUTPATIENT
Start: 2021-10-04 | End: 2021-10-07

## 2021-10-04 RX ORDER — MORPHINE SULFATE 50 MG/1
2 CAPSULE, EXTENDED RELEASE ORAL ONCE
Refills: 0 | Status: DISCONTINUED | OUTPATIENT
Start: 2021-10-04 | End: 2021-10-04

## 2021-10-04 RX ADMIN — PIPERACILLIN AND TAZOBACTAM 25 GRAM(S): 4; .5 INJECTION, POWDER, LYOPHILIZED, FOR SOLUTION INTRAVENOUS at 15:22

## 2021-10-04 RX ADMIN — RISPERIDONE 2 MILLIGRAM(S): 4 TABLET ORAL at 21:47

## 2021-10-04 RX ADMIN — MORPHINE SULFATE 2 MILLIGRAM(S): 50 CAPSULE, EXTENDED RELEASE ORAL at 13:11

## 2021-10-04 RX ADMIN — PIPERACILLIN AND TAZOBACTAM 25 GRAM(S): 4; .5 INJECTION, POWDER, LYOPHILIZED, FOR SOLUTION INTRAVENOUS at 05:48

## 2021-10-04 RX ADMIN — PIPERACILLIN AND TAZOBACTAM 25 GRAM(S): 4; .5 INJECTION, POWDER, LYOPHILIZED, FOR SOLUTION INTRAVENOUS at 21:47

## 2021-10-04 RX ADMIN — PANTOPRAZOLE SODIUM 40 MILLIGRAM(S): 20 TABLET, DELAYED RELEASE ORAL at 10:57

## 2021-10-04 NOTE — CONSULT NOTE ADULT - ATTENDING COMMENTS
paf generally apparently in sr  rapid af and hypotension in the setting of biliary process  bp improved an in sr with frequent pacs and pvcs, rates overall controlled  would use ivbb for now to control rates and diminish ectopy, as tolerated by bp  planned for perc jackelin-optimized for this urgent and low risk procedure, pending hr and bp trends    Upon my evaluation, this patient is at high risk for imminent or life threatening deterioration due to af, hypotension,  and other active medical issues which require my direct attention, intervention, and personal management.  I have personally spent >35 minutes  of critical care time exclusive of time spent on separate billing procedures. This includes review of laboratory data, radiology results, discussion with primary team\patient, and monitoring for potential decompensation Interventions were performed as documented above.

## 2021-10-04 NOTE — PROGRESS NOTE ADULT - ASSESSMENT
89 y/o M with PMHx of dementia, paroxysmal atrial fibrillation (not on AC), hx of CLL, HLD, osteoarthritis presented to [facility] ED with an acute episode of rigors and unresponsiveness admitted with severe sepsis/shock secondary to acute cholecystitis.    planned for HIDA  on IVF   on ABX  ID f/u  GI and Surgery f/u  may need IR - Cholecystotomy -   DVT p  monitor HD and VS  assist with needs - ADL

## 2021-10-04 NOTE — PROGRESS NOTE ADULT - SUBJECTIVE AND OBJECTIVE BOX
Patient is a 89y old  Male who presents with a chief complaint of sepsis/cholecystitis (04 Oct 2021 12:57)    24 hour events: Patient seen and examined at bedside. Presented to [facility] on 10/2, found to have sepsis 2/2 acute cholecystitis. Patient developed hypotension with some improvement after fluid resuscitation. Patient also with short episode of Afib prior to converting back to NSR. Transferred to [facility] ICU on 10/3 for further management given need for possible IR intervention pending HIDA scan. Patient AAOx1, denies any complaints. Of note, patient was previously DNR/DNI on home hospice, now FULL code.     REVIEW OF SYSTEMS  Limited  dementia  Denies cp, sob, abdominal pain    T(F): 96.6 (10-04-21 @ 07:25), Max: 98.3 (10-03-21 @ 16:00)  HR: 80 (10-04-21 @ 11:00) (65 - 89)  BP: 117/58 (10-04-21 @ 11:00) (84/51 - 124/57)  RR: 13 (10-04-21 @ 11:00) (12 - 26)  SpO2: 100% (10-04-21 @ 11:00) (94% - 100%)  Wt(kg): --        I&O's Summary    10-03 @ :  -  10-04 @ 07:00  --------------------------------------------------------  IN: 200 mL / OUT: 900 mL / NET: -700 mL    10-04 @ :  -  10-04 @ 13:33  --------------------------------------------------------  IN: 400 mL / OUT: 400 mL / NET: 0 mL      PHYSICAL EXAM  General: NAD  HEENT: NCAT, EOMI  PULM: CTA b/l, no rales, wheezes  CVS: Regular rate and rhythm, no murmurs, rubs, or gallops  ABD: Soft, nondistended, nontender, no guarding, normoactive bowel sounds, no masses  NEURO: AAO x1 (to person only), non-focal, sensation intact  EXT: No edema, nontender  SKIN: Warm and well perfused, no rashes noted.    MEDICATIONS  piperacillin/tazobactam IVPB.. IV Intermittent  risperiDONE   Tablet Oral  pantoprazole  Injectable IV Push  dextrose 5% + sodium chloride 0.9%. IV Continuous                            9.8    5.23  )-----------( 106      ( 04 Oct 2021 09:21 )             30.7       10-    143  |  112<H>  |  17  ----------------------------<  96  3.8   |  26  |  1.20    Ca    8.1<L>      04 Oct 2021 09:21  Phos  2.6     10-  Mg     1.8     10-    TPro  5.3<L>  /  Alb  2.3<L>  /  TBili  0.7  /  DBili  .20  /  AST  16  /  ALT  9<L>  /  AlkPhos  42  10-    Lactate 1.0           10-04 @ 09:21          PT/INR - ( 02 Oct 2021 14:06 )   PT: 16.5 sec;   INR: 1.38 ratio         PTT - ( 02 Oct 2021 14:06 )  PTT:29.3 sec  Urinalysis Basic - ( 02 Oct 2021 19:04 )    Color: Yellow / Appearance: Clear / S.020 / pH: x  Gluc: x / Ketone: Negative  / Bili: Negative / Urobili: Negative mg/dL   Blood: x / Protein: 15 mg/dL / Nitrite: Negative   Leuk Esterase: Trace / RBC: 0-2 /HPF / WBC 0-2   Sq Epi: x / Non Sq Epi: x / Bacteria: x      .Blood Blood-Peripheral   No growth to date. -- 10-02 @ 22:50  Clean Catch Clean Catch (Midstream)   No growth -- 10-02 @ 22:49      Rapid RVP Result: NotDetec (10-02 @ 13:38)    CENTRAL LINE: N       DATE INSERTED:              REMOVE: Y/N  SOLIS: N                 DATE INSERTED:              REMOVE: Y/N  A-LINE: N                   DATE INSERTED:              REMOVE: Y/N    GLOBAL ISSUE/BEST PRACTICE  Analgesia: none  Sedation: none  HOB elevation: yes  VTE prophylaxis: SCD  Glycemic control: none  Nutrition: NPO    CODE STATUS: FULL  GOC discussion: Y       Patient is a 89y old  Male who presents with a chief complaint of sepsis/cholecystitis (04 Oct 2021 12:57)    24 hour events: Patient seen and examined at bedside. Presented to [facility] on 10/2, found to have sepsis 2/2 acute cholecystitis. Patient developed hypotension with some improvement after fluid resuscitation. Patient also with short episode of Afib prior to converting back to NSR. Transferred to [facility] ICU on 10/3 for further management given need for possible IR intervention pending HIDA scan. Patient AAOx1, denies any complaints. Of note, patient was previously DNR/DNI on home hospice, now FULL code.     REVIEW OF SYSTEMS  Limited  dementia  Denies cp, sob, abdominal pain    T(F): 96.6 (10-04-21 @ 07:25), Max: 98.3 (10-03-21 @ 16:00)  HR: 80 (10-04-21 @ 11:00) (65 - 89)  BP: 117/58 (10-04-21 @ 11:00) (84/51 - 124/57)  RR: 13 (10-04-21 @ 11:00) (12 - 26)  SpO2: 100% (10-04-21 @ 11:00) (94% - 100%)  Wt(kg): --        I&O's Summary    10-03 @ :  -  10-04 @ 07:00  --------------------------------------------------------  IN: 200 mL / OUT: 900 mL / NET: -700 mL    10-04 @ :  -  10-04 @ 13:33  --------------------------------------------------------  IN: 400 mL / OUT: 400 mL / NET: 0 mL      PHYSICAL EXAM  General: NAD  HEENT: NCAT, EOMI  PULM: CTA b/l, no rales, wheezes  CVS: Regular rate and rhythm, no murmurs, rubs, or gallops  ABD: Soft, nondistended, TTP to RUQ with deep palpation, no guarding, normoactive bowel sounds, no masses  NEURO: AAO x1 (to person only), non-focal, sensation intact  EXT: No edema, nontender  SKIN: Warm and well perfused, no rashes noted.    MEDICATIONS  piperacillin/tazobactam IVPB.. IV Intermittent  risperiDONE   Tablet Oral  pantoprazole  Injectable IV Push  dextrose 5% + sodium chloride 0.9%. IV Continuous                            9.8    5.23  )-----------( 106      ( 04 Oct 2021 09:21 )             30.7       10-    143  |  112<H>  |  17  ----------------------------<  96  3.8   |  26  |  1.20    Ca    8.1<L>      04 Oct 2021 09:21  Phos  2.6     10-  Mg     1.8     10-    TPro  5.3<L>  /  Alb  2.3<L>  /  TBili  0.7  /  DBili  .20  /  AST  16  /  ALT  9<L>  /  AlkPhos  42  10-    Lactate 1.0           10- @ 09:21          PT/INR - ( 02 Oct 2021 14:06 )   PT: 16.5 sec;   INR: 1.38 ratio         PTT - ( 02 Oct 2021 14:06 )  PTT:29.3 sec  Urinalysis Basic - ( 02 Oct 2021 19:04 )    Color: Yellow / Appearance: Clear / S.020 / pH: x  Gluc: x / Ketone: Negative  / Bili: Negative / Urobili: Negative mg/dL   Blood: x / Protein: 15 mg/dL / Nitrite: Negative   Leuk Esterase: Trace / RBC: 0-2 /HPF / WBC 0-2   Sq Epi: x / Non Sq Epi: x / Bacteria: x      .Blood Blood-Peripheral   No growth to date. -- 10-02 @ 22:50  Clean Catch Clean Catch (Midstream)   No growth -- 10-02 @ 22:49      Rapid RVP Result: NotDetec (10-02 @ 13:38)    CENTRAL LINE: N       DATE INSERTED:              REMOVE: Y/N  SOLIS: N                 DATE INSERTED:              REMOVE: Y/N  A-LINE: N                   DATE INSERTED:              REMOVE: Y/N    GLOBAL ISSUE/BEST PRACTICE  Analgesia: none  Sedation: none  HOB elevation: yes  VTE prophylaxis: SCD  Glycemic control: none  Nutrition: NPO    CODE STATUS: FULL  GOC discussion: Y

## 2021-10-04 NOTE — PROGRESS NOTE ADULT - SUBJECTIVE AND OBJECTIVE BOX
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 3.375 Gram(s) IV Intermittent every 8 hours  risperiDONE   Tablet 2 milliGRAM(s) Oral at bedtime    MEDICATIONS  (PRN):         Vitals log        ICU Vital Signs Last 24 Hrs  T(C): 36.1 (04 Oct 2021 04:00), Max: 36.8 (03 Oct 2021 16:00)  T(F): 97 (04 Oct 2021 04:00), Max: 98.3 (03 Oct 2021 16:00)  HR: 89 (04 Oct 2021 04:00) (64 - 89)  BP: 110/60 (04 Oct 2021 04:00) (80/46 - 115/58)  BP(mean): 80 (04 Oct 2021 04:00) (47 - 89)  ABP: --  ABP(mean): --  RR: 26 (04 Oct 2021 04:00) (12 - 26)  SpO2: 100% (04 Oct 2021 04:00) (94% - 100%)           Input and Output:  I&O's Detail    03 Oct 2021 07:01  -  04 Oct 2021 05:57  --------------------------------------------------------  IN:  Total IN: 0 mL    OUT:    Voided (mL): 600 mL  Total OUT: 600 mL    Total NET: -600 mL          Lab Data                        9.4    5.35  )-----------( 100      ( 04 Oct 2021 05:35 )             29.0     10-04    143  |  111<H>  |  17  ----------------------------<  80  3.7   |  24  |  1.20    Ca    7.7<L>      04 Oct 2021 05:35  Phos  3.0     10-03  Mg     1.8     10-03    TPro  5.1<L>  /  Alb  2.1<L>  /  TBili  0.5  /  DBili  x   /  AST  10<L>  /  ALT  8<L>  /  AlkPhos  34<L>  10-04            Review of Systems	      Objective     Physical Examination    heart s1s2  lung dec BS  abd dec bS      Pertinent Lab findings & Imaging      [place]:  NO   Adequate UO     I&O's Detail    03 Oct 2021 07:01  -  04 Oct 2021 05:57  --------------------------------------------------------  IN:  Total IN: 0 mL    OUT:    Voided (mL): 600 mL  Total OUT: 600 mL    Total NET: -600 mL               Discussed with:     Cultures:	        Radiology

## 2021-10-04 NOTE — PROGRESS NOTE ADULT - SUBJECTIVE AND OBJECTIVE BOX
CHARLY LU is a 89yMale , patient examined and chart reviewed.     INTERVAL HPI/ OVERNIGHT EVENTS:   Afebrile. Lethargic.   BP stable.    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    ROS:  Unable to obtain due to : Dementia    ALLERGIES  vancomycin (Nephrotoxicity)      Current inpatient medications :    ANTIBIOTICS/RELEVANT:  piperacillin/tazobactam IVPB.. 3.375 Gram(s) IV Intermittent every 8 hours      dextrose 5% + sodium chloride 0.9%. 1000 milliLiter(s) IV Continuous <Continuous>  metoprolol tartrate Injectable 2.5 milliGRAM(s) IV Push every 6 hours PRN  pantoprazole  Injectable 40 milliGRAM(s) IV Push daily  risperiDONE   Tablet 2 milliGRAM(s) Oral at bedtime      Objective:    10-03 @ 07:01  -  10-04 @ 07:00  --------------------------------------------------------  IN: 200 mL / OUT: 900 mL / NET: -700 mL    10-04 @ 07:01  -  10-04 @ 22:15  --------------------------------------------------------  IN: 1450 mL / OUT: 1120 mL / NET: 330 mL      T(C): 36.2 (10-04-21 @ 18:00), Max: 36.5 (10-03-21 @ 23:36)  HR: 103 (10-04-21 @ 20:00) (72 - 112)  BP: 116/55 (10-04-21 @ 20:00) (86/56 - 144/70)  RR: 18 (10-04-21 @ 20:00) (10 - 26)  SpO2: 100% (10-04-21 @ 20:00) (94% - 100%)      Physical Exam:  General: weak lethargic no acute distress  Neck: supple, trachea midline  Lungs: clear, no wheeze/rhonchi  Cardiovascular: regular rate and rhythm, S1 S2  Abdomen: soft, mild RUQ tender,  bowel sounds normal  Neurological: Lethargic Dementia  Extremities: no edema      LABS:                          9.8    5.23  )-----------( 106      ( 04 Oct 2021 09:21 )             30.7       10-04    143  |  112<H>  |  17  ----------------------------<  96  3.8   |  26  |  1.20    Ca    8.1<L>      04 Oct 2021 09:21  Phos  2.6     10-04  Mg     1.8     10-04    TPro  5.3<L>  /  Alb  2.3<L>  /  TBili  0.7  /  DBili  .20  /  AST  16  /  ALT  9<L>  /  AlkPhos  42  10-04      MICROBIOLOGY:    Culture - Blood (collected 02 Oct 2021 22:50)  Source: .Blood Blood-Peripheral  Preliminary Report (03 Oct 2021 23:01):    No growth to date.    Culture - Blood (collected 02 Oct 2021 22:50)  Source: .Blood Blood-Peripheral  Preliminary Report (03 Oct 2021 23:01):    No growth to date.    Culture - Urine (collected 02 Oct 2021 22:49)  Source: Clean Catch Clean Catch (Midstream)  Final Report (03 Oct 2021 20:27):    No growth    RADIOLOGY & ADDITIONAL STUDIES:  EXAM:  NM HEPATOBILIARY IMG                            PROCEDURE DATE:  10/04/2021          INTERPRETATION:  CLINICAL STATEMENT: 89-year-old male with cholelithiasis, fever, and gallbladder wall thickening and pericholecystic fluid on CT.    RADIOPHARMACEUTICAL: 3.15 mCi and 2.95 mCi Tc-99m-Mebrofenin, I.V.; 2 doses    TECHNIQUE:  Dynamic images of the anterior abdomen were obtained for 1 hour following injection of radiotracer. Morphine 2mg I.V. and a second dose of radiotracer were administered at 1 hour. Dynamic imaging was continued for 1 hour followed by static images of the abdomen in the right lateral and right anterior oblique views immediately thereafter.    FINDINGS: There is prompt, homogeneous uptake of radiotracer by the hepatocytes. Activity is first seen in the bowel at 15 minutes. The gallbladder is not visualized at any time during the study, despite administration of morphine. There is good clearance of activity from the liver at the end of the study.    IMPRESSION: Abnormal morphine-augmented hepatobiliary scan.    Findings consistent with acute cholecystitis.    EXAM:  CT ABDOMEN AND PELVIS                          EXAM:  CT CHEST                                  PROCEDURE DATE:  10/02/2021          INTERPRETATION:  CLINICAL INFORMATION: Fever, history of left sided empyema    COMPARISON: CT chest 3/9/2020, pet/CT 6/19/2019.    CONTRAST/COMPLICATIONS:  IV Contrast: NONE  Oral Contrast: NONE  Complications: None reported at time of study completion    PROCEDURE:  CT of the Chest, Abdomen and Pelvis was performed.  Sagittal and coronal reformats were performed.    FINDINGS: Evaluation of the solid organs and vasculature is limited in the absence of intravenous contrast.  CHEST:  LUNGS AND LARGE AIRWAYS: Patent central airways. No pulmonary nodules.  PLEURA: No pleural effusion. Right pleural calcifications.  VESSELS: Aortic, aortic valvular and coronary artery calcifications.  HEART: Heart size is normal. No pericardial effusion.  MEDIASTINUM AND KAELYN: No lymphadenopathy.  CHEST WALL AND LOWER NECK: Within normal limits.    ABDOMEN AND PELVIS:  LIVER: Within normal limits.  BILE DUCTS: Normal caliber.  GALLBLADDER: Large gallbladder stone again noted. There is suggestion of gallbladder wall thickening, and pericholecystic fluid tracking inferiorly.  SPLEEN: Within normal limits.  PANCREAS: Within normal limits.  ADRENALS: Within normal limits.  KIDNEYS/URETERS: Nonobstructing 4 mm left renal calculus. Small right renal cyst. No hydronephrosis.    BLADDER: Within normal limits.  REPRODUCTIVE ORGANS: Prostate is enlarged.    BOWEL: Colonic,including right-sided, diverticulosis. Trace free fluid in the right paracolic gutter adjacent to diverticulosis may be due to gallbladder inflammation. No colonic wall to suggest acute diverticulitis. No bowel obstruction. Appendix is normal.  PERITONEUM: Trace pelvic free fluid. No pneumoperitoneum  VESSELS: Atherosclerotic changes.  RETROPERITONEUM/LYMPH NODES: No lymphadenopathy.  ABDOMINAL WALL: Within normal limits.  BONES: Diffuse degenerative changes. Status post posterior lumbar laminectomies. Mixed lucent and sclerotic lesion in the right mid humeral shaft is of unknown etiology, but unchanged since PET of 6/19/2019.    IMPRESSION:  Cholelithiasis with gallbladder wall thickening and pericholecystic edema, which extends to the rightparacolic gutter. Findings are suspicious for cholecystitis. No colonic wall thickening to suggest diverticulitis. Correlate with patient's symptomatology and right upper quadrant ultrasound.      Assessment :   89M with dementia, hx of CLL, HLD, osteoarthritis admitted with sepsis with shock sec Acute cholecystitis   Did not need pressors BP stable  Afib   HIDA +    Plan :   Cont Zosyn  Trend temps and cbc  Fu cultures  Surgery d/w family, would prefer medical management at this time, if worsens will need perc jackelin vs jackelin  Strict asp precautions    D/w ICU attending     Continue with present regiment.  Appropriate use of antibiotics and adverse effects reviewed.    D/w Daughter at bedside.    Critical care > 35 minutes were spent in direct patient care reviewing notes, medications ,labs data/ imaging , discussion with multidisciplinary team.    Thank you for allowing me to participate in care of your patient .    Jose Kiran MD  Infectious Disease  [phone]

## 2021-10-04 NOTE — PROGRESS NOTE ADULT - SUBJECTIVE AND OBJECTIVE BOX
CHARLY LU  MRN-[id number] 89y    GENERAL SURGERY/ DR. OCASIO     MEDICATIONS  (STANDING):  dextrose 5% + sodium chloride 0.9%. 1000 milliLiter(s) (100 mL/Hr) IV Continuous <Continuous>  pantoprazole  Injectable 40 milliGRAM(s) IV Push daily  piperacillin/tazobactam IVPB.. 3.375 Gram(s) IV Intermittent every 8 hours  risperiDONE   Tablet 2 milliGRAM(s) Oral at bedtime    ICU Vital Signs Last 24 Hrs  T(C): 35.9 (04 Oct 2021 07:25), Max: 36.8 (03 Oct 2021 16:00)  T(F): 96.6 (04 Oct 2021 07:25), Max: 98.3 (03 Oct 2021 16:00)  HR: 86 (04 Oct 2021 08:00) (65 - 89)  BP: 115/58 (04 Oct 2021 08:00) (82/37 - 124/57)  BP(mean): 83 (04 Oct 2021 08:00) (47 - 99)  RR: 20 (04 Oct 2021 08:00) (12 - 26)  SpO2: 100% (04 Oct 2021 08:00) (94% - 100%)    10-03-21 @ 07:01  -  10-04-21 @ 07:00  --------------------------------------------------------  IN: 200 mL / OUT: 900 mL / NET: -700 mL    SOLIS CATH 900 ML       LUNGS: CLEAR TO AUSCULTATION , NO W/R/R  ABDOMEN: + BS, SOFT, NON DISTENDED, NO PALPABLE MASS, TENDERNESS RUQ, NEGATIVE SALGADO'S SIGN   EXTREMITY: NO EDEMA, NO CALF TENDERNESS                           9.4    5.35  )-----------( 100      ( 04 Oct 2021 05:35 )             29.0      10-04    143  |  111<H>  |  17  ----------------------------<  80  3.7   |  24  |  1.20    Ca    7.7<L>      04 Oct 2021 05:35  Phos  2.6     10-04  Mg     1.8     10-04    TPro  5.1<L>  /  Alb  2.1<L>  /  TBili  0.5  /  DBili  x   /  AST  10<L>  /  ALT  8<L>  /  AlkPhos  34<L>  10-04    10/02 BLOOD C/S X2, URINE C/S NO GROWTH TO DATE                         ASSESSMENT &  PLAN:     CHOLELITHIASIS WITH GALLBLADDER WALL THICKENING R/O ACUTE CHOLECYSTITIS     NPO  ZOSYN  HIDA SCAN  SURGICAL TEAM WILL FOLLOW UP         CHARLY LU  MRN-[id number] 89y      GENERAL SURGERY/ DR. OCASIO       MEDICATIONS  (STANDING):  dextrose 5% + sodium chloride 0.9%. 1000 milliLiter(s) (100 mL/Hr) IV Continuous <Continuous>  pantoprazole  Injectable 40 milliGRAM(s) IV Push daily  piperacillin/tazobactam IVPB.. 3.375 Gram(s) IV Intermittent every 8 hours  risperiDONE   Tablet 2 milliGRAM(s) Oral at bedtime      ICU Vital Signs Last 24 Hrs  T(C): 35.9 (04 Oct 2021 07:25), Max: 36.8 (03 Oct 2021 16:00)  T(F): 96.6 (04 Oct 2021 07:25), Max: 98.3 (03 Oct 2021 16:00)  HR: 86 (04 Oct 2021 08:00) (65 - 89)  BP: 115/58 (04 Oct 2021 08:00) (82/37 - 124/57)  BP(mean): 83 (04 Oct 2021 08:00) (47 - 99)  RR: 20 (04 Oct 2021 08:00) (12 - 26)  SpO2: 100% (04 Oct 2021 08:00) (94% - 100%)      10-03-21 @ 07:01  -  10-04-21 @ 07:00  --------------------------------------------------------  IN: 200 mL / OUT: 900 mL / NET: -700 mL    SOLIS CATH 900 ML       LUNGS: CLEAR TO AUSCULTATION , NO W/R/R  ABDOMEN: + BS, SOFT, NON DISTENDED, NO PALPABLE MASS, TENDERNESS RUQ, NEGATIVE SALGADO'S SIGN   EXTREMITY: NO EDEMA, NO CALF TENDERNESS                             9.4    5.35  )-----------( 100      ( 04 Oct 2021 05:35 )             29.0        10-04    143  |  111<H>  |  17  ----------------------------<  80  3.7   |  24  |  1.20    Ca    7.7<L>      04 Oct 2021 05:35  Phos  2.6     10-04  Mg     1.8     10-04    TPro  5.1<L>  /  Alb  2.1<L>  /  TBili  0.5  /  DBili  x   /  AST  10<L>  /  ALT  8<L>  /  AlkPhos  34<L>  10-04      10/02 BLOOD C/S X2, URINE C/S NO GROWTH TO DATE                           ASSESSMENT &  PLAN:     CHOLELITHIASIS WITH GALLBLADDER WALL THICKENING R/O ACUTE CHOLECYSTITIS     NPO  ZOSYN  HIDA SCAN  SURGICAL TEAM WILL FOLLOW UP

## 2021-10-04 NOTE — CONSULT NOTE ADULT - ASSESSMENT
**Charting in progress***  89 y/o M with PMHx of dementia, paroxsymal atrial fibrillation (not on AC), hx of CLL, HLD, osteoarthritis transfer from [facility] with severe sepsis 2/2 possible cholecystitis.     Afib with RVR  - Now resolved, pt in NSR with no intervention   - Afib with RVR on admission at [place] on initial EKG: Afib RVR at 128bpm, repeat with sinus with 1st degree AV block  - Off AC due to hx of hematuria and dementia, continue to hold for possible future cholecystectomy as well      - Other cardiovascular workup will depend on clinical course.  - All other workup per primary team.  - Will continue to follow.                 **Charting in progress***  89 y/o M with PMHx of dementia, paroxsymal atrial fibrillation (not on AC), hx of CLL, HLD, osteoarthritis transfer from [facility] with severe sepsis 2/2 possible cholecystitis.     Afib with RVR  - Pt currently in afib rate in the high 90s to low 100s. Pt had converted from afib to RVR to normal sinus at [place] prior.   - Afib with RVR on admission at [place] on initial EKG: Afib RVR at 128bpm, repeat with sinus with 1st degree AV block  - Pt was on amiodarone 200mg qd during previous hospitalization for rate control   - Off AC due to hx of hematuria with anemia and dementia, continue to hold for possible future cholecystectomy as well        - Other cardiovascular workup will depend on clinical course.  - All other workup per primary team.  - Will continue to follow.                 **Charting in progress***  89 y/o M with PMHx of dementia, paroxsymal atrial fibrillation (not on AC), hx of CLL, HLD, osteoarthritis transfer from [facility] with severe sepsis 2/2 possible cholecystitis.     Afib with RVR  - Pt currently in afib rate in the high 90s to low 100s. Pt had converted from afib to RVR to normal sinus at [place] prior.   - Afib with RVR on admission at [place] on initial EKG: Afib RVR at 128bpm, repeat with sinus with 1st degree AV block  - Pt was on amiodarone during previous hospitalization   - Off AC due to hx of hematuria with anemia and dementia, continue to hold for possible future cholecystectomy as well        - Other cardiovascular workup will depend on clinical course.  - All other workup per primary team.  - Will continue to follow.                 89 y/o M with PMHx of dementia, paroxsymal atrial fibrillation (not on AC), hx of CLL, HLD, osteoarthritis transfer from [facility] with severe sepsis 2/2 possible cholecystitis.     Afib with RVR  - Hx of pAF, in the setting of stressors - previously during empyema and now with acute cholecystitis   - Pt currently in afib rate in the high 90s to low 100s. Pt had converted from afib to RVR to normal sinus at [place] prior.   - Afib with RVR on admission at [place] on initial EKG: Afib RVR at 128bpm, repeat with sinus with 1st degree AV block  - Pt was on amiodarone during previous hospitalization, may consider restarting during hospital course but not now   - Start lopressor 2.5mg IV q6h with hold parameters for rate control- continue IV form now as patient to have procedure soon    - Off AC due to hx of hematuria with anemia and dementia, will continue to hold off  - Previous ECHO 3/2020 with EF of 55%   - F/u ECHO      Hypotension   - Now resolved, patient with hypotension last night   - Continue to monitor routine hemodynamics   - May require pressors if worsens     Acute cholecystitis   - Pt at elevated but not modifiable active cardiac risk factors and in the setting of low risk IR jackelin, patient is optimized as best as possible from cardiovascular standpoint to proceed with planned procedure with routine hemodynamic monitoring.    - Other cardiovascular workup will depend on clinical course.  - All other workup per primary team.  - Will continue to follow.

## 2021-10-04 NOTE — PROGRESS NOTE ADULT - ASSESSMENT
As above in PA notation.  Patient personally seen and examined.  No acute events overnight following transfer from [facility].  Vitals non-suggestive since admission to ICU.  Abdomen soft with +/- tenderness to deepest palpation only of RUQ.  Labs reassuring this am.  Clinically, improving overall.  Surgically, stable at present.  In agreement with current supportive care.  Plan for HIDA today.

## 2021-10-04 NOTE — DIETITIAN INITIAL EVALUATION ADULT. - OTHER INFO
Current:   -Intake: NPO status   -GI: no BM noted thus far; acute cholecystitis - surgical team following, possible jackelin tube   -Renal: GUS; IV hydration   -Pressure Injury: heel - stage 1     Weight Hx:   -Current: 176 pounds (dosing)  -Per Mae HIE: 170 pounds (4/2020)

## 2021-10-04 NOTE — PROGRESS NOTE ADULT - ASSESSMENT
A/P 89 y/o M with PMHx of dementia, paroxsymal atrial fibrillation (not on AC), hx of CLL, HLD, osteoarthritis transfer from [facility] with severe sepsis 2/2 possible cholecystitis.     Neuro   -pt AAOx1  -Cont Risperdal and haldol prn for hx of dementia  -DVt ppx, SCD's     CV  -shock resolved 2/2 IVF resuscitation. Not requiring vasopressors at this time  -PAF, now SR   -Not on AC in anticipation of possible IR or OR; pt is a poor candidate for AC regardless 2/2 dementia   -consider amio load if AFib recurrent  -Cardio following     Pulm   -tolerating RA    GI  -suspect acute cholecystitis, HIDA today.  -possible IR or OR intervention pending HIDA results  -keep NPO   -PPI for GI ppx   -Surg Dr Uribe following     Renal   -Cr improving, appears at baseline  -will cont IVF hydration   -monitor I/O's   -avoid nephrotoxic agents     ID   -cont zosyn   -BCx NGTD, Ucx negative  -ID consult Dr Kiran, f/u recs    Heme  -SCDs for DVT ppx  -Not on AC as above     Dispo: Stable for transfer to [facility] A/P 89 y/o M with PMHx of dementia, paroxsymal atrial fibrillation (not on AC), hx of CLL, HLD, osteoarthritis transfer from [facility] with severe sepsis 2/2 possible cholecystitis.     Neuro   -pt AAOx1  -Cont Risperdal and haldol prn for hx of dementia  -DVt ppx, SCD's     CV  -shock resolved 2/2 IVF resuscitation. Not requiring vasopressors at this time  -PAF, now SR   -Not on AC in anticipation of possible IR or OR; pt is a poor candidate for AC regardless 2/2 dementia   -consider amio load if AFib recurrent  -Cardio following     Pulm   -tolerating RA    GI  -suspect acute cholecystitis, HIDA today.  -possible IR jackelin tube or cholecystectomy pending HIDA results  -keep NPO   -PPI for GI ppx   -Surg Dr Uribe following     Renal   -Cr improving, appears at baseline  -will cont IVF hydration   -monitor I/O's   -avoid nephrotoxic agents     ID   -cont zosyn   -BCx NGTD, Ucx negative  -ID consult Dr Kiran, f/u recs    Heme  -SCDs for DVT ppx  -Not on AC as above     Dispo: Stable for transfer to [facility]. Spoke to son Sohail (ER physician), updated on patient status, and answered all questions. A/P 89 y/o M with PMHx of dementia, paroxsymal atrial fibrillation (not on AC), hx of CLL, HLD, osteoarthritis transfer from [facility] with severe sepsis 2/2 possible cholecystitis.     Neuro   -pt AAOx1  -Cont Risperdal and haldol prn for hx of dementia  -DVt ppx, SCD's     CV  -shock resolved 2/2 IVF resuscitation. Not requiring vasopressors at this time  -PAF, now SR   -Not on AC in anticipation of possible IR or OR; pt is a poor candidate for AC regardless 2/2 dementia   -consider amio load if AFib recurrent  -Cardio following     Pulm   -tolerating RA    GI  -suspect acute cholecystitis, HIDA positive  -given reassuring clinical status, will monitor for 1 more day on IV abx and re-evaluate need for possible IR jackelin tube  -diet advanced to clears  -PPI for GI ppx   -Surg Dr Uribe following     Renal   -Cr improving, appears at baseline  -will cont IVF hydration   -monitor I/O's   -avoid nephrotoxic agents     ID   -cont zosyn   -BCx NGTD, Ucx negative  -ID consult Dr Kiran, f/u recs    Heme  -SCDs for DVT ppx  -Not on AC as above     Dispo: Stable for transfer to [facility]. Spoke to son Sohail (ER physician), updated on patient status, and answered all questions. A/P 89 y/o M with PMHx of dementia, paroxsymal atrial fibrillation (not on AC), hx of CLL, HLD, osteoarthritis transfer from [facility] with severe sepsis 2/2 possible cholecystitis.     Neuro   -pt AAOx1  -Cont Risperdal and haldol prn for hx of dementia  -DVt ppx, SCD's     CV  -shock resolved 2/2 IVF resuscitation. Not requiring vasopressors at this time  -PAF, now SR   -Not on AC in anticipation of possible IR; pt is a poor candidate for AC regardless 2/2 dementia   -consider amio load if AFib recurrent  -Cardio following     Pulm   -tolerating RA    GI  -suspect acute cholecystitis, HIDA positive  -given reassuring clinical status, will monitor for 1 more day on IV abx and re-evaluate need for possible IR jackelin tube  -diet advanced to clears  -PPI for GI ppx   -Surg Dr Uribe following     Renal   -Cr improving, appears at baseline  -will cont IVF hydration   -monitor I/O's   -avoid nephrotoxic agents     ID   -cont zosyn   -BCx NGTD, Ucx negative  -ID consult Dr Kiran, f/u recs    Heme  -SCDs for DVT ppx  -Not on AC as above     Dispo: Stable for transfer to [facility]. Spoke to son Sohail (ER physician), updated on patient status, and answered all questions. A/P 89 y/o M with PMHx of dementia, paroxsymal atrial fibrillation (not on AC), hx of CLL, HLD, osteoarthritis transfer from [facility] with severe sepsis 2/2 possible cholecystitis.     Neuro   -pt AAOx1  -Cont Risperdal and haldol prn for hx of dementia      CV  -shock resolved 2/2 IVF resuscitation. Not requiring vasopressors at this time  -PAF, now SR   -Not on AC in anticipation of possible IR; pt is a poor candidate for AC regardless 2/2 dementia   -consider amio load if AFib recurrent  -Cardio following     Pulm   -tolerating RA    GI  -suspect acute cholecystitis, HIDA positive  -given reassuring clinical status, will monitor for 1 more day on IV abx and re-evaluate need for possible IR jackelin tube  -diet advanced to clears  -PPI for GI ppx   -Surg Dr Uribe following     Renal   -Cr improving, appears at baseline  -will cont IVF hydration   -monitor I/O's   -avoid nephrotoxic agents     ID   -cont zosyn   -BCx NGTD, Ucx negative  -ID consult Dr Kiran, f/u recs    Heme  -SCDs for DVT ppx  -Not on AC as above     Dispo: Stable for transfer to [facility]. Spoke to son Sohail (ER physician), updated on patient status, and answered all questions.

## 2021-10-04 NOTE — PROGRESS NOTE ADULT - SUBJECTIVE AND OBJECTIVE BOX
INTERVAL HPI/OVERNIGHT EVENTS:  Patient seen and examined at bedside. No acute overnight events. Patient states he feels fine. Patient confused, AAOx1 on exam (to person). Unable to obtain comprehensive HPI and ROS 2/2 dementia, but denies any chest pain/pressure, abd pain.    MEDICATIONS  (STANDING):  dextrose 5% + sodium chloride 0.9%. 1000 milliLiter(s) (100 mL/Hr) IV Continuous <Continuous>  pantoprazole  Injectable 40 milliGRAM(s) IV Push daily  piperacillin/tazobactam IVPB.. 3.375 Gram(s) IV Intermittent every 8 hours  risperiDONE   Tablet 2 milliGRAM(s) Oral at bedtime    MEDICATIONS  (PRN):  metoprolol tartrate Injectable 2.5 milliGRAM(s) IV Push every 6 hours PRN HR > 120      Allergies    vancomycin (Nephrotoxicity)    Intolerances        Vital Signs Last 24 Hrs  T(C): 36.2 (04 Oct 2021 18:00), Max: 36.5 (03 Oct 2021 23:36)  T(F): 97.2 (04 Oct 2021 18:00), Max: 97.7 (03 Oct 2021 23:36)  HR: 103 (04 Oct 2021 20:00) (72 - 112)  BP: 116/55 (04 Oct 2021 20:00) (86/56 - 144/70)  BP(mean): 76 (04 Oct 2021 20:00) (67 - 100)  RR: 18 (04 Oct 2021 20:00) (10 - 26)  SpO2: 100% (04 Oct 2021 20:00) (94% - 100%)    10-03 @ 07:01  -  10-04 @ 07:00  --------------------------------------------------------  IN: 200 mL / OUT: 900 mL / NET: -700 mL    10-04 @ 07:01  -  10-04 @ 21:00  --------------------------------------------------------  IN: 1450 mL / OUT: 1120 mL / NET: 330 mL      Physical Exam:  General: NAD, resting comfortably in bed  Neurology: A&Ox1, nonfocal, JUNE x 4  Respiratory: CTA B/L, no LE edema  CV: +S1S2  Abdominal: Soft, NT, ND +BS  Extremities: No clubbing/cyanosis, + peripheral pulses      LABS:                        9.8    5.23  )-----------( 106      ( 04 Oct 2021 09:21 )             30.7     10-04    143  |  112<H>  |  17  ----------------------------<  96  3.8   |  26  |  1.20    Ca    8.1<L>      04 Oct 2021 09:21  Phos  2.6     10-04  Mg     1.8     10-04    TPro  5.3<L>  /  Alb  2.3<L>  /  TBili  0.7  /  DBili  .20  /  AST  16  /  ALT  9<L>  /  AlkPhos  42  10-04          RADIOLOGY & ADDITIONAL TESTS:

## 2021-10-04 NOTE — PROGRESS NOTE ADULT - ASSESSMENT
89 y/o M with PMHx of dementia, paroxysmal atrial fibrillation (not on AC), hx of CLL, HLD, osteoarthritis presented to  ED with an acute episode of rigors and unresponsiveness admitted with severe sepsis/shock secondary to acute cholecystitis.    #Sepsis likely secondary to acute cholecystitis  - Admitted to ICU   - Stared on Zosyn empirically in , will continue   - US RUQ: Cholelithiasis with associated gallbladder wall edema which can be seen in acute cholecystitis.  - No evidence of leukocytosis or transaminitis  - severe sepsis POA responded to fluids (per family patient runs low BP)  - monitor for shock state necessitating IV vasopressors  - will hold off pressors for now - continue with midodrine  - F/up HIDA scan- concerning for acute cholecystitis  - F/u blood cultures  - ID consulted (Dr. Kiran)  - Surgery following (Dr. Uribe)  - Discussed with Surgery, plan to start patient on clears and depending on clinical course may need perc cholecystostomy tube placement with IR, will keep NPO after midnight    #Paroxysmal Atrial Fibrillation  - EKG at  on 10/2 showed atrial fibrillation and subsequent EKG was SR with 1st deg AV block  - would hold off AC for possible future cholecystectomy and hx of hematuria - also given dementia  - was on amiodarone in the past  - continue cardiac monitoring - consider IV amiodarone if recurrent AF    #GUS   - Creatinine 1.83 on admission to   - improving renal indices  - IV fluids for gentle hydration  - monitor serum creatinine & urine output  - avoid nephrotoxic medications    #Normocytic Anemia, likely anemia of chronic disease  - H/H: 9.1/27.8 on admission, baseline hemoglobin between 7.5 to 8.8  - Currently hemodynamically stable, monitor for signs and symptoms of active bleeding  - F/u AM CBC    #Dementia  - continue with Risperdal and haldol PRN at night   - Fall precautions     #Preventive measures  DVT ppx: hold AC for possible OR and history of hematuria, SCD's   Full code/Inpatient   Aspiration and fall precautions

## 2021-10-04 NOTE — DIETITIAN INITIAL EVALUATION ADULT. - ADD RECOMMEND
1) Upon advancement of diet, recommend low fat diet. Defer consistency to SLP and team. 2) Monitor need for nutrition supplement. 3) Monitor weights, GI function, labs.

## 2021-10-04 NOTE — CONSULT NOTE ADULT - SUBJECTIVE AND OBJECTIVE BOX
89 year old man with a history of CLL, dementia, PAF, HLD, OA, GI bleed/gastric ulcer, spinal stenosis, spine surgery, hospitalization in 3/2020 with empyema who presented to the ER on 10/2 with complaints of rigors and unresponsiveness; increased lethargy x 1 week.  Patient is a poor informant and cannot provide much input.  He was found to be febrile, hypotensive, and tachycardic (AF w/ RVR) upon arrival with a suspected diagnosis of sepsis related to acute cholecystitis.   He denies current cardiac symptoms -- specifically no palpitations, angina, dyspnea. ****Charting inprogress***  [facility]         Warren Posada, Abelardo Amos Pannella, Patel, Savella        [phone] (office)    Reason for Consult: Afib     Interval HPI: Patient seen and examined at bedside. No acute events overnight.     HPI:  89 y/o M with PMHx of dementia, paroxsymal atrial fibrillation (not on AC), hx of CLL, HLD, osteoarthritis presented to  ED with an acute episode of rigors and unresponsiveness. He had a Tmax of 101.8 in the ED. Was found to have acute cholecystitis on imaging. Was in shock state and maintained on IV fluids, zosyn and midodrine with possible plan for IV vasopressors. Was seen by neurology Dr. Blair for seizure like activity - possibly rigors in setting of fever. Surgery Dr. Uribe was also following for cholecystitis and felt as though [facility] would be a better option given capability for IR intervention and a formal ICU. Will be getting a HIDA scan while here. Of note patient was under hospice care in the past, but now changed to full code and with aggressive management. Discussed with daughter (Esperanza) and son (Sohail) over telephone - they state that he is NOT DNR/DNI.    Vital Signs ([facility] ICU)  T(C Max: 98.3 HR: 69 BP: 101/57 RR: 14 SpO2: 97%  EKG at  on 10/2 showed atrial fibrillation with RVR at 128bpm and subsequent EKG was SR with 1st deg AV block  CT CAP: Cholelithiasis with gallbladder wall thickening and pericholecystic edema, which extends to the right paracolic gutter. Findings are suspicious for cholecystitis. No colonic wall thickening to suggest diverticulitis. Correlate with patient's symptomatology and right upper quadrant ultrasound.  US RUQ: Cholelithiasis with associated gallbladder wall edema which can be seen in acute cholecystitis. There is no sonographic Burr sign. Consider further evaluation with HIDA scan to assess for acute cholecystitis. 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    History of Arthroscopy of Knee  bilateral    Status Post Arthroscopy  right shoulder    S/P Tonsillectomy  childhood        SOCIAL HISTORY: No active tobacco, alcohol or illicit drug use    FAMILY HISTORY:  FH: Alzheimer&#x27;s disease (Mother)        Home Medications:  Haldol 2 mg oral tablet: 1 tab(s) orally, As Needed (rarely) (03 Oct 2021 21:33)  pantoprazole 40 mg oral delayed release tablet: 1 tab(s) orally 2 times a day (03 Oct 2021 21:33)  risperiDONE 2 mg oral tablet: 1 tab(s) orally once a day (at bedtime) (03 Oct 2021 21:33)  senna oral tablet: 1-2 tab(s) orally once a day (03 Oct 2021 21:33)      MEDICATIONS  (STANDING):  dextrose 5% + sodium chloride 0.9%. 1000 milliLiter(s) (100 mL/Hr) IV Continuous <Continuous>  pantoprazole  Injectable 40 milliGRAM(s) IV Push daily  piperacillin/tazobactam IVPB.. 3.375 Gram(s) IV Intermittent every 8 hours  risperiDONE   Tablet 2 milliGRAM(s) Oral at bedtime    MEDICATIONS  (PRN):      Allergies    vancomycin (Nephrotoxicity)    Intolerances        REVIEW OF SYSTEMS: Negative except as per HPI.    VITAL SIGNS:   Vital Signs Last 24 Hrs  T(C): 35.9 (04 Oct 2021 07:25), Max: 36.8 (03 Oct 2021 16:00)  T(F): 96.6 (04 Oct 2021 07:25), Max: 98.3 (03 Oct 2021 16:00)  HR: 80 (04 Oct 2021 11:00) (65 - 89)  BP: 117/58 (04 Oct 2021 11:00) (84/51 - 124/57)  BP(mean): 78 (04 Oct 2021 11:00) (61 - 99)  RR: 13 (04 Oct 2021 11:00) (12 - 26)  SpO2: 100% (04 Oct 2021 11:00) (94% - 100%)    I&O's Summary    03 Oct 2021 07:01  -  04 Oct 2021 07:00  --------------------------------------------------------  IN: 200 mL / OUT: 900 mL / NET: -700 mL    04 Oct 2021 07:01  -  04 Oct 2021 13:01  --------------------------------------------------------  IN: 400 mL / OUT: 400 mL / NET: 0 mL        PHYSICAL EXAM:  Constitutional: NAD, well-developed  HEENT NC/AT, moist mucous membranes  Pulmonary: Non-labored, breath sounds are clear bilaterally, no wheezing, rales or rhonchi  Cardiovascular: +S1, S2, RRR, no murmur  Gastrointestinal: Soft, nontender, nondistended, normoactive bowel sounds  Extremities: No peripheral edema   Neurological: Alert, strength and sensitivity are grossly intact  Skin: No obvious lesions/rashes  Psych: Mood & affect appropriate    LABS: All Labs Reviewed:                        9.8    5.23  )-----------( 106      ( 04 Oct 2021 09:21 )             30.7                         9.4    5.35  )-----------( 100      ( 04 Oct 2021 05:35 )             29.0                         9.1    6.83  )-----------( 81       ( 03 Oct 2021 07:05 )             27.8     04 Oct 2021 09:21    143    |  112    |  17     ----------------------------<  96     3.8     |  26     |  1.20   04 Oct 2021 05:35    143    |  111    |  17     ----------------------------<  80     3.7     |  24     |  1.20   03 Oct 2021 07:05    137    |  107    |  24     ----------------------------<  84     3.7     |  24     |  1.36     Ca    8.1        04 Oct 2021 09:21  Ca    7.7        04 Oct 2021 05:35  Ca    8.0        03 Oct 2021 07:05  Phos  2.6       04 Oct 2021 05:35  Phos  3.0       03 Oct 2021 07:05  Phos  3.3       02 Oct 2021 14:06  Mg     1.8       04 Oct 2021 05:35  Mg     1.8       03 Oct 2021 07:05  Mg     3.4       02 Oct 2021 14:06    TPro  5.3    /  Alb  2.3    /  TBili  0.7    /  DBili  .20    /  AST  16     /  ALT  9      /  AlkPhos  42     04 Oct 2021 09:21  TPro  5.1    /  Alb  2.1    /  TBili  0.5    /  DBili  x      /  AST  10     /  ALT  8      /  AlkPhos  34     04 Oct 2021 05:35  TPro  4.7    /  Alb  2.3    /  TBili  0.7    /  DBili  x      /  AST  12     /  ALT  <10    /  AlkPhos  37     03 Oct 2021 07:05    PT/INR - ( 02 Oct 2021 14:06 )   PT: 16.5 sec;   INR: 1.38 ratio         PTT - ( 02 Oct 2021 14:06 )  PTT:29.3 sec      Blood Culture: Organism --  Gram Stain Blood -- Gram Stain --  Specimen Source .Blood Blood-Peripheral  Culture-Blood --    Organism --  Gram Stain Blood -- Gram Stain --  Specimen Source Clean Catch Clean Catch (Midstream)  Culture-Blood --    ECHO: Technically difficult and limited study  Normal left ventricular internal dimensions and systolic function, estimated LVEF of 55%.   Right ventricle is not well-visualized  Sclerotic trileaflet aortic valve, without AI.   Trace physiologic MR and TR    EKG: EKG at  on 10/2 showed atrial fibrillation with RVR at 128bpm and subsequent EKG was SR with 1st deg AV block    RADIOLOGY:   CT chest, CT abd/pelvis: Cholelithiasis with gallbladder wall thickening and pericholecystic edema, which extends to the right paracolic gutter. Findings are suspicious for cholecystitis. No colonic wall thickening to suggest diverticulitis. Correlate with patient's symptomatology and right upper quadrant ultrasound.    CXR: No focal consolidation seen in the lungs.   ****Charting inprogress***  [facility]         Warren Posada, Abelardo Amos Pannella, Patel, Savella        [phone] (office)    Reason for Consult: Afib     Interval HPI: Patient seen and examined at bedside. Pt with baseline dementia and only oriented to self. Daughter, Esperanza, contacted for further history. Pt follows with Dr. Kirkland outpatient and had a history of afib during previous hospitalization at [facility] in 3/2020.      HPI:  89 y/o M with PMHx of dementia, paroxsymal atrial fibrillation (not on AC), hx of CLL, HLD, osteoarthritis presented to  ED with an acute episode of rigors and unresponsiveness. He had a Tmax of 101.8 in the ED. Was found to have acute cholecystitis on imaging. Was in shock state and maintained on IV fluids, zosyn and midodrine with possible plan for IV vasopressors. Was seen by neurology Dr. Blair for seizure like activity - possibly rigors in setting of fever. Surgery Dr. Uribe was also following for cholecystitis and felt as though [facility] would be a better option given capability for IR intervention and a formal ICU. Will be getting a HIDA scan while here. Of note patient was under hospice care in the past, but now changed to full code and with aggressive management. Discussed with daughter (Esperanza) and son (Sohail) over telephone - they state that he is NOT DNR/DNI.    Vital Signs ([facility] ICU)  T(C Max: 98.3 HR: 69 BP: 101/57 RR: 14 SpO2: 97%  EKG at  on 10/2 showed atrial fibrillation with RVR at 128bpm and subsequent EKG was SR with 1st deg AV block  CT CAP: Cholelithiasis with gallbladder wall thickening and pericholecystic edema, which extends to the right paracolic gutter. Findings are suspicious for cholecystitis. No colonic wall thickening to suggest diverticulitis. Correlate with patient's symptomatology and right upper quadrant ultrasound.  US RUQ: Cholelithiasis with associated gallbladder wall edema which can be seen in acute cholecystitis. There is no sonographic Burr sign. Consider further evaluation with HIDA scan to assess for acute cholecystitis. 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    History of Arthroscopy of Knee  bilateral    Status Post Arthroscopy  right shoulder    S/P Tonsillectomy  childhood        SOCIAL HISTORY: No active tobacco, alcohol or illicit drug use    FAMILY HISTORY:  FH: Alzheimer&#x27;s disease (Mother)        Home Medications:  Haldol 2 mg oral tablet: 1 tab(s) orally, As Needed (rarely) (03 Oct 2021 21:33)  pantoprazole 40 mg oral delayed release tablet: 1 tab(s) orally 2 times a day (03 Oct 2021 21:33)  risperiDONE 2 mg oral tablet: 1 tab(s) orally once a day (at bedtime) (03 Oct 2021 21:33)  senna oral tablet: 1-2 tab(s) orally once a day (03 Oct 2021 21:33)      MEDICATIONS  (STANDING):  dextrose 5% + sodium chloride 0.9%. 1000 milliLiter(s) (100 mL/Hr) IV Continuous <Continuous>  pantoprazole  Injectable 40 milliGRAM(s) IV Push daily  piperacillin/tazobactam IVPB.. 3.375 Gram(s) IV Intermittent every 8 hours  risperiDONE   Tablet 2 milliGRAM(s) Oral at bedtime    MEDICATIONS  (PRN):      Allergies    vancomycin (Nephrotoxicity)    Intolerances        REVIEW OF SYSTEMS: Negative except as per HPI.    VITAL SIGNS:   Vital Signs Last 24 Hrs  T(C): 35.9 (04 Oct 2021 07:25), Max: 36.8 (03 Oct 2021 16:00)  T(F): 96.6 (04 Oct 2021 07:25), Max: 98.3 (03 Oct 2021 16:00)  HR: 80 (04 Oct 2021 11:00) (65 - 89)  BP: 117/58 (04 Oct 2021 11:00) (84/51 - 124/57)  BP(mean): 78 (04 Oct 2021 11:00) (61 - 99)  RR: 13 (04 Oct 2021 11:00) (12 - 26)  SpO2: 100% (04 Oct 2021 11:00) (94% - 100%)    I&O's Summary    03 Oct 2021 07:01  -  04 Oct 2021 07:00  --------------------------------------------------------  IN: 200 mL / OUT: 900 mL / NET: -700 mL    04 Oct 2021 07:01  -  04 Oct 2021 13:01  --------------------------------------------------------  IN: 400 mL / OUT: 400 mL / NET: 0 mL        PHYSICAL EXAM:  Constitutional: NAD, well-developed  HEENT NC/AT, moist mucous membranes  Pulmonary: Non-labored, breath sounds are clear bilaterally, no wheezing, rales or rhonchi  Cardiovascular: +S1, S2, RRR, no murmur  Gastrointestinal: Soft, nontender, nondistended, normoactive bowel sounds  Extremities: No peripheral edema   Neurological: Alert, strength and sensitivity are grossly intact  Skin: No obvious lesions/rashes  Psych: Mood & affect appropriate    LABS: All Labs Reviewed:                        9.8    5.23  )-----------( 106      ( 04 Oct 2021 09:21 )             30.7                         9.4    5.35  )-----------( 100      ( 04 Oct 2021 05:35 )             29.0                         9.1    6.83  )-----------( 81       ( 03 Oct 2021 07:05 )             27.8     04 Oct 2021 09:21    143    |  112    |  17     ----------------------------<  96     3.8     |  26     |  1.20   04 Oct 2021 05:35    143    |  111    |  17     ----------------------------<  80     3.7     |  24     |  1.20   03 Oct 2021 07:05    137    |  107    |  24     ----------------------------<  84     3.7     |  24     |  1.36     Ca    8.1        04 Oct 2021 09:21  Ca    7.7        04 Oct 2021 05:35  Ca    8.0        03 Oct 2021 07:05  Phos  2.6       04 Oct 2021 05:35  Phos  3.0       03 Oct 2021 07:05  Phos  3.3       02 Oct 2021 14:06  Mg     1.8       04 Oct 2021 05:35  Mg     1.8       03 Oct 2021 07:05  Mg     3.4       02 Oct 2021 14:06    TPro  5.3    /  Alb  2.3    /  TBili  0.7    /  DBili  .20    /  AST  16     /  ALT  9      /  AlkPhos  42     04 Oct 2021 09:21  TPro  5.1    /  Alb  2.1    /  TBili  0.5    /  DBili  x      /  AST  10     /  ALT  8      /  AlkPhos  34     04 Oct 2021 05:35  TPro  4.7    /  Alb  2.3    /  TBili  0.7    /  DBili  x      /  AST  12     /  ALT  <10    /  AlkPhos  37     03 Oct 2021 07:05    PT/INR - ( 02 Oct 2021 14:06 )   PT: 16.5 sec;   INR: 1.38 ratio         PTT - ( 02 Oct 2021 14:06 )  PTT:29.3 sec      Blood Culture: Organism --  Gram Stain Blood -- Gram Stain --  Specimen Source .Blood Blood-Peripheral  Culture-Blood --    Organism --  Gram Stain Blood -- Gram Stain --  Specimen Source Clean Catch Clean Catch (Midstream)  Culture-Blood --    ECHO: Technically difficult and limited study  Normal left ventricular internal dimensions and systolic function, estimated LVEF of 55%.   Right ventricle is not well-visualized  Sclerotic trileaflet aortic valve, without AI.   Trace physiologic MR and TR    EKG: EKG at  on 10/2 showed atrial fibrillation with RVR at 128bpm and subsequent EKG was SR with 1st deg AV block    RADIOLOGY:   CT chest, CT abd/pelvis: Cholelithiasis with gallbladder wall thickening and pericholecystic edema, which extends to the right paracolic gutter. Findings are suspicious for cholecystitis. No colonic wall thickening to suggest diverticulitis. Correlate with patient's symptomatology and right upper quadrant ultrasound.    CXR: No focal consolidation seen in the lungs.   ****Charting inprogress***  [facility]         Warren Posada, Abelardo Amos Pannella, Patel, Savella        [phone] (office)    Reason for Consult: Afib     Interval HPI: Patient seen and examined at bedside. Pt with baseline dementia and only oriented to self. Daughter, Esperanza, contacted for further history. Pt follows with Dr. Kirkland outpatient and had a history of afib during previous hospitalization at [facility] in 3/2020 for which he was loaded with amiodarone, but was not discharged on any medication as he converted to NSR and remained in NSR for the remainder of his hospitalization.       HPI:  89 y/o M with PMHx of dementia, paroxsymal atrial fibrillation (not on AC), hx of CLL, HLD, osteoarthritis presented to  ED with an acute episode of rigors and unresponsiveness. He had a Tmax of 101.8 in the ED. Was found to have acute cholecystitis on imaging. Was in shock state and maintained on IV fluids, zosyn and midodrine with possible plan for IV vasopressors. Was seen by neurology Dr. Blair for seizure like activity - possibly rigors in setting of fever. Surgery Dr. Uribe was also following for cholecystitis and felt as though [facility] would be a better option given capability for IR intervention and a formal ICU. Will be getting a HIDA scan while here. Of note patient was under hospice care in the past, but now changed to full code and with aggressive management. Discussed with daughter (Esperanza) and son (Sohail) over telephone - they state that he is NOT DNR/DNI.    Vital Signs ([facility] ICU)  T(C Max: 98.3 HR: 69 BP: 101/57 RR: 14 SpO2: 97%  EKG at  on 10/2 showed atrial fibrillation with RVR at 128bpm and subsequent EKG was SR with 1st deg AV block  CT CAP: Cholelithiasis with gallbladder wall thickening and pericholecystic edema, which extends to the right paracolic gutter. Findings are suspicious for cholecystitis. No colonic wall thickening to suggest diverticulitis. Correlate with patient's symptomatology and right upper quadrant ultrasound.  US RUQ: Cholelithiasis with associated gallbladder wall edema which can be seen in acute cholecystitis. There is no sonographic Burr sign. Consider further evaluation with HIDA scan to assess for acute cholecystitis. 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    History of Arthroscopy of Knee  bilateral    Status Post Arthroscopy  right shoulder    S/P Tonsillectomy  childhood        SOCIAL HISTORY: No active tobacco, alcohol or illicit drug use    FAMILY HISTORY:  FH: Alzheimer&#x27;s disease (Mother)        Home Medications:  Haldol 2 mg oral tablet: 1 tab(s) orally, As Needed (rarely) (03 Oct 2021 21:33)  pantoprazole 40 mg oral delayed release tablet: 1 tab(s) orally 2 times a day (03 Oct 2021 21:33)  risperiDONE 2 mg oral tablet: 1 tab(s) orally once a day (at bedtime) (03 Oct 2021 21:33)  senna oral tablet: 1-2 tab(s) orally once a day (03 Oct 2021 21:33)      MEDICATIONS  (STANDING):  dextrose 5% + sodium chloride 0.9%. 1000 milliLiter(s) (100 mL/Hr) IV Continuous <Continuous>  pantoprazole  Injectable 40 milliGRAM(s) IV Push daily  piperacillin/tazobactam IVPB.. 3.375 Gram(s) IV Intermittent every 8 hours  risperiDONE   Tablet 2 milliGRAM(s) Oral at bedtime    MEDICATIONS  (PRN):      Allergies    vancomycin (Nephrotoxicity)    Intolerances        REVIEW OF SYSTEMS: Negative except as per HPI.    VITAL SIGNS:   Vital Signs Last 24 Hrs  T(C): 35.9 (04 Oct 2021 07:25), Max: 36.8 (03 Oct 2021 16:00)  T(F): 96.6 (04 Oct 2021 07:25), Max: 98.3 (03 Oct 2021 16:00)  HR: 80 (04 Oct 2021 11:00) (65 - 89)  BP: 117/58 (04 Oct 2021 11:00) (84/51 - 124/57)  BP(mean): 78 (04 Oct 2021 11:00) (61 - 99)  RR: 13 (04 Oct 2021 11:00) (12 - 26)  SpO2: 100% (04 Oct 2021 11:00) (94% - 100%)    I&O's Summary    03 Oct 2021 07:01  -  04 Oct 2021 07:00  --------------------------------------------------------  IN: 200 mL / OUT: 900 mL / NET: -700 mL    04 Oct 2021 07:01  -  04 Oct 2021 13:01  --------------------------------------------------------  IN: 400 mL / OUT: 400 mL / NET: 0 mL        PHYSICAL EXAM:  Constitutional: NAD, well-developed  HEENT NC/AT, moist mucous membranes  Pulmonary: Non-labored, breath sounds are clear bilaterally, no wheezing, rales or rhonchi  Cardiovascular: +S1, S2, RRR, no murmur  Gastrointestinal: Soft, nontender, nondistended, normoactive bowel sounds  Extremities: No peripheral edema   Neurological: Alert, strength and sensitivity are grossly intact  Skin: No obvious lesions/rashes  Psych: Mood & affect appropriate    LABS: All Labs Reviewed:                        9.8    5.23  )-----------( 106      ( 04 Oct 2021 09:21 )             30.7                         9.4    5.35  )-----------( 100      ( 04 Oct 2021 05:35 )             29.0                         9.1    6.83  )-----------( 81       ( 03 Oct 2021 07:05 )             27.8     04 Oct 2021 09:21    143    |  112    |  17     ----------------------------<  96     3.8     |  26     |  1.20   04 Oct 2021 05:35    143    |  111    |  17     ----------------------------<  80     3.7     |  24     |  1.20   03 Oct 2021 07:05    137    |  107    |  24     ----------------------------<  84     3.7     |  24     |  1.36     Ca    8.1        04 Oct 2021 09:21  Ca    7.7        04 Oct 2021 05:35  Ca    8.0        03 Oct 2021 07:05  Phos  2.6       04 Oct 2021 05:35  Phos  3.0       03 Oct 2021 07:05  Phos  3.3       02 Oct 2021 14:06  Mg     1.8       04 Oct 2021 05:35  Mg     1.8       03 Oct 2021 07:05  Mg     3.4       02 Oct 2021 14:06    TPro  5.3    /  Alb  2.3    /  TBili  0.7    /  DBili  .20    /  AST  16     /  ALT  9      /  AlkPhos  42     04 Oct 2021 09:21  TPro  5.1    /  Alb  2.1    /  TBili  0.5    /  DBili  x      /  AST  10     /  ALT  8      /  AlkPhos  34     04 Oct 2021 05:35  TPro  4.7    /  Alb  2.3    /  TBili  0.7    /  DBili  x      /  AST  12     /  ALT  <10    /  AlkPhos  37     03 Oct 2021 07:05    PT/INR - ( 02 Oct 2021 14:06 )   PT: 16.5 sec;   INR: 1.38 ratio         PTT - ( 02 Oct 2021 14:06 )  PTT:29.3 sec      Blood Culture: Organism --  Gram Stain Blood -- Gram Stain --  Specimen Source .Blood Blood-Peripheral  Culture-Blood --    Organism --  Gram Stain Blood -- Gram Stain --  Specimen Source Clean Catch Clean Catch (Midstream)  Culture-Blood --    ECHO: Technically difficult and limited study  Normal left ventricular internal dimensions and systolic function, estimated LVEF of 55%.   Right ventricle is not well-visualized  Sclerotic trileaflet aortic valve, without AI.   Trace physiologic MR and TR    EKG: EKG at  on 10/2 showed atrial fibrillation with RVR at 128bpm and subsequent EKG was SR with 1st deg AV block    RADIOLOGY:   CT chest, CT abd/pelvis: Cholelithiasis with gallbladder wall thickening and pericholecystic edema, which extends to the right paracolic gutter. Findings are suspicious for cholecystitis. No colonic wall thickening to suggest diverticulitis. Correlate with patient's symptomatology and right upper quadrant ultrasound.    CXR: No focal consolidation seen in the lungs.   [facility]         Warren Posada, Abelardo Amos, Lucina, Kelvin Travis        [phone] (office)    Reason for Consult: Afib     Interval HPI: Patient seen and examined at bedside. Pt with baseline dementia and only oriented to self. Daughter, Esperanza, contacted for further history. Pt follows with Dr. Kirkland outpatient and had a history of afib during previous hospitalization at [facility] in 3/2020 for which he was loaded with amiodarone, but was not discharged on any medication as he converted to NSR and remained in NSR for the remainder of his hospitalization.       HPI:  89 y/o M with PMHx of dementia, paroxsymal atrial fibrillation (not on AC), hx of CLL, HLD, osteoarthritis presented to  ED with an acute episode of rigors and unresponsiveness. He had a Tmax of 101.8 in the ED. Was found to have acute cholecystitis on imaging. Was in shock state and maintained on IV fluids, zosyn and midodrine with possible plan for IV vasopressors. Was seen by neurology Dr. Blair for seizure like activity - possibly rigors in setting of fever. Surgery Dr. Uribe was also following for cholecystitis and felt as though [facility] would be a better option given capability for IR intervention and a formal ICU. Will be getting a HIDA scan while here. Of note patient was under hospice care in the past, but now changed to full code and with aggressive management. Discussed with daughter (Esperanza) and son (Sohail) over telephone - they state that he is NOT DNR/DNI.    Vital Signs ([facility] ICU)  T(C Max: 98.3 HR: 69 BP: 101/57 RR: 14 SpO2: 97%  EKG at  on 10/2 showed atrial fibrillation with RVR at 128bpm and subsequent EKG was SR with 1st deg AV block  CT CAP: Cholelithiasis with gallbladder wall thickening and pericholecystic edema, which extends to the right paracolic gutter. Findings are suspicious for cholecystitis. No colonic wall thickening to suggest diverticulitis. Correlate with patient's symptomatology and right upper quadrant ultrasound.  US RUQ: Cholelithiasis with associated gallbladder wall edema which can be seen in acute cholecystitis. There is no sonographic Burr sign. Consider further evaluation with HIDA scan to assess for acute cholecystitis. 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    History of Arthroscopy of Knee  bilateral    Status Post Arthroscopy  right shoulder    S/P Tonsillectomy  childhood        SOCIAL HISTORY: No active tobacco, alcohol or illicit drug use    FAMILY HISTORY:  FH: Alzheimer&#x27;s disease (Mother)        Home Medications:  Haldol 2 mg oral tablet: 1 tab(s) orally, As Needed (rarely) (03 Oct 2021 21:33)  pantoprazole 40 mg oral delayed release tablet: 1 tab(s) orally 2 times a day (03 Oct 2021 21:33)  risperiDONE 2 mg oral tablet: 1 tab(s) orally once a day (at bedtime) (03 Oct 2021 21:33)  senna oral tablet: 1-2 tab(s) orally once a day (03 Oct 2021 21:33)      MEDICATIONS  (STANDING):  dextrose 5% + sodium chloride 0.9%. 1000 milliLiter(s) (100 mL/Hr) IV Continuous <Continuous>  pantoprazole  Injectable 40 milliGRAM(s) IV Push daily  piperacillin/tazobactam IVPB.. 3.375 Gram(s) IV Intermittent every 8 hours  risperiDONE   Tablet 2 milliGRAM(s) Oral at bedtime    MEDICATIONS  (PRN):      Allergies    vancomycin (Nephrotoxicity)    Intolerances        REVIEW OF SYSTEMS: Negative except as per HPI.    VITAL SIGNS:   Vital Signs Last 24 Hrs  T(C): 35.9 (04 Oct 2021 07:25), Max: 36.8 (03 Oct 2021 16:00)  T(F): 96.6 (04 Oct 2021 07:25), Max: 98.3 (03 Oct 2021 16:00)  HR: 80 (04 Oct 2021 11:00) (65 - 89)  BP: 117/58 (04 Oct 2021 11:00) (84/51 - 124/57)  BP(mean): 78 (04 Oct 2021 11:00) (61 - 99)  RR: 13 (04 Oct 2021 11:00) (12 - 26)  SpO2: 100% (04 Oct 2021 11:00) (94% - 100%)    I&O's Summary    03 Oct 2021 07:01  -  04 Oct 2021 07:00  --------------------------------------------------------  IN: 200 mL / OUT: 900 mL / NET: -700 mL    04 Oct 2021 07:01  -  04 Oct 2021 13:01  --------------------------------------------------------  IN: 400 mL / OUT: 400 mL / NET: 0 mL        PHYSICAL EXAM:  Constitutional: NAD, well-developed  HEENT NC/AT, moist mucous membranes  Pulmonary: Non-labored, breath sounds are clear bilaterally, no wheezing, rales or rhonchi  Cardiovascular: +S1, S2, irregularly irregular, no murmur  Gastrointestinal: Soft, nontender, nondistended, normoactive bowel sounds  Extremities: No peripheral edema   Neurological: Alert, strength and sensitivity are grossly intact  Skin: No obvious lesions/rashes  Psych: Mood & affect appropriate    LABS: All Labs Reviewed:                        9.8    5.23  )-----------( 106      ( 04 Oct 2021 09:21 )             30.7                         9.4    5.35  )-----------( 100      ( 04 Oct 2021 05:35 )             29.0                         9.1    6.83  )-----------( 81       ( 03 Oct 2021 07:05 )             27.8     04 Oct 2021 09:21    143    |  112    |  17     ----------------------------<  96     3.8     |  26     |  1.20   04 Oct 2021 05:35    143    |  111    |  17     ----------------------------<  80     3.7     |  24     |  1.20   03 Oct 2021 07:05    137    |  107    |  24     ----------------------------<  84     3.7     |  24     |  1.36     Ca    8.1        04 Oct 2021 09:21  Ca    7.7        04 Oct 2021 05:35  Ca    8.0        03 Oct 2021 07:05  Phos  2.6       04 Oct 2021 05:35  Phos  3.0       03 Oct 2021 07:05  Phos  3.3       02 Oct 2021 14:06  Mg     1.8       04 Oct 2021 05:35  Mg     1.8       03 Oct 2021 07:05  Mg     3.4       02 Oct 2021 14:06    TPro  5.3    /  Alb  2.3    /  TBili  0.7    /  DBili  .20    /  AST  16     /  ALT  9      /  AlkPhos  42     04 Oct 2021 09:21  TPro  5.1    /  Alb  2.1    /  TBili  0.5    /  DBili  x      /  AST  10     /  ALT  8      /  AlkPhos  34     04 Oct 2021 05:35  TPro  4.7    /  Alb  2.3    /  TBili  0.7    /  DBili  x      /  AST  12     /  ALT  <10    /  AlkPhos  37     03 Oct 2021 07:05    PT/INR - ( 02 Oct 2021 14:06 )   PT: 16.5 sec;   INR: 1.38 ratio         PTT - ( 02 Oct 2021 14:06 )  PTT:29.3 sec      Blood Culture: Organism --  Gram Stain Blood -- Gram Stain --  Specimen Source .Blood Blood-Peripheral  Culture-Blood --    Organism --  Gram Stain Blood -- Gram Stain --  Specimen Source Clean Catch Clean Catch (Midstream)  Culture-Blood --    ECHO: Technically difficult and limited study  Normal left ventricular internal dimensions and systolic function, estimated LVEF of 55%.   Right ventricle is not well-visualized  Sclerotic trileaflet aortic valve, without AI.   Trace physiologic MR and TR    EKG: EKG at  on 10/2 showed atrial fibrillation with RVR at 128bpm and subsequent EKG was SR with 1st deg AV block    RADIOLOGY:   CT chest, CT abd/pelvis: Cholelithiasis with gallbladder wall thickening and pericholecystic edema, which extends to the right paracolic gutter. Findings are suspicious for cholecystitis. No colonic wall thickening to suggest diverticulitis. Correlate with patient's symptomatology and right upper quadrant ultrasound.    CXR: No focal consolidation seen in the lungs.

## 2021-10-05 ENCOUNTER — RX CHANGE (OUTPATIENT)
Age: 86
End: 2021-10-05

## 2021-10-05 DIAGNOSIS — N17.9 ACUTE KIDNEY FAILURE, UNSPECIFIED: ICD-10-CM

## 2021-10-05 DIAGNOSIS — D64.9 ANEMIA, UNSPECIFIED: ICD-10-CM

## 2021-10-05 DIAGNOSIS — F03.90 UNSPECIFIED DEMENTIA WITHOUT BEHAVIORAL DISTURBANCE: ICD-10-CM

## 2021-10-05 DIAGNOSIS — Z29.9 ENCOUNTER FOR PROPHYLACTIC MEASURES, UNSPECIFIED: ICD-10-CM

## 2021-10-05 DIAGNOSIS — I48.0 PAROXYSMAL ATRIAL FIBRILLATION: ICD-10-CM

## 2021-10-05 LAB
ALBUMIN SERPL ELPH-MCNC: 2.2 G/DL — LOW (ref 3.3–5)
ALP SERPL-CCNC: 41 U/L — SIGNIFICANT CHANGE UP (ref 40–120)
ALT FLD-CCNC: 9 U/L — LOW (ref 12–78)
ANION GAP SERPL CALC-SCNC: 5 MMOL/L — SIGNIFICANT CHANGE UP (ref 5–17)
APTT BLD: 26.8 SEC — LOW (ref 27.5–35.5)
AST SERPL-CCNC: 14 U/L — LOW (ref 15–37)
BASOPHILS # BLD AUTO: 0.04 K/UL — SIGNIFICANT CHANGE UP (ref 0–0.2)
BASOPHILS NFR BLD AUTO: 0.8 % — SIGNIFICANT CHANGE UP (ref 0–2)
BILIRUB SERPL-MCNC: 0.6 MG/DL — SIGNIFICANT CHANGE UP (ref 0.2–1.2)
BUN SERPL-MCNC: 12 MG/DL — SIGNIFICANT CHANGE UP (ref 7–23)
CALCIUM SERPL-MCNC: 7.6 MG/DL — LOW (ref 8.5–10.1)
CHLORIDE SERPL-SCNC: 112 MMOL/L — HIGH (ref 96–108)
CO2 SERPL-SCNC: 25 MMOL/L — SIGNIFICANT CHANGE UP (ref 22–31)
COVID-19 SPIKE DOMAIN AB INTERP: POSITIVE
COVID-19 SPIKE DOMAIN ANTIBODY RESULT: >250 U/ML — HIGH
CREAT SERPL-MCNC: 1.1 MG/DL — SIGNIFICANT CHANGE UP (ref 0.5–1.3)
EOSINOPHIL # BLD AUTO: 0.52 K/UL — HIGH (ref 0–0.5)
EOSINOPHIL NFR BLD AUTO: 10.3 % — HIGH (ref 0–6)
GLUCOSE SERPL-MCNC: 128 MG/DL — HIGH (ref 70–99)
HCT VFR BLD CALC: 29.4 % — LOW (ref 39–50)
HGB BLD-MCNC: 9.8 G/DL — LOW (ref 13–17)
IMM GRANULOCYTES NFR BLD AUTO: 0.6 % — SIGNIFICANT CHANGE UP (ref 0–1.5)
INR BLD: 1.38 RATIO — HIGH (ref 0.88–1.16)
LYMPHOCYTES # BLD AUTO: 1.19 K/UL — SIGNIFICANT CHANGE UP (ref 1–3.3)
LYMPHOCYTES # BLD AUTO: 23.5 % — SIGNIFICANT CHANGE UP (ref 13–44)
MAGNESIUM SERPL-MCNC: 1.8 MG/DL — SIGNIFICANT CHANGE UP (ref 1.6–2.6)
MCHC RBC-ENTMCNC: 31.6 PG — SIGNIFICANT CHANGE UP (ref 27–34)
MCHC RBC-ENTMCNC: 33.3 GM/DL — SIGNIFICANT CHANGE UP (ref 32–36)
MCV RBC AUTO: 94.8 FL — SIGNIFICANT CHANGE UP (ref 80–100)
MONOCYTES # BLD AUTO: 0.38 K/UL — SIGNIFICANT CHANGE UP (ref 0–0.9)
MONOCYTES NFR BLD AUTO: 7.5 % — SIGNIFICANT CHANGE UP (ref 2–14)
NEUTROPHILS # BLD AUTO: 2.91 K/UL — SIGNIFICANT CHANGE UP (ref 1.8–7.4)
NEUTROPHILS NFR BLD AUTO: 57.3 % — SIGNIFICANT CHANGE UP (ref 43–77)
NRBC # BLD: 0 /100 WBCS — SIGNIFICANT CHANGE UP (ref 0–0)
PHOSPHATE SERPL-MCNC: 2.6 MG/DL — SIGNIFICANT CHANGE UP (ref 2.5–4.5)
PLATELET # BLD AUTO: 137 K/UL — LOW (ref 150–400)
POTASSIUM SERPL-MCNC: 3.5 MMOL/L — SIGNIFICANT CHANGE UP (ref 3.5–5.3)
POTASSIUM SERPL-SCNC: 3.5 MMOL/L — SIGNIFICANT CHANGE UP (ref 3.5–5.3)
PROT SERPL-MCNC: 5.1 G/DL — LOW (ref 6–8.3)
PROTHROM AB SERPL-ACNC: 15.9 SEC — HIGH (ref 10.6–13.6)
RBC # BLD: 3.1 M/UL — LOW (ref 4.2–5.8)
RBC # FLD: 12.8 % — SIGNIFICANT CHANGE UP (ref 10.3–14.5)
SARS-COV-2 IGG+IGM SERPL QL IA: >250 U/ML — HIGH
SARS-COV-2 IGG+IGM SERPL QL IA: POSITIVE
SODIUM SERPL-SCNC: 142 MMOL/L — SIGNIFICANT CHANGE UP (ref 135–145)
WBC # BLD: 5.07 K/UL — SIGNIFICANT CHANGE UP (ref 3.8–10.5)
WBC # FLD AUTO: 5.07 K/UL — SIGNIFICANT CHANGE UP (ref 3.8–10.5)

## 2021-10-05 PROCEDURE — 99291 CRITICAL CARE FIRST HOUR: CPT

## 2021-10-05 PROCEDURE — 99233 SBSQ HOSP IP/OBS HIGH 50: CPT

## 2021-10-05 RX ORDER — AMIODARONE HYDROCHLORIDE 400 MG/1
150 TABLET ORAL ONCE
Refills: 0 | Status: COMPLETED | OUTPATIENT
Start: 2021-10-05 | End: 2021-10-05

## 2021-10-05 RX ORDER — POTASSIUM CHLORIDE 20 MEQ
40 PACKET (EA) ORAL ONCE
Refills: 0 | Status: COMPLETED | OUTPATIENT
Start: 2021-10-05 | End: 2021-10-05

## 2021-10-05 RX ORDER — AMIODARONE HYDROCHLORIDE 400 MG/1
TABLET ORAL
Refills: 0 | Status: DISCONTINUED | OUTPATIENT
Start: 2021-10-05 | End: 2021-10-07

## 2021-10-05 RX ORDER — SODIUM CHLORIDE 9 MG/ML
250 INJECTION INTRAMUSCULAR; INTRAVENOUS; SUBCUTANEOUS ONCE
Refills: 0 | Status: COMPLETED | OUTPATIENT
Start: 2021-10-05 | End: 2021-10-05

## 2021-10-05 RX ORDER — AMIODARONE HYDROCHLORIDE 400 MG/1
400 TABLET ORAL EVERY 8 HOURS
Refills: 0 | Status: DISCONTINUED | OUTPATIENT
Start: 2021-10-05 | End: 2021-10-07

## 2021-10-05 RX ADMIN — PANTOPRAZOLE SODIUM 40 MILLIGRAM(S): 20 TABLET, DELAYED RELEASE ORAL at 12:17

## 2021-10-05 RX ADMIN — RISPERIDONE 2 MILLIGRAM(S): 4 TABLET ORAL at 21:00

## 2021-10-05 RX ADMIN — SODIUM CHLORIDE 250 MILLILITER(S): 9 INJECTION INTRAMUSCULAR; INTRAVENOUS; SUBCUTANEOUS at 14:18

## 2021-10-05 RX ADMIN — AMIODARONE HYDROCHLORIDE 400 MILLIGRAM(S): 400 TABLET ORAL at 09:00

## 2021-10-05 RX ADMIN — AMIODARONE HYDROCHLORIDE 618 MILLIGRAM(S): 400 TABLET ORAL at 08:20

## 2021-10-05 RX ADMIN — AMIODARONE HYDROCHLORIDE 400 MILLIGRAM(S): 400 TABLET ORAL at 21:00

## 2021-10-05 RX ADMIN — AMIODARONE HYDROCHLORIDE 400 MILLIGRAM(S): 400 TABLET ORAL at 16:07

## 2021-10-05 RX ADMIN — Medication 2.5 MILLIGRAM(S): at 06:04

## 2021-10-05 RX ADMIN — PIPERACILLIN AND TAZOBACTAM 25 GRAM(S): 4; .5 INJECTION, POWDER, LYOPHILIZED, FOR SOLUTION INTRAVENOUS at 16:01

## 2021-10-05 RX ADMIN — PIPERACILLIN AND TAZOBACTAM 25 GRAM(S): 4; .5 INJECTION, POWDER, LYOPHILIZED, FOR SOLUTION INTRAVENOUS at 21:00

## 2021-10-05 RX ADMIN — PIPERACILLIN AND TAZOBACTAM 25 GRAM(S): 4; .5 INJECTION, POWDER, LYOPHILIZED, FOR SOLUTION INTRAVENOUS at 05:15

## 2021-10-05 RX ADMIN — Medication 40 MILLIEQUIVALENT(S): at 09:00

## 2021-10-05 NOTE — PROGRESS NOTE ADULT - SUBJECTIVE AND OBJECTIVE BOX
Patient is a 89y old  Male who presents with a chief complaint of sepsis/cholecystitis (05 Oct 2021 10:22)      INTERVAL HPI/OVERNIGHT EVENTS:  Patient seen and examined in ICU. Resting comfortably in bed. No complaints this morning. He denies any abdominal pain, nausea, or vomiting. Denies any chest pain, SOB, or palpitations. HIDA c/w acute cholecystitis. Amio loading underway.    MEDICATIONS  (STANDING):  aMIOdarone    Tablet   Oral   aMIOdarone    Tablet 400 milliGRAM(s) Oral every 8 hours  dextrose 5% + sodium chloride 0.9%. 1000 milliLiter(s) (100 mL/Hr) IV Continuous <Continuous>  pantoprazole  Injectable 40 milliGRAM(s) IV Push daily  piperacillin/tazobactam IVPB.. 3.375 Gram(s) IV Intermittent every 8 hours  risperiDONE   Tablet 2 milliGRAM(s) Oral at bedtime    MEDICATIONS  (PRN):  metoprolol tartrate Injectable 2.5 milliGRAM(s) IV Push every 6 hours PRN HR > 120      Allergies    vancomycin (Nephrotoxicity)    Intolerances        REVIEW OF SYSTEMS:  as per HPI    Vital Signs Last 24 Hrs  T(C): 35.8 (05 Oct 2021 07:35), Max: 36.4 (04 Oct 2021 23:57)  T(F): 96.4 (05 Oct 2021 07:35), Max: 97.5 (04 Oct 2021 23:57)  HR: 84 (05 Oct 2021 09:00) (80 - 180)  BP: 87/55 (05 Oct 2021 09:00) (87/55 - 144/70)  BP(mean): 65 (05 Oct 2021 09:00) (65 - 101)  RR: 21 (05 Oct 2021 09:00) (10 - 28)  SpO2: 100% (05 Oct 2021 08:00) (99% - 100%)    PHYSICAL EXAM:  GENERAL: pleasantly demented elderly gentleman in NAD  HEENT:  anicteric, moist mucous membranes  CHEST/LUNG:  CTA b/l, no rales, wheezes, or rhonchi  HEART: irregular  ABDOMEN:  BS+, soft, nontender, nondistended  EXTREMITIES: no edema, cyanosis, or calf tenderness  NERVOUS SYSTEM: answers simple questions, and follows simple commands, no focal deficits noted.    LABS:                        9.8    5.07  )-----------( 137      ( 05 Oct 2021 06:22 )             29.4     CBC Full  -  ( 05 Oct 2021 06:22 )  WBC Count : 5.07 K/uL  Hemoglobin : 9.8 g/dL  Hematocrit : 29.4 %  Platelet Count - Automated : 137 K/uL  Mean Cell Volume : 94.8 fl  Mean Cell Hemoglobin : 31.6 pg  Mean Cell Hemoglobin Concentration : 33.3 gm/dL  Auto Neutrophil # : 2.91 K/uL  Auto Lymphocyte # : 1.19 K/uL  Auto Monocyte # : 0.38 K/uL  Auto Eosinophil # : 0.52 K/uL  Auto Basophil # : 0.04 K/uL  Auto Neutrophil % : 57.3 %  Auto Lymphocyte % : 23.5 %  Auto Monocyte % : 7.5 %  Auto Eosinophil % : 10.3 %  Auto Basophil % : 0.8 %    05 Oct 2021 06:22    142    |  112    |  12     ----------------------------<  128    3.5     |  25     |  1.10     Ca    7.6        05 Oct 2021 06:22  Phos  2.6       05 Oct 2021 06:22  Mg     1.8       05 Oct 2021 06:22    TPro  5.1    /  Alb  2.2    /  TBili  0.6    /  DBili  x      /  AST  14     /  ALT  9      /  AlkPhos  41     05 Oct 2021 06:22    PT/INR - ( 05 Oct 2021 06:22 )   PT: 15.9 sec;   INR: 1.38 ratio         PTT - ( 05 Oct 2021 06:22 )  PTT:26.8 sec    Culture - Blood (collected 10-02-21 @ 22:50)  Source: .Blood Blood-Peripheral  Preliminary Report (10-03-21 @ 23:01):    No growth to date.    Culture - Blood (collected 10-02-21 @ 22:50)  Source: .Blood Blood-Peripheral  Preliminary Report (10-03-21 @ 23:01):    No growth to date.    Culture - Urine (collected 10-02-21 @ 22:49)  Source: Clean Catch Clean Catch (Midstream)  Final Report (10-03-21 @ 20:27):    No growth        RADIOLOGY & ADDITIONAL TESTS:    Personally reviewed.     Consultant(s) Notes Reviewed:  [x] YES  [ ] NO

## 2021-10-05 NOTE — PROGRESS NOTE ADULT - SUBJECTIVE AND OBJECTIVE BOX
[facility] - Warren Posada, Dandre, Abelardo, Lucina, Kelvin Travis  Office Number:  [phone]    Patient resting comfortably in bed in NAD.  Converted to rapid AF overnight.  Remains rapid on my exam.  Patient is mildly agitated, and trying to climb out of bed.  He is unable to provide any meaningful interval history.    F/U for:  AF    Telemetry: Af with RVR     MEDICATIONS  (STANDING):  aMIOdarone    Tablet   Oral   aMIOdarone    Tablet 400 milliGRAM(s) Oral every 8 hours  dextrose 5% + sodium chloride 0.9%. 1000 milliLiter(s) (100 mL/Hr) IV Continuous <Continuous>  pantoprazole  Injectable 40 milliGRAM(s) IV Push daily  piperacillin/tazobactam IVPB.. 3.375 Gram(s) IV Intermittent every 8 hours  risperiDONE   Tablet 2 milliGRAM(s) Oral at bedtime    MEDICATIONS  (PRN):  metoprolol tartrate Injectable 2.5 milliGRAM(s) IV Push every 6 hours PRN HR > 120      Allergies    vancomycin (Nephrotoxicity)    Intolerances        Vital Signs Last 24 Hrs  T(C): 35.8 (05 Oct 2021 07:35), Max: 36.4 (04 Oct 2021 23:57)  T(F): 96.4 (05 Oct 2021 07:35), Max: 97.5 (04 Oct 2021 23:57)  HR: 84 (05 Oct 2021 09:00) (80 - 180)  BP: 87/55 (05 Oct 2021 09:00) (87/55 - 144/70)  BP(mean): 65 (05 Oct 2021 09:00) (65 - 101)  RR: 21 (05 Oct 2021 09:00) (10 - 28)  SpO2: 100% (05 Oct 2021 08:00) (99% - 100%)    I&O's Summary    04 Oct 2021 07:01  -  05 Oct 2021 07:00  --------------------------------------------------------  IN: 2725 mL / OUT: 1915 mL / NET: 810 mL        ON EXAM:    Constitutional: NAD, well-developed  HEENT NC/AT, moist mucous membranes  Pulmonary: Non-labored, breath sounds are clear bilaterally, no wheezing, rales or rhonchi  Cardiovascular: +S1, S2, irregularly irregular, no murmur. tachycardia  Gastrointestinal: Soft, nontender, nondistended, normoactive bowel sounds  Extremities: No peripheral edema   Neurological: Alert, strength and sensitivity are grossly intact  Skin: No obvious lesions/rashes  Psych: Unable to properly assess  LABS: All Labs Reviewed:                        9.8    5.07  )-----------( 137      ( 05 Oct 2021 06:22 )             29.4                         9.8    5.23  )-----------( 106      ( 04 Oct 2021 09:21 )             30.7                         9.4    5.35  )-----------( 100      ( 04 Oct 2021 05:35 )             29.0     05 Oct 2021 06:22    142    |  112    |  12     ----------------------------<  128    3.5     |  25     |  1.10   04 Oct 2021 09:21    143    |  112    |  17     ----------------------------<  96     3.8     |  26     |  1.20   04 Oct 2021 05:35    143    |  111    |  17     ----------------------------<  80     3.7     |  24     |  1.20     Ca    7.6        05 Oct 2021 06:22  Ca    8.1        04 Oct 2021 09:21  Ca    7.7        04 Oct 2021 05:35  Phos  2.6       05 Oct 2021 06:22  Phos  2.6       04 Oct 2021 05:35  Phos  3.0       03 Oct 2021 07:05  Mg     1.8       05 Oct 2021 06:22  Mg     1.8       04 Oct 2021 05:35  Mg     1.8       03 Oct 2021 07:05    TPro  5.1    /  Alb  2.2    /  TBili  0.6    /  DBili  x      /  AST  14     /  ALT  9      /  AlkPhos  41     05 Oct 2021 06:22  TPro  5.3    /  Alb  2.3    /  TBili  0.7    /  DBili  .20    /  AST  16     /  ALT  9      /  AlkPhos  42     04 Oct 2021 09:21  TPro  5.1    /  Alb  2.1    /  TBili  0.5    /  DBili  x      /  AST  10     /  ALT  8      /  AlkPhos  34     04 Oct 2021 05:35    PT/INR - ( 05 Oct 2021 06:22 )   PT: 15.9 sec;   INR: 1.38 ratio         PTT - ( 05 Oct 2021 06:22 )  PTT:26.8 sec      Blood Culture: Organism --  Gram Stain Blood -- Gram Stain --  Specimen Source .Blood Blood-Peripheral  Culture-Blood --    Organism --  Gram Stain Blood -- Gram Stain --  Specimen Source Clean Catch Clean Catch (Midstream)  Culture-Blood --

## 2021-10-05 NOTE — PROGRESS NOTE ADULT - ASSESSMENT
As above in PA notation.  Patient personally seen and examined.  Mr. Winters denies abdominal pain.  Ate well this pm per RN.  Vitals non-suggestive overall throughout day.  Abdomen soft and non tender on my exam.  Labs reassuring.  Clinically, improving overall.  Surgically, stable at present.  To continue current supportive care.   If pain presents, if tenderness elicited, if WBCs climb with diet, will drain, otherwise given such a clinically reassuring picture, will hold off on IR drainage.

## 2021-10-05 NOTE — PROGRESS NOTE ADULT - SUBJECTIVE AND OBJECTIVE BOX
SUBJECTIVE: Patient seen and examined at bedside. Patient states "I feel fine". Denies fever, chills, chest pain, SOB, abdominal pain, n/v/d.     Vital Signs Last 24 Hrs  T(C): 35.8 (05 Oct 2021 07:35), Max: 36.4 (04 Oct 2021 23:57)  T(F): 96.4 (05 Oct 2021 07:35), Max: 97.5 (04 Oct 2021 23:57)  HR: 84 (05 Oct 2021 09:00) (80 - 180)  BP: 87/55 (05 Oct 2021 09:00) (87/55 - 144/70)  BP(mean): 65 (05 Oct 2021 09:00) (65 - 101)  RR: 21 (05 Oct 2021 09:00) (10 - 28)  SpO2: 100% (05 Oct 2021 08:00) (99% - 100%)    PHYSICAL EXAM:  GENERAL: No acute distress, well-developed  HEAD:  Atraumatic, Normocephalic  ABDOMEN: Soft, non-tender, non-distended  NEUROLOGY: A&O x 3, no focal deficits    I&O's Summary    04 Oct 2021 07:01  -  05 Oct 2021 07:00  --------------------------------------------------------  IN: 2725 mL / OUT: 1915 mL / NET: 810 mL    MEDICATIONS  (STANDING):  aMIOdarone    Tablet   Oral   aMIOdarone    Tablet 400 milliGRAM(s) Oral every 8 hours  dextrose 5% + sodium chloride 0.9%. 1000 milliLiter(s) (100 mL/Hr) IV Continuous <Continuous>  pantoprazole  Injectable 40 milliGRAM(s) IV Push daily  piperacillin/tazobactam IVPB.. 3.375 Gram(s) IV Intermittent every 8 hours  risperiDONE   Tablet 2 milliGRAM(s) Oral at bedtime    MEDICATIONS  (PRN):  metoprolol tartrate Injectable 2.5 milliGRAM(s) IV Push every 6 hours PRN HR > 120    LABS:                        9.8    5.07  )-----------( 137      ( 05 Oct 2021 06:22 )             29.4     10-05    142  |  112<H>  |  12  ----------------------------<  128<H>  3.5   |  25  |  1.10    Ca    7.6<L>      05 Oct 2021 06:22  Phos  2.6     10-05  Mg     1.8     10-05    TPro  5.1<L>  /  Alb  2.2<L>  /  TBili  0.6  /  DBili  x   /  AST  14<L>  /  ALT  9<L>  /  AlkPhos  41  10-05    PT/INR - ( 05 Oct 2021 06:22 )   PT: 15.9 sec;   INR: 1.38 ratio      PTT - ( 05 Oct 2021 06:22 )  PTT:26.8 sec    RADIOLOGY & ADDITIONAL STUDIES:  < from: [facility] Hepatobiliary Imaging (10.04.21 @ 14:33) >  FINDINGS: There is prompt, homogeneous uptake of radiotracer by the hepatocytes. Activity is first seen in the bowel at 15 minutes. The gallbladder is not visualized at any time during the study, despite administration of morphine. There is good clearance of activity from the liver at the end of the study.    IMPRESSION: Abnormal morphine-augmented hepatobiliary scan.    Findings consistent with acute cholecystitis.       SUBJECTIVE: Patient seen and examined at bedside. Patient states "I feel fine". Denies fever, chills, chest pain, SOB, abdominal pain, n/v/d.       Vital Signs Last 24 Hrs  T(C): 35.8 (05 Oct 2021 07:35), Max: 36.4 (04 Oct 2021 23:57)  T(F): 96.4 (05 Oct 2021 07:35), Max: 97.5 (04 Oct 2021 23:57)  HR: 84 (05 Oct 2021 09:00) (80 - 180)  BP: 87/55 (05 Oct 2021 09:00) (87/55 - 144/70)  BP(mean): 65 (05 Oct 2021 09:00) (65 - 101)  RR: 21 (05 Oct 2021 09:00) (10 - 28)  SpO2: 100% (05 Oct 2021 08:00) (99% - 100%)      PHYSICAL EXAM:  GENERAL: No acute distress, well-developed  HEAD:  Atraumatic, Normocephalic  ABDOMEN: Soft, non-tender, non-distended  NEUROLOGY: A&O x 3, no focal deficits      I&O's Summary    04 Oct 2021 07:01  -  05 Oct 2021 07:00  --------------------------------------------------------  IN: 2725 mL / OUT: 1915 mL / NET: 810 mL    MEDICATIONS  (STANDING):  aMIOdarone    Tablet   Oral   aMIOdarone    Tablet 400 milliGRAM(s) Oral every 8 hours  dextrose 5% + sodium chloride 0.9%. 1000 milliLiter(s) (100 mL/Hr) IV Continuous <Continuous>  pantoprazole  Injectable 40 milliGRAM(s) IV Push daily  piperacillin/tazobactam IVPB.. 3.375 Gram(s) IV Intermittent every 8 hours  risperiDONE   Tablet 2 milliGRAM(s) Oral at bedtime      MEDICATIONS  (PRN):  metoprolol tartrate Injectable 2.5 milliGRAM(s) IV Push every 6 hours PRN HR > 120      LABS:                        9.8    5.07  )-----------( 137      ( 05 Oct 2021 06:22 )             29.4     10-05    142  |  112<H>  |  12  ----------------------------<  128<H>  3.5   |  25  |  1.10    Ca    7.6<L>      05 Oct 2021 06:22  Phos  2.6     10-05  Mg     1.8     10-05    TPro  5.1<L>  /  Alb  2.2<L>  /  TBili  0.6  /  DBili  x   /  AST  14<L>  /  ALT  9<L>  /  AlkPhos  41  10-05    PT/INR - ( 05 Oct 2021 06:22 )   PT: 15.9 sec;   INR: 1.38 ratio      PTT - ( 05 Oct 2021 06:22 )  PTT:26.8 sec      RADIOLOGY & ADDITIONAL STUDIES:  < from: NM Hepatobiliary Imaging (10.04.21 @ 14:33) >  FINDINGS: There is prompt, homogeneous uptake of radiotracer by the hepatocytes. Activity is first seen in the bowel at 15 minutes. The gallbladder is not visualized at any time during the study, despite administration of morphine. There is good clearance of activity from the liver at the end of the study.    IMPRESSION: Abnormal morphine-augmented hepatobiliary scan.    Findings consistent with acute cholecystitis.

## 2021-10-05 NOTE — PROGRESS NOTE ADULT - SUBJECTIVE AND OBJECTIVE BOX
Patient is a 89y old  Male who presents with a chief complaint of sepsis/cholecystitis (05 Oct 2021 09:46)    24 hour events: Overnight, patient with AFib with RVR, highest recorded HR was 180. Patient given lopressor IVP 2.5mg x1 and started on amiodarone. Patient seen and examined at bedside. Is AAOx1 (to self only) but reports he feels well. Denies any pain. Has not had a BM in 2 days, states this is typical for him.     REVIEW OF SYSTEMS  Limited 2/2 dementia  Denies cp, sob, abdominal pain, nausea.     T(F): 96.4 (10-05-21 @ 07:35), Max: 97.5 (10-04-21 @ 23:57)  HR: 84 (10-05-21 @ 09:00) (80 - 180)  BP: 87/55 (10-05-21 @ 09:00) (87/55 - 144/70)  RR: 21 (10-05-21 @ 09:00) (10 - 28)  SpO2: 100% (10-05-21 @ 08:00) (99% - 100%)  Wt(kg): --        I&O's Summary    10-04 @ 07:01  -  10-05 @ 07:00  --------------------------------------------------------  IN: 2725 mL / OUT: 1915 mL / NET: 810 mL      PHYSICAL EXAM  General: NAD  HEENT: NCAT, EOMI  PULM: CTA b/l, no rales, wheezes  CVS: Regular rate and rhythm, no murmurs, rubs, or gallops  ABD: Soft, nondistended, nonTTP, no guarding, normoactive bowel sounds, no masses  NEURO: AAO x1 (to person only), non-focal, sensation intact  EXT: No edema, nontender  SKIN: Warm and well perfused, no rashes noted.    MEDICATIONS  piperacillin/tazobactam IVPB.. IV Intermittent  aMIOdarone    Tablet Oral  aMIOdarone    Tablet Oral  metoprolol tartrate Injectable IV Push PRN  risperiDONE   Tablet Oral  pantoprazole  Injectable IV Push  dextrose 5% + sodium chloride 0.9%. IV Continuous                          9.8    5.07  )-----------( 137      ( 05 Oct 2021 06:22 )             29.4       10-05    142  |  112<H>  |  12  ----------------------------<  128<H>  3.5   |  25  |  1.10    Ca    7.6<L>      05 Oct 2021 06:22  Phos  2.6     10-05  Mg     1.8     10-05    TPro  5.1<L>  /  Alb  2.2<L>  /  TBili  0.6  /  DBili  x   /  AST  14<L>  /  ALT  9<L>  /  AlkPhos  41  10-05          PT/INR - ( 05 Oct 2021 06:22 )   PT: 15.9 sec;   INR: 1.38 ratio         PTT - ( 05 Oct 2021 06:22 )  PTT:26.8 sec    .Blood Blood-Peripheral   No growth to date. -- 10-02 @ 22:50  Clean Catch Clean Catch (Midstream)   No growth -- 10-02 @ 22:49      Rapid RVP Result: NotDetec (10-02 @ 13:38)    CENTRAL LINE: N       DATE INSERTED:              REMOVE: Y/N  SOLIS: Y                DATE INSERTED:              REMOVE: Y/N  A-LINE: N                   DATE INSERTED:              REMOVE: Y/N    GLOBAL ISSUE/BEST PRACTICE  Analgesia: none  Sedation: none  HOB elevation: yes  VTE prophylaxis: SCD  Glycemic control: none  Nutrition: NPO    CODE STATUS: FULL  GO discussion: Y

## 2021-10-05 NOTE — PROGRESS NOTE ADULT - ASSESSMENT
A/P 89 y/o M with PMHx of dementia, paroxsymal atrial fibrillation (not on AC), hx of CLL, HLD, osteoarthritis transfer from [facility] with severe sepsis 2/2 possible cholecystitis.     Neuro   -pt mentating at baseline, AAOx1  -Cont Risperdal and haldol prn for hx of dementia    CV  -shock resolved 2/2 IVF resuscitation. Not requiring vasopressors at this time to maintain MAP >65  -pAFib- RVR overnight, started on amio  -Not on AC in anticipation of possible IR; pt is a poor candidate for AC regardless 2/2 dementia   -Cardio following     Pulm   -tolerating RA    GI  -acute cholecystitis, HIDA positive  -given reassuring clinical status, being managed medically for now, may need perc jackelin tube pending course   -keep npo for now as per surgery, IVF while npo  -PPI for GI ppx   -Surg Dr Uribe following     Renal   -Cr improving, appears at baseline  -will cont IVF hydration   -monitor I/O's   -avoid nephrotoxic agents     ID   -cont zosyn   -BCx NGTD, Ucx negative  -ID consult Dr Kiran, f/u recs    Heme  -SCDs for DVT ppx  -Not on AC as above     Dispo: Stable for transfer to [facility].

## 2021-10-05 NOTE — PROGRESS NOTE ADULT - SUBJECTIVE AND OBJECTIVE BOX
CHARLY LU is a 89yMale , patient examined and chart reviewed.     INTERVAL HPI/ OVERNIGHT EVENTS:   Pt seen earlier. No events.  Afebrile. Lethargic.   BP stable.    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    ROS:  Unable to obtain due to : Dementia    ALLERGIES  vancomycin (Nephrotoxicity)      Current inpatient medications :    ANTIBIOTICS/RELEVANT:  piperacillin/tazobactam IVPB.. 3.375 Gram(s) IV Intermittent every 8 hours    MEDICATIONS  (STANDING):  aMIOdarone    Tablet   Oral   aMIOdarone    Tablet 400 milliGRAM(s) Oral every 8 hours  dextrose 5% + sodium chloride 0.9%. 1000 milliLiter(s) (100 mL/Hr) IV Continuous <Continuous>  pantoprazole  Injectable 40 milliGRAM(s) IV Push daily  risperiDONE   Tablet 2 milliGRAM(s) Oral at bedtime    MEDICATIONS  (PRN):  metoprolol tartrate Injectable 2.5 milliGRAM(s) IV Push every 6 hours PRN HR > 120      Objective:  Vital Signs Last 24 Hrs  T(C): 36.4 (05 Oct 2021 16:09), Max: 36.6 (05 Oct 2021 13:55)  T(F): 97.6 (05 Oct 2021 16:09), Max: 97.9 (05 Oct 2021 13:55)  HR: 89 (05 Oct 2021 16:09) (80 - 180)  BP: 100/51 (05 Oct 2021 16:09) (87/55 - 127/65)  BP(mean): 65 (05 Oct 2021 09:00) (65 - 101)  RR: 18 (05 Oct 2021 16:09) (15 - 28)  SpO2: 94% (05 Oct 2021 16:09) (94% - 100%)      Physical Exam:  General: weak  no acute distress  Neck: supple, trachea midline  Lungs: clear, no wheeze/rhonchi  Cardiovascular: regular rate and rhythm, S1 S2  Abdomen: soft, nontender,  bowel sounds normal  Neurological: Dementia  Extremities: no edema      LABS:                        9.8    5.07  )-----------( 137      ( 05 Oct 2021 06:22 )             29.4   10-05    142  |  112<H>  |  12  ----------------------------<  128<H>  3.5   |  25  |  1.10    Ca    7.6<L>      05 Oct 2021 06:22  Phos  2.6     10-05  Mg     1.8     10-05    TPro  5.1<L>  /  Alb  2.2<L>  /  TBili  0.6  /  DBili  x   /  AST  14<L>  /  ALT  9<L>  /  AlkPhos  41  10-05        MICROBIOLOGY:    Culture - Blood (collected 02 Oct 2021 22:50)  Source: .Blood Blood-Peripheral  Preliminary Report (03 Oct 2021 23:01):    No growth to date.    Culture - Blood (collected 02 Oct 2021 22:50)  Source: .Blood Blood-Peripheral  Preliminary Report (03 Oct 2021 23:01):    No growth to date.    Culture - Urine (collected 02 Oct 2021 22:49)  Source: Clean Catch Clean Catch (Midstream)  Final Report (03 Oct 2021 20:27):    No growth    RADIOLOGY & ADDITIONAL STUDIES:  EXAM:  NM HEPATOBILIARY IMG                            PROCEDURE DATE:  10/04/2021          INTERPRETATION:  CLINICAL STATEMENT: 89-year-old male with cholelithiasis, fever, and gallbladder wall thickening and pericholecystic fluid on CT.    RADIOPHARMACEUTICAL: 3.15 mCi and 2.95 mCi Tc-99m-Mebrofenin, I.V.; 2 doses    TECHNIQUE:  Dynamic images of the anterior abdomen were obtained for 1 hour following injection of radiotracer. Morphine 2mg I.V. and a second dose of radiotracer were administered at 1 hour. Dynamic imaging was continued for 1 hour followed by static images of the abdomen in the right lateral and right anterior oblique views immediately thereafter.    FINDINGS: There is prompt, homogeneous uptake of radiotracer by the hepatocytes. Activity is first seen in the bowel at 15 minutes. The gallbladder is not visualized at any time during the study, despite administration of morphine. There is good clearance of activity from the liver at the end of the study.    IMPRESSION: Abnormal morphine-augmented hepatobiliary scan.    Findings consistent with acute cholecystitis.    EXAM:  CT ABDOMEN AND PELVIS                          EXAM:  CT CHEST                                  PROCEDURE DATE:  10/02/2021          INTERPRETATION:  CLINICAL INFORMATION: Fever, history of left sided empyema    COMPARISON: CT chest 3/9/2020, pet/CT 6/19/2019.    CONTRAST/COMPLICATIONS:  IV Contrast: NONE  Oral Contrast: NONE  Complications: None reported at time of study completion    PROCEDURE:  CT of the Chest, Abdomen and Pelvis was performed.  Sagittal and coronal reformats were performed.    FINDINGS: Evaluation of the solid organs and vasculature is limited in the absence of intravenous contrast.  CHEST:  LUNGS AND LARGE AIRWAYS: Patent central airways. No pulmonary nodules.  PLEURA: No pleural effusion. Right pleural calcifications.  VESSELS: Aortic, aortic valvular and coronary artery calcifications.  HEART: Heart size is normal. No pericardial effusion.  MEDIASTINUM AND KAELYN: No lymphadenopathy.  CHEST WALL AND LOWER NECK: Within normal limits.    ABDOMEN AND PELVIS:  LIVER: Within normal limits.  BILE DUCTS: Normal caliber.  GALLBLADDER: Large gallbladder stone again noted. There is suggestion of gallbladder wall thickening, and pericholecystic fluid tracking inferiorly.  SPLEEN: Within normal limits.  PANCREAS: Within normal limits.  ADRENALS: Within normal limits.  KIDNEYS/URETERS: Nonobstructing 4 mm left renal calculus. Small right renal cyst. No hydronephrosis.    BLADDER: Within normal limits.  REPRODUCTIVE ORGANS: Prostate is enlarged.    BOWEL: Colonic,including right-sided, diverticulosis. Trace free fluid in the right paracolic gutter adjacent to diverticulosis may be due to gallbladder inflammation. No colonic wall to suggest acute diverticulitis. No bowel obstruction. Appendix is normal.  PERITONEUM: Trace pelvic free fluid. No pneumoperitoneum  VESSELS: Atherosclerotic changes.  RETROPERITONEUM/LYMPH NODES: No lymphadenopathy.  ABDOMINAL WALL: Within normal limits.  BONES: Diffuse degenerative changes. Status post posterior lumbar laminectomies. Mixed lucent and sclerotic lesion in the right mid humeral shaft is of unknown etiology, but unchanged since PET of 6/19/2019.    IMPRESSION:  Cholelithiasis with gallbladder wall thickening and pericholecystic edema, which extends to the rightparacolic gutter. Findings are suspicious for cholecystitis. No colonic wall thickening to suggest diverticulitis. Correlate with patient's symptomatology and right upper quadrant ultrasound.      Assessment :   89M with dementia, hx of CLL, HLD, osteoarthritis admitted with sepsis with shock sec Acute cholecystitis   Did not need pressors BP stable  Afib   HIDA +  Conservative management    Plan :   Cont Zosyn  Trend temps and cbc  Fu cultures- NGTD  Surgery d/w family, would prefer medical management at this time, if worsens will need perc jackelin vs jackelin  Strict asp precautions    Continue with present regiment.  Appropriate use of antibiotics and adverse effects reviewed.    35 minutes were spent in direct patient care reviewing notes, medications ,labs data/ imaging , discussion with multidisciplinary team.    Thank you for allowing me to participate in care of your patient .    Jose Kiran MD  Infectious Disease  [phone]

## 2021-10-05 NOTE — PROGRESS NOTE ADULT - ASSESSMENT
89 y/o M with PMHx of dementia, paroxysmal atrial fibrillation (not on AC), hx of CLL, HLD, osteoarthritis presented to [facility] with an acute episode of rigors and unresponsiveness admitted with severe sepsis/shock secondary to acute cholecystitis.

## 2021-10-05 NOTE — CHART NOTE - NSCHARTNOTEFT_GEN_A_CORE
Called by RN for Pt with 3 beats of vtach. Pt was asymptomatic and denied any complaints.     Assessment/Plan  89 y/o M with PMHx of dementia, paroxysmal atrial fibrillation (not on AC), hx of CLL, HLD, osteoarthritis presented to  ED with an acute episode of rigors and unresponsiveness admitted with severe sepsis/shock secondary to acute cholecystitis.    #3 beats of Vtach  -Hx of PAF  -Cardiology [facility] following, on Amiodarone 400mg po q8h right now   -F/u AM labs  -RN to call if any changes

## 2021-10-05 NOTE — PROGRESS NOTE ADULT - SUBJECTIVE AND OBJECTIVE BOX
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 1000 milliLiter(s) (100 mL/Hr) IV Continuous <Continuous>  pantoprazole  Injectable 40 milliGRAM(s) IV Push daily  piperacillin/tazobactam IVPB.. 3.375 Gram(s) IV Intermittent every 8 hours  risperiDONE   Tablet 2 milliGRAM(s) Oral at bedtime    MEDICATIONS  (PRN):  metoprolol tartrate Injectable 2.5 milliGRAM(s) IV Push every 6 hours PRN HR > 120         Vitals log        ICU Vital Signs Last 24 Hrs  T(C): 36.2 (05 Oct 2021 04:16), Max: 36.4 (04 Oct 2021 23:57)  T(F): 97.2 (05 Oct 2021 04:16), Max: 97.5 (04 Oct 2021 23:57)  HR: 102 (05 Oct 2021 05:00) (79 - 112)  BP: 126/83 (05 Oct 2021 05:00) (99/54 - 144/70)  BP(mean): 101 (05 Oct 2021 05:00) (71 - 101)  ABP: --  ABP(mean): --  RR: 18 (05 Oct 2021 05:00) (10 - 22)  SpO2: 100% (05 Oct 2021 05:00) (100% - 100%)           Input and Output:  I&O's Detail    03 Oct 2021 07:01  -  04 Oct 2021 07:00  --------------------------------------------------------  IN:    dextrose 5% + sodium chloride 0.9%: 200 mL  Total IN: 200 mL    OUT:    Voided (mL): 900 mL  Total OUT: 900 mL    Total NET: -700 mL      04 Oct 2021 07:01  -  05 Oct 2021 06:05  --------------------------------------------------------  IN:    dextrose 5% + sodium chloride 0.9%: 1800 mL    IV PiggyBack: 200 mL    Oral Fluid: 350 mL  Total IN: 2350 mL    OUT:    Voided (mL): 1765 mL  Total OUT: 1765 mL    Total NET: 585 mL          Lab Data                        9.8    5.23  )-----------( 106      ( 04 Oct 2021 09:21 )             30.7     10-04    143  |  112<H>  |  17  ----------------------------<  96  3.8   |  26  |  1.20    Ca    8.1<L>      04 Oct 2021 09:21  Phos  2.6     10-04  Mg     1.8     10-04    TPro  5.3<L>  /  Alb  2.3<L>  /  TBili  0.7  /  DBili  .20  /  AST  16  /  ALT  9<L>  /  AlkPhos  42  10-04            Review of Systems	      Objective     Physical Examination  heart s1s2  lung dec BS  abd soft        Pertinent Lab findings & Imaging      [place]:  NO   Adequate UO     I&O's Detail    03 Oct 2021 07:01  -  04 Oct 2021 07:00  --------------------------------------------------------  IN:    dextrose 5% + sodium chloride 0.9%: 200 mL  Total IN: 200 mL    OUT:    Voided (mL): 900 mL  Total OUT: 900 mL    Total NET: -700 mL      04 Oct 2021 07:01  -  05 Oct 2021 06:05  --------------------------------------------------------  IN:    dextrose 5% + sodium chloride 0.9%: 1800 mL    IV PiggyBack: 200 mL    Oral Fluid: 350 mL  Total IN: 2350 mL    OUT:    Voided (mL): 1765 mL  Total OUT: 1765 mL    Total NET: 585 mL               Discussed with:     Cultures:	        Radiology

## 2021-10-05 NOTE — PROGRESS NOTE ADULT - ASSESSMENT
89 y/o M with PMHx of dementia, paroxysmal atrial fibrillation (not on AC), hx of CLL, HLD, osteoarthritis presented to  ED with an acute episode of rigors and unresponsiveness admitted with severe sepsis/shock secondary to acute cholecystitis.    medical management of sepsis - acute jackelin  surgery  gi  ABX  I and O  monitor VS and HD and Sat  serial labs  serial ABD exam  ICU management in progress

## 2021-10-05 NOTE — PROGRESS NOTE ADULT - ASSESSMENT
89 y/o M with PMHx of dementia, paroxsymal atrial fibrillation (not on AC), hx of CLL, HLD, osteoarthritis transfer from [facility] with severe sepsis 2/2 possible cholecystitis.     Afib with RVR  - Hx of pAF, in the setting of stressors - previously during empyema and now with acute cholecystitis   - Pt currently in afib rate in the high rates.     - Afib with RVR on admission at [facility] on initial EKG: Afib RVR at 128bpm, repeat with sinus with 1st degree AV block  - Resume amiodarone load to 5 g with 400 tid then 200 QD  - Start lopressor 2.5mg IV q6h with hold parameters for rate control- continue IV form now as patient to have procedure soon    - Off AC due to hx of hematuria with anemia and dementia, will continue to hold off  - Previous ECHO 3/2020 with EF of 55%   - F/u ECHO      Hypotension   - Now resolved, patient with hypotension last night   - Continue to monitor routine hemodynamics   - May require pressors if worsens     Acute cholecystitis   - Pt at elevated but not modifiable active cardiac risk factors and in the setting of low risk IR jackelin, patient is optimized as best as possible from cardiovascular standpoint to proceed with planned procedure with routine hemodynamic monitoring.    - Other cardiovascular workup will depend on clinical course.  - All other workup per primary team.  - Will continue to follow.

## 2021-10-06 LAB
ALBUMIN SERPL ELPH-MCNC: 2.1 G/DL — LOW (ref 3.3–5)
ALP SERPL-CCNC: 33 U/L — LOW (ref 40–120)
ALT FLD-CCNC: 9 U/L — LOW (ref 12–78)
ANION GAP SERPL CALC-SCNC: 4 MMOL/L — LOW (ref 5–17)
AST SERPL-CCNC: 16 U/L — SIGNIFICANT CHANGE UP (ref 15–37)
BASOPHILS # BLD AUTO: 0.02 K/UL — SIGNIFICANT CHANGE UP (ref 0–0.2)
BASOPHILS NFR BLD AUTO: 0.5 % — SIGNIFICANT CHANGE UP (ref 0–2)
BILIRUB SERPL-MCNC: 0.4 MG/DL — SIGNIFICANT CHANGE UP (ref 0.2–1.2)
BUN SERPL-MCNC: 9 MG/DL — SIGNIFICANT CHANGE UP (ref 7–23)
CALCIUM SERPL-MCNC: 7.5 MG/DL — LOW (ref 8.5–10.1)
CHLORIDE SERPL-SCNC: 113 MMOL/L — HIGH (ref 96–108)
CO2 SERPL-SCNC: 25 MMOL/L — SIGNIFICANT CHANGE UP (ref 22–31)
CREAT SERPL-MCNC: 1.2 MG/DL — SIGNIFICANT CHANGE UP (ref 0.5–1.3)
EOSINOPHIL # BLD AUTO: 0.34 K/UL — SIGNIFICANT CHANGE UP (ref 0–0.5)
EOSINOPHIL NFR BLD AUTO: 8 % — HIGH (ref 0–6)
GLUCOSE SERPL-MCNC: 126 MG/DL — HIGH (ref 70–99)
HCT VFR BLD CALC: 29.1 % — LOW (ref 39–50)
HGB BLD-MCNC: 9.5 G/DL — LOW (ref 13–17)
IMM GRANULOCYTES NFR BLD AUTO: 0.2 % — SIGNIFICANT CHANGE UP (ref 0–1.5)
LYMPHOCYTES # BLD AUTO: 1.22 K/UL — SIGNIFICANT CHANGE UP (ref 1–3.3)
LYMPHOCYTES # BLD AUTO: 28.7 % — SIGNIFICANT CHANGE UP (ref 13–44)
MAGNESIUM SERPL-MCNC: 1.9 MG/DL — SIGNIFICANT CHANGE UP (ref 1.6–2.6)
MCHC RBC-ENTMCNC: 31.6 PG — SIGNIFICANT CHANGE UP (ref 27–34)
MCHC RBC-ENTMCNC: 32.6 GM/DL — SIGNIFICANT CHANGE UP (ref 32–36)
MCV RBC AUTO: 96.7 FL — SIGNIFICANT CHANGE UP (ref 80–100)
MONOCYTES # BLD AUTO: 0.4 K/UL — SIGNIFICANT CHANGE UP (ref 0–0.9)
MONOCYTES NFR BLD AUTO: 9.4 % — SIGNIFICANT CHANGE UP (ref 2–14)
NEUTROPHILS # BLD AUTO: 2.26 K/UL — SIGNIFICANT CHANGE UP (ref 1.8–7.4)
NEUTROPHILS NFR BLD AUTO: 53.2 % — SIGNIFICANT CHANGE UP (ref 43–77)
NRBC # BLD: 0 /100 WBCS — SIGNIFICANT CHANGE UP (ref 0–0)
PHOSPHATE SERPL-MCNC: 2.6 MG/DL — SIGNIFICANT CHANGE UP (ref 2.5–4.5)
PLATELET # BLD AUTO: 128 K/UL — LOW (ref 150–400)
POTASSIUM SERPL-MCNC: 3.7 MMOL/L — SIGNIFICANT CHANGE UP (ref 3.5–5.3)
POTASSIUM SERPL-SCNC: 3.7 MMOL/L — SIGNIFICANT CHANGE UP (ref 3.5–5.3)
PROT SERPL-MCNC: 5 G/DL — LOW (ref 6–8.3)
RBC # BLD: 3.01 M/UL — LOW (ref 4.2–5.8)
RBC # FLD: 12.9 % — SIGNIFICANT CHANGE UP (ref 10.3–14.5)
SODIUM SERPL-SCNC: 142 MMOL/L — SIGNIFICANT CHANGE UP (ref 135–145)
WBC # BLD: 4.25 K/UL — SIGNIFICANT CHANGE UP (ref 3.8–10.5)
WBC # FLD AUTO: 4.25 K/UL — SIGNIFICANT CHANGE UP (ref 3.8–10.5)

## 2021-10-06 PROCEDURE — 99233 SBSQ HOSP IP/OBS HIGH 50: CPT

## 2021-10-06 PROCEDURE — 99232 SBSQ HOSP IP/OBS MODERATE 35: CPT

## 2021-10-06 RX ORDER — SENNA PLUS 8.6 MG/1
2 TABLET ORAL AT BEDTIME
Refills: 0 | Status: DISCONTINUED | OUTPATIENT
Start: 2021-10-06 | End: 2021-10-09

## 2021-10-06 RX ADMIN — PIPERACILLIN AND TAZOBACTAM 25 GRAM(S): 4; .5 INJECTION, POWDER, LYOPHILIZED, FOR SOLUTION INTRAVENOUS at 14:29

## 2021-10-06 RX ADMIN — PANTOPRAZOLE SODIUM 40 MILLIGRAM(S): 20 TABLET, DELAYED RELEASE ORAL at 11:49

## 2021-10-06 RX ADMIN — RISPERIDONE 2 MILLIGRAM(S): 4 TABLET ORAL at 21:06

## 2021-10-06 RX ADMIN — AMIODARONE HYDROCHLORIDE 400 MILLIGRAM(S): 400 TABLET ORAL at 21:06

## 2021-10-06 RX ADMIN — AMIODARONE HYDROCHLORIDE 400 MILLIGRAM(S): 400 TABLET ORAL at 17:54

## 2021-10-06 RX ADMIN — SENNA PLUS 2 TABLET(S): 8.6 TABLET ORAL at 21:05

## 2021-10-06 RX ADMIN — PIPERACILLIN AND TAZOBACTAM 25 GRAM(S): 4; .5 INJECTION, POWDER, LYOPHILIZED, FOR SOLUTION INTRAVENOUS at 21:05

## 2021-10-06 RX ADMIN — PIPERACILLIN AND TAZOBACTAM 25 GRAM(S): 4; .5 INJECTION, POWDER, LYOPHILIZED, FOR SOLUTION INTRAVENOUS at 06:20

## 2021-10-06 RX ADMIN — AMIODARONE HYDROCHLORIDE 400 MILLIGRAM(S): 400 TABLET ORAL at 06:20

## 2021-10-06 NOTE — PROGRESS NOTE ADULT - SUBJECTIVE AND OBJECTIVE BOX
SURGERY PA NOTE ON BEHALF OF DR. OCASIO / GENERAL SURGERY:    S: Patient seen and examined at bedside.  Overnight, 3 beats of V-tach noted.  Denies abdominal pain, denies nausea.  Has been tolerating diet (now on low-residue), but only took a few bites of breakfast this morning as the food was "not appetizing".  Denies any fevers/chills/diaphoresis, denies CP/SOB/BOYKIN/dizziness/lightheadedness.       MEDICATIONS:  aMIOdarone    Tablet   Oral   aMIOdarone    Tablet 400 milliGRAM(s) Oral every 8 hours  dextrose 5% + sodium chloride 0.9%. 1000 milliLiter(s) IV Continuous <Continuous>  metoprolol tartrate Injectable 2.5 milliGRAM(s) IV Push every 6 hours PRN  pantoprazole  Injectable 40 milliGRAM(s) IV Push daily  piperacillin/tazobactam IVPB.. 3.375 Gram(s) IV Intermittent every 8 hours  risperiDONE   Tablet 2 milliGRAM(s) Oral at bedtime      O:  Vital Signs Last 24 Hrs  T(C): 36.4 (06 Oct 2021 06:40), Max: 36.9 (05 Oct 2021 21:43)  T(F): 97.6 (06 Oct 2021 06:40), Max: 98.5 (05 Oct 2021 21:43)  HR: 71 (06 Oct 2021 06:40) (71 - 89)  BP: 127/69 (06 Oct 2021 06:40) (93/54 - 127/69)  RR: 18 (06 Oct 2021 06:40) (16 - 18)  SpO2: 97% (06 Oct 2021 06:40) (94% - 97%)    I&O SUMMARY:    10-05-21 @ 07:01  -  10-06-21 @ 07:00  --------------------------------------------------------  IN: 950 mL / OUT: 625 mL / NET: 325 mL        PHYSICAL EXAM:  Lungs: CTA bilat without W/R/R  Card: S1S2  Abd: Soft, NT over RUQ or elsewhere throughout, ND.  +BS x 4.  No rebound/guarding.    Ext: Calves soft, NT, without edema bilat    LABS:                        9.5    4.25  )-----------( 128      ( 06 Oct 2021 07:47 )             29.1     10-06    142  |  113<H>  |  9   ----------------------------<  126<H>  3.7   |  25  |  1.20    Ca    7.5<L>      06 Oct 2021 07:47  Phos  2.6     10-06  Mg     1.9     10-06    TPro  5.0<L>  /  Alb  2.1<L>  /  TBili  0.4  /  DBili  x   /  AST  16  /  ALT  9<L>  /  AlkPhos  33<L>  10-06    PT/INR - ( 05 Oct 2021 06:22 )   PT: 15.9 sec;   INR: 1.38 ratio         PTT - ( 05 Oct 2021 06:22 )  PTT:26.8 sec      A:  89-yo male with cholelithiasis, gallbladder wall thickening  HIDA positive for acute cholecystitis     P:  Patient currently asymptomatic and tolerating diet  WBC remains normal, remains afebrile with stable VS  3 beats of V-tach overnight, Cardiology following - appreciate recs  Continue diet, continue abx per ID  Would keep perc choley drainage as an option should patient clinically decompensate, though would continue current conservative management for now  Continue current care per primary team  Will d/w Dr. Ocasio   Will follow            SURGERY PA NOTE ON BEHALF OF DR. OCASIO / GENERAL SURGERY:    S: Patient seen and examined at bedside.  Overnight, 3 beats of V-tach noted.  Denies abdominal pain, denies nausea.  Has been tolerating diet (now on low-residue), but only took a few bites of breakfast this morning as the food was "not appetizing".  Denies any fevers/chills/diaphoresis, denies CP/SOB/BOYKIN/dizziness/lightheadedness.       MEDICATIONS:  aMIOdarone    Tablet   Oral   aMIOdarone    Tablet 400 milliGRAM(s) Oral every 8 hours  dextrose 5% + sodium chloride 0.9%. 1000 milliLiter(s) IV Continuous <Continuous>  metoprolol tartrate Injectable 2.5 milliGRAM(s) IV Push every 6 hours PRN  pantoprazole  Injectable 40 milliGRAM(s) IV Push daily  piperacillin/tazobactam IVPB.. 3.375 Gram(s) IV Intermittent every 8 hours  risperiDONE   Tablet 2 milliGRAM(s) Oral at bedtime      O:  Vital Signs Last 24 Hrs  T(C): 36.4 (06 Oct 2021 06:40), Max: 36.9 (05 Oct 2021 21:43)  T(F): 97.6 (06 Oct 2021 06:40), Max: 98.5 (05 Oct 2021 21:43)  HR: 71 (06 Oct 2021 06:40) (71 - 89)  BP: 127/69 (06 Oct 2021 06:40) (93/54 - 127/69)  RR: 18 (06 Oct 2021 06:40) (16 - 18)  SpO2: 97% (06 Oct 2021 06:40) (94% - 97%)    I&O SUMMARY:    10-05-21 @ 07:01  -  10-06-21 @ 07:00  --------------------------------------------------------  IN: 950 mL / OUT: 625 mL / NET: 325 mL        PHYSICAL EXAM:  Lungs: CTA bilat without W/R/R  Card: S1S2  Abd: Soft, NT over RUQ or elsewhere throughout, ND.  +BS x 4.  No rebound/guarding.    Ext: Calves soft, NT, without edema bilat    LABS:                        9.5    4.25  )-----------( 128      ( 06 Oct 2021 07:47 )             29.1     10-06    142  |  113<H>  |  9   ----------------------------<  126<H>  3.7   |  25  |  1.20    Ca    7.5<L>      06 Oct 2021 07:47  Phos  2.6     10-06  Mg     1.9     10-06    TPro  5.0<L>  /  Alb  2.1<L>  /  TBili  0.4  /  DBili  x   /  AST  16  /  ALT  9<L>  /  AlkPhos  33<L>  10-06    PT/INR - ( 05 Oct 2021 06:22 )   PT: 15.9 sec;   INR: 1.38 ratio         PTT - ( 05 Oct 2021 06:22 )  PTT:26.8 sec      A:  89-yo male with cholelithiasis, gallbladder wall thickening  HIDA positive for acute cholecystitis     P:  Patient currently asymptomatic and tolerating diet  WBC remains normal, remains afebrile with stable VS  3 beats of V-tach overnight, Cardiology following - appreciate recs  Continue diet, continue abx per ID  Would keep perc choley drainage as an option should patient clinically decompensate, though would continue current conservative management for now  Continue current care per primary team  Will d/w Dr. Ocasio   Will follow     **ADDENDUM**  Discussed with Dr. Ocasio  Diminished bowel function, start Senna - if no function by tomorrow, will start Miralax  If no issues today, consider transition to PO Abx tomorrow  If remains stable, anticipate possible d/c to home 10/8 or 10/9  Will follow      SURGERY PA NOTE ON BEHALF OF DR. OCASIO / GENERAL SURGERY:    S: Patient seen and examined at bedside.  Overnight, 3 beats of V-tach noted.  Denies abdominal pain, denies nausea.  Has been tolerating diet (now on low-residue), but only took a few bites of breakfast this morning as the food was "not appetizing".  Denies any fevers/chills/diaphoresis, denies CP/SOB/BOYKIN/dizziness/lightheadedness.         MEDICATIONS:  aMIOdarone    Tablet   Oral   aMIOdarone    Tablet 400 milliGRAM(s) Oral every 8 hours  dextrose 5% + sodium chloride 0.9%. 1000 milliLiter(s) IV Continuous <Continuous>  metoprolol tartrate Injectable 2.5 milliGRAM(s) IV Push every 6 hours PRN  pantoprazole  Injectable 40 milliGRAM(s) IV Push daily  piperacillin/tazobactam IVPB.. 3.375 Gram(s) IV Intermittent every 8 hours  risperiDONE   Tablet 2 milliGRAM(s) Oral at bedtime      O:  Vital Signs Last 24 Hrs  T(C): 36.4 (06 Oct 2021 06:40), Max: 36.9 (05 Oct 2021 21:43)  T(F): 97.6 (06 Oct 2021 06:40), Max: 98.5 (05 Oct 2021 21:43)  HR: 71 (06 Oct 2021 06:40) (71 - 89)  BP: 127/69 (06 Oct 2021 06:40) (93/54 - 127/69)  RR: 18 (06 Oct 2021 06:40) (16 - 18)  SpO2: 97% (06 Oct 2021 06:40) (94% - 97%)      I&O SUMMARY:    10-05-21 @ 07:01  -  10-06-21 @ 07:00  --------------------------------------------------------  IN: 950 mL / OUT: 625 mL / NET: 325 mL      PHYSICAL EXAM:  Lungs: CTA bilat without W/R/R  Card: S1S2  Abd: Soft, NT over RUQ or elsewhere throughout, ND.  +BS x 4.  No rebound/guarding.    Ext: Calves soft, NT, without edema bilat      LABS:                        9.5    4.25  )-----------( 128      ( 06 Oct 2021 07:47 )             29.1     10-06    142  |  113<H>  |  9   ----------------------------<  126<H>  3.7   |  25  |  1.20    Ca    7.5<L>      06 Oct 2021 07:47  Phos  2.6     10-06  Mg     1.9     10-06    TPro  5.0<L>  /  Alb  2.1<L>  /  TBili  0.4  /  DBili  x   /  AST  16  /  ALT  9<L>  /  AlkPhos  33<L>  10-06    PT/INR - ( 05 Oct 2021 06:22 )   PT: 15.9 sec;   INR: 1.38 ratio    PTT - ( 05 Oct 2021 06:22 )  PTT:26.8 sec      A:  89-yo male with cholelithiasis, gallbladder wall thickening  HIDA positive for acute cholecystitis       P:  Patient currently asymptomatic and tolerating diet  WBC remains normal, remains afebrile with stable VS  3 beats of V-tach overnight, Cardiology following - appreciate recs  Continue diet, continue abx per ID  Would keep perc choley drainage as an option should patient clinically decompensate, though would continue current conservative management for now  Continue current care per primary team  Will d/w Dr. Ocasio   Will follow     **ADDENDUM**  Discussed with Dr. Ocasio  Diminished bowel function, start Senna - if no function by tomorrow, will start Miralax  If no issues today, consider transition to PO Abx tomorrow  If remains stable, anticipate possible d/c to home 10/8 or 10/9  Will follow

## 2021-10-06 NOTE — PROGRESS NOTE ADULT - SUBJECTIVE AND OBJECTIVE BOX
***CHARTING IN PROGRESS  SUBJECTIVE:    No acute events overnight, afebrile, hds.    VITAL SIGNS:    Vital Signs Last 24 Hrs  T(C): 36.4 (06 Oct 2021 06:40), Max: 36.9 (05 Oct 2021 21:43)  T(F): 97.6 (06 Oct 2021 06:40), Max: 98.5 (05 Oct 2021 21:43)  HR: 71 (06 Oct 2021 06:40) (71 - 89)  BP: 127/69 (06 Oct 2021 06:40) (93/54 - 127/69)  BP(mean): --  RR: 18 (06 Oct 2021 06:40) (16 - 18)  SpO2: 97% (06 Oct 2021 06:40) (94% - 97%)    PHYSICAL EXAM:     GENERAL: no acute distress  HEENT: NC/AT, EOMI, neck supple, MMM  RESPIRATORY: LCTAB/L, no rhonchi, rales, or wheezing  CARDIOVASCULAR: RRR, no murmurs, gallops, rubs  ABDOMINAL: soft, non-tender, non-distended, positive bowel sounds   EXTREMITIES: no clubbing, cyanosis, or edema  NEUROLOGICAL: alert and oriented x 3, non-focal  SKIN: no rashes or lesions   MUSCULOSKELETAL: no gross joint deformity                          9.5    4.25  )-----------( 128      ( 06 Oct 2021 07:47 )             29.1     10-06    142  |  113<H>  |  9   ----------------------------<  126<H>  3.7   |  25  |  1.20    Ca    7.5<L>      06 Oct 2021 07:47  Phos  2.6     10-06  Mg     1.9     10-06    TPro  5.0<L>  /  Alb  2.1<L>  /  TBili  0.4  /  DBili  x   /  AST  16  /  ALT  9<L>  /  AlkPhos  33<L>  10-06      CAPILLARY BLOOD GLUCOSE          MEDICATIONS  (STANDING):  aMIOdarone    Tablet   Oral   aMIOdarone    Tablet 400 milliGRAM(s) Oral every 8 hours  dextrose 5% + sodium chloride 0.9%. 1000 milliLiter(s) (100 mL/Hr) IV Continuous <Continuous>  pantoprazole  Injectable 40 milliGRAM(s) IV Push daily  piperacillin/tazobactam IVPB.. 3.375 Gram(s) IV Intermittent every 8 hours  risperiDONE   Tablet 2 milliGRAM(s) Oral at bedtime       SUBJECTIVE:    No acute events overnight, afebrile, hds.  Pt states that he is feeling better.  Abd pain improved, no n/v at this time.    VITAL SIGNS:    Vital Signs Last 24 Hrs  T(C): 36.4 (06 Oct 2021 06:40), Max: 36.9 (05 Oct 2021 21:43)  T(F): 97.6 (06 Oct 2021 06:40), Max: 98.5 (05 Oct 2021 21:43)  HR: 71 (06 Oct 2021 06:40) (71 - 89)  BP: 127/69 (06 Oct 2021 06:40) (93/54 - 127/69)  BP(mean): --  RR: 18 (06 Oct 2021 06:40) (16 - 18)  SpO2: 97% (06 Oct 2021 06:40) (94% - 97%)    PHYSICAL EXAM:     GENERAL: NAD  HEENT: perrla, eomi  RESPIRATORY: CTAB  CARDIOVASCULAR: s1 and s2, rrr, no m/r/g  ABDOMINAL: soft, nd, nt, +bs  EXTREMITIES: no c/c/e  NEUROLOGICAL: alert and oriented x 3, non-focal  SKIN: no new rashes or lesions   MUSCULOSKELETAL: no gross joint deformity                          9.5    4.25  )-----------( 128      ( 06 Oct 2021 07:47 )             29.1     10-06    142  |  113<H>  |  9   ----------------------------<  126<H>  3.7   |  25  |  1.20    Ca    7.5<L>      06 Oct 2021 07:47  Phos  2.6     10-06  Mg     1.9     10-06    TPro  5.0<L>  /  Alb  2.1<L>  /  TBili  0.4  /  DBili  x   /  AST  16  /  ALT  9<L>  /  AlkPhos  33<L>  10-06      CAPILLARY BLOOD GLUCOSE          MEDICATIONS  (STANDING):  aMIOdarone    Tablet   Oral   aMIOdarone    Tablet 400 milliGRAM(s) Oral every 8 hours  dextrose 5% + sodium chloride 0.9%. 1000 milliLiter(s) (100 mL/Hr) IV Continuous <Continuous>  pantoprazole  Injectable 40 milliGRAM(s) IV Push daily  piperacillin/tazobactam IVPB.. 3.375 Gram(s) IV Intermittent every 8 hours  risperiDONE   Tablet 2 milliGRAM(s) Oral at bedtime

## 2021-10-06 NOTE — PROGRESS NOTE ADULT - SUBJECTIVE AND OBJECTIVE BOX
[facility] -- Warren Posada, Abelardo Amos, Thaddeus Verdugo Savella  Office # [phone]      Follow Up:    afib  Subjective/Observations:   seen at bedside, arousable , in no acute distress  unable to provide any meaningful information     PAST MEDICAL & SURGICAL HISTORY:  Chronic Lymphocytic Leukemia  followed by oncologist in [place]    GERD (Gastroesophageal Reflux Disease)    Diverticulitis    Spinal Stenosis    Herniated Disc    Hyperlipidemia    Osteoarthritis    Stomach Ulcer  H pylori 2007, treated with antibiotics    GI Bleed  2007    Dementia    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 1000 milliLiter(s) (100 mL/Hr) IV Continuous <Continuous>  pantoprazole  Injectable 40 milliGRAM(s) IV Push daily  piperacillin/tazobactam IVPB.. 3.375 Gram(s) IV Intermittent every 8 hours  risperiDONE   Tablet 2 milliGRAM(s) Oral at bedtime    MEDICATIONS  (PRN):  metoprolol tartrate Injectable 2.5 milliGRAM(s) IV Push every 6 hours PRN HR > 120      Allergies    vancomycin (Nephrotoxicity)    Intolerances        Vital Signs Last 24 Hrs  T(C): 36.4 (06 Oct 2021 06:40), Max: 36.9 (05 Oct 2021 21:43)  T(F): 97.6 (06 Oct 2021 06:40), Max: 98.5 (05 Oct 2021 21:43)  HR: 71 (06 Oct 2021 06:40) (71 - 89)  BP: 127/69 (06 Oct 2021 06:40) (93/54 - 127/69)  BP(mean): --  RR: 18 (06 Oct 2021 06:40) (16 - 18)  SpO2: 97% (06 Oct 2021 06:40) (94% - 97%)    I&O's Summary    05 Oct 2021 07:01  -  06 Oct 2021 07:00  --------------------------------------------------------  IN: 950 mL / OUT: 625 mL / NET: 325 mL          PHYSICAL EXAM:  TELE: Af 75   Constitutional: NAD, awake and alert, well-developed  HEENT: Moist Mucous Membranes, Anicteric  Pulmonary: Non-labored, breath sounds are clear bilaterally, No wheezing, crackles or rhonchi  Cardiovascular: IRRegular, S1 and S2 nl, No murmurs, rubs, gallops or clicks  Gastrointestinal: Bowel Sounds present, soft, nontender.   Lymph: No lymphadenopathy. No peripheral edema.  Skin: No visible rashes or ulcers.  Psych:  confused    LABS: All Labs Reviewed:                        9.5    4.25  )-----------( 128      ( 06 Oct 2021 07:47 )             29.1                         9.8    5.07  )-----------( 137      ( 05 Oct 2021 06:22 )             29.4                         9.8    5.23  )-----------( 106      ( 04 Oct 2021 09:21 )             30.7     06 Oct 2021 07:47    142    |  113    |  9      ----------------------------<  126    3.7     |  25     |  1.20   05 Oct 2021 06:22    142    |  112    |  12     ----------------------------<  128    3.5     |  25     |  1.10   04 Oct 2021 09:21    143    |  112    |  17     ----------------------------<  96     3.8     |  26     |  1.20     Ca    7.5        06 Oct 2021 07:47  Ca    7.6        05 Oct 2021 06:22  Ca    8.1        04 Oct 2021 09:21  Phos  2.6       06 Oct 2021 07:47  Phos  2.6       05 Oct 2021 06:22  Phos  2.6       04 Oct 2021 05:35  Mg     1.9       06 Oct 2021 07:47  Mg     1.8       05 Oct 2021 06:22  Mg     1.8       04 Oct 2021 05:35    TPro  5.0    /  Alb  2.1    /  TBili  0.4    /  DBili  x      /  AST  16     /  ALT  9      /  AlkPhos  33     06 Oct 2021 07:47  TPro  5.1    /  Alb  2.2    /  TBili  0.6    /  DBili  x      /  AST  14     /  ALT  9      /  AlkPhos  41     05 Oct 2021 06:22  TPro  5.3    /  Alb  2.3    /  TBili  0.7    /  DBili  .20    /  AST  16     /  ALT  9      /  AlkPhos  42     04 Oct 2021 09:21    PT/INR - ( 05 Oct 2021 06:22 )   PT: 15.9 sec;   INR: 1.38 ratio         PTT - ( 05 Oct 2021 06:22 )  PTT:26.8 sec      < from: 12 Lead ECG (10.02.21 @ 19:59) >  Ventricular Rate 90 BPM    Atrial Rate 90 BPM    P-R Interval 226 ms    QRS Duration 98 ms    Q-T Interval 368 ms    QTC Calculation(Bazett) 450 ms    P Axis 61 degrees    R Axis -67 degrees    T Axis 47 degrees    Diagnosis Line *** Poor data quality, interpretation may be adversely affected  Sinus rhythm with 1st degree AV block  Left axis deviation  Inferior infarct , age undetermined  Anterior infarct (cited on or before 25-FEB-2020)  Abnormal ECG           [facility] -- Warren Posada, Abelardo Amos, Thaddeus Verdugo Savella  Office # [phone]      Follow Up:    afib    Subjective/Observations:   seen at bedside, arousable , in no acute distress  unable to provide any meaningful information     PAST MEDICAL & SURGICAL HISTORY:  Chronic Lymphocytic Leukemia  followed by oncologist in [place]    GERD (Gastroesophageal Reflux Disease)    Diverticulitis    Spinal Stenosis    Herniated Disc    Hyperlipidemia    Osteoarthritis    Stomach Ulcer  H pylori 2007, treated with antibiotics    GI Bleed  2007    Dementia    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 1000 milliLiter(s) (100 mL/Hr) IV Continuous <Continuous>  pantoprazole  Injectable 40 milliGRAM(s) IV Push daily  piperacillin/tazobactam IVPB.. 3.375 Gram(s) IV Intermittent every 8 hours  risperiDONE   Tablet 2 milliGRAM(s) Oral at bedtime    MEDICATIONS  (PRN):  metoprolol tartrate Injectable 2.5 milliGRAM(s) IV Push every 6 hours PRN HR > 120      Allergies    vancomycin (Nephrotoxicity)    Intolerances        Vital Signs Last 24 Hrs  T(C): 36.4 (06 Oct 2021 06:40), Max: 36.9 (05 Oct 2021 21:43)  T(F): 97.6 (06 Oct 2021 06:40), Max: 98.5 (05 Oct 2021 21:43)  HR: 71 (06 Oct 2021 06:40) (71 - 89)  BP: 127/69 (06 Oct 2021 06:40) (93/54 - 127/69)  BP(mean): --  RR: 18 (06 Oct 2021 06:40) (16 - 18)  SpO2: 97% (06 Oct 2021 06:40) (94% - 97%)    I&O's Summary    05 Oct 2021 07:01  -  06 Oct 2021 07:00  --------------------------------------------------------  IN: 950 mL / OUT: 625 mL / NET: 325 mL          PHYSICAL EXAM:  TELE: Af 75   Constitutional: NAD, awake and alert, well-developed  HEENT: Moist Mucous Membranes, Anicteric  Pulmonary: Non-labored, breath sounds are clear bilaterally, No wheezing, crackles or rhonchi  Cardiovascular: IRRR, S1 and S2 nl, No murmurs, rubs, gallops or clicks  Gastrointestinal: Bowel Sounds present, soft, nontender.   Lymph: No lymphadenopathy. No peripheral edema.  Skin: No visible rashes or ulcers.  Psych:  confused    LABS: All Labs Reviewed:                        9.5    4.25  )-----------( 128      ( 06 Oct 2021 07:47 )             29.1                         9.8    5.07  )-----------( 137      ( 05 Oct 2021 06:22 )             29.4                         9.8    5.23  )-----------( 106      ( 04 Oct 2021 09:21 )             30.7     06 Oct 2021 07:47    142    |  113    |  9      ----------------------------<  126    3.7     |  25     |  1.20   05 Oct 2021 06:22    142    |  112    |  12     ----------------------------<  128    3.5     |  25     |  1.10   04 Oct 2021 09:21    143    |  112    |  17     ----------------------------<  96     3.8     |  26     |  1.20     Ca    7.5        06 Oct 2021 07:47  Ca    7.6        05 Oct 2021 06:22  Ca    8.1        04 Oct 2021 09:21  Phos  2.6       06 Oct 2021 07:47  Phos  2.6       05 Oct 2021 06:22  Phos  2.6       04 Oct 2021 05:35  Mg     1.9       06 Oct 2021 07:47  Mg     1.8       05 Oct 2021 06:22  Mg     1.8       04 Oct 2021 05:35    TPro  5.0    /  Alb  2.1    /  TBili  0.4    /  DBili  x      /  AST  16     /  ALT  9      /  AlkPhos  33     06 Oct 2021 07:47  TPro  5.1    /  Alb  2.2    /  TBili  0.6    /  DBili  x      /  AST  14     /  ALT  9      /  AlkPhos  41     05 Oct 2021 06:22  TPro  5.3    /  Alb  2.3    /  TBili  0.7    /  DBili  .20    /  AST  16     /  ALT  9      /  AlkPhos  42     04 Oct 2021 09:21    PT/INR - ( 05 Oct 2021 06:22 )   PT: 15.9 sec;   INR: 1.38 ratio         PTT - ( 05 Oct 2021 06:22 )  PTT:26.8 sec      < from: 12 Lead ECG (10.02.21 @ 19:59) >  Ventricular Rate 90 BPM    Atrial Rate 90 BPM    P-R Interval 226 ms    QRS Duration 98 ms    Q-T Interval 368 ms    QTC Calculation(Bazett) 450 ms    P Axis 61 degrees    R Axis -67 degrees    T Axis 47 degrees    Diagnosis Line *** Poor data quality, interpretation may be adversely affected  Sinus rhythm with 1st degree AV block  Left axis deviation  Inferior infarct , age undetermined  Anterior infarct (cited on or before 25-FEB-2020)  Abnormal ECG

## 2021-10-06 NOTE — PROGRESS NOTE ADULT - ASSESSMENT
89 y/o M with PMHx of dementia, paroxsymal atrial fibrillation (not on AC), hx of CLL, HLD, osteoarthritis transfer from [facility] with severe sepsis 2/2 possible cholecystitis.     Afib with RVR  - Hx of pAF, in the setting of stressors - previously during empyema and now with acute cholecystitis   - Afib with RVR on admission at [place] on initial EKG: Afib RVR at 128bpm, repeat with sinus with 1st degree AV block  -tele reveiwed AF 75 with frequent PVC overnight   continue PO amio load 5gram  -continue PRN iv lopressor, if no plans for procedure, change to PO     - Off AC due to hx of hematuria with anemia and dementia, will continue to hold off  - Previous ECHO 2/2020 with EF of 55%   - F/u ECHO      Hypotension   - Now resolved, 127/69    Acute cholecystitis   -for possible IR percutaneous cholecystotomy - fu surgery recs - Pt at elevated but not modifiable active cardiac risk factors and in the setting of low risk IR jackelin, patient is optimized as best as possible from cardiovascular standpoint to proceed with planned procedure with routine hemodynamic monitoring.      - Other cardiovascular workup will depend on clinical course.  - All other workup per primary team.  - Will continue to follow.  Peggy Craig FNP-C  Cardiology NP  [facility] [address] [phone]                 89 y/o M with PMHx of dementia, paroxsymal atrial fibrillation (not on AC), hx of CLL, HLD, osteoarthritis transfer from [facility] with severe sepsis 2/2 possible cholecystitis.     Afib with RVR  - Hx of pAF, in the setting of stressors - previously during empyema and now with acute cholecystitis   - Afib with RVR on admission at [place] on initial EKG: Afib RVR at 128bpm, repeat with sinus with 1st degree AV block  - tele reviewed AF 75 with frequent PVC overnight   - at this point will cont Amio load to 5 grams. If pt does not convert to sinus rhythm within next 24 hours may need to just stop amio and rate control  with bb.   -continue PRN iv lopressor, if no plans for procedure, change to PO     - Off AC due to hx of hematuria with anemia and dementia, will continue to hold off  - Previous ECHO 2/2020 with EF of 55%   - F/u ECHO      Hypotension   - Now resolved, 127/69    Acute cholecystitis   -for possible IR percutaneous cholecystotomy - fu surgery recs - Pt at elevated but not modifiable active cardiac risk factors and in the setting of low risk IR jackelin, patient is optimized as best as possible from cardiovascular standpoint to proceed with planned procedure with routine hemodynamic monitoring.      - Other cardiovascular workup will depend on clinical course.  - All other workup per primary team.  - Will continue to follow.  Peggy HSUP-C  Cardiology NP  [facility] [address] [phone]                 89 y/o M with PMHx of dementia, paroxsymal atrial fibrillation (not on AC), hx of CLL, HLD, osteoarthritis transfer from [facility] with severe sepsis 2/2 possible cholecystitis.     Afib with RVR  - Hx of pAF, in the setting of stressors - previously during empyema and now with acute cholecystitis   - Afib with RVR on admission at [place] on initial EKG: Afib RVR at 128bpm, repeat with sinus with 1st degree AV block  - tele reviewed AF 75 with frequent PVC overnight   - at this point will cont Amio load to 5 grams. If pt does not convert to sinus rhythm within next 24 hours may need to just stop amio and rate control  with bb but bp may be limiting. cont load for now.   -continue PRN iv lopressor, if no plans for procedure, change to PO     - Off AC due to hx of hematuria with anemia and dementia, will continue to hold off  - Previous ECHO 2/2020 with EF of 55%   - F/u ECHO      Hypotension   - Now resolved, 127/69    Acute cholecystitis   -for possible IR percutaneous cholecystotomy - fu surgery recs - Pt at elevated but not modifiable active cardiac risk factors and in the setting of low risk IR jackelin, patient is optimized as best as possible from cardiovascular standpoint to proceed with planned procedure with routine hemodynamic monitoring.      - Other cardiovascular workup will depend on clinical course.  - All other workup per primary team.  - Will continue to follow.  Peggy Craig FNP-C  Cardiology NP  [facility] [phone]

## 2021-10-06 NOTE — PROGRESS NOTE ADULT - ASSESSMENT
89 y/o M with PMHx of dementia, paroxysmal atrial fibrillation (not on AC), hx of CLL, HLD, osteoarthritis presented to  ED with an acute episode of rigors and unresponsiveness admitted with severe sepsis/shock secondary to acute cholecystitis.    on PO diet  on IVF  Surgery f/u noted  transferred out of ICU    medical management of sepsis - acute jackelin  surgery  gi  ABX  I and O  monitor VS and HD and Sat  serial labs  serial ABD exam

## 2021-10-06 NOTE — PROGRESS NOTE ADULT - SUBJECTIVE AND OBJECTIVE BOX
CHARLY LU is a 89yMale , patient examined and chart reviewed.     INTERVAL HPI/ OVERNIGHT EVENTS:   Pt seen earlier. No events.  Afebrile. Lethargic.   Daughter at bedside.    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    ROS:  Unable to obtain due to : Dementia    ALLERGIES  vancomycin (Nephrotoxicity)      Current inpatient medications :    ANTIBIOTICS/RELEVANT:  piperacillin/tazobactam IVPB.. 3.375 Gram(s) IV Intermittent every 8 hours    MEDICATIONS  (STANDING):  aMIOdarone    Tablet   Oral   aMIOdarone    Tablet 400 milliGRAM(s) Oral every 8 hours  dextrose 5% + sodium chloride 0.9%. 1000 milliLiter(s) (100 mL/Hr) IV Continuous <Continuous>  pantoprazole  Injectable 40 milliGRAM(s) IV Push daily  risperiDONE   Tablet 2 milliGRAM(s) Oral at bedtime  senna 2 Tablet(s) Oral at bedtime    MEDICATIONS  (PRN):  metoprolol tartrate Injectable 2.5 milliGRAM(s) IV Push every 6 hours PRN HR > 120      Objective:  Vital Signs Last 24 Hrs  T(C): 36.7 (06 Oct 2021 11:49), Max: 36.9 (05 Oct 2021 21:43)  T(F): 98 (06 Oct 2021 11:49), Max: 98.5 (05 Oct 2021 21:43)  HR: 69 (06 Oct 2021 11:49) (69 - 89)  BP: 93/59 (06 Oct 2021 11:49) (93/59 - 127/69)  RR: 18 (06 Oct 2021 11:49) (16 - 18)  SpO2: 93% (06 Oct 2021 11:49) (93% - 97%)      Physical Exam:  General: weak  no acute distress  Neck: supple, trachea midline  Lungs: clear, no wheeze/rhonchi  Cardiovascular: regular rate and rhythm, S1 S2  Abdomen: soft, nontender,  bowel sounds normal  Neurological: Dementia  Extremities: no edema      LABS:                        9.5    4.25  )-----------( 128      ( 06 Oct 2021 07:47 )             29.1   10-06    142  |  113<H>  |  9   ----------------------------<  126<H>  3.7   |  25  |  1.20    Ca    7.5<L>      06 Oct 2021 07:47  Phos  2.6     10-06  Mg     1.9     10-06    TPro  5.0<L>  /  Alb  2.1<L>  /  TBili  0.4  /  DBili  x   /  AST  16  /  ALT  9<L>  /  AlkPhos  33<L>  10-06    MICROBIOLOGY:    Culture - Blood (collected 02 Oct 2021 22:50)  Source: .Blood Blood-Peripheral  Preliminary Report (03 Oct 2021 23:01):    No growth to date.    Culture - Blood (collected 02 Oct 2021 22:50)  Source: .Blood Blood-Peripheral  Preliminary Report (03 Oct 2021 23:01):    No growth to date.    Culture - Urine (collected 02 Oct 2021 22:49)  Source: Clean Catch Clean Catch (Midstream)  Final Report (03 Oct 2021 20:27):    No growth    RADIOLOGY & ADDITIONAL STUDIES:  EXAM:  NM HEPATOBILIARY IMG                            PROCEDURE DATE:  10/04/2021          INTERPRETATION:  CLINICAL STATEMENT: 89-year-old male with cholelithiasis, fever, and gallbladder wall thickening and pericholecystic fluid on CT.    RADIOPHARMACEUTICAL: 3.15 mCi and 2.95 mCi Tc-99m-Mebrofenin, I.V.; 2 doses    TECHNIQUE:  Dynamic images of the anterior abdomen were obtained for 1 hour following injection of radiotracer. Morphine 2mg I.V. and a second dose of radiotracer were administered at 1 hour. Dynamic imaging was continued for 1 hour followed by static images of the abdomen in the right lateral and right anterior oblique views immediately thereafter.    FINDINGS: There is prompt, homogeneous uptake of radiotracer by the hepatocytes. Activity is first seen in the bowel at 15 minutes. The gallbladder is not visualized at any time during the study, despite administration of morphine. There is good clearance of activity from the liver at the end of the study.    IMPRESSION: Abnormal morphine-augmented hepatobiliary scan.    Findings consistent with acute cholecystitis.    EXAM:  CT ABDOMEN AND PELVIS                          EXAM:  CT CHEST                                  PROCEDURE DATE:  10/02/2021          INTERPRETATION:  CLINICAL INFORMATION: Fever, history of left sided empyema    COMPARISON: CT chest 3/9/2020, pet/CT 6/19/2019.    CONTRAST/COMPLICATIONS:  IV Contrast: NONE  Oral Contrast: NONE  Complications: None reported at time of study completion    PROCEDURE:  CT of the Chest, Abdomen and Pelvis was performed.  Sagittal and coronal reformats were performed.    FINDINGS: Evaluation of the solid organs and vasculature is limited in the absence of intravenous contrast.  CHEST:  LUNGS AND LARGE AIRWAYS: Patent central airways. No pulmonary nodules.  PLEURA: No pleural effusion. Right pleural calcifications.  VESSELS: Aortic, aortic valvular and coronary artery calcifications.  HEART: Heart size is normal. No pericardial effusion.  MEDIASTINUM AND KAELYN: No lymphadenopathy.  CHEST WALL AND LOWER NECK: Within normal limits.    ABDOMEN AND PELVIS:  LIVER: Within normal limits.  BILE DUCTS: Normal caliber.  GALLBLADDER: Large gallbladder stone again noted. There is suggestion of gallbladder wall thickening, and pericholecystic fluid tracking inferiorly.  SPLEEN: Within normal limits.  PANCREAS: Within normal limits.  ADRENALS: Within normal limits.  KIDNEYS/URETERS: Nonobstructing 4 mm left renal calculus. Small right renal cyst. No hydronephrosis.    BLADDER: Within normal limits.  REPRODUCTIVE ORGANS: Prostate is enlarged.    BOWEL: Colonic,including right-sided, diverticulosis. Trace free fluid in the right paracolic gutter adjacent to diverticulosis may be due to gallbladder inflammation. No colonic wall to suggest acute diverticulitis. No bowel obstruction. Appendix is normal.  PERITONEUM: Trace pelvic free fluid. No pneumoperitoneum  VESSELS: Atherosclerotic changes.  RETROPERITONEUM/LYMPH NODES: No lymphadenopathy.  ABDOMINAL WALL: Within normal limits.  BONES: Diffuse degenerative changes. Status post posterior lumbar laminectomies. Mixed lucent and sclerotic lesion in the right mid humeral shaft is of unknown etiology, but unchanged since PET of 6/19/2019.    IMPRESSION:  Cholelithiasis with gallbladder wall thickening and pericholecystic edema, which extends to the rightparacolic gutter. Findings are suspicious for cholecystitis. No colonic wall thickening to suggest diverticulitis. Correlate with patient's symptomatology and right upper quadrant ultrasound.      Assessment :   89M with dementia, hx of CLL, HLD, osteoarthritis admitted with sepsis with shock sec Acute cholecystitis   Sp shock Did not need pressors- BP stable  Afib   HIDA +  Conservative management    Plan :   Cont Zosyn day 4  Can change to po Augmentin to complete total antibiotic course of 10 days when cleared for dc  Trend temps and cbc  Fu cultures- NGTD  Surgery d/w family, would prefer medical management at this time, if worsens will need perc jackelin vs jackelin  Strict asp precautions    D/w Daughter at bedside    Continue with present regiment.  Appropriate use of antibiotics and adverse effects reviewed.    35 minutes were spent in direct patient care reviewing notes, medications ,labs data/ imaging , discussion with multidisciplinary team.    Thank you for allowing me to participate in care of your patient .    Jose Kiran MD  Infectious Disease  [phone]

## 2021-10-06 NOTE — PROGRESS NOTE ADULT - ASSESSMENT
As above in PA notation.  Patient personally seen and examined.  Vitals non-suggestive.  Abdomen soft, non tense and non tender with RUQ specifically WNL.  Labs reassuring.  Clinically, improving overall.  Surgically, stable at present.  To continue current supportive care.   Given overall reassuring clinical picture, will defer IR drainage for the present.

## 2021-10-06 NOTE — PROGRESS NOTE ADULT - SUBJECTIVE AND OBJECTIVE BOX
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 1000 milliLiter(s) (100 mL/Hr) IV Continuous <Continuous>  pantoprazole  Injectable 40 milliGRAM(s) IV Push daily  piperacillin/tazobactam IVPB.. 3.375 Gram(s) IV Intermittent every 8 hours  risperiDONE   Tablet 2 milliGRAM(s) Oral at bedtime    MEDICATIONS  (PRN):  metoprolol tartrate Injectable 2.5 milliGRAM(s) IV Push every 6 hours PRN HR > 120         Vitals log        ICU Vital Signs Last 24 Hrs  T(C): 36.9 (05 Oct 2021 21:43), Max: 36.9 (05 Oct 2021 21:43)  T(F): 98.5 (05 Oct 2021 21:43), Max: 98.5 (05 Oct 2021 21:43)  HR: 89 (05 Oct 2021 21:43) (80 - 180)  BP: 115/63 (05 Oct 2021 21:43) (87/55 - 118/66)  BP(mean): 65 (05 Oct 2021 09:00) (65 - 88)  ABP: --  ABP(mean): --  RR: 16 (05 Oct 2021 21:43) (16 - 28)  SpO2: 96% (05 Oct 2021 21:43) (94% - 100%)           Input and Output:  I&O's Detail    04 Oct 2021 07:01  -  05 Oct 2021 07:00  --------------------------------------------------------  IN:    dextrose 5% + sodium chloride 0.9%: 2100 mL    IV PiggyBack: 275 mL    Oral Fluid: 350 mL  Total IN: 2725 mL    OUT:    Voided (mL): 1915 mL  Total OUT: 1915 mL    Total NET: 810 mL      05 Oct 2021 07:01  -  06 Oct 2021 05:26  --------------------------------------------------------  IN:    dextrose 5% + sodium chloride 0.9%: 600 mL    IV PiggyBack: 100 mL    Sodium Chloride 0.9% Bolus: 250 mL  Total IN: 950 mL    OUT:    Voided (mL): 325 mL  Total OUT: 325 mL    Total NET: 625 mL          Lab Data                        9.8    5.07  )-----------( 137      ( 05 Oct 2021 06:22 )             29.4     10-05    142  |  112<H>  |  12  ----------------------------<  128<H>  3.5   |  25  |  1.10    Ca    7.6<L>      05 Oct 2021 06:22  Phos  2.6     10-05  Mg     1.8     10-05    TPro  5.1<L>  /  Alb  2.2<L>  /  TBili  0.6  /  DBili  x   /  AST  14<L>  /  ALT  9<L>  /  AlkPhos  41  10-05            Review of Systems	      Objective     Physical Examination  heart s1s2  lung dec BS  abd dec BS        Pertinent Lab findings & Imaging      [place]:  NO   Adequate UO     I&O's Detail    04 Oct 2021 07:01  -  05 Oct 2021 07:00  --------------------------------------------------------  IN:    dextrose 5% + sodium chloride 0.9%: 2100 mL    IV PiggyBack: 275 mL    Oral Fluid: 350 mL  Total IN: 2725 mL    OUT:    Voided (mL): 1915 mL  Total OUT: 1915 mL    Total NET: 810 mL      05 Oct 2021 07:01  -  06 Oct 2021 05:26  --------------------------------------------------------  IN:    dextrose 5% + sodium chloride 0.9%: 600 mL    IV PiggyBack: 100 mL    Sodium Chloride 0.9% Bolus: 250 mL  Total IN: 950 mL    OUT:    Voided (mL): 325 mL  Total OUT: 325 mL    Total NET: 625 mL               Discussed with:     Cultures:	        Radiology

## 2021-10-07 LAB
ANION GAP SERPL CALC-SCNC: 6 MMOL/L — SIGNIFICANT CHANGE UP (ref 5–17)
ANION GAP SERPL CALC-SCNC: 7 MMOL/L — SIGNIFICANT CHANGE UP (ref 5–17)
BASOPHILS # BLD AUTO: 0.03 K/UL — SIGNIFICANT CHANGE UP (ref 0–0.2)
BASOPHILS NFR BLD AUTO: 0.6 % — SIGNIFICANT CHANGE UP (ref 0–2)
BUN SERPL-MCNC: 8 MG/DL — SIGNIFICANT CHANGE UP (ref 7–23)
BUN SERPL-MCNC: 8 MG/DL — SIGNIFICANT CHANGE UP (ref 7–23)
CALCIUM SERPL-MCNC: 7.6 MG/DL — LOW (ref 8.5–10.1)
CALCIUM SERPL-MCNC: 7.9 MG/DL — LOW (ref 8.5–10.1)
CHLORIDE SERPL-SCNC: 113 MMOL/L — HIGH (ref 96–108)
CHLORIDE SERPL-SCNC: 113 MMOL/L — HIGH (ref 96–108)
CO2 SERPL-SCNC: 24 MMOL/L — SIGNIFICANT CHANGE UP (ref 22–31)
CO2 SERPL-SCNC: 25 MMOL/L — SIGNIFICANT CHANGE UP (ref 22–31)
CREAT SERPL-MCNC: 1.3 MG/DL — SIGNIFICANT CHANGE UP (ref 0.5–1.3)
CREAT SERPL-MCNC: 1.4 MG/DL — HIGH (ref 0.5–1.3)
CULTURE RESULTS: SIGNIFICANT CHANGE UP
CULTURE RESULTS: SIGNIFICANT CHANGE UP
EOSINOPHIL # BLD AUTO: 0.35 K/UL — SIGNIFICANT CHANGE UP (ref 0–0.5)
EOSINOPHIL NFR BLD AUTO: 7.1 % — HIGH (ref 0–6)
GLUCOSE SERPL-MCNC: 121 MG/DL — HIGH (ref 70–99)
GLUCOSE SERPL-MCNC: 158 MG/DL — HIGH (ref 70–99)
HCT VFR BLD CALC: 29.8 % — LOW (ref 39–50)
HCT VFR BLD CALC: 31.1 % — LOW (ref 39–50)
HGB BLD-MCNC: 9.6 G/DL — LOW (ref 13–17)
HGB BLD-MCNC: 9.9 G/DL — LOW (ref 13–17)
IMM GRANULOCYTES NFR BLD AUTO: 0.4 % — SIGNIFICANT CHANGE UP (ref 0–1.5)
LYMPHOCYTES # BLD AUTO: 1.4 K/UL — SIGNIFICANT CHANGE UP (ref 1–3.3)
LYMPHOCYTES # BLD AUTO: 28.3 % — SIGNIFICANT CHANGE UP (ref 13–44)
MCHC RBC-ENTMCNC: 30.9 PG — SIGNIFICANT CHANGE UP (ref 27–34)
MCHC RBC-ENTMCNC: 31 PG — SIGNIFICANT CHANGE UP (ref 27–34)
MCHC RBC-ENTMCNC: 31.8 GM/DL — LOW (ref 32–36)
MCHC RBC-ENTMCNC: 32.2 GM/DL — SIGNIFICANT CHANGE UP (ref 32–36)
MCV RBC AUTO: 96.1 FL — SIGNIFICANT CHANGE UP (ref 80–100)
MCV RBC AUTO: 97.2 FL — SIGNIFICANT CHANGE UP (ref 80–100)
MONOCYTES # BLD AUTO: 0.44 K/UL — SIGNIFICANT CHANGE UP (ref 0–0.9)
MONOCYTES NFR BLD AUTO: 8.9 % — SIGNIFICANT CHANGE UP (ref 2–14)
NEUTROPHILS # BLD AUTO: 2.7 K/UL — SIGNIFICANT CHANGE UP (ref 1.8–7.4)
NEUTROPHILS NFR BLD AUTO: 54.7 % — SIGNIFICANT CHANGE UP (ref 43–77)
NRBC # BLD: 0 /100 WBCS — SIGNIFICANT CHANGE UP (ref 0–0)
NRBC # BLD: 0 /100 WBCS — SIGNIFICANT CHANGE UP (ref 0–0)
PLATELET # BLD AUTO: 130 K/UL — LOW (ref 150–400)
PLATELET # BLD AUTO: 156 K/UL — SIGNIFICANT CHANGE UP (ref 150–400)
POTASSIUM SERPL-MCNC: 3.6 MMOL/L — SIGNIFICANT CHANGE UP (ref 3.5–5.3)
POTASSIUM SERPL-MCNC: 3.8 MMOL/L — SIGNIFICANT CHANGE UP (ref 3.5–5.3)
POTASSIUM SERPL-SCNC: 3.6 MMOL/L — SIGNIFICANT CHANGE UP (ref 3.5–5.3)
POTASSIUM SERPL-SCNC: 3.8 MMOL/L — SIGNIFICANT CHANGE UP (ref 3.5–5.3)
RBC # BLD: 3.1 M/UL — LOW (ref 4.2–5.8)
RBC # BLD: 3.2 M/UL — LOW (ref 4.2–5.8)
RBC # FLD: 13 % — SIGNIFICANT CHANGE UP (ref 10.3–14.5)
RBC # FLD: 13.1 % — SIGNIFICANT CHANGE UP (ref 10.3–14.5)
SODIUM SERPL-SCNC: 144 MMOL/L — SIGNIFICANT CHANGE UP (ref 135–145)
SODIUM SERPL-SCNC: 144 MMOL/L — SIGNIFICANT CHANGE UP (ref 135–145)
SPECIMEN SOURCE: SIGNIFICANT CHANGE UP
SPECIMEN SOURCE: SIGNIFICANT CHANGE UP
WBC # BLD: 4.94 K/UL — SIGNIFICANT CHANGE UP (ref 3.8–10.5)
WBC # BLD: 5.98 K/UL — SIGNIFICANT CHANGE UP (ref 3.8–10.5)
WBC # FLD AUTO: 4.94 K/UL — SIGNIFICANT CHANGE UP (ref 3.8–10.5)
WBC # FLD AUTO: 5.98 K/UL — SIGNIFICANT CHANGE UP (ref 3.8–10.5)

## 2021-10-07 PROCEDURE — 99232 SBSQ HOSP IP/OBS MODERATE 35: CPT

## 2021-10-07 PROCEDURE — 99233 SBSQ HOSP IP/OBS HIGH 50: CPT

## 2021-10-07 RX ORDER — POLYETHYLENE GLYCOL 3350 17 G/17G
17 POWDER, FOR SOLUTION ORAL DAILY
Refills: 0 | Status: DISCONTINUED | OUTPATIENT
Start: 2021-10-07 | End: 2021-10-09

## 2021-10-07 RX ORDER — SODIUM CHLORIDE 9 MG/ML
1000 INJECTION INTRAMUSCULAR; INTRAVENOUS; SUBCUTANEOUS
Refills: 0 | Status: DISCONTINUED | OUTPATIENT
Start: 2021-10-07 | End: 2021-10-09

## 2021-10-07 RX ORDER — METOPROLOL TARTRATE 50 MG
12.5 TABLET ORAL
Refills: 0 | Status: DISCONTINUED | OUTPATIENT
Start: 2021-10-07 | End: 2021-10-09

## 2021-10-07 RX ORDER — SODIUM CHLORIDE 9 MG/ML
250 INJECTION INTRAMUSCULAR; INTRAVENOUS; SUBCUTANEOUS ONCE
Refills: 0 | Status: COMPLETED | OUTPATIENT
Start: 2021-10-07 | End: 2021-10-07

## 2021-10-07 RX ADMIN — PIPERACILLIN AND TAZOBACTAM 25 GRAM(S): 4; .5 INJECTION, POWDER, LYOPHILIZED, FOR SOLUTION INTRAVENOUS at 06:10

## 2021-10-07 RX ADMIN — PIPERACILLIN AND TAZOBACTAM 25 GRAM(S): 4; .5 INJECTION, POWDER, LYOPHILIZED, FOR SOLUTION INTRAVENOUS at 13:02

## 2021-10-07 RX ADMIN — SENNA PLUS 2 TABLET(S): 8.6 TABLET ORAL at 21:19

## 2021-10-07 RX ADMIN — AMIODARONE HYDROCHLORIDE 400 MILLIGRAM(S): 400 TABLET ORAL at 06:10

## 2021-10-07 RX ADMIN — SODIUM CHLORIDE 250 MILLILITER(S): 9 INJECTION INTRAMUSCULAR; INTRAVENOUS; SUBCUTANEOUS at 18:09

## 2021-10-07 RX ADMIN — Medication 1 TABLET(S): at 18:03

## 2021-10-07 RX ADMIN — PANTOPRAZOLE SODIUM 40 MILLIGRAM(S): 20 TABLET, DELAYED RELEASE ORAL at 13:03

## 2021-10-07 RX ADMIN — RISPERIDONE 2 MILLIGRAM(S): 4 TABLET ORAL at 21:20

## 2021-10-07 RX ADMIN — SODIUM CHLORIDE 60 MILLILITER(S): 9 INJECTION INTRAMUSCULAR; INTRAVENOUS; SUBCUTANEOUS at 18:10

## 2021-10-07 NOTE — PROGRESS NOTE ADULT - SUBJECTIVE AND OBJECTIVE BOX
[facility] -- Warren Posada, Abelardo Amos, Thaddeus Verdugo [place], [place]  Office # [phone]    Follow Up:  Cardiac Optimization    Subjective/Observations: Sitting on the chair, very sleepy but verbally responsive.  Comfortable on RA.  Denies N/V or abdominal pain.  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 1000 milliLiter(s) (100 mL/Hr) IV Continuous <Continuous>  pantoprazole  Injectable 40 milliGRAM(s) IV Push daily  piperacillin/tazobactam IVPB.. 3.375 Gram(s) IV Intermittent every 8 hours  risperiDONE   Tablet 2 milliGRAM(s) Oral at bedtime  senna 2 Tablet(s) Oral at bedtime    MEDICATIONS  (PRN):  metoprolol tartrate Injectable 2.5 milliGRAM(s) IV Push every 6 hours PRN HR > 120    Allergies    vancomycin (Nephrotoxicity)    Intolerances    Vital Signs Last 24 Hrs  T(C): 36.7 (07 Oct 2021 06:13), Max: 36.7 (06 Oct 2021 11:49)  T(F): 98 (07 Oct 2021 06:13), Max: 98 (06 Oct 2021 11:49)  HR: 86 (07 Oct 2021 06:13) (69 - 86)  BP: 109/65 (07 Oct 2021 06:13) (93/59 - 118/68)  BP(mean): --  RR: 18 (07 Oct 2021 06:13) (18 - 18)  SpO2: 94% (07 Oct 2021 06:13) (93% - 95%)  I&O's Summary    06 Oct 2021 07:01  -  07 Oct 2021 07:00  --------------------------------------------------------  IN: 200 mL / OUT: 1000 mL / NET: -800 mL     PHYSICAL EXAM:  TELE: Afib  Constitutional: NAD, lethargic, well-developed  HEENT: Moist Mucous Membranes, Anicteric  Pulmonary: Non-labored, breath sounds are clear but diminished bilaterally, No wheezing, rales +rhonchi at bases  Cardiovascular: IRRR, S1 and S2, No murmurs, rubs, gallops or clicks  Gastrointestinal: Bowel Sounds present, soft, nontender.   Lymph: No peripheral edema. No lymphadenopathy.  Skin: No visible rashes or ulcers.  Psych:  Mood & affect: Flat  LABS: All Labs Reviewed:                        9.6    4.94  )-----------( 130      ( 07 Oct 2021 08:09 )             29.8                         9.5    4.25  )-----------( 128      ( 06 Oct 2021 07:47 )             29.1                         9.8    5.07  )-----------( 137      ( 05 Oct 2021 06:22 )             29.4     07 Oct 2021 08:09    144    |  113    |  8      ----------------------------<  121    3.8     |  25     |  1.30   06 Oct 2021 07:47    142    |  113    |  9      ----------------------------<  126    3.7     |  25     |  1.20   05 Oct 2021 06:22    142    |  112    |  12     ----------------------------<  128    3.5     |  25     |  1.10     Ca    7.6        07 Oct 2021 08:09  Ca    7.5        06 Oct 2021 07:47  Ca    7.6        05 Oct 2021 06:22  Phos  2.6       06 Oct 2021 07:47  Phos  2.6       05 Oct 2021 06:22  Mg     1.9       06 Oct 2021 07:47  Mg     1.8       05 Oct 2021 06:22    TPro  5.0    /  Alb  2.1    /  TBili  0.4    /  DBili  x      /  AST  16     /  ALT  9      /  AlkPhos  33     06 Oct 2021 07:47  TPro  5.1    /  Alb  2.2    /  TBili  0.6    /  DBili  x      /  AST  14     /  ALT  9      /  AlkPhos  41     05 Oct 2021 06:22       EXAM:  ECHO TTE WO CON COMP W DOPPLR         PROCEDURE DATE:  02/27/2020        INTERPRETATION:  INDICATION: Heart failure  Sonographer PH    Blood Pressure 107/68    Height 177.8 cm     Weight 86.2 kg       BSA 2.0 sq m    Dimensions:    LA 3.7    Normal Values: 2.0 - 4.0 cm    Ao 3.3        Normal Values: 2.0 - 3.8 cm  SEPTUM Normal Values: 0.6 - 1.2 cm  PWT Normal Values: 0.6 - 1.1 cm  LVIDd Normal Values: 3.0 - 5.6 cm  LVIDs Normal Values: 1.8 - 4.0 cm      OBSERVATIONS:  Technically difficult and limited study  Mitral Valve: normal, trace physiologic MR.  Aortic Valve/Aorta: Sclerotic trileaflet aortic valve with normal opening.  Tricuspid Valve: normal with trace TR.  Pulmonic Valve: Not well-visualized  Left Atrium: normal  RightAtrium: Not well-visualized  Left Ventricle: normal LV size and systolic function, estimated LVEF of 55%.  Right Ventricle: Not well-visualized  Pericardium/Pleura: normal, small anterior pericardial effusion without signs of hemodynamic compromise.  Pulmonary/RV Pressure: estimated PA systolic pressure of 34mmHg. IVC is dilated    Conclusion:   Technically difficult and limited study  Normal left ventricular internal dimensions and systolic function, estimated LVEF of 55%.   Right ventricle is not well-visualized  Sclerotic trileaflet aortic valve, without AI.   Trace physiologic MR and TR.       TYLER AYALA   This document has been electronically signed. Feb 28 2020  8:07AM        EXAM:  CT ABDOMEN AND PELVIS                          EXAM:  CT CHEST                                  PROCEDURE DATE:  10/02/2021          INTERPRETATION:  CLINICAL INFORMATION: Fever, history of left sided empyema    COMPARISON: CT chest 3/9/2020, pet/CT 6/19/2019.    CONTRAST/COMPLICATIONS:  IV Contrast: NONE  Oral Contrast: NONE  Complications: None reported at time of study completion    PROCEDURE:  CT of the Chest, Abdomen and Pelvis was performed.  Sagittal and coronal reformats were performed.    FINDINGS: Evaluation of the solid organs and vasculature is limited in the absence of intravenous contrast.  CHEST:  LUNGS AND LARGE AIRWAYS: Patent central airways. No pulmonary nodules.  PLEURA: No pleural effusion. Right pleural calcifications.  VESSELS: Aortic, aortic valvular and coronary artery calcifications.  HEART: Heart size is normal. No pericardial effusion.  MEDIASTINUM AND KAELYN: No lymphadenopathy.  CHEST WALL AND LOWER NECK: Within normal limits.    ABDOMEN AND PELVIS:  LIVER: Within normal limits.  BILE DUCTS: Normal caliber.  GALLBLADDER: Large gallbladder stone again noted. There is suggestion of gallbladder wall thickening, and pericholecystic fluid tracking inferiorly.  SPLEEN: Within normal limits.  PANCREAS: Within normal limits.  ADRENALS: Within normal limits.  KIDNEYS/URETERS: Nonobstructing 4 mm left renal calculus. Small right renal cyst. No hydronephrosis.    BLADDER: Within normal limits.  REPRODUCTIVE ORGANS: Prostate is enlarged.    BOWEL: Colonic,including right-sided, diverticulosis. Trace free fluid in the right paracolic gutter adjacent to diverticulosis may be due to gallbladder inflammation. No colonic wall to suggest acute diverticulitis. No bowel obstruction. Appendix is normal.  PERITONEUM: Trace pelvic free fluid. No pneumoperitoneum  VESSELS: Atherosclerotic changes.  RETROPERITONEUM/LYMPH NODES: No lymphadenopathy.  ABDOMINAL WALL: Within normal limits.  BONES: Diffuse degenerative changes. Status post posterior lumbar laminectomies. Mixed lucent and sclerotic lesion in the right mid humeral shaft is of unknown etiology, but unchanged since PET of 6/19/2019.    IMPRESSION:  Cholelithiasis with gallbladder wall thickening and pericholecystic edema, which extends to the rightparacolic gutter. Findings are suspicious for cholecystitis. No colonic wall thickening to suggest diverticulitis. Correlate with patient's symptomatology and right upper quadrant ultrasound.    Incidental findings as above.    --- End of Report ---    MATEO NEVILLE MD; Attending Radiologist  This document has been electronically signed. Oct  2 2021  3:41PM    Ventricular Rate 90 BPM    Atrial Rate 90 BPM    P-R Interval 226 ms    QRS Duration 98 ms    Q-T Interval 368 ms    QTC Calculation(Bazett) 450 ms    P Axis 61 degrees    R Axis -67 degrees    T Axis 47 degrees    Diagnosis Line *** Poor data quality, interpretation may be adversely affected  Sinus rhythm with 1st degree AV block  Left axis deviation  Inferior infarct , age undetermined  Anterior infarct (cited on or before 25-FEB-2020)  Abnormal ECG  When compared with ECG of 02-OCT-2021 13:30,  Significant changes have occurred  Confirmed by Leatha Mauricio MD ([id number]) on 10/3/2021 11:16:35 AM

## 2021-10-07 NOTE — PROGRESS NOTE ADULT - SUBJECTIVE AND OBJECTIVE BOX
CHARLY LU is a 89yMale , patient examined and chart reviewed.     INTERVAL HPI/ OVERNIGHT EVENTS:   Afebrile. Lethargic.   No events. NAD.    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    ROS:  Unable to obtain due to : Dementia    ALLERGIES  vancomycin (Nephrotoxicity)      Current inpatient medications :    ANTIBIOTICS/RELEVANT:  amoxicillin  875 milliGRAM(s)/clavulanate 1 Tablet(s) Oral every 12 hours    MEDICATIONS  (STANDING):  metoprolol tartrate 12.5 milliGRAM(s) Oral two times a day  pantoprazole  Injectable 40 milliGRAM(s) IV Push daily  polyethylene glycol 3350 17 Gram(s) Oral daily  risperiDONE   Tablet 2 milliGRAM(s) Oral at bedtime  senna 2 Tablet(s) Oral at bedtime        Objective:  Vital Signs Last 24 Hrs  T(C): 36.4 (07 Oct 2021 14:16), Max: 36.7 (07 Oct 2021 06:13)  T(F): 97.5 (07 Oct 2021 14:16), Max: 98 (07 Oct 2021 06:13)  HR: 63 (07 Oct 2021 14:16) (63 - 86)  BP: 124/73 (07 Oct 2021 14:16) (109/65 - 124/73)  RR: 18 (07 Oct 2021 14:16) (18 - 18)  SpO2: 93% (07 Oct 2021 14:16) (93% - 95%)    Physical Exam:  General: weak  no acute distress  Neck: supple, trachea midline  Lungs: clear, no wheeze/rhonchi  Cardiovascular: regular rate and rhythm, S1 S2  Abdomen: soft, nontender,  bowel sounds normal  Neurological: Dementia  Extremities: no edema      LABS:                        9.9    5.98  )-----------( 156      ( 07 Oct 2021 11:08 )             31.1   10-07    144  |  113<H>  |  8   ----------------------------<  158<H>  3.6   |  24  |  1.40<H>    Ca    7.9<L>      07 Oct 2021 11:08  Phos  2.6     10-06  Mg     1.9     10-06    TPro  5.0<L>  /  Alb  2.1<L>  /  TBili  0.4  /  DBili  x   /  AST  16  /  ALT  9<L>  /  AlkPhos  33<L>  10-06    MICROBIOLOGY:    Culture - Blood (collected 02 Oct 2021 22:50)  Source: .Blood Blood-Peripheral  Preliminary Report (03 Oct 2021 23:01):    No growth to date.    Culture - Blood (collected 02 Oct 2021 22:50)  Source: .Blood Blood-Peripheral  Preliminary Report (03 Oct 2021 23:01):    No growth to date.    Culture - Urine (collected 02 Oct 2021 22:49)  Source: Clean Catch Clean Catch (Midstream)  Final Report (03 Oct 2021 20:27):    No growth    RADIOLOGY & ADDITIONAL STUDIES:  EXAM:  NM HEPATOBILIARY IMG                            PROCEDURE DATE:  10/04/2021          INTERPRETATION:  CLINICAL STATEMENT: 89-year-old male with cholelithiasis, fever, and gallbladder wall thickening and pericholecystic fluid on CT.    RADIOPHARMACEUTICAL: 3.15 mCi and 2.95 mCi Tc-99m-Mebrofenin, I.V.; 2 doses    TECHNIQUE:  Dynamic images of the anterior abdomen were obtained for 1 hour following injection of radiotracer. Morphine 2mg I.V. and a second dose of radiotracer were administered at 1 hour. Dynamic imaging was continued for 1 hour followed by static images of the abdomen in the right lateral and right anterior oblique views immediately thereafter.    FINDINGS: There is prompt, homogeneous uptake of radiotracer by the hepatocytes. Activity is first seen in the bowel at 15 minutes. The gallbladder is not visualized at any time during the study, despite administration of morphine. There is good clearance of activity from the liver at the end of the study.    IMPRESSION: Abnormal morphine-augmented hepatobiliary scan.    Findings consistent with acute cholecystitis.    EXAM:  CT ABDOMEN AND PELVIS                          EXAM:  CT CHEST                                  PROCEDURE DATE:  10/02/2021          INTERPRETATION:  CLINICAL INFORMATION: Fever, history of left sided empyema    COMPARISON: CT chest 3/9/2020, pet/CT 6/19/2019.    CONTRAST/COMPLICATIONS:  IV Contrast: NONE  Oral Contrast: NONE  Complications: None reported at time of study completion    PROCEDURE:  CT of the Chest, Abdomen and Pelvis was performed.  Sagittal and coronal reformats were performed.    FINDINGS: Evaluation of the solid organs and vasculature is limited in the absence of intravenous contrast.  CHEST:  LUNGS AND LARGE AIRWAYS: Patent central airways. No pulmonary nodules.  PLEURA: No pleural effusion. Right pleural calcifications.  VESSELS: Aortic, aortic valvular and coronary artery calcifications.  HEART: Heart size is normal. No pericardial effusion.  MEDIASTINUM AND KAELYN: No lymphadenopathy.  CHEST WALL AND LOWER NECK: Within normal limits.    ABDOMEN AND PELVIS:  LIVER: Within normal limits.  BILE DUCTS: Normal caliber.  GALLBLADDER: Large gallbladder stone again noted. There is suggestion of gallbladder wall thickening, and pericholecystic fluid tracking inferiorly.  SPLEEN: Within normal limits.  PANCREAS: Within normal limits.  ADRENALS: Within normal limits.  KIDNEYS/URETERS: Nonobstructing 4 mm left renal calculus. Small right renal cyst. No hydronephrosis.    BLADDER: Within normal limits.  REPRODUCTIVE ORGANS: Prostate is enlarged.    BOWEL: Colonic,including right-sided, diverticulosis. Trace free fluid in the right paracolic gutter adjacent to diverticulosis may be due to gallbladder inflammation. No colonic wall to suggest acute diverticulitis. No bowel obstruction. Appendix is normal.  PERITONEUM: Trace pelvic free fluid. No pneumoperitoneum  VESSELS: Atherosclerotic changes.  RETROPERITONEUM/LYMPH NODES: No lymphadenopathy.  ABDOMINAL WALL: Within normal limits.  BONES: Diffuse degenerative changes. Status post posterior lumbar laminectomies. Mixed lucent and sclerotic lesion in the right mid humeral shaft is of unknown etiology, but unchanged since PET of 6/19/2019.    IMPRESSION:  Cholelithiasis with gallbladder wall thickening and pericholecystic edema, which extends to the rightparacolic gutter. Findings are suspicious for cholecystitis. No colonic wall thickening to suggest diverticulitis. Correlate with patient's symptomatology and right upper quadrant ultrasound.      Assessment :   89M with dementia, hx of CLL, HLD, osteoarthritis admitted with sepsis with shock sec Acute cholecystitis   Sp shock Did not need pressors- BP stable  Afib   Conservative management    Plan :   Off Zosyn  Completed course of po Augmentin to complete total antibiotic course of 10 days    Trend temps and cbc  Surgery d/w family, would prefer medical management at this time, if worsens will need perc jackelin vs jackelin  Strict asp precautions  Dc planning per surgery    Continue with present regiment.  Appropriate use of antibiotics and adverse effects reviewed.    35 minutes were spent in direct patient care reviewing notes, medications ,labs data/ imaging , discussion with multidisciplinary team.    Thank you for allowing me to participate in care of your patient .    Jose Kiran MD  Infectious Disease  [phone]      CHARLY LU is a 89yMale , patient examined and chart reviewed.     INTERVAL HPI/ OVERNIGHT EVENTS:   Afebrile.  No events. NAD.    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    ROS:  Unable to obtain due to : Dementia    ALLERGIES  vancomycin (Nephrotoxicity)      Current inpatient medications :    ANTIBIOTICS/RELEVANT:  amoxicillin  875 milliGRAM(s)/clavulanate 1 Tablet(s) Oral every 12 hours    MEDICATIONS  (STANDING):  metoprolol tartrate 12.5 milliGRAM(s) Oral two times a day  pantoprazole  Injectable 40 milliGRAM(s) IV Push daily  polyethylene glycol 3350 17 Gram(s) Oral daily  risperiDONE   Tablet 2 milliGRAM(s) Oral at bedtime  senna 2 Tablet(s) Oral at bedtime        Objective:  Vital Signs Last 24 Hrs  T(C): 36.4 (07 Oct 2021 14:16), Max: 36.7 (07 Oct 2021 06:13)  T(F): 97.5 (07 Oct 2021 14:16), Max: 98 (07 Oct 2021 06:13)  HR: 63 (07 Oct 2021 14:16) (63 - 86)  BP: 124/73 (07 Oct 2021 14:16) (109/65 - 124/73)  RR: 18 (07 Oct 2021 14:16) (18 - 18)  SpO2: 93% (07 Oct 2021 14:16) (93% - 95%)    Physical Exam:  General: weak  no acute distress  Neck: supple, trachea midline  Lungs: clear, no wheeze/rhonchi  Cardiovascular: regular rate and rhythm, S1 S2  Abdomen: soft, nontender,  bowel sounds normal  Neurological: Dementia  Extremities: no edema      LABS:                        9.9    5.98  )-----------( 156      ( 07 Oct 2021 11:08 )             31.1   10-07    144  |  113<H>  |  8   ----------------------------<  158<H>  3.6   |  24  |  1.40<H>    Ca    7.9<L>      07 Oct 2021 11:08  Phos  2.6     10-06  Mg     1.9     10-06    TPro  5.0<L>  /  Alb  2.1<L>  /  TBili  0.4  /  DBili  x   /  AST  16  /  ALT  9<L>  /  AlkPhos  33<L>  10-06    MICROBIOLOGY:    Culture - Blood (collected 02 Oct 2021 22:50)  Source: .Blood Blood-Peripheral  Preliminary Report (03 Oct 2021 23:01):    No growth to date.    Culture - Blood (collected 02 Oct 2021 22:50)  Source: .Blood Blood-Peripheral  Preliminary Report (03 Oct 2021 23:01):    No growth to date.    Culture - Urine (collected 02 Oct 2021 22:49)  Source: Clean Catch Clean Catch (Midstream)  Final Report (03 Oct 2021 20:27):    No growth    RADIOLOGY & ADDITIONAL STUDIES:  EXAM:  NM HEPATOBILIARY IMG                            PROCEDURE DATE:  10/04/2021          INTERPRETATION:  CLINICAL STATEMENT: 89-year-old male with cholelithiasis, fever, and gallbladder wall thickening and pericholecystic fluid on CT.    RADIOPHARMACEUTICAL: 3.15 mCi and 2.95 mCi Tc-99m-Mebrofenin, I.V.; 2 doses    TECHNIQUE:  Dynamic images of the anterior abdomen were obtained for 1 hour following injection of radiotracer. Morphine 2mg I.V. and a second dose of radiotracer were administered at 1 hour. Dynamic imaging was continued for 1 hour followed by static images of the abdomen in the right lateral and right anterior oblique views immediately thereafter.    FINDINGS: There is prompt, homogeneous uptake of radiotracer by the hepatocytes. Activity is first seen in the bowel at 15 minutes. The gallbladder is not visualized at any time during the study, despite administration of morphine. There is good clearance of activity from the liver at the end of the study.    IMPRESSION: Abnormal morphine-augmented hepatobiliary scan.    Findings consistent with acute cholecystitis.    EXAM:  CT ABDOMEN AND PELVIS                          EXAM:  CT CHEST                                  PROCEDURE DATE:  10/02/2021          INTERPRETATION:  CLINICAL INFORMATION: Fever, history of left sided empyema    COMPARISON: CT chest 3/9/2020, pet/CT 6/19/2019.    CONTRAST/COMPLICATIONS:  IV Contrast: NONE  Oral Contrast: NONE  Complications: None reported at time of study completion    PROCEDURE:  CT of the Chest, Abdomen and Pelvis was performed.  Sagittal and coronal reformats were performed.    FINDINGS: Evaluation of the solid organs and vasculature is limited in the absence of intravenous contrast.  CHEST:  LUNGS AND LARGE AIRWAYS: Patent central airways. No pulmonary nodules.  PLEURA: No pleural effusion. Right pleural calcifications.  VESSELS: Aortic, aortic valvular and coronary artery calcifications.  HEART: Heart size is normal. No pericardial effusion.  MEDIASTINUM AND KAELYN: No lymphadenopathy.  CHEST WALL AND LOWER NECK: Within normal limits.    ABDOMEN AND PELVIS:  LIVER: Within normal limits.  BILE DUCTS: Normal caliber.  GALLBLADDER: Large gallbladder stone again noted. There is suggestion of gallbladder wall thickening, and pericholecystic fluid tracking inferiorly.  SPLEEN: Within normal limits.  PANCREAS: Within normal limits.  ADRENALS: Within normal limits.  KIDNEYS/URETERS: Nonobstructing 4 mm left renal calculus. Small right renal cyst. No hydronephrosis.    BLADDER: Within normal limits.  REPRODUCTIVE ORGANS: Prostate is enlarged.    BOWEL: Colonic,including right-sided, diverticulosis. Trace free fluid in the right paracolic gutter adjacent to diverticulosis may be due to gallbladder inflammation. No colonic wall to suggest acute diverticulitis. No bowel obstruction. Appendix is normal.  PERITONEUM: Trace pelvic free fluid. No pneumoperitoneum  VESSELS: Atherosclerotic changes.  RETROPERITONEUM/LYMPH NODES: No lymphadenopathy.  ABDOMINAL WALL: Within normal limits.  BONES: Diffuse degenerative changes. Status post posterior lumbar laminectomies. Mixed lucent and sclerotic lesion in the right mid humeral shaft is of unknown etiology, but unchanged since PET of 6/19/2019.    IMPRESSION:  Cholelithiasis with gallbladder wall thickening and pericholecystic edema, which extends to the rightparacolic gutter. Findings are suspicious for cholecystitis. No colonic wall thickening to suggest diverticulitis. Correlate with patient's symptomatology and right upper quadrant ultrasound.      Assessment :   89M with dementia, hx of CLL, HLD, osteoarthritis admitted with sepsis with shock sec Acute cholecystitis   Sp shock Did not need pressors- BP stable  Afib   Conservative management    Plan :   Off Zosyn  Completed course of po Augmentin to complete total antibiotic course of 10 days    Trend temps and cbc  Surgery d/w family, would prefer medical management at this time, if worsens will need perc jackelin vs jackelin  Strict asp precautions  Dc planning per surgery    Continue with present regiment.  Appropriate use of antibiotics and adverse effects reviewed.    35 minutes were spent in direct patient care reviewing notes, medications ,labs data/ imaging , discussion with multidisciplinary team.    Thank you for allowing me to participate in care of your patient .    Jose Kiran MD  Infectious Disease  [phone]

## 2021-10-07 NOTE — PROGRESS NOTE ADULT - ASSESSMENT
89 y/o M with PMHx of dementia, paroxsymal atrial fibrillation (not on AC), hx of CLL, HLD, osteoarthritis transfer from [facility] with severe sepsis 2/2 possible cholecystitis.     Afib with RVR  - Hx of pAF, in the setting of stressors - previously during empyema and now with acute cholecystitis   - Afib with RVR on admission at [place] on initial EKG: Afib RVR at 128bpm, repeat with sinus with 1st degree AV block  - HR are now mostly controlled.  Will discontinue tele  - As he remains in Afib while on Amio, will D/C and start on BB for rate-contro  - Off AC due to hx of hematuria with anemia and dementia.    - Previous ECHO 2/2020 with EF of 55%.  No need to repeat    Hypotension   - BP now stable for the most part  - Not on home BP meds    Acute cholecystitis   - HIDA +.  Family opting for conservative management for now  - Surgery and ID following.  Abx per ID  - If planned for any surgical/IR procedure, he is considered at elevated risk but no modifiable active cardiac risk factors at the moment.  He is optimized as best as possible from cardiovascular standpoint.  Routine hemodynamic monitoring recommended    - All other workup per primary team.  - Will continue to follow.    Aster Barth DNP, NP-C  Cardiology   Spectra #[phone]

## 2021-10-07 NOTE — PROGRESS NOTE ADULT - ASSESSMENT
89 y/o M with PMHx of dementia, paroxysmal atrial fibrillation (not on AC), hx of CLL, HLD, osteoarthritis presented to [facility] ED with an acute episode of rigors and unresponsiveness admitted with severe sepsis/shock secondary to acute cholecystitis.    on PO diet  on IVF  Surgery f/u noted    medical management of sepsis - acute jackelin  surgery  gi  ABX  I and O  monitor VS and HD and Sat  serial labs  serial ABD exam

## 2021-10-07 NOTE — PROGRESS NOTE ADULT - ASSESSMENT
As above in PA notation.  Patient personally seen and examined.  Mr. Winters continues to have no abdominal pain.  Not eating much, but denies nausea or GI complaint "I just don't like the food..."  Vitals non-suggestive.  Abdomen soft, non tense and completely non-tender.  RUQ specifically WNL.  Labs reassuring today.  Clinically, stable to improving overall.  Surgically, no indication for intervention at this time.  To continue current supportive care.   Will stop IV ABX today.  Started on PO ABX this pm.  Will add Miralax to GI regimen.  If no events tomorrow, then considering for discharge Saturday.  Family wishes for discharge to home if possible.  PT involved and following...

## 2021-10-07 NOTE — PROGRESS NOTE ADULT - SUBJECTIVE AND OBJECTIVE BOX
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 1000 milliLiter(s) (100 mL/Hr) IV Continuous <Continuous>  pantoprazole  Injectable 40 milliGRAM(s) IV Push daily  piperacillin/tazobactam IVPB.. 3.375 Gram(s) IV Intermittent every 8 hours  risperiDONE   Tablet 2 milliGRAM(s) Oral at bedtime  senna 2 Tablet(s) Oral at bedtime    MEDICATIONS  (PRN):  metoprolol tartrate Injectable 2.5 milliGRAM(s) IV Push every 6 hours PRN HR > 120         Vitals log        ICU Vital Signs Last 24 Hrs  T(C): 36.6 (06 Oct 2021 20:18), Max: 36.7 (06 Oct 2021 11:49)  T(F): 97.9 (06 Oct 2021 20:18), Max: 98 (06 Oct 2021 11:49)  HR: 76 (06 Oct 2021 20:18) (69 - 76)  BP: 118/68 (06 Oct 2021 20:18) (93/59 - 127/69)  BP(mean): --  ABP: --  ABP(mean): --  RR: 18 (06 Oct 2021 20:18) (18 - 18)  SpO2: 95% (06 Oct 2021 20:18) (93% - 97%)           Input and Output:  I&O's Detail    05 Oct 2021 07:01  -  06 Oct 2021 07:00  --------------------------------------------------------  IN:    dextrose 5% + sodium chloride 0.9%: 600 mL    IV PiggyBack: 100 mL    Sodium Chloride 0.9% Bolus: 250 mL  Total IN: 950 mL    OUT:    Voided (mL): 625 mL  Total OUT: 625 mL    Total NET: 325 mL      06 Oct 2021 07:01  -  07 Oct 2021 05:25  --------------------------------------------------------  IN:    Oral Fluid: 200 mL  Total IN: 200 mL    OUT:    Voided (mL): 800 mL  Total OUT: 800 mL    Total NET: -600 mL          Lab Data                        9.5    4.25  )-----------( 128      ( 06 Oct 2021 07:47 )             29.1     10-06    142  |  113<H>  |  9   ----------------------------<  126<H>  3.7   |  25  |  1.20    Ca    7.5<L>      06 Oct 2021 07:47  Phos  2.6     10-06  Mg     1.9     10-06    TPro  5.0<L>  /  Alb  2.1<L>  /  TBili  0.4  /  DBili  x   /  AST  16  /  ALT  9<L>  /  AlkPhos  33<L>  10-06            Review of Systems	      Objective     Physical Examination    heart s1s2  lung dec BS  abd soft  head nc  frail  weak      Pertinent Lab findings & Imaging      Hamlet:  NO   Adequate UO     I&O's Detail    05 Oct 2021 07:01  -  06 Oct 2021 07:00  --------------------------------------------------------  IN:    dextrose 5% + sodium chloride 0.9%: 600 mL    IV PiggyBack: 100 mL    Sodium Chloride 0.9% Bolus: 250 mL  Total IN: 950 mL    OUT:    Voided (mL): 625 mL  Total OUT: 625 mL    Total NET: 325 mL      06 Oct 2021 07:01  -  07 Oct 2021 05:25  --------------------------------------------------------  IN:    Oral Fluid: 200 mL  Total IN: 200 mL    OUT:    Voided (mL): 800 mL  Total OUT: 800 mL    Total NET: -600 mL               Discussed with:     Cultures:	        Radiology

## 2021-10-07 NOTE — PROGRESS NOTE ADULT - SUBJECTIVE AND OBJECTIVE BOX
SUBJECTIVE:    No acute events overnight, afebrile, hds.  Pt alert and awake today, sitting in chair, and states that he slept well.  No abd pn.    VITAL SIGNS:    Vital Signs Last 24 Hrs  T(C): 36.7 (07 Oct 2021 06:13), Max: 36.7 (07 Oct 2021 06:13)  T(F): 98 (07 Oct 2021 06:13), Max: 98 (07 Oct 2021 06:13)  HR: 86 (07 Oct 2021 06:13) (76 - 86)  BP: 109/65 (07 Oct 2021 06:13) (109/65 - 118/68)  BP(mean): --  RR: 18 (07 Oct 2021 06:13) (18 - 18)  SpO2: 94% (07 Oct 2021 06:13) (94% - 95%)    PHYSICAL EXAM:     GENERAL: NAD  HEENT: perrla, eomi  RESPIRATORY: CTAB  CARDIOVASCULAR: s1 and s2, rrr, no m/r/g  ABDOMINAL: soft, non-tender, non-distended, positive bowel sounds   EXTREMITIES: no clubbing, cyanosis, or edema  NEUROLOGICAL: alert, nonfocal  SKIN: no new rashes or lesions   MUSCULOSKELETAL: no gross joint deformity                          9.9    5.98  )-----------( 156      ( 07 Oct 2021 11:08 )             31.1     10-07    144  |  113<H>  |  8   ----------------------------<  158<H>  3.6   |  24  |  1.40<H>    Ca    7.9<L>      07 Oct 2021 11:08  Phos  2.6     10-06  Mg     1.9     10-06    TPro  5.0<L>  /  Alb  2.1<L>  /  TBili  0.4  /  DBili  x   /  AST  16  /  ALT  9<L>  /  AlkPhos  33<L>  10-06      CAPILLARY BLOOD GLUCOSE          MEDICATIONS  (STANDING):  dextrose 5% + sodium chloride 0.9%. 1000 milliLiter(s) (100 mL/Hr) IV Continuous <Continuous>  pantoprazole  Injectable 40 milliGRAM(s) IV Push daily  piperacillin/tazobactam IVPB.. 3.375 Gram(s) IV Intermittent every 8 hours  risperiDONE   Tablet 2 milliGRAM(s) Oral at bedtime  senna 2 Tablet(s) Oral at bedtime

## 2021-10-08 ENCOUNTER — TRANSCRIPTION ENCOUNTER (OUTPATIENT)
Age: 86
End: 2021-10-08

## 2021-10-08 LAB
ANION GAP SERPL CALC-SCNC: 5 MMOL/L — SIGNIFICANT CHANGE UP (ref 5–17)
BUN SERPL-MCNC: 10 MG/DL — SIGNIFICANT CHANGE UP (ref 7–23)
CALCIUM SERPL-MCNC: 7.7 MG/DL — LOW (ref 8.5–10.1)
CHLORIDE SERPL-SCNC: 115 MMOL/L — HIGH (ref 96–108)
CO2 SERPL-SCNC: 23 MMOL/L — SIGNIFICANT CHANGE UP (ref 22–31)
CREAT SERPL-MCNC: 1.6 MG/DL — HIGH (ref 0.5–1.3)
GLUCOSE SERPL-MCNC: 88 MG/DL — SIGNIFICANT CHANGE UP (ref 70–99)
HCT VFR BLD CALC: 27.5 % — LOW (ref 39–50)
HGB BLD-MCNC: 8.8 G/DL — LOW (ref 13–17)
MCHC RBC-ENTMCNC: 30.7 PG — SIGNIFICANT CHANGE UP (ref 27–34)
MCHC RBC-ENTMCNC: 32 GM/DL — SIGNIFICANT CHANGE UP (ref 32–36)
MCV RBC AUTO: 95.8 FL — SIGNIFICANT CHANGE UP (ref 80–100)
NRBC # BLD: 0 /100 WBCS — SIGNIFICANT CHANGE UP (ref 0–0)
PLATELET # BLD AUTO: 133 K/UL — LOW (ref 150–400)
POTASSIUM SERPL-MCNC: 3.8 MMOL/L — SIGNIFICANT CHANGE UP (ref 3.5–5.3)
POTASSIUM SERPL-SCNC: 3.8 MMOL/L — SIGNIFICANT CHANGE UP (ref 3.5–5.3)
RBC # BLD: 2.87 M/UL — LOW (ref 4.2–5.8)
RBC # FLD: 13.1 % — SIGNIFICANT CHANGE UP (ref 10.3–14.5)
SODIUM SERPL-SCNC: 143 MMOL/L — SIGNIFICANT CHANGE UP (ref 135–145)
WBC # BLD: 6.68 K/UL — SIGNIFICANT CHANGE UP (ref 3.8–10.5)
WBC # FLD AUTO: 6.68 K/UL — SIGNIFICANT CHANGE UP (ref 3.8–10.5)

## 2021-10-08 PROCEDURE — 99232 SBSQ HOSP IP/OBS MODERATE 35: CPT

## 2021-10-08 PROCEDURE — 99233 SBSQ HOSP IP/OBS HIGH 50: CPT

## 2021-10-08 RX ADMIN — POLYETHYLENE GLYCOL 3350 17 GRAM(S): 17 POWDER, FOR SOLUTION ORAL at 12:07

## 2021-10-08 RX ADMIN — Medication 12.5 MILLIGRAM(S): at 05:41

## 2021-10-08 RX ADMIN — SENNA PLUS 2 TABLET(S): 8.6 TABLET ORAL at 21:12

## 2021-10-08 RX ADMIN — Medication 1 TABLET(S): at 17:26

## 2021-10-08 RX ADMIN — RISPERIDONE 2 MILLIGRAM(S): 4 TABLET ORAL at 21:12

## 2021-10-08 RX ADMIN — Medication 12.5 MILLIGRAM(S): at 17:26

## 2021-10-08 RX ADMIN — Medication 1 TABLET(S): at 05:41

## 2021-10-08 RX ADMIN — PANTOPRAZOLE SODIUM 40 MILLIGRAM(S): 20 TABLET, DELAYED RELEASE ORAL at 12:07

## 2021-10-08 NOTE — PROGRESS NOTE ADULT - PROBLEM SELECTOR PLAN 3
- Creatinine 1.83 on admission to   - renal indices continue to improve.  - IV fluids   - monitor serum creatinine & urine output  - avoid nephrotoxic medications
- Creatinine 1.83 on admission to   - Cr vilma form 1.4 yesterday to 1.6 today, encouraging po intake, started on ensure cans, will f/u tomorrow  - IV fluids given while pt not eating much po  - monitor serum creatinine & urine output  - avoid nephrotoxic medications

## 2021-10-08 NOTE — DISCHARGE NOTE PROVIDER - CARE PROVIDER_API CALL
Arnoldo Amos)  Cardiovascular Disease  [address] [place] [address]  Phone: [phone]  Fax: [phone]  Follow Up Time:    Munir Kirkland (MD)  Cardiovascular Disease  [address] [facility], [address]  [place] [address]  Phone: [phone]  Fax: [phone]  Follow Up Time: 2 weeks

## 2021-10-08 NOTE — DISCHARGE NOTE PROVIDER - CARE PROVIDERS DIRECT ADDRESSES
[email or web address]

## 2021-10-08 NOTE — PROGRESS NOTE ADULT - PROBLEM SELECTOR PLAN 6
DVT ppx: hold AC for possible OR and history of hematuria, SCD's   Full code/Inpatient   Aspiration and fall precautions
DVT ppx: hold AC for possible OR and history of hematuria, SCD's   Full code/Inpatient   Aspiration and fall precautions  Possible role of returning to hospice care?
DVT ppx: hold AC for possible OR and history of hematuria, SCD's   Full code/Inpatient   Aspiration and fall precautions
DVT ppx: hold AC for possible OR and history of hematuria, SCD's   Full code/Inpatient   Aspiration and fall precautions  Possible role of returning to hospice care, for now family wants to take pt home tomorrow

## 2021-10-08 NOTE — PROGRESS NOTE ADULT - ASSESSMENT
89 y/o M with PMH of dementia, paroxsymal atrial fibrillation (not on AC), hx of CLL, HLD, osteoarthritis transfer from [facility] with severe sepsis 2/2 possible cholecystitis.     Afib with RVR  - Hx of pAF, in the setting of stressors - previously during empyema and now with acute cholecystitis   - Afib with RVR on admission at [place] on initial EKG: Afib RVR at 128bpm, repeat with sinus with 1st degree AV block  - HR are now mostly controlled, now off tele  - HR: 82 (10-08 @ 04:58) (59 - 82)  - Was on amio, off amio and is on BB  - Off AC due to hx of hematuria with anemia and dementia.    - Previous ECHO 2/2020 with EF of 55%. No need to repeat    Hypotension   - BP: 103/55 (10-08-21 @ 04:58) (84/48 - 124/73)  - Continue BB low dose   - Not on home BP meds    Acute cholecystitis   - HIDA +.  Family opting for conservative management for now  - Surgery and ID following.  Abx per ID  - If planned for any surgical/IR procedure, he is considered at elevated risk but no modifiable active cardiac risk factors at the moment.  He is optimized as best as possible from cardiovascular standpoint.  Routine hemodynamic monitoring recommended    - Monitor and replete lytes, keep K>4, Mg>2.  - All other medical needs as per primary team.  - Other cardiovascular workup will depend on clinical course.  - Will continue to follow.    Chidi Severino, MS FNP, [facility]  Nurse Practitioner- Cardiology   Spectra #[phone]

## 2021-10-08 NOTE — PROGRESS NOTE ADULT - PROBLEM SELECTOR PLAN 1
- US RUQ: Cholelithiasis with associated gallbladder wall edema which can be seen in acute cholecystitis.  - HIDA consistent with acute cholecystitis  - severe sepsis POA responded to fluids (per family patient runs low BP)  - s/p zosyn transitioned to po augmentin, likely d/c home tomorrow  - monitor for shock state necessitating IV vasopressors, no need at present.  - blood cx from 10/2 - show ngtd  - ID Dr. Kiran following.  - Surgery Dr. Uribe following
89y Male p/w HIDA positive for acute cholecystitis. Patient states that he feels well, denies n/v, abdominal pain. Episode of rapid afib, being treated with Amiodarone.   - HIDA positive, will likely need perc jackelin. Will discuss further with Dr. Uribe  - Cont care as per ICU team  - NPO, IVF  - pain control, supportive care, incentive spirometry  - continue IV Zosyn  - continue DVT ppx     Surgical Team Contact Information  Spectralink: Ext: [phone] or [phone]  Pager: [phone]
- US RUQ: Cholelithiasis with associated gallbladder wall edema which can be seen in acute cholecystitis.  - HIDA consistent with acute cholecystitis  - severe sepsis POA responded to fluids (per family patient runs low BP)  - Continue on Zosyn   - monitor for shock state necessitating IV vasopressors, no need at present.  - blood cx from 10/2 - show ngtd  - ID Dr. Kiran following.  - Surgery Dr. Uribe following  - May need IR percutaneous cholecystostomy, cont to f/u on clinical state and surg recs
- US RUQ: Cholelithiasis with associated gallbladder wall edema which can be seen in acute cholecystitis.  - HIDA consistent with acute cholecystitis  - severe sepsis POA responded to fluids (per family patient runs low BP)  - Continue on Zosyn   - monitor for shock state necessitating IV vasopressors, no need at present.  - F/u blood cultures  - ID Dr. Kiran following.  - Surgery Dr. Uribe following.  - May need IR percutaneous cholecystostomy.
- US RUQ: Cholelithiasis with associated gallbladder wall edema which can be seen in acute cholecystitis.  - HIDA consistent with acute cholecystitis  - severe sepsis POA responded to fluids (per family patient runs low BP)  - Continue on Zosyn   - monitor for shock state necessitating IV vasopressors, no need at present.  - blood cx from 10/2 - show ngtd  - ID Dr. Kiran following.  - Surgery Dr. Uribe following  - May need IR percutaneous cholecystostomy, cont to f/u on clinical state and surg recs

## 2021-10-08 NOTE — PROGRESS NOTE ADULT - PROBLEM SELECTOR PROBLEM 3
GUS (acute kidney injury)

## 2021-10-08 NOTE — PROGRESS NOTE ADULT - SUBJECTIVE AND OBJECTIVE BOX
SURGERY PA NOTE ON BEHALF OF DR. OCASIO / GENERAL SURGERY:    S: Patient seen and examined at bedside.  Attempted to see pt at 0730, and 0830 - patient sleeping.  Seen at 1000, arousable but somewhat groggy.  Has not eaten breakfast yet, per RN staff and based on personal observation - has not taken much PO x 2 days.  Continues to deny any abdominal pain, nausea, or vomiting.  Still without BM or flatus, on miralax and senna.  Transitioned to augmentin bid.  Denies any CP/SOB/dyspnea/dizziness/lightheadedness/palpitations.      MEDICATIONS:  amoxicillin  875 milliGRAM(s)/clavulanate 1 Tablet(s) Oral every 12 hours  metoprolol tartrate 12.5 milliGRAM(s) Oral two times a day  pantoprazole  Injectable 40 milliGRAM(s) IV Push daily  polyethylene glycol 3350 17 Gram(s) Oral daily  risperiDONE   Tablet 2 milliGRAM(s) Oral at bedtime  senna 2 Tablet(s) Oral at bedtime  sodium chloride 0.9%. 1000 milliLiter(s) IV Continuous <Continuous>      O:  Vital Signs Last 24 Hrs  T(C): 36.6 (08 Oct 2021 04:58), Max: 36.7 (07 Oct 2021 19:02)  T(F): 97.9 (08 Oct 2021 04:58), Max: 98.1 (07 Oct 2021 19:02)  HR: 82 (08 Oct 2021 04:58) (59 - 82)  BP: 103/55 (08 Oct 2021 04:58) (84/48 - 124/73)  RR: 18 (08 Oct 2021 04:58) (18 - 19)  SpO2: 100% (08 Oct 2021 04:58) (93% - 100%)    I&O SUMMARY:    10-07-21 @ 07:01  -  10-08-21 @ 07:00  --------------------------------------------------------  IN: 720 mL / OUT: 0 mL / NET: 720 mL        PHYSICAL EXAM:  Lungs: Expiratory ronchi noted bilaterally, otherwise CTA  Card: S1S2  Abd: Soft, remains NT over RUQ or elsewhere throughout, ND.  +BS x 4.  No rebound/guarding.    Ext: Calves soft, NT, without edema bilat    LABS:                        8.8    6.68  )-----------( 133      ( 08 Oct 2021 06:57 )             27.5     10-08    143  |  115<H>  |  10  ----------------------------<  88  3.8   |  23  |  1.60<H>    Ca    7.7<L>      08 Oct 2021 06:57      A:  89-yo male with cholelithiasis, gallbladder wall thickening  HIDA positive for acute cholecystitis       P:  Patient remains asymptomatic with a benign abdominal exam, though anorexia persists  Somewhat lethargic today, though vitals remain stable (mild hypotension noted, though is relatively on-trend with prior readings)  WBC remains normal, mild decrease in H&H noted  Cr slowly creeping up as well - 2/2 prior Zosyn? vs. diminished PO intake/fluid depletion (IVF d/c'd yesterday) - consider resuming IV?  Continue diet, continue abx per ID  Holding off from any surgical or invasive modalities at this time - will continue abx  Apparently doing well with regard to infectious picture, though now with some lethargy/FTT    Will d/w Dr. Ocasio  Will follow      SURGERY PA NOTE ON BEHALF OF DR. OCASIO / GENERAL SURGERY:    S: Patient seen and examined at bedside.  Attempted to see pt at 0730, and 0830 - patient sleeping.  Seen at 1000, arousable but somewhat groggy.  Has not eaten breakfast yet, per RN staff and based on personal observation - has not taken much PO x 2 days.  Continues to deny any abdominal pain, nausea, or vomiting.  Still without BM or flatus, on miralax and senna.  Transitioned to augmentin bid.  Denies any CP/SOB/dyspnea/dizziness/lightheadedness/palpitations.        MEDICATIONS:  amoxicillin  875 milliGRAM(s)/clavulanate 1 Tablet(s) Oral every 12 hours  metoprolol tartrate 12.5 milliGRAM(s) Oral two times a day  pantoprazole  Injectable 40 milliGRAM(s) IV Push daily  polyethylene glycol 3350 17 Gram(s) Oral daily  risperiDONE   Tablet 2 milliGRAM(s) Oral at bedtime  senna 2 Tablet(s) Oral at bedtime  sodium chloride 0.9%. 1000 milliLiter(s) IV Continuous <Continuous>      O:  Vital Signs Last 24 Hrs  T(C): 36.6 (08 Oct 2021 04:58), Max: 36.7 (07 Oct 2021 19:02)  T(F): 97.9 (08 Oct 2021 04:58), Max: 98.1 (07 Oct 2021 19:02)  HR: 82 (08 Oct 2021 04:58) (59 - 82)  BP: 103/55 (08 Oct 2021 04:58) (84/48 - 124/73)  RR: 18 (08 Oct 2021 04:58) (18 - 19)  SpO2: 100% (08 Oct 2021 04:58) (93% - 100%)      I&O SUMMARY:    10-07-21 @ 07:01  -  10-08-21 @ 07:00  --------------------------------------------------------  IN: 720 mL / OUT: 0 mL / NET: 720 mL      PHYSICAL EXAM:  Lungs: Expiratory ronchi noted bilaterally, otherwise CTA  Card: S1S2  Abd: Soft, remains NT over RUQ or elsewhere throughout, ND.  +BS x 4.  No rebound/guarding.    Ext: Calves soft, NT, without edema bilat      LABS:                        8.8    6.68  )-----------( 133      ( 08 Oct 2021 06:57 )             27.5     10-08    143  |  115<H>  |  10  ----------------------------<  88  3.8   |  23  |  1.60<H>    Ca    7.7<L>      08 Oct 2021 06:57      A:  89-yo male with cholelithiasis, gallbladder wall thickening  HIDA positive for acute cholecystitis       P:  Patient remains asymptomatic with a benign abdominal exam, though anorexia persists  Somewhat lethargic today, though vitals remain stable (mild hypotension noted, though is relatively on-trend with prior readings)  WBC remains normal, mild decrease in H&H noted  Cr slowly creeping up as well - 2/2 prior Zosyn? vs. diminished PO intake/fluid depletion (IVF d/c'd yesterday) - consider resuming IV?  Continue diet, continue abx per ID  Holding off from any surgical or invasive modalities at this time - will continue abx  Apparently doing well with regard to infectious picture, though now with some lethargy/FTT    Will d/w Dr. Ocasio  Will follow

## 2021-10-08 NOTE — PROGRESS NOTE ADULT - ASSESSMENT
89 y/o M with PMHx of dementia, paroxysmal atrial fibrillation (not on AC), hx of CLL, HLD, osteoarthritis presented to [facility] with an acute episode of rigors and unresponsiveness admitted with severe sepsis/shock secondary to acute cholecystitis.    on PO ABX  on PO diet  on IVF  Surgery f/u noted    medical management of sepsis - acute jackelin  surgery  gi  ABX  I and O  monitor VS and HD and Sat  serial labs  serial ABD exam

## 2021-10-08 NOTE — PROGRESS NOTE ADULT - ASSESSMENT
As above in PA notation.  Patient personally seen and examined.  Mr. Winters continues to deny abdominal pain.  Not moving much today after being out of bed to chair most of yesterday.  Not eating much today per daughter unless he is fed "he hates the food..."  Vitals non-suggestive overall given relative hypotension prior to admission per son's report.  Abdomen soft and non tense and non tender at time of this exam.  Labs show stable WBCs despite transition to PO ABX.  Creatinine noted with IVF continued with plan for redraw in am.  Clinically, more of a failure to thrive picture at this point.  I would not advocate for surgery given overall condition and cardiac risk factors.  No immediate indication for drainage- no pain, no tenderness, no elevation of WBCs.  Discussed at length with patient's daughter at bedside and son by phone.  Their hope now is for discharge to home with reinitiation of Hospice services.  This does not seem unreasonable given his age, comorbid conditions and present limited function.  Surgically, stable at present.  No additional recommendations at this time.  Will remain available as needed.  However, please recall PRN.

## 2021-10-08 NOTE — PROGRESS NOTE ADULT - PROBLEM SELECTOR PLAN 4
Normocytic Anemia, likely anemia of chronic disease  - H/H: 9.1/27.8 on admission, baseline hemoglobin between 7.5 to 8.8  - Currently hemodynamically stable, monitor for signs and symptoms of active bleeding  - F/u AM CBC

## 2021-10-08 NOTE — PROGRESS NOTE ADULT - SUBJECTIVE AND OBJECTIVE BOX
SUBJECTIVE:    No acute events overnight, afebrile, hds.  Pt slept well and rested comfortably overnight.    VITAL SIGNS:    Vital Signs Last 24 Hrs  T(C): 36.6 (08 Oct 2021 04:58), Max: 36.7 (07 Oct 2021 19:02)  T(F): 97.9 (08 Oct 2021 04:58), Max: 98.1 (07 Oct 2021 19:02)  HR: 82 (08 Oct 2021 04:58) (59 - 82)  BP: 103/55 (08 Oct 2021 04:58) (84/48 - 124/73)  BP(mean): --  RR: 18 (08 Oct 2021 04:58) (18 - 19)  SpO2: 100% (08 Oct 2021 04:58) (93% - 100%)    PHYSICAL EXAM:     GENERAL: NAD  HEENT: perrla, eomi  RESPIRATORY: CTAB  CARDIOVASCULAR: s1 and s2, rrr, no m/r/g  ABDOMINAL: soft, non-tender, non-distended, positive bowel sounds   EXTREMITIES: no clubbing, cyanosis, or edema  NEUROLOGICAL: alert, nonfocal  SKIN: no new rashes or lesions   MUSCULOSKELETAL: no gross joint deformity                          8.8    6.68  )-----------( 133      ( 08 Oct 2021 06:57 )             27.5     10-08    143  |  115<H>  |  10  ----------------------------<  88  3.8   |  23  |  1.60<H>    Ca    7.7<L>      08 Oct 2021 06:57        CAPILLARY BLOOD GLUCOSE          MEDICATIONS  (STANDING):  amoxicillin  875 milliGRAM(s)/clavulanate 1 Tablet(s) Oral every 12 hours  metoprolol tartrate 12.5 milliGRAM(s) Oral two times a day  pantoprazole  Injectable 40 milliGRAM(s) IV Push daily  polyethylene glycol 3350 17 Gram(s) Oral daily  risperiDONE   Tablet 2 milliGRAM(s) Oral at bedtime  senna 2 Tablet(s) Oral at bedtime  sodium chloride 0.9%. 1000 milliLiter(s) (60 mL/Hr) IV Continuous <Continuous>

## 2021-10-08 NOTE — PROGRESS NOTE ADULT - SUBJECTIVE AND OBJECTIVE BOX
[facility] -- Warren Posada, Dandre, Abelardo, Thaddeus Verdugo, Casie Warren: Office # [phone]    Follow Up: cardiac optimization pre/post procedure      Subjective/Observations: Patient seen and examined. Patient awake, alert, resting in bed. No complaints of chest pain, dyspnea, palpitations or dizziness. No signs of orthopnea or PND. Tolerating room air. IVF @ 60, + pedal edema, forgetful, answers simple commands.     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 1000 milliLiter(s) (60 mL/Hr) IV Continuous <Continuous>    MEDICATIONS  (PRN):    Allergies  vancomycin (Nephrotoxicity)    Vital Signs Last 24 Hrs  T(C): 36.6 (08 Oct 2021 04:58), Max: 36.7 (07 Oct 2021 19:02)  T(F): 97.9 (08 Oct 2021 04:58), Max: 98.1 (07 Oct 2021 19:02)  HR: 82 (08 Oct 2021 04:58) (59 - 82)  BP: 103/55 (08 Oct 2021 04:58) (84/48 - 124/73)  BP(mean): --  RR: 18 (08 Oct 2021 04:58) (18 - 19)  SpO2: 100% (08 Oct 2021 04:58) (93% - 100%)  I&O's Summary    07 Oct 2021 07:01  -  08 Oct 2021 07:00  --------------------------------------------------------  IN: 720 mL / OUT: 0 mL / NET: 720 mL    TELE: Not on telemetry   PHYSICAL EXAM:  Appearance: NAD, no distress, alert, Well developed   HEENT: Moist Mucous Membranes, Anicteric  Cardiovascular: Regular rate and rhythm, Normal S1 S2, No JVD, No murmurs, No rubs, gallops or clicks  Respiratory: Non-labored, Clear to auscultation, No rales, No rhonchi, No wheezing.   Gastrointestinal:  Soft, Non-tender, + BS  Neurologic: Non-focal  Skin: Warm and dry, No visible rashes or ulcers, No ecchymosis, No cyanosis  Musculoskeletal: No clubbing, No cyanosis, No joint swelling/tenderness  Psychiatry: Mood & affect appropriate  Lymph: + pedal edema    LABS: All Labs Reviewed:                        8.8    6.68  )-----------( 133      ( 08 Oct 2021 06:57 )             27.5                         9.9    5.98  )-----------( 156      ( 07 Oct 2021 11:08 )             31.1                         9.6    4.94  )-----------( 130      ( 07 Oct 2021 08:09 )             29.8     08 Oct 2021 06:57    143    |  115    |  10     ----------------------------<  88     3.8     |  23     |  1.60   07 Oct 2021 11:08    144    |  113    |  8      ----------------------------<  158    3.6     |  24     |  1.40   07 Oct 2021 08:09    144    |  113    |  8      ----------------------------<  121    3.8     |  25     |  1.30     Ca    7.7        08 Oct 2021 06:57  Ca    7.9        07 Oct 2021 11:08  Ca    7.6        07 Oct 2021 08:09  Phos  2.6       06 Oct 2021 07:47  Mg     1.9       06 Oct 2021 07:47    TPro  5.0    /  Alb  2.1    /  TBili  0.4    /  DBili  x      /  AST  16     /  ALT  9      /  AlkPhos  33     06 Oct 2021 07:47    12 Lead ECG:   Ventricular Rate 90 BPM    Atrial Rate 90 BPM    P-R Interval 226 ms    QRS Duration 98 ms    Q-T Interval 368 ms    QTC Calculation(Bazett) 450 ms    P Axis 61 degrees    R Axis -67 degrees    T Axis 47 degrees    Diagnosis Line *** Poor data quality, interpretation may be adversely affected  Sinus rhythm with 1st degree AV block  Left axis deviation  Inferior infarct , age undetermined  Anterior infarct (cited on or before 25-FEB-2020)  Abnormal ECG  When compared with ECG of 02-OCT-2021 13:30,  Significant changes have occurred  Confirmed by Leatha Mauricio MD ([id number]) on 10/3/2021 11:16:35 AM (10-02-21 @ 19:59)       EXAM:  ECHO TTE WO CON COMP W DOPPLR    PROCEDURE DATE:  02/27/2020    INTERPRETATION:  INDICATION: Heart failure  Sonographer PH    Blood Pressure 107/68    Height 177.8 cm     Weight 86.2 kg       BSA 2.0 sq m    Dimensions:    LA 3.7    Normal Values: 2.0 - 4.0 cm    Ao 3.3        Normal Values: 2.0 - 3.8 cm  SEPTUM Normal Values: 0.6 - 1.2 cm  PWT Normal Values: 0.6 - 1.1 cm  LVIDd Normal Values: 3.0 - 5.6 cm  LVIDs Normal Values: 1.8 - 4.0 cm      OBSERVATIONS:  Technically difficult and limited study  Mitral Valve: normal, trace physiologic MR.  Aortic Valve/Aorta: Sclerotic trileaflet aortic valve with normal opening.  Tricuspid Valve: normal with trace TR.  Pulmonic Valve: Not well-visualized  Left Atrium: normal  RightAtrium: Not well-visualized  Left Ventricle: normal LV size and systolic function, estimated LVEF of 55%.  Right Ventricle: Not well-visualized  Pericardium/Pleura: normal, small anterior pericardial effusion without signs of hemodynamic compromise.  Pulmonary/RV Pressure: estimated PA systolic pressure of 34mmHg. IVC is dilated    Conclusion:   Technically difficult and limited study  Normal left ventricular internal dimensions and systolic function, estimated LVEF of 55%.   Right ventricle is not well-visualized  Sclerotic trileaflet aortic valve, without AI.   Trace physiologic MR and TR.     TYLER AYALA   This document has been electronically signed. Feb 28 2020  8:07AM      Xray Chest 1 View-PORTABLE IMMEDIATE:   EXAM:  XR CHEST PORTABLE IMMED 1V                        PROCEDURE DATE:  10/02/2021    INTERPRETATION:  HISTORY: Sepsis    TECHNIQUE: A single AP view of the chest was obtained.    COMPARISON: 3/6/2020    FINDINGS:  The cardiac silhouette is normal in size. There are no focal consolidations or pleural effusions. The hilar and mediastinal structures appear unremarkable. The osseous structures are intact.    IMPRESSION: No focal consolidation seen in the lungs.  --- End of Report ---  YAYA DE LEÓN MD; Attending Radiologist  This document has been electronically signed. Oct  2 2021  2:49PM (10-02-21 @ 14:43)    CT Chest No Cont:   EXAM:  CT ABDOMEN AND PELVIS                          EXAM:  CT CHEST                        PROCEDURE DATE:  10/02/2021    INTERPRETATION:  CLINICAL INFORMATION: Fever, history of left sided empyema    COMPARISON: CT chest 3/9/2020, pet/CT 6/19/2019.    CONTRAST/COMPLICATIONS:  IV Contrast: NONE  Oral Contrast: NONE  Complications: None reported at time of study completion    PROCEDURE:  CT of the Chest, Abdomen and Pelvis was performed.  Sagittal and coronal reformats were performed.    FINDINGS: Evaluation of the solid organs and vasculature is limited in the absence of intravenous contrast.  CHEST:  LUNGS AND LARGE AIRWAYS: Patent central airways. No pulmonary nodules.  PLEURA: No pleural effusion. Right pleural calcifications.  VESSELS: Aortic, aortic valvular and coronary artery calcifications.  HEART: Heart size is normal. No pericardial effusion.  MEDIASTINUM AND KAELYN: No lymphadenopathy.  CHEST WALL AND LOWER NECK: Within normal limits.    ABDOMEN AND PELVIS:  LIVER: Within normal limits.  BILE DUCTS: Normal caliber.  GALLBLADDER: Large gallbladder stone again noted. There is suggestion of gallbladder wall thickening, and pericholecystic fluid tracking inferiorly.  SPLEEN: Within normal limits.  PANCREAS: Within normal limits.  ADRENALS: Within normal limits.  KIDNEYS/URETERS: Nonobstructing 4 mm left renal calculus. Small right renal cyst. No hydronephrosis.    BLADDER: Within normal limits.  REPRODUCTIVE ORGANS: Prostate is enlarged.    BOWEL: Colonic,including right-sided, diverticulosis. Trace free fluid in the right paracolic gutter adjacent to diverticulosis may be due to gallbladder inflammation. No colonic wall to suggest acute diverticulitis. No bowel obstruction. Appendix is normal.  PERITONEUM: Trace pelvic free fluid. No pneumoperitoneum  VESSELS: Atherosclerotic changes.  RETROPERITONEUM/LYMPH NODES: No lymphadenopathy.  ABDOMINAL WALL: Within normal limits.  BONES: Diffuse degenerative changes. Status post posterior lumbar laminectomies. Mixed lucent and sclerotic lesion in the right mid humeral shaft is of unknown etiology, but unchanged since PET of 6/19/2019.    IMPRESSION:  Cholelithiasis with gallbladder wall thickening and pericholecystic edema, which extends to the rightparacolic gutter. Findings are suspicious for cholecystitis. No colonic wall thickening to suggest diverticulitis. Correlate with patient's symptomatology and right upper quadrant ultrasound.    Incidental findings as above.  --- End of Report ---  MATEO NEVILLE MD; Attending Radiologist  This document has been electronically signed. Oct  2 2021  3:41PM (10-02-21 @ 14:55)

## 2021-10-08 NOTE — DISCHARGE NOTE PROVIDER - NSDCMRMEDTOKEN_GEN_ALL_CORE_FT
Haldol 2 mg oral tablet: 1 tab(s) orally, As Needed (rarely)  pantoprazole 40 mg oral delayed release tablet: 1 tab(s) orally 2 times a day  risperiDONE 2 mg oral tablet: 1 tab(s) orally once a day (at bedtime)  senna oral tablet: 1-2 tab(s) orally once a day   amoxicillin-clavulanate 875 mg-125 mg oral tablet: 1 tab(s) orally every 12 hours  Haldol 2 mg oral tablet: 1 tab(s) orally, As Needed (rarely)  metoprolol: 12.5 milligram(s) orally 2 times a day  pantoprazole 40 mg oral delayed release tablet: 1 tab(s) orally 2 times a day  risperiDONE 2 mg oral tablet: 1 tab(s) orally once a day (at bedtime)  senna oral tablet: 1-2 tab(s) orally once a day

## 2021-10-08 NOTE — DISCHARGE NOTE PROVIDER - HOSPITAL COURSE
HPI (H+P):  89 y/o M with PMHx of dementia, paroxsymal atrial fibrillation (not on AC), hx of CLL, HLD, osteoarthritis presented to  ED with an acute episode of rigors and unresponsiveness. He had a Tmax of 101.8 in the ED. Was found to have acute cholecystitis on imaging. Was in shock state and maintained on IV fluids, zosyn and midodrine with possible plan for IV vasopressors. Was seen by neurology Dr. Blair for seizure like activity - possibly rigors in setting of fever. Surgery Dr. Uribe was also following for cholecystitis and felt as though [facility] would be a better option given capability for IR intervention and a formal ICU. Will be getting a HIDA scan while here. Of note patient was under hospice care in the past, but now changed to full code and with aggressive management. Discussed with daughter (Esperanza) and son (Sohail) over telephone - they state that he is NOT DNR/DNI.    Vital Signs ([facility] ICU)  T(C Max: 98.3 HR: 69 BP: 101/57 RR: 14 SpO2: 97%  EKG at  on 10/2 showed atrial fibrillation and subsequent EKG was SR with 1st deg AV block  CT CAP: Cholelithiasis with gallbladder wall thickening and pericholecystic edema, which extends to the right paracolic gutter. Findings are suspicious for cholecystitis. No colonic wall thickening to suggest diverticulitis. Correlate with patient's symptomatology and right upper quadrant ultrasound.  US RUQ: Cholelithiasis with associated gallbladder wall edema which can be seen in acute cholecystitis. There is no sonographic Burr sign. Consider further evaluation with HIDA scan to assess for acute cholecystitis. (03 Oct 2021 20:53)    HOSPITAL COURSE:    Pt had HIDA scan positive for cholecystitis.  Patient was followed by Surgery, who did not recommend acute surgical intervention, while a perc jackelin was considered, ultimately, pt's symptoms improved, and pt was managed conservatively with IV zosyn, transitioned to po Augmentin.  Pt's Cr was rising in hospital course thought 2/2 dehydration and po intake was encouraged and ensure supplementation was added.  Patient was to follow up with PMD upon discharge.    VITAL SIGNS:    Vital Signs Last 24 Hrs  T(C): 36.6 (08 Oct 2021 04:58), Max: 36.7 (07 Oct 2021 19:02)  T(F): 97.9 (08 Oct 2021 04:58), Max: 98.1 (07 Oct 2021 19:02)  HR: 82 (08 Oct 2021 04:58) (59 - 82)  BP: 103/55 (08 Oct 2021 04:58) (84/48 - 124/73)  BP(mean): --  RR: 18 (08 Oct 2021 04:58) (18 - 19)  SpO2: 100% (08 Oct 2021 04:58) (93% - 100%)    PHYSICAL EXAM:     GENERAL: no acute distress  HEENT: NC/AT, EOMI, neck supple, MMM  RESPIRATORY: LCTAB/L, no rhonchi, rales, or wheezing  CARDIOVASCULAR: RRR, no murmurs, gallops, rubs  ABDOMINAL: soft, non-tender, non-distended, positive bowel sounds   EXTREMITIES: no clubbing, cyanosis, or edema  NEUROLOGICAL: alert and oriented x 3, non-focal  SKIN: no rashes or lesions   MUSCULOSKELETAL: no gross joint deformity                          8.8    6.68  )-----------( 133      ( 08 Oct 2021 06:57 )             27.5     10-08    143  |  115<H>  |  10  ----------------------------<  88  3.8   |  23  |  1.60<H>    Ca    7.7<L>      08 Oct 2021 06:57     HPI (H+P):  89 y/o M with PMHx of dementia, paroxsymal atrial fibrillation (not on AC), hx of CLL, HLD, osteoarthritis presented to  ED with an acute episode of rigors and unresponsiveness. He had a Tmax of 101.8 in the ED. Was found to have acute cholecystitis on imaging. Was in shock state and maintained on IV fluids, zosyn and midodrine with possible plan for IV vasopressors. Was seen by neurology Dr. Blair for seizure like activity - possibly rigors in setting of fever. Surgery Dr. Uribe was also following for cholecystitis and felt as though [facility] would be a better option given capability for IR intervention and a formal ICU. Will be getting a HIDA scan while here. Of note patient was under hospice care in the past, but now changed to full code and with aggressive management. Discussed with daughter (Esperanza) and son (Sohail) over telephone - they state that he is NOT DNR/DNI.    Vital Signs ([facility] ICU)  T(C Max: 98.3 HR: 69 BP: 101/57 RR: 14 SpO2: 97%  EKG at  on 10/2 showed atrial fibrillation and subsequent EKG was SR with 1st deg AV block  CT CAP: Cholelithiasis with gallbladder wall thickening and pericholecystic edema, which extends to the right paracolic gutter. Findings are suspicious for cholecystitis. No colonic wall thickening to suggest diverticulitis. Correlate with patient's symptomatology and right upper quadrant ultrasound.  US RUQ: Cholelithiasis with associated gallbladder wall edema which can be seen in acute cholecystitis. There is no sonographic Burr sign. Consider further evaluation with HIDA scan to assess for acute cholecystitis. (03 Oct 2021 20:53)    HOSPITAL COURSE:    Pt had HIDA scan positive for cholecystitis.  Patient was followed by Surgery, who did not recommend acute surgical intervention, while a perc jackelin was considered, ultimately, pt's symptoms improved, and pt was managed conservatively with IV zosyn, transitioned to po Augmentin.  Pt's Cr was rising in hospital course thought 2/2 dehydration and po intake was encouraged and ensure supplementation was added.  Family wants to consider home hospice but wants to do it with on their own, not inpatient.  Pt's  r at 1.6, but family feels its form dehydration, and wants patient to be at home.  Patient was to follow up with PMD upon discharge.    VITAL SIGNS:    Vital Signs Last 24 Hrs  T(C): 36.8 (09 Oct 2021 12:26), Max: 37.3 (08 Oct 2021 20:50)  T(F): 98.2 (09 Oct 2021 12:26), Max: 99.1 (08 Oct 2021 20:50)  HR: 86 (09 Oct 2021 12:26) (75 - 99)  BP: 110/60 (09 Oct 2021 12:26) (95/60 - 123/67)  BP(mean): --  RR: 19 (09 Oct 2021 12:26) (17 - 19)  SpO2: 91% (09 Oct 2021 12:26) (91% - 96%)    PHYSICAL EXAM:     GENERAL: no acute distress  HEENT: NC/AT, EOMI, neck supple, MMM  RESPIRATORY: LCTAB/L, no rhonchi, rales, or wheezing  CARDIOVASCULAR: RRR, no murmurs, gallops, rubs  ABDOMINAL: soft, non-tender, non-distended, positive bowel sounds   EXTREMITIES: no clubbing, cyanosis, or edema  NEUROLOGICAL: alert and oriented x 3, non-focal  SKIN: no rashes or lesions   MUSCULOSKELETAL: no gross joint deformity                          8.8    6.68  )-----------( 133      ( 08 Oct 2021 06:57 )             27.5     10-08    143  |  115<H>  |  10  ----------------------------<  88  3.8   |  23  |  1.60<H>    Ca    7.7<L>      08 Oct 2021 06:57

## 2021-10-08 NOTE — PROGRESS NOTE ADULT - PROBLEM SELECTOR PLAN 5
- continue with Risperdal.  - Can give Haldol PRN.  - Fall precautions

## 2021-10-08 NOTE — PROGRESS NOTE ADULT - SUBJECTIVE AND OBJECTIVE BOX
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 1000 milliLiter(s) (60 mL/Hr) IV Continuous <Continuous>    MEDICATIONS  (PRN):         Vitals log        ICU Vital Signs Last 24 Hrs  T(C): 36.6 (08 Oct 2021 04:58), Max: 36.7 (07 Oct 2021 06:13)  T(F): 97.9 (08 Oct 2021 04:58), Max: 98.1 (07 Oct 2021 19:02)  HR: 82 (08 Oct 2021 04:58) (59 - 86)  BP: 103/55 (08 Oct 2021 04:58) (84/48 - 124/73)  BP(mean): --  ABP: --  ABP(mean): --  RR: 18 (08 Oct 2021 04:58) (18 - 19)  SpO2: 100% (08 Oct 2021 04:58) (93% - 100%)           Input and Output:  I&O's Detail    06 Oct 2021 07:01  -  07 Oct 2021 07:00  --------------------------------------------------------  IN:    Oral Fluid: 200 mL  Total IN: 200 mL    OUT:    Voided (mL): 1000 mL  Total OUT: 1000 mL    Total NET: -800 mL          Lab Data                        9.9    5.98  )-----------( 156      ( 07 Oct 2021 11:08 )             31.1     10-07    144  |  113<H>  |  8   ----------------------------<  158<H>  3.6   |  24  |  1.40<H>    Ca    7.9<L>      07 Oct 2021 11:08  Phos  2.6     10-06  Mg     1.9     10-06    TPro  5.0<L>  /  Alb  2.1<L>  /  TBili  0.4  /  DBili  x   /  AST  16  /  ALT  9<L>  /  AlkPhos  33<L>  10-06            Review of Systems	      Objective     Physical Examination    heart s1s2  lung dec BS  abd soft      Pertinent Lab findings & Imaging      Hamlet:  NO   Adequate UO     I&O's Detail    06 Oct 2021 07:01  -  07 Oct 2021 07:00  --------------------------------------------------------  IN:    Oral Fluid: 200 mL  Total IN: 200 mL    OUT:    Voided (mL): 1000 mL  Total OUT: 1000 mL    Total NET: -800 mL               Discussed with:     Cultures:	        Radiology

## 2021-10-08 NOTE — PROGRESS NOTE ADULT - TIME BILLING
plan of care discussed with patient, daughter at bedside, Surgery, RN, ICU team. Chart/labs/meds reviewed.
Discussed with patient in detail, ICU team and Surgery PA.
Discussed with patient as able, sone in detail, Hospitalist attending, Surgery PA and tonight's RN team.
Discussed with patient/ daughter/ son in detail, Hospitalist team and Palliative Care attending, Surgery PA and today's RN.
89M h/o dementia, pAFib (not on AC), previous CLL, HLD, [place] [facility] [place] 2[place]2 acute cholecystitis.     Neuro: cont risperdal and haldol prn   CV: [place] [facility] [place] never required pressor support, maintaining MAP > 65 without chemical assistance  - pAFib, now mostly rate controlled with frequent PVCs causing artifactual tachycardia  - continue lopressor PRN for rate control (has not required)   Pulm: no acute issues, stable on room air  GI: HIDA+ for acute jackelin, surgery d/w family, would prefer medical management at this time, if worsens will need perc jackelin vs jackelin  - change to clear liquid diet, dc IVF when diet advanced  - trend LFTs  Renal: GUS improved s/p fluid resuscitation, likely ATN from sepsis and transient hypotension  ID: continue zosyn for acute jackelin, follow-up blood cultures  Heme: dvt ppx with HSQ, no plans for procedure at this time
Discussed with patient as able, Hospitalist team, Surgery PA and today's RN.
Discussed with patient as able, with son in detail, Hospitalist team, Surgery PA and today's RN.

## 2021-10-08 NOTE — DISCHARGE NOTE PROVIDER - NSDCCPCAREPLAN_GEN_ALL_CORE_FT
PRINCIPAL DISCHARGE DIAGNOSIS  Diagnosis: Acute cholecystitis  Assessment and Plan of Treatment: s/p treatment with IV zosyn, transitioned to po augmentin, complete course of antibiotics at home

## 2021-10-08 NOTE — PROGRESS NOTE ADULT - PROBLEM SELECTOR PLAN 2
- EKG at  on 10/2 showed atrial fibrillation and subsequent EKG was SR with 1st deg AV block  - would hold off AC for possible future cholecystectomy and hx of hematuria - also given dementia  - was on amiodarone in the past  - now re-loading with Amiodarone.  - Metoprolol IVP PRN, BP permitting
- EKG at  on 10/2 showed atrial fibrillation and subsequent EKG was SR with 1st deg AV block  - would hold off AC for possible future cholecystectomy and hx of hematuria - also given dementia  - was on amiodarone in the past; but per cards amio now dc'ed, and changed to BB for rate control, as per cards started on metoprolol 12.5 bid
- EKG at  on 10/2 showed atrial fibrillation and subsequent EKG was SR with 1st deg AV block  - would hold off AC given hx of anemia, hematuria, and also given dementia  - was on amiodarone in the past; but per cards amio now dc'ed, and changed to BB for rate control, as per cards started on metoprolol 12.5 bid
- EKG at  on 10/2 showed atrial fibrillation and subsequent EKG was SR with 1st deg AV block  - would hold off AC for possible future cholecystectomy and hx of hematuria - also given dementia  - was on amiodarone in the past  - now re-loading with Amiodarone.  - Metoprolol IVP PRN, BP permitting

## 2021-10-09 ENCOUNTER — TRANSCRIPTION ENCOUNTER (OUTPATIENT)
Age: 86
End: 2021-10-09

## 2021-10-09 VITALS
HEART RATE: 86 BPM | TEMPERATURE: 98 F | SYSTOLIC BLOOD PRESSURE: 110 MMHG | DIASTOLIC BLOOD PRESSURE: 60 MMHG | RESPIRATION RATE: 19 BRPM | OXYGEN SATURATION: 91 %

## 2021-10-09 LAB
ANION GAP SERPL CALC-SCNC: 4 MMOL/L — LOW (ref 5–17)
BUN SERPL-MCNC: 12 MG/DL — SIGNIFICANT CHANGE UP (ref 7–23)
CALCIUM SERPL-MCNC: 7.7 MG/DL — LOW (ref 8.5–10.1)
CHLORIDE SERPL-SCNC: 117 MMOL/L — HIGH (ref 96–108)
CO2 SERPL-SCNC: 24 MMOL/L — SIGNIFICANT CHANGE UP (ref 22–31)
CREAT SERPL-MCNC: 1.6 MG/DL — HIGH (ref 0.5–1.3)
GLUCOSE SERPL-MCNC: 92 MG/DL — SIGNIFICANT CHANGE UP (ref 70–99)
HCT VFR BLD CALC: 26.4 % — LOW (ref 39–50)
HGB BLD-MCNC: 8.5 G/DL — LOW (ref 13–17)
MCHC RBC-ENTMCNC: 31.4 PG — SIGNIFICANT CHANGE UP (ref 27–34)
MCHC RBC-ENTMCNC: 32.2 GM/DL — SIGNIFICANT CHANGE UP (ref 32–36)
MCV RBC AUTO: 97.4 FL — SIGNIFICANT CHANGE UP (ref 80–100)
NRBC # BLD: 0 /100 WBCS — SIGNIFICANT CHANGE UP (ref 0–0)
PLATELET # BLD AUTO: 139 K/UL — LOW (ref 150–400)
POTASSIUM SERPL-MCNC: 3.8 MMOL/L — SIGNIFICANT CHANGE UP (ref 3.5–5.3)
POTASSIUM SERPL-SCNC: 3.8 MMOL/L — SIGNIFICANT CHANGE UP (ref 3.5–5.3)
RBC # BLD: 2.71 M/UL — LOW (ref 4.2–5.8)
RBC # FLD: 13.2 % — SIGNIFICANT CHANGE UP (ref 10.3–14.5)
SODIUM SERPL-SCNC: 145 MMOL/L — SIGNIFICANT CHANGE UP (ref 135–145)
WBC # BLD: 8.43 K/UL — SIGNIFICANT CHANGE UP (ref 3.8–10.5)
WBC # FLD AUTO: 8.43 K/UL — SIGNIFICANT CHANGE UP (ref 3.8–10.5)

## 2021-10-09 PROCEDURE — 99239 HOSP IP/OBS DSCHRG MGMT >30: CPT

## 2021-10-09 PROCEDURE — 99232 SBSQ HOSP IP/OBS MODERATE 35: CPT

## 2021-10-09 PROCEDURE — 99231 SBSQ HOSP IP/OBS SF/LOW 25: CPT

## 2021-10-09 RX ORDER — METOPROLOL TARTRATE 50 MG
12.5 TABLET ORAL
Qty: 60 | Refills: 0
Start: 2021-10-09 | End: 2021-11-07

## 2021-10-09 RX ADMIN — PANTOPRAZOLE SODIUM 40 MILLIGRAM(S): 20 TABLET, DELAYED RELEASE ORAL at 11:14

## 2021-10-09 RX ADMIN — Medication 12.5 MILLIGRAM(S): at 05:38

## 2021-10-09 RX ADMIN — Medication 1 TABLET(S): at 05:38

## 2021-10-09 RX ADMIN — POLYETHYLENE GLYCOL 3350 17 GRAM(S): 17 POWDER, FOR SOLUTION ORAL at 11:17

## 2021-10-09 NOTE — PROGRESS NOTE ADULT - SUBJECTIVE AND OBJECTIVE BOX
[facility] -- Warren Posada, Dandre, Abelardo, Lucina, Thaddeus, Casie Warren: Office # [phone]    Follow Up:  cardiac optimization pre/post procedure     Subjective/Observations: Patient seen and examined. Patient awake, alert, resting in bed. No complaints of chest pain, dyspnea, palpitations or dizziness. No signs of orthopnea or PND. Tolerating room air. IVF @ 60, + pedal edema, forgetful, answers simple commands.     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 1000 milliLiter(s) (60 mL/Hr) IV Continuous <Continuous>    MEDICATIONS  (PRN):    Allergies    vancomycin (Nephrotoxicity)    Intolerances      Vital Signs Last 24 Hrs  T(C): 36.6 (09 Oct 2021 05:33), Max: 37.3 (08 Oct 2021 20:50)  T(F): 97.9 (09 Oct 2021 05:33), Max: 99.1 (08 Oct 2021 20:50)  HR: 84 (09 Oct 2021 05:33) (75 - 99)  BP: 96/56 (09 Oct 2021 05:33) (95/60 - 123/67)  BP(mean): --  RR: 17 (09 Oct 2021 05:33) (17 - 19)  SpO2: 92% (09 Oct 2021 05:33) (92% - 96%)  I&O's Summary    08 Oct 2021 07:01  -  09 Oct 2021 07:00  --------------------------------------------------------  IN: 720 mL / OUT: 950 mL / NET: -230 mL          TELE: Not on telemetry   PHYSICAL EXAM:  Appearance: NAD, no distress, alert, Well developed   HEENT: Moist Mucous Membranes, Anicteric  Cardiovascular: Regular rate and rhythm, Normal S1 S2, No JVD, No murmurs, No rubs, gallops or clicks  Respiratory: Non-labored, Clear to auscultation, No rales, No rhonchi, No wheezing.   Gastrointestinal:  Soft, Non-tender, + BS  Neurologic: Non-focal  Skin: Warm and dry, No visible rashes or ulcers, No ecchymosis, No cyanosis  Musculoskeletal: No clubbing, No cyanosis, No joint swelling/tenderness  Psychiatry: Mood & affect appropriate  Lymph: + pedal edema    LABS: All Labs Reviewed:                        8.5    8.43  )-----------( 139      ( 09 Oct 2021 11:40 )             26.4                         8.8    6.68  )-----------( 133      ( 08 Oct 2021 06:57 )             27.5                         9.9    5.98  )-----------( 156      ( 07 Oct 2021 11:08 )             31.1     08 Oct 2021 06:57    143    |  115    |  10     ----------------------------<  88     3.8     |  23     |  1.60   07 Oct 2021 11:08    144    |  113    |  8      ----------------------------<  158    3.6     |  24     |  1.40   07 Oct 2021 08:09    144    |  113    |  8      ----------------------------<  121    3.8     |  25     |  1.30     Ca    7.7        08 Oct 2021 06:57  Ca    7.9        07 Oct 2021 11:08  Ca    7.6        07 Oct 2021 08:09      12 Lead ECG:   Ventricular Rate 90 BPM    Atrial Rate 90 BPM    P-R Interval 226 ms    QRS Duration 98 ms    Q-T Interval 368 ms    QTC Calculation(Bazett) 450 ms    P Axis 61 degrees    R Axis -67 degrees    T Axis 47 degrees    Diagnosis Line *** Poor data quality, interpretation may be adversely affected  Sinus rhythm with 1st degree AV block  Left axis deviation  Inferior infarct , age undetermined  Anterior infarct (cited on or before 25-FEB-2020)  Abnormal ECG  When compared with ECG of 02-OCT-2021 13:30,  Significant changes have occurred  Confirmed by Leatha Mauricio MD ([id number]) on 10/3/2021 11:16:35 AM (10-02-21 @ 19:59)       EXAM:  ECHO TTE WO CON COMP W DOPPLR         PROCEDURE DATE:  02/27/2020        INTERPRETATION:  INDICATION: Heart failure  Sonographer PH    Blood Pressure 107/68    Height 177.8 cm     Weight 86.2 kg       BSA 2.0 sq m    Dimensions:    LA 3.7    Normal Values: 2.0 - 4.0 cm    Ao 3.3        Normal Values: 2.0 - 3.8 cm  SEPTUM Normal Values: 0.6 - 1.2 cm  PWT Normal Values: 0.6 - 1.1 cm  LVIDd Normal Values: 3.0 - 5.6 cm  LVIDs Normal Values: 1.8 - 4.0 cm      OBSERVATIONS:  Technically difficult and limited study  Mitral Valve: normal, trace physiologic MR.  Aortic Valve/Aorta: Sclerotic trileaflet aortic valve with normal opening.  Tricuspid Valve: normal with trace TR.  Pulmonic Valve: Not well-visualized  Left Atrium: normal  RightAtrium: Not well-visualized  Left Ventricle: normal LV size and systolic function, estimated LVEF of 55%.  Right Ventricle: Not well-visualized  Pericardium/Pleura: normal, small anterior pericardial effusion without signs of hemodynamic compromise.  Pulmonary/RV Pressure: estimated PA systolic pressure of 34mmHg. IVC is dilated    Conclusion:   Technically difficult and limited study  Normal left ventricular internal dimensions and systolic function, estimated LVEF of 55%.   Right ventricle is not well-visualized  Sclerotic trileaflet aortic valve, without AI.   Trace physiologic MR and TR.                       TYLER AYALA   This document has been electronically signed. Feb 28 2020  8:07AM                Xray Chest 1 View-PORTABLE IMMEDIATE:   EXAM:  XR CHEST PORTABLE IMMED 1V                                  PROCEDURE DATE:  10/02/2021          INTERPRETATION:  HISTORY: Sepsis    TECHNIQUE: A single AP view of the chest was obtained.    COMPARISON: 3/6/2020    FINDINGS:  The cardiac silhouette is normal in size. There are no focal consolidations or pleural effusions. The hilar and mediastinal structures appear unremarkable. The osseous structures are intact.    IMPRESSION: No focal consolidation seen in the lungs.    --- End of Report ---              YAYA DE LEÓN MD; Attending Radiologist  This document has been electronically signed. Oct  2 2021  2:49PM (10-02-21 @ 14:43)    CT Chest No Cont:   EXAM:  CT ABDOMEN AND PELVIS                          EXAM:  CT CHEST                                  PROCEDURE DATE:  10/02/2021          INTERPRETATION:  CLINICAL INFORMATION: Fever, history of left sided empyema    COMPARISON: CT chest 3/9/2020, pet/CT 6/19/2019.    CONTRAST/COMPLICATIONS:  IV Contrast: NONE  Oral Contrast: NONE  Complications: None reported at time of study completion    PROCEDURE:  CT of the Chest, Abdomen and Pelvis was performed.  Sagittal and coronal reformats were performed.    FINDINGS: Evaluation of the solid organs and vasculature is limited in the absence of intravenous contrast.  CHEST:  LUNGS AND LARGE AIRWAYS: Patent central airways. No pulmonary nodules.  PLEURA: No pleural effusion. Right pleural calcifications.  VESSELS: Aortic, aortic valvular and coronary artery calcifications.  HEART: Heart size is normal. No pericardial effusion.  MEDIASTINUM AND KAELYN: No lymphadenopathy.  CHEST WALL AND LOWER NECK: Within normal limits.    ABDOMEN AND PELVIS:  LIVER: Within normal limits.  BILE DUCTS: Normal caliber.  GALLBLADDER: Large gallbladder stone again noted. There is suggestion of gallbladder wall thickening, and pericholecystic fluid tracking inferiorly.  SPLEEN: Within normal limits.  PANCREAS: Within normal limits.  ADRENALS: Within normal limits.  KIDNEYS/URETERS: Nonobstructing 4 mm left renal calculus. Small right renal cyst. No hydronephrosis.    BLADDER: Within normal limits.  REPRODUCTIVE ORGANS: Prostate is enlarged.    BOWEL: Colonic,including right-sided, diverticulosis. Trace free fluid in the right paracolic gutter adjacent to diverticulosis may be due to gallbladder inflammation. No colonic wall to suggest acute diverticulitis. No bowel obstruction. Appendix is normal.  PERITONEUM: Trace pelvic free fluid. No pneumoperitoneum  VESSELS: Atherosclerotic changes.  RETROPERITONEUM/LYMPH NODES: No lymphadenopathy.  ABDOMINAL WALL: Within normal limits.  BONES: Diffuse degenerative changes. Status post posterior lumbar laminectomies. Mixed lucent and sclerotic lesion in the right mid humeral shaft is of unknown etiology, but unchanged since PET of 6/19/2019.    IMPRESSION:  Cholelithiasis with gallbladder wall thickening and pericholecystic edema, which extends to the rightparacolic gutter. Findings are suspicious for cholecystitis. No colonic wall thickening to suggest diverticulitis. Correlate with patient's symptomatology and right upper quadrant ultrasound.    Incidental findings as above.    --- End of Report ---              MATEO NEVILLE MD; Attending Radiologist  This document has been electronically signed. Oct  2 2021  3:41PM (10-02-21 @ 14:55)

## 2021-10-09 NOTE — PROGRESS NOTE ADULT - ASSESSMENT
89 y/o M with PMHx of dementia, paroxysmal atrial fibrillation (not on AC), hx of CLL, HLD, osteoarthritis presented to [facility] ED with an acute episode of rigors and unresponsiveness admitted with severe sepsis/shock secondary to acute cholecystitis.    CM notes reviewed -   on PO ABX  on PO diet - adjustment noted  on IVF  Surgery f/u noted    medical management of sepsis - acute jackelin  surgery  gi  ABX  I and O  monitor VS and HD and Sat  serial labs  serial ABD exam

## 2021-10-09 NOTE — PROGRESS NOTE ADULT - PROVIDER SPECIALTY LIST ADULT
Cardiology
Cardiology
Critical Care
Infectious Disease
Pulmonology
Pulmonology
Cardiology
Cardiology
Hospitalist
Infectious Disease
Infectious Disease
Pulmonology
Pulmonology
Surgery
Surgery
Cardiology
Critical Care
Infectious Disease
Pulmonology
Surgery
Pulmonology
Surgery
Hospitalist

## 2021-10-09 NOTE — PROGRESS NOTE ADULT - REASON FOR ADMISSION
sepsis/cholecystitis

## 2021-10-09 NOTE — PROGRESS NOTE ADULT - SUBJECTIVE AND OBJECTIVE BOX
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 1000 milliLiter(s) (60 mL/Hr) IV Continuous <Continuous>    MEDICATIONS  (PRN):         Vitals log        ICU Vital Signs Last 24 Hrs  T(C): 37.3 (08 Oct 2021 20:50), Max: 37.3 (08 Oct 2021 20:50)  T(F): 99.1 (08 Oct 2021 20:50), Max: 99.1 (08 Oct 2021 20:50)  HR: 99 (08 Oct 2021 20:50) (75 - 99)  BP: 123/67 (08 Oct 2021 20:50) (95/60 - 123/67)  BP(mean): --  ABP: --  ABP(mean): --  RR: 18 (08 Oct 2021 20:50) (18 - 19)  SpO2: 92% (08 Oct 2021 20:50) (92% - 96%)           Input and Output:  I&O's Detail    07 Oct 2021 07:01  -  08 Oct 2021 07:00  --------------------------------------------------------  IN:    sodium chloride 0.9%: 720 mL  Total IN: 720 mL    OUT:  Total OUT: 0 mL    Total NET: 720 mL      08 Oct 2021 07:01  -  09 Oct 2021 05:24  --------------------------------------------------------  IN:    sodium chloride 0.9%: 360 mL  Total IN: 360 mL    OUT:    Voided (mL): 500 mL  Total OUT: 500 mL    Total NET: -140 mL          Lab Data                        8.8    6.68  )-----------( 133      ( 08 Oct 2021 06:57 )             27.5     10-08    143  |  115<H>  |  10  ----------------------------<  88  3.8   |  23  |  1.60<H>    Ca    7.7<L>      08 Oct 2021 06:57              Review of Systems	      Objective     Physical Examination    heart s1s2  lung dec BS  abd soft  head nc  frail  weak      Pertinent Lab findings & Imaging      [place]:  NO   Adequate UO     I&O's Detail    07 Oct 2021 07:01  -  08 Oct 2021 07:00  --------------------------------------------------------  IN:    sodium chloride 0.9%: 720 mL  Total IN: 720 mL    OUT:  Total OUT: 0 mL    Total NET: 720 mL      08 Oct 2021 07:01  -  09 Oct 2021 05:24  --------------------------------------------------------  IN:    sodium chloride 0.9%: 360 mL  Total IN: 360 mL    OUT:    Voided (mL): 500 mL  Total OUT: 500 mL    Total NET: -140 mL               Discussed with:     Cultures:	        Radiology

## 2021-10-09 NOTE — DISCHARGE NOTE NURSING/CASE MANAGEMENT/SOCIAL WORK - PATIENT PORTAL LINK FT
You can access the FollowMyHealth Patient Portal offered by [facility] by registering at the following website: [email or web address]. By joining [facility]’s FollowMyHealth portal, you will also be able to view your health information using other applications (apps) compatible with our system.

## 2021-10-09 NOTE — PROGRESS NOTE ADULT - ASSESSMENT
89 y/o M with PMH of dementia, paroxsymal atrial fibrillation (not on AC), hx of CLL, HLD, osteoarthritis transfer from [facility] with severe sepsis 2/2 possible cholecystitis.     Afib with RVR  - Hx of pAF, in the setting of stressors - previously during empyema and now with acute cholecystitis   - Afib with RVR on admission at [place] on initial EKG: Afib RVR at 128bpm, repeat with sinus with 1st degree AV block  - HR are now mostly controlled, now off tele  - HR: 84 (10-09 @ 05:33) (75 - 99)  - Was on amio, off amio and is on BB  - Off AC due to hx of hematuria with anemia and dementia.    - Previous ECHO 2/2020 with EF of 55%. No need to repeat    Hypotension   - BP: 96/56 (10-09-21 @ 05:33) (95/60 - 123/67)  - Continue BB low dose   - Not on home BP meds    Acute cholecystitis   - HIDA +.  Family opting for conservative management for now  - Surgery and ID following.  Abx per ID  - If planned for any surgical/IR procedure, he is considered at elevated risk but no modifiable active cardiac risk factors at the moment.  He is optimized as best as possible from cardiovascular standpoint.  Routine hemodynamic monitoring recommended    - Monitor and replete lytes, keep K>4, Mg>2.  - All other medical needs as per primary team.  - Other cardiovascular workup will depend on clinical course.  - Will continue to follow.    Chidi Severino, MS FNP, [facility]  Nurse Practitioner- Cardiology   Spectra #[phone]

## 2021-10-09 NOTE — PROGRESS NOTE ADULT - ATTENDING COMMENTS
89M h/o dementia, pAFib (not on AC), previous CLL, HLD, OA transferred from [facility] with sepsis 2/2 acute cholecystitis.     Neuro: cont risperdal and haldol prn   CV: transient hypotension at [facility], never required pressor support, maintaining MAP > 65 without chemical assistance  - pAFib, required Amio 150 IVPB and started PO load (had previously been on Amio for pAF, last prescribed in March per [place] review)  - no AC due to previous bleeding issues and dementia (risk > benefit)  - continue lopressor PRN for rate control  Pulm: no acute issues, stable on room air  GI: HIDA+ for acute jackelin, continue medical management for now as pt remains pain free and montior labs and status - if ok with surgery likely start CLD today  Renal: GUS improved s/p fluid resuscitation, likely ATN from sepsis and transient hypotension  ID: continue zosyn for acute jackelin, follow-up blood cultures  Heme: dvt ppx with HSQ,    D/w surgery and cardiology. Eligible for transfer to [facility]
Chart reviewed    Patient seen and examined    Agree with plan as outlined above    89 y/o M with PMH of dementia, paroxsymal atrial fibrillation (not on AC), hx of CLL, HLD, osteoarthritis transfer from [facility] with severe sepsis 2/2 possible cholecystitis.     Afib with RVR  - Hx of pAF, in the setting of stressors - previously during empyema and now with acute cholecystitis   - Afib with RVR on admission at [place] on initial EKG: Afib RVR at 128bpm, repeat with sinus with 1st degree AV block  - HR are now mostly controlled, now off tele  - HR: 82 (10-08 @ 04:58) (59 - 82)  - Was on amio, off amio and is on BB  - Off AC due to hx of hematuria with anemia and dementia.    - Previous ECHO 2/2020 with EF of 55%. No need to repeat
Chart reviewed    Patient seen and examined    Agree with plan as outlined above    89 y/o M with PMH of dementia, paroxsymal atrial fibrillation (not on AC), hx of CLL, HLD, osteoarthritis transfer from [facility] with severe sepsis 2/2 possible cholecystitis.     Afib with RVR  - Hx of pAF, in the setting of stressors - previously during empyema and now with acute cholecystitis   - Afib with RVR on admission at [place] on initial EKG: Afib RVR at 128bpm, repeat with sinus with 1st degree AV block  - HR are now mostly controlled, now off tele  - HR: 84 (10-09 @ 05:33) (75 - 99)  - Was on amio, off amio and is on BB  - Off AC due to hx of hematuria with anemia and dementia.    - Previous ECHO 2/2020 with EF of 55%. No need to repeat
Pt seen.  Elderly gentleman no distress.  Denies pain.  Tolerating diet.  No scleral icterus or jaundice.  LFTs normal.  NO acute surgical intervention indicate.  Discharge planning as per primary service.
Now in AF. cont amio in hopes of converting to sr. but for now can use as rate control. may eventually just need to start bb if bp can tolerate. no ac. Further cardiac workup will depend on clinical course.
Now in AF. cont amio in hopes of converting to sr. but for now can use as rate control. may eventually just need to start bb if bp can tolerate. no ac. can proceed with IR procedure. Further cardiac workup will depend on clinical course.
Pt seen and assessed with ICU team. Agree with above except where documented below.

## 2021-10-09 NOTE — PROGRESS NOTE ADULT - SUBJECTIVE AND OBJECTIVE BOX
SURGERY PA NOTE ON BEHALF OF DR. TILLEY (COVERING FOR DR. OCASIO) / GENERAL SURGERY:    S: Patient seen and examined at bedside.  Patient more alert and awake this morning, reports persistent anorexia - denies any abdominal pain, nausea, vomiting.  Has had 2 very small, soft BM's per RN staff (1 yesterday afternoon, 1 earlier this morning).  Still on bowel regimen.  Patient reports that he has not yet passed flatus.  Has been OOB to chair.      MEDICATIONS:  amoxicillin  875 milliGRAM(s)/clavulanate 1 Tablet(s) Oral every 12 hours  metoprolol tartrate 12.5 milliGRAM(s) Oral two times a day  pantoprazole  Injectable 40 milliGRAM(s) IV Push daily  polyethylene glycol 3350 17 Gram(s) Oral daily  risperiDONE   Tablet 2 milliGRAM(s) Oral at bedtime  senna 2 Tablet(s) Oral at bedtime  sodium chloride 0.9%. 1000 milliLiter(s) IV Continuous <Continuous>      O:  Vital Signs Last 24 Hrs  T(C): 36.6 (09 Oct 2021 05:33), Max: 37.3 (08 Oct 2021 20:50)  T(F): 97.9 (09 Oct 2021 05:33), Max: 99.1 (08 Oct 2021 20:50)  HR: 84 (09 Oct 2021 05:33) (75 - 99)  BP: 96/56 (09 Oct 2021 05:33) (95/60 - 123/67)  BP(mean): --  RR: 17 (09 Oct 2021 05:33) (17 - 19)  SpO2: 92% (09 Oct 2021 05:33) (92% - 96%)    I&O SUMMARY:    10-08-21 @ 07:01  -  10-09-21 @ 07:00  --------------------------------------------------------  IN: 720 mL / OUT: 950 mL / NET: -230 mL        PHYSICAL EXAM:  Lungs: CTA bilat without W/R/R  Card: S1S2  Abd: Soft, remains NT over RUQ or elsewhere throughout, ND.  +BS x 4.  No rebound/guarding.    Ext: Calves soft, NT, without edema bilat    LABS:  Pending for today...    A:  89-yo male with cholelithiasis, gallbladder wall thickening  HIDA positive for acute cholecystitis       P:  Patient remains asymptomatic with a benign abdominal exam, with persistent anorexia  Seems to have perked up a bit with IVF, vitals remain stable (as yesterday, with mild periodic hypotension, though is relatively on-trend with prior readings)  Tolerating change to PO augmentin, remains afebrile - will follow WBC  Labwork pending for today - will follow...  Holding off from any surgical or invasive modalities at this time - will continue abx  Apparently doing well with regard to infectious picture, lethargy improved  Per RN staff, family plans for d/c to home with home hospice - will re-visit later today  Will d/w Dr. Tilley, will follow

## 2021-10-11 PROBLEM — C88.4 EXTRANODAL MARGINAL ZONE B-CELL LYMPHOMA OF MUCOSA-ASSOCIATED LYMPHOID TISSUE [MALT-LYMPHOMA]: Chronic | Status: ACTIVE | Noted: 2021-10-03

## 2021-10-12 ENCOUNTER — RX CHANGE (OUTPATIENT)
Age: 86
End: 2021-10-12

## 2021-10-13 ENCOUNTER — RX CHANGE (OUTPATIENT)
Age: 86
End: 2021-10-13

## 2021-10-13 RX ORDER — HALOPERIDOL 2 MG/ML
2 SOLUTION, CONCENTRATE ORAL
Qty: 60 | Refills: 2 | Status: ACTIVE | COMMUNITY
Start: 2021-09-30 | End: 1900-01-01

## 2021-10-15 ENCOUNTER — APPOINTMENT (OUTPATIENT)
Dept: CARDIOLOGY | Facility: CLINIC | Age: 86
End: 2021-10-15
Payer: MEDICARE

## 2021-10-15 PROCEDURE — 99213 OFFICE O/P EST LOW 20 MIN: CPT | Mod: 95

## 2021-10-15 NOTE — PHYSICAL EXAM
[Frail] : frail [Ill-Appearing] : ill-appearing [Lethargic] : lethargic [No Xanthelasma] : no xanthelasma [No Cyanosis] : no cyanosis [No Clubbing] : no clubbing [No Rash] : no rash [No Focal Deficits] : no focal deficits [Cognitive Impairment] : cognitive impairment [de-identified] : chair bound

## 2021-10-15 NOTE — HISTORY OF PRESENT ILLNESS
[Home] : at home, [unfilled] , at the time of the visit. [Medical Office: ([facility])___] : at the medical office located in  [Family Member] : family member [Verbal consent obtained from patient] : the patient, [unfilled] [FreeTextEntry1] : initiated 10:50am 89 yo male was recently [facility] after non invasive treatment for cholecystitis. Cholecystostomy was deferred in the presence of clinical improvement with antibiotics. Pt is lethargic and not thriving at home. Son (a physician) agrees to reenroll his father in hospice.  [FreeTextEntry3] : CARLOS ROSAS (DAUGHTER) [FreeTextEntry4] : MAYRA ALBRIGHT

## 2021-10-15 NOTE — DISCUSSION/SUMMARY
[Hyperlipidemia] : hyperlipidemia [FreeTextEntry4] : functional decline [FreeTextEntry1] : Pt will be enrolled in hospice. We will continue to provide support to patient and family as necessary.

## 2021-10-25 ENCOUNTER — APPOINTMENT (OUTPATIENT)
Dept: CARDIOLOGY | Facility: CLINIC | Age: 86
End: 2021-10-25

## 2021-10-25 NOTE — HISTORY OF PRESENT ILLNESS
[Patient] : the patient [Self] : self [FreeTextEntry2] : Fili Winters [Home] : at home, [unfilled] , at the time of the visit. [Medical Office: ([facility])___] : at the medical office located in  [Family Member] : family member [Verbal consent obtained from patient] : the patient, [unfilled] [FreeTextEntry1] : initiatied 10:50am 89 yo  [FreeTextEntry3] : CARLOS ROSAS (DAUGHTER) [FreeTextEntry4] : MAYRA ALBRIGHT

## 2021-10-25 NOTE — HISTORY OF PRESENT ILLNESS
[Patient] : the patient [Self] : self [FreeTextEntry2] : Fili Winters [Home] : at home, [unfilled] , at the time of the visit. [Medical Office: ([facility])___] : at the medical office located in  [Family Member] : family member [Verbal consent obtained from patient] : the patient, [unfilled] [FreeTextEntry1] : initiatied 10:50am 89 yo  [FreeTextEntry3] : CARLOS ROSAS (DAUGHTER) [FreeTextEntry4] : MAYRA ALBRIGHT

## 2021-10-26 PROCEDURE — 85610 PROTHROMBIN TIME: CPT

## 2021-10-26 PROCEDURE — 97162 PT EVAL MOD COMPLEX 30 MIN: CPT

## 2021-10-26 PROCEDURE — 80053 COMPREHEN METABOLIC PANEL: CPT

## 2021-10-26 PROCEDURE — 84100 ASSAY OF PHOSPHORUS: CPT

## 2021-10-26 PROCEDURE — 71045 X-RAY EXAM CHEST 1 VIEW: CPT

## 2021-10-26 PROCEDURE — 85027 COMPLETE CBC AUTOMATED: CPT

## 2021-10-26 PROCEDURE — 87086 URINE CULTURE/COLONY COUNT: CPT

## 2021-10-26 PROCEDURE — 78226 HEPATOBILIARY SYSTEM IMAGING: CPT

## 2021-10-26 PROCEDURE — 86769 SARS-COV-2 COVID-19 ANTIBODY: CPT

## 2021-10-26 PROCEDURE — 70450 CT HEAD/BRAIN W/O DYE: CPT

## 2021-10-26 PROCEDURE — 82962 GLUCOSE BLOOD TEST: CPT

## 2021-10-26 PROCEDURE — 85730 THROMBOPLASTIN TIME PARTIAL: CPT

## 2021-10-26 PROCEDURE — A9537: CPT

## 2021-10-26 PROCEDURE — 83735 ASSAY OF MAGNESIUM: CPT

## 2021-10-26 PROCEDURE — 83605 ASSAY OF LACTIC ACID: CPT

## 2021-10-26 PROCEDURE — 93005 ELECTROCARDIOGRAM TRACING: CPT

## 2021-10-26 PROCEDURE — 82803 BLOOD GASES ANY COMBINATION: CPT

## 2021-10-26 PROCEDURE — 96367 TX/PROPH/DG ADDL SEQ IV INF: CPT

## 2021-10-26 PROCEDURE — 71250 CT THORAX DX C-: CPT

## 2021-10-26 PROCEDURE — 97112 NEUROMUSCULAR REEDUCATION: CPT

## 2021-10-26 PROCEDURE — 81001 URINALYSIS AUTO W/SCOPE: CPT

## 2021-10-26 PROCEDURE — 80048 BASIC METABOLIC PNL TOTAL CA: CPT

## 2021-10-26 PROCEDURE — 85025 COMPLETE CBC W/AUTO DIFF WBC: CPT

## 2021-10-26 PROCEDURE — 74176 CT ABD & PELVIS W/O CONTRAST: CPT

## 2021-10-26 PROCEDURE — 0225U NFCT DS DNA&RNA 21 SARSCOV2: CPT

## 2021-10-26 PROCEDURE — 97110 THERAPEUTIC EXERCISES: CPT

## 2021-10-26 PROCEDURE — 36415 COLL VENOUS BLD VENIPUNCTURE: CPT

## 2021-10-26 PROCEDURE — 76705 ECHO EXAM OF ABDOMEN: CPT

## 2021-10-26 PROCEDURE — 96365 THER/PROPH/DIAG IV INF INIT: CPT

## 2021-10-26 PROCEDURE — 87040 BLOOD CULTURE FOR BACTERIA: CPT

## 2021-10-26 PROCEDURE — 99291 CRITICAL CARE FIRST HOUR: CPT

## 2021-10-26 PROCEDURE — 80076 HEPATIC FUNCTION PANEL: CPT

## 2021-10-26 PROCEDURE — 83690 ASSAY OF LIPASE: CPT

## 2021-10-28 NOTE — ED ADULT TRIAGE NOTE - NS ED NURSE AMBULANCES
[facility] [Midline] : trachea located in midline position [Normal] : parotid gland, submandibular gland and thyroid glands are normal [de-identified] : crusting

## 2021-11-18 RX ORDER — METOPROLOL SUCCINATE 25 MG/1
25 TABLET, EXTENDED RELEASE ORAL DAILY
Qty: 45 | Refills: 1 | Status: ACTIVE | COMMUNITY
Start: 2021-11-18 | End: 1900-01-01

## 2022-01-03 ENCOUNTER — RX RENEWAL (OUTPATIENT)
Age: 87
End: 2022-01-03

## 2022-01-03 RX ORDER — RISPERIDONE 2 MG/1
2 TABLET, FILM COATED ORAL
Qty: 90 | Refills: 0 | Status: ACTIVE | COMMUNITY
Start: 2021-07-01 | End: 1900-01-01

## 2022-01-03 RX ORDER — PANTOPRAZOLE 40 MG/1
40 TABLET, DELAYED RELEASE ORAL
Qty: 180 | Refills: 0 | Status: ACTIVE | COMMUNITY
Start: 2021-07-01 | End: 1900-01-01

## 2022-02-22 ENCOUNTER — RX RENEWAL (OUTPATIENT)
Age: 87
End: 2022-02-22

## 2022-04-19 NOTE — CHART NOTE - NSCHARTNOTEFT_GEN_A_CORE
LOV: 01/27/2022 Molly Daniel PA-C   Nxt Apt: 04/28/2022 Tia Hernández PA-C  Last Refill:02/23/2022  Gabapentin 300mg capsule    Please Advise      Patient has been intermittently hypotensive with SBP in 80's-90's. Patient has been adequately fluid resuscitated and started on Midodrine. Discussed with Dr. Maxwell and eICU Dr. Lux. May start pressors if unable to maintain MAP. Also discussed with Dr. Uribe as acute jackelin likely source of infection based on clinical presentation although unclear if acute vs chronic. Family was updated in detail and case was discussed with intensivist Dr. Martinez at [facility]. Decision was made to transfer to [place] under ICU service with continuation of surgical care with Dr. Uribe. Consent obtained from son for transfer and witnessed by RN Sulma. Transfer center called and updated, will arrange for urgent ambulance transport to [place].

## 2022-05-25 NOTE — ED ADULT NURSE NOTE - CHIEF COMPLAINT QUOTE
ED Attending Physician Note      Patient : Marcos Jones Age: 59 year old Sex: male   MRN: [id number] Encounter Date: 5/25/2022      History     Chief Complaint   Patient presents with   • Chest Pain Adult   • Dizziness     59 yoM w pmhx of CAD w/ 3 stents on plavix who presents today as an RRT from outpatient pulmonary function testing due to SOB and lightheadedness s/p PFTs today. Prior to the testing today, he was feeling like his usual self w/ no sob, lightheadedness, or chest pain. He notes that he has been experiencing some mild breathing difficulties x6 mo which is why he underwent PFTs. His SOB is worse with exertion. Denies chest pain, palpitations, nvd, or abd pain. Not a current cigarette smoker, last smoked 19 years ago. Denies hx of COPD. Denies leg swelling, recent travel, surgeries, or prior hx of dvt/pe.     The history is provided by the patient.       No Known Allergies    Past Medical History:   Diagnosis Date   • Bronchitis, not specified as acute or chronic    • Essential (primary) hypertension        No past surgical history on file.    No family history on file.    Social History     Tobacco Use   • Smoking status: Never Smoker   • Smokeless tobacco: Never Used   Substance Use Topics   • Alcohol use: Yes   • Drug use: Never       See HPI, otherwise ROS negative  Review of Systems   Constitutional: Negative for chills and fever.   HENT: Negative.    Eyes: Negative.    Respiratory: Positive for shortness of breath. Negative for cough and chest tightness.    Cardiovascular: Negative for chest pain, palpitations and leg swelling.   Gastrointestinal: Negative for diarrhea, nausea and vomiting.   Endocrine: Negative.    Genitourinary: Negative.    Musculoskeletal: Negative.    Skin: Negative.    Allergic/Immunologic: Negative.    Neurological: Positive for light-headedness. Negative for dizziness, syncope, speech difficulty, weakness and numbness.   Hematological: Negative.   Transfer from [facility] for flank pain.   Psychiatric/Behavioral: Negative.        Physical Exam     ED Triage Vitals [05/25/22 0826]   ED Triage Vitals Group      Temp 98.9 °F (37.2 °C)      Heart Rate 94      Resp (!) 29      BP (!) 146/98      SpO2 96 %      EtCO2 mmHg       Height       Weight       Weight Scale Used       BMI (Calculated)       IBW/kg (Calculated)        Physical Exam  Vitals and nursing note reviewed.   Constitutional:       General: He is not in acute distress.     Appearance: Normal appearance. He is not ill-appearing, toxic-appearing or diaphoretic.   HENT:      Head: Normocephalic and atraumatic.      Right Ear: External ear normal.      Left Ear: External ear normal.      Mouth/Throat:      Mouth: Mucous membranes are moist.      Neck: Normal range of motion and neck supple. No muscular tenderness.   Eyes:      Extraocular Movements: Extraocular movements intact.      Conjunctiva/sclera: Conjunctivae normal.      Pupils: Pupils are equal, round, and reactive to light.   Cardiovascular:      Rate and Rhythm: Normal rate and regular rhythm.      Heart sounds: Normal heart sounds. No murmur heard.  Pulmonary:      Effort: No accessory muscle usage or respiratory distress.      Breath sounds: Normal breath sounds. No stridor. No wheezing or rales.   Abdominal:      General: Abdomen is flat. There is no distension.      Palpations: Abdomen is soft.      Tenderness: There is no abdominal tenderness. There is no guarding or rebound.   Musculoskeletal:         General: No deformity. Normal range of motion.      Right lower leg: No tenderness. No edema.      Left lower leg: No edema.   Skin:     General: Skin is warm and dry.      Capillary Refill: Capillary refill takes less than 2 seconds.   Neurological:      General: No focal deficit present.      Mental Status: He is alert and oriented to person, place, and time.      Cranial Nerves: No cranial nerve deficit.      Sensory: No sensory deficit.      Motor: No weakness.   Psychiatric:          Mood and Affect: Mood normal.         Behavior: Behavior normal.         Thought Content: Thought content normal.         Judgment: Judgment normal.         ED Course     Procedures    Lab Results     Results for orders placed or performed during the hospital encounter of 05/25/22   Comprehensive Metabolic Panel   Result Value Ref Range    Fasting Status      Sodium 138 135 - 145 mmol/L    Potassium 4.0 3.4 - 5.1 mmol/L    Chloride 101 97 - 110 mmol/L    Carbon Dioxide 27 21 - 32 mmol/L    Anion Gap 14 7 - 19 mmol/L    Glucose 161 (H) 70 - 99 mg/dL    BUN 14 6 - 20 mg/dL    Creatinine 1.21 (H) 0.67 - 1.17 mg/dL    Glomerular Filtration Rate 69 >=60    BUN/ Creatinine Ratio 12 7 - 25    Calcium 9.1 8.4 - 10.2 mg/dL    Bilirubin, Total 0.5 0.2 - 1.0 mg/dL    GOT/AST 41 (H) <=37 Units/L    GPT/ALT 52 <64 Units/L    Alkaline Phosphatase 62 45 - 117 Units/L    Albumin 3.7 3.6 - 5.1 g/dL    Protein, Total 9.0 (H) 6.4 - 8.2 g/dL    Globulin 5.3 (H) 2.0 - 4.0 g/dL    A/G Ratio 0.7 (L) 1.0 - 2.4   TROPONIN I, HIGH SENSITIVITY   Result Value Ref Range    Troponin I, High Sensitivity 12 <77 ng/L   CBC with Automated Differential (performable only)   Result Value Ref Range    WBC 4.4 4.2 - 11.0 K/mcL    RBC 4.54 4.50 - 5.90 mil/mcL    HGB 13.5 13.0 - 17.0 g/dL    HCT 39.1 39.0 - 51.0 %    MCV 86.1 78.0 - 100.0 fl    MCH 29.7 26.0 - 34.0 pg    MCHC 34.5 32.0 - 36.5 g/dL    RDW-CV 12.9 11.0 - 15.0 %    RDW-SD 40.3 39.0 - 50.0 fL     140 - 450 K/mcL    NRBC 0 <=0 /100 WBC    Neutrophil, Percent 60 %    Lymphocytes, Percent 25 %    Mono, Percent 12 %    Eosinophils, Percent 3 %    Basophils, Percent 0 %    Immature Granulocytes 0 %    Absolute Neutrophils 2.6 1.8 - 7.7 K/mcL    Absolute Lymphocytes 1.1 1.0 - 4.0 K/mcL    Absolute Monocytes 0.5 0.3 - 0.9 K/mcL    Absolute Eosinophils  0.1 0.0 - 0.5 K/mcL    Absolute Basophils 0.0 0.0 - 0.3 K/mcL    Absolute Immmature Granulocytes 0.0 0.0 - 0.2 K/mcL   Magnesium    Result Value Ref Range    Magnesium 2.2 1.7 - 2.4 mg/dL   NT proBNP   Result Value Ref Range    NT-proBNP 61 <=125 pg/mL   D Dimer, Quantitative   Result Value Ref Range    D Dimer, Quantitative 0.48 <0.57 mg/L (FEU)   TROPONIN I, HIGH SENSITIVITY   Result Value Ref Range    Troponin I, High Sensitivity 9 <77 ng/L       EKG Results     0820   Rate: 87   NSR  Normal intervals.  QTc not prolonged.  No TRAVIS/STD/TWI.     EKG tracing interpreted by ED physician    Radiology Results     Imaging Results          XR CHEST PA AND LATERAL 2 VIEWS (Final result)  Result time 05/25/22 09:35:28    Final result                 Impression:       No focal infiltrate or acute radiographic abnormality allowing for mild  bronchovascular crowding.      FOR PHYSICIAN USE ONLY - Please note that this report was generated using  voice recognition software.  If you require clarification or feel that  there has been an error in this report please contact me through  [employer].  Thank you very much for allowing me to participate in the  care of your patient.       Electronically Signed by: MAURICE STONE M.D.   Signed on: 5/25/2022 9:35 AM                Narrative:    EXAM: XR CHEST PA AND LATERAL 2 VIEWS    CLINICAL INDICATION: 59-year-old male with chest pain and dizziness    COMPARISON: None    FINDINGS: There low lung volumes with mild bronchovascular crowding on the  frontal view.  The cardiomediastinal silhouette and pulmonary vasculature  are within normal limits. There is no pleural effusion, focal infiltrate,  or pneumothorax. The there is mild degenerative spurring of the visualized  spine.                                ED Medication Orders (From admission, onward)    Ordered Start     Status Ordering Provider    05/25/22 0948 05/25/22 0949  albuterol (VENTOLIN) nebulizer 5 mg  ONCE         Last MAR action: Given RONALDO HICKMAN    05/25/22 0948 05/25/22 0949  ipratropium (ATROVENT) 0.02 % nebulizer solution 0.5 mg  ONCE          Last MAR action: Given RONALDO ZAIDI    05/25/22 0948 05/25/22 0949  dexAMETHasone (DECADRON) tablet 10 mg  ONCE         Last MAR action: Given RONALDO ZAIDI    05/25/22 0904 05/25/22 0905  sodium chloride (NORMAL SALINE) 0.9 % bolus 500 mL  ONCE         Last MAR action: Completed RONALDO ZAIDI          ED Course as of 06/21/22 1016   Wed May 25, 2022   0949 On reevaluation, patient is resting comfortably.  In no distress.States that his symptoms have improved.Does report that his dyspnea is more so related to exertion [KG]   1221 Troponin I, High Sensitivity: 9 [KG]      ED Course User Index  [KG] Ronaldo Zaidi MD       MDM  Number of Diagnoses or Management Options  Dizziness  Shortness of breath  Diagnosis management comments:     Workup as above    I discussed the workup and plan with the patient. Given strict return precautions. Instructed to follow-up closely with PCP. Patient expressed understanding and agreement. All questions answered. Patient discharged in stable condition.   Given discharge instructions as below-  Your work-up was as discussed.  I offered to keep you for cardiac testing however he said you have had follow-up with your cardiologist 2 months ago and have a follow-up coming up.  We also stated you have a follow-up coming up with the pulmonologist/lung specialist.  Please also follow-up closely with your PCP.  Please return to the emergency department for any new or worsening symptoms such as persistent or worsening dyspnea, chest pain, vomiting, sweating, lightheadedness, dizziness or any other worrisome symptoms you may have.  For trial purposes, I have given you albuterol inhaler and take for shortness of breath as needed.       Amount and/or Complexity of Data Reviewed  Clinical lab tests: ordered and reviewed  Tests in the radiology section of CPT®: ordered and reviewed  Independent visualization of images, tracings, or specimens: yes        Clinical Impression     ED Diagnosis   1.  Shortness of breath     2. Dizziness         Disposition        Discharge Medication List as of 5/25/2022 12:25 PM      Prior to Admission Medications    Details   sildenafil (VIAGRA) 50 MG tablet Take 1 tablet by mouth as needed for Erectile Dysfunction.Eprescribe, Disp-5 tablet, R-11      cloNIDine (CATAPRES) 0.1 MG tablet Take 1 tablet by mouth 2 times daily.Normal, Disp-180 tablet, R-1      amLODIPine (NORVASC) 10 MG tablet Take 1 tablet by mouth daily.Normal, Disp-90 tablet, R-1      hydroxychloroquine (PLAQUENIL) 200 MG tablet Take 200 mg by mouth 2 times daily.Historical Med      aspirin 81 MG tablet Take 81 mg by mouth daily.Historical Med      enalapril (VASOTEC) 20 MG tablet Take 20 mg by mouth 2 times daily.Historical Med      albuterol-ipratropium (COMBIVENT RESPIMAT) 100-20 MCG/ACT inhaler Inhale 1 puff into the lungs every 4 hours as needed.Historical Med             Discharge Medication List as of 5/25/2022 12:25 PM      New Prescriptions    Details   albuterol 108 (90 Base) MCG/ACT inhaler Inhale 2 puffs into the lungs every 4 hours as needed for Wheezing.Eprescribe, Disp-8.5 g, R-0             Discharge 5/25/2022 12:21 PM  Marcos Jones discharge to home/self care.          Irma Brothers   6/21/2022 8:26 AM   Note co-written w/ Irma Brothers, ED Scribe.            Efrain Zaidi MD  06/21/22 8059

## 2022-05-30 NOTE — ED ADULT NURSE NOTE - NS TRANSFER PATIENT BELONGINGS
EMERGENCY DEPARTMENT ENCOUNTER      NAME: Tyson Mcintosh  AGE: 78 year old male  YOB: 1943  MRN: [id number]  EVALUATION DATE & TIME: 5/30/2022 10:34 AM    PCP: Darline Keyes    ED PROVIDER: Abida Carreno Akil Coelho MD;  Location: [facility] Cath Lab;  Service: Cardiology     CV LEFT HEART CATHETERIZATION WITHOUT LEFT VENTRICULOGRAM Left 1/9/2019    Procedure: Left Heart Catheterization Without Left Ventriculogram;  Surgeon: Akil Coelho MD;  Location: [facility] Cath Lab;  Sukh Diez MD;  Location: [facility] OR;  Abida Carreno DO  Emergency Medicine  [facility] EMERGENCY ROOM  [address] [address] [phone]  Dept: [phone]       Abida Carreno DO  05/30/22 4397     Clothing

## 2022-10-10 NOTE — ED PROVIDER NOTE - NSTIMEPROVIDERCAREINITIATE_GEN_ER
13-Jul-2020 04:18 Inflammation Suggestive Of Cancer Camouflage Histology Text: There was a dense lymphocytic infiltrate which prevented adequate histologic evaluation of adjacent structures.

## 2023-01-21 NOTE — H&P ADULT - NSICDXPASTSURGICALHX_GEN_ALL_CORE_FT
PAST SURGICAL HISTORY:  History of Arthroscopy of Knee bilateral    S/P Tonsillectomy childhood    Status Post Arthroscopy right shoulder     Azra

## 2023-04-23 NOTE — PROGRESS NOTE ADULT - ASSESSMENT
89 yo M with PMH of HLD and MALToma of the stomach (under surveillance), spinal stenosis with extensive lumbar surgery in 2011 and bilateral LE neuropathy, admitted for L side empyema and R ureteral stone. Patient initially reported what appeared to be stable occasional angina however later denied this - he is a poor historian. Review of EKG revealed possible inferior infarct however no wall motion abnormalities on 2/27 echo. Elevated proBNP however normal LV systolic function with EF 55% on echo and no evidence of meaningful volume overload on exam. Patient has no active ischemia, decompensated HF, unstable arrythmia, or severe stenotic valvular disease.     - Continue Atorvastatin.  - Continue heparin SQ for DVT prophylaxis.  - Monitor and replete lytes, keep K>4, Mg>2.  - Patient is average cardiac risk and optimized from cardiovascular standpoint to proceed with needed thoracic or urologic procedure.   - Rest of management per urology/CT surgery/primary team. Other cardiovascular workup will depend on clinical course. Will follow. None 89 yo M with PMH of HLD and MALToma of the stomach (under surveillance), spinal stenosis with extensive lumbar surgery in 2011 and bilateral LE neuropathy, admitted for L side empyema and R ureteral stone. Patient initially reported what appeared to be stable occasional angina however later denied this - he is a poor historian.     Vo  -Elevated proBNP however normal LV systolic function with EF 55% on echo and no evidence of meaningful volume overload on exam.   Review of EKG revealed possible inferior infarct however no wall motion abnormalities on 2/27 echo.     HLD  - Continue Atorvastatin.    DVT  - Continue heparin SQ     - Monitor and replete lytes, keep K>4, Mg>2.  - Patient is average cardiac risk and optimized from cardiovascular standpoint to proceed with needed thoracic or urologic procedure. Patient has no active ischemia, decompensated HF, unstable arrythmia, or severe stenotic valvular disease.     - Rest of management per urology/CT surgery/primary team. Other cardiovascular workup will depend on clinical course. Will follow.  Arnoldo Rosales [facility]  Cardiology   Spectra #[phone]

## 2023-08-23 NOTE — H&P ADULT - CARDIOVASCULAR
details… detailed exam Cimetidine Counseling:  I discussed with the patient the risks of Cimetidine including but not limited to gynecomastia, headache, diarrhea, nausea, drowsiness, arrhythmias, pancreatitis, skin rashes, psychosis, bone marrow suppression and kidney toxicity.

## 2024-12-19 NOTE — BEHAVIORAL HEALTH ASSESSMENT NOTE - ORIENTED TO PLACE
If you experience any new or concerning symptoms or your symptoms change or worsen, return to the ER as soon as possible.      Yes
